# Patient Record
Sex: FEMALE | Race: BLACK OR AFRICAN AMERICAN | Employment: OTHER | URBAN - METROPOLITAN AREA
[De-identification: names, ages, dates, MRNs, and addresses within clinical notes are randomized per-mention and may not be internally consistent; named-entity substitution may affect disease eponyms.]

---

## 2017-01-05 ENCOUNTER — CLINICAL SUPPORT (OUTPATIENT)
Dept: INTERNAL MEDICINE CLINIC | Age: 77
End: 2017-01-05

## 2017-01-05 DIAGNOSIS — I48.20 CHRONIC ATRIAL FIBRILLATION (HCC): Primary | ICD-10-CM

## 2017-01-05 LAB
INR BLD: 1.8
PT POC: 21 SEC
VALID INTERNAL CONTROL?: YES

## 2017-01-05 NOTE — MR AVS SNAPSHOT
Visit Information Date & Time Provider Department Dept. Phone Encounter #  
 1/5/2017  9:15 AM [facility] Roman [facility] [phone] [id number] Upcoming Health Maintenance Date Due Pneumococcal 65+ High/Highest Risk (2 of 2 - PPSV23) 8/30/2017* INFLUENZA AGE 9 TO ADULT 8/30/2017* GLAUCOMA SCREENING Q2Y 3/16/2017 MEDICARE YEARLY EXAM 12/23/2017 DTaP/Tdap/Td series (2 - Td) 5/19/2026 *Topic was postponed. The date shown is not the original due date. Allergies as of 1/5/2017  Review Complete On: 12/22/2016 By: Olga Rendon MD  
  
 Severity Noted Reaction Type Reactions Codeine  09/05/2014    Other (comments) Current Immunizations  Reviewed on 4/19/2016 Name Date Influenza Vaccine 9/15/2014 Not reviewed this visit You Were Diagnosed With   
  
 Codes Comments Chronic atrial fibrillation (HCC)    -  Primary ICD-10-CM: Q75.4 ICD-9-CM: 427.31 Vitals OB Status Smoking Status Postmenopausal Never Smoker Preferred Pharmacy Pharmacy Name Phone [facility] [address] [place] [address]. [phone] Your Updated Medication List  
  
   
This list is accurate as of: 1/5/17 10:09 AM.  Always use your most recent med list.  
  
  
  
  
 amiodarone 200 mg tablet Commonly known as:  CORDARONE Take 0.5 Tabs by mouth daily. calcium 500 mg Tab Take 1 Tab by mouth daily. cyclobenzaprine 10 mg tablet Commonly known as:  FLEXERIL Take 1 Tab by mouth three (3) times daily as needed for Muscle Spasm(s). fentaNYL 25 mcg/hr PATCH Commonly known as:  DURAGESIC  
1 Patch by TransDERmal route every seventy-two (72) hours. Max Daily Amount: 1 Patch. GAVISCON EXTRA STRENGTH 160-105 mg Danuta Coburn Generic drug:  aluminum hydrox-magnesium carb Take 1-2 tablets by mouth four (4) times daily as needed. lisinopril 40 mg tablet Commonly known as:  Merilynn Kvng Take 1 Tab by mouth daily. metoprolol succinate 25 mg XL tablet Commonly known as:  TOPROL-XL Take 0.5 Tabs by mouth daily. oxyCODONE-acetaminophen  mg per tablet Commonly known as:  PERCOCET 10 Take 1 Tab by mouth every eight (8) hours as needed for Pain. Max Daily Amount: 3 Tabs. Polyethylene Glycol 3350 Powd  
by Does Not Apply route daily as needed. potassium chloride 20 mEq tablet Commonly known as:  K-DUR, KLOR-CON Take 1 Tab by mouth two (2) times a day. REVLIMID 25 mg Cap Generic drug:  lenalidomide Take 25 mg by mouth daily. * warfarin 7.5 mg tablet Commonly known as:  COUMADIN Take 7.5 mg by mouth four (4) days a week. 1 tablet (7.5 mg) Monday, Tuesday, Wednesday, Thursday, and Friday * warfarin 7.5 mg tablet Commonly known as:  COUMADIN Take 3.75 mg by mouth three (3) days a week. 1/2 tablet (3.75 mg) on Friday, Saturday, & Sunday * Notice: This list has 2 medication(s) that are the same as other medications prescribed for you. Read the directions carefully, and ask your doctor or other care provider to review them with you. Introducing [facility]! Carlos Lynch introduces iSECUREtrac patient portal. Now you can access parts of your medical record, email your doctor's office, and request medication refills online. 1. In your internet browser, go to [email or web address] 2. Click on the First Time User? Click Here link in the Sign In box. You will see the New Member Sign Up page. 3. Enter your iSECUREtrac Access Code exactly as it appears below. You will not need to use this code after youve completed the sign-up process. If you do not sign up before the expiration date, you must request a new code. · iSECUREtrac Access Code: [email or web address] Expires: 2/5/2017 10:48 AM 
 
 4. Enter the last four digits of your Social Security Number (xxxx) and Date of Birth (mm/dd/yyyy) as indicated and click Submit. You will be taken to the next sign-up page. 5. Create a Zady ID. This will be your Zady login ID and cannot be changed, so think of one that is secure and easy to remember. 6. Create a Zady password. You can change your password at any time. 7. Enter your Password Reset Question and Answer. This can be used at a later time if you forget your password. 8. Enter your e-mail address. You will receive e-mail notification when new information is available in [address] 9. Click Sign Up. You can now view and download portions of your medical record. 10. Click the Download Summary menu link to download a portable copy of your medical information. If you have questions, please visit the Frequently Asked Questions section of the Zady website. Remember, Zady is NOT to be used for urgent needs. For medical emergencies, dial 911. Now available from your iPhone and Android! Please provide this summary of care documentation to your next provider. Your primary care clinician is listed as Deanna Taylor. If you have any questions after today's visit, please call [phone].

## 2017-01-05 NOTE — PROGRESS NOTES
Patient in office for pt check. Patient taking coumadin 7.5 mg, 1/2 tab M-W-F, and 1 tab T-T-S-S. PT: 21.0  INR: 1.8  Per Dr. Fabiola Mancini, no changes and rto in 1 month for pt check.

## 2017-01-31 NOTE — TELEPHONE ENCOUNTER
Daya (pt) came in office requesting Rx refill for: lisinopril (PRINIVIL, ZESTRIL) 40 mg tablet and metoprolol succinate (TOPROL-XL) 25 mg XL tablet.  Pt contact # [phone]

## 2017-02-01 RX ORDER — LISINOPRIL 40 MG/1
40 TABLET ORAL DAILY
Qty: 30 TAB | Refills: 11 | Status: SHIPPED | OUTPATIENT
Start: 2017-02-01 | End: 2017-08-12

## 2017-02-01 RX ORDER — METOPROLOL SUCCINATE 25 MG/1
12.5 TABLET, EXTENDED RELEASE ORAL DAILY
Qty: 30 TAB | Refills: 11 | Status: SHIPPED | OUTPATIENT
Start: 2017-02-01 | End: 2017-05-18 | Stop reason: SDUPTHER

## 2017-02-06 ENCOUNTER — CLINICAL SUPPORT (OUTPATIENT)
Dept: INTERNAL MEDICINE CLINIC | Age: 77
End: 2017-02-06

## 2017-02-06 DIAGNOSIS — I48.20 CHRONIC ATRIAL FIBRILLATION (HCC): Primary | ICD-10-CM

## 2017-02-06 LAB
INR BLD: 2.8
PT POC: 33.3 SEC
VALID INTERNAL CONTROL?: YES

## 2017-02-06 NOTE — MR AVS SNAPSHOT
Visit Information Date & Time Provider Department Dept. Phone Encounter #  
 2/6/2017  9:00 AM Saumya Iqbal MD [facility] [phone] [id number] Your Appointments 2/20/2017  9:00 AM  
Nurse Visit with Saumya Iqbal MD  
[address] [place] [address] and Primary Care [address] [facility]) Appt Note: 2 week f/u  
 [phone]  
  
   
 [phone] Upcoming Health Maintenance Date Due  
 GLAUCOMA SCREENING Q2Y 3/16/2017 Pneumococcal 65+ High/Highest Risk (2 of 2 - PPSV23) 8/30/2017* INFLUENZA AGE 9 TO ADULT 8/30/2017* MEDICARE YEARLY EXAM 12/23/2017 DTaP/Tdap/Td series (2 - Td) 5/19/2026 *Topic was postponed. The date shown is not the original due date. Allergies as of 2/6/2017  Review Complete On: 1/11/2017 By: Saumya Iqbal MD  
  
 Severity Noted Reaction Type Reactions Codeine  09/05/2014    Other (comments) Current Immunizations  Reviewed on 4/19/2016 Name Date Influenza Vaccine 9/15/2014 Not reviewed this visit You Were Diagnosed With   
  
 Codes Comments Chronic atrial fibrillation (HCC)    -  Primary ICD-10-CM: F25.6 ICD-9-CM: 427.31 Vitals OB Status Smoking Status Postmenopausal Never Smoker Preferred Pharmacy Pharmacy Name Phone [facility] [address] [place] [address] [phone] Your Updated Medication List  
  
   
This list is accurate as of: 2/6/17  3:19 PM.  Always use your most recent med list.  
  
  
  
  
 amiodarone 200 mg tablet Commonly known as:  CORDARONE Take 0.5 Tabs by mouth daily. calcium 500 mg Tab Take 1 Tab by mouth daily. cyclobenzaprine 10 mg tablet Commonly known as:  FLEXERIL Take 1 Tab by mouth three (3) times daily as needed for Muscle Spasm(s).   
  
 fentaNYL 25 mcg/hr PATCH  
 Commonly known as:  DURAGESIC  
1 Patch by TransDERmal route every seventy-two (72) hours. Max Daily Amount: 1 Patch. GAVISCON EXTRA STRENGTH 160-105 mg Geovanna Millan Generic drug:  aluminum hydrox-magnesium carb Take 1-2 tablets by mouth four (4) times daily as needed. lisinopril 40 mg tablet Commonly known as:  Quick Edman Take 1 Tab by mouth daily. metoprolol succinate 25 mg XL tablet Commonly known as:  TOPROL-XL Take 0.5 Tabs by mouth daily. oxyCODONE-acetaminophen  mg per tablet Commonly known as:  PERCOCET 10 Take 1 Tab by mouth every eight (8) hours as needed for Pain. Max Daily Amount: 3 Tabs. Polyethylene Glycol 3350 Powd  
by Does Not Apply route daily as needed. potassium chloride 20 mEq tablet Commonly known as:  K-DUR, KLOR-CON Take 1 Tab by mouth two (2) times a day. REVLIMID 25 mg Cap Generic drug:  lenalidomide Take 25 mg by mouth daily. * warfarin 7.5 mg tablet Commonly known as:  COUMADIN Take 7.5 mg by mouth four (4) days a week. 1 tablet (7.5 mg) Monday, Tuesday, Wednesday, Thursday, and Friday * warfarin 7.5 mg tablet Commonly known as:  COUMADIN Take 3.75 mg by mouth three (3) days a week. 1/2 tablet (3.75 mg) on Friday, Saturday, & Sunday * Notice: This list has 2 medication(s) that are the same as other medications prescribed for you. Read the directions carefully, and ask your doctor or other care provider to review them with you. We Performed the Following AMB POC PT/INR [34199 CPT(R)] Introducing [facility]! Lesli Ireland introduces [employer] patient portal. Now you can access parts of your medical record, email your doctor's office, and request medication refills online. 1. In your internet browser, go to [email or web address] 2. Click on the First Time User? Click Here link in the Sign In box. You will see the New Member Sign Up page. 3. Enter your [employer] Access Code exactly as it appears below. You will not need to use this code after youve completed the sign-up process. If you do not sign up before the expiration date, you must request a new code. · [employer] Access Code: [email or web address] Expires: 5/7/2017  9:47 AM 
 
4. Enter the last four digits of your Social Security Number (xxxx) and Date of Birth (mm/dd/yyyy) as indicated and click Submit. You will be taken to the next sign-up page. 5. Create a XebiaLabs ID. This will be your Xeb[employer] login ID and cannot be changed, so think of one that is secure and easy to remember. 6. Create a XebiaLabs password. You can change your password at any time. 7. Enter your Password Reset Question and Answer. This can be used at a later time if you forget your password. 8. Enter your e-mail address. You will receive e-mail notification when new information is available in [address] 9. Click Sign Up. You can now view and download portions of your medical record. 10. Click the Download Summary menu link to download a portable copy of your medical information. If you have questions, please visit the Frequently Asked Questions section of the XebiaLabs website. Remember, XebiaLabs is NOT to be used for urgent needs. For medical emergencies, dial 911. Now available from your iPhone and Android! Please provide this summary of care documentation to your next provider. Your primary care clinician is listed as Deanna Taylor. If you have any questions after today's visit, please call [phone].

## 2017-02-06 NOTE — PROGRESS NOTES
Patient in office for pt/inr check. Patient taking coumadin 5 mg, 1/2 tab M-W-F, and 1 tab Tues-Thurs-Sat-Sun. Patient states that she held coumadin for 3 days last week due to dental procedure, and resumed taking on Friday. PT: 33.3  INR: 2.8  Per Dr. Osorio Najera, no changes and rto in 2 weeks for pt/inr check.

## 2017-02-15 RX ORDER — AMIODARONE HYDROCHLORIDE 200 MG/1
100 TABLET ORAL DAILY
Qty: 30 TAB | Refills: 11 | Status: SHIPPED | OUTPATIENT
Start: 2017-02-15 | End: 2017-08-12

## 2017-02-20 ENCOUNTER — CLINICAL SUPPORT (OUTPATIENT)
Dept: INTERNAL MEDICINE CLINIC | Age: 77
End: 2017-02-20

## 2017-02-20 DIAGNOSIS — I48.20 CHRONIC ATRIAL FIBRILLATION (HCC): Primary | ICD-10-CM

## 2017-02-20 LAB
INR BLD: 1.9
PT POC: 22.7 SEC
VALID INTERNAL CONTROL?: YES

## 2017-02-20 NOTE — MR AVS SNAPSHOT
Visit Information Date & Time Provider Department Dept. Phone Encounter #  
 2/20/2017  9:00 AM Hyacinth Hammans, MD [facility] [phone] [id number] Upcoming Health Maintenance Date Due  
 GLAUCOMA SCREENING Q2Y 3/16/2017 Pneumococcal 65+ High/Highest Risk (2 of 2 - PPSV23) 8/30/2017* INFLUENZA AGE 9 TO ADULT 8/30/2017* MEDICARE YEARLY EXAM 12/23/2017 DTaP/Tdap/Td series (2 - Td) 5/19/2026 *Topic was postponed. The date shown is not the original due date. Allergies as of 2/20/2017  Review Complete On: 1/11/2017 By: Hyacinth Hammans, MD  
  
 Severity Noted Reaction Type Reactions Codeine  09/05/2014    Other (comments) Current Immunizations  Reviewed on 4/19/2016 Name Date Influenza Vaccine 9/15/2014 Not reviewed this visit You Were Diagnosed With   
  
 Codes Comments Chronic atrial fibrillation (HCC)    -  Primary ICD-10-CM: T74.1 ICD-9-CM: 427.31 Vitals OB Status Smoking Status Postmenopausal Never Smoker Preferred Pharmacy Pharmacy Name Phone [facility] [address] [place] [phone] Your Updated Medication List  
  
   
This list is accurate as of: 2/20/17  9:54 AM.  Always use your most recent med list.  
  
  
  
  
 amiodarone 200 mg tablet Commonly known as:  CORDARONE Take 0.5 Tabs by mouth daily. calcium 500 mg Tab Take 1 Tab by mouth daily. cyclobenzaprine 10 mg tablet Commonly known as:  FLEXERIL Take 1 Tab by mouth three (3) times daily as needed for Muscle Spasm(s). fentaNYL 25 mcg/hr PATCH Commonly known as:  DURAGESIC  
1 Patch by TransDERmal route every seventy-two (72) hours. Max Daily Amount: 1 Patch. GAVISCON EXTRA STRENGTH 160-105 mg Peg Dancer Generic drug:  aluminum hydrox-magnesium carb Take 1-2 tablets by mouth four (4) times daily as needed. lisinopril 40 mg tablet Commonly known as:  Ksenia [place] Take 1 Tab by mouth daily. metoprolol succinate 25 mg XL tablet Commonly known as:  TOPROL-XL Take 0.5 Tabs by mouth daily. oxyCODONE-acetaminophen  mg per tablet Commonly known as:  PERCOCET 10 Take 1 Tab by mouth every eight (8) hours as needed for Pain. Max Daily Amount: 3 Tabs. Polyethylene Glycol 3350 Powd  
by Does Not Apply route daily as needed. potassium chloride 20 mEq tablet Commonly known as:  K-DUR, KLOR-CON Take 1 Tab by mouth two (2) times a day. REVLIMID 25 mg Cap Generic drug:  lenalidomide Take 25 mg by mouth daily. * warfarin 7.5 mg tablet Commonly known as:  COUMADIN Take 7.5 mg by mouth four (4) days a week. 1 tablet (7.5 mg) Monday, Tuesday, Wednesday, Thursday, and Friday * warfarin 7.5 mg tablet Commonly known as:  COUMADIN Take 3.75 mg by mouth three (3) days a week. 1/2 tablet (3.75 mg) on Friday, Saturday, & Sunday * Notice: This list has 2 medication(s) that are the same as other medications prescribed for you. Read the directions carefully, and ask your doctor or other care provider to review them with you. We Performed the Following AMB POC PT/INR [67393 CPT(R)] Introducing [facility]! True Jones introduces [employer] patient portal. Now you can access parts of your medical record, email your doctor's office, and request medication refills online. 1. In your internet browser, go to [email or web address] 2. Click on the First Time User? Click Here link in the Sign In box. You will see the New Member Sign Up page. 3. Enter your [employer] Access Code exactly as it appears below. You will not need to use this code after youve completed the sign-up process. If you do not sign up before the expiration date, you must request a new code. · [employer] Access Code: [email or web address] Expires: 5/7/2017  9:47 AM 
 
4. Enter the last four digits of your Social Security Number (xxxx) and Date of Birth (mm/dd/yyyy) as indicated and click Submit. You will be taken to the next sign-up page. 5. Create a Coherent Path ID. This will be your Coherent Path login ID and cannot be changed, so think of one that is secure and easy to remember. 6. Create a Coherent Path password. You can change your password at any time. 7. Enter your Password Reset Question and Answer. This can be used at a later time if you forget your password. 8. Enter your e-mail address. You will receive e-mail notification when new information is available in [address] 9. Click Sign Up. You can now view and download portions of your medical record. 10. Click the Download Summary menu link to download a portable copy of your medical information. If you have questions, please visit the Frequently Asked Questions section of the Coherent Path website. Remember, Coherent Path is NOT to be used for urgent needs. For medical emergencies, dial 911. Now available from your iPhone and Android! Please provide this summary of care documentation to your next provider. Your primary care clinician is listed as Deanna Taylor. If you have any questions after today's visit, please call [phone].

## 2017-02-20 NOTE — PROGRESS NOTES
Patient office for pt check. Patient taking coumadin 5 mg 1/2 tab M-W-F, and 1 tab Jefr-Chmi-Js-Sun. PT: 22.7  INR: 1.9  Per Nisa Holcomb, take 1/2 tab M-F and 1 tab Sa-Sun, and rto office in 2 weeks for pt check.

## 2017-02-24 ENCOUNTER — HOSPITAL ENCOUNTER (EMERGENCY)
Age: 77
Discharge: HOME | End: 2017-02-24
Attending: EMERGENCY MEDICINE
Payer: MEDICARE

## 2017-02-24 VITALS
DIASTOLIC BLOOD PRESSURE: 89 MMHG | OXYGEN SATURATION: 98 % | TEMPERATURE: 98.1 F | RESPIRATION RATE: 18 BRPM | SYSTOLIC BLOOD PRESSURE: 143 MMHG | HEIGHT: 70 IN | BODY MASS INDEX: 29.35 KG/M2 | WEIGHT: 205 LBS | HEART RATE: 66 BPM

## 2017-02-24 DIAGNOSIS — M62.838 NECK MUSCLE SPASM: Primary | ICD-10-CM

## 2017-02-24 DIAGNOSIS — M62.838 TRAPEZIUS MUSCLE SPASM: ICD-10-CM

## 2017-02-24 PROCEDURE — 99282 EMERGENCY DEPT VISIT SF MDM: CPT

## 2017-02-24 NOTE — ED NOTES
Discharge instructions reviewed with and given to pt.by ER NP. No change to assessment and no obvious distress noted at time of discharge. Ambulatory on own accord.

## 2017-02-24 NOTE — ED PROVIDER NOTES
HPI Comments: 67 yo F with hx of arthritis, atrial fibrillation, multiple myeloma, HTN (see list)  presents ambulatory to the ED for L sided neck and shoulder pain since Monday after doing housework. Denies trauma or injury. Denies CP, SOB. No weakness, numbness or tingling of LUE. Past Medical History:  No date: Arthritis  No date: Atrial fibrillation ([address]  No date: Cancer ([facility] [address] [facility]      Comment: multiple myeloma  No date: Hypertension    Social History    Marital status:              Spouse name:                       Years of education:                 Number of children:               Occupational History    None on file    Social History Main Topics    Smoking status: Never Smoker                                                                Smokeless status: Never Used                        Alcohol use: No              Drug use: No              Sexual activity: Not Currently        Other Topics            Concern    None on file    Social History Narrative    None on file            Patient is a 68 y.o. female presenting with neck pain and shoulder pain. Neck Pain      Shoulder Pain           Past Medical History:   Diagnosis Date    Arthritis     Atrial fibrillation ([address]     Cancer ([facility] [address]     multiple myeloma    Hypertension        Past Surgical History:   Procedure Laterality Date    HX GYN           Family History:   Problem Relation Age of Onset    Stroke Mother     No Known Problems Father        Social History     Social History    Marital status:      Spouse name: N/A    Number of children: N/A    Years of education: N/A     Occupational History    Not on file.      Social History Main Topics    Smoking status: Never Smoker    Smokeless tobacco: Never Used    Alcohol use No    Drug use: No    Sexual activity: Not Currently     Other Topics Concern    Not on file     Social History Narrative         ALLERGIES: Codeine    Review of Systems   Musculoskeletal: Positive for neck pain. Vitals:    02/24/17 0951   Pulse: 73   Resp: 16   Temp: 98 °F (36.7 °C)   SpO2: 97%   Weight: 93 kg (205 lb)   Height: 5' 10\" (1.778 m)            Physical Exam   Constitutional: She is oriented to person, place, and time. She appears well-developed and well-nourished. HENT:   Head: Normocephalic and atraumatic. Neck: Muscular tenderness present. No spinous process tenderness present. Decreased range of motion present. Cardiovascular: Normal rate, regular rhythm and intact distal pulses. Pulmonary/Chest: Effort normal. No stridor. No respiratory distress. Neurological: She is alert and oriented to person, place, and time. Skin: She is not diaphoretic. Psychiatric: She has a normal mood and affect. Nursing note and vitals reviewed. MDM  Number of Diagnoses or Management Options  Neck muscle spasm:   Trapezius muscle spasm:   Diagnosis management comments: 69 yo F with L trapezius, shoulder discomfort since Monday after house cleaning, Denies cervical spinous process tenderness. No deformity. ROM of L shoulder decreased secondary to pain. No deformity. Pt has point tenderness to palpation of trapezius. Xray imaging not indicated. Pt has prescriptions for Flexeril and Percocet and will therefore not be prescribed these medications today. Recommend pt use medications as directed. FU with PCP for evaluation of sx.     Patient Progress  Patient progress: stable    ED Course       Procedures

## 2017-02-24 NOTE — ED TRIAGE NOTES
Pt reports left shoulder and left neck pain that began this past Monday. Pt states it hurts to left her arm and turn her neck. Pt denies chest pain or SOB.

## 2017-02-24 NOTE — DISCHARGE INSTRUCTIONS
Neck Spasm: Exercises  Your Care Instructions  Here are some examples of typical rehabilitation exercises for your condition. Start each exercise slowly. Ease off the exercise if you start to have pain. Your doctor or physical therapist will tell you when you can start these exercises and which ones will work best for you. How to do the exercises  Levator scapula stretch    1. Sit in a firm chair, or stand up straight. 2. Gently tilt your head toward your left shoulder. 3. Turn your head to look down into your armpit, bending your head slightly forward. Let the weight of your head stretch your neck muscles. 4. Hold for 15 to 30 seconds. 5. Return to your starting position. 6. Follow the same instructions above, but tilt your head toward your right shoulder. 7. Repeat 2 to 4 times toward each shoulder. Upper trapezius stretch    1. Sit in a firm chair, or stand up straight. 2. This stretch works best if you keep your shoulder down as you lean away from it. To help you remember to do this, start by relaxing your shoulders and lightly holding on to your thighs or your chair. 3. Tilt your head toward your shoulder and hold for 15 to 30 seconds. Let the weight of your head stretch your muscles. 4. If you would like a little added stretch, place your arm behind your back. Use the arm opposite of the direction you are tilting your head. For example, if you are tilting your head to the left, place your right arm behind your back. 5. Repeat 2 to 4 times toward each shoulder. Neck rotation    1. Sit in a firm chair, or stand up straight. 2. Keeping your chin level, turn your head to the right, and hold for 15 to 30 seconds. 3. Turn your head to the left, and hold for 15 to 30 seconds. 4. Repeat 2 to 4 times to each side. Chin tuck    1. Lie on the floor with a rolled-up towel under your neck. Your head should be touching the floor. 2. Slowly bring your chin toward the front of your neck.   3. Hold for a count of 6, and then relax for up to 10 seconds. 4. Repeat 8 to 12 times. Forward neck flexion    1. Sit in a firm chair, or stand up straight. 2. Bend your head forward. 3. Hold for 15 to 30 seconds, then return to your starting position. 4. Repeat 2 to 4 times. Follow-up care is a key part of your treatment and safety. Be sure to make and go to all appointments, and call your doctor if you are having problems. It's also a good idea to know your test results and keep a list of the medicines you take. Where can you learn more? Go to http://mila-syeda.info/. Enter P962 in the search box to learn more about \"Neck Spasm: Exercises. \"  Current as of: May 23, 2016  Content Version: 11.1  © 5596-5545 [employer]. Care instructions adapted under license by [facility] (which disclaims liability or warranty for this information). If you have questions about a medical condition or this instruction, always ask your healthcare professional. [place] [address] any warranty or liability for your use of this information. We hope that we have addressed all of your medical concerns. The examination and treatment you received in the Emergency Department were for an emergent problem and were not intended as complete care. It is important that you follow up with your healthcare provider(s) for ongoing care. If your symptoms worsen or do not improve as expected, and you are unable to reach your usual health care provider(s), you should return to the Emergency Department. Today's healthcare is undergoing tremendous change, and patient satisfaction surveys are one of the many tools to assess the quality of medical care. You may receive a survey from the [employer] regarding your experience in the Emergency Department. I hope that your experience has been completely positive, particularly the medical care that I provided.   As such, please participate in the survey; anything less than excellent does not meet my expectations or intentions. [facility] and [facility] participate in nationally recognized quality of care measures. If your blood pressure is greater than 120/80, as reported below, we urge that you seek medical care to address the potential of high blood pressure, commonly known as hypertension. Hypertension can be hereditary or can be caused by certain medical conditions, pain, stress, or \"white coat syndrome. \"       Please make an appointment with your health care provider(s) for follow up of your Emergency Department visit. VITALS:   Patient Vitals for the past 8 hrs:   Temp Pulse Resp SpO2   02/24/17 0951 98 °F (36.7 °C) 73 16 97 %          Thank you for allowing us to provide you with medical care today. We realize that you have many choices for your emergency care needs. Please choose us in the future for any continued health care needs. July Villalta, [address] [facility] [[email or web address]] Sonny Suero: [phone]            No results found for this or any previous visit (from the past 24 hour(s)). No results found.

## 2017-02-28 ENCOUNTER — CLINICAL SUPPORT (OUTPATIENT)
Dept: INTERNAL MEDICINE CLINIC | Age: 77
End: 2017-02-28

## 2017-02-28 DIAGNOSIS — I48.20 CHRONIC ATRIAL FIBRILLATION (HCC): Primary | ICD-10-CM

## 2017-02-28 LAB
INR BLD: 2.3
PT POC: 26.7 SEC
VALID INTERNAL CONTROL?: YES

## 2017-02-28 NOTE — MR AVS SNAPSHOT
Visit Information Date & Time Provider Department Dept. Phone Encounter #  
 2/28/2017 10:00 AM Felice Causey MD [facility] Medicine and Primary Care [phone] [id number] Upcoming Health Maintenance Date Due  
 GLAUCOMA SCREENING Q2Y 3/16/2017 Pneumococcal 65+ High/Highest Risk (2 of 2 - PPSV23) 8/30/2017* INFLUENZA AGE 9 TO ADULT 8/30/2017* MEDICARE YEARLY EXAM 12/23/2017 DTaP/Tdap/Td series (2 - Td) 5/19/2026 *Topic was postponed. The date shown is not the original due date. Allergies as of 2/28/2017  Review Complete On: 2/24/2017 By: Rhonda Yancey RN Severity Noted Reaction Type Reactions Codeine  09/05/2014    Other (comments) Current Immunizations  Reviewed on 4/19/2016 Name Date Influenza Vaccine 9/15/2014 Not reviewed this visit Vitals OB Status Postmenopausal       
  
  
Preferred Pharmacy Pharmacy Name Phone [facility] [address] [place] [address] [phone] Your Updated Medication List  
  
   
This list is accurate as of: 2/28/17 10:46 AM.  Always use your most recent med list.  
  
  
  
  
 amiodarone 200 mg tablet Commonly known as:  CORDARONE Take 0.5 Tabs by mouth daily. calcium 500 mg Tab Take 1 Tab by mouth daily. cyclobenzaprine 10 mg tablet Commonly known as:  FLEXERIL Take 1 Tab by mouth three (3) times daily as needed for Muscle Spasm(s). fentaNYL 25 mcg/hr PATCH Commonly known as:  DURAGESIC  
1 Patch by TransDERmal route every seventy-two (72) hours. Max Daily Amount: 1 Patch. GAVISCON EXTRA STRENGTH 160-105 mg Issa Navas Generic drug:  aluminum hydrox-magnesium carb Take 1-2 tablets by mouth four (4) times daily as needed. lisinopril 40 mg tablet Commonly known as:  Shayy Pinch Take 1 Tab by mouth daily. metoprolol succinate 25 mg XL tablet Commonly known as:  TOPROL-XL  
 Take 0.5 Tabs by mouth daily. oxyCODONE-acetaminophen  mg per tablet Commonly known as:  PERCOCET 10 Take 1 Tab by mouth every eight (8) hours as needed for Pain. Max Daily Amount: 3 Tabs. Polyethylene Glycol 3350 Powd  
by Does Not Apply route daily as needed. potassium chloride 20 mEq tablet Commonly known as:  K-DUR, KLOR-CON Take 1 Tab by mouth two (2) times a day. REVLIMID 25 mg Cap Generic drug:  lenalidomide Take 25 mg by mouth daily. * warfarin 7.5 mg tablet Commonly known as:  COUMADIN Take 7.5 mg by mouth four (4) days a week. 1 tablet (7.5 mg) Monday, Tuesday, Wednesday, Thursday, and Friday * warfarin 7.5 mg tablet Commonly known as:  COUMADIN Take 3.75 mg by mouth three (3) days a week. 1/2 tablet (3.75 mg) on Friday, Saturday, & Sunday * Notice: This list has 2 medication(s) that are the same as other medications prescribed for you. Read the directions carefully, and ask your doctor or other care provider to review them with you. Introducing [facility]! Dariela Alexis introduces FamilyApp patient portal. Now you can access parts of your medical record, email your doctor's office, and request medication refills online. 1. In your internet browser, go to [email or web address] 2. Click on the First Time User? Click Here link in the Sign In box. You will see the New Member Sign Up page. 3. Enter your FamilyApp Access Code exactly as it appears below. You will not need to use this code after youve completed the sign-up process. If you do not sign up before the expiration date, you must request a new code. · FamilyApp Access Code: [email or web address] Expires: 5/7/2017  9:47 AM 
 
4. Enter the last four digits of your Social Security Number (xxxx) and Date of Birth (mm/dd/yyyy) as indicated and click Submit. You will be taken to the next sign-up page. 5. Create a ProNerve ID. This will be your ProNerve login ID and cannot be changed, so think of one that is secure and easy to remember. 6. Create a ProNerve password. You can change your password at any time. 7. Enter your Password Reset Question and Answer. This can be used at a later time if you forget your password. 8. Enter your e-mail address. You will receive e-mail notification when new information is available in [address] 9. Click Sign Up. You can now view and download portions of your medical record. 10. Click the Download Summary menu link to download a portable copy of your medical information. If you have questions, please visit the Frequently Asked Questions section of the ProNerve website. Remember, ProNerve is NOT to be used for urgent needs. For medical emergencies, dial 911. Now available from your iPhone and Android! Please provide this summary of care documentation to your next provider. Your primary care clinician is listed as Deanna Taylor. If you have any questions after today's visit, please call [phone].

## 2017-03-02 NOTE — PROGRESS NOTES
Patient in office for pt/inr check. Patient taking coumadin 5 mg 1tab M-F and 1/2 tab S-S. PT: 26.7  INR: 2.3  Per Dr. Debo Cunningham, no changes and rto in 1 month for pt/inr check.

## 2017-03-15 ENCOUNTER — HOSPITAL ENCOUNTER (EMERGENCY)
Age: 77
Discharge: HOME | End: 2017-03-15
Attending: EMERGENCY MEDICINE
Payer: MEDICARE

## 2017-03-15 ENCOUNTER — APPOINTMENT (OUTPATIENT)
Dept: GENERAL RADIOLOGY | Age: 77
End: 2017-03-15
Attending: EMERGENCY MEDICINE
Payer: MEDICARE

## 2017-03-15 VITALS
DIASTOLIC BLOOD PRESSURE: 91 MMHG | RESPIRATION RATE: 18 BRPM | TEMPERATURE: 98.2 F | HEART RATE: 90 BPM | HEIGHT: 70 IN | BODY MASS INDEX: 29.35 KG/M2 | OXYGEN SATURATION: 100 % | SYSTOLIC BLOOD PRESSURE: 177 MMHG | WEIGHT: 205 LBS

## 2017-03-15 DIAGNOSIS — M19.019 SHOULDER ARTHRITIS: ICD-10-CM

## 2017-03-15 DIAGNOSIS — M25.512 ACUTE PAIN OF LEFT SHOULDER: Primary | ICD-10-CM

## 2017-03-15 PROCEDURE — 74011250637 HC RX REV CODE- 250/637: Performed by: PHYSICIAN ASSISTANT

## 2017-03-15 PROCEDURE — 73030 X-RAY EXAM OF SHOULDER: CPT

## 2017-03-15 PROCEDURE — 99283 EMERGENCY DEPT VISIT LOW MDM: CPT

## 2017-03-15 RX ORDER — HYDROMORPHONE HYDROCHLORIDE 2 MG/1
2 TABLET ORAL
Qty: 15 TAB | Refills: 0 | Status: ON HOLD | OUTPATIENT
Start: 2017-03-15 | End: 2017-08-11

## 2017-03-15 RX ORDER — OXYCODONE AND ACETAMINOPHEN 5; 325 MG/1; MG/1
2 TABLET ORAL
Status: COMPLETED | OUTPATIENT
Start: 2017-03-15 | End: 2017-03-15

## 2017-03-15 RX ADMIN — OXYCODONE HYDROCHLORIDE AND ACETAMINOPHEN 2 TABLET: 5; 325 TABLET ORAL at 10:50

## 2017-03-15 NOTE — ED NOTES
Patient c/o L shoulder pain that started 3 weeks ago. Denies CP, SOB,swelling on ankles. Pain worse with movement. No injury. No distress noted. Will continue to monitor.

## 2017-03-15 NOTE — DISCHARGE INSTRUCTIONS
Musculoskeletal Pain: Care Instructions  Your Care Instructions  Different problems with the bones, muscles, nerves, ligaments, and tendons in the body can cause pain. One or more areas of your body may ache or burn. Or they may feel tired, stiff, or sore. The medical term for this type of pain is musculoskeletal pain. It can have many different causes. Sometimes the pain is caused by an injury such as a strain or sprain. Or you might have pain from using one part of your body in the same way over and over again. This is called overuse. In some cases, the cause of the pain is another health problem such as arthritis or fibromyalgia. The doctor will examine you and ask you questions about your health to help find the cause of your pain. Blood tests or imaging tests like an X-ray may also be helpful. But sometimes doctors can't find a cause of the pain. Treatment depends on your symptoms and the cause of the pain, if known. The doctor has checked you carefully, but problems can develop later. If you notice any problems or new symptoms, get medical treatment right away. Follow-up care is a key part of your treatment and safety. Be sure to make and go to all appointments, and call your doctor if you are having problems. It's also a good idea to know your test results and keep a list of the medicines you take. How can you care for yourself at home? · Rest until you feel better. · Do not do anything that makes the pain worse. Return to exercise gradually if you feel better and your doctor says it's okay. · Be safe with medicines. Read and follow all instructions on the label. ¨ If the doctor gave you a prescription medicine for pain, take it as prescribed. ¨ If you are not taking a prescription pain medicine, ask your doctor if you can take an over-the-counter medicine. · Put ice or a cold pack on the area for 10 to 20 minutes at a time to ease pain. Put a thin cloth between the ice and your skin.   When should you call for help? Call your doctor now or seek immediate medical care if:  · You have new pain, or your pain gets worse. · You have new symptoms such as a fever, a rash, or chills. Watch closely for changes in your health, and be sure to contact your doctor if:  · You do not get better as expected. Where can you learn more? Go to E[employer].be  Enter Q624 in the search box to learn more about \"Musculoskeletal Pain: Care Instructions. \"   © 4331-2460 [employer] Incorporated. Care instructions adapted under license by [employer] (which disclaims liability or warranty for this information). This care instruction is for use with your licensed healthcare professional. If you have questions about a medical condition or this instruction, always ask your healthcare professional. [address] any warranty or liability for your use of this information.   Content Version: 93.7.254550; Current as of: November 20, 2015

## 2017-03-15 NOTE — ED NOTES
Patient discharged by provider. No distress noted. Patient sent out with wheelchair and RN with ride home.
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
## 2017-03-17 ENCOUNTER — HOSPITAL ENCOUNTER (OUTPATIENT)
Dept: VASCULAR SURGERY | Age: 77
Discharge: HOME | End: 2017-03-17
Attending: INTERNAL MEDICINE
Payer: MEDICARE

## 2017-03-17 DIAGNOSIS — C90.00 MULTIPLE MYELOMA, WITHOUT MENTION OF HAVING ACHIEVED REMISSION: ICD-10-CM

## 2017-03-17 DIAGNOSIS — M79.602 PAIN IN LEFT ARM: ICD-10-CM

## 2017-03-17 DIAGNOSIS — R60.9 EDEMA, UNSPECIFIED: ICD-10-CM

## 2017-03-17 PROCEDURE — 93971 EXTREMITY STUDY: CPT

## 2017-03-17 NOTE — PROCEDURES
[facility]  *** FINAL REPORT ***    Name: Frank Vivar  MRN: [id number]    Outpatient  : 27 Mar 1940  HIS Order #: [id number]  [address] [facility] Visit #: [id number]  Date: 17 Mar 2017    TYPE OF TEST: Peripheral Venous Testing    REASON FOR TEST  Limb swelling    Left Arm:-  Deep venous thrombosis:           No  Superficial venous thrombosis:    No      INTERPRETATION/FINDINGS  PROCEDURE:  Venous duplex examination using B-mode, color flow and  spectral Doppler of the upper extremity veins. Left arm :  1. Deep vein(s) visualized include the internal jugular, subclavian,  axillary, brachial, radial and ulnar veins. 2. No evidence of deep venous thrombosis detected in the veins  visualized. 3. Superficial vein(s) visualized include the basilic (upper arm),  basilic (forearm), cephalic (upper arm) and cephalic (forearm) veins. 4. No evidence of superficial thrombosis detected. ADDITIONAL COMMENTS    I have personally reviewed the data relevant to the interpretation of  this  study. TECHNOLOGIST: Joi Boyd RVT, RDMS  Signed: 2017 01:51 PM    PHYSICIAN: Kamla Owusu MD  Signed: 2017 08:57 AM

## 2017-03-17 NOTE — PROGRESS NOTES
[facility] Vascular  Preliminary Report:  Venous Duplex Arm    Left arm venous duplex was performed. All deep and superficial veins appear compressible with normal Doppler characteristics. Final report to follow.

## 2017-03-21 ENCOUNTER — OFFICE VISIT (OUTPATIENT)
Dept: INTERNAL MEDICINE CLINIC | Age: 77
End: 2017-03-21

## 2017-03-21 VITALS
RESPIRATION RATE: 16 BRPM | HEIGHT: 70 IN | WEIGHT: 207 LBS | BODY MASS INDEX: 29.63 KG/M2 | DIASTOLIC BLOOD PRESSURE: 76 MMHG | OXYGEN SATURATION: 97 % | SYSTOLIC BLOOD PRESSURE: 119 MMHG | TEMPERATURE: 98.3 F | HEART RATE: 86 BPM

## 2017-03-21 DIAGNOSIS — M65.9 TENOSYNOVITIS OF HAND: ICD-10-CM

## 2017-03-21 DIAGNOSIS — M77.9 TENDONITIS/CAPSULITIS/PERIARTHRITIS: Primary | ICD-10-CM

## 2017-03-21 DIAGNOSIS — C90.00 MULTIPLE MYELOMA, REMISSION STATUS UNSPECIFIED (HCC): ICD-10-CM

## 2017-03-21 DIAGNOSIS — I48.20 CHRONIC ATRIAL FIBRILLATION (HCC): ICD-10-CM

## 2017-03-21 DIAGNOSIS — I10 ESSENTIAL HYPERTENSION: ICD-10-CM

## 2017-03-21 DIAGNOSIS — M17.0 PRIMARY OSTEOARTHRITIS OF BOTH KNEES: ICD-10-CM

## 2017-03-21 LAB
INR BLD: 3.4
PT POC: 40.2 SEC
VALID INTERNAL CONTROL?: YES

## 2017-03-21 NOTE — MR AVS SNAPSHOT
Visit Information Date & Time Provider Department Dept. Phone Encounter #  
 3/21/2017  1:00 PM Kady Ayers MD [facility] and [place] [address] [id number] Your Appointments 4/21/2017 11:00 AM  
Any with Kady Ayers MD  
[address] [place] [address] and Primary Care [address]) Appt Note: follow up 0 [address] Jimena Mcgarry [phone] Upcoming Health Maintenance Date Due  
 GLAUCOMA SCREENING Q2Y 3/16/2017 Pneumococcal 65+ High/Highest Risk (2 of 2 - PPSV23) 8/30/2017* INFLUENZA AGE 9 TO ADULT 8/30/2017* MEDICARE YEARLY EXAM 12/23/2017 DTaP/Tdap/Td series (2 - Td) 5/19/2026 *Topic was postponed. The date shown is not the original due date. Allergies as of 3/21/2017  Review Complete On: 3/21/2017 By: Jeffrey Mcintosh LPN Severity Noted Reaction Type Reactions Codeine  09/05/2014    Other (comments) Current Immunizations  Reviewed on 4/19/2016 Name Date Influenza Vaccine 9/15/2014 Not reviewed this visit You Were Diagnosed With   
  
 Codes Comments Tendonitis/capsulitis/periarthritis    -  Primary ICD-10-CM: M77.9 ICD-9-CM: 726.90 Tenosynovitis of hand     ICD-10-CM: M65.9 ICD-9-CM: 727.05 Left hand Multiple myeloma, remission status unspecified (HCC)     ICD-10-CM: C90.00 ICD-9-CM: 203.00 Essential hypertension     ICD-10-CM: I10 
ICD-9-CM: 401.9 Vitals BP Pulse Temp Resp Height(growth percentile) Weight(growth percentile) 119/76 (BP 1 Location: Left arm, BP Patient Position: Sitting) 86 98.3 °F (36.8 °C) (Oral) 16 5' 10\" (1.778 m) 207 lb (93.9 kg) SpO2 BMI OB Status Smoking Status 97% 29.7 kg/m2 Postmenopausal Never Smoker Vitals History BMI and BSA Data Body Mass Index Body Surface Area  
 29.7 kg/m 2 2.15 m 2 Preferred Pharmacy Pharmacy Name Phone [facility] [address] [place] [address] [phone] Your Updated Medication List  
  
   
This list is accurate as of: 3/21/17  2:42 PM.  Always use your most recent med list.  
  
  
  
  
 amiodarone 200 mg tablet Commonly known as:  CORDARONE Take 0.5 Tabs by mouth daily. calcium 500 mg Tab Take 1 Tab by mouth daily. cyclobenzaprine 10 mg tablet Commonly known as:  FLEXERIL Take 1 Tab by mouth three (3) times daily as needed for Muscle Spasm(s). fentaNYL 25 mcg/hr PATCH Commonly known as:  DURAGESIC  
1 Patch by TransDERmal route every seventy-two (72) hours. Max Daily Amount: 1 Patch. GAVISCON EXTRA STRENGTH 160-105 mg Anna Dupes Generic drug:  aluminum hydrox-magnesium carb Take 1-2 tablets by mouth four (4) times daily as needed. HYDROmorphone 2 mg tablet Commonly known as:  DILAUDID Take 1 Tab by mouth every four (4) hours as needed for Pain. Max Daily Amount: 12 mg.  
  
 lisinopril 40 mg tablet Commonly known as:  Merilynn Monroe Take 1 Tab by mouth daily. metoprolol succinate 25 mg XL tablet Commonly known as:  TOPROL-XL Take 0.5 Tabs by mouth daily. oxyCODONE-acetaminophen  mg per tablet Commonly known as:  PERCOCET 10 Take 1 Tab by mouth every eight (8) hours as needed for Pain. Max Daily Amount: 3 Tabs. Polyethylene Glycol 3350 Powd  
by Does Not Apply route daily as needed. potassium chloride 20 mEq tablet Commonly known as:  K-DUR, KLOR-CON Take 1 Tab by mouth two (2) times a day. REVLIMID 25 mg Cap Generic drug:  lenalidomide Take 25 mg by mouth daily. * warfarin 7.5 mg tablet Commonly known as:  COUMADIN Take 7.5 mg by mouth four (4) days a week. 1 tablet (7.5 mg) Monday, Tuesday, Wednesday, Thursday, and Friday * warfarin 7.5 mg tablet Commonly known as:  COUMADIN Take 3.75 mg by mouth three (3) days a week.  1/2 tablet (3.75 mg) on Friday, Saturday, & Sunday * Notice: This list has 2 medication(s) that are the same as other medications prescribed for you. Read the directions carefully, and ask your doctor or other care provider to review them with you. We Performed the Following METABOLIC PANEL, BASIC [83638 CPT(R)] URIC ACID A761978 CPT(R)] Introducing [facility] SERVICES! Dariela Vanesa introduces PicLyf patient portal. Now you can access parts of your medical record, email your doctor's office, and request medication refills online. 1. In your internet browser, go to [email or web address] 2. Click on the First Time User? Click Here link in the Sign In box. You will see the New Member Sign Up page. 3. Enter your PicLyf Access Code exactly as it appears below. You will not need to use this code after youve completed the sign-up process. If you do not sign up before the expiration date, you must request a new code. · PicLyf Access Code: [email or web address] Expires: 5/7/2017 10:47 AM 
 
4. Enter the last four digits of your Social Security Number (xxxx) and Date of Birth (mm/dd/yyyy) as indicated and click Submit. You will be taken to the next sign-up page. 5. Create a PicLyf ID. This will be your PicLyf login ID and cannot be changed, so think of one that is secure and easy to remember. 6. Create a PicLyf password. You can change your password at any time. 7. Enter your Password Reset Question and Answer. This can be used at a later time if you forget your password. 8. Enter your e-mail address. You will receive e-mail notification when new information is available in [address] 9. Click Sign Up. You can now view and download portions of your medical record. 10. Click the Download Summary menu link to download a portable copy of your medical information.  
 
If you have questions, please visit the Frequently Asked Questions section of the Vivisimo. Remember, Rocketriphart is NOT to be used for urgent needs. For medical emergencies, dial 911. Now available from your iPhone and Android! Please provide this summary of care documentation to your next provider. Your primary care clinician is listed as Deanna Taylor. If you have any questions after today's visit, please call [phone].

## 2017-03-21 NOTE — PROGRESS NOTES
Charley Harrison is a 68 y.o. female  Chief Complaint   Patient presents with   [facility] Follow Up     [facility] last tuesday, ED [facility] last friday r/t hand and back pain     1. Have you been to the ER, urgent care clinic since your last visit? Hospitalized since your last visit? [facility] last Tuesday, ED [facility] last friday    2. Have you seen or consulted any other health care providers outside of the [address] [place] [address] since your last visit? Include any pap smears or colon screening.  No

## 2017-03-21 NOTE — PROGRESS NOTES
Patient also in office for pt/inr check. Patient coumadin 5 mg, 1 tab M-F, and 1/2 tab Sat and Sun. PT: 40.2  INR: 3.4  Per Dr. Isabel Mays take 1 tab M-Thur, and 1/2 tab F-Sun and rto in 1 month. A steroid injection was performed at left shoulder using 1% plain Lidocaine and 40 mg of Kenalog. This was well tolerated.

## 2017-03-21 NOTE — PROGRESS NOTES
[address] [facility] and Primary Care  [facility]  [address] [facility] [address]  Phone:  [phone]  Fax: [phone]       Chief Complaint   Patient presents with   [facility] Follow Up     [facility] last tuesday, ED [facility] last friday r/t hand and back pain   . SUBJECTIVE:    Agustin Castano is a 68 y.o. female comes in stating that about a week and a half ago she developed swelling and pain of her hand. This is accompanied also by a painful left shoulder which has gotten progressively worse. There has been no antecedent history of trauma. She went to the emergency room on two separate occasions and nothing was really done until she saw the physician's assistant at Dr. Gerard Heller office who gave her a course of Prednisone. The pain in her left hand improved significantly but she continues to have significant pain in her left shoulder. She denies any similar symptoms in the past.      She is actively being treated for multiple myeloma by Dr. Shahnaz Braswell. She continues to have pain in her knees requiring use of a cane currently. She is taking her antihypertensive medication as prescribed. Finally she remains on her Coumadin for her paroxysmal atrial fibrillation. Current Outpatient Prescriptions   Medication Sig Dispense Refill    lisinopril (PRINIVIL, ZESTRIL) 40 mg tablet Take 1 Tab by mouth daily. 30 Tab 11    metoprolol succinate (TOPROL-XL) 25 mg XL tablet Take 0.5 Tabs by mouth daily. 30 Tab 11    potassium chloride (K-DUR, KLOR-CON) 20 mEq tablet Take 1 Tab by mouth two (2) times a day. 60 Tab 11    Polyethylene Glycol 3350 powd by Does Not Apply route daily as needed.  warfarin (COUMADIN) 7.5 mg tablet Take 7.5 mg by mouth four (4) days a week. 1 tablet (7.5 mg) Monday, Tuesday, Wednesday, Thursday, and Friday       warfarin (COUMADIN) 7.5 mg tablet Take 3.75 mg by mouth three (3) days a week.  1/2 tablet (3.75 mg) on Friday, Saturday, & Sunday       calcium 500 mg tab Take 1 Tab by mouth daily.  HYDROmorphone (DILAUDID) 2 mg tablet Take 1 Tab by mouth every four (4) hours as needed for Pain. Max Daily Amount: 12 mg. 15 Tab 0    amiodarone (CORDARONE) 200 mg tablet Take 0.5 Tabs by mouth daily. 30 Tab 11    oxyCODONE-acetaminophen (PERCOCET 10)  mg per tablet Take 1 Tab by mouth every eight (8) hours as needed for Pain. Max Daily Amount: 3 Tabs. 75 Tab 0    cyclobenzaprine (FLEXERIL) 10 mg tablet Take 1 Tab by mouth three (3) times daily as needed for Muscle Spasm(s). 60 Tab 5    fentaNYL (DURAGESIC) 25 mcg/hr PATCH 1 Patch by TransDERmal route every seventy-two (72) hours. Max Daily Amount: 1 Patch. (Patient taking differently: 1 Patch by TransDERmal route every seventy-two (72) hours as needed.) 10 Patch 0    aluminum hydrox-magnesium carb (GAVISCON EXTRA STRENGTH) 160-105 mg chew Take 1-2 tablets by mouth four (4) times daily as needed.  lenalidomide (REVLIMID) 25 mg cap Take 25 mg by mouth daily.        Past Medical History:   Diagnosis Date    Arthritis     Atrial fibrillation ([facility]     Cancer ([facility]     multiple myeloma    Hypertension      Past Surgical History:   Procedure Laterality Date    HX GYN       Allergies   Allergen Reactions    Codeine Other (comments)         REVIEW OF SYSTEMS:  General: negative for - chills or fever  ENT: negative for - headaches, nasal congestion or tinnitus  Respiratory: negative for - cough, hemoptysis, shortness of breath or wheezing  Cardiovascular : negative for - chest pain, edema, palpitations or shortness of breath  Gastrointestinal: negative for - abdominal pain, blood in stools, heartburn or nausea/vomiting  Genito-Urinary: no dysuria, trouble voiding, or hematuria  Musculoskeletal: negative for - gait disturbance, joint pain, joint stiffness or joint swelling  Neurological: no TIA or stroke symptoms  Hematologic: no bruises, no bleeding, no swollen glands  Integument: no lumps, mole changes, nail changes or rash  Endocrine: no malaise/lethargy or unexpected weight changes      Social History     Social History    Marital status:      Spouse name: N/A    Number of children: N/A    Years of education: N/A     Social History Main Topics    Smoking status: Never Smoker    Smokeless tobacco: Never Used    Alcohol use No    Drug use: No    Sexual activity: Not Currently     Other Topics Concern    None     Social History Narrative     Family History   Problem Relation Age of Onset    Stroke Mother     No Known Problems Father        OBJECTIVE:    Visit Vitals    /76 (BP 1 Location: Left arm, BP Patient Position: Sitting)    Pulse 86    Temp 98.3 °F (36.8 °C) (Oral)    Resp 16    Ht 5' 10\" (1.778 m)    Wt 207 lb (93.9 kg)    SpO2 97%    BMI 29.7 kg/m2     CONSTITUTIONAL: well , well nourished, appears age appropriate  EYES: perrla, eom intact  ENMT:moist mucous membranes, pharynx clear  NECK: supple. Thyroid normal  RESPIRATORY: Chest: clear to ascultation and percussion   CARDIOVASCULAR: Heart: regular rate and rhythm  GASTROINTESTINAL: Abdomen: soft, bowel sounds active  HEMATOLOGIC: no pathological lymph nodes palpated  MUSCULOSKELETAL: Extremities: no edema, pulse 1+   INTEGUMENT: No unusual rashes or suspicious skin lesions noted. Nails appear normal.  NEUROLOGIC: non-focal exam   MENTAL STATUS: alert and oriented, appropriate affect      ASSESSMENT:  1. Tendonitis/capsulitis/periarthritis    2. Tenosynovitis of hand    3. Multiple myeloma, remission status unspecified ([facility]    4. Essential hypertension    5. Primary osteoarthritis of both knees    6. Chronic atrial fibrillation (HCC)        PLAN:    1. The patient had a tenosynovitis of the left wrist and hand. This is significantly better. She is taking her last dose of Prednisone today. 2. She has a modest inflammation of her left pericapsular joint. There is a moderate amount of swelling that is there also.   Under sterile technique I inject Kenalog 40 mg and Xylocaine 1% 5 cc's. She tolerates the procedure well. My entry point within the tendon complex was not as invasive as I would like it to have been. 3. She will continue follow-up with Dr. Ray Ryder regarding multiple myeloma. Apparently she is doing well from this perspective. 4. Blood pressure is excellent today and no adjustments are made. 5. Her osteoarthritis of her knees is reasonably stable. She continues use of her NSAID. 6. Her INR is a bit elevated. She will hold the Coumadin for 24 hours and resume it at 5 mg Monday through Thursday and 2.5 mg on Friday, Saturday, and Sunday. .  Orders Placed This Encounter    URIC ACID    METABOLIC PANEL, BASIC    AMB POC PT/INR         Follow-up Disposition:  Return in about 2 weeks (around 4/4/2017).       Nadia Hinkle MD

## 2017-03-22 LAB
BUN SERPL-MCNC: 30 MG/DL (ref 8–27)
BUN/CREAT SERPL: 30 (ref 11–26)
CALCIUM SERPL-MCNC: 9.4 MG/DL (ref 8.7–10.3)
CHLORIDE SERPL-SCNC: 102 MMOL/L (ref 96–106)
CO2 SERPL-SCNC: 26 MMOL/L (ref 18–29)
CREAT SERPL-MCNC: 1 MG/DL (ref 0.57–1)
GLUCOSE SERPL-MCNC: 85 MG/DL (ref 65–99)
POTASSIUM SERPL-SCNC: 4.3 MMOL/L (ref 3.5–5.2)
SODIUM SERPL-SCNC: 143 MMOL/L (ref 134–144)
URATE SERPL-MCNC: 3.9 MG/DL (ref 2.5–7.1)

## 2017-03-28 ENCOUNTER — HOSPITAL ENCOUNTER (OUTPATIENT)
Dept: ULTRASOUND IMAGING | Age: 77
Discharge: HOME | End: 2017-03-28
Attending: INTERNAL MEDICINE
Payer: MEDICARE

## 2017-03-28 DIAGNOSIS — R10.9 ABDOMINAL PAIN, UNSPECIFIED SITE: ICD-10-CM

## 2017-03-28 DIAGNOSIS — C90.00 MULTIPLE MYELOMA, WITHOUT MENTION OF HAVING ACHIEVED REMISSION: ICD-10-CM

## 2017-03-28 PROCEDURE — 76700 US EXAM ABDOM COMPLETE: CPT

## 2017-04-21 ENCOUNTER — OFFICE VISIT (OUTPATIENT)
Dept: INTERNAL MEDICINE CLINIC | Age: 77
End: 2017-04-21

## 2017-04-21 VITALS
BODY MASS INDEX: 30.35 KG/M2 | HEIGHT: 70 IN | TEMPERATURE: 98.4 F | DIASTOLIC BLOOD PRESSURE: 90 MMHG | OXYGEN SATURATION: 98 % | WEIGHT: 212 LBS | HEART RATE: 89 BPM | RESPIRATION RATE: 16 BRPM | SYSTOLIC BLOOD PRESSURE: 124 MMHG

## 2017-04-21 DIAGNOSIS — I10 ESSENTIAL HYPERTENSION: ICD-10-CM

## 2017-04-21 DIAGNOSIS — M65.819 SYNOVITIS OF SHOULDER: ICD-10-CM

## 2017-04-21 DIAGNOSIS — R29.898 MUSCULAR DECONDITIONING: ICD-10-CM

## 2017-04-21 DIAGNOSIS — I48.20 CHRONIC ATRIAL FIBRILLATION (HCC): Primary | ICD-10-CM

## 2017-04-21 DIAGNOSIS — I87.2 VENOUS INSUFFICIENCY: ICD-10-CM

## 2017-04-21 DIAGNOSIS — C90.00 MULTIPLE MYELOMA, REMISSION STATUS UNSPECIFIED (HCC): ICD-10-CM

## 2017-04-21 LAB
INR BLD: 2.5
PT POC: 29.9 SEC
VALID INTERNAL CONTROL?: YES

## 2017-04-21 RX ORDER — OXYCODONE AND ACETAMINOPHEN 10; 325 MG/1; MG/1
1 TABLET ORAL
Qty: 75 TAB | Refills: 0 | Status: SHIPPED | OUTPATIENT
Start: 2017-04-21 | End: 2017-11-25

## 2017-04-21 NOTE — PROGRESS NOTES
1. Have you been to the ER, urgent care clinic since your last visit? Hospitalized since your last visit? No    2. Have you seen or consulted any other health care providers outside of the [address] [place] [address] since your last visit? Include any pap smears or colon screening.  No

## 2017-04-21 NOTE — MR AVS SNAPSHOT
Visit Information Date & Time Provider Department Dept. Phone Encounter #  
 4/21/2017 11:00 AM Madeline Castaneda MD [facility] and Primary Care [phone] Your Appointments 5/19/2017 11:00 AM  
New Patient with Madeline Castaneda MD  
[address] [facility] and Primary Care [facility]) Appt Note: follow up [address] [place]  
  
   
 [address] [phone] Upcoming Health Maintenance Date Due  
 GLAUCOMA SCREENING Q2Y 3/16/2017 Pneumococcal 65+ High/Highest Risk (2 of 2 - PPSV23) 8/30/2017* INFLUENZA AGE 9 TO ADULT 8/30/2017* MEDICARE YEARLY EXAM 12/23/2017 DTaP/Tdap/Td series (2 - Td) 5/19/2026 *Topic was postponed. The date shown is not the original due date. Allergies as of 4/21/2017  Review Complete On: 4/21/2017 By: [place] Severity Noted Reaction Type Reactions Codeine  09/05/2014    Other (comments) Current Immunizations  Reviewed on 4/19/2016 Name Date Influenza Vaccine 9/15/2014 Not reviewed this visit You Were Diagnosed With   
  
 Codes Comments Chronic atrial fibrillation (HCC)    -  Primary ICD-10-CM: G35.9 ICD-9-CM: 427.31 Venous insufficiency     ICD-10-CM: I87.2 ICD-9-CM: 459.81 Synovitis of shoulder     ICD-10-CM: M65.819 ICD-9-CM: 726.10 Essential hypertension     ICD-10-CM: I10 
ICD-9-CM: 401.9 Multiple myeloma, remission status unspecified (HCC)     ICD-10-CM: C90.00 ICD-9-CM: 203.00 Vitals BP Pulse Temp Resp Height(growth percentile) Weight(growth percentile) 124/90 (BP 1 Location: Right arm, BP Patient Position: Sitting) 89 98.4 °F (36.9 °C) (Oral) 16 5' 10\" (1.778 m) 212 lb (96.2 kg) SpO2 BMI OB Status Smoking Status 98% 30.42 kg/m2 Postmenopausal Never Smoker BMI and BSA Data  Body Mass Index Body Surface Area  
 30.42 kg/m 2 2.18 m 2  
  
  
 Preferred Pharmacy Pharmacy Name Phone [phone] [facility], [address] [place] [address]. [phone] Your Updated Medication List  
  
   
This list is accurate as of: 4/21/17 12:53 PM.  Always use your most recent med list.  
  
  
  
  
 amiodarone 200 mg tablet Commonly known as:  CORDARONE Take 0.5 Tabs by mouth daily. calcium 500 mg Tab Take 1 Tab by mouth daily. cyclobenzaprine 10 mg tablet Commonly known as:  FLEXERIL Take 1 Tab by mouth three (3) times daily as needed for Muscle Spasm(s). fentaNYL 25 mcg/hr PATCH Commonly known as:  DURAGESIC  
1 Patch by TransDERmal route every seventy-two (72) hours. Max Daily Amount: 1 Patch. GAVISCON EXTRA STRENGTH 160-105 mg Diamond Bare Generic drug:  aluminum hydrox-magnesium carb Take 1-2 tablets by mouth four (4) times daily as needed. HYDROmorphone 2 mg tablet Commonly known as:  DILAUDID Take 1 Tab by mouth every four (4) hours as needed for Pain. Max Daily Amount: 12 mg.  
  
 lisinopril 40 mg tablet Commonly known as:  Caitlyn Gear Take 1 Tab by mouth daily. metoprolol succinate 25 mg XL tablet Commonly known as:  TOPROL-XL Take 0.5 Tabs by mouth daily. oxyCODONE-acetaminophen  mg per tablet Commonly known as:  PERCOCET 10 Take 1 Tab by mouth every eight (8) hours as needed for Pain. Max Daily Amount: 3 Tabs. Polyethylene Glycol 3350 Powd  
by Does Not Apply route daily as needed. potassium chloride 20 mEq tablet Commonly known as:  K-DUR, KLOR-CON Take 1 Tab by mouth two (2) times a day. REVLIMID 25 mg Cap Generic drug:  lenalidomide Take 25 mg by mouth daily. * warfarin 7.5 mg tablet Commonly known as:  COUMADIN Take 7.5 mg by mouth four (4) days a week. 1 tablet (7.5 mg) Monday, Tuesday, Wednesday, Thursday, and Friday * warfarin 7.5 mg tablet Commonly known as:  COUMADIN  
 Take 3.75 mg by mouth three (3) days a week. 1/2 tablet (3.75 mg) on Friday, Saturday, & Sunday * Notice: This list has 2 medication(s) that are the same as other medications prescribed for you. Read the directions carefully, and ask your doctor or other care provider to review them with you. Prescriptions Printed Refills  
 oxyCODONE-acetaminophen (PERCOCET 10)  mg per tablet 0 Sig: Take 1 Tab by mouth every eight (8) hours as needed for Pain. Max Daily Amount: 3 Tabs. Class: Print Route: Oral  
  
We Performed the Following AMB POC PT/INR [91683 CPT(R)] Introducing [facility] & HEALTH SERVICES! Shahnaz Cleverly introduces Haiku Deck patient portal. Now you can access parts of your medical record, email your doctor's office, and request medication refills online. 1. In your internet browser, go to [email or web address] 2. Click on the First Time User? Click Here link in the Sign In box. You will see the New Member Sign Up page. 3. Enter your Haiku Deck Access Code exactly as it appears below. You will not need to use this code after youve completed the sign-up process. If you do not sign up before the expiration date, you must request a new code. · Haiku Deck Access Code: [email or web address] Expires: 5/7/2017 10:47 AM 
 
4. Enter the last four digits of your Social Security Number (xxxx) and Date of Birth (mm/dd/yyyy) as indicated and click Submit. You will be taken to the next sign-up page. 5. Create a Haiku Deck ID. This will be your Haiku Deck login ID and cannot be changed, so think of one that is secure and easy to remember. 6. Create a Haiku Deck password. You can change your password at any time. 7. Enter your Password Reset Question and Answer. This can be used at a later time if you forget your password. 8. Enter your e-mail address. You will receive e-mail notification when new information is available in [address] 9. Click Sign Up. You can now view and download portions of your medical record. 10. Click the Download Summary menu link to download a portable copy of your medical information. If you have questions, please visit the Frequently Asked Questions section of the Bubbles website. Remember, Bubbles is NOT to be used for urgent needs. For medical emergencies, dial 911. Now available from your iPhone and Android! Please provide this summary of care documentation to your next provider. Your primary care clinician is listed as Deanna Taylor. If you have any questions after today's visit, please call [phone].

## 2017-04-22 PROBLEM — R29.898 MUSCULAR DECONDITIONING: Status: ACTIVE | Noted: 2017-04-22

## 2017-04-22 NOTE — PROGRESS NOTES
[facility]  [place] [address]  [address] [place] [id number]  Phone:  [phone]  Fax: [phone]       Chief Complaint   Patient presents with    Follow-up     swelling in both legs    . SUBJECTIVE:    Bernard Kinsey is a 68 y.o. female comes in for return visit with several complaints. She continues to have pain in her left shoulder. She has a rather impressive synovitis of the shoulder with an effusion. My injection was not successful in reducing the discomfort. She is concerned about swelling of her lower extremities. The left is greater than the right and it is mostly distal.  There is indeed an orthostatic component. She has had this before but could not remember. She has been taking her antihypertensive medication as prescribed. She is in chronic atrial fibrillation with a controlled ventricular rate and has been on Coumadin. Finally she follows up with her medical oncologist Dr. Reagan Olvera regarding her multiple myeloma. Current Outpatient Prescriptions   Medication Sig Dispense Refill    oxyCODONE-acetaminophen (PERCOCET 10)  mg per tablet Take 1 Tab by mouth every eight (8) hours as needed for Pain. Max Daily Amount: 3 Tabs. 75 Tab 0    HYDROmorphone (DILAUDID) 2 mg tablet Take 1 Tab by mouth every four (4) hours as needed for Pain. Max Daily Amount: 12 mg. 15 Tab 0    amiodarone (CORDARONE) 200 mg tablet Take 0.5 Tabs by mouth daily. 30 Tab 11    lisinopril (PRINIVIL, ZESTRIL) 40 mg tablet Take 1 Tab by mouth daily. 30 Tab 11    metoprolol succinate (TOPROL-XL) 25 mg XL tablet Take 0.5 Tabs by mouth daily. 30 Tab 11    cyclobenzaprine (FLEXERIL) 10 mg tablet Take 1 Tab by mouth three (3) times daily as needed for Muscle Spasm(s). 60 Tab 5    fentaNYL (DURAGESIC) 25 mcg/hr PATCH 1 Patch by TransDERmal route every seventy-two (72) hours. Max Daily Amount: 1 Patch.  (Patient taking differently: 1 Patch by TransDERmal route every seventy-two (72) hours as needed.) 10 Patch 0    potassium chloride (K-DUR, KLOR-CON) 20 mEq tablet Take 1 Tab by mouth two (2) times a day. 60 Tab 11    Polyethylene Glycol 3350 powd by Does Not Apply route daily as needed.  warfarin (COUMADIN) 7.5 mg tablet Take 7.5 mg by mouth four (4) days a week. 1 tablet (7.5 mg) Monday, Tuesday, Wednesday, Thursday, and Friday       warfarin (COUMADIN) 7.5 mg tablet Take 3.75 mg by mouth three (3) days a week. 1/2 tablet (3.75 mg) on Friday, Saturday, & Sunday       calcium 500 mg tab Take 1 Tab by mouth daily.  aluminum hydrox-magnesium carb (GAVISCON EXTRA STRENGTH) 160-105 mg chew Take 1-2 tablets by mouth four (4) times daily as needed.  lenalidomide (REVLIMID) 25 mg cap Take 25 mg by mouth daily.        Past Medical History:   Diagnosis Date    Arthritis     At[facility] fibrillation ([facility]     Cancer ([facility]     multiple myeloma    Hypertension      Past Surgical History:   Procedure Laterality Date    HX GYN       Allergies   Allergen Reactions    Codeine Other (comments)         REVIEW OF SYSTEMS:  General: negative for - chills or fever  ENT: negative for - headaches, nasal congestion or tinnitus  Respiratory: negative for - cough, hemoptysis, shortness of breath or wheezing  Cardiovascular : negative for - chest pain, edema, palpitations or shortness of breath  Gastrointestinal: negative for - abdominal pain, blood in stools, heartburn or nausea/vomiting  Genito-Urinary: no dysuria, trouble voiding, or hematuria  Musculoskeletal: negative for - gait disturbance, joint pain, joint stiffness or joint swelling  Neurological: no TIA or stroke symptoms  Hematologic: no bruises, no bleeding, no swollen glands  Integument: no lumps, mole changes, nail changes or rash  Endocrine: no malaise/lethargy or unexpected weight changes      Social History     Social History    Marital status:      Spouse name: N/A    Number of children: N/A    Years of education: N/A     Social History Main Topics    Smoking status: Never Smoker    Smokeless tobacco: Never Used    Alcohol use No    Drug use: No    Sexual activity: Not Currently     Other Topics Concern    None     Social History Narrative     Family History   Problem Relation Age of Onset    Stroke Mother     No Known Problems Father        OBJECTIVE:    Visit Vitals    /90 (BP 1 Location: Right arm, BP Patient Position: Sitting)    Pulse 89    Temp 98.4 °F (36.9 °C) (Oral)    Resp 16    Ht 5' 10\" (1.778 m)    Wt 212 lb (96.2 kg)    SpO2 98%    BMI 30.42 kg/m2     CONSTITUTIONAL: well , well nourished, appears age appropriate  EYES: perrla, eom intact  ENMT:moist mucous membranes, pharynx clear  NECK: supple. Thyroid normal  RESPIRATORY: Chest: clear to ascultation and percussion   CARDIOVASCULAR: Heart: regular rate and rhythm  GASTROINTESTINAL: Abdomen: soft, bowel sounds active  HEMATOLOGIC: no pathological lymph nodes palpated  MUSCULOSKELETAL: Extremities: 1+ edema distally, pulse 1+, moderate limited range of motion of the left shoulder by pain, moderate effusion left shoulder anteriorly  INTEGUMENT: No unusual rashes or suspicious skin lesions noted. Nails appear normal.  NEUROLOGIC: non-focal exam   MENTAL STATUS: alert and oriented, appropriate affect      ASSESSMENT:  1. Chronic atrial fibrillation (HCC)    2. Venous insufficiency    3. Synovitis of shoulder    4. Essential hypertension    5. Multiple myeloma, remission status unspecified ([facility]    6. Muscular deconditioning        PLAN:    1. INR is acceptable today at 2.5. No adjustments will be made in her Coumadin. 2. As far as her venous insufficiency is concerned, support stockings are suggested. She was given a prescription for knee high support stockings with pressure of 20 to 30 millimeters of mercury.   She is told that she would have to put them on in the morning and take them off in the evening. They are going to be a bit tight. 3. She has a chronic synovitis of the left shoulder. I will inject her shoulder on her return visit. 4. Blood pressure is excellent today. No adjustments are made. 5. She will follow-up with her medical oncologist for her multiple myeloma. 6. I encourage her to remain as physically active as possible. She is getting a bit more deconditioned. .  Orders Placed This Encounter    AMB POC PT/INR    oxyCODONE-acetaminophen (PERCOCET 10)  mg per tablet         Follow-up Disposition:  Return in about 4 weeks (around 5/19/2017).       Amee Polo MD

## 2017-05-18 RX ORDER — METOPROLOL SUCCINATE 25 MG/1
12.5 TABLET, EXTENDED RELEASE ORAL DAILY
Qty: 30 TAB | Refills: 11 | Status: SHIPPED | OUTPATIENT
Start: 2017-05-18 | End: 2018-08-02 | Stop reason: SDUPTHER

## 2017-05-19 ENCOUNTER — APPOINTMENT (OUTPATIENT)
Dept: GENERAL RADIOLOGY | Age: 77
End: 2017-05-19
Attending: PHYSICIAN ASSISTANT
Payer: MEDICARE

## 2017-05-19 ENCOUNTER — HOSPITAL ENCOUNTER (EMERGENCY)
Age: 77
Discharge: HOME | End: 2017-05-19
Attending: EMERGENCY MEDICINE
Payer: MEDICARE

## 2017-05-19 VITALS
BODY MASS INDEX: 30.58 KG/M2 | TEMPERATURE: 98.2 F | HEIGHT: 70 IN | OXYGEN SATURATION: 100 % | SYSTOLIC BLOOD PRESSURE: 141 MMHG | WEIGHT: 213.63 LBS | DIASTOLIC BLOOD PRESSURE: 96 MMHG | HEART RATE: 89 BPM

## 2017-05-19 DIAGNOSIS — M54.12 CERVICAL RADICULOPATHY: ICD-10-CM

## 2017-05-19 DIAGNOSIS — M54.2 NECK PAIN: Primary | ICD-10-CM

## 2017-05-19 PROCEDURE — 74011250636 HC RX REV CODE- 250/636: Performed by: PHYSICIAN ASSISTANT

## 2017-05-19 PROCEDURE — 72050 X-RAY EXAM NECK SPINE 4/5VWS: CPT

## 2017-05-19 PROCEDURE — 96372 THER/PROPH/DIAG INJ SC/IM: CPT

## 2017-05-19 PROCEDURE — 74011250637 HC RX REV CODE- 250/637: Performed by: PHYSICIAN ASSISTANT

## 2017-05-19 PROCEDURE — 99283 EMERGENCY DEPT VISIT LOW MDM: CPT

## 2017-05-19 RX ORDER — PREDNISONE 5 MG/1
TABLET ORAL
Qty: 21 TAB | Refills: 0 | Status: ON HOLD | OUTPATIENT
Start: 2017-05-19 | End: 2017-08-11

## 2017-05-19 RX ORDER — HYDROCODONE BITARTRATE AND ACETAMINOPHEN 5; 325 MG/1; MG/1
1 TABLET ORAL
Status: COMPLETED | OUTPATIENT
Start: 2017-05-19 | End: 2017-05-19

## 2017-05-19 RX ORDER — DIAZEPAM 5 MG/1
5 TABLET ORAL
Status: COMPLETED | OUTPATIENT
Start: 2017-05-19 | End: 2017-05-19

## 2017-05-19 RX ORDER — METHOCARBAMOL 750 MG/1
750 TABLET, FILM COATED ORAL 3 TIMES DAILY
Qty: 15 TAB | Refills: 0 | Status: ON HOLD | OUTPATIENT
Start: 2017-05-19 | End: 2017-08-11

## 2017-05-19 RX ORDER — HYDROCODONE BITARTRATE AND ACETAMINOPHEN 5; 325 MG/1; MG/1
1 TABLET ORAL
Qty: 20 TAB | Refills: 0 | Status: ON HOLD | OUTPATIENT
Start: 2017-05-19 | End: 2017-08-11

## 2017-05-19 RX ADMIN — METHYLPREDNISOLONE SODIUM SUCCINATE 125 MG: 125 INJECTION, POWDER, FOR SOLUTION INTRAMUSCULAR; INTRAVENOUS at 08:01

## 2017-05-19 RX ADMIN — DIAZEPAM 5 MG: 5 TABLET ORAL at 08:01

## 2017-05-19 RX ADMIN — HYDROCODONE BITARTRATE AND ACETAMINOPHEN 1 TABLET: 5; 325 TABLET ORAL at 08:01

## 2017-05-19 NOTE — DISCHARGE INSTRUCTIONS
Neck Pain: Care Instructions  Your Care Instructions  You can have neck pain anywhere from the bottom of your head to the top of your shoulders. It can spread to the upper back or arms. Injuries, painting a ceiling, sleeping with your neck twisted, staying in one position for too long, and many other activities can cause neck pain. Most neck pain gets better with home care. Your doctor may recommend medicine to relieve pain or relax your muscles. He or she may suggest exercise and physical therapy to increase flexibility and relieve stress. You may need to wear a special (cervical) collar to support your neck for a day or two. Follow-up care is a key part of your treatment and safety. Be sure to make and go to all appointments, and call your doctor if you are having problems. It's also a good idea to know your test results and keep a list of the medicines you take. How can you care for yourself at home? · Try using a heating pad on a low or medium setting for 15 to 20 minutes every 2 or 3 hours. Try a warm shower in place of one session with the heating pad. · You can also try an ice pack for 10 to 15 minutes every 2 to 3 hours. Put a thin cloth between the ice and your skin. · Take pain medicines exactly as directed. ¨ If the doctor gave you a prescription medicine for pain, take it as prescribed. ¨ If you are not taking a prescription pain medicine, ask your doctor if you can take an over-the-counter medicine. · If your doctor recommends a cervical collar, wear it exactly as directed. When should you call for help? Call your doctor now or seek immediate medical care if:  · You have new or worsening numbness in your arms, buttocks or legs. · You have new or worsening weakness in your arms or legs. (This could make it hard to stand up.)  · You lose control of your bladder or bowels.   Watch closely for changes in your health, and be sure to contact your doctor if:  · Your neck pain is getting worse.  · You are not getting better after 1 week. · You do not get better as expected. Where can you learn more? Go to http://mila-syeda.info/. Enter [id number] in the search box to learn more about \"Neck Pain: Care Instructions. \"  Current as of: May 23, 2016  Content Version: 11.2  © 2514-5582 [employer]. Care instructions adapted under license by [employer] (which disclaims liability or warranty for this information). If you have questions about a medical condition or this instruction, always ask your healthcare professional. [place] [address] any warranty or liability for your use of this information. Pinched Nerve in the Neck: Care Instructions  Your Care Instructions  A pinched nerve in the neck happens when a vertebra or disc in the upper part of your spine is damaged. This damage can happen because of an injury. Or it can just happen with age. The changes caused by the damage may put pressure on a nearby nerve root, pinching it. This causes symptoms such as sharp pain in your neck, shoulder, arm, or back. You may also have tingling or numbness. Sometimes it makes your arm weaker. The symptoms are usually worse when you turn your head or strain your neck. For many people, the symptoms get better over time and finally go away. Early treatment usually includes medicines for pain and swelling. Sometimes physical therapy and special exercises may help. Follow-up care is a key part of your treatment and safety. Be sure to make and go to all appointments, and call your doctor if you are having problems. It's also a good idea to know your test results and keep a list of the medicines you take. How can you care for yourself at home? · Be safe with medicines. Read and follow all instructions on the label. ¨ If the doctor gave you a prescription medicine for pain, take it as prescribed.   ¨ If you are not taking a prescription pain medicine, ask your doctor if you can take an over-the-counter medicine. · Try using a heating pad on a low or medium setting for 15 to 20 minutes every 2 or 3 hours. Try a warm shower in place of one session with the heating pad. You can also buy single-use heat wraps that last up to 8 hours. · You can also try an ice pack for 10 to 15 minutes every 2 to 3 hours. There isn't strong evidence that either heat or ice will help. But you can try them to see if they help you. · Don't spend too long in one position. Take short breaks to move around and change positions. · Wear a seat belt and shoulder harness when you are in a car. · Sleep with a pillow under your head and neck that keeps your neck straight. · If you were given a neck brace (cervical collar) to limit neck motion, wear it as instructed for as many days as your doctor tells you to. Do not wear it longer than you were told to. Wearing a brace for too long can lead to neck stiffness and can weaken the neck muscles. · Follow your doctor's instructions for gentle neck-stretching exercises. · Do not smoke. Smoking can slow healing of your discs. If you need help quitting, talk to your doctor about stop-smoking programs and medicines. These can increase your chances of quitting for good. · Avoid strenuous work or exercise until your doctor says it is okay. When should you call for help? Call 911 anytime you think you may need emergency care. For example, call if:  · You are unable to move an arm or a leg at all. Call your doctor now or seek immediate medical care if:  · You have new or worse symptoms in your arms, legs, chest, belly, or buttocks. Symptoms may include:  ¨ Numbness or tingling. ¨ Weakness. ¨ Pain. · You lose bladder or bowel control. Watch closely for changes in your health, and be sure to contact your doctor if:  · You are not getting better as expected. Where can you learn more? Go to http://lee.info/.   Enter C537 in the search box to learn more about \"Pinched Nerve in the Neck: Care Instructions. \"  Current as of: May 23, 2016  Content Version: 11.2  © 4862-4984 [employer]. Care instructions adapted under license by [employer] (which disclaims liability or warranty for this information). If you have questions about a medical condition or this instruction, always ask your healthcare professional. [place] [address] any warranty or liability for your use of this information.

## 2017-05-19 NOTE — ED PROVIDER NOTES
HPI Comments: Hannah Alarcon is a 68 y.o. female with PMhx significant for HTN and arthritis who presents ambulatory to the ED with cc of 10/10 gradually worsening right shoulder pain since 5/16/17. She also c/o associated right neck pain. The pt reports that she had a recent CT of her cervical spine which showed degenerative disc disease. She denies any recent falls or injuries to the area. The pt specifically denies fecal incontinence or enuresis at this time. SHx: -tobacco, -EtOH, -illicit drug use    PCP: Anabella Gomez MD    There are no other complaints, changes or physical findings at this time. The history is provided by the patient. No  was used. Past Medical History:   Diagnosis Date    Arthritis     At[facility] fibrillation ([facility]     Cancer ([facility]     multiple myeloma    Hypertension        Past Surgical History:   Procedure Laterality Date    HX GYN           Family History:   Problem Relation Age of Onset    Stroke Mother     No Known Problems Father        Social History     Social History    Marital status:      Spouse name: N/A    Number of children: N/A    Years of education: N/A     Occupational History    Not on file. Social History Main Topics    Smoking status: Never Smoker    Smokeless tobacco: Never Used    Alcohol use No    Drug use: No    Sexual activity: Not Currently     Other Topics Concern    Not on file     Social History Narrative         ALLERGIES: Codeine    Review of Systems   Constitutional: Negative. Negative for activity change, appetite change, chills, fatigue, fever and unexpected weight change. HENT: Negative. Negative for congestion, hearing loss, rhinorrhea, sneezing and voice change. Eyes: Negative. Negative for pain and visual disturbance. Respiratory: Negative. Negative for apnea, cough, choking, chest tightness and shortness of breath. Cardiovascular: Negative.   Negative for chest pain and palpitations. Gastrointestinal: Negative. Negative for abdominal distention, abdominal pain, blood in stool, diarrhea, nausea and vomiting.        (-) fecal incontinence   Genitourinary: Negative. Negative for difficulty urinating, enuresis, flank pain, frequency and urgency. No discharge   Musculoskeletal: Positive for arthralgias (right shoulder) and neck pain (right). Negative for back pain, myalgias and neck stiffness. Skin: Negative. Negative for color change and rash. Neurological: Negative. Negative for dizziness, seizures, syncope, speech difficulty, weakness, numbness and headaches. Hematological: Negative for adenopathy. Psychiatric/Behavioral: Negative. Negative for agitation, behavioral problems, dysphoric mood and suicidal ideas. The patient is not nervous/anxious. Patient Vitals for the past 12 hrs:   Temp SpO2   05/19/17 0742 98.2 °F (36.8 °C) 100 %            Physical Exam   Constitutional: She is oriented to person, place, and time. She appears well-developed and well-nourished. No distress. HENT:   Head: Normocephalic and atraumatic. Right Ear: External ear normal.   Left Ear: External ear normal.   Nose: Nose normal.   Mouth/Throat: Oropharynx is clear and moist. No oropharyngeal exudate. Eyes: Conjunctivae and EOM are normal. Pupils are equal, round, and reactive to light. Right eye exhibits no discharge. Left eye exhibits no discharge. No scleral icterus. Neck: Normal range of motion. Neck supple. No JVD present. No tracheal deviation present. Cardiovascular: Normal rate, regular rhythm, normal heart sounds and intact distal pulses. Exam reveals no gallop and no friction rub. No murmur heard. Pulmonary/Chest: Effort normal and breath sounds normal. No respiratory distress. She has no wheezes. She has no rales. She exhibits no tenderness. Abdominal: Soft. Bowel sounds are normal. She exhibits no distension and no mass. There is no tenderness.  There is no rebound and no guarding. Musculoskeletal: She exhibits tenderness (right shoulder, right neck). She exhibits no edema. Decreased active and passive range of motion of right shoulder and right neck; neurovascularly intact. Lymphadenopathy:     She has no cervical adenopathy. Neurological: She is alert and oriented to person, place, and time. She has normal reflexes. No cranial nerve deficit. She exhibits normal muscle tone. Coordination normal.   No fecal or urinary incontinence. Skin: Skin is warm and dry. She is not diaphoretic. Psychiatric: She has a normal mood and affect. Her behavior is normal. Judgment and thought content normal.   Nursing note and vitals reviewed. MDM  Number of Diagnoses or Management Options  Cervical radiculopathy:   Neck pain:   Diagnosis management comments:   DDx: DDD, DJD, lytic lesion. Amount and/or Complexity of Data Reviewed  Tests in the radiology section of CPT®: ordered and reviewed  Review and summarize past medical records: yes  Independent visualization of images, tracings, or specimens: yes    Patient Progress  Patient progress: stable    ED Course       Procedures    IMAGING RESULTS:  XR SPINE CERV 4 OR 5 V   Final Result   Indication: Right shoulder and neck pain for 3 days     Five views of the cervical spine demonstrate normal alignment without evidence  of acute fracture, subluxation, or prevertebral edema. Spondylosis is noted at  C5-6 and C6-7 with bilateral neuroforaminal narrowing secondary to uncovertebral  osteophyte formation.     IMPRESSION  Impression: Spondylitic changes without acute abnormality. MEDICATIONS GIVEN:  Medications   methylPREDNISolone (PF) (SOLU-MEDROL) injection 125 mg (125 mg IntraMUSCular Given 5/19/17 0801)   HYDROcodone-acetaminophen (NORCO) 5-325 mg per tablet 1 Tab (1 Tab Oral Given 5/19/17 0801)   diazePAM (VALIUM) tablet 5 mg (5 mg Oral Given 5/19/17 0801)       IMPRESSION:  1. Neck pain    2.  Cervical radiculopathy        PLAN:  1. Current Discharge Medication List      START taking these medications    Details   HYDROcodone-acetaminophen (NORCO) 5-325 mg per tablet Take 1 Tab by mouth every four (4) hours as needed for Pain. Max Daily Amount: 6 Tabs. Qty: 20 Tab, Refills: 0      methocarbamol (ROBAXIN-750) 750 mg tablet Take 1 Tab by mouth three (3) times daily. Qty: 15 Tab, Refills: 0           2. Follow-up Information     Follow up With Details Comments [address], [place] In 2 days As needed [place] [address]  [facility]  [phone]          Return to ED if worse        DISCHARGE NOTE:  8:47 AM  The patient has been re-evaluated and is ready for discharge. Reviewed available results with patient. Counseled patient on diagnosis and care plan. Patient has expressed understanding, and all questions have been answered. Patient agrees with plan and agrees to follow up as recommended, or return to the ED if their symptoms worsen. Discharge instructions have been provided and explained to the patient, along with reasons to return to the ED. This note is prepared by Meg Patten, acting as Scribe for [employer]. [employer]: The scribe's documentation has been prepared under my direction and personally reviewed by me in its entirety. I confirm that the note above accurately reflects all work, treatment, procedures, and medical decision making performed by me.

## 2017-05-19 NOTE — ED NOTES
CHARLOTTE LOPEZ reviewed discharge instructions with the patient and son. The patient and son verbalized understanding. Patient taken out of treatment area via wheelchair by this RN and son states that he will drive patient home.

## 2017-05-22 ENCOUNTER — PATIENT OUTREACH (OUTPATIENT)
Dept: INTERNAL MEDICINE CLINIC | Age: 77
End: 2017-05-22

## 2017-05-22 NOTE — PROGRESS NOTES
NNTOCED:    Patient on discharge report dated 5/19/2017. Patient seen @ [facility] ED  For Neck Pain. Left message on voicemail. Will attempt to contact again. Need to complete post-discharge assessment.     Neck pain    Cervical radiculopathy

## 2017-05-23 ENCOUNTER — PATIENT OUTREACH (OUTPATIENT)
Dept: INTERNAL MEDICINE CLINIC | Age: 77
End: 2017-05-23

## 2017-05-23 NOTE — PROGRESS NOTES
NNTOCED:     Patient called again today; left message on  for return Call. Sent Get in Touch Letter today as well. Patient seen in ED [facility] ED 5/19/2017 for Neck Pain.

## 2017-05-23 NOTE — PROGRESS NOTES
Alexus Agarwal Discharge Follow-Up      Date/Time:  2017 10:28 AM     Returned Call received for Patient discharged from [facility] for Neck Pain. RRAT score: Low Risk            3       Total Score        3 Relationship with PCP        Criteria that do not apply:    Patient Living Status    Patient Length of Stay > 5    More than 1 Admission in calendar year    Patient Insurance is Medicare, Medicaid or Self Pay    Charlson Comorbidity Score     Low    Medical History:     Past Medical History:   Diagnosis Date    Arthritis     At[facility] ([facility]     Cancer ([facility]     multiple myeloma    Hypertension        Nurse Navigator(NN) contacted the patient by telephone to perform post ED [facility] ED discharge assessment. Verified  and address with patient as identifiers. Provided introduction to self, and explanation of the Nurses Navigator role. Diet:   Patient reports: Renal Diet     Activity:    Patient reports: somewalking outside the house    Medication:   Performed medication reconciliation with patient, and patient verbalizes understanding of administration of home medications. There were no barriers to obtaining medications identified at this time. Support system:  patient and friend & family    Discharge Instructions :  Reviewed discharge instructions with patient. Patient verbalizes understanding of discharge instructions and follow-up care. Red Flags:  Neck Pain.  -Teaching done on importance of taking medication as prescribed. Advance Care Planning:   Patient was offered the opportunity to discuss advance care planning:  yes     Does patient have an Advance Directive:  no   If no, did you provide information on Caring Connections? yes     PCP/Specialist follow up: Patient scheduled to follow up with Anne Alicea MD on 2017. Date changed from 6/15/2017. Reviewed red flags with patient, and patient verbalizes understanding.   Patient given an opportunity to ask questions. No other clinical/social/functional needs noted. The patient agrees to contact the PCP office for questions related to their healthcare. The patient expressed thanks, offered no additional questions and ended the call. Case management plan: Attempt to contact the patient by telephone or during office visit within the next 7-10 days. Will continue to follow as necessary for the next 30 days. Will reassess for case management needs prior to discharge from case management service on or about 30 days.

## 2017-05-23 NOTE — LETTER
5/23/2017 10:03 AM 
 
Ms. Brian Rosa [address] [address] Dear Ms. Betsy García am the Registered Nurse Navigator working with Dr. Monet Parra. Part of my job is to follow up with patients who have been in the emergency department to see how they are feeling, answer any questions they may have about their visit and also make sure they have a follow-up appointment to see their primary care doctor. You are scheduled for 6/15/2017 but may need to see Dr. Monet Parra before that. I have been unable to reach you by telephone and wanted to make sure that you scheduled a follow-up appointment to come in and talk to Dr. Monet Parra about your recent visit to the hospital.  Please call our office to schedule your appointment. In the meantime, if you have any questions or concerns, please feel free to call me on my direct line at Thank you for allowing us to participate in your care! Sincerely, Daniel Forman RN

## 2017-05-25 ENCOUNTER — OFFICE VISIT (OUTPATIENT)
Dept: INTERNAL MEDICINE CLINIC | Age: 77
End: 2017-05-25

## 2017-05-25 DIAGNOSIS — I48.20 CHRONIC ATRIAL FIBRILLATION (HCC): ICD-10-CM

## 2017-05-25 DIAGNOSIS — R35.1 NOCTURIA: Primary | ICD-10-CM

## 2017-05-25 DIAGNOSIS — I10 ESSENTIAL HYPERTENSION: ICD-10-CM

## 2017-05-25 DIAGNOSIS — I82.5Z3 CHRONIC DEEP VEIN THROMBOSIS (DVT) OF DISTAL VEIN OF BOTH LOWER EXTREMITIES (HCC): ICD-10-CM

## 2017-05-25 DIAGNOSIS — M17.0 PRIMARY OSTEOARTHRITIS OF BOTH KNEES: ICD-10-CM

## 2017-05-25 DIAGNOSIS — C90.00 MULTIPLE MYELOMA, REMISSION STATUS UNSPECIFIED (HCC): ICD-10-CM

## 2017-05-25 LAB
INR BLD: 3.4
PT POC: 40.7 SECONDS
VALID INTERNAL CONTROL?: YES

## 2017-05-25 RX ORDER — AMOXICILLIN 250 MG/1
250 CAPSULE ORAL 3 TIMES DAILY
Qty: 21 CAP | Refills: 0 | Status: SHIPPED | OUTPATIENT
Start: 2017-05-25 | End: 2017-06-01

## 2017-05-25 NOTE — PROGRESS NOTES
[facility]  [place] [address]  [address] [place] [id number]  Phone:  [phone]  Fax: [phone]     No chief complaint on file. .      SUBJECTIVE:    Stephanie Hager is a 68 y.o. female comes in for return visit complaining of significant nocturia last night which represents a significant change from her norm. She urinated almost every hour. There was no dysuria but obvious urgency associated. The urine was not malodorous. She also complains of increasing pain in both knees. She has rather severe osteoarthritis involving both joints. She continues to follow-up with her medical oncologist for her multiple myeloma. She continues on her Thalidomide. She remains on her Warfarin for her paroxysmal atrial fibrillation. She is also on Amiodarone. Current Outpatient Prescriptions   Medication Sig Dispense Refill    amoxicillin (AMOXIL) 250 mg capsule Take 1 Cap by mouth three (3) times daily for 7 days. 21 Cap 0    HYDROcodone-acetaminophen (NORCO) 5-325 mg per tablet Take 1 Tab by mouth every four (4) hours as needed for Pain. Max Daily Amount: 6 Tabs. 20 Tab 0    methocarbamol (ROBAXIN-750) 750 mg tablet Take 1 Tab by mouth three (3) times daily. 15 Tab 0    predniSONE (STERAPRED) 5 mg dose pack See administration instruction per 5mg dose pack 21 Tab 0    metoprolol succinate (TOPROL-XL) 25 mg XL tablet Take 0.5 Tabs by mouth daily. 30 Tab 11    oxyCODONE-acetaminophen (PERCOCET 10)  mg per tablet Take 1 Tab by mouth every eight (8) hours as needed for Pain. Max Daily Amount: 3 Tabs. 75 Tab 0    HYDROmorphone (DILAUDID) 2 mg tablet Take 1 Tab by mouth every four (4) hours as needed for Pain. Max Daily Amount: 12 mg. 15 Tab 0    amiodarone (CORDARONE) 200 mg tablet Take 0.5 Tabs by mouth daily. 30 Tab 11    lisinopril (PRINIVIL, ZESTRIL) 40 mg tablet Take 1 Tab by mouth daily.  30 Tab 11    cyclobenzaprine (FLEXERIL) 10 mg tablet Take 1 Tab by mouth three (3) times daily as needed for Muscle Spasm(s). 60 Tab 5    fentaNYL (DURAGESIC) 25 mcg/hr PATCH 1 Patch by TransDERmal route every seventy-two (72) hours. Max Daily Amount: 1 Patch. (Patient taking differently: 1 Patch by TransDERmal route every seventy-two (72) hours as needed.) 10 Patch 0    potassium chloride (K-DUR, KLOR-CON) 20 mEq tablet Take 1 Tab by mouth two (2) times a day. 60 Tab 11    Polyethylene Glycol 3350 powd by Does Not Apply route daily as needed.  warfarin (COUMADIN) 7.5 mg tablet Take 7.5 mg by mouth four (4) days a week. 1 tablet (7.5 mg) Monday, Tuesday, Wednesday, Thursday, and Friday       warfarin (COUMADIN) 7.5 mg tablet Take 3.75 mg by mouth three (3) days a week. 1/2 tablet (3.75 mg) on Friday, Saturday, & Sunday       calcium 500 mg tab Take 1 Tab by mouth daily.  aluminum hydrox-magnesium carb (GAVISCON EXTRA STRENGTH) 160-105 mg chew Take 1-2 tablets by mouth four (4) times daily as needed.  lenalidomide (REVLIMID) 25 mg cap Take 25 mg by mouth daily.        Past Medical History:   Diagnosis Date    Arthritis     [facility]     Cancer ([facility]     multiple myeloma    Hypertension      Past Surgical History:   Procedure Laterality Date    HX GYN       Allergies   Allergen Reactions    Codeine Other (comments)         REVIEW OF SYSTEMS:  General: negative for - chills or fever  ENT: negative for - headaches, nasal congestion or tinnitus  Respiratory: negative for - cough, hemoptysis, shortness of breath or wheezing  Cardiovascular : negative for - chest pain, edema, palpitations or shortness of breath  Gastrointestinal: negative for - abdominal pain, blood in stools, heartburn or nausea/vomiting  Genito-Urinary: no dysuria, trouble voiding, or hematuria  Musculoskeletal: negative for - gait disturbance, joint pain, joint stiffness or joint swelling  Neurological: no TIA or stroke symptoms  Hematologic: no bruises, no bleeding, no swollen glands  Integument: no lumps, mole changes, nail changes or rash  Endocrine: no malaise/lethargy or unexpected weight changes      Social History     Social History    Marital status:      Spouse name: N/A    Number of children: N/A    Years of education: N/A     Social History Main Topics    Smoking status: Never Smoker    Smokeless tobacco: Never Used    Alcohol use No    Drug use: No    Sexual activity: Not Currently     Other Topics Concern    None     Social History Narrative     Family History   Problem Relation Age of Onset    Stroke Mother     No Known Problems Father        OBJECTIVE:    There were no vitals taken for this visit. CONSTITUTIONAL: well , well nourished, appears age appropriate  EYES: perrla, eom intact  ENMT:moist mucous membranes, pharynx clear  NECK: supple. Thyroid normal  RESPIRATORY: Chest: clear to ascultation and percussion   CARDIOVASCULAR: Heart: regular rate and rhythm  GASTROINTESTINAL: Abdomen: soft, bowel sounds active  HEMATOLOGIC: no pathological lymph nodes palpated  MUSCULOSKELETAL: Extremities: no edema, pulse 1+   INTEGUMENT: No unusual rashes or suspicious skin lesions noted. Nails appear normal.  NEUROLOGIC: non-focal exam   MENTAL STATUS: alert and oriented, appropriate affect      ASSESSMENT:  1. Nocturia    2. Primary osteoarthritis of both knees    3. Essential hypertension    4. Chronic deep vein thrombosis (DVT) of distal vein of both lower extremities (HCC)    5. Multiple myeloma, remission status unspecified ([facility] [address]    6. Chronic atrial fibrillation (HCC)        PLAN:    1. As far as nocturia is concerned I suspect she might well have a cystitis. Empiric treatment with Cipro 500 mg b.i.d for the next five days. 2. She has impressive osteoarthritis of both knees with a mild to moderate flare of both. Under sterile technique I inject Kenalog 40 mg and Xylocaine 1% 5 cc's into the lateral aspect of both knees.   She tolerates the procedure well. 3. Blood pressure is excellent today. 4. INR is a bit elevated. This is secondary to her Amiodarone. I will reduce her Warfarin to 2.5 mg daily and see how she fairs. I also suggest that she increase her intake of vegetables. 5. She will follow-up with her medical oncologist for her multiple myeloma. 6. Her ventricular rate appears to be more than adequate. She will continue the Cardioactive medications, the Metoprolol and Amiodarone. I contemplated discontinue the patient's KCL since she is not longer on a diuretic but in reviewing her labs it appears that she might need this suggesting that she has hyperaldosteronism. On her return visit in two weeks we will repeat INR. She will also have a BNP done. .  Orders Placed This Encounter    AMB POC PT/INR    amoxicillin (AMOXIL) 250 mg capsule         Follow-up Disposition:  Return in about 3 months (around 8/25/2017), or RTO 2 weeks for INR.       Lan Phillips MD

## 2017-05-30 VITALS
WEIGHT: 210.8 LBS | OXYGEN SATURATION: 91 % | RESPIRATION RATE: 18 BRPM | TEMPERATURE: 98.1 F | DIASTOLIC BLOOD PRESSURE: 97 MMHG | BODY MASS INDEX: 30.18 KG/M2 | HEART RATE: 96 BPM | SYSTOLIC BLOOD PRESSURE: 159 MMHG | HEIGHT: 70 IN

## 2017-06-15 ENCOUNTER — OFFICE VISIT (OUTPATIENT)
Dept: INTERNAL MEDICINE CLINIC | Age: 77
End: 2017-06-15

## 2017-06-15 VITALS
BODY MASS INDEX: 30.59 KG/M2 | HEART RATE: 76 BPM | WEIGHT: 213.7 LBS | SYSTOLIC BLOOD PRESSURE: 133 MMHG | DIASTOLIC BLOOD PRESSURE: 92 MMHG | RESPIRATION RATE: 20 BRPM | OXYGEN SATURATION: 98 % | HEIGHT: 70 IN

## 2017-06-15 DIAGNOSIS — I87.2 VENOUS INSUFFICIENCY: Primary | ICD-10-CM

## 2017-06-15 DIAGNOSIS — I48.20 CHRONIC ATRIAL FIBRILLATION (HCC): ICD-10-CM

## 2017-06-15 DIAGNOSIS — C90.00 MULTIPLE MYELOMA, REMISSION STATUS UNSPECIFIED (HCC): ICD-10-CM

## 2017-06-15 DIAGNOSIS — M54.50 MIDLINE LOW BACK PAIN WITHOUT SCIATICA, UNSPECIFIED CHRONICITY: ICD-10-CM

## 2017-06-15 DIAGNOSIS — I10 ESSENTIAL HYPERTENSION: ICD-10-CM

## 2017-06-15 LAB
INR BLD: 2.3
PT POC: 27.5 SECONDS
VALID INTERNAL CONTROL?: YES

## 2017-06-15 NOTE — PROGRESS NOTES
Chief Complaint   Patient presents with    Nocturia     1 month follow up and pt/inr check   1. Have you been to the ER, urgent care clinic since your last visit? Hospitalized since your last visit? No    2. Have you seen or consulted any other health care providers outside of the [address] [place] [address] since your last visit? Include any pap smears or colon screening.  No

## 2017-06-15 NOTE — MR AVS SNAPSHOT
Visit Information Date & Time Provider Department Dept. Phone Encounter #  
 6/15/2017  9:15 AM [facility] and [address] [id number] Your Appointments 7/20/2017 10:00 AM  
Nurse Visit with Newton Aquino MD  
[address] [place] [address] and Primary Care [facility]) Appt Note: follow up PT/INR  
 [address] [facility]  
  
   
 [address] [id number] Upcoming Health Maintenance Date Due Pneumococcal 65+ High/Highest Risk (2 of 2 - PPSV23) 8/30/2017* INFLUENZA AGE 9 TO ADULT 8/30/2017* GLAUCOMA SCREENING Q2Y 9/15/2017* MEDICARE YEARLY EXAM 12/23/2017 DTaP/Tdap/Td series (2 - Td) 5/19/2026 *Topic was postponed. The date shown is not the original due date. Allergies as of 6/15/2017  Review Complete On: 6/15/2017 By: Antoinette Mosher Severity Noted Reaction Type Reactions Codeine  09/05/2014    Other (comments) Current Immunizations  Reviewed on 4/19/2016 Name Date Influenza Vaccine 9/15/2014 Not reviewed this visit You Were Diagnosed With   
  
 Codes Comments Venous insufficiency    -  Primary ICD-10-CM: F65.7 ICD-9-CM: 459.81 Chronic atrial fibrillation (HCC)     ICD-10-CM: Q75.6 ICD-9-CM: 427.31 Midline low back pain without sciatica, unspecified chronicity     ICD-10-CM: M54.5 ICD-9-CM: 724.2 Multiple myeloma, remission status unspecified (HCC)     ICD-10-CM: C90.00 ICD-9-CM: 203.00 Essential hypertension     ICD-10-CM: I10 
ICD-9-CM: 401.9 Vitals BP Pulse Resp Height(growth percentile) Weight(growth percentile) SpO2  
 (!) 133/92 (BP 1 Location: Right arm, BP Patient Position: Sitting) 76 20 5' 10\" (1.778 m) 213 lb 11.2 oz (96.9 kg) 98% BMI OB Status Smoking Status 30.66 kg/m2 Hysterectomy Never Smoker Vitals History BMI and BSA Data Body Mass Index Body Surface Area  
 30.66 kg/m 2 2.19 m 2 Preferred Pharmacy Pharmacy Name Phone [facility], [address] [place], [place] [address] [phone] Your Updated Medication List  
  
   
This list is accurate as of: 6/15/17 10:51 AM.  Always use your most recent med list.  
  
  
  
  
 amiodarone 200 mg tablet Commonly known as:  CORDARONE Take 0.5 Tabs by mouth daily. calcium 500 mg Tab Take 1 Tab by mouth daily. cyclobenzaprine 10 mg tablet Commonly known as:  FLEXERIL Take 1 Tab by mouth three (3) times daily as needed for Muscle Spasm(s). fentaNYL 25 mcg/hr PATCH Commonly known as:  DURAGESIC  
1 Patch by TransDERmal route every seventy-two (72) hours. Max Daily Amount: 1 Patch. GAVISCON EXTRA STRENGTH 160-105 mg [employer] Generic drug:  aluminum hydrox-magnesium carb Take 1-2 tablets by mouth four (4) times daily as needed. HYDROcodone-acetaminophen 5-325 mg per tablet Commonly known as:  Fredick Sly Take 1 Tab by mouth every four (4) hours as needed for Pain. Max Daily Amount: 6 Tabs. HYDROmorphone 2 mg tablet Commonly known as:  DILAUDID Take 1 Tab by mouth every four (4) hours as needed for Pain. Max Daily Amount: 12 mg.  
  
 lisinopril 40 mg tablet Commonly known as:  Davey Dance Take 1 Tab by mouth daily. methocarbamol 750 mg tablet Commonly known as:  [id number]-253 Take 1 Tab by mouth three (3) times daily. metoprolol succinate 25 mg XL tablet Commonly known as:  TOPROL-XL Take 0.5 Tabs by mouth daily. oxyCODONE-acetaminophen  mg per tablet Commonly known as:  PERCOCET 10 Take 1 Tab by mouth every eight (8) hours as needed for Pain. Max Daily Amount: 3 Tabs. Polyethylene Glycol 3350 Powd  
by Does Not Apply route daily as needed. potassium chloride 20 mEq tablet Commonly known as:  K-DUR, KLOR-CON Take 1 Tab by mouth two (2) times a day. predniSONE 5 mg dose pack Commonly known as:  STERAPRED See administration instruction per 5mg dose pack REVLIMID 25 mg Cap Generic drug:  lenalidomide Take 25 mg by mouth daily. * warfarin 7.5 mg tablet Commonly known as:  COUMADIN Take 7.5 mg by mouth four (4) days a week. 1 tablet (7.5 mg) Monday, Tuesday, Wednesday, Thursday, and Friday * warfarin 7.5 mg tablet Commonly known as:  COUMADIN Take 3.75 mg by mouth three (3) days a week. 1/2 tablet (3.75 mg) on Friday, Saturday, & Sunday * Notice: This list has 2 medication(s) that are the same as other medications prescribed for you. Read the directions carefully, and ask your doctor or other care provider to review them with you. We Performed the Following AMB POC PT/INR [34175 CPT(R)] Introducing [facility] SERVICES! Radha Alexander introduces [employer] patient portal. Now you can access parts of your medical record, email your doctor's office, and request medication refills online. 1. In your internet browser, go to [email or web address] 2. Click on the First Time User? Click Here link in the Sign In box. You will see the New Member Sign Up page. 3. Enter your [employer] Access Code exactly as it appears below. You will not need to use this code after youve completed the sign-up process. If you do not sign up before the expiration date, you must request a new code. · [employer] Access Code: [email or web address] Expires: 8/17/2017  7:51 AM 
 
4. Enter the last four digits of your Social Security Number (xxxx) and Date of Birth (mm/dd/yyyy) as indicated and click Submit. You will be taken to the next sign-up page. 5. Create a [employer] ID. This will be your [employer] login ID and cannot be changed, so think of one that is secure and easy to remember. 6. Create a [employer] password. You can change your password at any time. 7. Enter your Password Reset Question and Answer.  This can be used at a later time if you forget your password. 8. Enter your e-mail address. You will receive e-mail notification when new information is available in [address] 9. Click Sign Up. You can now view and download portions of your medical record. 10. Click the Download Summary menu link to download a portable copy of your medical information. If you have questions, please visit the Frequently Asked Questions section of the [employer] website. Remember, [employer] is NOT to be used for urgent needs. For medical emergencies, dial 911. Now available from your iPhone and Android! Please provide this summary of care documentation to your next provider. Your primary care clinician is listed as Deanna Taylor. If you have any questions after today's visit, please call [phone].

## 2017-06-15 NOTE — PROGRESS NOTES
[facility]  [place] [address]  [address] [place] [id number]  Phone:  [phone]  Fax: [phone]       Chief Complaint   Patient presents with    Nocturia     1 month follow up and pt/inr check   . SUBJECTIVE:    Gena Couch is a 68 y.o. female comes in for return visit stating that she has done fairly well. Her concern is primarily swelling of her lower extremities which she complained about before. She has had this chronically in the past and it has just gotten worse during the hot humid months as is happening currently. This has an orthostatic component to it. She continues to have episodic pain in her back and shoulders. She comments that the Oxycodone does not help as much even though she is getting 10/325. She gest occasional epigastric pain oftentimes occurring in the morning. Within an hour or so it is pretty much gone. She could not remember if there were any precipitating or exacerbating factors. She follows up with Dr. Rosanna Abel next week for her multiple myeloma. There has been no meaningful weight loss. Current Outpatient Prescriptions   Medication Sig Dispense Refill    HYDROcodone-acetaminophen (NORCO) 5-325 mg per tablet Take 1 Tab by mouth every four (4) hours as needed for Pain. Max Daily Amount: 6 Tabs. 20 Tab 0    methocarbamol (ROBAXIN-750) 750 mg tablet Take 1 Tab by mouth three (3) times daily. 15 Tab 0    predniSONE (STERAPRED) 5 mg dose pack See administration instruction per 5mg dose pack 21 Tab 0    metoprolol succinate (TOPROL-XL) 25 mg XL tablet Take 0.5 Tabs by mouth daily. 30 Tab 11    oxyCODONE-acetaminophen (PERCOCET 10)  mg per tablet Take 1 Tab by mouth every eight (8) hours as needed for Pain. Max Daily Amount: 3 Tabs. 75 Tab 0    HYDROmorphone (DILAUDID) 2 mg tablet Take 1 Tab by mouth every four (4) hours as needed for Pain.  Max Daily Amount: 12 mg. 15 Tab 0    amiodarone (CORDARONE) 200 mg tablet Take 0.5 Tabs by mouth daily. 30 Tab 11    lisinopril (PRINIVIL, ZESTRIL) 40 mg tablet Take 1 Tab by mouth daily. 30 Tab 11    cyclobenzaprine (FLEXERIL) 10 mg tablet Take 1 Tab by mouth three (3) times daily as needed for Muscle Spasm(s). 60 Tab 5    fentaNYL (DURAGESIC) 25 mcg/hr PATCH 1 Patch by TransDERmal route every seventy-two (72) hours. Max Daily Amount: 1 Patch. (Patient taking differently: 1 Patch by TransDERmal route every seventy-two (72) hours as needed.) 10 Patch 0    potassium chloride (K-DUR, KLOR-CON) 20 mEq tablet Take 1 Tab by mouth two (2) times a day. 60 Tab 11    Polyethylene Glycol 3350 powd by Does Not Apply route daily as needed.  warfarin (COUMADIN) 7.5 mg tablet Take 7.5 mg by mouth four (4) days a week. 1 tablet (7.5 mg) Monday, Tuesday, Wednesday, Thursday, and Friday       warfarin (COUMADIN) 7.5 mg tablet Take 3.75 mg by mouth three (3) days a week. 1/2 tablet (3.75 mg) on Friday, Saturday, & Sunday       calcium 500 mg tab Take 1 Tab by mouth daily.  aluminum hydrox-magnesium carb (GAVISCON EXTRA STRENGTH) 160-105 mg chew Take 1-2 tablets by mouth four (4) times daily as needed.  lenalidomide (REVLIMID) 25 mg cap Take 25 mg by mouth daily.        Past Medical History:   Diagnosis Date    Arthritis     Atrial fibrillation ([facility]     Cancer ([facility]     multiple myeloma    Hypertension      Past Surgical History:   Procedure Laterality Date    HX GYN       Allergies   Allergen Reactions    Codeine Other (comments)         REVIEW OF SYSTEMS:  General: negative for - chills or fever  ENT: negative for - headaches, nasal congestion or tinnitus  Respiratory: negative for - cough, hemoptysis, shortness of breath or wheezing  Cardiovascular : negative for - chest pain, edema, palpitations or shortness of breath  Gastrointestinal: negative for - abdominal pain, blood in stools, heartburn or nausea/vomiting  Genito-Urinary: no dysuria, trouble voiding, or hematuria  Musculoskeletal: negative for - gait disturbance, joint pain, joint stiffness or joint swelling  Neurological: no TIA or stroke symptoms  Hematologic: no bruises, no bleeding, no swollen glands  Integument: no lumps, mole changes, nail changes or rash  Endocrine: no malaise/lethargy or unexpected weight changes      Social History     Social History    Marital status:      Spouse name: N/A    Number of children: N/A    Years of education: N/A     Social History Main Topics    Smoking status: Never Smoker    Smokeless tobacco: Never Used    Alcohol use No    Drug use: No    Sexual activity: Not Currently     Other Topics Concern    None     Social History Narrative     Family History   Problem Relation Age of Onset    Stroke Mother     No Known Problems Father        OBJECTIVE:    Visit Vitals    BP (!) 133/92 (BP 1 Location: Right arm, BP Patient Position: Sitting)    Pulse 76    Resp 20    Ht 5' 10\" (1.778 m)    Wt 213 lb 11.2 oz (96.9 kg)    SpO2 98%    BMI 30.66 kg/m2     CONSTITUTIONAL: well , well nourished, appears age appropriate  EYES: perrla, eom intact  ENMT:moist mucous membranes, pharynx clear  NECK: supple. Thyroid normal  RESPIRATORY: Chest: clear to ascultation and percussion   CARDIOVASCULAR: Heart: regular rate and rhythm  GASTROINTESTINAL: Abdomen: soft, bowel sounds active  HEMATOLOGIC: no pathological lymph nodes palpated  MUSCULOSKELETAL: Extremities: 1+ edema distally, pulse 1+   INTEGUMENT: No unusual rashes or suspicious skin lesions noted. Nails appear normal.  NEUROLOGIC: non-focal exam   MENTAL STATUS: alert and oriented, appropriate affect      ASSESSMENT:  1. Venous insufficiency    2. Chronic atrial fibrillation (HCC)    3. Midline low back pain without sciatica, unspecified chronicity    4. Multiple myeloma, remission status unspecified ([facility] [address]    5. Essential hypertension        PLAN:    1.  The patient has venous insufficiency as the etiology of her swelling. Support stockings are the treatment of choice. Logically it would be very difficult for her to utilize this. I remind her this is a benign condition that is made worse in the hot, humid months and she has had it for years. 2. INR is acceptable today. No adjustments are made. She will continue the Coumadin at one and a half tablets all days except Mondays when she will take one of the 5 mg tablet. 3. Low back pain is reasonably stable for now. 4. She will follow-up with Dr. Jeremias Jimenez regarding her multiple myeloma. 5. Her blood pressure is excellent. No adjustments are made. 6. I remind her to remain as physically active as possible. .  Orders Placed This Encounter    AMB POC PT/INR         Follow-up Disposition:  Return in about 3 months (around 9/15/2017), or monthly for INR.       Héctor Ovalle MD

## 2017-06-29 ENCOUNTER — HOSPITAL ENCOUNTER (OUTPATIENT)
Dept: MAMMOGRAPHY | Age: 77
Discharge: HOME | End: 2017-06-29
Attending: INTERNAL MEDICINE
Payer: MEDICARE

## 2017-06-29 ENCOUNTER — OFFICE VISIT (OUTPATIENT)
Dept: INTERNAL MEDICINE CLINIC | Age: 77
End: 2017-06-29

## 2017-06-29 VITALS
SYSTOLIC BLOOD PRESSURE: 144 MMHG | TEMPERATURE: 98.3 F | HEIGHT: 70 IN | HEART RATE: 87 BPM | WEIGHT: 213.6 LBS | OXYGEN SATURATION: 94 % | RESPIRATION RATE: 18 BRPM | BODY MASS INDEX: 30.58 KG/M2 | DIASTOLIC BLOOD PRESSURE: 95 MMHG

## 2017-06-29 DIAGNOSIS — K11.20 SIALADENITIS: Primary | ICD-10-CM

## 2017-06-29 DIAGNOSIS — I10 ESSENTIAL HYPERTENSION: ICD-10-CM

## 2017-06-29 DIAGNOSIS — Z12.31 VISIT FOR SCREENING MAMMOGRAM: ICD-10-CM

## 2017-06-29 PROCEDURE — 77067 SCR MAMMO BI INCL CAD: CPT

## 2017-06-29 RX ORDER — AMOXICILLIN 250 MG/1
250 CAPSULE ORAL 3 TIMES DAILY
Qty: 30 CAP | Refills: 0 | Status: SHIPPED | OUTPATIENT
Start: 2017-06-29 | End: 2017-07-09

## 2017-06-29 NOTE — PROGRESS NOTES
Chief Complaint   Patient presents with    Mass     patient has visible \"lump\" on the right side of her neck. she states that noticed it about 4 days ago, and she also states that it swells when she eats and it makes it hard for her to swollow. 1. Have you been to the ER, urgent care clinic since your last visit? Hospitalized since your last visit? No    2. Have you seen or consulted any other health care providers outside of the [address] [place] [address] since your last visit? Include any pap smears or colon screening.  No

## 2017-06-29 NOTE — MR AVS SNAPSHOT
Visit Information Date & Time Provider Department Dept. Phone Encounter #  
 6/29/2017 10:45 AM [facility] [phone] [id number] Your Appointments 7/20/2017 10:00 AM  
Nurse Visit with Olivia Romo MD  
[address] [facility]) Appt Note: follow up PT/INR  
 [phone]  
  
   
 [address] [id number]  
  
    
 8/24/2017  9:30 AM  
Any with Olivia Romo MD  
[address] [facility]) Appt Note: 2 month follow up  
 [address] [phone]  
  
   
 [address] [id number] Upcoming Health Maintenance Date Due Pneumococcal 65+ High/Highest Risk (2 of 2 - PPSV23) 8/30/2017* INFLUENZA AGE 9 TO ADULT 8/30/2017* GLAUCOMA SCREENING Q2Y 9/15/2017* MEDICARE YEARLY EXAM 12/23/2017 DTaP/Tdap/Td series (2 - Td) 5/19/2026 *Topic was postponed. The date shown is not the original due date. Allergies as of 6/29/2017  Review Complete On: 6/29/2017 By: Shelli Archuleta Severity Noted Reaction Type Reactions Codeine  09/05/2014    Other (comments) Current Immunizations  Reviewed on 4/19/2016 Name Date Influenza Vaccine 9/15/2014 Not reviewed this visit Vitals BP Pulse Temp Resp Height(growth percentile) Weight(growth percentile) (!) 144/95 (BP 1 Location: Right arm, BP Patient Position: Sitting) 87 98.3 °F (36.8 °C) (Oral) 18 5' 10\" (1.778 m) 213 lb 9.6 oz (96.9 kg) SpO2 BMI OB Status Smoking Status 94% 30.65 kg/m2 Hysterectomy Never Smoker Vitals History BMI and BSA Data Body Mass Index Body Surface Area  
 30.65 kg/m 2 2.19 m 2 Preferred Pharmacy Pharmacy Name Phone [facility] [address] [place] [address] [phone] Your Updated Medication List  
  
   
This list is accurate as of: 6/29/17 12:46 PM.  Always use your most recent med list.  
  
  
  
  
 amiodarone 200 mg tablet Commonly known as:  CORDARONE Take 0.5 Tabs by mouth daily. calcium 500 mg Tab Take 1 Tab by mouth daily. cyclobenzaprine 10 mg tablet Commonly known as:  FLEXERIL Take 1 Tab by mouth three (3) times daily as needed for Muscle Spasm(s). fentaNYL 25 mcg/hr PATCH Commonly known as:  DURAGESIC  
1 Patch by TransDERmal route every seventy-two (72) hours. Max Daily Amount: 1 Patch. GAVISCON EXTRA STRENGTH 160-105 mg Redding Gerardo Generic drug:  aluminum hydrox-magnesium carb Take 1-2 tablets by mouth four (4) times daily as needed. HYDROcodone-acetaminophen 5-325 mg per tablet Commonly known as:  Rosa Elena Michael Take 1 Tab by mouth every four (4) hours as needed for Pain. Max Daily Amount: 6 Tabs. HYDROmorphone 2 mg tablet Commonly known as:  DILAUDID Take 1 Tab by mouth every four (4) hours as needed for Pain. Max Daily Amount: 12 mg.  
  
 lisinopril 40 mg tablet Commonly known as:  Conifer Neve Take 1 Tab by mouth daily. methocarbamol 750 mg tablet Commonly known as:  VCRYPUS-076 Take 1 Tab by mouth three (3) times daily. metoprolol succinate 25 mg XL tablet Commonly known as:  TOPROL-XL Take 0.5 Tabs by mouth daily. oxyCODONE-acetaminophen  mg per tablet Commonly known as:  PERCOCET 10 Take 1 Tab by mouth every eight (8) hours as needed for Pain. Max Daily Amount: 3 Tabs. Polyethylene Glycol 3350 Powd  
by Does Not Apply route daily as needed. potassium chloride 20 mEq tablet Commonly known as:  K-DUR, KLOR-CON Take 1 Tab by mouth two (2) times a day. predniSONE 5 mg dose pack Commonly known as:  STERAPRED See administration instruction per 5mg dose pack REVLIMID 25 mg Cap Generic drug:  lenalidomide Take 25 mg by mouth daily. * warfarin 7.5 mg tablet Commonly known as:  COUMADIN Take 7.5 mg by mouth four (4) days a week. 1 tablet (7.5 mg) Monday, Tuesday, Wednesday, Thursday, and Friday * warfarin 7.5 mg tablet Commonly known as:  COUMADIN Take 3.75 mg by mouth three (3) days a week. 1/2 tablet (3.75 mg) on Friday, Saturday, & Sunday * Notice: This list has 2 medication(s) that are the same as other medications prescribed for you. Read the directions carefully, and ask your doctor or other care provider to review them with you. Introducing [facility] & HEALTH SERVICES! [facility] introduces OurHouse patient portal. Now you can access parts of your medical record, email your doctor's office, and request medication refills online. 1. In your internet browser, go to [email or web address]". Shirley Mae[employer]/DealsAndYout 2. Click on the First Time User? Click Here link in the Sign In box. You will see the New Member Sign Up page. 3. Enter your OurHouse Access Code exactly as it appears below. You will not need to use this code after youve completed the sign-up process. If you do not sign up before the expiration date, you must request a new code. · Our[employer] Access Code: [email or web address] Expires: 8/17/2017  7:51 AM 
 
4. Enter the last four digits of your Social Security Number (xxxx) and Date of Birth (mm/dd/yyyy) as indicated and click Submit. You will be taken to the next sign-up page. 5. Create a ShoutEmt ID. This will be your OurHouse login ID and cannot be changed, so think of one that is secure and easy to remember. 6. Create a OurHouse password. You can change your password at any time. 7. Enter your Password Reset Question and Answer. This can be used at a later time if you forget your password. 8. Enter your e-mail address. You will receive e-mail notification when new information is available in [address] 9. Click Sign Up. You can now view and download portions of your medical record. 10. Click the Download Summary menu link to download a portable copy of your medical information. If you have questions, please visit the Frequently Asked Questions section of the [employer] website. Remember, [employer] is NOT to be used for urgent needs. For medical emergencies, dial 911. Now available from your iPhone and Android! Please provide this summary of care documentation to your next provider. Your primary care clinician is listed as Deanna Taylor. If you have any questions after today's visit, please call [phone].

## 2017-06-29 NOTE — PROGRESS NOTES
Chief Complaint   Patient presents with    Mass     patient has visible \"lump\" on the right side of her neck. she states that noticed it about 4 days ago, and she also states that it swells when she eats and it makes it hard for her to swollow. .      SUBECTIVE:    Bradly Lynn is a 68 y.o. female comes in for return visit complaining of a five day history of pain and swelling of the right submandibular area proximally. She states that when she eats it actually swells up even more. She denies any similar symptoms before. She has a past history of primary hypertension and atrial fibrillation. Current Outpatient Prescriptions   Medication Sig Dispense Refill    amoxicillin (AMOXIL) 250 mg capsule Take 1 Cap by mouth three (3) times daily for 10 days. 30 Cap 0    HYDROcodone-acetaminophen (NORCO) 5-325 mg per tablet Take 1 Tab by mouth every four (4) hours as needed for Pain. Max Daily Amount: 6 Tabs. 20 Tab 0    methocarbamol (ROBAXIN-750) 750 mg tablet Take 1 Tab by mouth three (3) times daily. 15 Tab 0    predniSONE (STERAPRED) 5 mg dose pack See administration instruction per 5mg dose pack 21 Tab 0    metoprolol succinate (TOPROL-XL) 25 mg XL tablet Take 0.5 Tabs by mouth daily. 30 Tab 11    oxyCODONE-acetaminophen (PERCOCET 10)  mg per tablet Take 1 Tab by mouth every eight (8) hours as needed for Pain. Max Daily Amount: 3 Tabs. 75 Tab 0    HYDROmorphone (DILAUDID) 2 mg tablet Take 1 Tab by mouth every four (4) hours as needed for Pain. Max Daily Amount: 12 mg. 15 Tab 0    amiodarone (CORDARONE) 200 mg tablet Take 0.5 Tabs by mouth daily. 30 Tab 11    lisinopril (PRINIVIL, ZESTRIL) 40 mg tablet Take 1 Tab by mouth daily. 30 Tab 11    cyclobenzaprine (FLEXERIL) 10 mg tablet Take 1 Tab by mouth three (3) times daily as needed for Muscle Spasm(s). 60 Tab 5    fentaNYL (DURAGESIC) 25 mcg/hr PATCH 1 Patch by TransDERmal route every seventy-two (72) hours.  Max Daily Amount: 1 Patch. (Patient taking differently: 1 Patch by TransDERmal route every seventy-two (72) hours as needed.) 10 Patch 0    potassium chloride (K-DUR, KLOR-CON) 20 mEq tablet Take 1 Tab by mouth two (2) times a day. 60 Tab 11    Polyethylene Glycol 3350 powd by Does Not Apply route daily as needed.  warfarin (COUMADIN) 7.5 mg tablet Take 7.5 mg by mouth four (4) days a week. 1 tablet (7.5 mg) Monday, Tuesday, Wednesday, Thursday, and Friday       warfarin (COUMADIN) 7.5 mg tablet Take 3.75 mg by mouth three (3) days a week. 1/2 tablet (3.75 mg) on Friday, Saturday, & Sunday       calcium 500 mg tab Take 1 Tab by mouth daily.  lenalidomide (REVLIMID) 25 mg cap Take 25 mg by mouth daily.  aluminum hydrox-magnesium carb (GAVISCON EXTRA STRENGTH) 160-105 mg chew Take 1-2 tablets by mouth four (4) times daily as needed.        Past Medical History:   Diagnosis Date    Arthritis     [facility]     Cancer ([facility]     multiple myeloma    Hypertension      Past Surgical History:   Procedure Laterality Date    HX GYN       Allergies   Allergen Reactions    Codeine Other (comments)       REVIEW OF SYSTEMS:  Review of Systems - Negative except   ENT ROS: negative for - headaches, hearing change, nasal congestion, oral lesions, tinnitus, visual changes or   Respiratory ROS: no cough, shortness of breath, or wheezing  Cardiovascular ROS: no chest pain or dyspnea on exertion  Gastrointestinal ROS: no abdominal pain, change in bowel habits, or black or blood  Genito-Urinary ROS: no dysuria, trouble voiding, or hematuria  Musculoskeletal ROS: negative  Neurological ROS: no TIA or stroke symptoms      Social History     Social History    Marital status:      Spouse name: N/A    Number of children: N/A    Years of education: N/A     Social History Main Topics    Smoking status: Never Smoker    Smokeless tobacco: Never Used    Alcohol use No    Drug use: No    Sexual activity: Not Currently     Other Topics Concern    None     Social History Narrative   r  Family History   Problem Relation Age of Onset    Stroke Mother     No Known Problems Father        OBJECTIVE:  Visit Vitals    BP (!) 144/95 (BP 1 Location: Right arm, BP Patient Position: Sitting)    Pulse 87    Temp 98.3 °F (36.8 °C) (Oral)    Resp 18    Ht 5' 10\" (1.778 m)    Wt 213 lb 9.6 oz (96.9 kg)    SpO2 94%    BMI 30.65 kg/m2     ENT: perrla,  eom intact, moderate enlargement right sublingual gland, tenderness to palpation  NECK: supple. Thyroid normal, no JVD  CHEST: clear to ascultation and percussion   HEART: regular rate and rhythm  ABD: soft, bowel sounds active,   EXTREMITIES: no edema, pulse 1+  INTEGUMENT: clear      ASSESSMENT:  1. Sialadenitis    2. Essential hypertension        PLAN:    1. The patient has a sialadenitis on the right lingula gland. It is moderately swollen today. She will be given Amoxicillin 250 t.i.d and I will see her back in two weeks for follow-up. If there is no meaningful improvement after five days she will give us a call because she will have to see an ENT physician for the strong possibility of a plugged tube. 2. Blood pressure is excellent. No adjustments are made today. Specifically blood pressure is 140/80. 3. She will continue her Coumadin as prescribed. .  Orders Placed This Encounter    amoxicillin (AMOXIL) 250 mg capsule       Follow-up Disposition:  Return in about 2 weeks (around 7/13/2017).       Deisy Fletcher MD

## 2017-07-13 ENCOUNTER — OFFICE VISIT (OUTPATIENT)
Dept: INTERNAL MEDICINE CLINIC | Age: 77
End: 2017-07-13

## 2017-07-13 VITALS
HEART RATE: 83 BPM | BODY MASS INDEX: 30.38 KG/M2 | SYSTOLIC BLOOD PRESSURE: 132 MMHG | HEIGHT: 70 IN | WEIGHT: 212.2 LBS | OXYGEN SATURATION: 96 % | RESPIRATION RATE: 18 BRPM | TEMPERATURE: 98.7 F | DIASTOLIC BLOOD PRESSURE: 92 MMHG

## 2017-07-13 DIAGNOSIS — C90.00 MULTIPLE MYELOMA, REMISSION STATUS UNSPECIFIED (HCC): ICD-10-CM

## 2017-07-13 DIAGNOSIS — I48.20 CHRONIC ATRIAL FIBRILLATION (HCC): ICD-10-CM

## 2017-07-13 DIAGNOSIS — K11.20 SIALADENITIS: Primary | ICD-10-CM

## 2017-07-13 NOTE — MR AVS SNAPSHOT
Visit Information Date & Time Provider Department Dept. Phone Encounter #  
 7/13/2017 10:00 AM Lan Phillips MD [facility] [phone] [id number] Your Appointments 7/20/2017 10:00 AM  
Nurse Visit with Lan Phillips MD  
[facility]) Appt Note: follow up PT/INR  
 [address] [phone]  
  
   
 [address] [id number]  
  
    
 8/24/2017  9:30 AM  
Any with Lan Phillips MD  
[address] [facility]) Appt Note: 2 month follow up  
 [address] [phone]  
  
   
 [address] [id number] Upcoming Health Maintenance Date Due Pneumococcal 65+ High/Highest Risk (2 of 2 - PPSV23) 8/30/2017* INFLUENZA AGE 9 TO ADULT 8/30/2017* GLAUCOMA SCREENING Q2Y 9/15/2017* MEDICARE YEARLY EXAM 12/23/2017 DTaP/Tdap/Td series (2 - Td) 5/19/2026 *Topic was postponed. The date shown is not the original due date. Allergies as of 7/13/2017  Review Complete On: 7/13/2017 By: Sammi Sauer Severity Noted Reaction Type Reactions Codeine  09/05/2014    Other (comments) Current Immunizations  Reviewed on 4/19/2016 Name Date Influenza Vaccine 9/15/2014 Not reviewed this visit Vitals BP Pulse Temp Resp Height(growth percentile) Weight(growth percentile) (!) 132/92 (BP 1 Location: Right arm, BP Patient Position: Sitting) 83 98.7 °F (37.1 °C) (Oral) 18 5' 10\" (1.778 m) 212 lb 3.2 oz (96.3 kg) SpO2 BMI OB Status Smoking Status 96% 30.45 kg/m2 Hysterectomy Never Smoker BMI and BSA Data Body Mass Index Body Surface Area  
 30.45 kg/m 2 2.18 m 2 Preferred Pharmacy Pharmacy Name Phone [facility] [address] [phone] Your Updated Medication List  
  
   
This list is accurate as of: 7/13/17 12:54 PM.  Always use your most recent med list.  
  
  
  
  
 amiodarone 200 mg tablet Commonly known as:  CORDARONE Take 0.5 Tabs by mouth daily. calcium 500 mg Tab Take 1 Tab by mouth daily. cyclobenzaprine 10 mg tablet Commonly known as:  FLEXERIL Take 1 Tab by mouth three (3) times daily as needed for Muscle Spasm(s). fentaNYL 25 mcg/hr PATCH Commonly known as:  DURAGESIC  
1 Patch by TransDERmal route every seventy-two (72) hours. Max Daily Amount: 1 Patch. GAVISCON EXTRA STRENGTH 160-105 mg Glennie Rummage Generic drug:  aluminum hydrox-magnesium carb Take 1-2 tablets by mouth four (4) times daily as needed. HYDROcodone-acetaminophen 5-325 mg per tablet Commonly known as:  Arun Quirk Take 1 Tab by mouth every four (4) hours as needed for Pain. Max Daily Amount: 6 Tabs. HYDROmorphone 2 mg tablet Commonly known as:  DILAUDID Take 1 Tab by mouth every four (4) hours as needed for Pain. Max Daily Amount: 12 mg.  
  
 lisinopril 40 mg tablet Commonly known as:  Tiffany Bills Take 1 Tab by mouth daily. methocarbamol 750 mg tablet Commonly known as:  RRMNEXE-697 Take 1 Tab by mouth three (3) times daily. metoprolol succinate 25 mg XL tablet Commonly known as:  TOPROL-XL Take 0.5 Tabs by mouth daily. oxyCODONE-acetaminophen  mg per tablet Commonly known as:  PERCOCET 10 Take 1 Tab by mouth every eight (8) hours as needed for Pain. Max Daily Amount: 3 Tabs. Polyethylene Glycol 3350 Powd  
by Does Not Apply route daily as needed. potassium chloride 20 mEq tablet Commonly known as:  K-DUR, KLOR-CON Take 1 Tab by mouth two (2) times a day. predniSONE 5 mg dose pack Commonly known as:  STERAPRED See administration instruction per 5mg dose pack REVLIMID 25 mg Cap Generic drug:  lenalidomide Take 25 mg by mouth daily. * warfarin 7.5 mg tablet Commonly known as:  COUMADIN Take 7.5 mg by mouth four (4) days a week. 1 tablet (7.5 mg) Monday, Tuesday, Wednesday, Thursday, and Friday * warfarin 7.5 mg tablet Commonly known as:  COUMADIN Take 3.75 mg by mouth three (3) days a week. 1/2 tablet (3.75 mg) on Friday, Saturday, & Sunday * Notice: This list has 2 medication(s) that are the same as other medications prescribed for you. Read the directions carefully, and ask your doctor or other care provider to review them with you. Introducing [facility] & HEALTH SERVICES! Hannah Maldonado introduces [employer] patient portal. Now you can access parts of your medical record, email your doctor's office, and request medication refills online. 1. In your internet browser, go to [email or web address] 2. Click on the First Time User? Click Here link in the Sign In box. You will see the New Member Sign Up page. 3. Enter your [employer] Access Code exactly as it appears below. You will not need to use this code after youve completed the sign-up process. If you do not sign up before the expiration date, you must request a new code. · [employer] Access Code: [email or web address] Expires: 8/17/2017  7:51 AM 
 
4. Enter the last four digits of your Social Security Number (xxxx) and Date of Birth (mm/dd/yyyy) as indicated and click Submit. You will be taken to the next sign-up page. 5. Create a Donya [employer] ID. This will be your [employer] login ID and cannot be changed, so think of one that is secure and easy to remember. 6. Create a [employer] password. You can change your password at any time. 7. Enter your Password Reset Question and Answer. This can be used at a later time if you forget your password. 8. Enter your e-mail address. You will receive e-mail notification when new information is available in [address] 9. Click Sign Up. You can now view and download portions of your medical record. 10. Click the Download Summary menu link to download a portable copy of your medical information. If you have questions, please visit the Frequently Asked Questions section of the [employer] website. Remember, [employer] is NOT to be used for urgent needs. For medical emergencies, dial 911. Now available from your iPhone and Android! Please provide this summary of care documentation to your next provider. Your primary care clinician is listed as Deanna Taylor. If you have any questions after today's visit, please call [phone].

## 2017-07-13 NOTE — PROGRESS NOTES
Chief Complaint   Patient presents with    Follow-up     2 week follow up      1. Have you been to the ER, urgent care clinic since your last visit? Hospitalized since your last visit? No    2. Have you seen or consulted any other health care providers outside of the [address] [place] [address] since your last visit? Include any pap smears or colon screening.  No

## 2017-07-16 NOTE — PROGRESS NOTES
Chief Complaint   Patient presents with    Follow-up     2 week follow up    . SUBECTIVE:    Endy Contreras is a 68 y.o. female comes in for follow-up of her infected lingular gland. There is been significant improvement such that the swelling is completely gone currently. She however notes episodic swelling with eating. She continues her Coumadin for episodic atrial fibrillation and recurrent episodes of DVT. She also follows up with her medical oncologist for multiple myeloma. Current Outpatient Prescriptions   Medication Sig Dispense Refill    HYDROcodone-acetaminophen (NORCO) 5-325 mg per tablet Take 1 Tab by mouth every four (4) hours as needed for Pain. Max Daily Amount: 6 Tabs. 20 Tab 0    methocarbamol (ROBAXIN-750) 750 mg tablet Take 1 Tab by mouth three (3) times daily. 15 Tab 0    predniSONE (STERAPRED) 5 mg dose pack See administration instruction per 5mg dose pack 21 Tab 0    metoprolol succinate (TOPROL-XL) 25 mg XL tablet Take 0.5 Tabs by mouth daily. 30 Tab 11    oxyCODONE-acetaminophen (PERCOCET 10)  mg per tablet Take 1 Tab by mouth every eight (8) hours as needed for Pain. Max Daily Amount: 3 Tabs. 75 Tab 0    HYDROmorphone (DILAUDID) 2 mg tablet Take 1 Tab by mouth every four (4) hours as needed for Pain. Max Daily Amount: 12 mg. 15 Tab 0    amiodarone (CORDARONE) 200 mg tablet Take 0.5 Tabs by mouth daily. 30 Tab 11    lisinopril (PRINIVIL, ZESTRIL) 40 mg tablet Take 1 Tab by mouth daily. 30 Tab 11    cyclobenzaprine (FLEXERIL) 10 mg tablet Take 1 Tab by mouth three (3) times daily as needed for Muscle Spasm(s). 60 Tab 5    fentaNYL (DURAGESIC) 25 mcg/hr PATCH 1 Patch by TransDERmal route every seventy-two (72) hours. Max Daily Amount: 1 Patch. (Patient taking differently: 1 Patch by TransDERmal route every seventy-two (72) hours as needed.) 10 Patch 0    potassium chloride (K-DUR, KLOR-CON) 20 mEq tablet Take 1 Tab by mouth two (2) times a day.  60 Tab 11    Polyethylene Glycol 3350 powd by Does Not Apply route daily as needed.  warfarin (COUMADIN) 7.5 mg tablet Take 7.5 mg by mouth four (4) days a week. 1 tablet (7.5 mg) Monday, Tuesday, Wednesday, Thursday, and Friday       warfarin (COUMADIN) 7.5 mg tablet Take 3.75 mg by mouth three (3) days a week. 1/2 tablet (3.75 mg) on Friday, Saturday, & Sunday       calcium 500 mg tab Take 1 Tab by mouth daily.  aluminum hydrox-magnesium carb (GAVISCON EXTRA STRENGTH) 160-105 mg chew Take 1-2 tablets by mouth four (4) times daily as needed.  lenalidomide (REVLIMID) 25 mg cap Take 25 mg by mouth daily.        Past Medical History:   Diagnosis Date    Arthritis     At[facility] fibrillation ([facility]     Cancer ([facility]     multiple myeloma    Hypertension      Past Surgical History:   Procedure Laterality Date    HX GYN       Allergies   Allergen Reactions    Codeine Other (comments)       REVIEW OF SYSTEMS:  Review of Systems - Negative except   ENT ROS: negative for - headaches, hearing change, nasal congestion, oral lesions, tinnitus, visual changes or   Respiratory ROS: no cough, shortness of breath, or wheezing  Cardiovascular ROS: no chest pain or dyspnea on exertion  Gastrointestinal ROS: no abdominal pain, change in bowel habits, or black or blood  Genito-Urinary ROS: no dysuria, trouble voiding, or hematuria  Musculoskeletal ROS: negative  Neurological ROS: no TIA or stroke symptoms      Social History     Social History    Marital status:      Spouse name: N/A    Number of children: N/A    Years of education: N/A     Social History Main Topics    Smoking status: Never Smoker    Smokeless tobacco: Never Used    Alcohol use No    Drug use: No    Sexual activity: Not Currently     Other Topics Concern    None     Social History Narrative   r  Family History   Problem Relation Age of Onset    Stroke Mother     No Known Problems Father        OBJECTIVE:  Visit Vitals    BP (!) 132/92 (BP 1 Location: Right arm, BP Patient Position: Sitting)    Pulse 83    Temp 98.7 °F (37.1 °C) (Oral)    Resp 18    Ht 5' 10\" (1.778 m)    Wt 212 lb 3.2 oz (96.3 kg)    SpO2 96%    BMI 30.45 kg/m2     ENT: perrla,  eom intact, minimal tenderness and enlargement right lingular gland  NECK: supple. Thyroid normal, no JVD  CHEST: clear to ascultation and percussion   HEART: regular rate and rhythm  ABD: soft, bowel sounds active,   EXTREMITIES: no edema, pulse 1+  INTEGUMENT: clear      ASSESSMENT:  1. Sialadenitis    2. Chronic atrial fibrillation (HCC)    3. Multiple myeloma, remission status unspecified (HCC)        PLAN:  1. The patient's infected lingular gland is better but it continues to swell with eating. I suspect a stone in the duct. I will send her to ENT to ensure nothing more need be done. 2.  INR is acceptable today no adjustments are made. 3.  She will continue follow-up with Dr. Ginger Alvarez for her multiple myeloma. .  Orders Placed This Encounter    REFERRAL TO ENT-OTOLARYNGOLOGY       Follow-up Disposition:  Return RTO monthly for INR.       Sita Merritt MD

## 2017-07-27 ENCOUNTER — TELEPHONE (OUTPATIENT)
Dept: INTERNAL MEDICINE CLINIC | Age: 77
End: 2017-07-27

## 2017-07-27 NOTE — TELEPHONE ENCOUNTER
We get a call from patients oncologist that her PT/INR GREATER THAN 8 AND THE PT WAS 96. PER THE DRJuanpablo PATIENT ASK TO HOLD HER COUMADIN UNTIL SHE SEE DR. Leanna Stewart.

## 2017-07-31 ENCOUNTER — OFFICE VISIT (OUTPATIENT)
Dept: INTERNAL MEDICINE CLINIC | Age: 77
End: 2017-07-31

## 2017-07-31 VITALS
RESPIRATION RATE: 16 BRPM | OXYGEN SATURATION: 96 % | WEIGHT: 216.6 LBS | HEART RATE: 53 BPM | SYSTOLIC BLOOD PRESSURE: 118 MMHG | DIASTOLIC BLOOD PRESSURE: 73 MMHG | TEMPERATURE: 98 F | HEIGHT: 70 IN | BODY MASS INDEX: 31.01 KG/M2

## 2017-07-31 DIAGNOSIS — I48.20 CHRONIC ATRIAL FIBRILLATION (HCC): Primary | ICD-10-CM

## 2017-07-31 DIAGNOSIS — I10 ESSENTIAL HYPERTENSION: ICD-10-CM

## 2017-07-31 DIAGNOSIS — R60.9 EDEMA, UNSPECIFIED TYPE: ICD-10-CM

## 2017-07-31 DIAGNOSIS — I87.2 VENOUS INSUFFICIENCY: ICD-10-CM

## 2017-07-31 DIAGNOSIS — J45.20 REACTIVE AIRWAY DISEASE, MILD INTERMITTENT, UNCOMPLICATED: ICD-10-CM

## 2017-07-31 NOTE — MR AVS SNAPSHOT
Visit Information Date & Time Provider Department Dept. Phone Encounter #  
 7/31/2017  9:00 AM Arben Nix MD [facility] [phone] [id number] Your Appointments 8/24/2017  9:30 AM  
Any with Arben Nix MD  
[address] [place] [address] and Primary Care [facility]) Appt Note: 2 month follow up  
 Josue Mcgarry [address] 1 [facility] [place]  
  
   
 Josue Mcgarry [address] [id number] Upcoming Health Maintenance Date Due Pneumococcal 65+ High/Highest Risk (2 of 2 - PPSV23) 8/30/2017* INFLUENZA AGE 9 TO ADULT 8/30/2017* GLAUCOMA SCREENING Q2Y 9/15/2017* MEDICARE YEARLY EXAM 12/23/2017 DTaP/Tdap/Td series (2 - Td) 5/19/2026 *Topic was postponed. The date shown is not the original due date. Allergies as of 7/31/2017  Review Complete On: 7/31/2017 By: Karo Damon Severity Noted Reaction Type Reactions Codeine  09/05/2014    Other (comments) Current Immunizations  Reviewed on 4/19/2016 Name Date Influenza Vaccine 9/15/2014 Not reviewed this visit You Were Diagnosed With   
  
 Codes Comments Chronic atrial fibrillation (HCC)    -  Primary ICD-10-CM: G82.2 ICD-9-CM: 427.31 Reactive airway disease, mild intermittent, uncomplicated     [facility]-99-KA: J45.20 ICD-9-CM: 493.90 Essential hypertension     ICD-10-CM: I10 
ICD-9-CM: 401.9 Venous insufficiency     ICD-10-CM: I87.2 ICD-9-CM: 459.81 Edema, unspecified type     ICD-10-CM: R60.9 ICD-9-CM: 432. 3 Vitals BP Pulse Temp Resp Height(growth percentile) Weight(growth percentile) 118/73 (BP 1 Location: Left arm, BP Patient Position: Sitting) (!) 53 98 °F (36.7 °C) (Oral) 16 5' 10\" (1.778 m) 216 lb 9.6 oz (98.2 kg) SpO2 BMI OB Status Smoking Status 96% 31.08 kg/m2 Hysterectomy Never Smoker Vitals History BMI and BSA Data Body Mass Index Body Surface Area 31.08 kg/m 2 2.2 m 2 Preferred Pharmacy Pharmacy Name Phone [phone], [address] [place] [address] [place] [phone] Your Updated Medication List  
  
   
This list is accurate as of: 7/31/17 11:38 AM.  Always use your most recent med list.  
  
  
  
  
 amiodarone 200 mg tablet Commonly known as:  CORDARONE Take 0.5 Tabs by mouth daily. calcium 500 mg Tab Take 1 Tab by mouth daily. cyclobenzaprine 10 mg tablet Commonly known as:  FLEXERIL Take 1 Tab by mouth three (3) times daily as needed for Muscle Spasm(s). fentaNYL 25 mcg/hr PATCH Commonly known as:  DURAGESIC  
1 Patch by TransDERmal route every seventy-two (72) hours. Max Daily Amount: 1 Patch. GAVISCON EXTRA STRENGTH 160-105 mg Suzy Khan Generic drug:  aluminum hydrox-magnesium carb Take 1-2 tablets by mouth four (4) times daily as needed. HYDROcodone-acetaminophen 5-325 mg per tablet Commonly known as:  Nanci Urban Take 1 Tab by mouth every four (4) hours as needed for Pain. Max Daily Amount: 6 Tabs. HYDROmorphone 2 mg tablet Commonly known as:  DILAUDID Take 1 Tab by mouth every four (4) hours as needed for Pain. Max Daily Amount: 12 mg.  
  
 lisinopril 40 mg tablet Commonly known as:  Nguyễn Littler Take 1 Tab by mouth daily. methocarbamol 750 mg tablet Commonly known as:  DSXYMWX-032 Take 1 Tab by mouth three (3) times daily. metoprolol succinate 25 mg XL tablet Commonly known as:  TOPROL-XL Take 0.5 Tabs by mouth daily. oxyCODONE-acetaminophen  mg per tablet Commonly known as:  PERCOCET 10 Take 1 Tab by mouth every eight (8) hours as needed for Pain. Max Daily Amount: 3 Tabs. Polyethylene Glycol 3350 Powd  
by Does Not Apply route daily as needed. potassium chloride 20 mEq tablet Commonly known as:  K-DUR, KLOR-CON Take 1 Tab by mouth two (2) times a day. predniSONE 5 mg dose pack Commonly known as:  STERAPRED See administration instruction per 5mg dose pack REVLIMID 25 mg Cap Generic drug:  lenalidomide Take 25 mg by mouth daily. * warfarin 7.5 mg tablet Commonly known as:  COUMADIN Take 7.5 mg by mouth four (4) days a week. 1 tablet (7.5 mg) Monday, Tuesday, Wednesday, Thursday, and Friday * warfarin 7.5 mg tablet Commonly known as:  COUMADIN Take 3.75 mg by mouth three (3) days a week. 1/2 tablet (3.75 mg) on Friday, Saturday, & Sunday * Notice: This list has 2 medication(s) that are the same as other medications prescribed for you. Read the directions carefully, and ask your doctor or other care provider to review them with you. We Performed the Following AMB POC PT/INR [36473 CPT(R)] BNP [id number] CPT(R)] METABOLIC PANEL, BASIC [62012 CPT(R)] Introducing [facility] SERVICES! [employer] introduces Herotainment patient portal. Now you can access parts of your medical record, email your doctor's office, and request medication refills online. 1. In your internet browser, go to [email or web address] 2. Click on the First Time User? Click Here link in the Sign In box. You will see the New Member Sign Up page. 3. Enter your Herotainment Access Code exactly as it appears below. You will not need to use this code after youve completed the sign-up process. If you do not sign up before the expiration date, you must request a new code. · Herotainment Access Code: [email or web address] Expires: 8/17/2017  7:51 AM 
 
4. Enter the last four digits of your Social Security Number (xxxx) and Date of Birth (mm/dd/yyyy) as indicated and click Submit. You will be taken to the next sign-up page. 5. Create a SPHARESt ID. This will be your SPHARESt login ID and cannot be changed, so think of one that is secure and easy to remember. 6. Create a SPHARESt password. You can change your password at any time. 7. Enter your Password Reset Question and Answer. This can be used at a later time if you forget your password. 8. Enter your e-mail address. You will receive e-mail notification when new information is available in [address] 9. Click Sign Up. You can now view and download portions of your medical record. 10. Click the Download Summary menu link to download a portable copy of your medical information. If you have questions, please visit the Frequently Asked Questions section of the meebee website. Remember, meebee is NOT to be used for urgent needs. For medical emergencies, dial 911. Now available from your iPhone and Android! Please provide this summary of care documentation to your next provider. Your primary care clinician is listed as Deanna Taylor. If you have any questions after today's visit, please call [phone].

## 2017-07-31 NOTE — PROGRESS NOTES
Patient states that she been experiencing wheezing x 3 to 4 days. Chief Complaint   Patient presents with    Coagulation disorder     elevated pt. 1. Have you been to the ER, urgent care clinic since your last visit? Hospitalized since your last visit? No    2. Have you seen or consulted any other health care providers outside of the [address] [place] [address] since your last visit? Include any pap smears or colon screening.  No

## 2017-07-31 NOTE — PROGRESS NOTES
[address] [place] [address] and Primary Care  [place] [address]  [address] [place] [address]  Phone:  [phone]  Fax: [phone]       Chief Complaint   Patient presents with    Coagulation disorder     elevated pt. .      SUBJECTIVE:    Larry Alexander is a 68 y.o. female Comes in for return visit for follow up of her INR, as well as several other problems. The INR remains elevated at 4.1. On last Tuesday 7.2 at oncologist office. She has been holding the Coumadin since. She has noted for the last five days nocturnal wheezing. She has had a bit of cough also. She has had no other accompanying respiratory symptoms. She has swelling of the lower extremities, but this has been a chronic recurring problem. She has a history of venous insufficiency. She has a past history primary hypertension. Current Outpatient Prescriptions   Medication Sig Dispense Refill    HYDROcodone-acetaminophen (NORCO) 5-325 mg per tablet Take 1 Tab by mouth every four (4) hours as needed for Pain. Max Daily Amount: 6 Tabs. 20 Tab 0    methocarbamol (ROBAXIN-750) 750 mg tablet Take 1 Tab by mouth three (3) times daily. 15 Tab 0    predniSONE (STERAPRED) 5 mg dose pack See administration instruction per 5mg dose pack 21 Tab 0    metoprolol succinate (TOPROL-XL) 25 mg XL tablet Take 0.5 Tabs by mouth daily. 30 Tab 11    oxyCODONE-acetaminophen (PERCOCET 10)  mg per tablet Take 1 Tab by mouth every eight (8) hours as needed for Pain. Max Daily Amount: 3 Tabs. 75 Tab 0    HYDROmorphone (DILAUDID) 2 mg tablet Take 1 Tab by mouth every four (4) hours as needed for Pain. Max Daily Amount: 12 mg. 15 Tab 0    amiodarone (CORDARONE) 200 mg tablet Take 0.5 Tabs by mouth daily. 30 Tab 11    lisinopril (PRINIVIL, ZESTRIL) 40 mg tablet Take 1 Tab by mouth daily. 30 Tab 11    cyclobenzaprine (FLEXERIL) 10 mg tablet Take 1 Tab by mouth three (3) times daily as needed for Muscle Spasm(s).  60 Tab 5    fentaNYL (DURAGESIC) 25 mcg/hr PATCH 1 Patch by TransDERmal route every seventy-two (72) hours. Max Daily Amount: 1 Patch. (Patient taking differently: 1 Patch by TransDERmal route every seventy-two (72) hours as needed.) 10 Patch 0    potassium chloride (K-DUR, KLOR-CON) 20 mEq tablet Take 1 Tab by mouth two (2) times a day. 60 Tab 11    Polyethylene Glycol 3350 powd by Does Not Apply route daily as needed.  warfarin (COUMADIN) 7.5 mg tablet Take 7.5 mg by mouth four (4) days a week. 1 tablet (7.5 mg) Monday, Tuesday, Wednesday, Thursday, and Friday       warfarin (COUMADIN) 7.5 mg tablet Take 3.75 mg by mouth three (3) days a week. 1/2 tablet (3.75 mg) on Friday, Saturday, & Sunday       calcium 500 mg tab Take 1 Tab by mouth daily.  aluminum hydrox-magnesium carb (GAVISCON EXTRA STRENGTH) 160-105 mg chew Take 1-2 tablets by mouth four (4) times daily as needed.  lenalidomide (REVLIMID) 25 mg cap Take 25 mg by mouth daily.        Past Medical History:   Diagnosis Date    Arthritis     Atrial fibrillation ([facility]     Cancer ([facility]     multiple myeloma    Hypertension      Past Surgical History:   Procedure Laterality Date    HX GYN       Allergies   Allergen Reactions    Codeine Other (comments)         REVIEW OF SYSTEMS:  General: negative for - chills or fever  ENT: negative for - headaches, nasal congestion or tinnitus  Respiratory: negative for - cough, hemoptysis, shortness of breath or wheezing  Cardiovascular : negative for - chest pain, edema, palpitations or shortness of breath  Gastrointestinal: negative for - abdominal pain, blood in stools, heartburn or nausea/vomiting  Genito-Urinary: no dysuria, trouble voiding, or hematuria  Musculoskeletal: negative for - gait disturbance, joint pain, joint stiffness or joint swelling  Neurological: no TIA or stroke symptoms  Hematologic: no bruises, no bleeding, no swollen glands  Integument: no lumps, mole changes, nail changes or rash  Endocrine: no malaise/lethargy or unexpected weight changes      Social History     Social History    Marital status:      Spouse name: N/A    Number of children: N/A    Years of education: N/A     Social History Main Topics    Smoking status: Never Smoker    Smokeless tobacco: Never Used    Alcohol use No    Drug use: No    Sexual activity: Not Currently     Other Topics Concern    None     Social History Narrative     Family History   Problem Relation Age of Onset    Stroke Mother     No Known Problems Father        OBJECTIVE:    Visit Vitals    /73 (BP 1 Location: Left arm, BP Patient Position: Sitting)    Pulse (!) 53    Temp 98 °F (36.7 °C) (Oral)    Resp 16    Ht 5' 10\" (1.778 m)    Wt 216 lb 9.6 oz (98.2 kg)    SpO2 96%    BMI 31.08 kg/m2     CONSTITUTIONAL: well , well nourished, appears age appropriate  EYES: perrla, eom intact  ENMT:moist mucous membranes, pharynx clear  NECK: supple. Thyroid normal  RESPIRATORY: Chest: clear to ascultation and percussion   CARDIOVASCULAR: Heart: regular rate and rhythm  GASTROINTESTINAL: Abdomen: soft, bowel sounds active  HEMATOLOGIC: no pathological lymph nodes palpated  MUSCULOSKELETAL: Extremities: 1+ edema distally, pulse 1+   INTEGUMENT: No unusual rashes or suspicious skin lesions noted. Nails appear normal.  NEUROLOGIC: non-focal exam   MENTAL STATUS: alert and oriented, appropriate affect      ASSESSMENT:  1. Chronic atrial fibrillation ([address]    2. Reactive airway disease, mild intermittent, uncomplicated    3. Essential hypertension    4. Venous insufficiency    5. Edema, unspecified type        PLAN:    1. INR is still elevated. She will continue to hold the Coumadin, and return to the office on Thursday for repeat INR. I suggest to her as I have done many times before that she needs to increase her intake of vegetables. 2. She has reactive airway disease, which is moderate.   She will start Dulera 2 puffs b.i.d., and I will give her a five day course of Prednisone 20 mg t.i.d. p.c.  3. Her blood pressure is excellent today. 4. She has a moderate amount of edema of the distal lower extremities bilaterally. Historically she has a history of venous insufficiency. This appears to be a little worse than usual, which is not surprising given the current weather pattern. .  Orders Placed This Encounter    BNP    METABOLIC PANEL, BASIC    AMB POC PT/INR         Follow-up Disposition:  Return in about 3 weeks (around 8/21/2017), or RTO Thur for INR.       Mikki Olson MD

## 2017-08-01 LAB
BNP SERPL-MCNC: 486.5 PG/ML (ref 0–100)
BUN SERPL-MCNC: 11 MG/DL (ref 8–27)
BUN/CREAT SERPL: 12 (ref 12–28)
CALCIUM SERPL-MCNC: 9.1 MG/DL (ref 8.7–10.3)
CHLORIDE SERPL-SCNC: 104 MMOL/L (ref 96–106)
CO2 SERPL-SCNC: 26 MMOL/L (ref 18–29)
CREAT SERPL-MCNC: 0.92 MG/DL (ref 0.57–1)
GLUCOSE SERPL-MCNC: 94 MG/DL (ref 65–99)
POTASSIUM SERPL-SCNC: 3.5 MMOL/L (ref 3.5–5.2)
SODIUM SERPL-SCNC: 145 MMOL/L (ref 134–144)

## 2017-08-03 ENCOUNTER — CLINICAL SUPPORT (OUTPATIENT)
Dept: INTERNAL MEDICINE CLINIC | Age: 77
End: 2017-08-03

## 2017-08-03 DIAGNOSIS — I48.20 CHRONIC ATRIAL FIBRILLATION (HCC): Primary | ICD-10-CM

## 2017-08-03 LAB
INR BLD: 1.5
PT POC: 18.5 SECONDS
VALID INTERNAL CONTROL?: YES

## 2017-08-03 NOTE — PROGRESS NOTES
PATIENT CAME IN TODAY FOR PT/INR CHECK. PATIENT CURRENTLY   NOT TAKING WARFIN BECAUSE OF ELEVATED INR. Results for orders placed or performed in visit on 08/03/17   AMB POC PT/INR   Result Value Ref Range    VALID INTERNAL CONTROL POC Yes     Prothrombin time (POC) 18.5 seconds    INR POC 1.5        Patient to return in 2 weeks per Dr. Marietta Soriano.

## 2017-08-03 NOTE — MR AVS SNAPSHOT
Visit Information Date & Time Provider Department Dept. Phone Encounter #  
 8/3/2017  9:15 AM [facility] Medicine and Primary Care [phone] [id number] Your Appointments 8/17/2017  9:00 AM  
Nurse Visit with David Romo MD  
[address] [facility] and Primary Care [facility]) Appt Note: 3 week follow up; pt check [address]  
[phone]  
  
   
 [facility] [address]  
  
    
 8/24/2017  9:30 AM  
Any with David Romo MD  
[address] [facility] and Primary Care [facility]) Appt Note: 2 month follow up  
 [address] [facility] [place]  
  
   
 [address] [facility] [address] Upcoming Health Maintenance Date Due Pneumococcal 65+ High/Highest Risk (2 of 2 - PPSV23) 8/30/2017* INFLUENZA AGE 9 TO ADULT 8/30/2017* GLAUCOMA SCREENING Q2Y 9/15/2017* MEDICARE YEARLY EXAM 12/23/2017 DTaP/Tdap/Td series (2 - Td) 5/19/2026 *Topic was postponed. The date shown is not the original due date. Allergies as of 8/3/2017  Review Complete On: 7/31/2017 By: David Romo MD  
  
 Severity Noted Reaction Type Reactions Codeine  09/05/2014    Other (comments) Current Immunizations  Reviewed on 4/19/2016 Name Date Influenza Vaccine 9/15/2014 Not reviewed this visit You Were Diagnosed With   
  
 Codes Comments Chronic atrial fibrillation (HCC)    -  Primary ICD-10-CM: U14.5 ICD-9-CM: 427.31 Vitals OB Status Smoking Status Hysterectomy Never Smoker Preferred Pharmacy [facility]acy [facility] Phone [facility] [address] [phone] Your Updated Medication List  
  
   
This list is accurate as of: 8/3/17 10:46 AM.  Always use your most recent med list.  
  
  
  
  
 amiodarone 200 mg tablet Commonly known as:  CORDARONE  
 Take 0.5 Tabs by mouth daily. calcium 500 mg Tab Take 1 Tab by mouth daily. cyclobenzaprine 10 mg tablet Commonly known as:  FLEXERIL Take 1 Tab by mouth three (3) times daily as needed for Muscle Spasm(s). fentaNYL 25 mcg/hr PATCH Commonly known as:  DURAGESIC  
1 Patch by TransDERmal route every seventy-two (72) hours. Max Daily Amount: 1 Patch. GAVISCON EXTRA STRENGTH 160-105 mg Sy Vitale Generic drug:  aluminum hydrox-magnesium carb Take 1-2 tablets by mouth four (4) times daily as needed. HYDROcodone-acetaminophen 5-325 mg per tablet Commonly known as:  Prakash Sony Take 1 Tab by mouth every four (4) hours as needed for Pain. Max Daily Amount: 6 Tabs. HYDROmorphone 2 mg tablet Commonly known as:  DILAUDID Take 1 Tab by mouth every four (4) hours as needed for Pain. Max Daily Amount: 12 mg.  
  
 lisinopril 40 mg tablet Commonly known as:  [place] Take 1 Tab by mouth daily. methocarbamol 750 mg tablet Commonly known as:  LDYUJZE-087 Take 1 Tab by mouth three (3) times daily. metoprolol succinate 25 mg XL tablet Commonly known as:  TOPROL-XL Take 0.5 Tabs by mouth daily. oxyCODONE-acetaminophen  mg per tablet Commonly known as:  PERCOCET 10 Take 1 Tab by mouth every eight (8) hours as needed for Pain. Max Daily Amount: 3 Tabs. Polyethylene Glycol 3350 Powd  
by Does Not Apply route daily as needed. potassium chloride 20 mEq tablet Commonly known as:  K-DUR, KLOR-CON Take 1 Tab by mouth two (2) times a day. predniSONE 5 mg dose pack Commonly known as:  STERAPRED See administration instruction per 5mg dose pack REVLIMID 25 mg Cap Generic drug:  lenalidomide Take 25 mg by mouth daily. * warfarin 7.5 mg tablet Commonly known as:  COUMADIN Take 7.5 mg by mouth four (4) days a week. 1 tablet (7.5 mg) Monday, Tuesday, Wednesday, Thursday, and Friday * warfarin 7.5 mg tablet Commonly known as:  COUMADIN Take 3.75 mg by mouth three (3) days a week. 1/2 tablet (3.75 mg) on Friday, Saturday, & Sunday * Notice: This list has 2 medication(s) that are the same as other medications prescribed for you. Read the directions carefully, and ask your doctor or other care provider to review them with you. We Performed the Following AMB POC PT/INR [08950 CPT(R)] Introducing [facility] & HEALTH SERVICES! [employer] introduces REscour patient portal. Now you can access parts of your medical record, email your doctor's office, and request medication refills online. 1. In your internet browser, go to [email or web address] 2. Click on the First Time User? Click Here link in the Sign In box. You will see the New Member Sign Up page. 3. Enter your REscour Access Code exactly as it appears below. You will not need to use this code after youve completed the sign-up process. If you do not sign up before the expiration date, you must request a new code. · REscour Access Code: [email or web address] Expires: 8/17/2017  7:51 AM 
 
4. Enter the last four digits of your Social Security Number (xxxx) and Date of Birth (mm/dd/yyyy) as indicated and click Submit. You will be taken to the next sign-up page. 5. Create a REscour ID. This will be your REscour login ID and cannot be changed, so think of one that is secure and easy to remember. 6. Create a REscour password. You can change your password at any time. 7. Enter your Password Reset Question and Answer. This can be used at a later time if you forget your password. 8. Enter your e-mail address. You will receive e-mail notification when new information is available in [address] 9. Click Sign Up. You can now view and download portions of your medical record. 10. Click the Download Summary menu link to download a portable copy of your medical information. If you have questions, please visit the Frequently Asked Questions section of the PeriGent website. Remember, [employer] is NOT to be used for urgent needs. For medical emergencies, dial 911. Now available from your iPhone and Android! Please provide this summary of care documentation to your next provider. Your primary care clinician is listed as Deanna Taylor. If you have any questions after today's visit, please call [phone].

## 2017-08-06 DIAGNOSIS — I51.7 LVH (LEFT VENTRICULAR HYPERTROPHY): Primary | ICD-10-CM

## 2017-08-08 ENCOUNTER — APPOINTMENT (OUTPATIENT)
Dept: CT IMAGING | Age: 77
DRG: 242 | End: 2017-08-08
Attending: EMERGENCY MEDICINE
Payer: MEDICARE

## 2017-08-08 ENCOUNTER — HOSPITAL ENCOUNTER (INPATIENT)
Age: 77
LOS: 4 days | Discharge: HOME | DRG: 242 | End: 2017-08-12
Attending: EMERGENCY MEDICINE | Admitting: INTERNAL MEDICINE
Payer: MEDICARE

## 2017-08-08 ENCOUNTER — APPOINTMENT (OUTPATIENT)
Dept: GENERAL RADIOLOGY | Age: 77
DRG: 242 | End: 2017-08-08
Attending: EMERGENCY MEDICINE
Payer: MEDICARE

## 2017-08-08 DIAGNOSIS — J96.01 ACUTE RESPIRATORY FAILURE WITH HYPOXIA (HCC): Primary | ICD-10-CM

## 2017-08-08 DIAGNOSIS — I26.99 OTHER ACUTE PULMONARY EMBOLISM WITHOUT ACUTE COR PULMONALE (HCC): ICD-10-CM

## 2017-08-08 DIAGNOSIS — I50.9 ACUTE CONGESTIVE HEART FAILURE, UNSPECIFIED CONGESTIVE HEART FAILURE TYPE: ICD-10-CM

## 2017-08-08 LAB
ALBUMIN SERPL BCP-MCNC: 3.2 G/DL (ref 3.5–5)
ALBUMIN/GLOB SERPL: 0.8 {RATIO} (ref 1.1–2.2)
ALP SERPL-CCNC: 108 U/L (ref 45–117)
ALT SERPL-CCNC: 35 U/L (ref 12–78)
ANION GAP BLD CALC-SCNC: 5 MMOL/L (ref 5–15)
APTT PPP: 30.1 SEC (ref 22.1–32.5)
AST SERPL W P-5'-P-CCNC: 21 U/L (ref 15–37)
ATRIAL RATE: 375 BPM
BASOPHILS # BLD AUTO: 0 K/UL (ref 0–0.1)
BASOPHILS # BLD: 1 % (ref 0–1)
BILIRUB SERPL-MCNC: 0.8 MG/DL (ref 0.2–1)
BNP SERPL-MCNC: 1661 PG/ML (ref 0–450)
BUN SERPL-MCNC: 8 MG/DL (ref 6–20)
BUN/CREAT SERPL: 8 (ref 12–20)
CALCIUM SERPL-MCNC: 8.6 MG/DL (ref 8.5–10.1)
CALCULATED R AXIS, ECG10: -23 DEGREES
CALCULATED T AXIS, ECG11: 153 DEGREES
CHLORIDE SERPL-SCNC: 105 MMOL/L (ref 97–108)
CK SERPL-CCNC: 158 U/L (ref 26–192)
CO2 SERPL-SCNC: 34 MMOL/L (ref 21–32)
CREAT SERPL-MCNC: 1.06 MG/DL (ref 0.55–1.02)
DIAGNOSIS, 93000: NORMAL
DIFFERENTIAL METHOD BLD: ABNORMAL
EOSINOPHIL # BLD: 0.1 K/UL (ref 0–0.4)
EOSINOPHIL NFR BLD: 2 % (ref 0–7)
ERYTHROCYTE [DISTWIDTH] IN BLOOD BY AUTOMATED COUNT: 18 % (ref 11.5–14.5)
GLOBULIN SER CALC-MCNC: 3.9 G/DL (ref 2–4)
GLUCOSE SERPL-MCNC: 120 MG/DL (ref 65–100)
HCT VFR BLD AUTO: 36.3 % (ref 35–47)
HGB BLD-MCNC: 10.9 G/DL (ref 11.5–16)
INR PPP: 1.3 (ref 0.9–1.1)
LYMPHOCYTES # BLD AUTO: 21 % (ref 12–49)
LYMPHOCYTES # BLD: 0.8 K/UL (ref 0.8–3.5)
MAGNESIUM SERPL-MCNC: 1.8 MG/DL (ref 1.6–2.4)
MCH RBC QN AUTO: 26.5 PG (ref 26–34)
MCHC RBC AUTO-ENTMCNC: 30 G/DL (ref 30–36.5)
MCV RBC AUTO: 88.1 FL (ref 80–99)
MONOCYTES # BLD: 0.1 K/UL (ref 0–1)
MONOCYTES NFR BLD AUTO: 2 % (ref 5–13)
NEUTS SEG # BLD: 2.7 K/UL (ref 1.8–8)
NEUTS SEG NFR BLD AUTO: 74 % (ref 32–75)
PLATELET # BLD AUTO: 236 K/UL (ref 150–400)
POTASSIUM SERPL-SCNC: 2.7 MMOL/L (ref 3.5–5.1)
PROT SERPL-MCNC: 7.1 G/DL (ref 6.4–8.2)
PROTHROMBIN TIME: 12.9 SEC (ref 9–11.1)
Q-T INTERVAL, ECG07: 304 MS
QRS DURATION, ECG06: 86 MS
QTC CALCULATION (BEZET), ECG08: 445 MS
RBC # BLD AUTO: 4.12 M/UL (ref 3.8–5.2)
RBC MORPH BLD: ABNORMAL
SODIUM SERPL-SCNC: 144 MMOL/L (ref 136–145)
THERAPEUTIC RANGE,PTTT: NORMAL SECS (ref 58–77)
TROPONIN I SERPL-MCNC: <0.04 NG/ML
VENTRICULAR RATE, ECG03: 129 BPM
WBC # BLD AUTO: 3.7 K/UL (ref 3.6–11)

## 2017-08-08 PROCEDURE — 85025 COMPLETE CBC W/AUTO DIFF WBC: CPT | Performed by: EMERGENCY MEDICINE

## 2017-08-08 PROCEDURE — 93005 ELECTROCARDIOGRAM TRACING: CPT

## 2017-08-08 PROCEDURE — 85730 THROMBOPLASTIN TIME PARTIAL: CPT | Performed by: EMERGENCY MEDICINE

## 2017-08-08 PROCEDURE — 71275 CT ANGIOGRAPHY CHEST: CPT

## 2017-08-08 PROCEDURE — 36415 COLL VENOUS BLD VENIPUNCTURE: CPT | Performed by: EMERGENCY MEDICINE

## 2017-08-08 PROCEDURE — 74011250637 HC RX REV CODE- 250/637: Performed by: INTERNAL MEDICINE

## 2017-08-08 PROCEDURE — 85610 PROTHROMBIN TIME: CPT | Performed by: EMERGENCY MEDICINE

## 2017-08-08 PROCEDURE — 96366 THER/PROPH/DIAG IV INF ADDON: CPT

## 2017-08-08 PROCEDURE — 80053 COMPREHEN METABOLIC PANEL: CPT | Performed by: EMERGENCY MEDICINE

## 2017-08-08 PROCEDURE — 96375 TX/PRO/DX INJ NEW DRUG ADDON: CPT

## 2017-08-08 PROCEDURE — 65660000000 HC RM CCU STEPDOWN

## 2017-08-08 PROCEDURE — 71020 XR CHEST PA LAT: CPT

## 2017-08-08 PROCEDURE — 74011636320 HC RX REV CODE- 636/320: Performed by: EMERGENCY MEDICINE

## 2017-08-08 PROCEDURE — 99285 EMERGENCY DEPT VISIT HI MDM: CPT

## 2017-08-08 PROCEDURE — 83880 ASSAY OF NATRIURETIC PEPTIDE: CPT | Performed by: EMERGENCY MEDICINE

## 2017-08-08 PROCEDURE — 84484 ASSAY OF TROPONIN QUANT: CPT | Performed by: EMERGENCY MEDICINE

## 2017-08-08 PROCEDURE — 74011250636 HC RX REV CODE- 250/636: Performed by: INTERNAL MEDICINE

## 2017-08-08 PROCEDURE — 96372 THER/PROPH/DIAG INJ SC/IM: CPT

## 2017-08-08 PROCEDURE — 83735 ASSAY OF MAGNESIUM: CPT | Performed by: EMERGENCY MEDICINE

## 2017-08-08 PROCEDURE — 74011250636 HC RX REV CODE- 250/636: Performed by: EMERGENCY MEDICINE

## 2017-08-08 PROCEDURE — 82550 ASSAY OF CK (CPK): CPT | Performed by: EMERGENCY MEDICINE

## 2017-08-08 PROCEDURE — 74011250637 HC RX REV CODE- 250/637: Performed by: EMERGENCY MEDICINE

## 2017-08-08 PROCEDURE — 96365 THER/PROPH/DIAG IV INF INIT: CPT

## 2017-08-08 RX ORDER — SODIUM CHLORIDE 0.9 % (FLUSH) 0.9 %
10 SYRINGE (ML) INJECTION
Status: COMPLETED | OUTPATIENT
Start: 2017-08-08 | End: 2017-08-08

## 2017-08-08 RX ORDER — FUROSEMIDE 10 MG/ML
20 INJECTION INTRAMUSCULAR; INTRAVENOUS
Status: COMPLETED | OUTPATIENT
Start: 2017-08-08 | End: 2017-08-08

## 2017-08-08 RX ORDER — SODIUM CHLORIDE 9 MG/ML
50 INJECTION, SOLUTION INTRAVENOUS
Status: COMPLETED | OUTPATIENT
Start: 2017-08-08 | End: 2017-08-08

## 2017-08-08 RX ORDER — AMIODARONE HYDROCHLORIDE 200 MG/1
100 TABLET ORAL DAILY
Status: DISCONTINUED | OUTPATIENT
Start: 2017-08-09 | End: 2017-08-11

## 2017-08-08 RX ORDER — SODIUM CHLORIDE 0.9 % (FLUSH) 0.9 %
5-10 SYRINGE (ML) INJECTION EVERY 8 HOURS
Status: DISCONTINUED | OUTPATIENT
Start: 2017-08-08 | End: 2017-08-12 | Stop reason: HOSPADM

## 2017-08-08 RX ORDER — SODIUM CHLORIDE 0.9 % (FLUSH) 0.9 %
5-10 SYRINGE (ML) INJECTION AS NEEDED
Status: DISCONTINUED | OUTPATIENT
Start: 2017-08-08 | End: 2017-08-12 | Stop reason: HOSPADM

## 2017-08-08 RX ORDER — POTASSIUM CHLORIDE 7.45 MG/ML
10 INJECTION INTRAVENOUS
Status: COMPLETED | OUTPATIENT
Start: 2017-08-08 | End: 2017-08-08

## 2017-08-08 RX ORDER — FUROSEMIDE 10 MG/ML
20 INJECTION INTRAMUSCULAR; INTRAVENOUS 2 TIMES DAILY
Status: DISCONTINUED | OUTPATIENT
Start: 2017-08-08 | End: 2017-08-09

## 2017-08-08 RX ORDER — METHOCARBAMOL 750 MG/1
750 TABLET, FILM COATED ORAL
Status: DISCONTINUED | OUTPATIENT
Start: 2017-08-08 | End: 2017-08-12 | Stop reason: HOSPADM

## 2017-08-08 RX ORDER — DOCUSATE SODIUM 100 MG/1
100 CAPSULE, LIQUID FILLED ORAL
Status: DISCONTINUED | OUTPATIENT
Start: 2017-08-08 | End: 2017-08-12 | Stop reason: HOSPADM

## 2017-08-08 RX ORDER — WARFARIN SODIUM 5 MG/1
5 TABLET ORAL
Status: COMPLETED | OUTPATIENT
Start: 2017-08-08 | End: 2017-08-08

## 2017-08-08 RX ORDER — POTASSIUM CHLORIDE 20 MEQ/1
60 TABLET, EXTENDED RELEASE ORAL
Status: COMPLETED | OUTPATIENT
Start: 2017-08-08 | End: 2017-08-08

## 2017-08-08 RX ORDER — HYDROCODONE BITARTRATE AND ACETAMINOPHEN 5; 325 MG/1; MG/1
1 TABLET ORAL
Status: DISCONTINUED | OUTPATIENT
Start: 2017-08-08 | End: 2017-08-12 | Stop reason: HOSPADM

## 2017-08-08 RX ORDER — METOPROLOL SUCCINATE 25 MG/1
12.5 TABLET, EXTENDED RELEASE ORAL DAILY
Status: DISCONTINUED | OUTPATIENT
Start: 2017-08-08 | End: 2017-08-12 | Stop reason: HOSPADM

## 2017-08-08 RX ORDER — LISINOPRIL 20 MG/1
40 TABLET ORAL DAILY
Status: DISCONTINUED | OUTPATIENT
Start: 2017-08-09 | End: 2017-08-12 | Stop reason: HOSPADM

## 2017-08-08 RX ORDER — ONDANSETRON 2 MG/ML
4 INJECTION INTRAMUSCULAR; INTRAVENOUS
Status: DISCONTINUED | OUTPATIENT
Start: 2017-08-08 | End: 2017-08-12 | Stop reason: HOSPADM

## 2017-08-08 RX ORDER — ACETAMINOPHEN 325 MG/1
650 TABLET ORAL
Status: DISCONTINUED | OUTPATIENT
Start: 2017-08-08 | End: 2017-08-12 | Stop reason: HOSPADM

## 2017-08-08 RX ORDER — ENOXAPARIN SODIUM 100 MG/ML
1 INJECTION SUBCUTANEOUS EVERY 12 HOURS
Status: DISCONTINUED | OUTPATIENT
Start: 2017-08-09 | End: 2017-08-11

## 2017-08-08 RX ORDER — ENOXAPARIN SODIUM 100 MG/ML
1 INJECTION SUBCUTANEOUS
Status: COMPLETED | OUTPATIENT
Start: 2017-08-08 | End: 2017-08-08

## 2017-08-08 RX ADMIN — POTASSIUM CHLORIDE 10 MEQ: 10 INJECTION, SOLUTION INTRAVENOUS at 14:31

## 2017-08-08 RX ADMIN — WARFARIN SODIUM 5 MG: 5 TABLET ORAL at 19:44

## 2017-08-08 RX ADMIN — SODIUM CHLORIDE 50 ML/HR: 900 INJECTION, SOLUTION INTRAVENOUS at 13:19

## 2017-08-08 RX ADMIN — Medication 10 ML: at 21:38

## 2017-08-08 RX ADMIN — METOPROLOL SUCCINATE 12.5 MG: 25 TABLET, EXTENDED RELEASE ORAL at 19:02

## 2017-08-08 RX ADMIN — ENOXAPARIN SODIUM 90 MG: 100 INJECTION SUBCUTANEOUS at 14:31

## 2017-08-08 RX ADMIN — POTASSIUM CHLORIDE 60 MEQ: 1500 TABLET, EXTENDED RELEASE ORAL at 12:19

## 2017-08-08 RX ADMIN — IOPAMIDOL 100 ML: 755 INJECTION, SOLUTION INTRAVENOUS at 13:19

## 2017-08-08 RX ADMIN — FUROSEMIDE 20 MG: 10 INJECTION, SOLUTION INTRAMUSCULAR; INTRAVENOUS at 19:02

## 2017-08-08 RX ADMIN — POTASSIUM CHLORIDE 10 MEQ: 10 INJECTION, SOLUTION INTRAVENOUS at 12:19

## 2017-08-08 RX ADMIN — Medication 10 ML: at 13:19

## 2017-08-08 RX ADMIN — FUROSEMIDE 20 MG: 10 INJECTION, SOLUTION INTRAMUSCULAR; INTRAVENOUS at 14:31

## 2017-08-08 NOTE — IP AVS SNAPSHOT
[address] [facility] 
[phone] Patient: Kinsey Hollis MRN: [id number] YRL:[id number] You are allergic to the following Allergen Reactions Codeine Other (comments) Recent Documentation Height Weight BMI OB Status Smoking Status 1.778 m 88 kg 27.84 kg/m2 Hysterectomy Never Smoker Emergency Contacts Name Discharge Info Relation Home Work Mobile Lucas Fofana DISCHARGE CAREGIVER [3] Child [2] [phone] Electa Alycia  Child [2] [phone] About your hospitalization You were admitted on:  August 8, 2017 You last received care in the:  [facility] 2 [facility] You were discharged on:  August 12, 2017 Unit phone number:  [phone] Why you were hospitalized Your primary diagnosis was:  Not on File Your diagnoses also included:  Chf (Congestive Heart Failure) (Hcc), Atrial Flutter (Hcc), Cardiomyopathy (Hcc), Acute Pulmonary Embolism (Hcc), Chronic Atrial Fibrillation (Hcc), Acute Combined Systolic And Diastolic Hf (Heart Failure), Nyha Class 2 (Hcc), Status Post Biventricular Pacemaker, S/P Av [place] Ablation Providers Seen During Your Hospitalizations Provider Role Specialty Primary office phone Emiliano Kilgore MD Attending Provider Emergency Medicine [phone] Geovanna Mccarthy MD Attending Provider Internal Medicine [phone] Your Primary Care Physician (PCP) Primary Care Physician Office Phone Office Fax [facility] [phone] Follow-up Information Follow up With Details Comments Contact Info Geovanna Mccarthy MD   [facility] [address] [place] [address] 
[phone] [address] [facility] [address] 
[phone] [facility] [address]   [address] [address] [phone] Ga Amos NP On 8/16/2017 at 3:30PM at [facility] (Dr. Trino Waddell office with Nurse Practitioner) [address] [place] [address] 
[phone] Marcie Ortega MD On 8/29/2017 at 10:15AM Post procedure followup [address] [place] [address] 
[phone] In 1 week Yvrose Lucas MD  Please call and make a follow-up appointment for 3-5 days from now. Thank you! [facility] [address] [facility] 
[phone] Your Appointments Wednesday August 16, 2017  3:30 PM EDT HOSPITAL FOLLOW-UP with Ga Amos NP [facility] [address] [place] [address]) [address] [place] [address]  
[phone] Thursday August 17, 2017  9:00 AM EDT Nurse Visit with Yvrose Lucas MD  
[address] [place] [address] and Primary Care [address] [place] [address]) Josue Mcgarry [phone]  
[phone] Thursday August 24, 2017  9:30 AM EDT Any with Yvrose Lucas MD  
[address] [place] [address] and Primary Care [address] [place] [address]) [address] [phone]  
[phone] Tuesday August 29, 2017 10:15 AM EDT  
PACEMAKER with PACEMAKER, [facility] [address] [place] [address]) [address] [place] [address]  
[phone] Current Discharge Medication List  
  
START taking these medications Dose & Instructions Dispensing Information Comments Morning Noon Evening Bedtime  
 apixaban 5 mg tablet Commonly known as:  Jacelyn Nathan Your last dose was: Your next dose is:    
   
   
 Dose:  5 mg Take 1 Tab by mouth two (2) times a day. Quantity:  60 Tab Refills:  3  
     
   
   
   
  
 furosemide 40 mg tablet Commonly known as:  LASIX Your last dose was: Your next dose is:    
   
   
 Dose:  40 mg Take 1 Tab by mouth daily. Quantity:  30 Tab Refills:  3 CONTINUE these medications which have CHANGED Dose & Instructions Dispensing Information Comments Morning Noon Evening Bedtime  
 lisinopril 40 mg tablet Commonly known as:  Albino Mosquera What changed:  how much to take Your last dose was: Your next dose is:    
   
   
 Dose:  20 mg Take 0.5 Tabs by mouth daily. Quantity:  30 Tab Refills:  3 CONTINUE these medications which have NOT CHANGED Dose & Instructions Dispensing Information Comments Morning Noon Evening Bedtime GAVISCON EXTRA STRENGTH 160-105 mg Hyacinth Byrdine Generic drug:  aluminum hydrox-magnesium carb Your last dose was: Your next dose is:    
   
   
 Dose:  1-2 Tab Take 1-2 tablets by mouth four (4) times daily as needed. Refills:  0  
     
   
   
   
  
 metoprolol succinate 25 mg XL tablet Commonly known as:  TOPROL-XL Your last dose was: Your next dose is:    
   
   
 Dose:  12.5 mg Take 0.5 Tabs by mouth daily. Quantity:  30 Tab Refills:  11  
     
   
   
   
  
 oxyCODONE-acetaminophen  mg per tablet Commonly known as:  PERCOCET 10 Your last dose was: Your next dose is:    
   
   
 Dose:  1 Tab Take 1 Tab by mouth every eight (8) hours as needed for Pain. Max Daily Amount: 3 Tabs. Quantity:  75 Tab Refills:  0  
     
   
   
   
  
 potassium chloride 20 mEq tablet Commonly known as:  K-DUR, KLOR-CON Your last dose was: Your next dose is:    
   
   
 Dose:  20 mEq Take 1 Tab by mouth two (2) times a day. Quantity:  60 Tab Refills:  11  
     
   
   
   
  
 REVLIMID 25 mg Cap Generic drug:  lenalidomide Your last dose was: Your next dose is:    
   
   
 Dose:  25 mg Take 25 mg by mouth daily. Refills:  0 STOP taking these medications   
 amiodarone 200 mg tablet Commonly known as:  CORDARONE  
   
  
 warfarin 7.5 mg tablet Commonly known as:  COUMADIN Where to Get Your Medications These medications were sent to [address] [place] [address], [address] [place] [address].  [address] [place] [id number] Phone:  [phone]  
  apixaban 5 mg tablet  
 furosemide 40 mg tablet Information on where to get these meds will be given to you by the nurse or doctor. ! Ask your nurse or doctor about these medications  
  lisinopril 40 mg tablet Discharge Instructions [facility] [address] [facility], [address] [facility]  [phone] ICD/PACEMAKER DISCHARGE INSTRUCTIONS Admission Diagnoses:  
CHF (congestive heart failure) ([address] Discharge Diagnoses: Active Problems: 
  Chronic atrial fibrillation ([address] (9/12/2014) Overview: 8/111/17 BIVPP implant with AV kathleen ablation CHF (congestive heart failure) ([address] (8/8/2017) Atrial flutter ([address] (8/10/2017) Cardiomyopathy ([address] (8/10/2017) Acute pulmonary embolism ([address] (8/10/2017) Acute combined systolic and diastolic HF (heart failure), NYHA class 2 ([address] (8/11/2017) Status post biventricular pacemaker (8/11/2017) Overview: 8/11/17 S/P AV kathleen ablation (8/11/2017) DISCHARGE INSTRUCTIONS FOR PATIENTS WITH ICD'S AND PACEMAKERS 1. Carry you ID card for your ICD/Pacemaker with you at all times. This card will be given to you in the hospital or mailed to you. 2. Medic Alert Bracelets are available from your pharmacist to wear at all times. 3. Call for an appointment in 2 weeks [phone]. 4. The pacemaker will bulge slightly under your skin. An ICD bulges a little more because it is larger. The bulge will decrease in size over the next few weeks. Please notify the doctor's office if you notice any of the following around your ICD site: A.  A bruise that does not go away B. Soreness or yellow, green, or brown drainage from the site. C. Any swelling from the site. D. If you have a fever of 100 degrees or higher that lasts for a few days INCISION CARE 1.  Leave dressing over your site until you see the doctor. 2.  Leave steri-strips over your site until they start to fall off.  
3.   You may shower after as long as your incision isnt submerged or directly sprayed upon until well healed. 4.  For comfort, wear loose fitting clothing. 5.  Ice pack to affected shoulder for first 24 hours, wear your sling for 2 days. 6.  Report any signs of infection, fever, pain, swelling, redness, oozing, or heat at site especially if these symptoms increase after the first 3 to 4 days. ACTIVITY PRECAUTIONS 1. Avoid rough contact with the implant site. 2. No driving for 14 days. 3. Avoid lifting your arm over your head, carrying anything on the affected side, or lifting over 10 pounds for 30 days. For the first 2 days only bend your arm at the elbow. 4. Any extreme activity such as golf, weight lifting or exercise biking should be restricted for 60 days. 5. Do not carry objects by holding them against your implant site. 6.  No shooting rifles or any type of gun with the affected shoulder permanently. 7.  If you have an ICD, welding and chainsaws are prohibited. SPECIAL PRECAUTIONS 1. You should avoid all strong magnetic fields, such as arc welding, large transformers, large motors. Some ICD devices will beep if it detects a strong magnet. If this occurs, move out of the area. 2.  You may not have an MRI. 3.  Treatments or surgery that requires diathermy or electrocautery should be discussed with your doctor before scheduled. 4. Avoid radio frequency transmitters, including radar. 5. Advise dentist or other medical personnel you see that you have a pacemaker or ICD. 6.  Cell phones and microwave oven use is okay. 7.  If you plan to move or take a trip to a new area, the doctor's office will give you a name of a doctor to contact for any problems. SPECIAL INSTRUCTIONS ON SHOCKS 1. Notify your doctor for any of the following: A. Anytime a shock is received in a 24 hour period. An office visit is not usually required for a single shock. B.  Two or more shocks in a row. If you do not feel well, call the [facility], otherwise call your doctor. This may require an office visit. C. Two or more shocks spaced apart by several hours. This may require an office visit. 2.  Keep a record of events. Include date, time, symptoms and activity at that time. ANTIBIOTIC THERAPY During the first 8 weeks after your pacemaker or ICD insertion, you may need antibiotics before any dental work or certain tests or operations. Let the dentist or doctor who is caring for you know that you have had an implanted device. S/P ABLATION DISCHARGE INSTRUCTIONS It is normal to feel tired the first couple days. Take it easy and follow the physicians instructions. CHECK THE CATHETER INSERTION SITE DAILY: 
You may shower 24 hours after the procedure, remove the bandage during showering. Wash with soap and water and pat dry. Gentle cleaning of the site with soap and water is sufficient, cover with a dry clean dressing or bandage. Do not apply creams or powders to the area. Do not sit in a bathtub or pool of water for 7 days or until wound has completely healed. Temporary bruising and discomfort is normal and may last a few weeks. You may have a  formation of a small lump at the site which may last up to 6 weeks. CALL THE PHYSICIAN: If the site becomes red, swollen or feels warm to the touch If there is bleeding or drainage or if there is unusual pain at the groin or down the leg.  
If there is any bleeding, lie down, apply pressure or have someone apply pressure with a clean cloth until the bleeding stops. If the bleeding continues, call 911 to be transported to the hospital. 
DO  [facility] [address]. Activity: For the first 24-48 hours or as instructed by the physician: No lifting, pushing or pulling over 5 pounds and no straining the insertion site. Do not life grocery bags or the garbage can, do not run the vacuum  or  for 7 days. Start with short walks as in the hospital and gradually increase as tolerated each day. It is recommended to walk 30 minutes 5-7 days per week. Follow your physicians instructions on activity. Avoid walking outside in extremes of heat or cold. Walk inside when it is cold and windy or hot and humid. Things to keep in mind: 
No driving for at least 5 days, or as designated by your physician. Limit the number of times you go up and down the stairs Take rests and pace yourself with activity. Be careful and do not strain with bowel movements. Medications: Take all medications as prescribed Call your physician if you have any questions Keep an updated list of your medications with you at all times and give a list to your physician and pharmacist 
 
Signs and Symptoms: 
Be cautious of symptoms of angina or recurrent symptoms such as chest discomfort, unusual shortness of breath or fatigue, palpitations. After Care: Follow up with your physician as instructed. Follow a heart healthy diet with proper portion control, daily stress management, daily exercise, blood pressure and cholesterol control , and smoking cessation. Discharge Instructions Attachments/References WARFARIN: LONG-TERM USE (ENGLISH) HEART FAILURE ZONES: GENERAL INFO (ENGLISH) Discharge Orders None ACO Transitions of Care Introducing [employer] [address] Ashvin offers a voluntary care coordination program to provide high quality service and care to [facility] fee-for-service beneficiaries. Grover Lantigua was designed to help you enhance your health and well-being through the following services: ? Transitions of Care  support for individuals who are transitioning from one care setting to another (example: Hospital to home). ? Chronic and Complex Care Coordination  support for individuals and caregivers of those with serious or chronic illnesses or with more than one chronic (ongoing) condition and those who take a number of different medications. If you meet specific medical criteria, a [facility] [address] may call you directly to coordinate your care with your primary care physician and your other care providers. For questions about the [facility] programs, please, contact your physicians office. For general questions or additional information about Accountable Care Organizations: 
Please visit www.medicare.gov/acos. html or call 1-800-MEDICARE (8-743.853.4507) TTY users should call 4-222.420.9997. Introducing [facility] [place] [facility]! Melany Fuller introduces [employer] patient portal. Now you can access parts of your medical record, email your doctor's office, and request medication refills online. 1. In your internet browser, go to [email or web address] 2. Click on the First Time User? Click Here link in the Sign In box. You will see the New Member Sign Up page. 3. Enter your [employer] Access Code exactly as it appears below. You will not need to use this code after youve completed the sign-up process. If you do not sign up before the expiration date, you must request a new code. · [employer] Access Code: [email or web address] Expires: 8/17/2017  7:51 AM 
 
4. Enter the last four digits of your Social Security Number (xxxx) and Date of Birth (mm/dd/yyyy) as indicated and click Submit. You will be taken to the next sign-up page. 5. Create a PATHSENSORS ID. This will be your PATHSENSORS login ID and cannot be changed, so think of one that is secure and easy to remember. 6. Create a PATHSENSORS password. You can change your password at any time. 7. Enter your Password Reset Question and Answer. This can be used at a later time if you forget your password. 8. Enter your e-mail address. You will receive e-mail notification when new information is available in [address] 9. Click Sign Up. You can now view and download portions of your medical record. 10. Click the Download Summary menu link to download a portable copy of your medical information. If you have questions, please visit the Frequently Asked Questions section of the PATHSENSORS website. Remember, PATHSENSORS is NOT to be used for urgent needs. For medical emergencies, dial 911. Now available from your iPhone and Android! General Information Please provide this summary of care documentation to your next provider. Patient Signature:  ____________________________________________________________ Date:  ____________________________________________________________  
  
Marilyn Whitaker Provider Signature:  ____________________________________________________________ Date:  ____________________________________________________________ More Information Taking Warfarin Safely: Care Instructions Your Care Instructions Warfarin is a medicine that you take to prevent blood clots. It is often called a blood thinner. Doctors give warfarin (such as Coumadin) to reduce the risk of blood clots. You may be at risk for blood clots if you have atrial fibrillation or deep vein thrombosis. Some other health problems may also put you at risk. Warfarin slows the amount of time it takes for your blood to clot. It can cause bleeding problems. Even if you've been taking warfarin for a while, it's important to know how to take it safely. Foods and other medicines can affect the way warfarin works. Some can make warfarin work too well. This can cause bleeding problems. And some can make it work poorly, so that it does not prevent blood clots very well. You will need regular blood tests to check how long it takes for your blood to form a clot. This test is called a PT or prothrombin time test. The result of the test is called an INR level. Depending on the test results, your doctor or anticoagulation clinic may adjust your dose of warfarin. Follow-up care is a key part of your treatment and safety. Be sure to make and go to all appointments, and call your doctor if you are having problems. It's also a good idea to know your test results and keep a list of the medicines you take. How can you care for yourself at home? Take warfarin safely · Take your warfarin at the same time each day. · If you miss a dose of warfarin, don't take an extra dose to make up for it. Your doctor can tell you exactly what to do so you don't take too much or too little. · Wear medical alert jewelry that lets others know that you take warfarin. You can buy this at most drugstores. · Don't take warfarin if you are pregnant or planning to get pregnant. Talk to your doctor about how you can prevent getting pregnant while you are taking it. · Don't change your dose or stop taking warfarin unless your doctor tells you to. Effects of medicines and food on warfarin · Don't start or stop taking any medicines, vitamins, or natural remedies unless you first talk to your doctor. Many medicines can affect how warfarin works. These include aspirin and other pain relievers, over-the-counter medicines, multivitamins, dietary supplements, and herbal products. · Tell all of your doctors and pharmacists that you take warfarin. Some prescription medicines can affect how warfarin works.  
· Keep the amount of vitamin K in your diet about the same from day to day. Do not suddenly eat a lot more or a lot less food that is rich in vitamin K than you usually do. Vitamin K affects how warfarin works and how your blood clots. Talk with your doctor before making big changes in your diet. Foods that have a lot of vitamin K include cooked green vegetables, such as: ¨ Kale, spinach, turnip greens, dex greens, Swiss chard, and mustard greens. ¨ [place] sprouts, broccoli, and asparagus. · Limit your use of alcohol. Avoid bleeding by preventing falls and injuries · Wear slippers or shoes with nonskid soles. · Remove throw rugs and clutter. · Rearrange furniture and electrical cords to keep them out of walking paths. · Keep stairways, porches, and outside walkways well lit. Use night-lights in hallways and bathrooms. · Be extra careful when you work with sharp tools or knives. When should you call for help? Call 911 anytime you think you may need emergency care. For example, call if: 
· You have a sudden, severe headache that is different from past headaches. Call your doctor now or seek immediate medical care if: 
· You have any abnormal bleeding, such as: 
¨ Nosebleeds. ¨ Vaginal bleeding that is different (heavier, more frequent, at a different time of the month) than what you are used to. ¨ Bloody or black stools, or rectal bleeding. ¨ Bloody or pink urine. Watch closely for changes in your health, and be sure to contact your doctor if you have any problems. Where can you learn more? Go to http://mila-syeda.info/. Enter O022 in the search box to learn more about \"Taking Warfarin Safely: Care Instructions. \" Current as of: November 15, 2016 Content Version: 11.3 © 2939-6448 [employer]. Care instructions adapted under license by [employer] (which disclaims liability or warranty for this information).  If you have questions about a medical condition or this instruction, always ask your healthcare professional. [address] any warranty or liability for your use of this information. Learning About Heart Failure Zones What are heart failure zones? Heart failure zones give you an easy way to see changes in your heart failure symptoms. They also tell you when you need to get help. Check every day to see which zone you are in. Green zone. You are doing well. This is where you want to be. · Your weight is stable. This means it is not going up or down. · You breathe easily. · You are sleeping well. You are able to lie flat without shortness of breath. · You can do your usual activities. Yellow zone. Be careful. Your symptoms are changing. Call your doctor. · You have new or increased shortness of breath. · You are dizzy or lightheaded, or you feel like you may faint. · You have sudden weight gain, such as more than 2 to 3 pounds in a day or 5 pounds in a week. (Your doctor may suggest a different range of weight gain.) · You have increased swelling in your legs, ankles, or feet. · You are so tired or weak that you cannot do your usual activities. · You are not sleeping well. Shortness of breath wakes you up at night. You need extra pillows. Your doctor's name: ____________________________________________________________ Your doctor's contact information: _________________________________________________ Red zone. This is an emergency. Call 911. You have symptoms of sudden heart failure, such as: 
· You have severe trouble breathing. · You cough up pink, foamy mucus. · You have a new irregular or fast heartbeat. You have symptoms of a heart attack. These may include: · Chest pain or pressure, or a strange feeling in the chest. 
· Sweating. · Shortness of breath. · Nausea or vomiting. · Pain, pressure, or a strange feeling in the back, neck, jaw, or upper belly or in one or both shoulders or arms. · Lightheadedness or sudden weakness. · A fast or irregular heartbeat. If you have symptoms of a heart attack: After you call 911, the  may tell you to chew 1 adult-strength or 2 to 4 low-dose aspirin. Wait for an ambulance. Do not try to drive yourself. Follow-up care is a key part of your treatment and safety. Be sure to make and go to all appointments, and call your doctor if you are having problems. It's also a good idea to know your test results and keep a list of the medicines you take. Where can you learn more? Go to http://mila-syeda.info/. Enter T174 in the search box to learn more about \"Learning About Heart Failure Zones. \" Current as of: February 23, 2017 Content Version: 11.3 © 5601-4648 [employer]. Care instructions adapted under license by [employer] (which disclaims liability or warranty for this information). If you have questions about a medical condition or this instruction, always ask your healthcare professional. [place] [address] any warranty or liability for your use of this information.

## 2017-08-08 NOTE — H&P
Hospitalist Admission Note    NAME: Jared Reveles   :  1940   MRN:  [id number]     Date/Time:  2017 5:30 PM    Patient PCP: Shorty Moore MD  ________________________________________________________________________    My assessment of this patient's clinical condition and my plan of care is as follows. Assessment / Plan:  Acute respiratory failure due to acute CHF exacerbation +/- small RLL acute PE  -O2 in 80s on room air, RR up to 28, significant edema in LE  -BNP elevated, CTA shows mild interstitial edema with small PE  -will order echo  -received lasix IV in ED, will continue Lasix 20 mg IV BID for now and monitor response  -INR has been supratherapeutic as outpatient, here INR 1.3, received lovenox in ED, will continue therapeutic lovenox BID, with warfarin bridge, also can consider switching to novel oral anticoagulant    Atrial fibrillation  -continue metoprolol, amiodarone   -anticoagulation as above    Multiple myeloma  -revlimid     Hypertension  -lisinopril, metoprolol  -hydralazine PRN    Code Status: full  Surrogate Decision Maker: son    DVT Prophylaxis: lovenox/warfarin bridge  GI Prophylaxis: not indicated    Baseline: independent        Subjective:   CHIEF COMPLAINT: SOB    HISTORY OF PRESENT ILLNESS:     Unruly Navas is a 68 y.o.  female with history of multiple myeloma, afib who presents with worsening shortness of breath and lower extremity edema for last few weeks. Had associated wheezing, was seen by her PCP who prescribed inhalers and prednisone and ordered outpatient echocardiogram. Has some orthopnea, no chest pain. Denies any history of heart problems. Has dry cough, no fevers or chills. We were asked to admit for work up and evaluation of the above problems.      Past Medical History:   Diagnosis Date    Arthritis     Atrial fibrillation ([facility] [address]     Cancer ([facility] [address]     multiple myeloma    Hypertension         Past Surgical History: Procedure Laterality Date    HX GYN      HX ORTHOPAEDIC      knee       Social History   Substance Use Topics    Smoking status: Never Smoker    Smokeless tobacco: Never Used    Alcohol use No        Family History   Problem Relation Age of Onset    Stroke Mother     No Known Problems Father      Allergies   Allergen Reactions    Codeine Other (comments)        Prior to Admission medications    Medication Sig Start Date End Date Taking? Authorizing Provider   HYDROcodone-acetaminophen (NORCO) 5-325 mg per tablet Take 1 Tab by mouth every four (4) hours as needed for Pain. Max Daily Amount: 6 Tabs. 5/19/17   CHARLOTTE Santiago   methocarbamol (ROBAXIN-750) 750 mg tablet Take 1 Tab by mouth three (3) times daily. 5/19/17   CHARLOTTE Santiago   predniSONE (STERAPRED) 5 mg dose pack See administration instruction per 5mg dose pack 5/19/17   CHARLOTTE Santiago   metoprolol succinate (TOPROL-XL) 25 mg XL tablet Take 0.5 Tabs by mouth daily. 5/18/17   July Bess MD   oxyCODONE-acetaminophen (PERCOCET 10)  mg per tablet Take 1 Tab by mouth every eight (8) hours as needed for Pain. Max Daily Amount: 3 Tabs. 4/21/17   July Bess MD   HYDROmorphone (DILAUDID) 2 mg tablet Take 1 Tab by mouth every four (4) hours as needed for Pain. Max Daily Amount: 12 mg. 3/15/17   CHARLOTTE Hennessy   amiodarone (CORDARONE) 200 mg tablet Take 0.5 Tabs by mouth daily. 2/15/17   July Bess MD   lisinopril (PRINIVIL, ZESTRIL) 40 mg tablet Take 1 Tab by mouth daily. 2/1/17   July Bess MD   cyclobenzaprine (FLEXERIL) 10 mg tablet Take 1 Tab by mouth three (3) times daily as needed for Muscle Spasm(s). 12/5/16   July Bess MD   fentaNYL (DURAGESIC) 25 mcg/hr PATCH 1 Patch by TransDERmal route every seventy-two (72) hours. Max Daily Amount: 1 Patch. Patient taking differently: 1 Patch by TransDERmal route every seventy-two (72) hours as needed.  5/19/16   July Bess MD   potassium chloride (K-DUR, KLOR-CON) 20 mEq tablet Take 1 Tab by mouth two (2) times a day. 4/22/16   Sita Merritt MD   Polyethylene Glycol 3350 powd by Does Not Apply route daily as needed. Historical Provider   warfarin (COUMADIN) 7.5 mg tablet Take 7.5 mg by mouth four (4) days a week. 1 tablet (7.5 mg) Monday, Tuesday, Wednesday, Thursday, and Friday     Historical Provider   warfarin (COUMADIN) 7.5 mg tablet Take 3.75 mg by mouth three (3) days a week. 1/2 tablet (3.75 mg) on Friday, Saturday, & Sunday     Historical Provider   calcium 500 mg tab Take 1 Tab by mouth daily. Historical Provider   aluminum hydrox-magnesium carb (GAVISCON EXTRA STRENGTH) 160-105 mg chew Take 1-2 tablets by mouth four (4) times daily as needed. Historical Provider   lenalidomide (REVLIMID) 25 mg cap Take 25 mg by mouth daily.     Historical Provider       REVIEW OF SYSTEMS:         Total of 12 systems reviewed as follows:         General: no fever, no chills, no sweats, no generalized weakness, no weight loss, no weight gain, no loss of appetite   Eyes: no blurred vision, no eye pain, no loss of vision, no double vision  ENT: no rhinorrhea, no pharyngitis   Respiratory: + cough, no sputum production, + SOB, no KNOTT, + wheezing, no pleuritic pain   Cardiology: no chest pain, no palpitations, no orthopnea, no PND, + edema, no syncope   Gastrointestinal: no abdominal pain, no N/V, no diarrhea, no dysphagia, no constipation, no bleeding   Genitourinary: no frequency, no urgency, no dysuria, no hematuria, no incontinence   Muskuloskeletal : no arthralgia, no myalgia, no back pain  Hematology: no easy bruising, no nose or gum bleeding, no lymphadenopathy   Dermatological: no rash, no ulceration, no pruritis, no color change / jaundice  Endocrine: no hot flashes, no polydipsia   Neurological: no headache, no dizziness, no confusion, no focal weakness, no paresthesia, no speech difficulties, no memory loss, no gait difficulty  Psychological: no anxiety, no depression, no agitation      Objective:   VITALS:    Patient Vitals for the past 12 hrs:   Temp Pulse Resp BP SpO2   08/08/17 1715 - 99 19 (!) 158/122 (!) 75 %   08/08/17 1708 - (!) 116 20 - 94 %   08/08/17 1646 - (!) 101 19 - (!) 85 %   08/08/17 1645 - (!) 112 18 (!) 146/102 -   08/08/17 1630 - 90 15 (!) 140/94 99 %   08/08/17 1615 - 97 18 (!) 153/103 98 %   08/08/17 1610 - (!) 103 17 - 99 %   08/08/17 1600 - (!) 130 21 (!) 186/137 92 %   08/08/17 1545 - (!) 125 26 (!) 151/115 (!) 89 %   08/08/17 1534 - (!) 138 24 (!) 179/110 (!) 82 %   08/08/17 1403 - 98 17 - 95 %   08/08/17 1402 - - - 137/90 -   08/08/17 1347 - 94 19 146/88 96 %   08/08/17 1336 - (!) 111 22 - 94 %   08/08/17 1245 - 86 18 114/77 93 %   08/08/17 1230 - 93 21 (!) 154/132 (!) 87 %   08/08/17 1215 - 87 16 (!) 131/92 (!) 88 %   08/08/17 1200 - 82 17 (!) 131/91 94 %   08/08/17 1148 - 86 17 124/83 96 %   08/08/17 1145 - 80 17 137/84 98 %   08/08/17 1123 - - - - 96 %   08/08/17 1100 - 95 16 110/78 -   08/08/17 1045 - (!) 111 28 (!) 149/103 -   08/08/17 1034 - (!) 121 21 127/72 -   08/08/17 1028 97.9 °F (36.6 °C) (!) 114 16 127/72 -         PHYSICAL EXAM:    General:    Alert, cooperative, no distress, appears stated age. HEENT: Atraumatic, anicteric sclerae, pink conjunctivae     No oral ulcers, oral mucosa moist, throat clear, dentition fair  Neck:  Supple, symmetrical  Lungs:   Clear to auscultation bilaterally, no wheezing, rhonchi, faint crackles  Chest wall:  No tenderness, no accessory muscle use. Heart:   Regular rhythm, no murmur, 3+ edema in b/l lower ext  Abdomen:   Soft, non-tender, not distended, bowel sounds normal  Extremities: No cyanosis, no clubbing, warm, well perfused, radial pulse 2+  Skin:     Not pale, not jaundiced, no rashes   Psych:  Good insight, not depressed, not anxious or agitated.   Neurologic: EOMs intact, face symmetric, no aphasia or slurred speech, strength 5/5 in BUE, 5/5 in BLE, sensation grossly intact, alert and oriented x 4.     _______________________________________________________________________  Care Plan discussed with:    Comments   Patient x    Family  x Son at bedside   RN     Care Manager                    Consultant:      _______________________________________________________________________  Expected  Disposition:   Home with Family x   HH/PT/OT/RN    SNF/LTC    JAY    ________________________________________________________________________  TOTAL TIME:  61 Minutes    Critical Care Provided     Minutes non procedure based      Comments     Reviewed previous records   >50% of visit spent in counseling and coordination of care  Discussion with patient and/or family and questions answered       ________________________________________________________________________  Sly Gustafson MD    Procedures: see electronic medical records for all procedures/Xrays and details which were not copied into this note but were reviewed prior to creation of Plan. LAB DATA REVIEWED:    Recent Results (from the past 24 hour(s))   EKG, 12 LEAD, INITIAL    Collection Time: 08/08/17 10:31 AM   Result Value Ref Range    Ventricular Rate 129 BPM    Atrial Rate 375 BPM    QRS Duration 86 ms    Q-T Interval 304 ms    QTC Calculation (Bezet) 445 ms    Calculated R Axis -23 degrees    Calculated T Axis 153 degrees    Diagnosis       Atrial flutter with variable AV block  Moderate voltage criteria for LVH, may be normal variant  Nonspecific ST and T wave abnormality  When compared with ECG of 25-MAR-2015 15:05,  Atrial flutter has replaced Sinus rhythm  Vent.  rate has increased BY  62 BPM  Confirmed by Vick Manjarrez ([id number]) on 8/8/2017 2:32:02 PM     CBC WITH AUTOMATED DIFF    Collection Time: 08/08/17 10:40 AM   Result Value Ref Range    WBC 3.7 3.6 - 11.0 K/uL    RBC 4.12 3.80 - 5.20 M/uL    HGB 10.9 (L) 11.5 - 16.0 g/dL    HCT 36.3 35.0 - 47.0 %    MCV 88.1 80.0 - 99.0 FL    MCH 26.5 26.0 - 34.0 PG    MCHC 30.0 30.0 - 36.5 g/dL    RDW 18.0 (H) 11.5 - 14.5 %    PLATELET 708 297 - 699 K/uL    NEUTROPHILS 74 32 - 75 %    LYMPHOCYTES 21 12 - 49 %    MONOCYTES 2 (L) 5 - 13 %    EOSINOPHILS 2 0 - 7 %    BASOPHILS 1 0 - 1 %    ABS. NEUTROPHILS 2.7 1.8 - 8.0 K/UL    ABS. LYMPHOCYTES 0.8 0.8 - 3.5 K/UL    ABS. MONOCYTES 0.1 0.0 - 1.0 K/UL    ABS. EOSINOPHILS 0.1 0.0 - 0.4 K/UL    ABS. BASOPHILS 0.0 0.0 - 0.1 K/UL    RBC COMMENTS POIKILOCYTOSIS  PRESENT        RBC COMMENTS OVALOCYTES  PRESENT        RBC COMMENTS ANISOCYTOSIS  1+        RBC COMMENTS SCHISTOCYTES  PRESENT        DF SMEAR SCANNED     TROPONIN I    Collection Time: 08/08/17 10:40 AM   Result Value Ref Range    Troponin-I, Qt. <0.04 <6.87 ng/mL   METABOLIC PANEL, COMPREHENSIVE    Collection Time: 08/08/17 10:40 AM   Result Value Ref Range    Sodium 144 136 - 145 mmol/L    Potassium 2.7 (LL) 3.5 - 5.1 mmol/L    Chloride 105 97 - 108 mmol/L    CO2 34 (H) 21 - 32 mmol/L    Anion gap 5 5 - 15 mmol/L    Glucose 120 (H) 65 - 100 mg/dL    BUN 8 6 - 20 MG/DL    Creatinine 1.06 (H) 0.55 - 1.02 MG/DL    BUN/Creatinine ratio 8 (L) 12 - 20      GFR est AA >60 >60 ml/min/1.73m2    GFR est non-AA 50 (L) >60 ml/min/1.73m2    Calcium 8.6 8.5 - 10.1 MG/DL    Bilirubin, total 0.8 0.2 - 1.0 MG/DL    ALT (SGPT) 35 12 - 78 U/L    AST (SGOT) 21 15 - 37 U/L    Alk.  phosphatase 108 45 - 117 U/L    Protein, total 7.1 6.4 - 8.2 g/dL    Albumin 3.2 (L) 3.5 - 5.0 g/dL    Globulin 3.9 2.0 - 4.0 g/dL    A-G Ratio 0.8 (L) 1.1 - 2.2     CK W/ REFLX CKMB    Collection Time: 08/08/17 10:40 AM   Result Value Ref Range     26 - 192 U/L   MAGNESIUM    Collection Time: 08/08/17 10:40 AM   Result Value Ref Range    Magnesium 1.8 1.6 - 2.4 mg/dL   NT-PRO BNP    Collection Time: 08/08/17 10:40 AM   Result Value Ref Range    NT pro-BNP 1661 (H) 0 - 450 PG/ML   PTT    Collection Time: 08/08/17 10:45 AM   Result Value Ref Range    aPTT 30.1 22.1 - 32.5 sec    aPTT, therapeutic range     58.0 - 77.0 SECS   PROTHROMBIN TIME + INR    Collection Time: 08/08/17 10:45 AM   Result Value Ref Range    INR 1.3 (H) 0.9 - 1.1      Prothrombin time 12.9 (H) 9.0 - 11.1 sec

## 2017-08-08 NOTE — PROGRESS NOTES
Pharmacy Daily Dosing of Warfarin    Indication: pulmonary embolism  Goal INR: 2-3    Average Daily Warfarin Dose: 2.5 mg daily  Concurrent Anticoagulants/Antiplatelets enoxaparin 90 mg Q12H  Major Interacting Medications (Dose/Frequency): amiodarone 100 mg daily    INR (0.9-1.1) > 5 or Platelets (< 79F): discuss with MD:  Recent Labs      08/08/17   1045  08/08/17   1040   INR  1.3*   --    HGB   --   10.9*   PLT   --   236     Impression/Plan:   Pharmacy consult to dose warfarin. Spoke with RN to confirm most recent dosing schedule, patient states she was taking warfarin 2.5 mg once daily. Originally taking average of ~6 mg/day which was reduced d/t addition of amiodarone by outpatient provider. INR subtherapeutic and patient presents with PE. Slightly low albumin. Will order warfarin PO 5 mg once tonight and follow. Pharmacy will follow daily and adjust the dose as appropriate.     Thanks for the consult  Ethyl ALLA Ayala Dosing and Monitoring Guidelines

## 2017-08-08 NOTE — ED PROVIDER NOTES
HPI Comments: Phuong Miller is a 68 y.o. female with hx of HTN, multiple myeloma and A-fib who presents ambulatory to the ED c/o dry cough, SOB and diaphoresis x two weeks. Pt reports SOB persists when lying flat. She states diaphoresis and SOB occur mostly in the evening. She denies waking up during night due to being SOB. Pt notes BL flank pain with deep inspiration. She notes having diarrhea, last episode two days ago. Pt also notes having intermittent abdominal pain, but denies any today. She also reports leg swelling and states she was taken off fluid pills x months. She notes decrease in coumadin by half since INR was supratherapeutic. Pt affirms undergoing chemotherapy zometa IV every three months. Pt specifically denies fever, chills, rhinorrhea, sore throat, congestion, CP, cough, hemoptysis, N/V, HA and lightheadedness. Social hx: - Tobacco use, - EtOH use, - Illicit drug use    PCP: Eden Martinez MD    There are no other complaints, changes or physical findings at this time. The history is provided by the patient. No  was used. Past Medical History:   Diagnosis Date    Arthritis     Atrial fibrillation ([address]     Cancer ([facility] [address]     multiple myeloma    Hypertension        Past Surgical History:   Procedure Laterality Date    HX GYN      HX ORTHOPAEDIC      knee         Family History:   Problem Relation Age of Onset    Stroke Mother     No Known Problems Father        Social History     Social History    Marital status:      Spouse name: N/A    Number of children: N/A    Years of education: N/A     Occupational History    Not on file.      Social History Main Topics    Smoking status: Never Smoker    Smokeless tobacco: Never Used    Alcohol use No    Drug use: No    Sexual activity: Not Currently     Other Topics Concern    Not on file     Social History Narrative         ALLERGIES: Codeine    Review of Systems   Constitutional: Positive for diaphoresis. Negative for chills, fatigue and fever. HENT: Negative for congestion, rhinorrhea and sore throat. Eyes: Negative for pain, discharge and visual disturbance. Respiratory: Positive for cough and shortness of breath. Negative for chest tightness and wheezing. Cardiovascular: Positive for leg swelling (BL). Negative for chest pain and palpitations. Gastrointestinal: Positive for nausea. Negative for abdominal pain, constipation, diarrhea and vomiting. Genitourinary: Negative for dysuria, frequency and hematuria. Musculoskeletal: Negative for arthralgias, back pain and myalgias. Skin: Negative for rash. Neurological: Negative for dizziness, weakness, light-headedness and headaches. Psychiatric/Behavioral: Negative. All other systems reviewed and are negative. Patient Vitals for the past 12 hrs:   Temp Pulse Resp BP SpO2   08/08/17 1403 - 98 17 - 95 %   08/08/17 1402 - - - 137/90 -   08/08/17 1347 - 94 19 146/88 96 %   08/08/17 1336 - (!) 111 22 - 94 %   08/08/17 1245 - 86 18 114/77 93 %   08/08/17 1230 - 93 21 (!) 154/132 (!) 87 %   08/08/17 1215 - 87 16 (!) 131/92 (!) 88 %   08/08/17 1200 - 82 17 (!) 131/91 94 %   08/08/17 1148 - 86 17 124/83 96 %   08/08/17 1145 - 80 17 137/84 98 %   08/08/17 1123 - - - - 96 %   08/08/17 1100 - 95 16 110/78 -   08/08/17 1045 - (!) 111 28 (!) 149/103 -   08/08/17 1034 - (!) 121 21 127/72 -   08/08/17 1028 97.9 °F (36.6 °C) (!) 114 16 127/72 -       Physical Exam   Constitutional: She is oriented to person, place, and time. She appears well-developed and well-nourished. No distress. HENT:   Head: Normocephalic and atraumatic. Eyes: EOM are normal. Right eye exhibits no discharge. Left eye exhibits no discharge. No scleral icterus. Neck: Normal range of motion. Neck supple. No tracheal deviation present. Cardiovascular: Normal rate, normal heart sounds and intact distal pulses. An irregular rhythm present.  Exam reveals no gallop and no friction rub. No murmur heard. Heart rate 90 to low 100s   Pulmonary/Chest: Effort normal and breath sounds normal. No respiratory distress. She has no wheezes. She has no rales. Abdominal: Soft. She exhibits no distension. There is no tenderness. Musculoskeletal: Normal range of motion. She exhibits no edema. 2+ BL edema. Lymphadenopathy:     She has no cervical adenopathy. Neurological: She is alert and oriented to person, place, and time. No focal neuro deficits   Skin: Skin is warm and dry. No rash noted. Psychiatric: She has a normal mood and affect. Nursing note and vitals reviewed. MDM  Number of Diagnoses or Management Options  Acute congestive heart failure, unspecified congestive heart failure type [facility]):   Acute respiratory failure with hypoxia ([address]): Other acute pulmonary embolism without acute cor pulmonale [facility]):   Diagnosis management comments:     Differential includes heart failure, pulmonary edema, PE, pleural effusion, pneumonia, ACS         Amount and/or Complexity of Data Reviewed  Clinical lab tests: reviewed and ordered  Tests in the radiology section of CPT®: ordered and reviewed  Tests in the medicine section of CPT®: ordered and reviewed  Review and summarize past medical records: yes  Discuss the patient with other providers: yes (hospitalist)  Independent visualization of images, tracings, or specimens: yes    Patient Progress  Patient progress: stable    ED Course       Procedures    EKG interpretation: (Preliminary) 11:07 AM  Rhythm: A-fib; and regular . Rate (approx.): 129 bpm; Axis: normal; QRS interval: normal ; ST/T wave: non-specific T wave abnormality; ST depression in lateral leads. PROGRESS NOTE:  1:55 PM  Last ejection fraction of 60-65% Sept 2014    PROGRESS NOTE:  2:02 PM  Reevaluated pt. She ambulated. Her SPO2 decreased to 86% with ambulation.     CONSULT NOTE:   2:21 PM  Fabienne Alex MD spoke with Dr. Fernando Woodard,  Specialty: hospitalist  Discussed pt's hx, disposition, and available diagnostic and imaging results. Reviewed care plans. Consultant agrees with plans as outlined. Ernestina Hopson will admit pt. Written by Brooke Castelan ED Scribe, as dictated by Kizzy You MD.    LABORATORY TESTS:  Recent Results (from the past 12 hour(s))   EKG, 12 LEAD, INITIAL    Collection Time: 08/08/17 10:31 AM   Result Value Ref Range    Ventricular Rate 129 BPM    Atrial Rate 375 BPM    QRS Duration 86 ms    Q-T Interval 304 ms    QTC Calculation (Bezet) 445 ms    Calculated R Axis -23 degrees    Calculated T Axis 153 degrees    Diagnosis       Atrial flutter with variable AV block  Moderate voltage criteria for LVH, may be normal variant  Nonspecific ST and T wave abnormality  Abnormal ECG  When compared with ECG of 25-MAR-2015 15:05,  Atrial flutter has replaced Sinus rhythm  Vent. rate has increased BY  62 BPM  ST now depressed in Inferior leads  ST now depressed in Lateral leads  Nonspecific T wave abnormality, worse in Lateral leads     CBC WITH AUTOMATED DIFF    Collection Time: 08/08/17 10:40 AM   Result Value Ref Range    WBC 3.7 3.6 - 11.0 K/uL    RBC 4.12 3.80 - 5.20 M/uL    HGB 10.9 (L) 11.5 - 16.0 g/dL    HCT 36.3 35.0 - 47.0 %    MCV 88.1 80.0 - 99.0 FL    MCH 26.5 26.0 - 34.0 PG    MCHC 30.0 30.0 - 36.5 g/dL    RDW 18.0 (H) 11.5 - 14.5 %    PLATELET 036 967 - 090 K/uL    NEUTROPHILS 74 32 - 75 %    LYMPHOCYTES 21 12 - 49 %    MONOCYTES 2 (L) 5 - 13 %    EOSINOPHILS 2 0 - 7 %    BASOPHILS 1 0 - 1 %    ABS. NEUTROPHILS 2.7 1.8 - 8.0 K/UL    ABS. LYMPHOCYTES 0.8 0.8 - 3.5 K/UL    ABS. MONOCYTES 0.1 0.0 - 1.0 K/UL    ABS. EOSINOPHILS 0.1 0.0 - 0.4 K/UL    ABS.  BASOPHILS 0.0 0.0 - 0.1 K/UL    RBC COMMENTS POIKILOCYTOSIS  PRESENT        RBC COMMENTS OVALOCYTES  PRESENT        RBC COMMENTS ANISOCYTOSIS  1+        RBC COMMENTS SCHISTOCYTES  PRESENT        DF SMEAR SCANNED     TROPONIN I    Collection Time: 08/08/17 10:40 AM   Result Value Ref Range    Troponin-I, Qt. <0.04 <4.53 ng/mL   METABOLIC PANEL, COMPREHENSIVE    Collection Time: 08/08/17 10:40 AM   Result Value Ref Range    Sodium 144 136 - 145 mmol/L    Potassium 2.7 (LL) 3.5 - 5.1 mmol/L    Chloride 105 97 - 108 mmol/L    CO2 34 (H) 21 - 32 mmol/L    Anion gap 5 5 - 15 mmol/L    Glucose 120 (H) 65 - 100 mg/dL    BUN 8 6 - 20 MG/DL    Creatinine 1.06 (H) 0.55 - 1.02 MG/DL    BUN/Creatinine ratio 8 (L) 12 - 20      GFR est AA >60 >60 ml/min/1.73m2    GFR est non-AA 50 (L) >60 ml/min/1.73m2    Calcium 8.6 8.5 - 10.1 MG/DL    Bilirubin, total 0.8 0.2 - 1.0 MG/DL    ALT (SGPT) 35 12 - 78 U/L    AST (SGOT) 21 15 - 37 U/L    Alk. phosphatase 108 45 - 117 U/L    Protein, total 7.1 6.4 - 8.2 g/dL    Albumin 3.2 (L) 3.5 - 5.0 g/dL    Globulin 3.9 2.0 - 4.0 g/dL    A-G Ratio 0.8 (L) 1.1 - 2.2     CK W/ REFLX CKMB    Collection Time: 08/08/17 10:40 AM   Result Value Ref Range     26 - 192 U/L   MAGNESIUM    Collection Time: 08/08/17 10:40 AM   Result Value Ref Range    Magnesium 1.8 1.6 - 2.4 mg/dL   NT-PRO BNP    Collection Time: 08/08/17 10:40 AM   Result Value Ref Range    NT pro-BNP 1661 (H) 0 - 450 PG/ML   PTT    Collection Time: 08/08/17 10:45 AM   Result Value Ref Range    aPTT 30.1 22.1 - 32.5 sec    aPTT, therapeutic range     58.0 - 77.0 SECS   PROTHROMBIN TIME + INR    Collection Time: 08/08/17 10:45 AM   Result Value Ref Range    INR 1.3 (H) 0.9 - 1.1      Prothrombin time 12.9 (H) 9.0 - 11.1 sec       IMAGING RESULTS:  CT Results  (Last 48 hours)               08/08/17 1320  CTA CHEST W OR W WO CONT Final result    Impression:  IMPRESSION:       1. Small acute emboli within the right lower lobe   2. Enlarged heart with mild intralobular septal thickening in the lower lobes   dependently compatible with mild interstitial edema       The findings were called to Corine Lennox on 8/8/2017 at 5 by Dr. Erin Hemphill. [phone]               Narrative:  INDICATION: Dyspnea.  History of multiple myeloma       COMPARISON: January 25, 2015       TECHNIQUE:  2.5 mm axial images were obtained from the bases to the lung apices   after the intravenous administration of 100 cc of Isovue-370. Three-dimensional   postprocessing was performed by the technologist with MIP reconstructions. CT   dose reduction was achieved through use of a standardized protocol tailored for   this examination and automatic exposure control for dose modulation. FINDINGS:       THYROID: There is a 7 mm nodule right lobe of the thyroid there is a 12 mm   nodule in the inferior left lobe of the thyroid. This area was obscured on the   previous CT scan   MEDIASTINUM: No mass or lymphadenopathy. NAHEED: No mass or lymphadenopathy. THORACIC AORTA: No dissection or aneurysm. MAIN PULMONARY ARTERY: There is acute coronary emboli to the segmental branches   of the posterior right lower lobe   TRACHEA/BRONCHI: Patent. ESOPHAGUS: No wall thickening or dilatation. HEART: Heart is enlarged   PLEURA: No effusion or pneumothorax. LUNGS: No focal consolidation. There is smooth interlobular septal thickening   dependently at the lung bases. INCIDENTALLY IMAGED UPPER ABDOMEN: Multiple round lesions are noted within both   kidneys incompletely evaluated. The largest in the upper pole left kidney   measures fluid density compatible with a cyst.   BONES: There is a focal area of sclerosis within the anterior L2 vertebral body. This is nonspecific but atypical for multiple myeloma               CXR Results  (Last 48 hours)               08/08/17 1115  XR CHEST PA LAT Final result    Impression:  IMPRESSION:       No acute pulmonary process. Moderate cardiomegaly       Narrative:  history: Dyspnea for 2 weeks       COMPARISON: 9/27/2016       FINDINGS:       Frontal and lateral chest radiograph submitted for review. Moderate cardiomegaly       No acute pulmonary process. No pleural effusion. No evidence for pneumothorax. MEDICATIONS GIVEN:  Medications   potassium chloride 10 mEq in 100 ml IVPB (10 mEq IntraVENous New Bag 8/8/17 1219)   enoxaparin (LOVENOX) injection 90 mg (not administered)   furosemide (LASIX) injection 20 mg (not administered)   potassium chloride (K-DUR, KLOR-CON) SR tablet 60 mEq (60 mEq Oral Given 8/8/17 1219)   iopamidol (ISOVUE-370) 76 % injection 100 mL (100 mL IntraVENous Given 8/8/17 1319)   sodium chloride (NS) flush 10 mL (10 mL IntraVENous Given 8/8/17 1319)   0.9% sodium chloride infusion (0 mL/hr IntraVENous IV Completed 8/8/17 1340)       IMPRESSION:  1. Acute respiratory failure with hypoxia ([address]    2. Acute congestive heart failure, unspecified congestive heart failure type ([address]    3. Other acute pulmonary embolism without acute cor pulmonale (HCC)        PLAN:  1. Admit to hospital.    2:23 PM  Patient is being admitted to the hospital by Dr. Nikunj Jameson. The results of their tests and reasons for their admission have been discussed with them and/or available family. They convey agreement and understanding for the need to be admitted and for their admission diagnosis. Consultation has been made with the inpatient physician specialist for hospitalization. This note is prepared by Teresa Agudelo, acting as Scribe for Eneida French MD.    Eneida French MD: The scribe's documentation has been prepared under my direction and personally reviewed by me in its entirety. I confirm that the note above accurately reflects all work, treatment, procedures, and medical decision making performed by me.

## 2017-08-08 NOTE — ED NOTES
Patient assisted with changing her brief, given clean underwear and pad. Patient resting comfortably in stretcher with call bell in reach, side rails x2, respirations even and unlabored. No complaints voiced at this time.

## 2017-08-08 NOTE — ED TRIAGE NOTES
Pt arrived ambulatory to ED with c/o SOB/ cough for about a week. Pt reports some dizziness yesterday. Denies chest pain, nausea, vomiting, diarrhea. Pt in no acute distress. Tachycardic.

## 2017-08-09 LAB
ANION GAP BLD CALC-SCNC: 5 MMOL/L (ref 5–15)
BUN SERPL-MCNC: 7 MG/DL (ref 6–20)
BUN/CREAT SERPL: 9 (ref 12–20)
CALCIUM SERPL-MCNC: 8 MG/DL (ref 8.5–10.1)
CHLORIDE SERPL-SCNC: 106 MMOL/L (ref 97–108)
CO2 SERPL-SCNC: 32 MMOL/L (ref 21–32)
CREAT SERPL-MCNC: 0.78 MG/DL (ref 0.55–1.02)
ERYTHROCYTE [DISTWIDTH] IN BLOOD BY AUTOMATED COUNT: 17.9 % (ref 11.5–14.5)
GLUCOSE SERPL-MCNC: 82 MG/DL (ref 65–100)
HCT VFR BLD AUTO: 31.4 % (ref 35–47)
HGB BLD-MCNC: 9.9 G/DL (ref 11.5–16)
INR PPP: 1.4 (ref 0.9–1.1)
MAGNESIUM SERPL-MCNC: 1.8 MG/DL (ref 1.6–2.4)
MCH RBC QN AUTO: 27.7 PG (ref 26–34)
MCHC RBC AUTO-ENTMCNC: 31.5 G/DL (ref 30–36.5)
MCV RBC AUTO: 87.7 FL (ref 80–99)
PLATELET # BLD AUTO: 216 K/UL (ref 150–400)
POTASSIUM SERPL-SCNC: 2.7 MMOL/L (ref 3.5–5.1)
PROTHROMBIN TIME: 14.3 SEC (ref 9–11.1)
RBC # BLD AUTO: 3.58 M/UL (ref 3.8–5.2)
SODIUM SERPL-SCNC: 143 MMOL/L (ref 136–145)
WBC # BLD AUTO: 3.1 K/UL (ref 3.6–11)

## 2017-08-09 PROCEDURE — 74011250637 HC RX REV CODE- 250/637: Performed by: INTERNAL MEDICINE

## 2017-08-09 PROCEDURE — 85610 PROTHROMBIN TIME: CPT | Performed by: EMERGENCY MEDICINE

## 2017-08-09 PROCEDURE — 93306 TTE W/DOPPLER COMPLETE: CPT

## 2017-08-09 PROCEDURE — 74011250636 HC RX REV CODE- 250/636: Performed by: EMERGENCY MEDICINE

## 2017-08-09 PROCEDURE — 36415 COLL VENOUS BLD VENIPUNCTURE: CPT | Performed by: INTERNAL MEDICINE

## 2017-08-09 PROCEDURE — 74011250637 HC RX REV CODE- 250/637: Performed by: EMERGENCY MEDICINE

## 2017-08-09 PROCEDURE — 74011250636 HC RX REV CODE- 250/636: Performed by: INTERNAL MEDICINE

## 2017-08-09 PROCEDURE — 80048 BASIC METABOLIC PNL TOTAL CA: CPT | Performed by: INTERNAL MEDICINE

## 2017-08-09 PROCEDURE — 65660000000 HC RM CCU STEPDOWN

## 2017-08-09 PROCEDURE — 83735 ASSAY OF MAGNESIUM: CPT

## 2017-08-09 PROCEDURE — 85027 COMPLETE CBC AUTOMATED: CPT | Performed by: INTERNAL MEDICINE

## 2017-08-09 RX ORDER — POTASSIUM CHLORIDE 750 MG/1
40 TABLET, FILM COATED, EXTENDED RELEASE ORAL
Status: COMPLETED | OUTPATIENT
Start: 2017-08-09 | End: 2017-08-09

## 2017-08-09 RX ORDER — FUROSEMIDE 10 MG/ML
40 INJECTION INTRAMUSCULAR; INTRAVENOUS DAILY
Status: DISCONTINUED | OUTPATIENT
Start: 2017-08-10 | End: 2017-08-12 | Stop reason: HOSPADM

## 2017-08-09 RX ORDER — POTASSIUM CHLORIDE 7.45 MG/ML
10 INJECTION INTRAVENOUS
Status: COMPLETED | OUTPATIENT
Start: 2017-08-09 | End: 2017-08-09

## 2017-08-09 RX ORDER — WARFARIN SODIUM 5 MG/1
5 TABLET ORAL ONCE
Status: COMPLETED | OUTPATIENT
Start: 2017-08-09 | End: 2017-08-09

## 2017-08-09 RX ADMIN — POTASSIUM CHLORIDE 40 MEQ: 750 TABLET, FILM COATED, EXTENDED RELEASE ORAL at 05:41

## 2017-08-09 RX ADMIN — Medication 10 ML: at 03:40

## 2017-08-09 RX ADMIN — ENOXAPARIN SODIUM 90 MG: 100 INJECTION SUBCUTANEOUS at 15:55

## 2017-08-09 RX ADMIN — FUROSEMIDE 20 MG: 10 INJECTION, SOLUTION INTRAMUSCULAR; INTRAVENOUS at 08:39

## 2017-08-09 RX ADMIN — AMIODARONE HYDROCHLORIDE 100 MG: 200 TABLET ORAL at 08:35

## 2017-08-09 RX ADMIN — POTASSIUM CHLORIDE 40 MEQ: 750 TABLET, FILM COATED, EXTENDED RELEASE ORAL at 08:37

## 2017-08-09 RX ADMIN — Medication 10 ML: at 21:20

## 2017-08-09 RX ADMIN — LISINOPRIL 40 MG: 20 TABLET ORAL at 08:36

## 2017-08-09 RX ADMIN — Medication 10 ML: at 15:55

## 2017-08-09 RX ADMIN — METOPROLOL SUCCINATE 12.5 MG: 25 TABLET, EXTENDED RELEASE ORAL at 08:37

## 2017-08-09 RX ADMIN — WARFARIN SODIUM 5 MG: 5 TABLET ORAL at 18:02

## 2017-08-09 RX ADMIN — POTASSIUM CHLORIDE 10 MEQ: 10 INJECTION, SOLUTION INTRAVENOUS at 05:39

## 2017-08-09 RX ADMIN — ENOXAPARIN SODIUM 90 MG: 100 INJECTION SUBCUTANEOUS at 03:39

## 2017-08-09 NOTE — CDMP QUERY
1 of 4    Dr. Ye Lomas MD :    The diagnosis of acute CHF exacerbation is documented on this patient. Could this be further specified as:    =>Acute on chronic diastolic CHF POA in setting of Chest CTA showing cardiomegaly and mild interstitial edema, Pro BNP: 1661 requiring Lasix IV, daily weight, cardiac monitor, strict I&O, and pending Echo  =>Acute on chronic systolic CHF POA  =>Other Explanation of clinical findings  =>Unable to Determine (no explanation of clinical findings)    The medical record reflects the following clinical findings, treatment, and risk factors:    Risk Factors: 77 BF w/hx: Multiple myeloma, afib, arthritis, HTN  Clinical Indicators: Admitted with acute resp failure with mild CHF, Chest CTA showing cardiomegaly and mild interstitial edema, Pro BNP: 1661  Treatment: Lasix IV, daily weight, cardiac monitor, strict I&O, and pending Echo    Please clarify and document your clinical opinion in the progress notes and discharge summary including the definitive and/or presumptive diagnosis, (suspected or probable), related to the above clinical findings. Please include clinical findings supporting your diagnosis. Thank [email or web address]  [phone]

## 2017-08-09 NOTE — PROGRESS NOTES
0700 - bedside report received from [facility], RN.    [id number] - bedside shift report given to Gena Albert RN.

## 2017-08-09 NOTE — CDMP QUERY
3 of 4    Dr. Glenna Parker MD :    Thank you for documenting the diagnosis of A-fib for this patient. Please clarify if this diagnosis could be further specified as:     =>Paroxysmal  =>Persistent  =>Permanent  =>Other explanations of clinical findings  =>Unable to Determine    Please document your response indicating your clinical opinion in your progress notes and discharge summary. -------------------------------------------------------------------------------------------------  Paroxysmal:  begins suddenly and stops on its own. Symptoms can be mild or severe. They stop within about a week, but usually in less than 24 hours. Persistent: continues for more than a week. It may stop on its own, or it can be stopped with treatment. Permanent: cannot be restored with treatment    Thank You,   [email or web address]  [phone]

## 2017-08-09 NOTE — CDMP QUERY
2 of 4    Dr. Becky German MD :    Acute pulmonary embolism was noted in the H&P; however, it is not noted in subsequent documentation. Please clarify if this condition was:    =>Treated & resolved  =>Ongoing/Improving  =>Ruled Out  =>Other Explanation of the Clinical Findings  =>Unable to Determine (no explanation for clinical findings)    The medical record reflects the following clinical findings, risk factors and treatment:     Risk Factors: 77 BF w/hx: multiple myeloma, afib, arthritis, HTN  Clinical Indicators:  Admitted with acute resp failure and CHF exacerbation with Chest CTA: Small acute emboli within the right lower lobe  Treatments:Anticoagulation and monitoring    Please clarify and document your clinical opinion in the progress notes and discharge summary including the definitive and/or presumptive diagnosis, (suspected or probable), related to the above clinical findings. Please include clinical findings supporting your diagnosis. Thank [email or web address]  [phone]

## 2017-08-09 NOTE — PROGRESS NOTES
TRANSFER - IN REPORT:    Verbal report received from Raven Baron (name) on Salvatore Lat  being received from ED (unit) for routine progression of care      Report consisted of patients Situation, Background, Assessment and   Recommendations(SBAR). Information from the following report(s) SBAR, Kardex, ED Summary, Intake/Output, MAR, Accordion, Recent Results and Cardiac Rhythm A-fib was reviewed with the receiving nurse. Opportunity for questions and clarification was provided. Assessment completed upon patients arrival to unit and care assumed. 2130 . Primary Nurse Fay Tran RN and Brayan melendrez RN performed a dual skin assessment on this patient No impairment noted  Wagner score is 22

## 2017-08-09 NOTE — PROGRESS NOTES
General Daily Progress Note    Admit Date: 8/8/2017    Subjective:     Patient has no complaint . Current Facility-Administered Medications   Medication Dose Route Frequency    [START ON 8/10/2017] furosemide (LASIX) injection 40 mg  40 mg IntraVENous DAILY    HYDROcodone-acetaminophen (NORCO) 5-325 mg per tablet 1 Tab  1 Tab Oral Q4H PRN    methocarbamol (ROBAXIN) tablet 750 mg  750 mg Oral TID PRN    metoprolol succinate (TOPROL-XL) XL tablet 12.5 mg  12.5 mg Oral DAILY    amiodarone (CORDARONE) tablet 100 mg  100 mg Oral DAILY    lisinopril (PRINIVIL, ZESTRIL) tablet 40 mg  40 mg Oral DAILY    . PHARMACY TO SUBSTITUTE PER PROTOCOL    Per Protocol    sodium chloride (NS) flush 5-10 mL  5-10 mL IntraVENous Q8H    sodium chloride (NS) flush 5-10 mL  5-10 mL IntraVENous PRN    acetaminophen (TYLENOL) tablet 650 mg  650 mg Oral Q4H PRN    ondansetron (ZOFRAN) injection 4 mg  4 mg IntraVENous Q4H PRN    docusate sodium (COLACE) capsule 100 mg  100 mg Oral BID PRN    enoxaparin (LOVENOX) injection 90 mg  1 mg/kg SubCUTAneous Q12H    WARFARIN INFORMATION NOTE (COUMADIN)   Other Rx Dosing/Monitoring        Review of Systems  Review of systems not obtained due to patient factors.     Objective:     Patient Vitals for the past 24 hrs:   BP Temp Pulse Resp SpO2 Height Weight   08/09/17 0810 (!) 130/94 97.6 °F (36.4 °C) 72 18 97 % - -   08/09/17 0552 - - - - - - 198 lb 3.1 oz (89.9 kg)   08/09/17 0403 138/87 - 77 - - - -   08/09/17 0355 (!) 142/102 98.3 °F (36.8 °C) 68 20 94 % - -   08/08/17 2354 152/87 98 °F (36.7 °C) 90 18 99 % - -   08/08/17 2118 137/88 98 °F (36.7 °C) 90 20 96 % - -   08/08/17 1927 138/71 - (!) 102 20 97 % - -   08/08/17 1845 146/87 - 96 20 90 % - -   08/08/17 1830 143/88 - 95 23 92 % - -   08/08/17 1815 (!) 151/94 - (!) 107 21 92 % - -   08/08/17 1812 - - (!) 104 20 93 % - -   08/08/17 1800 (!) 167/100 - (!) 114 20 (!) 83 % - -   08/08/17 1759 - - (!) 105 26 99 % - -   08/08/17 1755 - - 86 16 92 % - -   08/08/17 1751 - - 81 18 92 % - -   08/08/17 1715 (!) 158/122 - 99 19 95 % - -   08/08/17 1708 - - (!) 116 20 94 % - -   08/08/17 1646 - - (!) 101 19 98 % - -   08/08/17 1645 (!) 146/102 - (!) 112 18 - - -   08/08/17 1630 (!) 140/94 - 90 15 99 % - -   08/08/17 1615 (!) 153/103 - 97 18 98 % - -   08/08/17 1610 - - (!) 103 17 99 % - -   08/08/17 1600 (!) 186/137 - (!) 130 21 92 % - -   08/08/17 1545 (!) 151/115 - (!) 125 26 (!) 89 % - -   08/08/17 1534 (!) 179/110 - (!) 138 24 92 % - -   08/08/17 1403 - - 98 17 95 % - -   08/08/17 1402 137/90 - - - - - -   08/08/17 1347 146/88 - 94 19 96 % - -   08/08/17 1336 - - (!) 111 22 94 % - -   08/08/17 1245 114/77 - 86 18 93 % - -   08/08/17 1230 (!) 154/132 - 93 21 (!) 87 % - -   08/08/17 1215 (!) 131/92 - 87 16 (!) 88 % - -   08/08/17 1200 (!) 131/91 - 82 17 94 % - -   08/08/17 1148 124/83 - 86 17 96 % - -   08/08/17 1145 137/84 - 80 17 98 % - -   08/08/17 1123 - - - - 96 % - -   08/08/17 1100 110/78 - 95 16 - - -   08/08/17 1045 (!) 149/103 - (!) 111 28 - - -   08/08/17 1034 127/72 - (!) 121 21 - - -   08/08/17 1028 127/72 97.9 °F (36.6 °C) (!) 114 16 - 5' 10\" (1.778 m) 208 lb 15.9 oz (94.8 kg)        08/07 1901 - 08/09 0700  In: 440 [P.O.:340; I.V.:100]  Out: 4550 [Urine:4550]    Physical Exam:   Visit Vitals    BP (!) 130/94 (BP 1 Location: Right arm, BP Patient Position: Sitting)  Comment: notified RN    Pulse 72    Temp 97.6 °F (36.4 °C)    Resp 18    Ht 5' 10\" (1.778 m)    Wt 198 lb 3.1 oz (89.9 kg)    SpO2 97%    BMI 28.44 kg/m2     General appearance: alert, cooperative, no distress, appears stated age  Neck: supple, symmetrical, trachea midline, no adenopathy, thyroid: not enlarged, symmetric, no tenderness/mass/nodules, no carotid bruit and no JVD  Lungs: clear to auscultation bilaterally  Heart: regular rate and rhythm, S1, S2 normal, no murmur, click, rub or gallop  Abdomen: soft, non-tender.  Bowel sounds normal. No masses,  no organomegaly  Extremities: extremities normal, atraumatic, no cyanosis or edema  Neurologic: Grossly normal        Data Review   Recent Results (from the past 24 hour(s))   EKG, 12 LEAD, INITIAL    Collection Time: 08/08/17 10:31 AM   Result Value Ref Range    Ventricular Rate 129 BPM    Atrial Rate 375 BPM    QRS Duration 86 ms    Q-T Interval 304 ms    QTC Calculation (Bezet) 445 ms    Calculated R Axis -23 degrees    Calculated T Axis 153 degrees    Diagnosis       Atrial flutter with variable AV block  Moderate voltage criteria for LVH, may be normal variant  Nonspecific ST and T wave abnormality  When compared with ECG of 25-MAR-2015 15:05,  Atrial flutter has replaced Sinus rhythm  Vent. rate has increased BY  62 BPM  Confirmed by Elmer Natacha ([id number]) on 8/8/2017 2:32:02 PM     CBC WITH AUTOMATED DIFF    Collection Time: 08/08/17 10:40 AM   Result Value Ref Range    WBC 3.7 3.6 - 11.0 K/uL    RBC 4.12 3.80 - 5.20 M/uL    HGB 10.9 (L) 11.5 - 16.0 g/dL    HCT 36.3 35.0 - 47.0 %    MCV 88.1 80.0 - 99.0 FL    MCH 26.5 26.0 - 34.0 PG    MCHC 30.0 30.0 - 36.5 g/dL    RDW 18.0 (H) 11.5 - 14.5 %    PLATELET 720 701 - 328 K/uL    NEUTROPHILS 74 32 - 75 %    LYMPHOCYTES 21 12 - 49 %    MONOCYTES 2 (L) 5 - 13 %    EOSINOPHILS 2 0 - 7 %    BASOPHILS 1 0 - 1 %    ABS. NEUTROPHILS 2.7 1.8 - 8.0 K/UL    ABS. LYMPHOCYTES 0.8 0.8 - 3.5 K/UL    ABS. MONOCYTES 0.1 0.0 - 1.0 K/UL    ABS. EOSINOPHILS 0.1 0.0 - 0.4 K/UL    ABS.  BASOPHILS 0.0 0.0 - 0.1 K/UL    RBC COMMENTS POIKILOCYTOSIS  PRESENT        RBC COMMENTS OVALOCYTES  PRESENT        RBC COMMENTS ANISOCYTOSIS  1+        RBC COMMENTS SCHISTOCYTES  PRESENT        DF SMEAR SCANNED     TROPONIN I    Collection Time: 08/08/17 10:40 AM   Result Value Ref Range    Troponin-I, Qt. <0.04 <0.00 ng/mL   METABOLIC PANEL, COMPREHENSIVE    Collection Time: 08/08/17 10:40 AM   Result Value Ref Range    Sodium 144 136 - 145 mmol/L    Potassium 2.7 (LL) 3.5 - 5.1 mmol/L    Chloride 105 97 - 108 mmol/L    CO2 34 (H) 21 - 32 mmol/L    Anion gap 5 5 - 15 mmol/L    Glucose 120 (H) 65 - 100 mg/dL    BUN 8 6 - 20 MG/DL    Creatinine 1.06 (H) 0.55 - 1.02 MG/DL    BUN/Creatinine ratio 8 (L) 12 - 20      GFR est AA >60 >60 ml/min/1.73m2    GFR est non-AA 50 (L) >60 ml/min/1.73m2    Calcium 8.6 8.5 - 10.1 MG/DL    Bilirubin, total 0.8 0.2 - 1.0 MG/DL    ALT (SGPT) 35 12 - 78 U/L    AST (SGOT) 21 15 - 37 U/L    Alk.  phosphatase 108 45 - 117 U/L    Protein, total 7.1 6.4 - 8.2 g/dL    Albumin 3.2 (L) 3.5 - 5.0 g/dL    Globulin 3.9 2.0 - 4.0 g/dL    A-G Ratio 0.8 (L) 1.1 - 2.2     CK W/ REFLX CKMB    Collection Time: 08/08/17 10:40 AM   Result Value Ref Range     26 - 192 U/L   MAGNESIUM    Collection Time: 08/08/17 10:40 AM   Result Value Ref Range    Magnesium 1.8 1.6 - 2.4 mg/dL   NT-PRO BNP    Collection Time: 08/08/17 10:40 AM   Result Value Ref Range    NT pro-BNP 1661 (H) 0 - 450 PG/ML   PTT    Collection Time: 08/08/17 10:45 AM   Result Value Ref Range    aPTT 30.1 22.1 - 32.5 sec    aPTT, therapeutic range     58.0 - 77.0 SECS   PROTHROMBIN TIME + INR    Collection Time: 08/08/17 10:45 AM   Result Value Ref Range    INR 1.3 (H) 0.9 - 1.1      Prothrombin time 12.9 (H) 9.0 - 37.2 sec   METABOLIC PANEL, BASIC    Collection Time: 08/09/17  3:35 AM   Result Value Ref Range    Sodium 143 136 - 145 mmol/L    Potassium 2.7 (LL) 3.5 - 5.1 mmol/L    Chloride 106 97 - 108 mmol/L    CO2 32 21 - 32 mmol/L    Anion gap 5 5 - 15 mmol/L    Glucose 82 65 - 100 mg/dL    BUN 7 6 - 20 MG/DL    Creatinine 0.78 0.55 - 1.02 MG/DL    BUN/Creatinine ratio 9 (L) 12 - 20      GFR est AA >60 >60 ml/min/1.73m2    GFR est non-AA >60 >60 ml/min/1.73m2    Calcium 8.0 (L) 8.5 - 10.1 MG/DL   CBC W/O DIFF    Collection Time: 08/09/17  3:35 AM   Result Value Ref Range    WBC 3.1 (L) 3.6 - 11.0 K/uL    RBC 3.58 (L) 3.80 - 5.20 M/uL    HGB 9.9 (L) 11.5 - 16.0 g/dL    HCT 31.4 (L) 35.0 - 47.0 %    MCV 87.7 80.0 - 99.0 FL    MCH 27.7 26.0 - 34.0 PG    MCHC 31.5 30.0 - 36.5 g/dL    RDW 17.9 (H) 11.5 - 14.5 %    PLATELET 184 348 - 985 K/uL   PROTHROMBIN TIME + INR    Collection Time: 08/09/17  3:35 AM   Result Value Ref Range    INR 1.4 (H) 0.9 - 1.1      Prothrombin time 14.3 (H) 9.0 - 11.1 sec   MAGNESIUM    Collection Time: 08/09/17  3:35 AM   Result Value Ref Range    Magnesium 1.8 1.6 - 2.4 mg/dL           Assessment:     Active Problems:    CHF (congestive heart failure) ([facility] [address] (8/8/2017)        Plan:     1. It appears patient did indeed have mild congestive heart failure. I will await the results of her echocardiogram.  She is currently significantly improved. 2.  Continue Coumadin in view of her hypercoagulable state. 3.  Will mobilize.

## 2017-08-09 NOTE — PROGRESS NOTES
This 68 yr old AAF has been admitted for CHF. An echo is pending. The HF nurse is also following. With CHF a cardiology consult is recommended. Will follow for patient would benefit from a hospital to home referral and have consulted [facility] for CHF health couch post discharge. Upon interview patient verified demographics and PCP. Patient reports independence and drives to her own appointments. Arthritis      Atrial fibrillation (HCC)     Cancer (HCC)      multiple myeloma   Hypertension             Care Management Interventions  PCP Verified by CM: Yes  MyChart Signup: No  Discharge Durable Medical Equipment: No (st. cane)  Physical Therapy Consult: No  Occupational Therapy Consult: No  Speech Therapy Consult: No  Current Support Network: Own Home  Confirm Follow Up Transport: Family  Plan discussed with Pt/Family/Caregiver: Yes  Freedom of Choice Offered:  Yes

## 2017-08-09 NOTE — CARDIO/PULMONARY
C/P rehab note- chart reviewed-Pt is on CHF bundle list    Adm with CHF. HX includes HTN,A-fib,Multiple Myeloma,DVT,. Nonsmoker. LVEF 25-30%. DIET REGULAR      Met with pt and family member. The CHF teaching packet  was provided. Also received information regarding the low sodium diet. Instruction given on s/s of CHF, checking weight every am and calling MD if weight is up 2-3 lbs in a day or 5 lbs in a week (or as directed by the physician), fluid/Na restrictions, s/s of worsening CHF and when to call MD.  Discussed the CHF \"zones\" and subsequent actions with pt. Reviewed activity as tolerated with frequent rest periods as needed, taking medications as prescribed, and the importance of follow up visits with physician. Does own grocery shopping and does not own a scale. Encouraged to purchase one. Given examples of high sodium foods and encouraged to closely read labels for sodium content. Encouraged  to verbalize concerns/questions. Verbalized understanding.

## 2017-08-09 NOTE — PROGRESS NOTES
[address] SHIFT NURSING NOTE    Bedside and Verbal shift change report given to Tristin Martinez RN (oncoming nurse) by Que Harris RN (offgoing nurse). Report included the following information SBAR, Kardex, Intake/Output, MAR, Accordion, Recent Results and Cardiac Rhythm Atrial Fibrillation. SHIFT SUMMARY:   8912 Received critical K result of 2.7 from lab.  0451 Paged Dr. Jillian Garcia, hospitalist.  6439 Received order from Dr. Jillian Garcia to add on a magnesium to AM labs. [address] and notified lab of add on magnesium        Admission Date 8/8/2017   Admission Diagnosis CHF (congestive heart failure) ([facility] [address]   Consults IP CONSULT TO HOSPITALIST  IP CONSULT TO PRIMARY CARE PROVIDER        Consults   [] PT   [] OT   [] Speech   [] Palliative      [] Hospice    [] Case Management   [x] None   Cardiac Monitoring   [x] Yes   [] No     Antibiotics   [] Yes   [x] No   GI Prophylaxis  (Ex: Protonix, Pepcid, etc,.)   [] Yes   [x] No          DVT Prophylaxis   SCDs:             Cesar stockings:         [x] Medication (Ex: Lovenox, Eliquis, Brilinta, Coumadin,  Heparin, etc..)   [] Contraindicated   [] No VTE needed       Urinary Catheter             LDAs               Peripheral IV 08/08/17 Left Hand (Active)   Site Assessment Clean, dry, & intact 8/8/2017  9:38 PM   Phlebitis Assessment 0 8/8/2017  9:38 PM   Infiltration Assessment 0 8/8/2017  9:38 PM   Dressing Status Clean, dry, & intact 8/8/2017  9:38 PM   Dressing Type Tape;Transparent 8/8/2017  9:38 PM   Hub Color/Line Status Pink;Flushed;Patent;Capped 8/8/2017  9:38 PM   Alcohol Cap Used No 8/8/2017  9:38 PM                      I/Os   Intake/Output Summary (Last 24 hours) at 08/09/17 0259  Last data filed at 08/08/17 2145   Gross per 24 hour   Intake              340 ml   Output             4150 ml   Net            -3810 ml         Activity Level Activity Level:  Up with Assistance     Activity Assistance: Partial (one person)   Diet Active Orders   Diet    DIET CARDIAC Regular Purposeful Rounding every 1-2 hour? [x] Yes    Ai Score  Total Score: 2   Bed Alarm (If score 3 or >)   [] Yes    [] Refused (See signed refusal form in chart)   Wagner Score  Wagner Score: 22       Wagner Score (if score 14 or less)   [] PMT consult   [] Nutrition consult   [] Wound Care consult      []  Specialty bed         Influenza Vaccine Received Flu Vaccine for Current Season (usually Sept-March): Not Flu Season               Needs prior to discharge:   Home O2 required:    [] Yes   [x] No     If yes, how much O2 required? Other:    Last Bowel Movement Date: 08/08/17   Readmission Risk Assessment Tool Score Low Risk            12       Total Score        3 Has Seen PCP in Last 6 Months (Yes=3, No=0)    5 Pt. Coverage (Medicare=5 , Medicaid, or Self-Pay=4)    4 Charlson Comorbidity Score (Age + Comorbid Conditions)        Criteria that do not apply:    . Living with Significant Other. Assisted Living. LTAC. SNF.  or   Rehab    Patient Length of Stay (>5 days = 3)    IP Visits Last 12 Months (1-3=4, 4=9, >4=11)       Expected Length of Stay - - -   Actual Length of Stay 1

## 2017-08-09 NOTE — CDMP QUERY
4 of 4    Dr. Ruddy Figueroa MD :    Please clarify if this patient is being treated/managed for:    =>Hypokalemia POA in setting of admit K: 2.7 requiring KCl PO and IV  =>Other Explanation of clinical findings  =>Unable to Determine (no explanation of clinical findings)    The medical record reflects the following clinical findings, treatment, and risk factors:    Risk Factors: 77 BF w/hx: multiple myeloma, afib, arthritis, HTN  Clinical Indicators: Admitted with Acute resp failure and CHF exacerbation with admit K: 2.7  Treatment: KCl PO and IV    Please clarify and document your clinical opinion in the progress notes and discharge summary including the definitive and/or presumptive diagnosis, (suspected or probable), related to the above clinical findings. Please include clinical findings supporting your diagnosis. Thank [email or web address]  [phone]

## 2017-08-09 NOTE — PROGRESS NOTES
1900 Received report from Marnie Horn RN. Assumed patient care. [facility] SHIFT NURSING NOTE    Bedside shift change report given to Migdalia Hernandez RN (oncoming nurse) by Thiago Balbuena RN (offgoing nurse). Report included the following information SBAR, Kardex, Intake/Output, MAR and Recent Results. SHIFT SUMMARY:         Admission Date 8/8/2017   Admission Diagnosis CHF (congestive heart failure) ([address]   Consults IP CONSULT TO HOSPITALIST  IP CONSULT TO PRIMARY CARE PROVIDER        Consults   [] PT   [] OT   [] Speech   [] Palliative      [] Hospice    [x] Case Management   [] None   Cardiac Monitoring   [x] Yes   [] No     Antibiotics   [] Yes   [x] No   GI Prophylaxis  (Ex: Protonix, Pepcid, etc,.)   [] Yes   [x] No          DVT Prophylaxis   SCDs:             Cesar stockings:         [x] Medication (Ex: Lovenox, Eliquis, Brilinta, Coumadin,  Heparin, etc..)   [] Contraindicated   [] No VTE needed       Urinary Catheter             LDAs               Peripheral IV 08/08/17 Left Hand (Active)   Site Assessment Clean, dry, & intact 8/9/2017  7:47 PM   Phlebitis Assessment 0 8/9/2017  7:47 PM   Infiltration Assessment 0 8/9/2017  7:47 PM   Dressing Status Clean, dry, & intact 8/9/2017  7:47 PM   Dressing Type Tape;Transparent 8/9/2017  7:47 PM   Hub Color/Line Status Pink;Flushed;Patent 8/9/2017  7:47 PM   Alcohol Cap Used No 8/8/2017  9:38 PM                      I/Os   Intake/Output Summary (Last 24 hours) at 08/10/17 0050  Last data filed at 08/09/17 1947   Gross per 24 hour   Intake              900 ml   Output              400 ml   Net              500 ml         Activity Level Activity Level: Up ad dangelo     Activity Assistance: No assistance needed   Diet Active Orders   Diet    DIET REGULAR      Purposeful Rounding every 1-2 hour?    [x] Yes    Ai Score  Total Score: 1   Bed Alarm (If score 3 or >)   [] Yes    [] Refused (See signed refusal form in chart)   Wagner Score  Wagner Score: 22       Wagner Score (if score 14 or less)   [] PMT consult   [] Nutrition consult   [] Wound Care consult      []  Specialty bed         Influenza Vaccine Received Flu Vaccine for Current Season (usually Sept-March): Not Flu Season               Needs prior to discharge:   Home O2 required:    [] Yes   [x] No     If yes, how much O2 required? Other:    Last Bowel Movement Date: 08/09/17   Readmission Risk Assessment Tool Score Low Risk            12       Total Score        3 Has Seen PCP in Last 6 Months (Yes=3, No=0)    5 Pt. Coverage (Medicare=5 , Medicaid, or Self-Pay=4)    4 Charlson Comorbidity Score (Age + Comorbid Conditions)        Criteria that do not apply:    . Living with Significant Other. Assisted Living. LTAC. SNF.  or   Rehab    Patient Length of Stay (>5 days = 3)    IP Visits Last 12 Months (1-3=4, 4=9, >4=11)       Expected Length of Stay 4d 14h   Actual Length of Stay 2

## 2017-08-10 ENCOUNTER — PATIENT OUTREACH (OUTPATIENT)
Dept: INTERNAL MEDICINE CLINIC | Age: 77
End: 2017-08-10

## 2017-08-10 PROBLEM — I26.99 ACUTE PULMONARY EMBOLISM (HCC): Status: ACTIVE | Noted: 2017-08-10

## 2017-08-10 PROBLEM — I48.92 ATRIAL FLUTTER (HCC): Status: ACTIVE | Noted: 2017-08-10

## 2017-08-10 PROBLEM — I42.9 CARDIOMYOPATHY (HCC): Status: ACTIVE | Noted: 2017-08-10

## 2017-08-10 LAB
ANION GAP BLD CALC-SCNC: 5 MMOL/L (ref 5–15)
BUN SERPL-MCNC: 10 MG/DL (ref 6–20)
BUN/CREAT SERPL: 10 (ref 12–20)
CALCIUM SERPL-MCNC: 8.5 MG/DL (ref 8.5–10.1)
CHLORIDE SERPL-SCNC: 105 MMOL/L (ref 97–108)
CO2 SERPL-SCNC: 30 MMOL/L (ref 21–32)
CREAT SERPL-MCNC: 0.98 MG/DL (ref 0.55–1.02)
ERYTHROCYTE [DISTWIDTH] IN BLOOD BY AUTOMATED COUNT: 18 % (ref 11.5–14.5)
GLUCOSE SERPL-MCNC: 95 MG/DL (ref 65–100)
HCT VFR BLD AUTO: 35.4 % (ref 35–47)
HGB BLD-MCNC: 11 G/DL (ref 11.5–16)
INR PPP: 1.5 (ref 0.9–1.1)
MCH RBC QN AUTO: 27.1 PG (ref 26–34)
MCHC RBC AUTO-ENTMCNC: 31.1 G/DL (ref 30–36.5)
MCV RBC AUTO: 87.2 FL (ref 80–99)
PLATELET # BLD AUTO: 226 K/UL (ref 150–400)
POTASSIUM SERPL-SCNC: 3.7 MMOL/L (ref 3.5–5.1)
PROTHROMBIN TIME: 15.8 SEC (ref 9–11.1)
RBC # BLD AUTO: 4.06 M/UL (ref 3.8–5.2)
SODIUM SERPL-SCNC: 140 MMOL/L (ref 136–145)
WBC # BLD AUTO: 3.6 K/UL (ref 3.6–11)

## 2017-08-10 PROCEDURE — 74011250637 HC RX REV CODE- 250/637: Performed by: INTERNAL MEDICINE

## 2017-08-10 PROCEDURE — 85610 PROTHROMBIN TIME: CPT | Performed by: EMERGENCY MEDICINE

## 2017-08-10 PROCEDURE — 85027 COMPLETE CBC AUTOMATED: CPT | Performed by: EMERGENCY MEDICINE

## 2017-08-10 PROCEDURE — 65660000000 HC RM CCU STEPDOWN

## 2017-08-10 PROCEDURE — 74011250636 HC RX REV CODE- 250/636: Performed by: INTERNAL MEDICINE

## 2017-08-10 PROCEDURE — 80048 BASIC METABOLIC PNL TOTAL CA: CPT | Performed by: EMERGENCY MEDICINE

## 2017-08-10 PROCEDURE — 36415 COLL VENOUS BLD VENIPUNCTURE: CPT | Performed by: EMERGENCY MEDICINE

## 2017-08-10 RX ORDER — WARFARIN SODIUM 5 MG/1
5 TABLET ORAL ONCE
Status: COMPLETED | OUTPATIENT
Start: 2017-08-10 | End: 2017-08-10

## 2017-08-10 RX ADMIN — FUROSEMIDE 40 MG: 10 INJECTION, SOLUTION INTRAMUSCULAR; INTRAVENOUS at 08:24

## 2017-08-10 RX ADMIN — ENOXAPARIN SODIUM 90 MG: 100 INJECTION SUBCUTANEOUS at 16:13

## 2017-08-10 RX ADMIN — Medication 10 ML: at 21:05

## 2017-08-10 RX ADMIN — WARFARIN SODIUM 5 MG: 5 TABLET ORAL at 17:32

## 2017-08-10 RX ADMIN — Medication 10 ML: at 05:12

## 2017-08-10 RX ADMIN — Medication 10 ML: at 08:29

## 2017-08-10 RX ADMIN — AMIODARONE HYDROCHLORIDE 100 MG: 200 TABLET ORAL at 08:23

## 2017-08-10 RX ADMIN — Medication 10 ML: at 16:13

## 2017-08-10 RX ADMIN — METOPROLOL SUCCINATE 12.5 MG: 25 TABLET, EXTENDED RELEASE ORAL at 08:24

## 2017-08-10 RX ADMIN — ENOXAPARIN SODIUM 90 MG: 100 INJECTION SUBCUTANEOUS at 02:35

## 2017-08-10 RX ADMIN — LISINOPRIL 40 MG: 20 TABLET ORAL at 08:23

## 2017-08-10 NOTE — CONSULTS
[address] [place] [address] [facility]     Date of  Admission: 8/8/2017 10:24 AM     Admission type:Emergency    Consult for: cardiomyopathy   Consult by: PCP     Subjective:     Vale Pallas is a 68 y.o. female with PMH a-fib, multiple myeloma, HTN, anemia who was  admitted for CHF (congestive heart failure) ([address] Ms. Lawrence Rodriguez states that she's been having increased KNOTT and leg swelling for the past few months. She has KNOTT when she is doing more than normal activity - such as out for a period of time buying groceries. She denies chest pain, palpitations, PND, or dizziness. She is not sure if she has orthopnea because she has always slept on multiple pillows for comfort. Ms. Lydia Lundberg does not have a cardiologist.  She was completely unaware of her history of a-fib, although she does recognize her home meds.   She had a previous stress test 09/14 which was normal.        Cardiac risk factors: hypertension, post-menopausal.      Patient Active Problem List    Diagnosis Date Noted    Atrial flutter ([address] 08/10/2017    Cardiomyopathy ([address] 08/10/2017    Acute pulmonary embolism ([address] 08/10/2017    CHF (congestive heart failure) ([address] 08/08/2017    Sialadenitis 06/29/2017    Muscular deconditioning 04/22/2017    Synovitis of shoulder 04/21/2017    Back pain 04/30/2016    Jaw pain 12/07/2015    Primary osteoarthritis of both knees 08/24/2015    Synovitis 05/18/2015    Contusion of knee 05/09/2015    Venous insufficiency 04/07/2015    Calf DVT (deep venous thrombosis) ([address] 01/27/2015    Intractable back pain 01/25/2015    Reflux esophagitis 10/31/2014    Multiple myeloma ([address] 10/31/2014    Nocturia 10/31/2014    Complex renal cyst 10/11/2014    Low back pain 10/10/2014    Atrial fibrillation ([address] 09/12/2014    Anemia 09/08/2014    Chest pain on exertion 09/07/2014    HTN (hypertension) 09/07/2014  Chest pain 09/05/2014    Osteoarthritis 09/05/2014      Travis Cleaning MD  Past Medical History:   Diagnosis Date    Arthritis     [facility]     Cancer ([facility]     multiple myeloma    Hypertension       Social History     Social History    Marital status:      Spouse name: N/A    Number of children: N/A    Years of education: N/A     Social History Main Topics    Smoking status: Never Smoker    Smokeless tobacco: Never Used    Alcohol use No    Drug use: No    Sexual activity: Not Currently     Other Topics Concern    None     Social History Narrative     Allergies   Allergen Reactions    Codeine Other (comments)      Family History   Problem Relation Age of Onset    Stroke Mother     No Known Problems Father       Current Facility-Administered Medications   Medication Dose Route Frequency    warfarin (COUMADIN) tablet 5 mg  5 mg Oral ONCE    furosemide (LASIX) injection 40 mg  40 mg IntraVENous DAILY    HYDROcodone-acetaminophen (NORCO) 5-325 mg per tablet 1 Tab  1 Tab Oral Q4H PRN    methocarbamol (ROBAXIN) tablet 750 mg  750 mg Oral TID PRN    metoprolol succinate (TOPROL-XL) XL tablet 12.5 mg  12.5 mg Oral DAILY    amiodarone (CORDARONE) tablet 100 mg  100 mg Oral DAILY    lisinopril (PRINIVIL, ZESTRIL) tablet 40 mg  40 mg Oral DAILY    . PHARMACY TO SUBSTITUTE PER PROTOCOL    Per Protocol    sodium chloride (NS) flush 5-10 mL  5-10 mL IntraVENous Q8H    sodium chloride (NS) flush 5-10 mL  5-10 mL IntraVENous PRN    acetaminophen (TYLENOL) tablet 650 mg  650 mg Oral Q4H PRN    ondansetron (ZOFRAN) injection 4 mg  4 mg IntraVENous Q4H PRN    docusate sodium (COLACE) capsule 100 mg  100 mg Oral BID PRN    enoxaparin (LOVENOX) injection 90 mg  1 mg/kg SubCUTAneous Q12H    WARFARIN INFORMATION NOTE (COUMADIN)   Other Rx Dosing/Monitoring        Review of Symptoms:   Constitutional: negative  Eyes: negative   Ears, nose, mouth, throat, and face: negative  Respiratory: KNOTT  Cardiovascular: negative   Gastrointestinal: negative  Genitourinary:negative   Musculoskeletal:leg swelling   Neurological: negative   Endocrine: negative          Objective:      Visit Vitals    BP (!) 138/94 (BP 1 Location: Right arm, BP Patient Position: At rest)    Pulse 88    Temp 97.7 °F (36.5 °C)    Resp 18    Ht 5' 10\" (1.778 m)    Wt 91.3 kg (201 lb 4.5 oz)    SpO2 95%    BMI 28.88 kg/m2       Physical:   General: pleasant, AAF, sitting on side of bed in NAD   Heart: rapid irregular rhythm, no m/S3/JVD  Lungs: clear   Abdomen: Soft, +BS, NTND   Extremities: LE krunal +DP/PT, 1+ non-pitting edema   Neurologic: Grossly normal    Data Review:   Recent Labs      08/10/17   0248  08/09/17   0335  08/08/17   1040   WBC  3.6  3.1*  3.7   HGB  11.0*  9.9*  10.9*   HCT  35.4  31.4*  36.3   PLT  226  216  236     Recent Labs      08/10/17   0248  08/09/17   0335  08/08/17   1045  08/08/17   1040   NA  140  143   --   144   K  3.7  2.7*   --   2.7*   CL  105  106   --   105   CO2  30  32   --   34*   GLU  95  82   --   120*   BUN  10  7   --   8   CREA  0.98  0.78   --   1.06*   CA  8.5  8.0*   --   8.6   MG   --   1.8   --   1.8   ALB   --    --    --   3.2*   TBILI   --    --    --   0.8   SGOT   --    --    --   21   ALT   --    --    --   35   INR  1.5*  1.4*  1.3*   --        Recent Labs      08/08/17   1040   TROIQ  <0.04         Intake/Output Summary (Last 24 hours) at 08/10/17 1550  Last data filed at 08/10/17 0330   Gross per 24 hour   Intake              610 ml   Output                0 ml   Net              610 ml        Cardiographics    Telemetry: a-flutter 90s with variable AV   ECG: a-flutter with variable AVB  Echocardiogram: EF 25-30%; diffuse hypokinesis; wall thickness mod increased; REGIS; mild to mod MR; mild TR; mild pulm HTN   CXRAY: \"No acute pulmonary process. Moderate cardiomegaly\"  CTA chest: \"1. Small acute emboli within the right lower lobe  2.  Enlarged heart with mild intralobular septal thickening in the lower lobes  dependently compatible with mild interstitial edema\"       Assessment:       Active Problems:    CHF (congestive heart failure) ([address] (8/8/2017)      Atrial flutter ([address] (8/10/2017)      Cardiomyopathy ([address] (8/10/2017)      Acute pulmonary embolism ([address] (8/10/2017)         Plan:     Endy Contreras is a 68 y.o. female who presented with gradually worsening SOB/KNOTT and leg swelling and was found to be in rapid a-flutter with a reduced EF of 25-30%. CTA also showed small acute emboli in her RLL. Pro-BNP 1661. Trop neg. Potassium very low on admission of 2.7. CHADSVASC=5  · Patient had normal heart function during her prior stress test 09/14, and she is asymptomatic to her irregular fast heart rate, so it is unknown how long she has been in this fast rhythm. Her new cardiomyopathy likely rate-induced. · Ischemia could also contribute, however, last stress negative, patient without chest pain, and trop negative. · Spoke with patient about her variable INR and coumadin use. She has never tried a different agent, but is open to them. Family has asked for pricing. Feel we will likely get better anticoagulation with one of the DOACs. · Dr. Joe Cespedes to speak with Dr. Gama Marlow for EP eval to consider ablation. · Continue to monitor and replete electrolytes as needed.   · Continue diuresis, daily weights, I&O's, BB, ACEi  · Check TSH and statin levels   · lovenox while awaiting therapeutic INR vs pricing for DOACs        Thank you for consulting [facility]      Lashon Baca, VIN  DNP, RN, AGACNP-BC

## 2017-08-10 NOTE — PROGRESS NOTES
Pharmacy Daily Dosing of Warfarin    Indication: pulmonary embolism  Goal INR: 2-3    Average Daily Warfarin Dose: 2.5 mg daily  Concurrent Anticoagulants/Antiplatelets enoxaparin 90 mg Q12H  Major Interacting Medications (Dose/Frequency): amiodarone 100 mg daily    INR (0.9-1.1) > 5 or Platelets (< 29J): discuss with MD:  Recent Labs      08/10/17   0248  08/09/17   0335  08/08/17   1045  08/08/17   1040   INR  1.5*  1.4*  1.3*   --    HGB  11.0*  9.9*   --   10.9*   PLT  226  216   --   236     Impression/Plan: Will order 5 mg x 1 dose. Will continue current regimen for lovenox as appropriate for indication and renal function. Pharmacy will follow daily and adjust the dose as appropriate.     Thanks for the consult  Trevin Fuentes, PHARMD,

## 2017-08-10 NOTE — PROGRESS NOTES
Problem: Falls - Risk of  Goal: *Absence of Falls  Document Ai Fall Risk and appropriate interventions in the flowsheet.    Outcome: Progressing Towards Goal  Fall Risk Interventions:  Mobility Interventions: Communicate number of staff needed for ambulation/transfer, Patient to call before getting OOB, Utilize walker, cane, or other assitive device           Medication Interventions: Evaluate medications/consider consulting pharmacy

## 2017-08-10 NOTE — PROGRESS NOTES
Spiritual Care Partner Volunteer visited patient on 8/10/17. Documented by:  Rev. Walter Murguia.  Peter Doss MA, [facility]    Lead  Profession Development & Advancement

## 2017-08-10 NOTE — CARDIO/PULMONARY
C/P rehab note- chart reviewed-Pt is on CHF bundle list.  DIET REGULAR     Adm with CHF.        HX includes HTN,A-fib,Multiple Myeloma,DVT,.     Nonsmoker.     LVEF 25-30%.       Met with pt who was sitting up on side of bed. This was a follow-up visit to answer questions and reinforce prior teaching re: CHF, S&Ss, medication management, Low NA diet, daily weights and when to call the doctor. Inquired if she had a chance to read CHF packet from yesterday? She responded,\" I glanced at it briefly\". Reviewed rational for daily weights and following a low NA diet. Pt informed me that she does not salt her food and will begin reading food labels to keep intake to 1500 mg of NA per day. She presently does not exercise and reviewed chair exercises she can do at home. Pt without questions but reinforcement needed.

## 2017-08-10 NOTE — PROGRESS NOTES
General Daily Progress Note    Admit Date: 8/8/2017    Subjective:     Patient has no complaint . Current Facility-Administered Medications   Medication Dose Route Frequency    warfarin (COUMADIN) tablet 5 mg  5 mg Oral ONCE    furosemide (LASIX) injection 40 mg  40 mg IntraVENous DAILY    HYDROcodone-acetaminophen (NORCO) 5-325 mg per tablet 1 Tab  1 Tab Oral Q4H PRN    methocarbamol (ROBAXIN) tablet 750 mg  750 mg Oral TID PRN    metoprolol succinate (TOPROL-XL) XL tablet 12.5 mg  12.5 mg Oral DAILY    amiodarone (CORDARONE) tablet 100 mg  100 mg Oral DAILY    lisinopril (PRINIVIL, ZESTRIL) tablet 40 mg  40 mg Oral DAILY    . PHARMACY TO SUBSTITUTE PER PROTOCOL    Per Protocol    sodium chloride (NS) flush 5-10 mL  5-10 mL IntraVENous Q8H    sodium chloride (NS) flush 5-10 mL  5-10 mL IntraVENous PRN    acetaminophen (TYLENOL) tablet 650 mg  650 mg Oral Q4H PRN    ondansetron (ZOFRAN) injection 4 mg  4 mg IntraVENous Q4H PRN    docusate sodium (COLACE) capsule 100 mg  100 mg Oral BID PRN    enoxaparin (LOVENOX) injection 90 mg  1 mg/kg SubCUTAneous Q12H    WARFARIN INFORMATION NOTE (COUMADIN)   Other Rx Dosing/Monitoring        Review of Systems  A comprehensive review of systems was negative. Objective:     Patient Vitals for the past 24 hrs:   BP Temp Pulse Resp SpO2 Weight   08/10/17 1054 120/82 97.5 °F (36.4 °C) (!) 101 18 96 % -   08/10/17 0751 99/73 97.6 °F (36.4 °C) 99 18 97 % -   08/10/17 0330 142/90 97.7 °F (36.5 °C) 87 18 96 % 201 lb 4.5 oz (91.3 kg)   08/09/17 2239 (!) 128/92 97.8 °F (36.6 °C) 64 18 97 % -   08/09/17 1947 143/84 98.1 °F (36.7 °C) (!) 55 18 100 % -   08/09/17 1516 120/75 98 °F (36.7 °C) 96 18 98 % -        08/08 1901 - 08/10 0700  In: 1050 [P.O.:950;  I.V.:100]  Out: 1100 [Urine:1100]    Physical Exam:   Visit Vitals    /82    Pulse (!) 101    Temp 97.5 °F (36.4 °C)    Resp 18    Ht 5' 10\" (1.778 m)    Wt 201 lb 4.5 oz (91.3 kg)    SpO2 96%    BMI 28.88 kg/m2     General appearance: alert, cooperative, no distress, appears stated age  Neck: supple, symmetrical, trachea midline, no adenopathy, thyroid: not enlarged, symmetric, no tenderness/mass/nodules and no JVD  Lungs: clear to auscultation bilaterally  Heart: regular rate and rhythm, S1, S2 normal, no murmur, click, rub or gallop  Abdomen: soft, non-tender. Bowel sounds normal. No masses,  no organomegaly  Extremities: extremities normal, atraumatic, no cyanosis or edema  Neurologic: Grossly normal        Data Review   Recent Results (from the past 24 hour(s))   PROTHROMBIN TIME + INR    Collection Time: 08/10/17  2:48 AM   Result Value Ref Range    INR 1.5 (H) 0.9 - 1.1      Prothrombin time 15.8 (H) 9.0 - 60.7 sec   METABOLIC PANEL, BASIC    Collection Time: 08/10/17  2:48 AM   Result Value Ref Range    Sodium 140 136 - 145 mmol/L    Potassium 3.7 3.5 - 5.1 mmol/L    Chloride 105 97 - 108 mmol/L    CO2 30 21 - 32 mmol/L    Anion gap 5 5 - 15 mmol/L    Glucose 95 65 - 100 mg/dL    BUN 10 6 - 20 MG/DL    Creatinine 0.98 0.55 - 1.02 MG/DL    BUN/Creatinine ratio 10 (L) 12 - 20      GFR est AA >60 >60 ml/min/1.73m2    GFR est non-AA 55 (L) >60 ml/min/1.73m2    Calcium 8.5 8.5 - 10.1 MG/DL   CBC W/O DIFF    Collection Time: 08/10/17  2:48 AM   Result Value Ref Range    WBC 3.6 3.6 - 11.0 K/uL    RBC 4.06 3.80 - 5.20 M/uL    HGB 11.0 (L) 11.5 - 16.0 g/dL    HCT 35.4 35.0 - 47.0 %    MCV 87.2 80.0 - 99.0 FL    MCH 27.1 26.0 - 34.0 PG    MCHC 31.1 30.0 - 36.5 g/dL    RDW 18.0 (H) 11.5 - 14.5 %    PLATELET 034 706 - 943 K/uL           Assessment:     Active Problems:    CHF (congestive heart failure) ([facility] (8/8/2017)        Plan:     1.  Echocardiogram reveals the patient to have a cardiomyopathy with an ejection fraction 35%. Will have cardiology evaluate. There are no overt signs of congestive heart failure currently. 2.  Continue full anticoagulation with Lovenox bridge. 3.  Will mobilize.

## 2017-08-10 NOTE — PROGRESS NOTES
4716 Received report from Arabella Willis RN. [facility]umed [facility] care. [facility]    Bedside shift change report given to William Aviles RN (oncoming nurse) by ROD Styles (offgoing nurse). Report included the following information SBAR, Kardex, Intake/Output, MAR, Recent Results and Cardiac Rhythm AFib. SHIFT SUMMARY:         Admission Date 8/8/2017   Admission Diagnosis CHF (congestive heart failure) ([facility]   Consults IP CONSULT TO PRIMARY CARE PROVIDER  IP CONSULT TO CARDIOLOGY        Consults   [] PT   [] OT   [] Speech   [] Palliative      [] Hospice    [x] Case Management   [] None   Cardiac Monitoring   [x] Yes   [] No     Antibiotics   [] Yes   [x] No   GI Prophylaxis  (Ex: Protonix, Pepcid, etc,.)   [] Yes   [x] No          DVT Prophylaxis   SCDs:             Cesar stockings:         [x] Medication (Ex: Lovenox, Eliquis, Brilinta, Coumadin,  Heparin, etc..)   [] Contraindicated   [] No VTE needed       Urinary Catheter             LDAs               Peripheral IV 08/08/17 Left Hand (Active)   Site Assessment Clean, dry, & intact 8/10/2017  8:09 PM   Phlebitis Assessment 0 8/10/2017  8:09 PM   Infiltration Assessment 0 8/10/2017  8:09 PM   Dressing Status Clean, dry, & intact 8/10/2017  8:09 PM   Dressing Type Tape;Transparent 8/10/2017  8:09 PM   Hub Color/Line Status Pink;Flushed;Patent 8/10/2017  8:09 PM   Alcohol Cap Used No 8/8/2017  9:38 PM                      I/Os   Intake/Output Summary (Last 24 hours) at 08/10/17 5458  Last data filed at 08/10/17 2009   Gross per 24 hour   Intake              170 ml   Output                0 ml   Net              170 ml         Activity Level Activity Level: Up ad dangelo     Activity Assistance: No assistance needed   Diet Active Orders   Diet    DIET NPO Except Meds    DIET REGULAR      Purposeful Rounding every 1-2 hour?    [x] Yes    Ai Score  Total Score: 1   Bed Alarm (If score 3 or >)   [] Yes    [] Refused (See signed refusal form in chart)   Wagner Score  Wagner Score: 22       Wagner Score (if score 14 or less)   [] PMT consult   [] Nutrition consult   [] Wound Care consult      []  Specialty bed         Influenza Vaccine Received Flu Vaccine for Current Season (usually Sept-March): Not Flu Season               Needs prior to discharge:   Home O2 required:    [] Yes   [x] No     If yes, how much O2 required? Other:    Last Bowel Movement Date: 08/10/17   Readmission Risk Assessment Tool Score Low Risk            12       Total Score        3 Has Seen PCP in Last 6 Months (Yes=3, No=0)    5 Pt. Coverage (Medicare=5 , Medicaid, or Self-Pay=4)    4 Charlson Comorbidity Score (Age + Comorbid Conditions)        Criteria that do not apply:    . Living with Significant Other. Assisted Living. LTAC. SNF.  or   Rehab    Patient Length of Stay (>5 days = 3)    IP Visits Last 12 Months (1-3=4, 4=9, >4=11)       Expected Length of Stay 4d 14h   Actual Length of Stay 2

## 2017-08-10 NOTE — PROGRESS NOTES
Bedside and Verbal shift change report given to Gallito Rico RN   (oncoming nurse) by Bev Martinez RN (offgoing nurse). Report included the following information SBAR, Kardex, ED Summary, Intake/Output, MAR, Accordion and Recent Results.

## 2017-08-11 ENCOUNTER — HOME HEALTH ADMISSION (OUTPATIENT)
Dept: HOME HEALTH SERVICES | Facility: HOME HEALTH | Age: 77
End: 2017-08-11

## 2017-08-11 ENCOUNTER — ANESTHESIA (OUTPATIENT)
Dept: NON INVASIVE DIAGNOSTICS | Age: 77
DRG: 242 | End: 2017-08-11
Payer: MEDICARE

## 2017-08-11 ENCOUNTER — ANESTHESIA EVENT (OUTPATIENT)
Dept: NON INVASIVE DIAGNOSTICS | Age: 77
DRG: 242 | End: 2017-08-11
Payer: MEDICARE

## 2017-08-11 ENCOUNTER — APPOINTMENT (OUTPATIENT)
Dept: GENERAL RADIOLOGY | Age: 77
DRG: 242 | End: 2017-08-11
Attending: INTERNAL MEDICINE
Payer: MEDICARE

## 2017-08-11 PROBLEM — Z98.890 S/P AV NODAL ABLATION: Status: ACTIVE | Noted: 2017-08-11

## 2017-08-11 PROBLEM — Z95.0 STATUS POST BIVENTRICULAR PACEMAKER: Status: ACTIVE | Noted: 2017-08-11

## 2017-08-11 PROBLEM — I50.41 ACUTE COMBINED SYSTOLIC AND DIASTOLIC HF (HEART FAILURE), NYHA CLASS 2 (HCC): Status: ACTIVE | Noted: 2017-08-11

## 2017-08-11 LAB
ANION GAP BLD CALC-SCNC: 4 MMOL/L (ref 5–15)
BUN SERPL-MCNC: 14 MG/DL (ref 6–20)
BUN/CREAT SERPL: 14 (ref 12–20)
CALCIUM SERPL-MCNC: 8.6 MG/DL (ref 8.5–10.1)
CHLORIDE SERPL-SCNC: 102 MMOL/L (ref 97–108)
CHOLEST SERPL-MCNC: 156 MG/DL
CO2 SERPL-SCNC: 33 MMOL/L (ref 21–32)
CREAT SERPL-MCNC: 0.97 MG/DL (ref 0.55–1.02)
ERYTHROCYTE [DISTWIDTH] IN BLOOD BY AUTOMATED COUNT: 17.8 % (ref 11.5–14.5)
GLUCOSE BLD STRIP.AUTO-MCNC: 94 MG/DL (ref 65–100)
GLUCOSE SERPL-MCNC: 98 MG/DL (ref 65–100)
HCT VFR BLD AUTO: 34 % (ref 35–47)
HDLC SERPL-MCNC: 82 MG/DL
HDLC SERPL: 1.9 {RATIO} (ref 0–5)
HGB BLD-MCNC: 10.5 G/DL (ref 11.5–16)
INR PPP: 1.6 (ref 0.9–1.1)
LDLC SERPL CALC-MCNC: 64 MG/DL (ref 0–100)
LIPID PROFILE,FLP: NORMAL
MCH RBC QN AUTO: 26.4 PG (ref 26–34)
MCHC RBC AUTO-ENTMCNC: 30.9 G/DL (ref 30–36.5)
MCV RBC AUTO: 85.6 FL (ref 80–99)
PLATELET # BLD AUTO: 217 K/UL (ref 150–400)
POTASSIUM SERPL-SCNC: 3.2 MMOL/L (ref 3.5–5.1)
PROTHROMBIN TIME: 16.8 SEC (ref 9–11.1)
RBC # BLD AUTO: 3.97 M/UL (ref 3.8–5.2)
SERVICE CMNT-IMP: NORMAL
SODIUM SERPL-SCNC: 139 MMOL/L (ref 136–145)
TRIGL SERPL-MCNC: 50 MG/DL (ref ?–150)
TSH SERPL DL<=0.05 MIU/L-ACNC: 2.7 UIU/ML (ref 0.36–3.74)
VLDLC SERPL CALC-MCNC: 10 MG/DL
WBC # BLD AUTO: 3.8 K/UL (ref 3.6–11)

## 2017-08-11 PROCEDURE — 85027 COMPLETE CBC AUTOMATED: CPT | Performed by: EMERGENCY MEDICINE

## 2017-08-11 PROCEDURE — 0JH607Z INSERTION OF CARDIAC RESYNCHRONIZATION PACEMAKER PULSE GENERATOR INTO CHEST SUBCUTANEOUS TISSUE AND FASCIA, OPEN APPROACH: ICD-10-PCS | Performed by: INTERNAL MEDICINE

## 2017-08-11 PROCEDURE — 02583ZZ DESTRUCTION OF CONDUCTION MECHANISM, PERCUTANEOUS APPROACH: ICD-10-PCS | Performed by: INTERNAL MEDICINE

## 2017-08-11 PROCEDURE — 77030012468 HC VLV BLEEDBK CNTRL ABBT -B

## 2017-08-11 PROCEDURE — 80061 LIPID PANEL: CPT | Performed by: NURSE PRACTITIONER

## 2017-08-11 PROCEDURE — 77030029065 HC DRSG HEMO QCLOT ZMED -B

## 2017-08-11 PROCEDURE — 74011250636 HC RX REV CODE- 250/636: Performed by: INTERNAL MEDICINE

## 2017-08-11 PROCEDURE — 77030010507 HC ADH SKN DERMBND J&J -B

## 2017-08-11 PROCEDURE — 85610 PROTHROMBIN TIME: CPT | Performed by: EMERGENCY MEDICINE

## 2017-08-11 PROCEDURE — 77030031139 HC SUT VCRL2 J&J -A

## 2017-08-11 PROCEDURE — 74011000250 HC RX REV CODE- 250

## 2017-08-11 PROCEDURE — C1887 CATHETER, GUIDING: HCPCS

## 2017-08-11 PROCEDURE — 82962 GLUCOSE BLOOD TEST: CPT

## 2017-08-11 PROCEDURE — C1769 GUIDE WIRE: HCPCS

## 2017-08-11 PROCEDURE — 74011636320 HC RX REV CODE- 636/320

## 2017-08-11 PROCEDURE — C1894 INTRO/SHEATH, NON-LASER: HCPCS

## 2017-08-11 PROCEDURE — L3670 SO ACRO/CLAV CAN WEB PRE OTS: HCPCS

## 2017-08-11 PROCEDURE — C1730 CATH, EP, 19 OR FEW ELECT: HCPCS

## 2017-08-11 PROCEDURE — C1751 CATH, INF, PER/CENT/MIDLINE: HCPCS

## 2017-08-11 PROCEDURE — 02H63JZ INSERTION OF PACEMAKER LEAD INTO RIGHT ATRIUM, PERCUTANEOUS APPROACH: ICD-10-PCS | Performed by: INTERNAL MEDICINE

## 2017-08-11 PROCEDURE — 36415 COLL VENOUS BLD VENIPUNCTURE: CPT | Performed by: EMERGENCY MEDICINE

## 2017-08-11 PROCEDURE — 74011250636 HC RX REV CODE- 250/636

## 2017-08-11 PROCEDURE — 84443 ASSAY THYROID STIM HORMONE: CPT | Performed by: EMERGENCY MEDICINE

## 2017-08-11 PROCEDURE — C1892 INTRO/SHEATH,FIXED,PEEL-AWAY: HCPCS

## 2017-08-11 PROCEDURE — C1898 LEAD, PMKR, OTHER THAN TRANS: HCPCS

## 2017-08-11 PROCEDURE — 77030004964 HC CBL EP ABLAT BSC -C

## 2017-08-11 PROCEDURE — 74011250637 HC RX REV CODE- 250/637: Performed by: INTERNAL MEDICINE

## 2017-08-11 PROCEDURE — 77030016894 HC CBL EP DX CATH3 STJU -B

## 2017-08-11 PROCEDURE — 02HK3JZ INSERTION OF PACEMAKER LEAD INTO RIGHT VENTRICLE, PERCUTANEOUS APPROACH: ICD-10-PCS | Performed by: INTERNAL MEDICINE

## 2017-08-11 PROCEDURE — 77030002996 HC SUT SLK J&J -A

## 2017-08-11 PROCEDURE — 77030027107 HC PTCH EXT REF CARTO3 J&J -F

## 2017-08-11 PROCEDURE — C1732 CATH, EP, DIAG/ABL, 3D/VECT: HCPCS

## 2017-08-11 PROCEDURE — 02H43JZ INSERTION OF PACEMAKER LEAD INTO CORONARY VEIN, PERCUTANEOUS APPROACH: ICD-10-PCS | Performed by: INTERNAL MEDICINE

## 2017-08-11 PROCEDURE — 33225 L VENTRIC PACING LEAD ADD-ON: CPT

## 2017-08-11 PROCEDURE — 77030018729 HC ELECTRD DEFIB PAD CARD -B

## 2017-08-11 PROCEDURE — 77030011640 HC PAD GRND REM COVD -A

## 2017-08-11 PROCEDURE — 93653 COMPRE EP EVAL TX SVT: CPT

## 2017-08-11 PROCEDURE — 65660000000 HC RM CCU STEPDOWN

## 2017-08-11 PROCEDURE — C2621 PMKR, OTHER THAN SING/DUAL: HCPCS

## 2017-08-11 PROCEDURE — 02K83ZZ MAP CONDUCTION MECHANISM, PERCUTANEOUS APPROACH: ICD-10-PCS | Performed by: INTERNAL MEDICINE

## 2017-08-11 PROCEDURE — C1900 LEAD, CORONARY VENOUS: HCPCS

## 2017-08-11 PROCEDURE — 71010 XR CHEST PORT: CPT

## 2017-08-11 PROCEDURE — 80048 BASIC METABOLIC PNL TOTAL CA: CPT | Performed by: EMERGENCY MEDICINE

## 2017-08-11 PROCEDURE — 77030018836 HC SOL IRR NACL ICUM -A

## 2017-08-11 PROCEDURE — 74011250637 HC RX REV CODE- 250/637: Performed by: NURSE PRACTITIONER

## 2017-08-11 RX ORDER — LIDOCAINE HYDROCHLORIDE 10 MG/ML
1-40 INJECTION INFILTRATION; PERINEURAL
Status: DISCONTINUED | OUTPATIENT
Start: 2017-08-11 | End: 2017-08-11 | Stop reason: HOSPADM

## 2017-08-11 RX ORDER — HEPARIN SODIUM 200 [USP'U]/100ML
INJECTION, SOLUTION INTRAVENOUS
Status: COMPLETED
Start: 2017-08-11 | End: 2017-08-11

## 2017-08-11 RX ORDER — POTASSIUM CHLORIDE 750 MG/1
40 TABLET, FILM COATED, EXTENDED RELEASE ORAL DAILY
Status: DISCONTINUED | OUTPATIENT
Start: 2017-08-11 | End: 2017-08-12 | Stop reason: HOSPADM

## 2017-08-11 RX ORDER — BACITRACIN 50000 [IU]/1
INJECTION, POWDER, FOR SOLUTION INTRAMUSCULAR
Status: COMPLETED
Start: 2017-08-11 | End: 2017-08-11

## 2017-08-11 RX ORDER — FENTANYL CITRATE 50 UG/ML
INJECTION, SOLUTION INTRAMUSCULAR; INTRAVENOUS AS NEEDED
Status: DISCONTINUED | OUTPATIENT
Start: 2017-08-11 | End: 2017-08-11 | Stop reason: HOSPADM

## 2017-08-11 RX ORDER — FENTANYL CITRATE 50 UG/ML
12.5-5 INJECTION, SOLUTION INTRAMUSCULAR; INTRAVENOUS
Status: DISCONTINUED | OUTPATIENT
Start: 2017-08-11 | End: 2017-08-11 | Stop reason: HOSPADM

## 2017-08-11 RX ORDER — BACITRACIN 50000 [IU]/1
50000 INJECTION, POWDER, FOR SOLUTION INTRAMUSCULAR ONCE
Status: COMPLETED | OUTPATIENT
Start: 2017-08-11 | End: 2017-08-11

## 2017-08-11 RX ORDER — HEPARIN SODIUM 200 [USP'U]/100ML
1500 INJECTION, SOLUTION INTRAVENOUS ONCE
Status: COMPLETED | OUTPATIENT
Start: 2017-08-11 | End: 2017-08-11

## 2017-08-11 RX ORDER — MIDAZOLAM HYDROCHLORIDE 1 MG/ML
INJECTION, SOLUTION INTRAMUSCULAR; INTRAVENOUS AS NEEDED
Status: DISCONTINUED | OUTPATIENT
Start: 2017-08-11 | End: 2017-08-11 | Stop reason: HOSPADM

## 2017-08-11 RX ORDER — LIDOCAINE HYDROCHLORIDE 10 MG/ML
INJECTION INFILTRATION; PERINEURAL
Status: COMPLETED
Start: 2017-08-11 | End: 2017-08-11

## 2017-08-11 RX ORDER — DOBUTAMINE HYDROCHLORIDE 200 MG/100ML
2.5-1 INJECTION INTRAVENOUS
Status: DISCONTINUED | OUTPATIENT
Start: 2017-08-11 | End: 2017-08-11 | Stop reason: HOSPADM

## 2017-08-11 RX ORDER — NALOXONE HYDROCHLORIDE 0.4 MG/ML
0.4 INJECTION, SOLUTION INTRAMUSCULAR; INTRAVENOUS; SUBCUTANEOUS AS NEEDED
Status: DISCONTINUED | OUTPATIENT
Start: 2017-08-11 | End: 2017-08-12 | Stop reason: HOSPADM

## 2017-08-11 RX ORDER — MIDAZOLAM HYDROCHLORIDE 1 MG/ML
1-5 INJECTION, SOLUTION INTRAMUSCULAR; INTRAVENOUS
Status: DISCONTINUED | OUTPATIENT
Start: 2017-08-11 | End: 2017-08-11 | Stop reason: HOSPADM

## 2017-08-11 RX ORDER — SODIUM CHLORIDE 9 MG/ML
INJECTION, SOLUTION INTRAVENOUS
Status: DISCONTINUED | OUTPATIENT
Start: 2017-08-11 | End: 2017-08-11 | Stop reason: HOSPADM

## 2017-08-11 RX ORDER — SODIUM CHLORIDE 0.9 % (FLUSH) 0.9 %
5-10 SYRINGE (ML) INJECTION AS NEEDED
Status: DISCONTINUED | OUTPATIENT
Start: 2017-08-11 | End: 2017-08-12 | Stop reason: HOSPADM

## 2017-08-11 RX ORDER — DOBUTAMINE HYDROCHLORIDE 200 MG/100ML
INJECTION INTRAVENOUS
Status: COMPLETED
Start: 2017-08-11 | End: 2017-08-11

## 2017-08-11 RX ORDER — SODIUM CHLORIDE 0.9 % (FLUSH) 0.9 %
5-10 SYRINGE (ML) INJECTION EVERY 8 HOURS
Status: DISCONTINUED | OUTPATIENT
Start: 2017-08-11 | End: 2017-08-12 | Stop reason: HOSPADM

## 2017-08-11 RX ORDER — PROPOFOL 10 MG/ML
INJECTION, EMULSION INTRAVENOUS
Status: DISCONTINUED | OUTPATIENT
Start: 2017-08-11 | End: 2017-08-11 | Stop reason: HOSPADM

## 2017-08-11 RX ADMIN — AMIODARONE HYDROCHLORIDE 100 MG: 200 TABLET ORAL at 09:42

## 2017-08-11 RX ADMIN — BACITRACIN 50000 UNITS: 50000 INJECTION, POWDER, FOR SOLUTION INTRAMUSCULAR at 12:06

## 2017-08-11 RX ADMIN — HEPARIN SODIUM 3000 UNITS: 200 INJECTION, SOLUTION INTRAVENOUS at 11:47

## 2017-08-11 RX ADMIN — FENTANYL CITRATE 25 MCG: 50 INJECTION, SOLUTION INTRAMUSCULAR; INTRAVENOUS at 11:25

## 2017-08-11 RX ADMIN — METOPROLOL SUCCINATE 12.5 MG: 25 TABLET, EXTENDED RELEASE ORAL at 09:42

## 2017-08-11 RX ADMIN — MIDAZOLAM HYDROCHLORIDE 1 MG: 1 INJECTION, SOLUTION INTRAMUSCULAR; INTRAVENOUS at 11:25

## 2017-08-11 RX ADMIN — LISINOPRIL 40 MG: 20 TABLET ORAL at 09:42

## 2017-08-11 RX ADMIN — DOBUTAMINE HYDROCHLORIDE 5 MCG/KG/MIN: 200 INJECTION INTRAVENOUS at 12:29

## 2017-08-11 RX ADMIN — SODIUM CHLORIDE: 9 INJECTION, SOLUTION INTRAVENOUS at 11:05

## 2017-08-11 RX ADMIN — LIDOCAINE HYDROCHLORIDE 10 ML: 10 INJECTION INFILTRATION; PERINEURAL at 12:25

## 2017-08-11 RX ADMIN — FENTANYL CITRATE 25 MCG: 50 INJECTION, SOLUTION INTRAMUSCULAR; INTRAVENOUS at 11:26

## 2017-08-11 RX ADMIN — POTASSIUM CHLORIDE 40 MEQ: 750 TABLET, FILM COATED, EXTENDED RELEASE ORAL at 18:43

## 2017-08-11 RX ADMIN — MIDAZOLAM HYDROCHLORIDE 0.5 MG: 1 INJECTION, SOLUTION INTRAMUSCULAR; INTRAVENOUS at 11:40

## 2017-08-11 RX ADMIN — IOPAMIDOL 75 ML: 755 INJECTION, SOLUTION INTRAVENOUS at 11:51

## 2017-08-11 RX ADMIN — LIDOCAINE HYDROCHLORIDE 30 ML: 10 INJECTION INFILTRATION; PERINEURAL at 11:39

## 2017-08-11 RX ADMIN — FENTANYL CITRATE 25 MCG: 50 INJECTION, SOLUTION INTRAMUSCULAR; INTRAVENOUS at 12:13

## 2017-08-11 RX ADMIN — FENTANYL CITRATE 25 MCG: 50 INJECTION, SOLUTION INTRAMUSCULAR; INTRAVENOUS at 11:39

## 2017-08-11 RX ADMIN — Medication 10 ML: at 05:34

## 2017-08-11 RX ADMIN — VANCOMYCIN HYDROCHLORIDE 1000 MG: 1 INJECTION, POWDER, LYOPHILIZED, FOR SOLUTION INTRAVENOUS at 11:20

## 2017-08-11 RX ADMIN — HYDROCODONE BITARTRATE AND ACETAMINOPHEN 1 TABLET: 5; 325 TABLET ORAL at 21:06

## 2017-08-11 RX ADMIN — FUROSEMIDE 40 MG: 10 INJECTION, SOLUTION INTRAMUSCULAR; INTRAVENOUS at 09:41

## 2017-08-11 RX ADMIN — Medication 10 ML: at 22:56

## 2017-08-11 RX ADMIN — ENOXAPARIN SODIUM 90 MG: 100 INJECTION SUBCUTANEOUS at 02:33

## 2017-08-11 RX ADMIN — LIDOCAINE HYDROCHLORIDE 30 ML: 10 INJECTION, SOLUTION INFILTRATION; PERINEURAL at 11:39

## 2017-08-11 RX ADMIN — MIDAZOLAM HYDROCHLORIDE 0.5 MG: 1 INJECTION, SOLUTION INTRAMUSCULAR; INTRAVENOUS at 12:25

## 2017-08-11 RX ADMIN — PROPOFOL 30 MCG/KG/MIN: 10 INJECTION, EMULSION INTRAVENOUS at 11:25

## 2017-08-11 NOTE — PROGRESS NOTES
[facility]: MED RECONCILIATION    Comments/Recommendations:      Patient was alert and oriented during med rec.  Family was present in the room and I was given consent to proceed with med rec.  Verified no new allergies   Patient denied use of OTC medications   Gaviscon is PRN for GERD, she has not needed it recently   Patient is only on Percocet for chronic pain management and only takes it PRN      Medications added:     · None    Medications removed:    · Calcium  · Cyclobenzaprine  · Fentanyl  · Norco  · Hydromorphone  · Methocarbamol  · Polyethylene Glycol  · Prednisone Dose pack    Medications adjusted:    · Warfarin--days in which patient is taking whole or 1/2 tablet    Information obtained from: Patient, [address] [facility] pharmacy personnel        Allergies: Codeine    Prior to Admission Medications:     Medication Documentation Review Audit       Reviewed by Alfredo Way (Pharmacy Student) on 08/11/17 at 1116         Medication Sig Documenting Provider Last Dose Status Taking?      aluminum hydrox-magnesium carb (GAVISCON EXTRA STRENGTH) 160-105 mg chew Take 1-2 tablets by mouth four (4) times daily as needed. Historical Provider 8/4/2017 Unknown time Active Yes             Med Note Mikle Goltz   Wed Mar 1, 2017  3:58 PM): Non recently      amiodarone (CORDARONE) 200 mg tablet Take 0.5 Tabs by mouth daily. Lan Phillips MD 8/4/2017 Unknown time Active Yes    lenalidomide (REVLIMID) 25 mg cap Take 25 mg by mouth daily. Historical Provider 8/4/2017 Unknown time Active Yes    lisinopril (PRINIVIL, ZESTRIL) 40 mg tablet Take 1 Tab by mouth daily. Lan Phillips MD 8/4/2017 Unknown time Active Yes    metoprolol succinate (TOPROL-XL) 25 mg XL tablet Take 0.5 Tabs by mouth daily. Lan Phillips MD 8/4/2017 Unknown time Active Yes    oxyCODONE-acetaminophen (PERCOCET 10)  mg per tablet Take 1 Tab by mouth every eight (8) hours as needed for Pain. Max Daily Amount: 3 Tabs.  Lan Phillips MD 8/4/2017 Unknown time Active Yes    potassium chloride (K-DUR, KLOR-CON) 20 mEq tablet Take 1 Tab by mouth two (2) times a day. Terri Rios MD 8/4/2017 Unknown time Active Yes    warfarin (COUMADIN) 7.5 mg tablet Take 7.5 mg by mouth four (4) days a week. 1 tablet (7.5 mg) Tuesday, Thursday, Saturday, and Sunday Historical Provider 8/4/2017 Unknown time Active Yes    warfarin (COUMADIN) 7.5 mg tablet Take 3.75 mg by mouth three (3) days a week.  1/2 tablet (3.75 mg) on Monday, Wednesday, and Friday Historical Provider 8/4/2017 Unknown time Active Yes                        Daryl Gaspar

## 2017-08-11 NOTE — PROGRESS NOTES
SHEATH PULL NOTE:    Patient informed of procedure with questions answered with review. Sheath site prepped with Chloraprep swab. 7 fr sheath in rfv and 8 fr sheath rfv pulled by KARISHMA Oakes RN. Hand hold and quick clot, with manual compression to site. No bleeding, no hematoma, no pain at site. Hemostasis obtained with hand hold/manual compression at site. Patient tolerated well. No change in status. Handhold for 15 minutes. No change at site. Occlusive dressing applied to site. No bleeding, no hematoma, no pain/discomfort at site. Groin instructions provided with review. Continue to monitor procedure site and patient status. *Advised patient to keep head flat and extremity flat to decrease risk of bleeding. *Recommended that patient not drink for ONE HOUR post sheath pull completion. *Recommended that patient not eat for TWO HOURS post sheath pull completion. *Instructed patient on rationale for delay of PO products to decrease risk for aspiration and if additional treatment to procedure site is required. Patient verbalized understanding of instructions with review.

## 2017-08-11 NOTE — PROGRESS NOTES
[address] [address]  [phone]      Cardiology Progress Note      8/11/2017 11:05 AM    Admit Date: 8/8/2017    Admit Diagnosis:   CHF (congestive heart failure) (HCC)    Subjective:     Salvatore Zuniga has no complaints. Dr. Wendi Noble consulted - plan for an PP implant with AV kathleen ablation today. Visit Vitals    /82    Pulse 81    Temp 97.9 °F (36.6 °C)    Resp 17    Ht 5' 10\" (1.778 m)    Wt 90.7 kg (200 lb)    SpO2 98%    BMI 28.7 kg/m2       Current Facility-Administered Medications   Medication Dose Route Frequency    apixaban (ELIQUIS) tablet 5 mg  5 mg Oral BID    furosemide (LASIX) injection 40 mg  40 mg IntraVENous DAILY    HYDROcodone-acetaminophen (NORCO) 5-325 mg per tablet 1 Tab  1 Tab Oral Q4H PRN    methocarbamol (ROBAXIN) tablet 750 mg  750 mg Oral TID PRN    metoprolol succinate (TOPROL-XL) XL tablet 12.5 mg  12.5 mg Oral DAILY    lisinopril (PRINIVIL, ZESTRIL) tablet 40 mg  40 mg Oral DAILY    . PHARMACY TO SUBSTITUTE PER PROTOCOL    Per Protocol    sodium chloride (NS) flush 5-10 mL  5-10 mL IntraVENous Q8H    sodium chloride (NS) flush 5-10 mL  5-10 mL IntraVENous PRN    acetaminophen (TYLENOL) tablet 650 mg  650 mg Oral Q4H PRN    ondansetron (ZOFRAN) injection 4 mg  4 mg IntraVENous Q4H PRN    docusate sodium (COLACE) capsule 100 mg  100 mg Oral BID PRN    WARFARIN INFORMATION NOTE (COUMADIN)   Other Rx Dosing/Monitoring       Objective:      Physical Exam:  General Appearance:  WNWD AAF in no acute distress  Chest:   Clear  Cardiovascular:  irr, irr , no murmur.   Abdomen:   Soft, non-tender, bowel sounds are active.   Extremities: +1 krunal LE edema  Skin:  Warm and dry.     Data Review:   Recent Labs      08/11/17   0241  08/10/17   0248  08/09/17   0335   WBC  3.8  3.6  3.1*   HGB  10.5*  11.0*  9.9*   HCT  34.0*  35.4  31.4*   PLT  217  226  216     Recent Labs      08/11/17   0241  08/10/17   0248  08/09/17   0335   NA 139  140  143   K  3.2*  3.7  2.7*   CL  102  105  106   CO2  33*  30  32   GLU  98  95  82   BUN  14  10  7   CREA  0.97  0.98  0.78   CA  8.6  8.5  8.0*   MG   --    --   1.8   INR  1.6*  1.5*  1.4*       No results for input(s): TROIQ, CPK, CKMB in the last 72 hours. Intake/Output Summary (Last 24 hours) at 08/11/17 1109  Last data filed at 08/11/17 0232   Gross per 24 hour   Intake              120 ml   Output              300 ml   Net             -180 ml        Telemetry: Afib    Echo: EF 25-30%, LAE sev, mod BAYRON, mild-mot MR, mild TR, pulm HTN       Assessment:     Active Problems:    Chronic atrial fibrillation ([address] (9/12/2014)      CHF (congestive heart failure) ([address] (8/8/2017)      Atrial flutter ([address] (8/10/2017)      Cardiomyopathy ([address] (8/10/2017)      Acute pulmonary embolism ([address] (8/10/2017)      Acute combined systolic and diastolic HF (heart failure), NYHA class 2 ([address] (8/11/2017)        Plan:     Chronic afib:  Appreciate Dr. Jasmyn Guzman consult - atrial fib not flutter, echo shows LAE 5.7 cm in 2014 and now with severe LAE and mod MR, ef 25-30% and cardiomyopathy  Not a candidate for amiodarone due to multiple myleloma, and not a candidate for cardioversion  Plan for biventricular PP implant (LBBB), low EF, and AV kathleen ablation today  Recommend NOAC - price $15 copayment. CHADS2 vasc score: 5  Continue on BB  TSH normal    CM with acute systolic HF:  May be arrhythmia induced CM, hopeful with AV kathleen ablation, EF will improve. Plan for echo in 3 months  Continue on Toprol XL, ACEI, and diuretics. Continues with hypokalemia, PO supplement  Monitor I/Os, daily weights, labs. Neg 3.5 L, weight down 8#s    RLL PE:  Starting Eliquis    Follow up with Dr. Jamsyn Guzman in 2 weeks for pacemaker check.   Follow up with Dr. Gertrude Watts 1 week

## 2017-08-11 NOTE — ANESTHESIA PREPROCEDURE EVALUATION
Anesthetic History   No history of anesthetic complications            Review of Systems / Medical History  Patient summary reviewed, nursing notes reviewed and pertinent labs reviewed    Pulmonary  Within defined limits                 Neuro/Psych   Within defined limits           Cardiovascular    Hypertension      CHF  Dysrhythmias       Exercise tolerance: >4 METS  Comments: EF 25-30%   GI/Hepatic/Renal     GERD           Endo/Other        Arthritis and cancer     Other Findings   Comments: Multiple myeloma           Physical Exam    Airway  Mallampati: IV  TM Distance: 4 - 6 cm  Neck ROM: normal range of motion   Mouth opening: Normal     Cardiovascular  Regular rate and rhythm,  S1 and S2 normal,  no murmur, click, rub, or gallop             Dental    Dentition: Upper partial plate and Lower partial plate     Pulmonary  Breath sounds clear to auscultation               Abdominal  GI exam deferred       Other Findings            Anesthetic Plan    ASA: 3  Anesthesia type: general and total IV anesthesia          Induction: Intravenous  Anesthetic plan and risks discussed with: Patient

## 2017-08-11 NOTE — PROGRESS NOTES
Received bedside report from [place] [address]. 11:03 AM    Dr. Jack Jackson called to inform about the patient being on high dose lovenox and her INR is 1.6 this morning. EP lab nurses aware of it,

## 2017-08-11 NOTE — CARDIO/PULMONARY
Cardiopulmonary Rehab Nursing Entry:    Chart reviewed-Pt is on CHF bundle list.    DIET REGULAR      Adm with CHF.       HX includes HTN,A-fib,Multiple Myeloma,DVT,.      Nonsmoker.      LVEF 25-30%. Met with pt at completion of Theresa Morgan NP visit to discuss plan for transfer to cath lab for biV-PPM and ablation. Pts son and granddaughter at bedside during session. Pt and family verbalized understanding of procedure and post-procedure temporarily restricted activities, no tub baths, no heavy lifting, no lifting her arm above shoulder height and no driving for 2 weeks. Advised pt of s/sx to monitor and inspect at puncture/incision sites. Followed-up on CHF information as well, pt aware of need to monitor sodium, to weigh herself on a daily basis, use a pillbox for medication compliance and to walk on a regular basis. Cath lab nurses in to transport pt to procedure.

## 2017-08-11 NOTE — PROGRESS NOTES
[facility] was able to process the new script for Eliquis 5 MG BID. Patient's co-pay was $15 month. Patient up ab dangelo in room today. Patient going for a av node and biv pacemaker today. Have spoke with Dr. Brandy Russo regarding the above and received permission for a time nursing visit under the [facility] to Home for CHF teaching. Have requested the specialist assist with PCP & cardiology appointment as anticipate discharge soon. Consult has also been placed to [facility] for post discharge health .

## 2017-08-11 NOTE — PROCEDURES
[address] [place] [address]  [phone]    Indications and Pre-Procedure Diagnosis:  Luis Bae is a 68 y.o. female with atrial fibrillation with rvr is referred for electr-physiologic evaluation and intervention. The left ventricular ejection fraction is 25-30% and the patient is NYHA Class III. Post Procedure Diagnosis    Cardiomyopathy  AF  CHF    AV Marietta Ablation Procedure  After informed consent was obtained, the patient was brought back to the EP lab in fasting condition. The presenting rhythm was atrial fibrillation. The patient received Versed and Fentanyl for conscious sedation per nursing personnel. Venous sheaths were placed in the right femoral vein using sterile Seldinger technique. Mapping was performed using standard catheter-based techniques and 3-D electro-anatomic mapping. A quadrapolar catheter was placed in the His position for mapping. An Blazer 8F/10mm Large Curve ablation catheter was advanced to the AV junction and 1 RF lesions totaling 3 minutes were applied resulting in complete heart block. Dobutamine at 5 mcg/min was started and no increased ventricular rates were noted. A slow ventricular escape rhythm of 42 bpm was present. Rapid atrial pacing to 200 ms, on and off dobutamine, demonstrated antegrade AV block. Rapid ventricular pacing to 600 ms, on and off dobutamine, demonstrated retrograde VA block. At the end of the procedure, catheters and sheaths were removed and manual compression held for hemostasis. Fluoroscopy and total procedure times were 2 and 30 minutes respectively. Estimated blood loss: < 10 ml. Sharp counts: correct. Specimen (s) collected: none. The following procedure related complication occurred: none. The following problems were encountered: none.     Conduction Intervals (ms)  H-V QRS Q-T R-R   [phone]     AV Marietta Conduction    VA Block when pacing at 600 ms    AV Wenckebach when pacing at 200 ms      Findings and Summary: This study demonstrates:  1. Successful AV node ablation    Recommendations:    1. [address]  2. VVIR 75 bpm      Thank you for allowing me to participate in this patients care.     Marva Ferrara MD, Marcela Acevedo

## 2017-08-11 NOTE — CONSULTS
Subjective:                [address], [place], [address] [place] [address]  [phone]    Date of  Admission: 8/8/2017 10:24 AM     Admission type:Emergency    Trinidad Marroquin is a 68 y.o. female admitted for CHF (congestive heart failure) ([address] She presented to the hospital with afib with rvr and chf. She has a hx of multiple myeloma. I was asked to see her regarding her AF and newly dx/d cardiomyopathy (ef25-30%). She has sob.       Patient Active Problem List    Diagnosis Date Noted    Atrial flutter ([address] 08/10/2017    Cardiomyopathy ([address] 08/10/2017    Acute pulmonary embolism ([address] 08/10/2017    CHF (congestive heart failure) ([address] 08/08/2017    Sialadenitis 06/29/2017    Muscular deconditioning 04/22/2017    Synovitis of shoulder 04/21/2017    Back pain 04/30/2016    Jaw pain 12/07/2015    Primary osteoarthritis of both knees 08/24/2015    Synovitis 05/18/2015    Contusion of knee 05/09/2015    Venous insufficiency 04/07/2015    Calf DVT (deep venous thrombosis) (HCC) 01/27/2015    Intractable back pain 01/25/2015    Reflux esophagitis 10/31/2014    Multiple myeloma ([address] 10/31/2014    Nocturia 10/31/2014    Complex renal cyst 10/11/2014    Low back pain 10/10/2014    Chronic atrial fibrillation ([address] 09/12/2014    Anemia 09/08/2014    Chest pain on exertion 09/07/2014    HTN (hypertension) 09/07/2014    Chest pain 09/05/2014    Osteoarthritis 09/05/2014      Champ North MD  Past Medical History:   Diagnosis Date    Arthritis     Atrial fibrillation ([address]     Cancer ([address]     multiple myeloma    Hypertension       Past Surgical History:   Procedure Laterality Date    HX GYN      HX ORTHOPAEDIC      knee     Allergies   Allergen Reactions    Codeine Other (comments)     Social History   Substance Use Topics    Smoking status: Never Smoker    Smokeless tobacco: Never Used    Alcohol use No     Family History   Problem Relation Age of Onset    Stroke Mother     No Known Problems Father       Current Facility-Administered Medications   Medication Dose Route Frequency    furosemide (LASIX) injection 40 mg  40 mg IntraVENous DAILY    HYDROcodone-acetaminophen (NORCO) 5-325 mg per tablet 1 Tab  1 Tab Oral Q4H PRN    methocarbamol (ROBAXIN) tablet 750 mg  750 mg Oral TID PRN    metoprolol succinate (TOPROL-XL) XL tablet 12.5 mg  12.5 mg Oral DAILY    amiodarone (CORDARONE) tablet 100 mg  100 mg Oral DAILY    lisinopril (PRINIVIL, ZESTRIL) tablet 40 mg  40 mg Oral DAILY    . PHARMACY TO SUBSTITUTE PER PROTOCOL    Per Protocol    sodium chloride (NS) flush 5-10 mL  5-10 mL IntraVENous Q8H    sodium chloride (NS) flush 5-10 mL  5-10 mL IntraVENous PRN    acetaminophen (TYLENOL) tablet 650 mg  650 mg Oral Q4H PRN    ondansetron (ZOFRAN) injection 4 mg  4 mg IntraVENous Q4H PRN    docusate sodium (COLACE) capsule 100 mg  100 mg Oral BID PRN    enoxaparin (LOVENOX) injection 90 mg  1 mg/kg SubCUTAneous Q12H    WARFARIN INFORMATION NOTE (COUMADIN)   Other Rx Dosing/Monitoring         Review of Symptoms:  Constitutional: negative  Eyes: negative  Ears, nose, mouth, throat, and face: negative  Respiratory: sob  Cardiovascular: tachy  Gastrointestinal: negative  Genitourinary: negative  Musculoskeletal: negative  Neurological: negative  Behvioral/Psych: negative  Endocrine: negative     Subjective:      Visit Vitals    /82    Pulse 81    Temp 97.9 °F (36.6 °C)    Resp 17    Ht 5' 10\" (1.778 m)    Wt 200 lb (90.7 kg)    SpO2 98%    BMI 28.7 kg/m2       Physical Exam  Abdomen: soft, non-tender.    Extremities: extremities normal  Heart: irr irr  Lungs: clear to auscultation bilaterally  Pulses: 2+ and symmetric    Cardiographics    Telemetry: afib    ECG: afib with rvr    Echocardiogram: lvef 25-30    Labs:  Recent Labs      08/11/17   0241  08/10/17   0248  08/09/17   0335   WBC  3.8  3.6  3.1*   HGB  10.5*  11.0*  9.9*   HCT  34.0*  35.4  31.4*   PLT  217  226  216 Recent Labs      08/11/17   0241  08/10/17   0248  08/09/17   0335   NA  139  140  143   K  3.2*  3.7  2.7*   CL  102  105  106   CO2  33*  30  32   GLU  98  95  82   BUN  14  10  7   CREA  0.97  0.98  0.78   CA  8.6  8.5  8.0*   MG   --    --   1.8   INR  1.6*  1.5*  1.4*       No results for input(s): TROIQ, CPK, CKMB in the last 72 hours. Intake/Output Summary (Last 24 hours) at 08/11/17 1057  Last data filed at 08/11/17 0232   Gross per 24 hour   Intake              120 ml   Output              300 ml   Net             -180 ml         Assessment:     Assessment:       Active Problems:    Chronic atrial fibrillation ([address] (9/12/2014)      CHF (congestive heart failure) ([address] (8/8/2017)      Atrial flutter ([address] (8/10/2017)      Cardiomyopathy ([address] (8/10/2017)      Acute pulmonary embolism ([address] (8/10/2017)         Plan:     Stephanie Hager is a pleasant lady with AF (LAE 5.7 cm in 2014 and now with severe LAE and mod MR, ef 25-30%) and cardiomyopathy. This is AF and not AFL. She is not a good candidate for amio due to multiple myeloma and I do not think she will see sinus again. She is a candidate for an av node ablation and biv pacemaker. I discussed the risks/benefits/alternatives of the procedure with the patient. Risks include (but are not limited to) bleeding, heart block, infection, cva/mi/tamponade/death. The patient understands and agrees to proceed. We will reassess her LVEF in 3months - if still down, she will be upgraded to a BiV-ICD. Thank you for this interesting consultation.         Spike Wynn MD, [facility]    8/11/2017

## 2017-08-11 NOTE — DISCHARGE INSTRUCTIONS
[facility]   [address] [address]  [phone]  ICD/PACEMAKER DISCHARGE INSTRUCTIONS        Admission Diagnoses:   CHF (congestive heart failure) [facility])    Discharge Diagnoses: Active Problems:    Chronic atrial fibrillation (HCC) (9/12/2014)      Overview: 8/111/17 BIVPP implant with AV kathleen ablation      CHF (congestive heart failure) ([address] (8/8/2017)      Atrial flutter ([address] (8/10/2017)      Cardiomyopathy ([address] (8/10/2017)      Acute pulmonary embolism ([address] (8/10/2017)      Acute combined systolic and diastolic HF (heart failure), NYHA class 2 ([address] (8/11/2017)      Status post biventricular pacemaker (8/11/2017)      Overview: 8/11/17       S/P AV kathleen ablation (8/11/2017)              DISCHARGE INSTRUCTIONS FOR PATIENTS WITH ICD'S AND PACEMAKERS    1. Carry you ID card for your ICD/Pacemaker with you at all times. This card will be given to you in the hospital or mailed to you. 2. Medic Alert Bracelets are available from your pharmacist to wear at all times. 3. Call for an appointment in 2 weeks [phone]. 4. The pacemaker will bulge slightly under your skin. An ICD bulges a little more because it is larger. The bulge will decrease in size over the next few weeks. Please notify the doctor's office if you notice any of the following around your ICD site:  A.  A bruise that does not go away  B. Soreness or yellow, green, or brown drainage from the site. C. Any swelling from the site. D. If you have a fever of 100 degrees or higher that lasts for a few days    INCISION CARE       1.  Leave dressing over your site until you see the doctor. 2.  Leave steri-strips over your site until they start to fall off.   3.   You may shower after as long as your incision isnt submerged or directly sprayed upon until well healed. 4.  For comfort, wear loose fitting clothing.   5.  Ice pack to affected shoulder for first 24 hours, wear your sling for 2 days. 6.  Report any signs of infection, fever, pain, swelling, redness, oozing, or heat at site especially if these symptoms increase after the first 3 to 4 days. ACTIVITY PRECAUTIONS     1. Avoid rough contact with the implant site. 2. No driving for 14 days. 3. Avoid lifting your arm over your head, carrying anything on the affected side, or lifting over 10 pounds for 30 days. For the first 2 days only bend your arm at the elbow. 4. Any extreme activity such as golf, weight lifting or exercise biking should be restricted for 60 days. 5. Do not carry objects by holding them against your implant site. 6.  No shooting rifles or any type of gun with the affected shoulder permanently. 7.  If you have an ICD, welding and chainsaws are prohibited. SPECIAL PRECAUTIONS     1. You should avoid all strong magnetic fields, such as arc welding, large transformers, large motors. Some ICD devices will beep if it detects a strong magnet. If this occurs, move out of the area. 2.  You may not have an MRI. 3.  Treatments or surgery that requires diathermy or electrocautery should be discussed with your doctor before scheduled. 4. Avoid radio frequency transmitters, including radar. 5. Advise dentist or other medical personnel you see that you have a pacemaker or ICD. 6.  Cell phones and microwave oven use is okay. 7.  If you plan to move or take a trip to a new area, the doctor's office will give you a name of a doctor to contact for any problems. SPECIAL INSTRUCTIONS ON SHOCKS   1. Notify your doctor for any of the following:      A. Anytime a shock is received in a 24 hour period. An office visit is not usually required for a single shock. B.  Two or more shocks in a row. If you do not feel well, call the [facility], otherwise call your doctor. This may require an office visit. C. Two or more shocks spaced apart by several hours. This may require an office visit.   2.  Keep a record of events. Include date, time, symptoms and activity at that time. ANTIBIOTIC THERAPY    During the first 8 weeks after your pacemaker or ICD insertion, you may need antibiotics before any dental work or certain tests or operations. Let the dentist or doctor who is caring for you know that you have had an implanted device. S/P ABLATION DISCHARGE INSTRUCTIONS    It is normal to feel tired the first couple days. Take it easy and follow the physicians instructions. CHECK THE CATHETER INSERTION SITE DAILY:  You may shower 24 hours after the procedure, remove the bandage during showering. Wash with soap and water and pat dry. Gentle cleaning of the site with soap and water is sufficient, cover with a dry clean dressing or bandage. Do not apply creams or powders to the area. Do not sit in a bathtub or pool of water for 7 days or until wound has completely healed. Temporary bruising and discomfort is normal and may last a few weeks. You may have a  formation of a small lump at the site which may last up to 6 weeks. CALL THE PHYSICIAN:  If the site becomes red, swollen or feels warm to the touch  If there is bleeding or drainage or if there is unusual pain at the groin or down the leg. If there is any bleeding, lie down, apply pressure or have someone apply pressure with a clean cloth until the bleeding stops. If the bleeding continues, call 911 to be transported to the hospital.  DO NOT DRIVE YOURSELF, [facility] 852. Activity:      For the first 24-48 hours or as instructed by the physician:  No lifting, pushing or pulling over 5 pounds and no straining the insertion site. Do not life grocery bags or the garbage can, do not run the vacuum  or  for 7 days. Start with short walks as in the hospital and gradually increase as tolerated each day. It is recommended to walk 30 minutes 5-7 days per week.   Follow your physicians instructions on activity. Avoid walking outside in extremes of heat or cold. Walk inside when it is cold and windy or hot and humid. Things to keep in mind:  No driving for at least 5 days, or as designated by your physician. Limit the number of times you go up and down the stairs  Take rests and pace yourself with activity. Be careful and do not strain with bowel movements. Medications: Take all medications as prescribed  Call your physician if you have any questions  Keep an updated list of your medications with you at all times and give a list to your physician and pharmacist    Signs and Symptoms:  Be cautious of symptoms of angina or recurrent symptoms such as chest discomfort, unusual shortness of breath or fatigue, palpitations. After Care: Follow up with your physician as instructed. Follow a heart healthy diet with proper portion control, daily stress management, daily exercise, blood pressure and cholesterol control , and smoking cessation.

## 2017-08-11 NOTE — ANESTHESIA POSTPROCEDURE EVALUATION
Post-Anesthesia Evaluation and Assessment    Patient: Salvatore Zuniga MRN: [id number]  SSN: [id number]    YOB: 1940  Age: 68 y.o. Sex: female       Cardiovascular Function/Vital Signs  Visit Vitals    BP (!) 152/102    Pulse 75    Temp 36.7 °C (98.1 °F)    Resp 15    Ht 5' 10\" (1.778 m)    Wt 90.7 kg (200 lb)    SpO2 94%    BMI 28.7 kg/m2       Patient is status post general, total IV anesthesia anesthesia for * No procedures listed *. Nausea/Vomiting: None    Postoperative hydration reviewed and adequate. Pain:  Pain Scale 1: Numeric (0 - 10) (08/11/17 0400)  Pain Intensity 1: 0 (08/11/17 0400)   Managed    Neurological Status: At baseline    Mental Status and Level of Consciousness: Arousable    Pulmonary Status:   O2 Device: Nasal cannula (08/11/17 1252)   Adequate oxygenation and airway patent    Complications related to anesthesia: None    Post-anesthesia assessment completed.  No concerns    Signed By: Tre Green MD     August 11, 2017

## 2017-08-11 NOTE — PROCEDURES
[address]  [phone]    Indications and Pre-Procedure Diagnosis:  Dimas Garcia is a 68 y.o. female with AF with rvr and cardiomyopathy/CHF is referred for BiV pacemaker. The left ventricular ejection fraction is 25-30% and the patient is NYHA Class III. Post Procedure Diagnosis:    CHF, acute systolic  cardiomyopathy  AF with RVR    BIV-Pacemaker Implant Procedure and Findings:  Informed consent was obtained and the patient was premedicated with vancomycin. The procedure was performed under local anesthesia. Continuous pulse oximetry and cuff pressure were monitored. During the procedure, the patient received Versed, Fentanyl and Propofol for sedation per anesthesia personnel. The left deltopectoral area was prepped and draped in the usual sterile fashion and was liberally infiltrated with 1% lidocaine. An incision was made over the left subpectoral area and a generator pocket was manually dissected. Access was achieved in the left axillary vein under fluoroscopic guidance and using the seldinger technique. Through the left axillary vein, pacing leads were positioned in appropriate regions in the right heart chambers where satisfactory pacing and sensing parameters were measured. A venogram was performed to assess patency of the coronary sinus, and a pacing lead was positioned appropriately to stimulate the left ventricle. Stability of the leads was assessed with deep breathing and there was no diaphragmatic pacing at 10V output. The leads were anchored using the sleeves and a pulse generator pocket fashioned using blunt dissection. The leads were then connected to the pulse generator. The pulse generator pocket was then liberally infiltrated with bacitracin solution, and the device implanted with a single silk fixation suture in the header to prevent migration.  The pocket was then closed in three layers using 2-O vicryl absorbable suture material, absorbable staples and Dermabond. The skin was closed using a sub-cuticular technique. A bio-occlusive dressing were applied to the skin. Fluoroscopy and total procedure times were 4 and 30 minutes respectively. Estimated blood loss <10 ml. Sharp count: correct. Specimen(s) collected: none. The following procedure related complication occurred: none. The following problems were encountered: none. Findings: successful bi-ventricular pacemaker placement.     Device Data Measurements:  Lead Sensing (mV) Threshold (V)Pulse Width (ms) Impedance (Ohms)    RV 15  1.2  0.5   828  CS 24  2.2  0.5   1331       Final Programmed Parameters  Bradycardia pacing rate  70 bpm  Pacing Mode    VVIR  Pacing Output    3.5 V@ 0.5 ms (RV) 3.5 V@ 0.5 (LV)    Supplies Summary available in the chart    Marichuy Valadez MD, [facility]

## 2017-08-12 VITALS
HEART RATE: 75 BPM | HEIGHT: 70 IN | SYSTOLIC BLOOD PRESSURE: 117 MMHG | BODY MASS INDEX: 27.77 KG/M2 | OXYGEN SATURATION: 99 % | TEMPERATURE: 97.7 F | RESPIRATION RATE: 16 BRPM | DIASTOLIC BLOOD PRESSURE: 98 MMHG | WEIGHT: 194 LBS

## 2017-08-12 LAB
ANION GAP BLD CALC-SCNC: 5 MMOL/L (ref 5–15)
ATRIAL RATE: 77 BPM
BUN SERPL-MCNC: 12 MG/DL (ref 6–20)
BUN/CREAT SERPL: 15 (ref 12–20)
CALCIUM SERPL-MCNC: 8.3 MG/DL (ref 8.5–10.1)
CALCULATED R AXIS, ECG10: -72 DEGREES
CALCULATED T AXIS, ECG11: 98 DEGREES
CHLORIDE SERPL-SCNC: 104 MMOL/L (ref 97–108)
CO2 SERPL-SCNC: 32 MMOL/L (ref 21–32)
CREAT SERPL-MCNC: 0.82 MG/DL (ref 0.55–1.02)
DIAGNOSIS, 93000: NORMAL
ERYTHROCYTE [DISTWIDTH] IN BLOOD BY AUTOMATED COUNT: 17.8 % (ref 11.5–14.5)
GLUCOSE SERPL-MCNC: 86 MG/DL (ref 65–100)
HCT VFR BLD AUTO: 31.1 % (ref 35–47)
HGB BLD-MCNC: 9.5 G/DL (ref 11.5–16)
MCH RBC QN AUTO: 26.5 PG (ref 26–34)
MCHC RBC AUTO-ENTMCNC: 30.5 G/DL (ref 30–36.5)
MCV RBC AUTO: 86.6 FL (ref 80–99)
PLATELET # BLD AUTO: 192 K/UL (ref 150–400)
POTASSIUM SERPL-SCNC: 3.1 MMOL/L (ref 3.5–5.1)
Q-T INTERVAL, ECG07: 540 MS
QRS DURATION, ECG06: 188 MS
QTC CALCULATION (BEZET), ECG08: 603 MS
RBC # BLD AUTO: 3.59 M/UL (ref 3.8–5.2)
SODIUM SERPL-SCNC: 141 MMOL/L (ref 136–145)
VENTRICULAR RATE, ECG03: 75 BPM
WBC # BLD AUTO: 4.8 K/UL (ref 3.6–11)

## 2017-08-12 PROCEDURE — 74011250637 HC RX REV CODE- 250/637: Performed by: INTERNAL MEDICINE

## 2017-08-12 PROCEDURE — 80048 BASIC METABOLIC PNL TOTAL CA: CPT | Performed by: EMERGENCY MEDICINE

## 2017-08-12 PROCEDURE — 74011250636 HC RX REV CODE- 250/636: Performed by: INTERNAL MEDICINE

## 2017-08-12 PROCEDURE — 74011250637 HC RX REV CODE- 250/637: Performed by: NURSE PRACTITIONER

## 2017-08-12 PROCEDURE — 36415 COLL VENOUS BLD VENIPUNCTURE: CPT | Performed by: EMERGENCY MEDICINE

## 2017-08-12 PROCEDURE — 93005 ELECTROCARDIOGRAM TRACING: CPT

## 2017-08-12 PROCEDURE — 85027 COMPLETE CBC AUTOMATED: CPT | Performed by: EMERGENCY MEDICINE

## 2017-08-12 RX ORDER — POTASSIUM CHLORIDE 20 MEQ/1
40 TABLET, EXTENDED RELEASE ORAL
Status: COMPLETED | OUTPATIENT
Start: 2017-08-12 | End: 2017-08-12

## 2017-08-12 RX ORDER — LISINOPRIL 40 MG/1
20 TABLET ORAL DAILY
Qty: 30 TAB | Refills: 3 | Status: SHIPPED
Start: 2017-08-12 | End: 2018-01-26 | Stop reason: SDUPTHER

## 2017-08-12 RX ORDER — FUROSEMIDE 40 MG/1
40 TABLET ORAL DAILY
Qty: 30 TAB | Refills: 3 | Status: SHIPPED | OUTPATIENT
Start: 2017-08-12 | End: 2017-12-03 | Stop reason: SDUPTHER

## 2017-08-12 RX ADMIN — HYDROCODONE BITARTRATE AND ACETAMINOPHEN 1 TABLET: 5; 325 TABLET ORAL at 11:03

## 2017-08-12 RX ADMIN — POTASSIUM CHLORIDE 40 MEQ: 750 TABLET, FILM COATED, EXTENDED RELEASE ORAL at 08:56

## 2017-08-12 RX ADMIN — LISINOPRIL 40 MG: 20 TABLET ORAL at 08:54

## 2017-08-12 RX ADMIN — POTASSIUM CHLORIDE 40 MEQ: 1500 TABLET, EXTENDED RELEASE ORAL at 12:46

## 2017-08-12 RX ADMIN — APIXABAN 5 MG: 5 TABLET, FILM COATED ORAL at 08:54

## 2017-08-12 RX ADMIN — METOPROLOL SUCCINATE 12.5 MG: 25 TABLET, EXTENDED RELEASE ORAL at 08:54

## 2017-08-12 RX ADMIN — Medication 10 ML: at 06:38

## 2017-08-12 RX ADMIN — FUROSEMIDE 40 MG: 10 INJECTION, SOLUTION INTRAMUSCULAR; INTRAVENOUS at 08:51

## 2017-08-12 RX ADMIN — VANCOMYCIN HYDROCHLORIDE 1000 MG: 1 INJECTION, POWDER, LYOPHILIZED, FOR SOLUTION INTRAVENOUS at 06:37

## 2017-08-12 NOTE — PROGRESS NOTES
1035: Per Dr. Cristino Aguilar, Dr. Marietta Soriano is the only other doctor that needs to see pt. Before discharge. Cariology has signed off.

## 2017-08-12 NOTE — PROGRESS NOTES
Patient ambulated to the bathroom with standby assistance. VSS. Right groin and left chest sites remain C/D/I.

## 2017-08-12 NOTE — PROGRESS NOTES
General Daily Progress Note    Admit Date: 8/8/2017    Subjective:     Patient has no complaint . Current Facility-Administered Medications   Medication Dose Route Frequency    potassium chloride SR (KLOR-CON 10) tablet 40 mEq  40 mEq Oral DAILY    [START ON 8/12/2017] apixaban (ELIQUIS) tablet 5 mg  5 mg Oral BID    sodium chloride (NS) flush 5-10 mL  5-10 mL IntraVENous Q8H    sodium chloride (NS) flush 5-10 mL  5-10 mL IntraVENous PRN    naloxone (NARCAN) injection 0.4 mg  0.4 mg IntraVENous PRN    [START ON 8/12/2017] vancomycin (VANCOCIN) 1,000 mg in 0.9% sodium chloride (MBP/ADV) 250 mL  1,000 mg IntraVENous ONCE    furosemide (LASIX) injection 40 mg  40 mg IntraVENous DAILY    HYDROcodone-acetaminophen (NORCO) 5-325 mg per tablet 1 Tab  1 Tab Oral Q4H PRN    methocarbamol (ROBAXIN) tablet 750 mg  750 mg Oral TID PRN    metoprolol succinate (TOPROL-XL) XL tablet 12.5 mg  12.5 mg Oral DAILY    lisinopril (PRINIVIL, ZESTRIL) tablet 40 mg  40 mg Oral DAILY    . PHARMACY TO SUBSTITUTE PER PROTOCOL    Per Protocol    sodium chloride (NS) flush 5-10 mL  5-10 mL IntraVENous Q8H    sodium chloride (NS) flush 5-10 mL  5-10 mL IntraVENous PRN    acetaminophen (TYLENOL) tablet 650 mg  650 mg Oral Q4H PRN    ondansetron (ZOFRAN) injection 4 mg  4 mg IntraVENous Q4H PRN    docusate sodium (COLACE) capsule 100 mg  100 mg Oral BID PRN        Review of Systems  A comprehensive review of systems was negative.     Objective:     Patient Vitals for the past 24 hrs:   BP Temp Pulse Resp SpO2 Weight   08/11/17 2100 105/62 - 75 - 93 % -   08/11/17 2000 113/78 - 75 - 98 % -   08/11/17 1900 129/75 - 76 - 99 % -   08/11/17 1730 111/81 - 75 - 98 % -   08/11/17 1715 106/75 - 75 - 96 % -   08/11/17 1700 108/74 - 75 - 93 % -   08/11/17 1645 113/72 - 75 - 96 % -   08/11/17 1630 125/80 - 75 - 92 % -   08/11/17 1615 121/82 - 75 - 97 % -   08/11/17 1600 130/81 - 75 - 94 % -   08/11/17 1545 136/85 - 75 - 97 % -   08/11/17 1530 125/90 - 75 - 97 % -   08/11/17 1515 129/89 - 75 - 95 % -   08/11/17 1500 (!) 128/92 - 75 - 94 % -   08/11/17 1445 126/89 - 75 - 95 % -   08/11/17 1430 122/83 - 75 - 94 % -   08/11/17 1415 133/90 - 75 - 95 % -   08/11/17 1400 (!) 138/92 - 75 - 96 % -   08/11/17 1346 (!) 155/110 - 75 - 97 % -   08/11/17 1330 (!) 150/96 - 75 - 98 % -   08/11/17 1315 (!) 143/96 - 75 - 97 % -   08/11/17 1300 (!) 160/97 - 75 - 93 % -   08/11/17 1252 (!) 152/102 98.1 °F (36.7 °C) 75 15 94 % -   08/11/17 0745 129/82 97.9 °F (36.6 °C) 81 17 98 % -   08/11/17 0400 (!) 130/92 97.6 °F (36.4 °C) 82 18 100 % 200 lb (90.7 kg)   08/10/17 2346 134/85 97.6 °F (36.4 °C) 86 18 97 % -        08/10 0701 - 08/11 1900  In: 420 [P.O.:120; I.V.:300]  Out: 1150 [Urine:1150]    Physical Exam:   Visit Vitals    /62    Pulse 75    Temp 98.1 °F (36.7 °C)    Resp 15    Ht 5' 10\" (1.778 m)    Wt 200 lb (90.7 kg)    SpO2 93%    BMI 28.7 kg/m2     General appearance: alert, cooperative, no distress, appears stated age  Neck: supple, symmetrical, trachea midline, no adenopathy, thyroid: not enlarged, symmetric, no tenderness/mass/nodules, no carotid bruit and no JVD  Lungs: clear to auscultation bilaterally  Heart: regular rate and rhythm, S1, S2 normal, no murmur, click, rub or gallop  Abdomen: soft, non-tender.  Bowel sounds normal. No masses,  no organomegaly  Extremities: extremities normal, atraumatic, no cyanosis or edema  Neurologic: Grossly normal        Data Review   Recent Results (from the past 24 hour(s))   PROTHROMBIN TIME + INR    Collection Time: 08/11/17  2:41 AM   Result Value Ref Range    INR 1.6 (H) 0.9 - 1.1      Prothrombin time 16.8 (H) 9.0 - 40.9 sec   METABOLIC PANEL, BASIC    Collection Time: 08/11/17  2:41 AM   Result Value Ref Range    Sodium 139 136 - 145 mmol/L    Potassium 3.2 (L) 3.5 - 5.1 mmol/L    Chloride 102 97 - 108 mmol/L    CO2 33 (H) 21 - 32 mmol/L    Anion gap 4 (L) 5 - 15 mmol/L    Glucose 98 65 - 100 mg/dL BUN 14 6 - 20 MG/DL    Creatinine 0.97 0.55 - 1.02 MG/DL    BUN/Creatinine ratio 14 12 - 20      GFR est AA >60 >60 ml/min/1.73m2    GFR est non-AA 56 (L) >60 ml/min/1.73m2    Calcium 8.6 8.5 - 10.1 MG/DL   CBC W/O DIFF    Collection Time: 08/11/17  2:41 AM   Result Value Ref Range    WBC 3.8 3.6 - 11.0 K/uL    RBC 3.97 3.80 - 5.20 M/uL    HGB 10.5 (L) 11.5 - 16.0 g/dL    HCT 34.0 (L) 35.0 - 47.0 %    MCV 85.6 80.0 - 99.0 FL    MCH 26.4 26.0 - 34.0 PG    MCHC 30.9 30.0 - 36.5 g/dL    RDW 17.8 (H) 11.5 - 14.5 %    PLATELET 050 033 - 391 K/uL   LIPID PANEL    Collection Time: 08/11/17  2:41 AM   Result Value Ref Range    LIPID PROFILE          Cholesterol, total 156 <200 MG/DL    Triglyceride 50 <150 MG/DL    HDL Cholesterol 82 MG/DL    LDL, calculated 64 0 - 100 MG/DL    VLDL, calculated 10 MG/DL    CHOL/HDL Ratio 1.9 0 - 5.0     TSH 3RD GENERATION    Collection Time: 08/11/17  2:41 AM   Result Value Ref Range    TSH 2.70 0.36 - 3.74 uIU/mL   GLUCOSE, POC    Collection Time: 08/11/17  1:05 PM   Result Value Ref Range    Glucose (POC) 94 65 - 100 mg/dL    Performed by Brown Decker            Assessment:     Active Problems:    Chronic atrial fibrillation ([facility] (9/12/2014)      Overview: 8/111/17 BIVPP implant with AV kathleen ablation      CHF (congestive heart failure) ([facility] (8/8/2017)      Atrial flutter ([facility] (8/10/2017)      Cardiomyopathy ([facility] (8/10/2017)      Acute pulmonary embolism ([facility] (8/10/2017)      Acute combined systolic and diastolic HF (heart failure), NYHA class 2 ([facility] (8/11/2017)      Status post biventricular pacemaker (8/11/2017)      Overview: 8/11/17       S/P AV kathleen ablation (8/11/2017)        Plan:     1. Complete ablation with placement of a biventricular pacemaker planned. 2.  Continue treatment of congestive heart failure. 3.  Anticoagulant to be changed to a 10 a inhibitor.

## 2017-08-12 NOTE — PROGRESS NOTES
Patient ambulated in hallway without difficulty. Dressing CDI. No complaints. Discharge instructions reviewed with patient; to be discharged to home with daughter. Site care instructions reviewed; site(s) CDI. Patient instructed on which medications to continue, which to start, and which to stop. Prescriptions sent ot patient's pharmacy. Medication info provided and reviewed with patient. Follow-up appointment information given; follow-up appointment to be made by patient. IV and tele box removed. Opportunity for questions provided; all questions answered. All belongings returned. Patient sent home with pacemaker card and information booklet. Patient wheeled to front door via wheelchair by nurse; to be transported home by daughter.

## 2017-08-12 NOTE — CARDIO/PULMONARY
Cardiopulmonary Rehab Nursing Entry:     Chart reviewed. DIET REGULAR      Adm with CHF.      HX includes HTN, A-fib, Multiple Myeloma, DVT.      Nonsmoker.      LVEF 25-30%.     Pt s/p biV-PPM and ablation. Pacemaker: Pt visited, daughter at the bedside. Printed material given and discussed re: pacemakers, pacemaker discharge instructions and the TLC diet. Discussed post pacemaker instructions including: restrictions for the affected arm (no raising the arm above shoulder level, no heavy lifting for 30 days), monitoring for infection, avoiding impacts/pressure to the site, avoiding extreme activities, when to call the doctor, use of cell phones and microwaves and avoiding strong magnetic fields. Afib/ablation: Printed material given and discussed re: cardiac ablation, post ablation instructions, healthy heart diet, and risk factors for CVD. Discussed post ablation restrictions (Avoiding heavy lifting and keeping the site clean and dry), what to do if bleeding/bruising is noted at the insertion site and when to call the dorctor. Also discussed heart healthy habits  and avoiding cardiac risk factors. Discussed etiology, symptomatology, and treatment of Atrial Fibrillation. Advised of risks if left untreated. Notified of importance of taking medications as prescribed. Recommended to purchase a pill box to prevent missed dosages. To advise medical personal if you are on a blood thinner. Provided handout on apixaban. Discussed avoiding caffeine, nicotine and other stimulants. Start a light exercise program, if ok with MD. Check pulse on a regular basis and demonstrated how to perform pulse check. Pt able to return demonstration. Contact your MD if pulse rate becomes irregular, dizzy/lightheaded, or any signs of bleeding. CHF: This was also a follow-up visit to answer questions and reinforce prior teaching re: CHF, S&Ss, medication management, Low NA diet, daily weights and when to call the doctor. Pt does not yet own a scale. Discussed importance of tracking daily weights and monitoring for s&s of fluid overload. Pt reports she is no longer on a diuretic. Discussed low sodium diet options as she reports she eats out often. Pt/daughter verbalized understanding.

## 2017-08-12 NOTE — PROGRESS NOTES
8/12/2017 8:11 AM    Admit Date: 8/8/2017    Admit Diagnosis: CHF (congestive heart failure) (HCC)    Subjective:     Daya Prescott   denies chest pain, chest pressure/discomfort, dyspnea, palpitations, irregular heart beats, near-syncope. Visit Vitals    /75    Pulse 76    Temp 98 °F (36.7 °C)    Resp 14    Ht 5' 10\" (1.778 m)    Wt 194 lb 0.1 oz (88 kg)    SpO2 97%    BMI 27.84 kg/m2     Current Facility-Administered Medications   Medication Dose Route Frequency    potassium chloride SR (KLOR-CON 10) tablet 40 mEq  40 mEq Oral DAILY    apixaban (ELIQUIS) tablet 5 mg  5 mg Oral BID    sodium chloride (NS) flush 5-10 mL  5-10 mL IntraVENous Q8H    sodium chloride (NS) flush 5-10 mL  5-10 mL IntraVENous PRN    naloxone (NARCAN) injection 0.4 mg  0.4 mg IntraVENous PRN    vancomycin (VANCOCIN) 1,000 mg in 0.9% sodium chloride (MBP/ADV) 250 mL  1,000 mg IntraVENous ONCE    furosemide (LASIX) injection 40 mg  40 mg IntraVENous DAILY    HYDROcodone-acetaminophen (NORCO) 5-325 mg per tablet 1 Tab  1 Tab Oral Q4H PRN    methocarbamol (ROBAXIN) tablet 750 mg  750 mg Oral TID PRN    metoprolol succinate (TOPROL-XL) XL tablet 12.5 mg  12.5 mg Oral DAILY    lisinopril (PRINIVIL, ZESTRIL) tablet 40 mg  40 mg Oral DAILY    . PHARMACY TO SUBSTITUTE PER PROTOCOL    Per Protocol    sodium chloride (NS) flush 5-10 mL  5-10 mL IntraVENous Q8H    sodium chloride (NS) flush 5-10 mL  5-10 mL IntraVENous PRN    acetaminophen (TYLENOL) tablet 650 mg  650 mg Oral Q4H PRN    ondansetron (ZOFRAN) injection 4 mg  4 mg IntraVENous Q4H PRN    docusate sodium (COLACE) capsule 100 mg  100 mg Oral BID PRN         Objective:      Visit Vitals    /75    Pulse 76    Temp 98 °F (36.7 °C)    Resp 14    Ht 5' 10\" (1.778 m)    Wt 194 lb 0.1 oz (88 kg)    SpO2 97%    BMI 27.84 kg/m2       Physical Exam:  Abdomen: soft, non-tender.  Bowel sounds normal.   Extremities: no cyanosis or edema  Heart: regular rate and rhythm, S1, S2 normal, no murmur, click, rub or gallop  Lungs: clear to auscultation bilaterally  Neurologic: Grossly normal    Data Review:   Labs:    Recent Results (from the past 24 hour(s))   GLUCOSE, POC    Collection Time: 08/11/17  1:05 PM   Result Value Ref Range    Glucose (POC) 94 65 - 100 mg/dL    Performed by Daniel Watson    EKG, 12 LEAD, INITIAL    Collection Time: 08/12/17  3:06 AM   Result Value Ref Range    Ventricular Rate 75 BPM    Atrial Rate 77 BPM    QRS Duration 188 ms    Q-T Interval 540 ms    QTC Calculation (Bezet) 603 ms    Calculated R Axis -72 degrees    Calculated T Axis 98 degrees    Diagnosis       Electronic ventricular pacemaker  When compared with ECG of 08-AUG-2017 10:31,  Electronic ventricular pacemaker has replaced Atrial flutter  Vent.  rate has decreased BY  54 BPM     METABOLIC PANEL, BASIC    Collection Time: 08/12/17  3:13 AM   Result Value Ref Range    Sodium 141 136 - 145 mmol/L    Potassium 3.1 (L) 3.5 - 5.1 mmol/L    Chloride 104 97 - 108 mmol/L    CO2 32 21 - 32 mmol/L    Anion gap 5 5 - 15 mmol/L    Glucose 86 65 - 100 mg/dL    BUN 12 6 - 20 MG/DL    Creatinine 0.82 0.55 - 1.02 MG/DL    BUN/Creatinine ratio 15 12 - 20      GFR est AA >60 >60 ml/min/1.73m2    GFR est non-AA >60 >60 ml/min/1.73m2    Calcium 8.3 (L) 8.5 - 10.1 MG/DL   CBC W/O DIFF    Collection Time: 08/12/17  3:13 AM   Result Value Ref Range    WBC 4.8 3.6 - 11.0 K/uL    RBC 3.59 (L) 3.80 - 5.20 M/uL    HGB 9.5 (L) 11.5 - 16.0 g/dL    HCT 31.1 (L) 35.0 - 47.0 %    MCV 86.6 80.0 - 99.0 FL    MCH 26.5 26.0 - 34.0 PG    MCHC 30.5 30.0 - 36.5 g/dL    RDW 17.8 (H) 11.5 - 14.5 %    PLATELET 450 286 - 026 K/uL       Telemetry: paced      Assessment:     Active Problems:    Chronic atrial fibrillation (HCC) (9/12/2014)      Overview: 8/111/17 BIVPP implant with AV kathleen ablation      CHF (congestive heart failure) ([facility]) (8/8/2017)      [facility] (8/10/2017) Cardiomyopathy ([facility] (8/10/2017)      Acute pulmonary embolism ([facility] (8/10/2017)      Acute combined systolic and diastolic HF (heart failure), NYHA class 2 ([facility] (8/11/2017)      Status post biventricular pacemaker (8/11/2017)      Overview: 8/11/17       S/P AV kathleen ablation (8/11/2017)        Plan:     S/p AV kathleen ablation and PPM. Continue current med. Ok to dc from cardiac standpoint. F/u as out pt as per schedule.

## 2017-08-13 ENCOUNTER — HOME CARE VISIT (OUTPATIENT)
Dept: HOME HEALTH SERVICES | Facility: HOME HEALTH | Age: 77
End: 2017-08-13

## 2017-08-14 ENCOUNTER — PATIENT OUTREACH (OUTPATIENT)
Dept: INTERNAL MEDICINE CLINIC | Age: 77
End: 2017-08-14

## 2017-08-14 NOTE — PROGRESS NOTES
[facility] 8/8-8/12/2017  CHF    Patient on Daily Cesus discharge report dated 8/14/2017. Left message on voicemail. Will attempt to contact again. Need to complete post-discharge assessment.     Chart Review:     Note:  Pt is requesting an [facility] visit for Tuesday 8/15.  MD appointments scheduled for Wednesday and Thursday.  Pt also agreed to visits on Monday or Friday if necessary. Brian Reilly you please assign out to a nurse in her area?  Thanks! [employer]

## 2017-08-14 NOTE — DISCHARGE SUMMARY
[address] SUMMARY       Name:  Tono Garay   MR#:  [id number]   :  1940   Account #:  [de-identified]        Date of Adm:  2017       HISTORY OF PRESENT ILLNESS: The patient is a 80-year-old lady   who presented to the emergency room complaining of increasing   shortness of breath. She was evaluated and found to have a very small   pulmonary embolus in one of the right lower lobes, but more   importantly mild interstitial edema. BNP was elevated, previously noted   as an outpatient. In view of this, it was elected to admit her for further   evaluation and care. She denies any chest pain. She has had no   orthopnea, PND either. PAST MEDICAL HISTORY, SOCIAL HISTORY, REVIEW OF   SYSTEMS, FAMILY HISTORY, PHYSICAL EXAMINATION: As in   admitting H and P.    LABORATORY VALUES: Hemoglobin was 10.8, white count 3.7, MCV   was 88.1, platelet count 225. The following differential: 74 segs, 21   lymphocytes, 10 monocytes, 2 eosinophils, 1 basophil. At the time of   discharge, hemoglobin was 9.5. Abnormalities on the comprehensive   profile on admission, BUN and creatinine 18 and 1.06, potassium 2.7. ProBNP 1661. Cardiac enzymes were negative. Potassium on   2017 of 2.1. BUN and creatinine are 12 and 0.8. RADIOGRAPHS: Chest x-ray revealed no acute pulmonary process,   moderate cardiomegaly. CTA revealed a small acute pulmonary   embolus within the right lower lobe, enlarged heart, mild intralobular   septal thickening in both lower lobes, dependently compatible with   interstitial edema. Echocardiogram revealed an ejection fraction of 25% to 30% with   diffuse hypokinesis, slight increased wall thickness. Left atrium and   right atrium was mildly dilated. There was mild to moderate mitral   regurgitation and mild tricuspid regurgitation.     HOSPITAL COURSE: The patient was admitted and treated for congestive heart failure, which was quite mild. She had no overt signs   of failure which is quite interesting. Echocardiogram revealed the   severe cardiac dysfunction with an ejection fraction of 25% to 30%. At   this point, consultation with Cardiology was obtained. The patient has a   prior history of atrial fibrillation with controlled ventricular response. Dr. Bernadette Flood, Cardiologist felt that ablation and  pacemaker would be in order to   improve her overall cardiac function. On 08/11/2017 she had an AV   kathleen ablation followed by placement of an AV pacemaker with a rate   of 62. She tolerated the procedure well. The patient was also switched to a 10a inhibitor inview of difficulty  with anticoagulation. Additionally, she did have a pulmonary embolus, so   this was to be addressed simultaneously. Hospital course was otherwise uneventful. No workup for ischemic was done. Sandro Joe FINAL DIAGNOSES:   1. Acute biventricular congestive heart failure secondary to a   cardiomyopathy with an ejection fraction of 25% to 30%. 2. Atrial fibrillation. 3. History of multiple myeloma. 4. Osteoarthritis. 5. Primary hypertension. 6. Hypokalemia. 7. Acute pulmonary embolus. OPERATIONS AND PROCEDURES PERFORMED ON THIS   ADMISSION:   1 AV node ablation. 2. Placement of an AV pacemaker. DISPOSITION: The patient will be discharged home ambulatory on a   regular diet. DISCHARGE MEDICATIONS: Include the following. 1. Eliquis 5 mg b.i.d.   2. Furosemide 20 mg daily. 3. Amlodipine 5 mg daily. 4. Lisinopril 40 mg daily. 5. Metoprolol 25 mg daily. 6. Potassium chloride 20 mEq t.i.d. She will return to the office in 1 week to see me, 1 week to see   cardiologist, Dr. Bernadette Flood. She will also return to see her oncologist, Dr. Leo Lugo in the next 1-2 weeks also. MD Daryl Kiran / TONYA   D:  08/13/2017   10:49   T:  08/14/2017   07:48   Job #:  [id number]

## 2017-08-15 ENCOUNTER — HOME CARE VISIT (OUTPATIENT)
Dept: SCHEDULING | Facility: HOME HEALTH | Age: 77
End: 2017-08-15

## 2017-08-15 ENCOUNTER — PATIENT OUTREACH (OUTPATIENT)
Dept: INTERNAL MEDICINE CLINIC | Age: 77
End: 2017-08-15

## 2017-08-15 RX ORDER — POTASSIUM CHLORIDE 20 MEQ/1
20 TABLET, EXTENDED RELEASE ORAL 2 TIMES DAILY
Qty: 60 TAB | Refills: 11 | Status: SHIPPED | OUTPATIENT
Start: 2017-08-15 | End: 2019-11-04 | Stop reason: SDUPTHER

## 2017-08-15 NOTE — PROGRESS NOTES
NNTOCIP [facility] -2017  CHF    Hospital Discharge Follow-Up      Date/Time:  8/15/2017 12:18 PM    Patient listed on Daily Census discharge [facility]) report on 2017  . Patient discharged from [facility] for CHF. Medical History:     Past Medical History:   Diagnosis Date    Acute combined systolic and diastolic HF (heart failure), NYHA class 2 ([address] 2017    Arthritis     Atrial fibrillation (HCC)     Cancer (HCC)     multiple myeloma    Hypertension     S/P AV kathleen ablation 2017    Status post biventricular pacemaker 2017      Nurse Navigator(NN) contacted the patient by telephone to [facility]. Verified  and address with patient as identifiers. Provided introduction to self, and explanation of the Nurses Navigator role. Diet:   Patient reports: Renal Diet  & Cardiac     Activity:    Patient reports: mostly moving around the house    Medication: Performed medication reconciliation with patient, and patient verbalizes understanding of administration of home medications. K-Con refill done. Nurse &  Daughter fill pill boxes. There were no barriers to obtaining medications identified at this time. Support system:  patient and daughter    Discharge Instructions :  Reviewed discharge instructions with patient. Patient verbalizes understanding of discharge instructions and follow-up care. Red Flags:  SOB. Chest Pain    EF: Echo:  Left ventricle: Systolic function was markedly reduced. Ejection fraction  was estimated in the range of 25 % to 30 %. There was diffuse hypokinesis. Wall thickness was moderately increased.     Type of HF:   Systolic   Disposition of patient:  Home    Current Heart Failure Medications:    ACE/ARB: Lisinopril   Beta blocker: Metroprolol   Asprin/antiplatelet/anticoagulation: Eliquis   Statin:    Diuretic: Lasix 40mg   K-Con 20meq    Vasodilators/anti-arrhythmic: Sacubitril/valsartan:    Daily Weights:  yes    (Evaluate a weight gain/loss of 2 pounds in 24 hours 3 pounds in 3 days or 4 pounds in one week). Date Weight Comments   8/14 201    8/15 205                               ACP: agreed to discuss on office visit    Palliative consult while IP: N/A    HH Services/Tele Monitoring: Jonathan Muller comes today. (Note:  8/14:  Pt is requesting an [facility] visit for Tuesday 8/15.  MD appointments scheduled for Wednesday and Thursday.  Pt also agreed to visits on Monday or Friday if necessary.)    Signs/Symptoms:   Patient had Pacemaker placed during IP. C/o a little fatigue today. Follow up appointment with Cardiology (3-5 days): 8/16/2017  Follow up appointment with PCP (within 14 days): 8/17/2017    Note:  Patient should be seen by cardiology or PCP monthly. Patient education completed during this call : How to Follow a Low Na+ Diet:  Patient stated daughter fixed chicken salad sandwich yesterday. Patient examined Na+ intake from yesterday as evaluation of increased weight gain. Patient stated she ate neumann for breakfast.  Patient stated she was drinking much water; Teaching on following adequate/ordered fluid intake 32 oz max. Goals      Attends follow-up appointments as directed.  Knowledge and adherence of prescribed medication (ie. action, side effects, missed dose, etc.). Advance Care Planning:   Patient was offered the opportunity to discuss advance care planning:  yes     Does patient have an Advance Directive:  no   If no, did you provide information on Caring Connections? yes     PCP/Specialist follow up: Patient scheduled to follow up with Geovanna Mccarthy MD on 8/17/2017. Scheduled to see Cardiologist 8/16/2017. Silvio Combs began today; to have begun yesterday. Reviewed red flags with patient, and patient verbalizes understanding. Patient given an opportunity to ask questions.  No other clinical/social/functional needs noted. The patient agrees to contact the PCP office for questions related to their healthcare. The patient expressed thanks, offered no additional questions and ended the call. Case management plan: Attempt to contact the patient by telephone or during office visit within the next 7-10 days. Will continue to follow as necessary for the next 30 days. Will reassess for case management needs prior to discharge from case management service on or about 30 days.

## 2017-08-16 ENCOUNTER — OFFICE VISIT (OUTPATIENT)
Dept: CARDIOLOGY CLINIC | Age: 77
End: 2017-08-16

## 2017-08-16 VITALS
BODY MASS INDEX: 28.29 KG/M2 | RESPIRATION RATE: 18 BRPM | SYSTOLIC BLOOD PRESSURE: 104 MMHG | WEIGHT: 197.6 LBS | HEART RATE: 78 BPM | DIASTOLIC BLOOD PRESSURE: 60 MMHG | OXYGEN SATURATION: 96 % | HEIGHT: 70 IN

## 2017-08-16 DIAGNOSIS — Z95.0 STATUS POST BIVENTRICULAR PACEMAKER: ICD-10-CM

## 2017-08-16 DIAGNOSIS — I42.9 CARDIOMYOPATHY, UNSPECIFIED TYPE (HCC): Primary | Chronic | ICD-10-CM

## 2017-08-16 DIAGNOSIS — I26.99 OTHER ACUTE PULMONARY EMBOLISM: ICD-10-CM

## 2017-08-16 DIAGNOSIS — Z98.890 S/P AV NODAL ABLATION: ICD-10-CM

## 2017-08-16 DIAGNOSIS — I48.92 ATRIAL FLUTTER, UNSPECIFIED TYPE (HCC): ICD-10-CM

## 2017-08-16 DIAGNOSIS — I50.41 ACUTE COMBINED SYSTOLIC AND DIASTOLIC HF (HEART FAILURE), NYHA CLASS 2 (HCC): ICD-10-CM

## 2017-08-16 DIAGNOSIS — I50.22 CHRONIC SYSTOLIC CONGESTIVE HEART FAILURE (HCC): ICD-10-CM

## 2017-08-16 RX ORDER — PROPRANOLOL HYDROCHLORIDE 40 MG/1
40 TABLET ORAL DAILY
COMMUNITY
End: 2017-08-16 | Stop reason: ALTCHOICE

## 2017-08-16 RX ORDER — DEXAMETHASONE 4 MG/1
4 TABLET ORAL
COMMUNITY
End: 2017-09-18 | Stop reason: ALTCHOICE

## 2017-08-16 NOTE — MR AVS SNAPSHOT
Visit Information Date & Time Provider Department Dept. Phone Encounter #  
 8/16/2017 11:30 AM Diego Hardy MD [facility] [phone] [id number] Your Appointments 8/17/2017  9:00 AM  
Nurse Visit with Hood Lay MD  
[address] [place] [address] and Primary Care [facility]) Appt Note: 3 week follow up; pt check [address]  
[phone]  
  
   
 [address] [id number]  
  
    
 8/24/2017  9:30 AM  
Any with Hood Lay MD  
[address] [place] [address] and Primary Care [facility]) Appt Note: 2 month follow up  
 [address] [place]  
  
   
 [address] [id number]  
  
    
 8/29/2017 10:15 AM  
PACEMAKER with PACEMAKER, [facility] [address]) Appt Note: post op pacemaker check per ellen espinosa 8-11-17  
 [address] [facility] [address]  
[phone] [address] [facility] [address] Upcoming Health Maintenance Date Due Pneumococcal 65+ High/Highest Risk (2 of 2 - PPSV23) 8/30/2017* INFLUENZA AGE 9 TO ADULT 8/30/2017* GLAUCOMA SCREENING Q2Y 9/15/2017* MEDICARE YEARLY EXAM 12/23/2017 DTaP/Tdap/Td series (2 - Td) 5/19/2026 *Topic was postponed. The date shown is not the original due date. Allergies as of 8/16/2017  Review Complete On: 8/16/2017 By: Isai Rush NP Severity Noted Reaction Type Reactions Codeine  09/05/2014    Other (comments) Current Immunizations  Reviewed on 4/19/2016 Name Date Influenza Vaccine 9/15/2014 Not reviewed this visit You Were Diagnosed With   
  
 Codes Comments Cardiomyopathy, unspecified type ([facility]    -  Primary ICD-10-CM: I42.9 ICD-9-CM: 425.4  Atrial flutter, unspecified type ([facility]     ICD-10-CM: I48.92 
ICD-9-CM: 427.32   
 Other acute pulmonary embolism ([facility]     ICD-10-CM: I26.99 
ICD-9-CM: 415.19 Chronic systolic congestive heart failure (HCC)     ICD-10-CM: I50.22 ICD-9-CM: 428.22, 428.0 Acute combined systolic and diastolic HF (heart failure), NYHA class 2 (HCC)     ICD-10-CM: I50.41 ICD-9-CM: 428.41 S/P AV kathleen ablation     ICD-10-CM: J40.294 ICD-9-CM: V45.89 Status post biventricular pacemaker     ICD-10-CM: Z95.0 ICD-9-CM: V45.01 Vitals BP Pulse Resp Height(growth percentile) Weight(growth percentile) SpO2  
 104/60 (BP 1 Location: Right arm, BP Patient Position: Sitting) 78 18 5' 10\" (1.778 m) 197 lb 9.6 oz (89.6 kg) 96% BMI OB Status Smoking Status 28.35 kg/m2 Hysterectomy Never Smoker Vitals History BMI and BSA Data Body Mass Index Body Surface Area  
 28.35 kg/m 2 2.1 m 2 Preferred Pharmacy Pharmacy Name Phone [facility] [address] [place] [address] [phone] Your Updated Medication List  
  
   
This list is accurate as of: 8/16/17 12:37 PM.  Always use your most recent med list.  
  
  
  
  
 apixaban 5 mg tablet Commonly known as:  Chacho Guardian Take 1 Tab by mouth two (2) times a day. dexamethasone 4 mg tablet Commonly known as:  DECADRON Take 4 mg by mouth every seven (7) days. furosemide 40 mg tablet Commonly known as:  LASIX Take 1 Tab by mouth daily. GAVISCON EXTRA STRENGTH 160-105 mg Lindsey Steven Generic drug:  aluminum hydrox-magnesium carb Take 1-2 tablets by mouth four (4) times daily as needed. lisinopril 40 mg tablet Commonly known as:  Joe Danna Take 0.5 Tabs by mouth daily. metoprolol succinate 25 mg XL tablet Commonly known as:  TOPROL-XL Take 0.5 Tabs by mouth daily. oxyCODONE-acetaminophen  mg per tablet Commonly known as:  PERCOCET 10 Take 1 Tab by mouth every eight (8) hours as needed for Pain. Max Daily Amount: 3 Tabs. potassium chloride 20 mEq tablet Commonly known as:  K-DUR, KLOR-CON Take 1 Tab by mouth two (2) times a day. REVLIMID 25 mg Cap Generic drug:  lenalidomide Take 25 mg by mouth daily. We Performed the Following AMB POC EKG ROUTINE W/ 12 LEADS, INTER & REP [07084 CPT(R)] To-Do List   
 08/30/2017 ECG:  STRESS TEST LEXISCAN/CARDIOLITE Introducing [facility] & HEALTH SERVICES! [employer] introduces [email or web address] patient portal. Now you can access parts of your medical record, email your doctor's office, and request medication refills online. 1. In your internet browser, go to [email or web address] Society 2. Click on the First Time User? Click Here link in the Sign In box. You will see the New Member Sign Up page. 3. Enter your PriceShoppers.com Access Code exactly as it appears below. You will not need to use this code after youve completed the sign-up process. If you do not sign up before the expiration date, you must request a new code. · [email or web address] Access Code: [email or web address] Expires: 8/17/2017  7:51 AM 
 
4. Enter the last four digits of your Social Security Number (xxxx) and Date of Birth (mm/dd/yyyy) as indicated and click Submit. You will be taken to the next sign-up page. 5. Create a [email or web address].com ID. This will be your [email or web address].com login ID and cannot be changed, so think of one that is secure and easy to remember. 6. Create a [email or web address].com password. You can change your password at any time. 7. Enter your Password Reset Question and Answer. This can be used at a later time if you forget your password. 8. Enter your e-mail address. You will receive e-mail notification when new information is available in [address] 9. Click Sign Up. You can now view and download portions of your medical record. 10. Click the Download Summary menu link to download a portable copy of your medical information.  
 
If you have questions, please visit the Frequently Asked Questions section of the PK Clean. Remember, [employer] is NOT to be used for urgent needs. For medical emergencies, dial 911. Now available from your iPhone and Android! Please provide this summary of care documentation to your next provider. Your primary care clinician is listed as Deanna Taylor. If you have any questions after today's visit, please call [phone].

## 2017-08-16 NOTE — PROGRESS NOTES
Guillermo Goldman DNP, ANP-BC  Subjective/HPI:     Kinsey Hollis is a 68 y.o. female is here for Hospital Follow up for A fib/cardiomyopathy. AVN ablation Bi V pacer placed. Ef 25-30%. NY III          ECHO  SUMMARY:  Left ventricle: Systolic function was markedly reduced. Ejection fraction  was estimated in the range of 25 % to 30 %. There was diffuse hypokinesis. Wall thickness was moderately increased. Left atrium: The atrium was moderately to severely dilated. Right atrium: The atrium was dilated. Mitral valve: There was mild to moderate regurgitation. Tricuspid valve: There was mild regurgitation. There was mild pulmonary  hypertension. PCP Provider  Geovanna Mccarthy MD  Past Medical History:   Diagnosis Date    Acute combined systolic and diastolic HF (heart failure), NYHA class 2 ([facility] 8/11/2017    Arthritis     Atrial fibrillation ([facility]     Cancer ([facility]     multiple myeloma    Hypertension     S/P AV kathleen ablation 8/11/2017    Status post biventricular pacemaker 8/11/2017 8/11/17       Past Surgical History:   Procedure Laterality Date    HX GYN      HX ORTHOPAEDIC      knee     Allergies   Allergen Reactions    Codeine Other (comments)      Family History   Problem Relation Age of Onset    Stroke Mother     No Known Problems Father       Current Outpatient Prescriptions   Medication Sig    dexamethasone (DECADRON) 4 mg tablet Take 4 mg by mouth every seven (7) days.  propranolol (INDERAL) 40 mg tablet Take 40 mg by mouth daily.  potassium chloride (K-DUR, KLOR-CON) 20 mEq tablet Take 1 Tab by mouth two (2) times a day.  lisinopril (PRINIVIL, ZESTRIL) 40 mg tablet Take 0.5 Tabs by mouth daily.  apixaban (ELIQUIS) 5 mg tablet Take 1 Tab by mouth two (2) times a day.  furosemide (LASIX) 40 mg tablet Take 1 Tab by mouth daily.  metoprolol succinate (TOPROL-XL) 25 mg XL tablet Take 0.5 Tabs by mouth daily.     oxyCODONE-acetaminophen (PERCOCET 10)  mg per tablet Take 1 Tab by mouth every eight (8) hours as needed for Pain. Max Daily Amount: 3 Tabs.  aluminum hydrox-magnesium carb (GAVISCON EXTRA STRENGTH) 160-105 mg chew Take 1-2 tablets by mouth four (4) times daily as needed.  lenalidomide (REVLIMID) 25 mg cap Take 25 mg by mouth daily. No current facility-administered medications for this visit. Vitals:    08/16/17 1138 08/16/17 1153   BP: 100/52 104/60   Pulse: 78    Resp: 18    SpO2: 96%    Weight: 197 lb 9.6 oz (89.6 kg)    Height: 5' 10\" (1.778 m)      Social History     Social History    Marital status:      Spouse name: N/A    Number of children: N/A    Years of education: N/A     Occupational History    Not on file. Social History Main Topics    Smoking status: Never Smoker    Smokeless tobacco: Never Used    Alcohol use No    Drug use: No    Sexual activity: Not Currently     Other Topics Concern    Not on file     Social History Narrative       I have reviewed the nurses notes, vitals, problem list, allergy list, medical history, family, social history and medications. Review of Symptoms:    General: Pt denies excessive weight gain or loss. Pt is able to conduct ADL's  HEENT: Denies blurred vision, headaches, epistaxis and difficulty swallowing. Respiratory: Denies shortness of breath, Mild KNOTT, wheezing or stridor. Cardiovascular: Denies precordial pain, palpitations, edema or PND  Gastrointestinal: Denies poor appetite, indigestion, abdominal pain or blood in stool  Musculoskeletal: Denies pain or swelling from muscles or joints  Neurologic: Denies tremor, paresthesias, or sensory motor disturbance  Skin: Denies rash, itching or texture change. Physical Exam:      General: Well developed, in no acute distress, cooperative and alert  HEENT: No carotid bruits, no JVD, trach is midline. Neck Supple, PEERL, EOM intact. Heart:  Normal S1/S2 negative S3 or S4.  Regular, 1/6 systolic murmur, gallop or rub.   Respiratory: Clear bilaterally x 4, no wheezing or rales  Abdomen:   Soft, non-tender, no masses, bowel sounds are active.   Extremities:  No edema, normal cap refill, no cyanosis, atraumatic. Neuro: A&Ox3, speech clear, gait stable. Cardiographics    ECG: paced   Results for orders placed or performed during the hospital encounter of 08/08/17   EKG, 12 LEAD, INITIAL   Result Value Ref Range    Ventricular Rate 75 BPM    Atrial Rate 77 BPM    QRS Duration 188 ms    Q-T Interval 540 ms    QTC Calculation (Bezet) 603 ms    Calculated R Axis -72 degrees    Calculated T Axis 98 degrees    Diagnosis       Electronic ventricular pacemaker  When compared with ECG of 08-AUG-2017 10:31,  Electronic ventricular pacemaker has replaced Atrial flutter  Vent. rate has decreased BY  54 BPM  Confirmed by Katarina Alexander ([id number]) on 8/12/2017 2:44:12 PM           Cardiology Labs:  Lab Results   Component Value Date/Time    Cholesterol, total 156 08/11/2017 02:41 AM    HDL Cholesterol 82 08/11/2017 02:41 AM    LDL, calculated 64 08/11/2017 02:41 AM    Triglyceride 50 08/11/2017 02:41 AM    CHOL/HDL Ratio 1.9 08/11/2017 02:41 AM       Lab Results   Component Value Date/Time    Sodium 141 08/12/2017 03:13 AM    Potassium 3.1 08/12/2017 03:13 AM    Chloride 104 08/12/2017 03:13 AM    CO2 32 08/12/2017 03:13 AM    Anion gap 5 08/12/2017 03:13 AM    Glucose 86 08/12/2017 03:13 AM    BUN 12 08/12/2017 03:13 AM    Creatinine 0.82 08/12/2017 03:13 AM    BUN/Creatinine ratio 15 08/12/2017 03:13 AM    GFR est AA >60 08/12/2017 03:13 AM    GFR est non-AA >60 08/12/2017 03:13 AM    Calcium 8.3 08/12/2017 03:13 AM    Bilirubin, total 0.8 08/08/2017 10:40 AM    AST (SGOT) 21 08/08/2017 10:40 AM    Alk.  phosphatase 108 08/08/2017 10:40 AM    Protein, total 7.1 08/08/2017 10:40 AM    Albumin 3.2 08/08/2017 10:40 AM    Globulin 3.9 08/08/2017 10:40 AM    A-G Ratio 0.8 08/08/2017 10:40 AM    ALT (SGPT) 35 08/08/2017 10:40 AM Assessment:     Assessment:     Diagnoses and all orders for this visit:    1. Cardiomyopathy, unspecified type ([facility]    2. Atrial flutter, unspecified type (HCC)  -     AMB POC EKG ROUTINE W/ 12 LEADS, INTER & REP    3. Other acute pulmonary embolism ([facility] [facility]    4. Chronic systolic congestive heart failure ([facility] [facility]    5. Acute combined systolic and diastolic HF (heart failure), NYHA class 2 ([address]    6. S/P AV kathleen ablation    7. Status post biventricular pacemaker        ICD-10-CM ICD-9-CM    1. Cardiomyopathy, unspecified type ([facility] [address] I42.9 425.4    2. Atrial flutter, unspecified type (HCC) I48.92 427.32 AMB POC EKG ROUTINE W/ 12 LEADS, INTER & REP   3. Other acute pulmonary embolism (HCC) I26.99 415.19    4. Chronic systolic congestive heart failure (HCC) I50.22 428.22      428.0    5. Acute combined systolic and diastolic HF (heart failure), NYHA class 2 (HCC) I50.41 428.41    6. S/P AV kathleen ablation Z98.890 V45.89    7. Status post biventricular pacemaker Z95.0 V45.01      Orders Placed This Encounter    AMB POC EKG ROUTINE W/ 12 LEADS, INTER & REP     Order Specific Question:   Reason for Exam:     Answer:   ROUTINE    dexamethasone (DECADRON) 4 mg tablet     Sig: Take 4 mg by mouth every seven (7) days.  propranolol (INDERAL) 40 mg tablet     Sig: Take 40 mg by mouth daily. Plan:      Systolic heart failure: Ejection fraction 25-30%: Has bi V pacemaker, on Toprol and ACEI. Will need ischemia work up, [facility] ordered. Follow up 1 month  Renal function stable. Atrial Fibrillation: AVN ablation, maintain Eliquis. Lisha Cassidy NP      [facility]    8/17/2017         Agree with note as outlined by  NP. I confirm findings in history and physical exam. No additional findings noted. Agree with plan as outlined above.      [address] Gabrielle Ko MD

## 2017-08-17 ENCOUNTER — OFFICE VISIT (OUTPATIENT)
Dept: INTERNAL MEDICINE CLINIC | Age: 77
End: 2017-08-17

## 2017-08-17 VITALS
HEART RATE: 75 BPM | RESPIRATION RATE: 16 BRPM | TEMPERATURE: 98 F | OXYGEN SATURATION: 98 % | WEIGHT: 196.9 LBS | SYSTOLIC BLOOD PRESSURE: 92 MMHG | BODY MASS INDEX: 28.19 KG/M2 | HEIGHT: 70 IN | DIASTOLIC BLOOD PRESSURE: 62 MMHG

## 2017-08-17 DIAGNOSIS — R10.31 INGUINAL PAIN, RIGHT: ICD-10-CM

## 2017-08-17 DIAGNOSIS — I42.9 CARDIOMYOPATHY, UNSPECIFIED TYPE (HCC): Primary | ICD-10-CM

## 2017-08-17 DIAGNOSIS — I87.2 VENOUS INSUFFICIENCY: ICD-10-CM

## 2017-08-17 DIAGNOSIS — R53.81 DEBILITY: ICD-10-CM

## 2017-08-17 DIAGNOSIS — I48.20 CHRONIC ATRIAL FIBRILLATION (HCC): Chronic | ICD-10-CM

## 2017-08-17 NOTE — MR AVS SNAPSHOT
Visit Information Date & Time Provider Department Dept. Phone Encounter #  
 8/17/2017  9:00 AM Karen Aguirre MD [facility]  Your Appointments 8/24/2017  9:30 AM  
Any with Karen Aguirre MD  
[address] [facility] and Primary Care [address]) Appt Note: 2 month follow up  
 [address] [facility] [address] [phone]  
  
    
 8/28/2017  9:30 AM  
STRESS TEST with NUCLEAR, [facility] [address]) Appt Note: per Dr. Naty Howell [[id number]] (Order [id number])798 pds , [address] [facility]  
[phone] [address]  
  
    
 8/29/2017 10:15 AM  
PACEMAKER with PACEMAKER, [facility] [address]) Appt Note: post op pacemaker check per ellen espinosa 8-11-17  
 [address]  
[phone] [address] Upcoming Health Maintenance Date Due Pneumococcal 65+ High/Highest Risk (2 of 2 - PPSV23) 8/30/2017* INFLUENZA AGE 9 TO ADULT 8/30/2017* GLAUCOMA SCREENING Q2Y 9/15/2017* MEDICARE YEARLY EXAM 12/23/2017 DTaP/Tdap/Td series (2 - Td) 5/19/2026 *Topic was postponed. The date shown is not the original due date. Allergies as of 8/17/2017  Review Complete On: 8/17/2017 By: Eric Noriega Severity Noted Reaction Type Reactions Codeine  09/05/2014    Other (comments) Current Immunizations  Reviewed on 4/19/2016 Name Date Influenza Vaccine 9/15/2014 Not reviewed this visit You Were Diagnosed With   
  
 Codes Comments Cardiomyopathy, unspecified type ([facility]    -  Primary ICD-10-CM: I42.9 ICD-9-CM: 425.4 Chronic atrial fibrillation (HCC)     ICD-10-CM: C72.8 ICD-9-CM: 427.31 Inguinal pain, right     ICD-10-CM: R10.31 ICD-9-CM: 789.09 Debility     ICD-10-CM: R53.81 ICD-9-CM: 799.3 Venous insufficiency     ICD-10-CM: I87.2 ICD-9-CM: 459.81 Vitals BP Pulse Temp Resp Height(growth percentile) Weight(growth percentile) 92/62 (BP 1 Location: Right arm, BP Patient Position: Sitting) 75 98 °F (36.7 °C) (Oral) 16 5' 10\" (1.778 m) 196 lb 14.4 oz (89.3 kg) SpO2 BMI OB Status Smoking Status 98% 28.25 kg/m2 Hysterectomy Never Smoker Vitals History BMI and BSA Data Body Mass Index Body Surface Area  
 28.25 kg/m 2 2.1 m 2 Preferred Pharmacy Pharmacy Name Phone [facility] [address] [place] [phone] Your Updated Medication List  
  
   
This list is accurate as of: 8/17/17 11:22 AM.  Always use your most recent med list.  
  
  
  
  
 apixaban 5 mg tablet Commonly known as:  Lis Mao Take 1 Tab by mouth two (2) times a day. dexamethasone 4 mg tablet Commonly known as:  DECADRON Take 4 mg by mouth every seven (7) days. furosemide 40 mg tablet Commonly known as:  LASIX Take 1 Tab by mouth daily. GAVISCON EXTRA STRENGTH 160-105 mg Nestor Spikes Generic drug:  aluminum hydrox-magnesium carb Take 1-2 tablets by mouth four (4) times daily as needed. lisinopril 40 mg tablet Commonly known as:  Coleman John Take 0.5 Tabs by mouth daily. metoprolol succinate 25 mg XL tablet Commonly known as:  TOPROL-XL Take 0.5 Tabs by mouth daily. oxyCODONE-acetaminophen  mg per tablet Commonly known as:  PERCOCET 10 Take 1 Tab by mouth every eight (8) hours as needed for Pain. Max Daily Amount: 3 Tabs. potassium chloride 20 mEq tablet Commonly known as:  K-DUR, KLOR-CON Take 1 Tab by mouth two (2) times a day. REVLIMID 25 mg Cap Generic drug:  lenalidomide Take 25 mg by mouth daily. We Performed the Following METABOLIC PANEL, BASIC [19247 CPT(R)] Introducing [facility] & HEALTH SERVICES! [facility] introduces [employer] patient portal. Now you can access parts of your medical record, email your doctor's office, and request medication refills online. 1. In your internet browser, go to [email or web address] 2. Click on the First Time User? Click Here link in the Sign In box. You will see the New Member Sign Up page. 3. Enter your [employer] Access Code exactly as it appears below. You will not need to use this code after youve completed the sign-up process. If you do not sign up before the expiration date, you must request a new code. · [employer] Access Code: [email or web address] Expires: 11/15/2017  8:14 AM 
 
4. Enter the last four digits of your Social Security Number (xxxx) and Date of Birth (mm/dd/yyyy) as indicated and click Submit. You will be taken to the next sign-up page. 5. Create a [employer] ID. This will be your [employer] login ID and cannot be changed, so think of one that is secure and easy to remember. 6. Create a [employer] password. You can change your password at any time. 7. Enter your Password Reset Question and Answer. This can be used at a later time if you forget your password. 8. Enter your e-mail address. You will receive e-mail notification when new information is available in [address] 9. Click Sign Up. You can now view and download portions of your medical record. 10. Click the Download Summary menu link to download a portable copy of your medical information. If you have questions, please visit the Frequently Asked Questions section of the [employer] website. Remember, [employer] is NOT to be used for urgent needs. For medical emergencies, dial 911. Now available from your iPhone and Android! Please provide this summary of care documentation to your next provider. Your primary care clinician is listed as Deanna Taylor. If you have any questions after today's visit, please call [phone].

## 2017-08-17 NOTE — PROGRESS NOTES
Chief Complaint   Patient presents with   [facility] Follow Up     1 week post hospital discharge following pacemaker placement     1. Have you been to the ER, urgent care clinic since your last visit? Hospitalized since your last visit? Yes Reason for visit: difficulty breathing/pacemaker placed    2. Have you seen or consulted any other health care providers outside of the [address] [place] [address] since your last visit? Include any pap smears or colon screening.  No

## 2017-08-18 LAB
BUN SERPL-MCNC: 19 MG/DL (ref 8–27)
BUN/CREAT SERPL: 19 (ref 12–28)
CALCIUM SERPL-MCNC: 9.4 MG/DL (ref 8.7–10.3)
CHLORIDE SERPL-SCNC: 101 MMOL/L (ref 96–106)
CO2 SERPL-SCNC: 28 MMOL/L (ref 18–29)
CREAT SERPL-MCNC: 1 MG/DL (ref 0.57–1)
GLUCOSE SERPL-MCNC: 74 MG/DL (ref 65–99)
POTASSIUM SERPL-SCNC: 4.1 MMOL/L (ref 3.5–5.2)
SODIUM SERPL-SCNC: 144 MMOL/L (ref 134–144)

## 2017-08-19 NOTE — PROGRESS NOTES
[address] [place] [address] and Primary Care  [place] [address]  [address] [place] [address]  Phone:  [phone]  Fax: [phone]       Chief Complaint   Patient presents with   [facility] Follow Up     1 week post hospital discharge following pacemaker placement   . SUBJECTIVE:    Isrrael Wilson is a 68 y.o. female Comes in for return visit having most recently been hospitalized for rhe unveiling of cardiomyopathy ejection fraction of 25 to 30%. She was in mild heart failure at the time, and with several doses of Lasix she is doing much better. She has chronic atrial fibrillation and given the size of her atrium it is elected to make her AV node and implant a dual chamber pacemaker. This was uneventful, and she has seen her EP doctor since discharge. He is quite pleased with her progress. She remains on her Revlimid for her multiple myeloma. She also remains on Eliquis for the chronic atrial fibrillation, and the risk persists in spite of the fact that she is technically in sinus rhythm. This will be up to cardiology to discontinue. Finally, she remains on antihypertensive medication as prescribed. She is moderately debilitated since being in the hospital for about a week. She is quite slow today. She is able to walk with her cane. Current Outpatient Prescriptions   Medication Sig Dispense Refill    dexamethasone (DECADRON) 4 mg tablet Take 4 mg by mouth every seven (7) days.  potassium chloride (K-DUR, KLOR-CON) 20 mEq tablet Take 1 Tab by mouth two (2) times a day. 60 Tab 11    lisinopril (PRINIVIL, ZESTRIL) 40 mg tablet Take 0.5 Tabs by mouth daily. 30 Tab 3    apixaban (ELIQUIS) 5 mg tablet Take 1 Tab by mouth two (2) times a day. 60 Tab 3    furosemide (LASIX) 40 mg tablet Take 1 Tab by mouth daily. 30 Tab 3    metoprolol succinate (TOPROL-XL) 25 mg XL tablet Take 0.5 Tabs by mouth daily.  30 Tab 11    oxyCODONE-acetaminophen (PERCOCET 10)  mg per tablet Take 1 Tab by mouth every eight (8) hours as needed for Pain. Max Daily Amount: 3 Tabs. 75 Tab 0    aluminum hydrox-magnesium carb (GAVISCON EXTRA STRENGTH) 160-105 mg chew Take 1-2 tablets by mouth four (4) times daily as needed.  lenalidomide (REVLIMID) 25 mg cap Take 25 mg by mouth daily.        Past Medical History:   Diagnosis Date    Acute combined systolic and diastolic HF (heart failure), NYHA class 2 ([address] 8/11/2017    Arthritis     Atrial fibrillation (HCC)     Cancer (HCC)     multiple myeloma    Hypertension     S/P AV kathleen ablation 8/11/2017    Status post biventricular pacemaker 8/11/2017 8/11/17      Past Surgical History:   Procedure Laterality Date    HX GYN      HX ORTHOPAEDIC      knee    HX PACEMAKER  08/11/2017     Allergies   Allergen Reactions    Codeine Other (comments)         REVIEW OF SYSTEMS:  General: negative for - chills or fever  ENT: negative for - headaches, nasal congestion or tinnitus  Respiratory: negative for - cough, hemoptysis, shortness of breath or wheezing  Cardiovascular : negative for - chest pain, edema, palpitations or shortness of breath  Gastrointestinal: negative for - abdominal pain, blood in stools, heartburn or nausea/vomiting  Genito-Urinary: no dysuria, trouble voiding, or hematuria  Musculoskeletal: negative for - gait disturbance, joint pain, joint stiffness or joint swelling  Neurological: no TIA or stroke symptoms  Hematologic: no bruises, no bleeding, no swollen glands  Integument: no lumps, mole changes, nail changes or rash  Endocrine: no malaise/lethargy or unexpected weight changes      Social History     Social History    Marital status:      Spouse name: N/A    Number of children: N/A    Years of education: N/A     Social History Main Topics    Smoking status: Never Smoker    Smokeless tobacco: Never Used    Alcohol use No    Drug use: No    Sexual activity: Not Currently     Other Topics Concern    None     Social History Narrative     Family History   Problem Relation Age of Onset    Stroke Mother     No Known Problems Father        OBJECTIVE:    Visit Vitals    BP 92/62 (BP 1 Location: Right arm, BP Patient Position: Sitting)    Pulse 75    Temp 98 °F (36.7 °C) (Oral)    Resp 16    Ht 5' 10\" (1.778 m)    Wt 196 lb 14.4 oz (89.3 kg)    SpO2 98%    BMI 28.25 kg/m2     CONSTITUTIONAL: well , well nourished, appears age appropriate  EYES: perrla, eom intact  ENMT:moist mucous membranes, pharynx clear  NECK: supple. Thyroid normal  RESPIRATORY: Chest: clear to ascultation and percussion   CARDIOVASCULAR: Heart: regular rate and rhythm  GASTROINTESTINAL: Abdomen: soft, bowel sounds active  HEMATOLOGIC: no pathological lymph nodes palpated  MUSCULOSKELETAL: Extremities: no edema, pulse 1+   INTEGUMENT: No unusual rashes or suspicious skin lesions noted. Nails appear normal.  NEUROLOGIC: non-focal exam   MENTAL STATUS: alert and oriented, appropriate affect      ASSESSMENT:  1. Cardiomyopathy, unspecified type ([address]    2. Chronic atrial fibrillation (HCC)    3. Inguinal pain, right    4. Debility    5. Venous insufficiency        PLAN:    1. There are no overt signs of congestive heart failure in spite of her cardiomyopathy. Hopefully with the pacemaker implantation there may be improvement in her ejection fraction. Obviously, if not over the next six months or so, then she will have to have a defibrillator placed. 2. She remains on her Eliquis for now. She will follow with cardiology as relates to the heart failure, as well as the atrial fibrillation. 3. She complains of inguinal pain, which is in the area where she had her cath done. I could not find any obvious pathology today. She does have a mildly positive Gordo's test which reflects mild osteoarthritis in this instance. which reflects    4. She has general debility related to deconditioning from her prolonged hospital stay.   She actually needs to walk more. a.  5. Swelling of the lower extremities persists, although it is not as bad as it previously was. Support stockings are suggested, but this has been suggested for many years. Fortunately the swelling isn't as bad. We suggested that may have been a contribution for cardiac decompensation, which improved with use of the Furosemide. .  Orders Placed This Encounter    METABOLIC PANEL, BASIC         Follow-up Disposition:  Return in about 4 weeks (around 9/14/2017).       Dana Fothergill, MD

## 2017-08-21 ENCOUNTER — PATIENT OUTREACH (OUTPATIENT)
Dept: INTERNAL MEDICINE CLINIC | Age: 77
End: 2017-08-21

## 2017-08-21 NOTE — PROGRESS NOTES
Hospital Discharge Follow-Up  [address] [facility]: CHF IP 2nd d/c f/u    Date/Time:  2017 10:57 AM    Patient discharged from [facility] for CHF  -2017. Patient returned call to [facility]; Nurse Navigator(NN) contacted the patient by telephone to perform post 2nd f/u for [facility] IP discharge f/u assessment. Verified  and address with patient as identifiers. Diet:  Renal Diet   Activity:  reports: walking everyday. Medication:   Patient called to inquire of medications if changes; Patient stated Propanolol 40mg was taken off by Cardiologist; patient asked what Dr. Cecily Kramer wanted. NN performed medication reconciliation with patient comparing to [address] with PCP and Cardiologist; Propanolol was not on list of meds. Patient verbalizes understanding of administration of home medications. There were no barriers to obtaining medications identified at this time. Discharge Instructions :  Reviewed discharge instructions with patient. Patient verbalizes understanding of discharge instructions and follow-up care. Red Flags:  SOB. Dizziness. Chest Pain. Advance Care Planning:   Patient was offered the opportunity to discuss advance care planning:  yes     Does patient have an Advance Directive:  no   If no, did you provide information on [facility]? yes     PCP/Specialist follow up: Patient scheduled to follow up with Geovanna Mccarthy MD on 2017. Reviewed red flags with patient, and patient verbalizes understanding. Patient given an opportunity to ask questions. No other clinical/social/functional needs noted. The patient agrees to contact the PCP office for questions related to their healthcare. The patient expressed thanks for having given NN office #, offered no additional questions and ended the call. Case management plan: Attempt to contact the patient by telephone or during office visit within the next 7 days for HF f/u.  Will continue to follow up weekly for 90 days. Will reassess for case management needs prior to discharge from Heart Failure Bundle service on or about 90 days post discharge. Ricky Leigh

## 2017-08-28 ENCOUNTER — CLINICAL SUPPORT (OUTPATIENT)
Dept: CARDIOLOGY CLINIC | Age: 77
End: 2017-08-28

## 2017-08-28 DIAGNOSIS — I26.99 OTHER ACUTE PULMONARY EMBOLISM: ICD-10-CM

## 2017-08-28 DIAGNOSIS — Z98.890 S/P AV NODAL ABLATION: ICD-10-CM

## 2017-08-28 DIAGNOSIS — R93.1 ABNORMAL NUCLEAR CARDIAC IMAGING TEST: Primary | ICD-10-CM

## 2017-08-28 DIAGNOSIS — I48.92 ATRIAL FLUTTER, UNSPECIFIED TYPE (HCC): ICD-10-CM

## 2017-08-28 DIAGNOSIS — I50.22 CHRONIC SYSTOLIC CONGESTIVE HEART FAILURE (HCC): ICD-10-CM

## 2017-08-28 DIAGNOSIS — I42.9 CARDIOMYOPATHY, UNSPECIFIED TYPE (HCC): Chronic | ICD-10-CM

## 2017-08-28 DIAGNOSIS — I50.41 ACUTE COMBINED SYSTOLIC AND DIASTOLIC HF (HEART FAILURE), NYHA CLASS 2 (HCC): ICD-10-CM

## 2017-08-28 DIAGNOSIS — Z95.0 STATUS POST BIVENTRICULAR PACEMAKER: ICD-10-CM

## 2017-08-29 ENCOUNTER — CLINICAL SUPPORT (OUTPATIENT)
Dept: CARDIOLOGY CLINIC | Age: 77
End: 2017-08-29

## 2017-08-29 ENCOUNTER — APPOINTMENT (OUTPATIENT)
Dept: GENERAL RADIOLOGY | Age: 77
End: 2017-08-29
Attending: EMERGENCY MEDICINE
Payer: MEDICARE

## 2017-08-29 ENCOUNTER — HOSPITAL ENCOUNTER (EMERGENCY)
Age: 77
Discharge: HOME | End: 2017-08-29
Attending: EMERGENCY MEDICINE
Payer: MEDICARE

## 2017-08-29 VITALS
DIASTOLIC BLOOD PRESSURE: 94 MMHG | TEMPERATURE: 98.7 F | HEART RATE: 75 BPM | WEIGHT: 196.43 LBS | BODY MASS INDEX: 28.12 KG/M2 | HEIGHT: 70 IN | OXYGEN SATURATION: 98 % | RESPIRATION RATE: 16 BRPM | SYSTOLIC BLOOD PRESSURE: 153 MMHG

## 2017-08-29 DIAGNOSIS — I50.22 CHRONIC SYSTOLIC CONGESTIVE HEART FAILURE (HCC): ICD-10-CM

## 2017-08-29 DIAGNOSIS — M54.9 UPPER BACK PAIN: Primary | ICD-10-CM

## 2017-08-29 DIAGNOSIS — Z95.0 STATUS POST BIVENTRICULAR PACEMAKER: Primary | ICD-10-CM

## 2017-08-29 DIAGNOSIS — Z98.890 S/P AV NODAL ABLATION: ICD-10-CM

## 2017-08-29 DIAGNOSIS — Z85.79 HISTORY OF MULTIPLE MYELOMA: ICD-10-CM

## 2017-08-29 DIAGNOSIS — I48.20 CHRONIC ATRIAL FIBRILLATION (HCC): Chronic | ICD-10-CM

## 2017-08-29 LAB
ANION GAP SERPL CALC-SCNC: 5 MMOL/L (ref 5–15)
ATRIAL RATE: 394 BPM
BASOPHILS # BLD: 0 K/UL
BASOPHILS NFR BLD: 0 %
BUN SERPL-MCNC: 11 MG/DL (ref 6–20)
BUN/CREAT SERPL: 14 (ref 12–20)
CALCIUM SERPL-MCNC: 8.6 MG/DL (ref 8.5–10.1)
CALCULATED R AXIS, ECG10: -103 DEGREES
CALCULATED T AXIS, ECG11: 39 DEGREES
CHLORIDE SERPL-SCNC: 107 MMOL/L (ref 97–108)
CO2 SERPL-SCNC: 31 MMOL/L (ref 21–32)
CREAT SERPL-MCNC: 0.81 MG/DL (ref 0.55–1.02)
DIAGNOSIS, 93000: NORMAL
DIFFERENTIAL METHOD BLD: ABNORMAL
EOSINOPHIL # BLD: 0 K/UL
EOSINOPHIL NFR BLD: 0 %
ERYTHROCYTE [DISTWIDTH] IN BLOOD BY AUTOMATED COUNT: 17 % (ref 11.5–14.5)
GLUCOSE SERPL-MCNC: 107 MG/DL (ref 65–100)
HCT VFR BLD AUTO: 29.5 % (ref 35–47)
HGB BLD-MCNC: 9.3 G/DL (ref 11.5–16)
LYMPHOCYTES # BLD: 0.6 K/UL
LYMPHOCYTES NFR BLD: 12 %
MCH RBC QN AUTO: 27.4 PG (ref 26–34)
MCHC RBC AUTO-ENTMCNC: 31.5 G/DL (ref 30–36.5)
MCV RBC AUTO: 86.8 FL (ref 80–99)
MONOCYTES # BLD: 1.1 K/UL
MONOCYTES NFR BLD: 21 %
NEUTS SEG # BLD: 3.4 K/UL
NEUTS SEG NFR BLD: 67 %
PLATELET # BLD AUTO: 195 K/UL (ref 150–400)
POTASSIUM SERPL-SCNC: 3.3 MMOL/L (ref 3.5–5.1)
Q-T INTERVAL, ECG07: 450 MS
QRS DURATION, ECG06: 144 MS
QTC CALCULATION (BEZET), ECG08: 502 MS
RBC # BLD AUTO: 3.4 M/UL (ref 3.8–5.2)
RBC MORPH BLD: ABNORMAL
SODIUM SERPL-SCNC: 143 MMOL/L (ref 136–145)
TROPONIN I SERPL-MCNC: <0.04 NG/ML
VENTRICULAR RATE, ECG03: 75 BPM
WBC # BLD AUTO: 5.1 K/UL (ref 3.6–11)

## 2017-08-29 PROCEDURE — 74011250637 HC RX REV CODE- 250/637: Performed by: EMERGENCY MEDICINE

## 2017-08-29 PROCEDURE — 93005 ELECTROCARDIOGRAM TRACING: CPT

## 2017-08-29 PROCEDURE — 36415 COLL VENOUS BLD VENIPUNCTURE: CPT | Performed by: EMERGENCY MEDICINE

## 2017-08-29 PROCEDURE — 80048 BASIC METABOLIC PNL TOTAL CA: CPT | Performed by: EMERGENCY MEDICINE

## 2017-08-29 PROCEDURE — 85025 COMPLETE CBC W/AUTO DIFF WBC: CPT | Performed by: EMERGENCY MEDICINE

## 2017-08-29 PROCEDURE — 99284 EMERGENCY DEPT VISIT MOD MDM: CPT

## 2017-08-29 PROCEDURE — 71020 XR CHEST PA LAT: CPT

## 2017-08-29 PROCEDURE — 96374 THER/PROPH/DIAG INJ IV PUSH: CPT

## 2017-08-29 PROCEDURE — 84484 ASSAY OF TROPONIN QUANT: CPT | Performed by: EMERGENCY MEDICINE

## 2017-08-29 PROCEDURE — 74011250636 HC RX REV CODE- 250/636: Performed by: EMERGENCY MEDICINE

## 2017-08-29 RX ORDER — SODIUM CHLORIDE 0.9 % (FLUSH) 0.9 %
5-10 SYRINGE (ML) INJECTION AS NEEDED
Status: DISCONTINUED | OUTPATIENT
Start: 2017-08-29 | End: 2017-08-29 | Stop reason: HOSPADM

## 2017-08-29 RX ORDER — OXYCODONE HYDROCHLORIDE 5 MG/1
10 TABLET ORAL
Status: COMPLETED | OUTPATIENT
Start: 2017-08-29 | End: 2017-08-29

## 2017-08-29 RX ORDER — SODIUM CHLORIDE 0.9 % (FLUSH) 0.9 %
5-10 SYRINGE (ML) INJECTION EVERY 8 HOURS
Status: DISCONTINUED | OUTPATIENT
Start: 2017-08-29 | End: 2017-08-29 | Stop reason: HOSPADM

## 2017-08-29 RX ORDER — PREDNISONE 20 MG/1
40 TABLET ORAL DAILY
Qty: 10 TAB | Refills: 0 | Status: SHIPPED | OUTPATIENT
Start: 2017-08-29 | End: 2017-11-24 | Stop reason: SDUPTHER

## 2017-08-29 RX ADMIN — OXYCODONE HYDROCHLORIDE 10 MG: 5 TABLET ORAL at 11:56

## 2017-08-29 RX ADMIN — METHYLPREDNISOLONE SODIUM SUCCINATE 125 MG: 125 INJECTION, POWDER, FOR SOLUTION INTRAMUSCULAR; INTRAVENOUS at 11:56

## 2017-08-29 NOTE — PROGRESS NOTES
Physical Therapy Screening:  Physical Therapy consult is not indicated at this time. A screening was performed on this patient's chart based on their entrance into the emergency department and potential need for physical therapy identified. The patients chart was reviewed and the patient was interviewed/screened. A physical therapy consult is not indicated at this time based on their prior level of function or current medical status. Pt here with R shoulder pain. She states she has not had recent falls and continues to walk with her SPC. She states she lives alone and feels she has no issues. She states that her pain does not interfere with use of her cane. She states again as to previous PT that she is not interested in OPPT for any balance or strengthening issues. Relayed to MD.      Please consult physical therapy if any therapy needs arise. Thank you.   Kae Morfin, PT

## 2017-08-29 NOTE — PROGRESS NOTES
See device report - BSC BiV PM, RV, LV only, post AV node ablation. Next check scheduled for 3 months in office.

## 2017-08-29 NOTE — PROGRESS NOTES
Carina Ely Henderson  1940  Marichuy Valadez MD          Subjective:    Patient is here for 2 week site check and device interrogation after pacemaker implantation completed on 8/11/17. The patient denies chest pain or shortness of breath.      Patient Active Problem List    Diagnosis Date Noted    Acute combined systolic and diastolic HF (heart failure), NYHA class 2 ([address] 08/11/2017    Status post biventricular pacemaker 08/11/2017    S/P AV kathleen ablation 08/11/2017    Atrial flutter ([address] 08/10/2017    Cardiomyopathy ([address] 08/10/2017    Acute pulmonary embolism ([address] 08/10/2017    CHF (congestive heart failure) ([address] 08/08/2017    Sialadenitis 06/29/2017    Muscular deconditioning 04/22/2017    Synovitis of shoulder 04/21/2017    Back pain 04/30/2016    Jaw pain 12/07/2015    Primary osteoarthritis of both knees 08/24/2015    Synovitis 05/18/2015    Contusion of knee 05/09/2015    Venous insufficiency 04/07/2015    Calf DVT (deep venous thrombosis) (HCC) 01/27/2015    Intractable back pain 01/25/2015    Reflux esophagitis 10/31/2014    Multiple myeloma ([address] 10/31/2014    Nocturia 10/31/2014    Complex renal cyst 10/11/2014    Low back pain 10/10/2014    Chronic atrial fibrillation ([address] 09/12/2014    Anemia 09/08/2014    Chest pain on exertion 09/07/2014    HTN (hypertension) 09/07/2014    Chest pain 09/05/2014    Osteoarthritis 09/05/2014      Past Medical History:   Diagnosis Date    Acute combined systolic and diastolic HF (heart failure), NYHA class 2 ([address] 8/11/2017    Arthritis     Atrial fibrillation (HCC)     Cancer (HCC)     multiple myeloma    Hypertension     S/P AV kathleen ablation 8/11/2017    Status post biventricular pacemaker 8/11/2017 8/11/17       Past Surgical History:   Procedure Laterality Date    HX GYN      HX ORTHOPAEDIC      knee    HX PACEMAKER  08/11/2017     Allergies   Allergen Reactions    Codeine Other (comments)      Family History Problem Relation Age of Onset    Stroke Mother     No Known Problems Father       No current facility-administered medications for this visit. Current Outpatient Prescriptions   Medication Sig    Technetium Tc99M-Tetrofosmin 0.23 mg solr 75.4 millicuries by IntraVENous route once for 1 dose. Radiopharmaceutical administered:Tc99m Tc Tetrofosmin (Myoview)    Amount administered rest : 10.5 mCi  Amount administered stress: 32.0 mCi    regadenoson (REGADENSON) 0.4 mg/5 mL syrg injection 5 mL by IntraVENous route once for 1 dose.  dexamethasone (DECADRON) 4 mg tablet Take 4 mg by mouth every seven (7) days.  potassium chloride (K-DUR, KLOR-CON) 20 mEq tablet Take 1 Tab by mouth two (2) times a day.  lisinopril (PRINIVIL, ZESTRIL) 40 mg tablet Take 0.5 Tabs by mouth daily.  apixaban (ELIQUIS) 5 mg tablet Take 1 Tab by mouth two (2) times a day.  furosemide (LASIX) 40 mg tablet Take 1 Tab by mouth daily.  metoprolol succinate (TOPROL-XL) 25 mg XL tablet Take 0.5 Tabs by mouth daily.  oxyCODONE-acetaminophen (PERCOCET 10)  mg per tablet Take 1 Tab by mouth every eight (8) hours as needed for Pain. Max Daily Amount: 3 Tabs.  aluminum hydrox-magnesium carb (GAVISCON EXTRA STRENGTH) 160-105 mg chew Take 1-2 tablets by mouth four (4) times daily as needed.  lenalidomide (REVLIMID) 25 mg cap Take 25 mg by mouth daily. Facility-Administered Medications Ordered in Other Visits   Medication Dose Route Frequency    sodium chloride (NS) flush 5-10 mL  5-10 mL IntraVENous Q8H    sodium chloride (NS) flush 5-10 mL  5-10 mL IntraVENous PRN          Review of Systems:    General: Pt denies excessive weight gain or loss. Pt is able to conduct ADL's  Respiratory: Denies shortness of breath, KNOTT, wheezing or stridor. Cardiovascular: Denies new onset of precordial pain, palpitations, edema or PND        Physical ExamPhysical Exam:      General: Well developed, in no acute distress. .  Chest: left subclavian pacer site approximated well  Neuro: A&Ox3, speech clear, gait stable. Assessment:   Assessment:     ICD-10-CM ICD-9-CM    1. Status post biventricular pacemaker Z95.0 V45.01    2. Chronic atrial fibrillation (HCC) I48.2 427.31    3. Chronic systolic congestive heart failure (HCC) I50.22 428.22      428.0    4. S/P AV kathleen ablation Z98.890 V45.89         Plan:     Patient is here to f/u after AV kathleen ablation and BIV- pacemaker placement. Left subclavian pacemaker site approximated well, no discharge. No erythema or heat. She will need to f/u with Dr Lang Zeng regarding her stress test results.      Den Mclain, VIN

## 2017-08-29 NOTE — ED NOTES
Pt complains of right shoulder pain that \"comes on every so many months. \"  The pain is posterior. Pt has hx of multiple myeloma, and she states this is the cause of her pain. She is here for pain control. Pt had a pacemaker placed on 08/11/17 for bradycardia.

## 2017-08-29 NOTE — ED PROVIDER NOTES
HPI Comments: Larry Alexander, 68 y.o. Female with PMHx significant for multiple myeloma, HTN, afib with pacemaker placed and recent AV node ablation, and CHF presents ambulatory to ED [facility] ED with cc of 10/10 sharp right upper back pain x 1 week. The patient states that her pain is similar to the pain she has due to her multiple myeloma. She reports that when she has this pain, she typically gets relief with Prednisone. She states that she took a dose of Hydrocodone with no relief. She states that she is actively receiving chemotherapy/radiation therapy at this time. Per medical records, the patient was recently admitted on 8/8/17 for acute respiratory failure, acute CHF, and a small PE found in her right lower lobe. She denies having any pain after her hospital discharge. She denies a history of recent strenuous activity or traumas. She specifically denies fevers, nausea, vomiting, cough, increased leg swelling, or chest pain. PCP: Pranav Paez MD  Heme/Onc: Michael Rivas. Abida Tinsley MD    Social history significant for: - Tobacco, - EtOH, - Illicit Drug Use    There are no other complaints, changes, or physical findings at this time. Written by LUANNE Whitley, as dictated by Elvia Marinelli DO. The history is provided by the patient and medical records. No  was used.         Past Medical History:   Diagnosis Date    Acute combined systolic and diastolic HF (heart failure), NYHA class 2 ([address] 8/11/2017    Arthritis     Atrial fibrillation (HCC)     Cancer (HCC)     multiple myeloma    Hypertension     S/P AV kathleen ablation 8/11/2017    Status post biventricular pacemaker 8/11/2017 8/11/17        Past Surgical History:   Procedure Laterality Date    HX GYN      HX ORTHOPAEDIC      knee    HX PACEMAKER  08/11/2017         Family History:   Problem Relation Age of Onset    Stroke Mother     No Known Problems Father        Social History     Social History    Marital status:      Spouse name: N/A    Number of children: N/A    Years of education: N/A     Occupational History    Not on file. Social History Main Topics    Smoking status: Never Smoker    Smokeless tobacco: Never Used    Alcohol use No    Drug use: No    Sexual activity: Not Currently     Other Topics Concern    Not on file     Social History Narrative         ALLERGIES: Codeine    Review of Systems   Constitutional: Negative. Negative for appetite change, chills, fatigue and fever. HENT: Negative. Negative for congestion, rhinorrhea, sinus pressure and sore throat. Eyes: Negative. Respiratory: Negative. Negative for cough, choking, chest tightness, shortness of breath and wheezing. Cardiovascular: Negative. Negative for chest pain, palpitations and leg swelling. Gastrointestinal: Negative for abdominal pain, constipation, diarrhea, nausea and vomiting. Endocrine: Negative. Genitourinary: Negative. Negative for difficulty urinating, dysuria, flank pain and urgency. Musculoskeletal: Positive for back pain (Right upper). Skin: Negative. Neurological: Negative. Negative for dizziness, speech difficulty, weakness, light-headedness, numbness and headaches. Psychiatric/Behavioral: Negative. All other systems reviewed and are negative. Vitals:    08/29/17 1113   BP: (!) 153/94   Pulse: 75   Resp: 16   Temp: 98.7 °F (37.1 °C)   SpO2: 98%   Weight: 89.1 kg (196 lb 6.9 oz)   Height: 5' 10\" (1.778 m)            Physical Exam   Constitutional: She is oriented to person, place, and time. She appears well-developed and well-nourished. No distress. HENT:   Head: Normocephalic and atraumatic. Mouth/Throat: Oropharynx is clear and moist. No oropharyngeal exudate. Eyes: Conjunctivae and EOM are normal. Pupils are equal, round, and reactive to light. Neck: Normal range of motion. Neck supple. No JVD present. No tracheal deviation present.    Cardiovascular: Normal rate, regular rhythm, normal heart sounds and intact distal pulses. No murmur heard. Pulmonary/Chest: Effort normal and breath sounds normal. No stridor. No respiratory distress. She has no wheezes. She has no rales. She exhibits no tenderness. Abdominal: Soft. She exhibits no distension. There is no tenderness. There is no rebound and no guarding. Musculoskeletal: She exhibits no edema. + ttp in right upper trapezius, ROM decreased due to pain, distal PMS intact     Neurological: She is alert and oriented to person, place, and time. No cranial nerve deficit. No gross motor or sensory deficits    Skin: Skin is warm and dry. She is not diaphoretic. Psychiatric: She has a normal mood and affect. Her behavior is normal.   Nursing note and vitals reviewed. MDM  Number of Diagnoses or Management Options  Diagnosis management comments:   DDx: Musculoskeletal pain, PNA, ACS       Amount and/or Complexity of Data Reviewed  Clinical lab tests: ordered and reviewed  Tests in the radiology section of CPT®: ordered and reviewed  Tests in the medicine section of CPT®: ordered and reviewed  Obtain history from someone other than the patient: yes (Medical records)  Review and summarize past medical records: yes  Independent visualization of images, tracings, or specimens: yes    Patient Progress  Patient progress: stable       ED Course       Procedures     EKG- Paced, underlying a-flutter, rate 75, normal qrs, no acute ST-T wave changes, Niki Worthington, DO      Progress Note:  11:40 AM  The patient was offered out patient Physical Therapy follow up prior to her discharge from the hospital on 8/8/17. The patient had declined out patient assistance then. [facility] evaluated the patient and the patient states that she is able to ambulate with her cane without difficulty at home. The patient also reports that her right shoulder pain does not interfere with her ADL's.  Pt continues to decline out patient Physical Therapy follow up at this time. Progress Note:  12:52 PM  The patient was re-evaluated and states that she is feeling improvement in pain after ED treatment. The patient and her son were informed of her lab and imaging results, and they convey their understanding of these results. Will discharge. Written by Lee Pruitt ED Scribe, as dictated by Juanjose Gallegos DO.    LABORATORY TESTS:  Recent Results (from the past 12 hour(s))   EKG, 12 LEAD, INITIAL    Collection Time: 08/29/17 11:06 AM   Result Value Ref Range    Ventricular Rate 75 BPM    Atrial Rate 394 BPM    QRS Duration 144 ms    Q-T Interval 450 ms    QTC Calculation (Bezet) 502 ms    Calculated R Axis -103 degrees    Calculated T Axis 39 degrees    Diagnosis       Ventricular-paced rhythm  When compared with ECG of 12-AUG-2017 03:06,  No significant change was found     CBC WITH AUTOMATED DIFF    Collection Time: 08/29/17 11:54 AM   Result Value Ref Range    WBC 5.1 3.6 - 11.0 K/uL    RBC 3.40 (L) 3.80 - 5.20 M/uL    HGB 9.3 (L) 11.5 - 16.0 g/dL    HCT 29.5 (L) 35.0 - 47.0 %    MCV 86.8 80.0 - 99.0 FL    MCH 27.4 26.0 - 34.0 PG    MCHC 31.5 30.0 - 36.5 g/dL    RDW 17.0 (H) 11.5 - 14.5 %    PLATELET 705 132 - 114 K/uL    NEUTROPHILS 67 %    LYMPHOCYTES 12 %    MONOCYTES 21 %    EOSINOPHILS 0 %    BASOPHILS 0 %    ABS. NEUTROPHILS 3.4 K/UL    ABS. LYMPHOCYTES 0.6 K/UL    ABS. MONOCYTES 1.1 K/UL    ABS. EOSINOPHILS 0.0 K/UL    ABS.  BASOPHILS 0.0 K/UL    RBC COMMENTS ANISOCYTOSIS  1+        RBC COMMENTS OVALOCYTES  PRESENT        RBC COMMENTS VISH CELLS  PRESENT        RBC COMMENTS POIKILOCYTOSIS  1+        DF MANUAL     METABOLIC PANEL, BASIC    Collection Time: 08/29/17 11:54 AM   Result Value Ref Range    Sodium 143 136 - 145 mmol/L    Potassium 3.3 (L) 3.5 - 5.1 mmol/L    Chloride 107 97 - 108 mmol/L    CO2 31 21 - 32 mmol/L    Anion gap 5 5 - 15 mmol/L    Glucose 107 (H) 65 - 100 mg/dL    BUN 11 6 - 20 MG/DL    Creatinine 0.81 0.55 - 1.02 MG/DL    BUN/Creatinine ratio 14 12 - 20      GFR est AA >60 >60 ml/min/1.73m2    GFR est non-AA >60 >60 ml/min/1.73m2    Calcium 8.6 8.5 - 10.1 MG/DL   TROPONIN I    Collection Time: 08/29/17 11:54 AM   Result Value Ref Range    Troponin-I, Qt. <0.04 <0.05 ng/mL       IMAGING RESULTS:  CXR Results  (Last 48 hours)               08/29/17 1145  XR CHEST PA LAT Final result    Impression:  Impression: Minimal bibasilar atelectasis. Stable cardiomegaly       Narrative:  Exam:  2 view chest       Indication: Right shoulder pain, history of multiple myeloma       Comparison to 8/11/2017. PA and lateral views demonstrate stable cardiomegaly. Pacer leads are unchanged   in position. Minimal bibasilar atelectasis is noted. The lungs are otherwise   clear. Degenerative changes are seen in the thoracic spine. MEDICATIONS GIVEN:  Medications   sodium chloride (NS) flush 5-10 mL (not administered)   sodium chloride (NS) flush 5-10 mL (not administered)   methylPREDNISolone (PF) (SOLU-MEDROL) injection 125 mg (125 mg IntraVENous Given 8/29/17 1156)   oxyCODONE IR (ROXICODONE) tablet 10 mg (10 mg Oral Given 8/29/17 1156)       IMPRESSION:  1. Upper back pain    2. History of multiple myeloma        PLAN:  1. Current Discharge Medication List      START taking these medications    Details   predniSONE (DELTASONE) 20 mg tablet Take 2 Tabs by mouth daily for 5 days. With Breakfast  Qty: 10 Tab, Refills: 0           2. Follow-up Information     Follow up With Details Comments [id number] SJuanpablo Butts MD  As needed [address]  [phone]          Return to ED if worse     Discharge Note:  1:08 PM  The pt is ready for discharge. The pt's signs, symptoms, diagnosis, and discharge instructions have been discussed and pt has conveyed their understanding. The pt is to follow up as recommended or return to ER should their symptoms worsen.  Plan has been discussed and pt is in agreement. This note is prepared by Pepito Taylor, acting as a Scribe for [facility], [address] [facility]: The scribe's documentation has been prepared under my direction and personally reviewed by me in its entirety. I confirm that the notes above accurately reflects all work, treatment, procedures, and medical decision making performed by me.

## 2017-08-29 NOTE — ED NOTES
Dr Neris Macario reviewed discharge instructions with the patient. The patient verbalized understanding. All questions and concerns were addressed. The patient declined a wheelchair and is discharged ambulatory in the care of family members with instructions and prescriptions in hand. Pt is alert and oriented x 4. Respirations are clear and unlabored.

## 2017-08-30 NOTE — PROGRESS NOTES
Verified patient with two identifiers. Spoke with patient regarding STRESS test results. Yadira Boone pt needs a follow up appt with Dr. Joanne Virgen. Thanks!

## 2017-08-31 ENCOUNTER — OFFICE VISIT (OUTPATIENT)
Dept: CARDIOLOGY CLINIC | Age: 77
End: 2017-08-31

## 2017-08-31 VITALS
BODY MASS INDEX: 28.1 KG/M2 | SYSTOLIC BLOOD PRESSURE: 122 MMHG | WEIGHT: 196.3 LBS | RESPIRATION RATE: 18 BRPM | HEIGHT: 70 IN | HEART RATE: 74 BPM | OXYGEN SATURATION: 99 % | DIASTOLIC BLOOD PRESSURE: 84 MMHG

## 2017-08-31 DIAGNOSIS — I42.9 CARDIOMYOPATHY, UNSPECIFIED TYPE (HCC): ICD-10-CM

## 2017-08-31 DIAGNOSIS — I50.41 ACUTE COMBINED SYSTOLIC AND DIASTOLIC HF (HEART FAILURE), NYHA CLASS 2 (HCC): Primary | ICD-10-CM

## 2017-08-31 DIAGNOSIS — I50.22 CHRONIC SYSTOLIC CONGESTIVE HEART FAILURE (HCC): ICD-10-CM

## 2017-08-31 DIAGNOSIS — R94.39 ABNORMAL NUCLEAR STRESS TEST: ICD-10-CM

## 2017-08-31 DIAGNOSIS — I10 ESSENTIAL HYPERTENSION: ICD-10-CM

## 2017-08-31 DIAGNOSIS — I48.92 ATRIAL FLUTTER, UNSPECIFIED TYPE (HCC): ICD-10-CM

## 2017-08-31 DIAGNOSIS — Z98.890 S/P AV NODAL ABLATION: ICD-10-CM

## 2017-08-31 NOTE — PROGRESS NOTES
NAME:  Stephanie Hager   :   1940   MRN:   [id number]   PCP:  Pérez Crowder MD           Subjective: The patient is a 68y.o. year old female  who returns for a test results with hx of A fib/cardiomyopathy, AVN ablation Bi V pacer placed. EF 25-30%. No complaints of chest discomfort. Unable to lie flat. Past Medical History:   Diagnosis Date    Acute combined systolic and diastolic HF (heart failure), NYHA class 2 (HCC) 2017    Arthritis     Atrial fibrillation (HCC)     Cancer (HCC)     multiple myeloma    Hypertension     S/P AV kathleen ablation 2017    Status post biventricular pacemaker 2017        Social History   Substance Use Topics    Smoking status: Never Smoker    Smokeless tobacco: Never Used    Alcohol use No      Family History   Problem Relation Age of Onset    Stroke Mother     No Known Problems Father         Review of Systems  Constitutional: Negative for fever, chills, and diaphoresis. Respiratory: Negative for cough, hemoptysis, sputum production, shortness of breath and wheezing. Reports orthopnea. Cardiovascular: Negative for chest pain, palpitations, orthopnea, claudication, leg swelling and PND. Gastrointestinal: Negative for heartburn, nausea, vomiting, blood in stool and melena. Genitourinary: Negative for dysuria and flank pain. Musculoskeletal: Negative for joint pain and back pain. Skin: Negative for rash. Neurological: Negative for focal weakness, seizures, loss of consciousness, weakness and headaches. Endo/Heme/Allergies: Does not bruise/bleed easily. Psychiatric/Behavioral: Negative for memory loss. The patient does not have insomnia. Objective:       Vitals:    17 1327 17 1336   BP: 120/70 122/84   Pulse: 74    Resp: 18    SpO2: 99%    Weight: 196 lb 4.8 oz (89 kg)    Height: 5' 10\" (1.778 m)     Body mass index is 28.17 kg/(m^2). General PE    Gen: NAD     Mental Status - Alert. General Appearance - Not in acute distress. Neck - no JVD     Chest and Lung Exam     Inspection: Accessory muscles - No use of accessory muscles in breathing. Auscultation:   Breath sounds: - Normal.     Cardiovascular   Inspection: Jugular vein - Bilateral - Inspection Normal.   Palpation/Percussion:   Apical Impulse: - Normal.   Auscultation: Rhythm - Regular. Heart Sounds - S1 WNL and S2 WNL. No S3 or S4. Murmurs & Other Heart Sounds: Auscultation of the heart reveals - No Murmurs. Peripheral Vascular   Upper Extremity: Inspection - Bilateral - No Cyanotic nailbeds or Digital clubbing. Lower Extremity:   Palpation: Edema - Bilateral - No edema. Abdomen: Soft, non-tender, bowel sounds are active. Neuro: A&O times 3, CN and motor grossly WNL      Data Review:       Stress Test The overall quality of the study is good. Attenuation artifact was noted. Left ventricular cavity size is noted to be upper normal.     SPECT images demonstrate a medium sized, low-grade partially reversible mid to distal anterior wall defect possibly consistent with low-grade ischemia and a medium, moderate grade predominantly fixed inferior wall defect most consistent with nontransmural infarction.  Possible contribution from soft tissue attenuation.  Specificity of perfusion defects for ischemia or infarction is reduced in the presence of dilated cardiomyopathy.  Gated SPECT images reveal mildly dilated LV with severe global hypokinesis with a calculated ejection fraction of 14%.  The calculated transient ischemic dilatation index is positive, although it is not visually obvious. Impression:   Myocardial perfusion imaging is abnormal. Overall left ventricular systolic function was calculated to be severely reduced.  Compared to the report from 9/8/2014, the current study reveals newly reduced calculated LVEF and new perfusion defects.      These test results indicate moderate to high likelihood for the presence of angiographically significant coronary artery disease.   Specificity of perfusion defects for ischemia or infarction is reduced in the presence of dilated cardiomyopathy.      Allergies reviewed  Allergies   Allergen Reactions    Codeine Other (comments)       Medications reviewed  Current Outpatient Prescriptions   Medication Sig    dexamethasone (DECADRON) 4 mg tablet Take 4 mg by mouth every seven (7) days.  potassium chloride (K-DUR, KLOR-CON) 20 mEq tablet Take 1 Tab by mouth two (2) times a day.  lisinopril (PRINIVIL, ZESTRIL) 40 mg tablet Take 0.5 Tabs by mouth daily.  apixaban (ELIQUIS) 5 mg tablet Take 1 Tab by mouth two (2) times a day.  furosemide (LASIX) 40 mg tablet Take 1 Tab by mouth daily.  metoprolol succinate (TOPROL-XL) 25 mg XL tablet Take 0.5 Tabs by mouth daily.  oxyCODONE-acetaminophen (PERCOCET 10)  mg per tablet Take 1 Tab by mouth every eight (8) hours as needed for Pain. Max Daily Amount: 3 Tabs.  lenalidomide (REVLIMID) 25 mg cap Take 25 mg by mouth daily.  predniSONE (DELTASONE) 20 mg tablet Take 2 Tabs by mouth daily for 5 days. With Breakfast    aluminum hydrox-magnesium carb (GAVISCON EXTRA STRENGTH) 160-105 mg chew Take 1-2 tablets by mouth four (4) times daily as needed. No current facility-administered medications for this visit. Assessment:       ICD-10-CM ICD-9-CM    1. Acute combined systolic and diastolic HF (heart failure), NYHA class 2 (HCC) I50.41 428.41 CARDIAC CATHETERIZATION   2. Atrial flutter, unspecified type ([address] I48.92 427.32 CARDIAC CATHETERIZATION   3. Cardiomyopathy, unspecified type ([facility] [address] I42.9 425.4 CARDIAC CATHETERIZATION   4. Chronic systolic congestive heart failure (HCC) I50.22 428.22 CARDIAC CATHETERIZATION     428.0    5. Essential hypertension I10 401.9 CARDIAC CATHETERIZATION   6. S/P AV kathleen ablation Z98.890 V45.89 CARDIAC CATHETERIZATION   7.  Abnormal nuclear stress test R94.39 794.39 CARDIAC CATHETERIZATION        Orders Placed This Encounter    CARDIAC CATHETERIZATION     Standing Status:   Future     Standing Expiration Date:   2/28/2018     Scheduling Instructions:      [id number]      Hold eliquis the day of procedure. Order Specific Question:   Reason for Exam:     Answer:   cardiomyopathy, afib, abnormal nuclear stress test.       Patient Active Problem List   Diagnosis Code    Chest pain R07.9    Osteoarthritis M19.90    Chest pain on exertion R07.9    HTN (hypertension) I10    Anemia D64.9    Chronic atrial fibrillation (HCC) I48.2    Low back pain M54.5    Complex renal cyst N28.1    Reflux esophagitis K21.0    Multiple myeloma (HCC) C90.00    Nocturia R35.1    Intractable back pain M54.9    Calf DVT (deep venous thrombosis) ([facility]) I82.4Z9    Venous insufficiency I87.2    Contusion of knee S80.00XA    Synovitis M65.9    Primary osteoarthritis of both knees M17.0    Jaw pain R68.84    Back pain M54.9    Synovitis of shoulder M65.819    Muscular deconditioning R29.898    Sialadenitis K11.20    CHF (congestive heart failure) ([facility]) I50.9    Atrial flutter ([facility]) I48.92    Cardiomyopathy ([facility]) I42.9    Acute pulmonary embolism ([facility]) I26.99    Acute combined systolic and diastolic HF (heart failure), NYHA class 2 ([facility]) I50.41    Status post biventricular pacemaker Z95.0    S/P AV kathleen ablation Z98.890       Plan:     Patient presents for follow up. 1. Ischemia per nuclear stress test - will schedule for cardiac cath. Pt verbalizes understanding and is in agreement with the plan. 2.  Systolic heart failure: compensated. Ejection fraction 25-30%: Has bi V pacemaker, on Toprol and ACEI. Consider Entretso and referral to heart failure. 3. Atrial fibrillation : S/p AV kathleen ablation and PPM.     Nidia Rico, TOD      [facility]    8/31/2017         Agree with note as outlined by  NP.  I confirm findings in history and physical exam. No additional findings noted. Agree with plan as outlined above. Cardiomyopathy with abnormal stress test. Procedure, risks, alternatives discussed with patient , son ( over phone ) and grand daughter.     Dorcas Guzman MD

## 2017-08-31 NOTE — MR AVS SNAPSHOT
Visit Information Date & Time Provider Department Dept. Phone Encounter #  
 8/31/2017  1:30 PM Mau Pastrana MD [facility] [phone] [id number] Your Appointments 9/21/2017  9:45 AM  
Any with Marni Montez MD  
[address] [place] [address] and Primary Care [address]) Appt Note: 2 month follow up; 1 month follow up  
 Josue Mcgarry [address] [facility]  
  
   
 [phone] [id number]  
  
    
 12/5/2017 11:15 AM  
PACEMAKER with PACEMAKER, [facility] [address]) Appt Note: 3mo bsc bivpm , s/p av node ablation [id number] [facility]  
[phone] [address] [facility]  
  
    
 12/5/2017 11:15 AM  
ESTABLISHED PATIENT with Alyse Cochran MD  
[facility] [address] [facility]) Appt Note: 3mo post implant [id number] [facility]  
[phone] [address] [facility] Upcoming Health Maintenance Date Due Pneumococcal 65+ High/Highest Risk (2 of 2 - PPSV23) 7/14/2016 INFLUENZA AGE 9 TO ADULT 8/1/2017 GLAUCOMA SCREENING Q2Y 9/15/2017* MEDICARE YEARLY EXAM 12/23/2017 DTaP/Tdap/Td series (2 - Td) 5/19/2026 *Topic was postponed. The date shown is not the original due date. Allergies as of 8/31/2017  Review Complete On: 8/31/2017 By: Dior Edwards LPN Severity Noted Reaction Type Reactions Codeine  09/05/2014    Other (comments) Current Immunizations  Reviewed on 8/29/2017 Name Date Influenza Vaccine 9/15/2014 Not reviewed this visit You Were Diagnosed With   
  
 Codes Comments Acute combined systolic and diastolic HF (heart failure), NYHA class 2 ([facility]    -  Primary ICD-10-CM: I50.41 ICD-9-CM: 428.41  Atrial flutter, unspecified type ([facility]     ICD-10-CM: Q17.16 
 ICD-9-CM: 427.32 Cardiomyopathy, unspecified type ([facility]     ICD-10-CM: I42.9 ICD-9-CM: 425. 4 Chronic systolic congestive heart failure (HCC)     ICD-10-CM: I50.22 ICD-9-CM: 428.22, 428.0 Essential hypertension     ICD-10-CM: I10 
ICD-9-CM: 401.9 S/P AV kathleen ablation     ICD-10-CM: E17.970 ICD-9-CM: V45.89 Abnormal nuclear stress test     ICD-10-CM: R94.39 
ICD-9-CM: 794.39 Vitals BP Pulse Resp Height(growth percentile) Weight(growth percentile) SpO2  
 122/84 (BP 1 Location: Right arm, BP Patient Position: Sitting) 74 18 5' 10\" (1.778 m) 196 lb 4.8 oz (89 kg) 99% BMI OB Status Smoking Status 28.17 kg/m2 Hysterectomy Never Smoker Vitals History BMI and BSA Data Body Mass Index Body Surface Area  
 28.17 kg/m 2 2.1 m 2 Preferred Pharmacy Pharmacy Name Phone [phone] [phone] Your Updated Medication List  
  
   
This list is accurate as of: 8/31/17  2:10 PM.  Always use your most recent med list.  
  
  
  
  
 apixaban 5 mg tablet Commonly known as:  Gwenetta [place] Take 1 Tab by mouth two (2) times a day. dexamethasone 4 mg tablet Commonly known as:  DECADRON Take 4 mg by mouth every seven (7) days. furosemide 40 mg tablet Commonly known as:  LASIX Take 1 Tab by mouth daily. GAVISCON EXTRA STRENGTH 160-105 mg Janine Ion Generic drug:  aluminum hydrox-magnesium carb Take 1-2 tablets by mouth four (4) times daily as needed. lisinopril 40 mg tablet Commonly known as:  Erika Jin Take 0.5 Tabs by mouth daily. metoprolol succinate 25 mg XL tablet Commonly known as:  TOPROL-XL Take 0.5 Tabs by mouth daily. oxyCODONE-acetaminophen  mg per tablet Commonly known as:  PERCOCET 10 Take 1 Tab by mouth every eight (8) hours as needed for Pain. Max Daily Amount: 3 Tabs. potassium chloride 20 mEq tablet Commonly known as:  K-DUR, KLOR-CON Take 1 Tab by mouth two (2) times a day. predniSONE 20 mg tablet Commonly known as:  Jose Gilmore Take 2 Tabs by mouth daily for 5 days. With Breakfast  
  
 REVLIMID 25 mg Cap Generic drug:  lenalidomide Take 25 mg by mouth daily. To-Do List   
 Around 08/31/2017 Cardiac Services:  CARDIAC CATHETERIZATION   
  
 09/07/2017 9:30 AM  
  Appointment with CATH LAB [facility] at [facility] CARDIAC CATH LAB ([phone]) NPO AFTER MIDNIGHT! ROUTINE CASES:  Please arrive 2 hours prior to your scheduled appointment time. If your scheduled appointment is for 7:30am, 8:00am or 8:15am, please arrive by 6:45am.  NON ROUTINE CASES:  1. If you require labs, x-ray, EKG or meds-please arrive 3 hours prior to your appointment time. 2.  If you require hydration prior to your procedure-please arrive 4 hours prior to your appointment time. ** It is the [facility] responsibility to notify the Cath Lab  of any prep required outside of the normal routine case**   Patient needs to arrive 2 hours before their appointment time. IF Pt requires LABS, EKG, MEDS, X-RAY, please arrive 3 hours prior to appontment time. IF Pt requires HYDRATION, please arrive 4 hours prior to appointment time. Introducing [facility]! [address] introduces [employer] patient portal. Now you can access parts of your medical record, email your doctor's office, and request medication refills online. 1. In your internet browser, go to [email or web address] 2. Click on the First Time User? Click Here link in the Sign In box. You will see the New Member Sign Up page. 3. Enter your [employer] Access Code exactly as it appears below. You will not need to use this code after youve completed the sign-up process. If you do not sign up before the expiration date, you must request a new code. · [employer] Access Code: [email or web address]- Expires: 11/15/2017  8:14 AM 
 
4. Enter the last four digits of your Social Security Number (xxxx) and Date of Birth (mm/dd/yyyy) as indicated and click Submit. You will be taken to the next sign-up page. 5. Create a [employer] ID. This will be your [employer] login ID and cannot be changed, so think of one that is secure and easy to remember. 6. Create a [employer] password. You can change your password at any time. 7. Enter your Password Reset Question and Answer. This can be used at a later time if you forget your password. 8. Enter your e-mail address. You will receive e-mail notification when new information is available in [address] 9. Click Sign Up. You can now view and download portions of your medical record. 10. Click the Download Summary menu link to download a portable copy of your medical information. If you have questions, please visit the Frequently Asked Questions section of the [employer] website. Remember, [employer] is NOT to be used for urgent needs. For medical emergencies, dial 911. Now available from your iPhone and Android! Please provide this summary of care documentation to your next provider. Your primary care clinician is listed as Deanna Taylor. If you have any questions after today's visit, please call [phone].

## 2017-09-07 ENCOUNTER — HOSPITAL ENCOUNTER (OUTPATIENT)
Dept: CARDIAC CATH/INVASIVE PROCEDURES | Age: 77
Discharge: HOME | End: 2017-09-07
Attending: INTERNAL MEDICINE | Admitting: INTERNAL MEDICINE
Payer: MEDICARE

## 2017-09-07 VITALS
SYSTOLIC BLOOD PRESSURE: 121 MMHG | RESPIRATION RATE: 18 BRPM | TEMPERATURE: 97.9 F | BODY MASS INDEX: 28.2 KG/M2 | OXYGEN SATURATION: 100 % | WEIGHT: 197 LBS | HEART RATE: 75 BPM | DIASTOLIC BLOOD PRESSURE: 95 MMHG | HEIGHT: 70 IN

## 2017-09-07 DIAGNOSIS — I42.9 CARDIOMYOPATHY, UNSPECIFIED TYPE (HCC): ICD-10-CM

## 2017-09-07 DIAGNOSIS — I48.92 ATRIAL FLUTTER, UNSPECIFIED TYPE (HCC): ICD-10-CM

## 2017-09-07 DIAGNOSIS — I10 ESSENTIAL HYPERTENSION: ICD-10-CM

## 2017-09-07 DIAGNOSIS — I50.41 ACUTE COMBINED SYSTOLIC AND DIASTOLIC HF (HEART FAILURE), NYHA CLASS 2 (HCC): ICD-10-CM

## 2017-09-07 DIAGNOSIS — Z98.890 S/P AV NODAL ABLATION: ICD-10-CM

## 2017-09-07 DIAGNOSIS — I50.22 CHRONIC SYSTOLIC CONGESTIVE HEART FAILURE (HCC): ICD-10-CM

## 2017-09-07 DIAGNOSIS — R94.39 ABNORMAL NUCLEAR STRESS TEST: ICD-10-CM

## 2017-09-07 LAB
INR PPP: 1.1 (ref 0.9–1.1)
PROTHROMBIN TIME: 11.5 SEC (ref 9–11.1)

## 2017-09-07 PROCEDURE — 77030008543 HC TBNG MON PRSS MRTM -A

## 2017-09-07 PROCEDURE — 99152 MOD SED SAME PHYS/QHP 5/>YRS: CPT

## 2017-09-07 PROCEDURE — 85610 PROTHROMBIN TIME: CPT | Performed by: INTERNAL MEDICINE

## 2017-09-07 PROCEDURE — 77030004549 HC CATH ANGI DX PRF MRTM -A

## 2017-09-07 PROCEDURE — C1894 INTRO/SHEATH, NON-LASER: HCPCS

## 2017-09-07 PROCEDURE — 77030019569 HC BND COMPR RAD TERU -B

## 2017-09-07 PROCEDURE — C1769 GUIDE WIRE: HCPCS

## 2017-09-07 PROCEDURE — 77030028837 HC SYR ANGI PWR INJ COEU -A

## 2017-09-07 PROCEDURE — 77030010221 HC SPLNT WR POS TELE -B

## 2017-09-07 PROCEDURE — 74011000250 HC RX REV CODE- 250

## 2017-09-07 PROCEDURE — 74011636320 HC RX REV CODE- 636/320

## 2017-09-07 PROCEDURE — 77030003631 HC NDL PERC VASC TELE -B

## 2017-09-07 PROCEDURE — 77030015766

## 2017-09-07 PROCEDURE — 74011250636 HC RX REV CODE- 250/636

## 2017-09-07 PROCEDURE — 74011250636 HC RX REV CODE- 250/636: Performed by: INTERNAL MEDICINE

## 2017-09-07 PROCEDURE — 77030019698 HC SYR ANGI MDLON MRTM -A

## 2017-09-07 PROCEDURE — 74011250637 HC RX REV CODE- 250/637: Performed by: INTERNAL MEDICINE

## 2017-09-07 PROCEDURE — 36415 COLL VENOUS BLD VENIPUNCTURE: CPT | Performed by: INTERNAL MEDICINE

## 2017-09-07 RX ORDER — FENTANYL CITRATE 50 UG/ML
25-50 INJECTION, SOLUTION INTRAMUSCULAR; INTRAVENOUS
Status: DISCONTINUED | OUTPATIENT
Start: 2017-09-07 | End: 2017-09-07

## 2017-09-07 RX ORDER — LIDOCAINE HYDROCHLORIDE 10 MG/ML
1-30 INJECTION, SOLUTION EPIDURAL; INFILTRATION; INTRACAUDAL; PERINEURAL
Status: DISCONTINUED | OUTPATIENT
Start: 2017-09-07 | End: 2017-09-07

## 2017-09-07 RX ORDER — VERAPAMIL HYDROCHLORIDE 2.5 MG/ML
2.5 INJECTION, SOLUTION INTRAVENOUS ONCE
Status: COMPLETED | OUTPATIENT
Start: 2017-09-07 | End: 2017-09-07

## 2017-09-07 RX ORDER — VERAPAMIL HYDROCHLORIDE 2.5 MG/ML
INJECTION, SOLUTION INTRAVENOUS
Status: COMPLETED
Start: 2017-09-07 | End: 2017-09-07

## 2017-09-07 RX ORDER — SODIUM CHLORIDE 0.9 % (FLUSH) 0.9 %
5-10 SYRINGE (ML) INJECTION EVERY 8 HOURS
Status: DISCONTINUED | OUTPATIENT
Start: 2017-09-07 | End: 2017-09-07 | Stop reason: HOSPADM

## 2017-09-07 RX ORDER — HEPARIN SODIUM 200 [USP'U]/100ML
500 INJECTION, SOLUTION INTRAVENOUS ONCE
Status: COMPLETED | OUTPATIENT
Start: 2017-09-07 | End: 2017-09-07

## 2017-09-07 RX ORDER — SODIUM CHLORIDE 0.9 % (FLUSH) 0.9 %
5-10 SYRINGE (ML) INJECTION AS NEEDED
Status: DISCONTINUED | OUTPATIENT
Start: 2017-09-07 | End: 2017-09-07 | Stop reason: HOSPADM

## 2017-09-07 RX ORDER — HEPARIN SODIUM 200 [USP'U]/100ML
INJECTION, SOLUTION INTRAVENOUS
Status: COMPLETED
Start: 2017-09-07 | End: 2017-09-07

## 2017-09-07 RX ORDER — NITROGLYCERIN 400 UG/1
1 SPRAY ORAL AS NEEDED
Status: DISCONTINUED | OUTPATIENT
Start: 2017-09-07 | End: 2017-09-07

## 2017-09-07 RX ORDER — HEPARIN SODIUM 1000 [USP'U]/ML
INJECTION, SOLUTION INTRAVENOUS; SUBCUTANEOUS
Status: COMPLETED
Start: 2017-09-07 | End: 2017-09-07

## 2017-09-07 RX ORDER — MIDAZOLAM HYDROCHLORIDE 1 MG/ML
.5-2 INJECTION, SOLUTION INTRAMUSCULAR; INTRAVENOUS
Status: DISCONTINUED | OUTPATIENT
Start: 2017-09-07 | End: 2017-09-07

## 2017-09-07 RX ORDER — MIDAZOLAM HYDROCHLORIDE 1 MG/ML
INJECTION, SOLUTION INTRAMUSCULAR; INTRAVENOUS
Status: COMPLETED
Start: 2017-09-07 | End: 2017-09-07

## 2017-09-07 RX ORDER — LIDOCAINE HYDROCHLORIDE 10 MG/ML
INJECTION, SOLUTION EPIDURAL; INFILTRATION; INTRACAUDAL; PERINEURAL
Status: COMPLETED
Start: 2017-09-07 | End: 2017-09-07

## 2017-09-07 RX ORDER — HEPARIN SODIUM 1000 [USP'U]/ML
1000-10000 INJECTION, SOLUTION INTRAVENOUS; SUBCUTANEOUS
Status: DISCONTINUED | OUTPATIENT
Start: 2017-09-07 | End: 2017-09-07

## 2017-09-07 RX ORDER — FENTANYL CITRATE 50 UG/ML
INJECTION, SOLUTION INTRAMUSCULAR; INTRAVENOUS
Status: COMPLETED
Start: 2017-09-07 | End: 2017-09-07

## 2017-09-07 RX ADMIN — MIDAZOLAM HYDROCHLORIDE 1 MG: 1 INJECTION, SOLUTION INTRAMUSCULAR; INTRAVENOUS at 09:27

## 2017-09-07 RX ADMIN — HEPARIN SODIUM 1000 UNITS: 200 INJECTION, SOLUTION INTRAVENOUS at 09:41

## 2017-09-07 RX ADMIN — MIDAZOLAM HYDROCHLORIDE 1 MG: 1 INJECTION INTRAMUSCULAR; INTRAVENOUS at 09:27

## 2017-09-07 RX ADMIN — HEPARIN SODIUM 1000 UNITS: 200 INJECTION, SOLUTION INTRAVENOUS at 09:43

## 2017-09-07 RX ADMIN — VERAPAMIL HYDROCHLORIDE 2.5 MG: 2.5 INJECTION, SOLUTION INTRAVENOUS at 09:49

## 2017-09-07 RX ADMIN — LIDOCAINE HYDROCHLORIDE 2 ML: 10 INJECTION, SOLUTION EPIDURAL; INFILTRATION; INTRACAUDAL; PERINEURAL at 09:40

## 2017-09-07 RX ADMIN — NITROGLYCERIN 200 MCG: 5 INJECTION, SOLUTION INTRAVENOUS at 09:49

## 2017-09-07 RX ADMIN — MIDAZOLAM HYDROCHLORIDE 1 MG: 1 INJECTION, SOLUTION INTRAMUSCULAR; INTRAVENOUS at 09:51

## 2017-09-07 RX ADMIN — VERAPAMIL HYDROCHLORIDE 2.5 MG: 2.5 INJECTION INTRAVENOUS at 09:49

## 2017-09-07 RX ADMIN — IOPAMIDOL 50 ML: 755 INJECTION, SOLUTION INTRAVENOUS at 09:39

## 2017-09-07 RX ADMIN — FENTANYL CITRATE 50 MCG: 50 INJECTION, SOLUTION INTRAMUSCULAR; INTRAVENOUS at 09:27

## 2017-09-07 RX ADMIN — HEPARIN SODIUM 2500 UNITS: 1000 INJECTION, SOLUTION INTRAVENOUS; SUBCUTANEOUS at 09:50

## 2017-09-07 RX ADMIN — IOPAMIDOL 18 ML: 755 INJECTION, SOLUTION INTRAVENOUS at 10:04

## 2017-09-07 RX ADMIN — Medication 1 SPRAY: at 09:44

## 2017-09-07 NOTE — DISCHARGE INSTRUCTIONS
[address] [phone]        Patient ID:  Endy Contreras  [id number]  31 y.o.  1940    Admit Date: 9/7/2017    Discharge Date: 9/7/2017     Admitting Physician: Karina Herbert MD     Discharge Physician: Fan Collins NP/Dr. Reshma Rodriguez    Admission Diagnoses:   Acute combined systolic and diastolic HF (heart failure), NYHA class 2 ([address] [I50.41]  Atrial flutter, unspecified type ([address] [I48.92]  Cardiomyopathy, unspecified type ([address] [D20.0]  Chronic systolic congestive heart failure ([address] [I50.22]  Essential hypertension [I10]  S/P AV kathleen ablation [Z98.890]  Abnormal nuclear stress test [R94.39]    Discharge Diagnoses: Active Problems:    S/P cardiac cath (9/7/2017)      Overview: 9/7/17:  No significant CAD        Discharge Condition: Good    Cardiology Procedures this Admission:  Diagnostic left heart catheterization    Disposition: home    Reference discharge instructions provided by nursing for diet and activity. Signed:  Fan Collins NP  9/7/2017  2:19 PM      Radial Cardiac Catheterization/Angiography Discharge Instructions    It is normal to feel tired the first couple days. Take it easy and follow the physicians instructions. CHECK THE CATHETER INSERTION SITE DAILY:    Remove the wrist dressing 24 hours after the procedure. You may shower 24 hours after the procedure. Wash with soap and water and pat dry. Gentle cleaning of the site with soap and water is sufficient, cover with a dry clean dressing or bandage. Do not apply creams or powders to the area. No soaking the wrist for 3 days. Leave the puncture site open to air after 24 hours post-procedure. CALL THE PHYSICIANS:     If the site becomes red, swollen or feels warm to the touch  If there is bleeding or drainage or if there is unusual pain at the radial site. If there is any minor oozing, you may apply a band-aid and remove after 12 hours. If the bleeding continues, hold pressure with the middle finger against the puncture site and the thumb against the back of the wrist,call 911 to be transported to the hospital.  DO NOT DRIVE YOURSELF, Tobias Matute. ACTIVITY:   For the first 24 hours do not manipulate the wrist.  No lifting, pushing or pulling over 3-5 pounds with the affected wrist for 7 daysand no straining the insertion site. Do not life grocery bags or the garbage can, do not run the vacuum  or  for 7 days. Start with short walks as in the hospital and gradually increase as tolerated each day. It is recommended to walk 30 minutes 5-7 days per week. Follow your physicians instructions on activity. Avoid walking outside in extremes of heat or cold. Walk inside when it is cold and windy or hot and humid. Things to keep in mind:  No driving for at least 24 hours, or as designated by your physician. Limit the number of times you go up and down the stairs  Take rests and pace yourself with activity. Be careful and do not strain with bowel movements. MEDICATIONS:    Take all medications as prescribed  Call your physician if you have any questions  Keep an updated list of your medications with you at all times and give a list to your physician and pharmacist    SIGNS AND SYMPTOMS:   Be cautious of symptoms of angina or recurrent symptoms such as chest discomfort, unusual shortness of breath or fatigue. These could be symptoms of restenosis, a new blockage or a heart attack. If your symptoms are relieved with rest it is still recommended that you notify your physician of recurrent chest pain or discomfort. For CHEST PAIN or symptoms of angina not relieved with rest:  If the discomfort is not relieved with rest, and you have been prescribed Nitroglycerin, take as directed (taken under the tongue, one at a time 5 minutes apart for a total of 3 doses).  If the discomfort is not relieved after the 3rd nitroglycerin, call 911. If you have not been prescribed Nitroglycerin  and your chest discomfort is not relieved with rest, call 911. AFTER CARE:   Follow up with your physician as instructed. Follow a heart healthy diet with proper portion control, daily stress management, daily exercise, blood pressure and cholesterol control , and smoking cessation.

## 2017-09-07 NOTE — INTERVAL H&P NOTE
H&P Update:  Rani Merrill was seen and examined. History and physical has been reviewed. The patient has been examined.  There have been no significant clinical changes since the completion of the originally dated History and Physical.    Signed By: Dakota De Leon MD     September 7, 2017 9:24 AM

## 2017-09-07 NOTE — PROCEDURES
LM-normal  LAD-normal   LCX-normal .   RCA-normal.   LV-dilated, LVEF 40%, LVEDP 8..   Right radial access. No complications. EBL-minimal.   Specimen-none. Medical management.

## 2017-09-07 NOTE — PROGRESS NOTES
1020-pt arrived to room, patent hemostasis achieved, TR band @ 11, no bleeding/hematoma, VS stable, no complaints of pain/SOB  1135-TR band removal started, VS stable, no signs of bleeding  1140-slight oozing at site  1155-TR band off, VS stable, no bleeding/bruising  1300-pt ambulated in hallway, no complaints of pain, right radial site clean/dry/intact, VS stable post walk  1420-dicussed discharge instructions with patient and family member, no further questions.

## 2017-09-07 NOTE — PROGRESS NOTES
Luis Bae is recovering post-procedure. R radial site dressing is CDI without swelling or bleeding. VSS. Rhythm v-paced with underlying flutter. Luis Bae denies complaints at this time. If recovery continues to progress without complication, discharge is planned for later today.           Abdifatah Campos, VIN  DNP, RN, AGACNP-BC

## 2017-09-07 NOTE — IP AVS SNAPSHOT
[address] [facility] 
[phone] Patient: Shalini Dey MRN: [id number] HMT:0/31/0849 You are allergic to the following Allergen Reactions Codeine Other (comments) Recent Documentation Height Weight BMI OB Status Smoking Status 1.778 m 89.4 kg 28.27 kg/m2 Hysterectomy Never Smoker Emergency Contacts Name Discharge Info Relation Home Work Mobile Lucas Fofana DISCHARGE CAREGIVER [3] Child [2] [phone] CAREGIVER [3] Child [2] [phone] About your hospitalization You were admitted on:  September 7, 2017 You last received care in the:  [facility] 2 INTRVNTNL CARDIO You were discharged on:  September 7, 2017 Unit phone number:  [phone] Why you were hospitalized Your primary diagnosis was:  Not on File Your diagnoses also included:  S/P Cardiac Cath Providers Seen During Your Hospitalizations Provider Role Specialty Primary office phone Jan Metcalf MD Attending Provider Cardiology [phone] Your Primary Care Physician (PCP) Primary Care Physician Office Phone Office Fax 104 [facility] [phone] Follow-up Information Follow up With Details Comments Contact Info Sharri Castellanos MD   [facility] [address] [place] [address] 
[phone] Jan Metcalf MD Go on 9/18/2017 11:30 AM for hospital follow-up:  please arrive at 11:15AM [address] [facility] 
[phone] Your Appointments Tuesday September 12, 2017  2:00 PM EDT  
(Arrive by 1:30 PM) ECHO with [facility], [facility]  
[facility] NON-INVASIVE CARD ([facility]) [address] [facility]  
[phone] Please be prepared to remove everything from the waist up and put on a gown. 2nd Floor Registration Area- Lobby of the 2nd Floor of [facility] Telephone: [phone] Main Fax: [phone] Nuc Med Fax: [phone] Send ALL pt scheduled for Cardiology, Nuc Med, and Stress to this location. Pt will enter hospital Main Lobby and take elevators to 2nd Floor. Registration area is located directly behind the Information Desk. ** Pt will need to arrive 30 min prior unless appointment prep/ patient instructions indicate otherwise** Please note that 2nd floor registration at [facility] closes at 2:45, Monday- Friday and is closed on weekends. if a patient has an appointment that will register after 2:45 or on the weekend, please direct them to [place] [address] Patient Access Registration. They will have a volunteer walk them to the second floor after they are registered. Monday September 18, 2017 11:30 AM EDT HOSPITAL FOLLOW-UP with Geo Jones MD  
[facility]) [address] [place] [address]  
[phone] Thursday September 21, 2017  9:45 AM EDT Any with Arben Nix MD  
[address] [place] [address] and Primary Care [facility]) [address] [address] [phone] Current Discharge Medication List  
  
CONTINUE these medications which have NOT CHANGED Dose & Instructions Dispensing Information Comments Morning Noon Evening Bedtime  
 apixaban 5 mg tablet Commonly known as:  Alvera Gouge Your last dose was: Your next dose is:    
   
   
 Dose:  5 mg Take 1 Tab by mouth two (2) times a day. Quantity:  60 Tab Refills:  3  
     
   
   
   
  
 dexamethasone 4 mg tablet Commonly known as:  DECADRON Your last dose was: Your next dose is:    
   
   
 Dose:  4 mg Take 4 mg by mouth every seven (7) days. Refills:  0 furosemide 40 mg tablet Commonly known as:  LASIX Your last dose was: Your next dose is:    
   
   
 Dose:  40 mg Take 1 Tab by mouth daily. Quantity:  30 Tab Refills:  3 GAVISCON EXTRA STRENGTH 160-105 mg Lige Hedge Generic drug:  aluminum hydrox-magnesium carb Your last dose was: Your next dose is:    
   
   
 Dose:  1-2 Tab Take 1-2 tablets by mouth four (4) times daily as needed. Refills:  0  
     
   
   
   
  
 lisinopril 40 mg tablet Commonly known as:  Rkissy Shayne Your last dose was: Your next dose is:    
   
   
 Dose:  20 mg Take 0.5 Tabs by mouth daily. Quantity:  30 Tab Refills:  3  
     
   
   
   
  
 metoprolol succinate 25 mg XL tablet Commonly known as:  TOPROL-XL Your last dose was: Your next dose is:    
   
   
 Dose:  12.5 mg Take 0.5 Tabs by mouth daily. Quantity:  30 Tab Refills:  11  
     
   
   
   
  
 oxyCODONE-acetaminophen  mg per tablet Commonly known as:  PERCOCET 10 Your last dose was: Your next dose is:    
   
   
 Dose:  1 Tab Take 1 Tab by mouth every eight (8) hours as needed for Pain. Max Daily Amount: 3 Tabs. Quantity:  75 Tab Refills:  0  
     
   
   
   
  
 potassium chloride 20 mEq tablet Commonly known as:  K-DUR, KLOR-CON Your last dose was: Your next dose is:    
   
   
 Dose:  20 mEq Take 1 Tab by mouth two (2) times a day. Quantity:  60 Tab Refills:  11  
     
   
   
   
  
 REVLIMID 25 mg Cap Generic drug:  lenalidomide Your last dose was: Your next dose is:    
   
   
 Dose:  25 mg Take 25 mg by mouth daily. Refills:  0 Discharge Instructions [address] [place] [address] [address] [facility]  [phone] Patient ID: 
Latonya Boogie [id number] 
47 y.o. 
1940 Admit Date: 9/7/2017 Discharge Date: 9/7/2017 Admitting Physician: Mau Pastrana MD  
 
Discharge Physician: Vito Pate NP/Dr. Jennifer Jane Admission Diagnoses:  
Acute combined systolic and diastolic HF (heart failure), NYHA class 2 ([address] [I50.41] Atrial flutter, unspecified type ([address] [I48.92] Cardiomyopathy, unspecified type ([address] [I42.9] Chronic systolic congestive heart failure ([address] [I50.22] Essential hypertension [I10] S/P AV kathleen ablation [Z98.890] Abnormal nuclear stress test [R94.39] Discharge Diagnoses: Active Problems: S/P cardiac cath (9/7/2017) Overview: 9/7/17:  No significant CAD Discharge Condition: Good Cardiology Procedures this Admission:  Diagnostic left heart catheterization Disposition: home Reference discharge instructions provided by nursing for diet and activity. Signed: 
Vito Pate NP 
9/7/2017 
2:19 PM 
 
 
Radial Cardiac Catheterization/Angiography Discharge Instructions It is normal to feel tired the first couple days. Take it easy and follow the physicians instructions. CHECK THE CATHETER INSERTION SITE DAILY: 
 
Remove the wrist dressing 24 hours after the procedure. You may shower 24 hours after the procedure. Wash with soap and water and pat dry. Gentle cleaning of the site with soap and water is sufficient, cover with a dry clean dressing or bandage. Do not apply creams or powders to the area. No soaking the wrist for 3 days. Leave the puncture site open to air after 24 hours post-procedure. CALL THE PHYSICIANS:  
 
If the site becomes red, swollen or feels warm to the touch If there is bleeding or drainage or if there is unusual pain at the radial site. If there is any minor oozing, you may apply a band-aid and remove after 12 hours.   
If the bleeding continues, hold pressure with the middle finger against the puncture site and the thumb against the back of the wrist,call 911 to be transported to the hospital. 
DO NOT DRIVE YOURSELF, OR HAVE ANYONE ELSE DRIVE YOU  CALL 228. ACTIVITY:  
For the first 24 hours do not manipulate the wrist. 
No lifting, pushing or pulling over 3-5 pounds with the affected wrist for 7 daysand no straining the insertion site. Do not life grocery bags or the garbage can, do not run the vacuum  or  for 7 days. Start with short walks as in the hospital and gradually increase as tolerated each day. It is recommended to walk 30 minutes 5-7 days per week. Follow your physicians instructions on activity. Avoid walking outside in extremes of heat or cold. Walk inside when it is cold and windy or hot and humid. Things to keep in mind: 
No driving for at least 24 hours, or as designated by your physician. Limit the number of times you go up and down the stairs Take rests and pace yourself with activity. Be careful and do not strain with bowel movements. MEDICATIONS: 
 
Take all medications as prescribed Call your physician if you have any questions Keep an updated list of your medications with you at all times and give a list to your physician and pharmacist 
 
SIGNS AND SYMPTOMS:  
Be cautious of symptoms of angina or recurrent symptoms such as chest discomfort, unusual shortness of breath or fatigue. These could be symptoms of restenosis, a new blockage or a heart attack. If your symptoms are relieved with rest it is still recommended that you notify your physician of recurrent chest pain or discomfort. For CHEST PAIN or symptoms of angina not relieved with rest:  If the discomfort is not relieved with rest, and you have been prescribed Nitroglycerin, take as directed (taken under the tongue, one at a time 5 minutes apart for a total of 3 doses). If the discomfort is not relieved after the 3rd nitroglycerin, call 911. If you have not been prescribed Nitroglycerin  and your chest discomfort is not relieved with rest, call 911. AFTER CARE:  
Follow up with your physician as instructed. Follow a heart healthy diet with proper portion control, daily stress management, daily exercise, blood pressure and cholesterol control , and smoking cessation. Discharge Orders None ACO Transitions of Care Introducing [address] [employer] offers a voluntary care coordination program to provide high quality service and care to [facility] fee-for-service beneficiaries. Devonte Ng was designed to help you enhance your health and well-being through the following services: ? Transitions of Care  support for individuals who are transitioning from one care setting to another (example: Hospital to home). ? Chronic and Complex Care Coordination  support for individuals and caregivers of those with serious or chronic illnesses or with more than one chronic (ongoing) condition and those who take a number of different medications. If you meet specific medical criteria, a [facility] [address] may call you directly to coordinate your care with your primary care physician and your other care providers. For questions about the [facility] programs, please, contact your physicians office. For general questions or additional information about Accountable Care Organizations: 
Please visit www.medicare.gov/acos. html or call 1-800-MEDICARE (0-171.306.2395) TTY users should call 8-558.852.9909. Introducing [facility]! Mateus Aviles introduces [employer] patient portal. Now you can access parts of your medical record, email your doctor's office, and request medication refills online. 1. In your internet browser, go to [email or web address]://[employer]. [employer] 2. Click on the First Time User? Click Here link in the Sign In box. You will see the New Member Sign Up page. 3. Enter your [employer] Access Code exactly as it appears below.  You will not need to use this code after youve completed the sign-up process. If you do not sign up before the expiration date, you must request a new code. · [employer] Access Code: [email or web address] Expires: 11/15/2017  8:14 AM 
 
4. Enter the last four digits of your Social Security Number (xxxx) and Date of Birth (mm/dd/yyyy) as indicated and click Submit. You will be taken to the next sign-up page. 5. Create a [employer] ID. This will be your [employer] login ID and cannot be changed, so think of one that is secure and easy to remember. 6. Create a [employer] password. You can change your password at any time. 7. Enter your Password Reset Question and Answer. This can be used at a later time if you forget your password. 8. Enter your e-mail address. You will receive e-mail notification when new information is available in [address] 9. Click Sign Up. You can now view and download portions of your medical record. 10. Click the Download Summary menu link to download a portable copy of your medical information. If you have questions, please visit the Frequently Asked Questions section of the [employer] website. Remember, [employer] is NOT to be used for urgent needs. For medical emergencies, dial 911. Now available from your iPhone and Android! General Information Please provide this summary of care documentation to your next provider. Patient Signature:  ____________________________________________________________ Date:  ____________________________________________________________  
  
Lorrie Little Provider Signature:  ____________________________________________________________ Date:  ____________________________________________________________

## 2017-09-07 NOTE — IP AVS SNAPSHOT
[address] [facility] 
[phone] Patient: Salvatore Zuniga MRN: [id number] VVZ:4/71/5894 Current Discharge Medication List  
  
CONTINUE these medications which have NOT CHANGED Dose & Instructions Dispensing Information Comments Morning Noon Evening Bedtime  
 apixaban 5 mg tablet Commonly known as:  Lis Mao Your last dose was: Your next dose is:    
   
   
 Dose:  5 mg Take 1 Tab by mouth two (2) times a day. Quantity:  60 Tab Refills:  3  
     
   
   
   
  
 dexamethasone 4 mg tablet Commonly known as:  DECADRON Your last dose was: Your next dose is:    
   
   
 Dose:  4 mg Take 4 mg by mouth every seven (7) days. Refills:  0  
     
   
   
   
  
 furosemide 40 mg tablet Commonly known as:  LASIX Your last dose was: Your next dose is:    
   
   
 Dose:  40 mg Take 1 Tab by mouth daily. Quantity:  30 Tab Refills:  3 GAVISCON EXTRA STRENGTH 160-105 mg Nestor Spikes Generic drug:  aluminum hydrox-magnesium carb Your last dose was: Your next dose is:    
   
   
 Dose:  1-2 Tab Take 1-2 tablets by mouth four (4) times daily as needed. Refills:  0  
     
   
   
   
  
 lisinopril 40 mg tablet Commonly known as:  [place] Your last dose was: Your next dose is:    
   
   
 Dose:  20 mg Take 0.5 Tabs by mouth daily. Quantity:  30 Tab Refills:  3  
     
   
   
   
  
 metoprolol succinate 25 mg XL tablet Commonly known as:  TOPROL-XL Your last dose was: Your next dose is:    
   
   
 Dose:  12.5 mg Take 0.5 Tabs by mouth daily. Quantity:  30 Tab Refills:  11  
     
   
   
   
  
 oxyCODONE-acetaminophen  mg per tablet Commonly known as:  PERCOCET 10 Your last dose was: Your next dose is:    
   
   
 Dose:  1 Tab Take 1 Tab by mouth every eight (8) hours as needed for Pain. Max Daily Amount: 3 Tabs. Quantity:  75 Tab Refills:  0  
     
   
   
   
  
 potassium chloride 20 mEq tablet Commonly known as:  K-TATO, KLOR-CON Your last dose was: Your next dose is:    
   
   
 Dose:  20 mEq Take 1 Tab by mouth two (2) times a day. Quantity:  60 Tab Refills:  11  
     
   
   
   
  
 REVLIMID 25 mg Cap Generic drug:  lenalidomide Your last dose was: Your next dose is:    
   
   
 Dose:  25 mg Take 25 mg by mouth daily. Refills:  0

## 2017-09-07 NOTE — H&P (VIEW-ONLY)
NAME:  Vanessa Weinstein   :   1940   MRN:   [id number]   PCP:  Trino Howell MD           Subjective: The patient is a 68y.o. year old female  who returns for a test results with hx of A fib/cardiomyopathy, AVN ablation Bi V pacer placed. EF 25-30%. No complaints of chest discomfort. Unable to lie flat. Past Medical History:   Diagnosis Date    Acute combined systolic and diastolic HF (heart failure), NYHA class 2 (HCC) 2017    Arthritis     Atrial fibrillation (HCC)     Cancer (HCC)     multiple myeloma    Hypertension     S/P AV kathleen ablation 2017    Status post biventricular pacemaker 2017        Social History   Substance Use Topics    Smoking status: Never Smoker    Smokeless tobacco: Never Used    Alcohol use No      Family History   Problem Relation Age of Onset    Stroke Mother     No Known Problems Father         Review of Systems  Constitutional: Negative for fever, chills, and diaphoresis. Respiratory: Negative for cough, hemoptysis, sputum production, shortness of breath and wheezing. Reports orthopnea. Cardiovascular: Negative for chest pain, palpitations, orthopnea, claudication, leg swelling and PND. Gastrointestinal: Negative for heartburn, nausea, vomiting, blood in stool and melena. Genitourinary: Negative for dysuria and flank pain. Musculoskeletal: Negative for joint pain and back pain. Skin: Negative for rash. Neurological: Negative for focal weakness, seizures, loss of consciousness, weakness and headaches. Endo/Heme/Allergies: Does not bruise/bleed easily. Psychiatric/Behavioral: Negative for memory loss. The patient does not have insomnia. Objective:       Vitals:    17 1327 17 1336   BP: 120/70 122/84   Pulse: 74    Resp: 18    SpO2: 99%    Weight: 196 lb 4.8 oz (89 kg)    Height: 5' 10\" (1.778 m)     Body mass index is 28.17 kg/(m^2). General PE    Gen: NAD     Mental Status - Alert. General Appearance - Not in acute distress. Neck - no JVD     Chest and Lung Exam     Inspection: Accessory muscles - No use of accessory muscles in breathing. Auscultation:   Breath sounds: - Normal.     Cardiovascular   Inspection: Jugular vein - Bilateral - Inspection Normal.   Palpation/Percussion:   Apical Impulse: - Normal.   Auscultation: Rhythm - Regular. Heart Sounds - S1 WNL and S2 WNL. No S3 or S4. Murmurs & Other Heart Sounds: Auscultation of the heart reveals - No Murmurs. Peripheral Vascular   Upper Extremity: Inspection - Bilateral - No Cyanotic nailbeds or Digital clubbing. Lower Extremity:   Palpation: Edema - Bilateral - No edema. Abdomen: Soft, non-tender, bowel sounds are active. Neuro: A&O times 3, CN and motor grossly WNL      Data Review:       Stress Test The overall quality of the study is good. Attenuation artifact was noted. Left ventricular cavity size is noted to be upper normal.     SPECT images demonstrate a medium sized, low-grade partially reversible mid to distal anterior wall defect possibly consistent with low-grade ischemia and a medium, moderate grade predominantly fixed inferior wall defect most consistent with nontransmural infarction.  Possible contribution from soft tissue attenuation.  Specificity of perfusion defects for ischemia or infarction is reduced in the presence of dilated cardiomyopathy.  Gated SPECT images reveal mildly dilated LV with severe global hypokinesis with a calculated ejection fraction of 14%.  The calculated transient ischemic dilatation index is positive, although it is not visually obvious. Impression:   Myocardial perfusion imaging is abnormal. Overall left ventricular systolic function was calculated to be severely reduced.  Compared to the report from 9/8/2014, the current study reveals newly reduced calculated LVEF and new perfusion defects.      These test results indicate moderate to high likelihood for the presence of angiographically significant coronary artery disease.   Specificity of perfusion defects for ischemia or infarction is reduced in the presence of dilated cardiomyopathy.      Allergies reviewed  Allergies   Allergen Reactions    Codeine Other (comments)       Medications reviewed  Current Outpatient Prescriptions   Medication Sig    dexamethasone (DECADRON) 4 mg tablet Take 4 mg by mouth every seven (7) days.  potassium chloride (K-DUR, KLOR-CON) 20 mEq tablet Take 1 Tab by mouth two (2) times a day.  lisinopril (PRINIVIL, ZESTRIL) 40 mg tablet Take 0.5 Tabs by mouth daily.  apixaban (ELIQUIS) 5 mg tablet Take 1 Tab by mouth two (2) times a day.  furosemide (LASIX) 40 mg tablet Take 1 Tab by mouth daily.  metoprolol succinate (TOPROL-XL) 25 mg XL tablet Take 0.5 Tabs by mouth daily.  oxyCODONE-acetaminophen (PERCOCET 10)  mg per tablet Take 1 Tab by mouth every eight (8) hours as needed for Pain. Max Daily Amount: 3 Tabs.  lenalidomide (REVLIMID) 25 mg cap Take 25 mg by mouth daily.  predniSONE (DELTASONE) 20 mg tablet Take 2 Tabs by mouth daily for 5 days. With Breakfast    aluminum hydrox-magnesium carb (GAVISCON EXTRA STRENGTH) 160-105 mg chew Take 1-2 tablets by mouth four (4) times daily as needed. No current facility-administered medications for this visit. Assessment:       ICD-10-CM ICD-9-CM    1. Acute combined systolic and diastolic HF (heart failure), NYHA class 2 (HCC) I50.41 428.41 CARDIAC CATHETERIZATION   2. Atrial flutter, unspecified type ([address] I48.92 427.32 CARDIAC CATHETERIZATION   3. Cardiomyopathy, unspecified type ([facility] I42.9 425.4 CARDIAC CATHETERIZATION   4. Chronic systolic congestive heart failure (HCC) I50.22 428.22 CARDIAC CATHETERIZATION     428.0    5. Essential hypertension I10 401.9 CARDIAC CATHETERIZATION   6. S/P AV kathleen ablation Z98.890 V45.89 CARDIAC CATHETERIZATION   7.  Abnormal nuclear stress test R94.39 794.39 CARDIAC CATHETERIZATION        Orders Placed This Encounter    CARDIAC CATHETERIZATION     Standing Status:   Future     Standing Expiration Date:   2/28/2018     Scheduling Instructions:      [id number]      Hold eliquis the day of procedure. Order Specific Question:   Reason for Exam:     Answer:   cardiomyopathy, afib, abnormal nuclear stress test.       Patient Active Problem List   Diagnosis Code    Chest pain R07.9    Osteoarthritis M19.90    Chest pain on exertion R07.9    HTN (hypertension) I10    Anemia D64.9    Chronic atrial fibrillation (HCC) I48.2    Low back pain M54.5    Complex renal cyst N28.1    Reflux esophagitis K21.0    Multiple myeloma (HCC) C90.00    Nocturia R35.1    Intractable back pain M54.9    Calf DVT (deep venous thrombosis) ([facility]) I82.4Z9    Venous insufficiency I87.2    Contusion of knee S80.00XA    Synovitis M65.9    Primary osteoarthritis of both knees M17.0    Jaw pain R68.84    Back pain M54.9    Synovitis of shoulder M65.819    Muscular deconditioning R29.898    Sialadenitis K11.20    CHF (congestive heart failure) ([facility]) I50.9    Atrial flutter ([facility]) I48.92    Cardiomyopathy ([facility]) I42.9    Acute pulmonary embolism ([facility]) I26.99    Acute combined systolic and diastolic HF (heart failure), NYHA class 2 ([facility]) I50.41    Status post biventricular pacemaker Z95.0    S/P AV kathleen ablation Z98.890       Plan:     Patient presents for follow up. 1. Ischemia per nuclear stress test - will schedule for cardiac cath. Pt verbalizes understanding and is in agreement with the plan. 2.  Systolic heart failure: compensated. Ejection fraction 2530%: Has bi V pacemaker, on Toprol and ACEI. Consider Entretso and referral to heart failure. 3. Atrial fibrillation : S/p AV kathleen ablation and PPM.     Nidia Vieyra, TOD      [facility]    8/31/2017         Agree with note as outlined by  NP.  I confirm findings in history and physical exam. No additional findings noted. Agree with plan as outlined above. Cardiomyopathy with abnormal stress test. Procedure, risks, alternatives discussed with patient , son ( over phone ) and grand daughter.     Karina Herbert MD

## 2017-09-12 ENCOUNTER — HOSPITAL ENCOUNTER (OUTPATIENT)
Dept: NON INVASIVE DIAGNOSTICS | Age: 77
Discharge: HOME | End: 2017-09-12
Attending: INTERNAL MEDICINE

## 2017-09-12 DIAGNOSIS — I51.7 LVH (LEFT VENTRICULAR HYPERTROPHY): ICD-10-CM

## 2017-09-18 ENCOUNTER — OFFICE VISIT (OUTPATIENT)
Dept: CARDIOLOGY CLINIC | Age: 77
End: 2017-09-18

## 2017-09-18 VITALS
OXYGEN SATURATION: 98 % | HEART RATE: 75 BPM | HEIGHT: 70 IN | BODY MASS INDEX: 28.1 KG/M2 | DIASTOLIC BLOOD PRESSURE: 70 MMHG | RESPIRATION RATE: 20 BRPM | SYSTOLIC BLOOD PRESSURE: 170 MMHG | WEIGHT: 196.3 LBS

## 2017-09-18 DIAGNOSIS — I50.22 CHRONIC SYSTOLIC CONGESTIVE HEART FAILURE (HCC): Primary | ICD-10-CM

## 2017-09-18 DIAGNOSIS — Z95.0 STATUS POST BIVENTRICULAR PACEMAKER: ICD-10-CM

## 2017-09-18 DIAGNOSIS — I48.20 CHRONIC ATRIAL FIBRILLATION (HCC): Chronic | ICD-10-CM

## 2017-09-18 NOTE — MR AVS SNAPSHOT
Visit Information Date & Time Provider Department Dept. Phone Encounter #  
 9/18/2017 11:30 AM [address], [place] [facility] [phone] [id number] Your Appointments 9/21/2017  9:45 AM  
Any with Gertrude Bender MD  
[address] [place] [address] and Primary [address] [facility]) Appt Note: 2 month follow up; 1 month follow up  
 Josue Mcgarry 90 [address] [facility] [id number]  
  
    
 12/5/2017 11:15 AM  
PACEMAKER with PACEMAKER, [facility]) Appt Note: 3mo bsc bivpm , s/p av node ablation [address]  
[phone] [address]  
  
    
 12/5/2017 11:15 AM  
ESTABLISHED PATIENT with Derek Bravo MD  
[facility]) Appt Note: 3mo post implant [address]  
[phone] [address] Upcoming Health Maintenance Date Due Pneumococcal 65+ High/Highest Risk (2 of 2 - PPSV23) 7/14/2016 GLAUCOMA SCREENING Q2Y 3/16/2017 INFLUENZA AGE 9 TO ADULT 8/1/2017 MEDICARE YEARLY EXAM 12/23/2017 DTaP/Tdap/Td series (2 - Td) 5/19/2026 Allergies as of 9/18/2017  Review Complete On: 9/18/2017 By: Leena Rizzo LPN Severity Noted Reaction Type Reactions Codeine  09/05/2014    Other (comments) Current Immunizations  Reviewed on 8/29/2017 Name Date Influenza Vaccine 9/15/2014 Not reviewed this visit You Were Diagnosed With   
  
 Codes Comments Chronic atrial fibrillation (HCC)    -  Primary ICD-10-CM: P34.4 ICD-9-CM: 427.31 Vitals  BP Pulse Resp Height(growth percentile) Weight(growth percentile) SpO2  
 170/70 (BP 1 Location: Right arm, BP Patient Position: Sitting) 75 20 5' 10\" (1.778 m) 196 lb 4.8 oz (89 kg) 98% BMI OB Status Smoking Status 28.17 kg/m2 Hysterectomy Never Smoker Vitals History BMI and BSA Data Body Mass Index Body Surface Area  
 28.17 kg/m 2 2.1 m 2 Preferred Pharmacy Pharmacy Name Phone [facility] [address] [facility], [address] [place] [address] Jimena [phone] Your Updated Medication List  
  
   
This list is accurate as of: 9/18/17 12:05 PM.  Always use your most recent med list.  
  
  
  
  
 apixaban 5 mg tablet Commonly known as:  Artice Guerita Take 1 Tab by mouth two (2) times a day. furosemide 40 mg tablet Commonly known as:  LASIX Take 1 Tab by mouth daily. GAVISCON EXTRA STRENGTH 160-105 mg Fish [place] Generic drug:  aluminum hydrox-magnesium carb Take 1-2 tablets by mouth four (4) times daily as needed. lisinopril 40 mg tablet Commonly known as:  [place] Take 0.5 Tabs by mouth daily. metoprolol succinate 25 mg XL tablet Commonly known as:  TOPROL-XL Take 0.5 Tabs by mouth daily. oxyCODONE-acetaminophen  mg per tablet Commonly known as:  PERCOCET 10 Take 1 Tab by mouth every eight (8) hours as needed for Pain. Max Daily Amount: 3 Tabs. potassium chloride 20 mEq tablet Commonly known as:  K-DUR, KLOR-CON Take 1 Tab by mouth two (2) times a day. REVLIMID 25 mg Cap Generic drug:  lenalidomide Take 25 mg by mouth daily. We Performed the Following AMB POC EKG ROUTINE W/ 12 LEADS, INTER & REP [55974 CPT(R)] Introducing [facility] & HEALTH SERVICES! [employer] introduces [employer] patient portal. Now you can access parts of your medical record, email your doctor's office, and request medication refills online. 1. In your internet browser, go to [email or web address] 2. Click on the First Time User? Click Here link in the Sign In box. You will see the New Member Sign Up page. 3. Enter your [employer] Access Code exactly as it appears below. You will not need to use this code after youve completed the sign-up process. If you do not sign up before the expiration date, you must request a new code. · [employer] Access Code: [email or web address] Expires: 11/15/2017  8:14 AM 
 
4. Enter the last four digits of your Social Security Number (xxxx) and Date of Birth (mm/dd/yyyy) as indicated and click Submit. You will be taken to the next sign-up page. 5. Create a [employer] ID. This will be your [employer] login ID and cannot be changed, so think of one that is secure and easy to remember. 6. Create a [employer] password. You can change your password at any time. 7. Enter your Password Reset Question and Answer. This can be used at a later time if you forget your password. 8. Enter your e-mail address. You will receive e-mail notification when new information is available in [address]. 9. Click Sign Up. You can now view and download portions of your medical record. 10. Click the Download Summary menu link to download a portable copy of your medical information. If you have questions, please visit the Frequently Asked Questions section of the [employer] website. Remember, [employer] is NOT to be used for urgent needs. For medical emergencies, dial 911. Now available from your iPhone and Android! Please provide this summary of care documentation to your next provider. Your primary care clinician is listed as Deanna Taylor. If you have any questions after today's visit, please call [phone].

## 2017-09-19 NOTE — PROGRESS NOTES
NAME:  Amee Gasca   :   1940   MRN:   [id number]   PCP:  Feliciano Nugent MD           Subjective: The patient is a 68y.o. year old female  who returns for a routine follow-up. Since the last visit, patient reports no change in exercise tolerance, chest pain,  medication intolerance, palpitations, shortness of breath, PND/orthopnea wheezing, sputum, syncope, dizziness or light headedness. C/o edema    Past Medical History:   Diagnosis Date    Acute combined systolic and diastolic HF (heart failure), NYHA class 2 ([address] 2017    Arthritis     Atrial fibrillation (HCC)     Cancer (HCC)     multiple myeloma    Hypertension     S/P AV kathlene ablation 2017    Status post biventricular pacemaker 2017         ICD-10-CM ICD-9-CM    1. Chronic systolic congestive heart failure (HCC) I50.22 428.22      428.0    2. Chronic atrial fibrillation (HCC) I48.2 427.31 AMB POC EKG ROUTINE W/ 12 LEADS, INTER & REP   3. Status post biventricular pacemaker Z95.0 V45.01       Social History   Substance Use Topics    Smoking status: Never Smoker    Smokeless tobacco: Never Used    Alcohol use No      Family History   Problem Relation Age of Onset    Stroke Mother     No Known Problems Father         Review of Systems  General: Pt denies excessive weight gain or loss. Pt is able to conduct ADL's  HEENT: Denies blurred vision, headaches, epistaxis and difficulty swallowing. Respiratory: Denies shortness of breath, KNOTT, wheezing or stridor. Cardiovascular: Denies precordial pain, palpitations, edema or PND  Gastrointestinal: Denies poor appetite, indigestion, abdominal pain or blood in stool  Musculoskeletal: Denies pain or swelling from muscles or joints  Neurologic: Denies tremor, paresthesias, or sensory motor disturbance  Skin: Denies rash, itching or texture change.     Objective:       Vitals:    17 1142 17 1150   BP: 164/70 170/70   Pulse: 75    Resp: 20    SpO2: 98%    Weight: 196 lb 4.8 oz (89 kg)    Height: 5' 10\" (1.778 m)     Body mass index is 28.17 kg/(m^2). General PE  Mental Status - Alert. General Appearance - Not in acute distress. Chest and Lung Exam   Inspection: Accessory muscles - No use of accessory muscles in breathing. Auscultation:   Breath sounds: - Normal.    Cardiovascular   Inspection: Jugular vein - Bilateral - Inspection Normal.  Palpation/Percussion:   Apical Impulse: - Normal.  Auscultation: Rhythm - Regular. Heart Sounds - S1 WNL and S2 WNL. No S3 or S4. Murmurs & Other Heart Sounds: Auscultation of the heart reveals - No Murmurs. Peripheral Vascular   Upper Extremity: Inspection - Bilateral - No Cyanotic nailbeds or Digital clubbing. Lower Extremity:   Palpation: Edema - Bilateral -  1+ edema, L> R. Data Review:     EKG -EKG: paced rythm    Medications reviewed  Current Outpatient Prescriptions   Medication Sig    potassium chloride (K-DUR, KLOR-CON) 20 mEq tablet Take 1 Tab by mouth two (2) times a day.  lisinopril (PRINIVIL, ZESTRIL) 40 mg tablet Take 0.5 Tabs by mouth daily.  apixaban (ELIQUIS) 5 mg tablet Take 1 Tab by mouth two (2) times a day.  furosemide (LASIX) 40 mg tablet Take 1 Tab by mouth daily.  metoprolol succinate (TOPROL-XL) 25 mg XL tablet Take 0.5 Tabs by mouth daily.  oxyCODONE-acetaminophen (PERCOCET 10)  mg per tablet Take 1 Tab by mouth every eight (8) hours as needed for Pain. Max Daily Amount: 3 Tabs.  aluminum hydrox-magnesium carb (GAVISCON EXTRA STRENGTH) 160-105 mg chew Take 1-2 tablets by mouth four (4) times daily as needed.  lenalidomide (REVLIMID) 25 mg cap Take 25 mg by mouth daily. No current facility-administered medications for this visit. Assessment:       ICD-10-CM ICD-9-CM    1. Chronic systolic congestive heart failure (HCC) I50.22 428.22      428.0    2.  Chronic atrial fibrillation (HCC) I48.2 427.31 AMB POC EKG ROUTINE W/ 12 LEADS, INTER & REP   3. Status post biventricular pacemaker Z95.0 V45.01         Plan:     Patient presents doing well and stable from cardiac standpoint. Cath revealed normal coronaries. She has non ischemic CM. CHF not well compensated. Will increase lisinopril to 40 mg to better control her BP. She will increase her lasix to 40 BID for 3 days. Continue current care and follow up in 2 weeks.     Brian Thomson MD

## 2017-09-21 ENCOUNTER — OFFICE VISIT (OUTPATIENT)
Dept: INTERNAL MEDICINE CLINIC | Age: 77
End: 2017-09-21

## 2017-09-21 VITALS
BODY MASS INDEX: 27.89 KG/M2 | WEIGHT: 194.8 LBS | DIASTOLIC BLOOD PRESSURE: 73 MMHG | OXYGEN SATURATION: 93 % | HEART RATE: 75 BPM | TEMPERATURE: 98 F | HEIGHT: 70 IN | SYSTOLIC BLOOD PRESSURE: 113 MMHG | RESPIRATION RATE: 18 BRPM

## 2017-09-21 DIAGNOSIS — R63.4 WEIGHT LOSS: ICD-10-CM

## 2017-09-21 DIAGNOSIS — I87.2 VENOUS INSUFFICIENCY: ICD-10-CM

## 2017-09-21 DIAGNOSIS — M65.819 SYNOVITIS OF SHOULDER: ICD-10-CM

## 2017-09-21 DIAGNOSIS — M17.0 PRIMARY OSTEOARTHRITIS OF BOTH KNEES: ICD-10-CM

## 2017-09-21 DIAGNOSIS — R29.898 MUSCULAR DECONDITIONING: ICD-10-CM

## 2017-09-21 DIAGNOSIS — I42.9 CARDIOMYOPATHY, UNSPECIFIED TYPE (HCC): Primary | ICD-10-CM

## 2017-09-21 DIAGNOSIS — I10 ESSENTIAL HYPERTENSION: ICD-10-CM

## 2017-09-21 DIAGNOSIS — I48.20 CHRONIC ATRIAL FIBRILLATION (HCC): Chronic | ICD-10-CM

## 2017-09-21 NOTE — MR AVS SNAPSHOT
Visit Information Date & Time Provider Department Dept. Phone Encounter #  
 9/21/2017  9:45 AM [facility] [phone] [id number] Follow-up Instructions Return in about 2 months (around 11/21/2017). Your Appointments 10/9/2017  9:00 AM  
3 WEEK with [place] [place]) Appt Note: per Dr. Evangeline Sacks [facility]  
[phone] [address] [facility]  
  
    
 12/5/2017 11:15 AM  
PACEMAKER with PACEMAKER, [facility]) Appt Note: 3mo bsc bivpm , s/p av node ablation [address] [facility]  
[phone] [address] [facility]  
  
    
 12/5/2017 11:15 AM  
ESTABLISHED PATIENT with Lion Salgado MD  
[place] [facility]) Appt Note: 3mo post implant [address]  
[phone] [address] [facility] Upcoming Health Maintenance Date Due  
 GLAUCOMA SCREENING Q2Y 3/16/2017 MEDICARE YEARLY EXAM 12/23/2017 DTaP/Tdap/Td series (2 - Td) 5/19/2026 Allergies as of 9/21/2017  Review Complete On: 9/18/2017 By: Chano Steen LPN Severity Noted Reaction Type Reactions Codeine  09/05/2014    Other (comments) Current Immunizations  Reviewed on 8/29/2017 Name Date Influenza Vaccine 9/15/2014 Not reviewed this visit You Were Diagnosed With   
  
 Codes Comments [facility], unspecified [facility]    -  Primary ICD-10-CM: I42.9 ICD-9-CM: 425. 4 Chronic atrial fibrillation (HCC)     ICD-10-CM: U19.1 ICD-9-CM: 427.31 Essential hypertension     ICD-10-CM: I10 
ICD-9-CM: 401.9 Venous insufficiency     ICD-10-CM: I87.2 ICD-9-CM: 459.81   
 Synovitis of shoulder     ICD-10-CM: M65.819 ICD-9-CM: 726.10 Muscular deconditioning     ICD-10-CM: R29.898 ICD-9-CM: 781.99 Primary osteoarthritis of both knees     ICD-10-CM: M17.0 ICD-9-CM: 715.16 Weight loss     ICD-10-CM: R63.4 ICD-9-CM: 783.21 Vitals BP Pulse Temp Resp Height(growth percentile) Weight(growth percentile) 113/73 75 98 °F (36.7 °C) (Oral) 18 5' 10\" (1.778 m) 194 lb 12.8 oz (88.4 kg) SpO2 BMI OB Status Smoking Status 93% 27.95 kg/m2 Hysterectomy Never Smoker Vitals History BMI and BSA Data Body Mass Index Body Surface Area  
 27.95 kg/m 2 2.09 m 2 Preferred Pharmacy Pharmacy Name Phone [facility] [address] [place] [address] [phone] Your Updated Medication List  
  
   
This list is accurate as of: 9/21/17 11:11 AM.  Always use your most recent med list.  
  
  
  
  
 apixaban 5 mg tablet Commonly known as:  Todd Brill Take 1 Tab by mouth two (2) times a day. furosemide 40 mg tablet Commonly known as:  LASIX Take 1 Tab by mouth daily. GAVISCON EXTRA STRENGTH 160-105 mg Mitchelian Mosylvester Generic drug:  aluminum hydrox-magnesium carb Take 1-2 tablets by mouth four (4) times daily as needed. lisinopril 40 mg tablet Commonly known as:  Niraj Carlos Take 0.5 Tabs by mouth daily. metoprolol succinate 25 mg XL tablet Commonly known as:  TOPROL-XL Take 0.5 Tabs by mouth daily. oxyCODONE-acetaminophen  mg per tablet Commonly known as:  PERCOCET 10 Take 1 Tab by mouth every eight (8) hours as needed for Pain. Max Daily Amount: 3 Tabs. potassium chloride 20 mEq tablet Commonly known as:  K-DUR, KLOR-CON Take 1 Tab by mouth two (2) times a day. REVLIMID 25 mg Cap Generic drug:  lenalidomide Take 25 mg by mouth daily. We Performed the Following METABOLIC PANEL, BASIC [81462 CPT(R)] Follow-up Instructions Return in about 2 months (around 11/21/2017). Introducing [facility] & HEALTH SERVICES! [employer] introduces [employer] patient portal. Now you can access parts of your medical record, email your doctor's office, and request medication refills online. 1. In your internet browser, go to [email or web address] 2. Click on the First Time User? Click Here link in the Sign In box. You will see the New Member Sign Up page. 3. Enter your [employer] Access Code exactly as it appears below. You will not need to use this code after youve completed the sign-up process. If you do not sign up before the expiration date, you must request a new code. · [employer] Access Code: [email or web address] Expires: 11/15/2017  8:14 AM 
 
4. Enter the last four digits of your Social Security Number (xxxx) and Date of Birth (mm/dd/yyyy) as indicated and click Submit. You will be taken to the next sign-up page. 5. Create a [employer] ID. This will be your [employer] login ID and cannot be changed, so think of one that is secure and easy to remember. 6. Create a [employer] password. You can change your password at any time. 7. Enter your Password Reset Question and Answer. This can be used at a later time if you forget your password. 8. Enter your e-mail address. You will receive e-mail notification when new information is available in [address] 9. Click Sign Up. You can now view and download portions of your medical record. 10. Click the Download Summary menu link to download a portable copy of your medical information. If you have questions, please visit the Frequently Asked Questions section of the [employer] website. Remember, [employer] is NOT to be used for urgent needs. For medical emergencies, dial 911. Now available from your iPhone and Android! Please provide this summary of care documentation to your next provider. Your primary care clinician is listed as Deanna Falk  If you have any questions after today's visit, please call [phone].

## 2017-09-21 NOTE — PROGRESS NOTES
1. Have you been to the ER, urgent care clinic since your last visit? Hospitalized since your last visit? No    2. Have you seen or consulted any other health care providers outside of the [facility] since your last visit? Include any pap smears or colon screening.  No      Had pac[facility] put in [facility].  1 month follow up

## 2017-09-21 NOTE — PROGRESS NOTES
[address] [facility]  [place] [address]  [address] [facility] [address]  Phone:  [phone]  Fax: [phone]       Chief Complaint   Patient presents with    Hypertension     f/u   . SUBJECTIVE:    Germain Blackwood is a 68 y.o. female Comes in for return visit stating that she has done generally well. She has occasional pain in her back, upper portion often times in the morning. It is short lived. She does not really take anything for it. I remind her that she cannot take NSAID's in view of the fact that she is on an anticoagulant. Her osteoarthritis of the knees is doing fairly well. She is also concerned about her weight, but her weight is actually stable for her , and allows her more mobility with her knees. She saw her cardiologist most recently and nothing was changed other than it was suggested that she increase her Lisinopril. Her blood pressure was slightly up during that visit. She denies any orthopnea, PND, or dyspnea on exertion. She does have swelling of her lower extremities, left greater than the right, which she has had for well over 20 plus years. This is related to venous insufficiency. Finally she follows with Dr. Nicolette Mayorga, her oncologist for her multiple myeloma. Current Outpatient Prescriptions   Medication Sig Dispense Refill    potassium chloride (K-DUR, KLOR-CON) 20 mEq tablet Take 1 Tab by mouth two (2) times a day. 60 Tab 11    lisinopril (PRINIVIL, ZESTRIL) 40 mg tablet Take 0.5 Tabs by mouth daily. 30 Tab 3    apixaban (ELIQUIS) 5 mg tablet Take 1 Tab by mouth two (2) times a day. 60 Tab 3    furosemide (LASIX) 40 mg tablet Take 1 Tab by mouth daily. 30 Tab 3    metoprolol succinate (TOPROL-XL) 25 mg XL tablet Take 0.5 Tabs by mouth daily. 30 Tab 11    oxyCODONE-acetaminophen (PERCOCET 10)  mg per tablet Take 1 Tab by mouth every eight (8) hours as needed for Pain. Max Daily Amount: 3 Tabs.  75 Tab 0    aluminum hydrox-magnesium carb (GAVISCON EXTRA STRENGTH) 160-105 mg chew Take 1-2 tablets by mouth four (4) times daily as needed.  lenalidomide (REVLIMID) 25 mg cap Take 25 mg by mouth daily.        Past Medical History:   Diagnosis Date    Acute combined systolic and diastolic HF (heart failure), [facility] 8/11/2017    Arthritis     Atrial fibrillation (HCC)     Cancer (HCC)     multiple myeloma    Hypertension     S/P AV kathleen ablation 8/11/2017    Status post biventricular pacemaker 8/11/2017 8/11/17      Past Surgical History:   Procedure Laterality Date    HX GYN      HX ORTHOPAEDIC      knee    HX PACEMAKER  08/11/2017     Allergies   Allergen Reactions    Codeine Other (comments)         REVIEW OF SYSTEMS:  General: negative for - chills or fever  ENT: negative for - headaches, nasal congestion or tinnitus  Respiratory: negative for - cough, hemoptysis, shortness of breath or wheezing  Cardiovascular : negative for - chest pain, edema, palpitations or shortness of breath  Gastrointestinal: negative for - abdominal pain, blood in stools, heartburn or nausea/vomiting  Genito-Urinary: no dysuria, trouble voiding, or hematuria  Musculoskeletal: negative for - gait disturbance, joint pain, joint stiffness or joint swelling  Neurological: no TIA or stroke symptoms  Hematologic: no bruises, no bleeding, no swollen glands  Integument: no lumps, mole changes, nail changes or rash  Endocrine: no malaise/lethargy or unexpected weight changes      Social History     Social History    Marital status:      Spouse name: N/A    Number of children: N/A    Years of education: N/A     Social History Main Topics    Smoking status: Never Smoker    Smokeless tobacco: Never Used    Alcohol use No    Drug use: No    Sexual activity: Not Currently     Other Topics Concern    None     Social History Narrative     Family History   Problem Relation Age of Onset    Stroke Mother     No Known Problems Father        OBJECTIVE:    Visit Vitals    /73    Pulse 75    Temp 98 °F (36.7 °C) (Oral)    Resp 18    Ht 5' 10\" (1.778 m)    Wt 194 lb 12.8 oz (88.4 kg)    SpO2 93%    BMI 27.95 kg/m2     CONSTITUTIONAL: well , well nourished, appears age appropriate  EYES: perrla, eom intact  ENMT:moist mucous membranes, pharynx clear  NECK: supple. Thyroid normal  RESPIRATORY: Chest: clear to ascultation and percussion   CARDIOVASCULAR: Heart: regular rate and rhythm  GASTROINTESTINAL: Abdomen: soft, bowel sounds active  HEMATOLOGIC: no pathological lymph nodes palpated  MUSCULOSKELETAL: Extremities: 1+ edema distally, pulse 1+, mild degenerative changes noted both knees  INTEGUMENT: No unusual rashes or suspicious skin lesions noted. Nails appear normal.  NEUROLOGIC: non-focal exam, slow gait  MENTAL STATUS: alert and oriented, appropriate affect      ASSESSMENT:  1. Cardiomyopathy, unspecified type ([address]    2. Chronic atrial fibrillation (HCC)    3. Essential hypertension    4. Venous insufficiency    5. Synovitis of shoulder    6. Muscular deconditioning    7. Primary osteoarthritis of both knees    8. Weight loss        PLAN:    1. Her cardiomyopathy is reasonably stable. There are no overt signs of congestive heart failure. 2. Her rhythm is regular since he has had the AV node ablation and is currently on a biventricular chamber pacemaker. 3. Blood pressure is perfectly normal at 120/70. There is no reason to make any adjustments in her statin. 4. There is asymmetric swelling of the lower extremities related to venous insufficiency. There is moderate orthostatic changes noted. Support stocking are indicated, but no increase in her diuretic. 5. She has a mild synovitis of the shoulder, which has been chronic. This does not really require any treatments other than symptomatic with Acetaminophen. 6. I encourage her to remain as physically active as possible.    7. Her osteoarthritis is quite stable for now primarily because her weight is stable. 8. There is no pathological weight loss. .  Orders Placed This Encounter    METABOLIC PANEL, BASIC         Follow-up Disposition:  Return in about 2 months (around 11/21/2017).       Sita Merritt MD

## 2017-09-22 LAB
BUN SERPL-MCNC: 11 MG/DL (ref 8–27)
BUN/CREAT SERPL: 11 (ref 12–28)
CALCIUM SERPL-MCNC: 9.4 MG/DL (ref 8.7–10.3)
CHLORIDE SERPL-SCNC: 106 MMOL/L (ref 96–106)
CO2 SERPL-SCNC: 28 MMOL/L (ref 18–29)
CREAT SERPL-MCNC: 1.01 MG/DL (ref 0.57–1)
GLUCOSE SERPL-MCNC: 84 MG/DL (ref 65–99)
POTASSIUM SERPL-SCNC: 3.9 MMOL/L (ref 3.5–5.2)
SODIUM SERPL-SCNC: 148 MMOL/L (ref 134–144)

## 2017-10-03 ENCOUNTER — PATIENT OUTREACH (OUTPATIENT)
Dept: INTERNAL MEDICINE CLINIC | Age: 77
End: 2017-10-03

## 2017-10-03 NOTE — PROGRESS NOTES
NNTOCIP:  [id number] [facility] 2017:  Cardiac Cath/Pacemaker    10/3/2017  11:00 Hospital Discharge Follow-Up  Patient listed on discharge [facility]) report on 2017. Patient discharged from [facility] for Cardiac Cath-Pacemaker implant. .  Call received today from patient inquiring of mickey. RRAT score: Low Risk            7       Total Score        3 Has Seen PCP in Last 6 Months (Yes=3, No=0)    4 Charlson Comorbidity Score (Age + Comorbid Conditions)        Criteria that do not apply:    . Living with Significant Other. Assisted Living. LTAC. SNF. or   Rehab    Patient Length of Stay (>5 days = 3)    IP Visits Last 12 Months (1-3=4, 4=9, >4=11)    Pt. Coverage (Medicare=5 , Medicaid, or Self-Pay=4)     Low    Medical History:     Past Medical History:   Diagnosis Date    Acute combined systolic and diastolic HF (heart failure), NYHA class 2 ([address] 2017    Arthritis     Atrial fibrillation (HCC)     Cancer (HCC)     multiple myeloma    Hypertension     S/P AV kathleen ablation 2017    Status post biventricular pacemaker 2017      Patient called to inquire of mickey walker order. Nurse Navigator(NN)  Performed a  post [address] [facility] IP discharge assessment for Cardiac Pacemaker. Verified  and address with patient as identifiers. Provided introduction to self, and explanation of the Nurses Navigator role. Diet:   Patient reports: Renal Diet     Activity:    Patient reports: somewalking outside the house    Medication: Performed medication reconciliation with patient, and patient verbalizes understanding of administration of home medications. There were no barriers to obtaining medications identified at this time. Support system: patient and friend    Discharge Instructions :Reviewed discharge instructions with patient done by PCP during 2017 office visit. Review SOB; dyspnea with patient today.  .  Patient verbalizes understanding of discharge instructions and follow-up care. Patient saw Cardiologist 9/18/2017 and had Echo done 9/12/2017. Red Flags:  SOB. Dizziness. Weakness. Advance Care Planning:   Patient was offered the opportunity to discuss advance care planning:  yes     Does patient have an Advance Directive:  no   If no, did you provide information on Caring Connections? yes     PCP/Specialist follow up: Patient scheduled to follow up with Josie Randhawa MD on 9/21/2017-visit done. Consult done w/ Referral Coordinator. Consult done w/ PCP re rolator-approved order; Consult done with rooming nurse for order placement. The patient agrees to contact the PCP office for questions related to their healthcare. The patient expressed thanks, offered no additional questions and ended the call. Case management Impression/ plan:  Patient verbalizes understanding of self-management goals of living with Heart Failure required [facility].

## 2017-10-05 ENCOUNTER — TELEPHONE (OUTPATIENT)
Dept: CARDIOLOGY CLINIC | Age: 77
End: 2017-10-05

## 2017-10-05 NOTE — TELEPHONE ENCOUNTER
Please call patient. She is having a small procedure and needs to no if she can get off her medication Eloquis. Thanks.

## 2017-10-05 NOTE — TELEPHONE ENCOUNTER
Verified patient with two identifiers. Spoke with pt asking when procedure is scheduled. Pt stated there is no date yet. Advised her to discuss with Dr. Kiana Barros when she comes in on 10/10. Pt verbalized understanding,      Nidia Gonzalez, TOD Beckford LPN        Caller: Unspecified (Today,  9:34 AM)                     She has follow up for decompensated CHF on 10/10. Is her procedure after that?  We should assess her first since we adjusted her meds

## 2017-10-05 NOTE — TELEPHONE ENCOUNTER
Verified patient with two identifiers. Spoke with pt she is going to have a procedure with Dr. Blayne Albarado, and would like to stop Eliquis if possible. Please advise.

## 2017-10-10 ENCOUNTER — OFFICE VISIT (OUTPATIENT)
Dept: CARDIOLOGY CLINIC | Age: 77
End: 2017-10-10

## 2017-10-10 ENCOUNTER — TELEPHONE (OUTPATIENT)
Dept: CARDIOLOGY CLINIC | Age: 77
End: 2017-10-10

## 2017-10-10 VITALS
BODY MASS INDEX: 27.9 KG/M2 | RESPIRATION RATE: 16 BRPM | WEIGHT: 194.9 LBS | OXYGEN SATURATION: 99 % | HEIGHT: 70 IN | HEART RATE: 76 BPM

## 2017-10-10 DIAGNOSIS — Z95.0 STATUS POST BIVENTRICULAR PACEMAKER: ICD-10-CM

## 2017-10-10 DIAGNOSIS — I48.20 CHRONIC ATRIAL FIBRILLATION (HCC): Chronic | ICD-10-CM

## 2017-10-10 DIAGNOSIS — I42.9 CARDIOMYOPATHY, UNSPECIFIED TYPE (HCC): Primary | ICD-10-CM

## 2017-10-10 DIAGNOSIS — I10 ESSENTIAL HYPERTENSION: ICD-10-CM

## 2017-10-10 DIAGNOSIS — Z98.890 S/P AV NODAL ABLATION: ICD-10-CM

## 2017-10-10 RX ORDER — LENALIDOMIDE 10 MG/1
CAPSULE ORAL
COMMUNITY
Start: 2017-09-19 | End: 2019-07-16

## 2017-10-10 NOTE — MR AVS SNAPSHOT
Visit Information Date & Time Provider Department Dept. Phone Encounter #  
 10/10/2017  9:00 AM [address], MD [facility] [phone] [id number] Your Appointments 11/16/2017  9:45 AM  
Any with Kenna Anderson MD  
[address] [place] [address] and Primary Care [facility]) Appt Note: 2 month f/u htn  
 [address] [place]  
  
   
 [phone]  
  
    
 12/5/2017 11:15 AM  
PACEMAKER with PACEMAKER, [facility]) Appt Note: 3mo bsc bivpm , s/p av node ablation [address]  
[phone] [address]  
  
    
 12/5/2017 11:15 AM  
ESTABLISHED PATIENT with Dena Gould MD  
[facility]) Appt Note: 3mo post implant [address]  
[phone] [address] Upcoming Health Maintenance Date Due  
 GLAUCOMA SCREENING Q2Y 3/16/2017 MEDICARE YEARLY EXAM 12/23/2017 DTaP/Tdap/Td series (2 - Td) 5/19/2026 Allergies as of 10/10/2017  Review Complete On: 10/10/2017 By: Francesca Meredith NP Severity Noted Reaction Type Reactions Codeine  09/05/2014    Other (comments) Current Immunizations  Reviewed on 8/29/2017 Name Date Influenza Vaccine 9/15/2014 Not reviewed this visit You Were Diagnosed With   
  
 Codes Comments Cardiomyopathy, unspecified type ([facility] [address]    -  Primary ICD-10-CM: I42.9 ICD-9-CM: 425. 4 Chronic atrial fibrillation (HCC)     ICD-10-CM: X42.0 ICD-9-CM: 427.31 S/P AV kathleen ablation     ICD-10-CM: X08.179 ICD-9-CM: V45.89 Status post biventricular pacemaker     ICD-10-CM: Z95.0 ICD-9-CM: V45.01 Essential hypertension     ICD-10-CM: I10 
ICD-9-CM: 401.9 Vitals BP Pulse Resp Height(growth percentile) Weight(growth percentile) SpO2  
 (!) 0/0 76 16 5' 10\" (1.778 m) 194 lb 14.4 oz (88.4 kg) 99% BMI OB Status Smoking Status 27.97 kg/m2 Hysterectomy Never Smoker Vitals History BMI and BSA Data Body Mass Index Body Surface Area  
 27.97 kg/m 2 2.09 m 2 Preferred Pharmacy Pharmacy Name Phone [facility] [address] [place] [address] [place] [phone] Your Updated Medication List  
  
   
This list is accurate as of: 10/10/17 10:20 AM.  Always use your most recent med list.  
  
  
  
  
 apixaban 5 mg tablet Commonly known as:  Rhenda David Take 1 Tab by mouth two (2) times a day. furosemide 40 mg tablet Commonly known as:  LASIX Take 1 Tab by mouth daily. GAVISCON EXTRA STRENGTH 160-105 mg Rainer Cord Generic drug:  aluminum hydrox-magnesium carb Take 1-2 tablets by mouth four (4) times daily as needed. lisinopril 40 mg tablet Commonly known as:  Nahed Primmer Take 0.5 Tabs by mouth daily. metoprolol succinate 25 mg XL tablet Commonly known as:  TOPROL-XL Take 0.5 Tabs by mouth daily. oxyCODONE-acetaminophen  mg per tablet Commonly known as:  PERCOCET 10 Take 1 Tab by mouth every eight (8) hours as needed for Pain. Max Daily Amount: 3 Tabs. potassium chloride 20 mEq tablet Commonly known as:  K-DUR, KLOR-CON Take 1 Tab by mouth two (2) times a day. REVLIMID 10 mg Cap Generic drug:  lenalidomide We Performed the Following AMB POC EKG ROUTINE W/ 12 LEADS, INTER & REP [75554 CPT(R)] Introducing [facility]! [place] [employer] introduces [employer] patient portal. Now you can access parts of your medical record, email your doctor's office, and request medication refills online. 1. In your internet browser, go to [email or web address] 2. Click on the First Time User? Click Here link in the Sign In box. You will see the New Member Sign Up page. 3. Enter your M[employer] Access Code exactly as it appears below. You will not need to use this code after youve completed the sign-up process. If you do not sign up before the expiration date, you must request a new code. · M[employer] Access Code: [email or web address] Expires: 11/15/2017  8:14 AM 
 
4. Enter the last four digits of your Social Security Number (xxxx) and Date of Birth (mm/dd/yyyy) as indicated and click Submit. You will be taken to the next sign-up page. 5. Create a MeshApp ID. This will be your MeshApp login ID and cannot be changed, so think of one that is secure and easy to remember. 6. Create a [employer] password. You can change your password at any time. 7. Enter your Password Reset Question and Answer. This can be used at a later time if you forget your password. 8. Enter your e-mail address. You will receive e-mail notification when new information is available in [address] 9. Click Sign Up. You can now view and download portions of your medical record. 10. Click the Download Summary menu link to download a portable copy of your medical information. If you have questions, please visit the Frequently Asked Questions section of the [employer] website. Remember, [employer]App is NOT to be used for urgent needs. For medical emergencies, dial 911. Now available from your iPhone and Android! Please provide this summary of care documentation to your next provider. Your primary care clinician is listed as Deanna Taylor. If you have any questions after today's visit, please call [phone].

## 2017-10-10 NOTE — TELEPHONE ENCOUNTER
Faxed note to Mack Verma @ [phone] stating pt can have a regular cleaning on teeth without stopping Eliquis.

## 2017-10-10 NOTE — TELEPHONE ENCOUNTER
Dentist office needs a fax that it is ok to clean patient's teeth with her blood thinner. Fax [phone].  Thanks

## 2017-10-10 NOTE — PROGRESS NOTES
Fannie Duncan DNP, ANP-BC  Subjective/HPI:     Albino Nolasco is a 68 y.o. female is here for congestive heart failure follow-up. Since last visit ACE inhibitor was increased due to hypertension however 3 days later was seen by primary care lisinopril reduced from 40 back down to 20 mg. She denies lightheadedness dizziness chest pain or shortness of breath. She is able to perform her activities of daily life without any limiting symptoms. Sleeping with 2 pillows without any symptoms suggestive of orthopnea or paroxysmal nocturnal dyspnea. Weight stable. PCP Provider  Leonarda Bingham MD  Past Medical History:   Diagnosis Date    Acute combined systolic and diastolic HF (heart failure), NYHA class 2 ([facility] [address] 8/11/2017    Arthritis     Atrial fibrillation ([facility] [address]     Cancer ([facility] [address]     multiple myeloma    Hypertension     S/P AV kathleen ablation 8/11/2017    Status post biventricular pacemaker 8/11/2017 8/11/17       Past Surgical History:   Procedure Laterality Date    HX GYN      HX ORTHOPAEDIC      knee    HX PACEMAKER  08/11/2017     Allergies   Allergen Reactions    Codeine Other (comments)      Family History   Problem Relation Age of Onset    Stroke Mother     No Known Problems Father       Current Outpatient Prescriptions   Medication Sig    potassium chloride (K-DUR, KLOR-CON) 20 mEq tablet Take 1 Tab by mouth two (2) times a day.  lisinopril (PRINIVIL, ZESTRIL) 40 mg tablet Take 0.5 Tabs by mouth daily.  apixaban (ELIQUIS) 5 mg tablet Take 1 Tab by mouth two (2) times a day.  furosemide (LASIX) 40 mg tablet Take 1 Tab by mouth daily.  metoprolol succinate (TOPROL-XL) 25 mg XL tablet Take 0.5 Tabs by mouth daily.  oxyCODONE-acetaminophen (PERCOCET 10)  mg per tablet Take 1 Tab by mouth every eight (8) hours as needed for Pain. Max Daily Amount: 3 Tabs.     REVLIMID 10 mg cap     aluminum hydrox-magnesium carb (GAVISCON EXTRA STRENGTH) 160-105 mg chew Take 1-2 tablets by mouth four (4) times daily as needed. No current facility-administered medications for this visit. Vitals:    10/10/17 0934 10/10/17 0935   BP: 120/80 (!) 0/0   Pulse: 76    Resp: 16    SpO2: 99%    Weight: 194 lb 14.4 oz (88.4 kg)    Height: 5' 10\" (1.778 m)      Social History     Social History    Marital status:      Spouse name: N/A    Number of children: N/A    Years of education: N/A     Occupational History    Not on file. Social History Main Topics    Smoking status: Never Smoker    Smokeless tobacco: Never Used    Alcohol use No    Drug use: No    Sexual activity: Not Currently     Other Topics Concern    Not on file     Social History Narrative       I have reviewed the nurses notes, vitals, problem list, allergy list, medical history, family, social history and medications. Review of Symptoms:    General: Pt denies excessive weight gain or loss. Pt is able to conduct ADL's  HEENT: Denies blurred vision, headaches, epistaxis and difficulty swallowing. Respiratory: Denies shortness of breath, KNOTT, wheezing or stridor. Cardiovascular: Denies precordial pain, palpitations, edema or PND  Gastrointestinal: Denies poor appetite, indigestion, abdominal pain or blood in stool  Musculoskeletal: Denies pain or swelling from muscles or joints  Neurologic: Denies tremor, paresthesias, or sensory motor disturbance  Skin: Denies rash, itching or texture change. Physical Exam:      General: Well developed, in no acute distress, cooperative and alert  HEENT: No carotid bruits, no JVD, trach is midline. Neck Supple,   Heart:  Normal S1/S2 negative S3 or S4. Regular, no murmur, gallop or rub.   Respiratory: Clear bilaterally x 4, no wheezing or rales  Abdomen:   Soft, non-tender, no masses, bowel sounds are active.   Extremities: Trace ankle edema, normal cap refill, no cyanosis, atraumatic.    Neuro: A&Ox3, speech clear, gait stable with assistance of a cane   Skin: Skin color is normal. No rashes or lesions. Non diaphoretic  Vascular: 2+ pulses symmetric in all extremities    Cardiographics    ECG: Paced with underlying atrial fibrillation  Results for orders placed or performed during the hospital encounter of 08/29/17   EKG, 12 LEAD, INITIAL   Result Value Ref Range    Ventricular Rate 75 BPM    Atrial Rate 394 BPM    QRS Duration 144 ms    Q-T Interval 450 ms    QTC Calculation (Bezet) 502 ms    Calculated R Axis -103 degrees    Calculated T Axis 39 degrees    Diagnosis       Ventricular-paced rhythm  Probable Atrial flutter  Confirmed by Inés Huang ([id number]) on 8/29/2017 8:12:55 PM           Cardiology Labs:  Lab Results   Component Value Date/Time    Cholesterol, total 156 08/11/2017 02:41 AM    HDL Cholesterol 82 08/11/2017 02:41 AM    LDL, calculated 64 08/11/2017 02:41 AM    Triglyceride 50 08/11/2017 02:41 AM    CHOL/HDL Ratio 1.9 08/11/2017 02:41 AM       Lab Results   Component Value Date/Time    Sodium 148 09/21/2017 11:05 AM    Potassium 3.9 09/21/2017 11:05 AM    Chloride 106 09/21/2017 11:05 AM    CO2 28 09/21/2017 11:05 AM    Anion gap 5 08/29/2017 11:54 AM    Glucose 84 09/21/2017 11:05 AM    BUN 11 09/21/2017 11:05 AM    Creatinine 1.01 09/21/2017 11:05 AM    BUN/Creatinine ratio 11 09/21/2017 11:05 AM    GFR est AA 62 09/21/2017 11:05 AM    GFR est non-AA 54 09/21/2017 11:05 AM    Calcium 9.4 09/21/2017 11:05 AM    Bilirubin, total 0.8 08/08/2017 10:40 AM    AST (SGOT) 21 08/08/2017 10:40 AM    Alk. phosphatase 108 08/08/2017 10:40 AM    Protein, total 7.1 08/08/2017 10:40 AM    Albumin 3.2 08/08/2017 10:40 AM    Globulin 3.9 08/08/2017 10:40 AM    A-G Ratio 0.8 08/08/2017 10:40 AM    ALT (SGPT) 35 08/08/2017 10:40 AM           Assessment:     Assessment:     Diagnoses and all orders for this visit:    1. Cardiomyopathy, unspecified type ([address]    2. Chronic atrial fibrillation (HCC)  -     AMB POC EKG ROUTINE W/ 12 LEADS, INTER & REP    3.  S/P AV kathleen ablation    4. Status post biventricular pacemaker    5. Essential hypertension        ICD-10-CM ICD-9-CM    1. Cardiomyopathy, unspecified type ([facility] [address] I42.9 425.4    2. Chronic atrial fibrillation (HCC) I48.2 427.31 AMB POC EKG ROUTINE W/ 12 LEADS, INTER & REP   3. S/P AV kathleen ablation Z98.890 V45.89    4. Status post biventricular pacemaker Z95.0 V45.01    5. Essential hypertension I10 401.9      Orders Placed This Encounter    AMB POC EKG ROUTINE W/ 12 LEADS, INTER & REP     Order Specific Question:   Reason for Exam:     Answer:   routine    REVLIMID 10 mg cap        Plan:     Systolic heart failure ejection fraction 40% well compensated symptoms consistent with [place] heart class I. Continue current medications including low-dose metoprolol and lisinopril 20 mg. She is euvolemic. Pacemaker site well-healed. Follow-up in 3 months. Ginger Toledo NP      [facility]    10/10/2017         Agree with note as outlined by  NP. I confirm findings in history and physical exam. No additional findings noted. Agree with plan as outlined above.      [address] Ko MD

## 2017-10-14 ENCOUNTER — HOSPITAL ENCOUNTER (EMERGENCY)
Age: 77
Discharge: HOME | End: 2017-10-14
Attending: EMERGENCY MEDICINE
Payer: MEDICARE

## 2017-10-14 ENCOUNTER — APPOINTMENT (OUTPATIENT)
Dept: ULTRASOUND IMAGING | Age: 77
End: 2017-10-14
Attending: EMERGENCY MEDICINE
Payer: MEDICARE

## 2017-10-14 ENCOUNTER — APPOINTMENT (OUTPATIENT)
Dept: GENERAL RADIOLOGY | Age: 77
End: 2017-10-14
Attending: EMERGENCY MEDICINE
Payer: MEDICARE

## 2017-10-14 VITALS
TEMPERATURE: 98.4 F | WEIGHT: 197.97 LBS | OXYGEN SATURATION: 96 % | SYSTOLIC BLOOD PRESSURE: 132 MMHG | BODY MASS INDEX: 28.34 KG/M2 | RESPIRATION RATE: 17 BRPM | HEIGHT: 70 IN | DIASTOLIC BLOOD PRESSURE: 88 MMHG | HEART RATE: 75 BPM

## 2017-10-14 DIAGNOSIS — M62.838 MUSCLE SPASM: Primary | ICD-10-CM

## 2017-10-14 DIAGNOSIS — R60.0 LOWER EXTREMITY EDEMA: ICD-10-CM

## 2017-10-14 PROCEDURE — 99284 EMERGENCY DEPT VISIT MOD MDM: CPT

## 2017-10-14 PROCEDURE — 93971 EXTREMITY STUDY: CPT

## 2017-10-14 PROCEDURE — 73030 X-RAY EXAM OF SHOULDER: CPT

## 2017-10-14 PROCEDURE — 72050 X-RAY EXAM NECK SPINE 4/5VWS: CPT

## 2017-10-14 RX ORDER — CYCLOBENZAPRINE HCL 5 MG
5 TABLET ORAL
Qty: 15 TAB | Refills: 0 | Status: SHIPPED | OUTPATIENT
Start: 2017-10-14 | End: 2017-11-24 | Stop reason: SDUPTHER

## 2017-10-14 NOTE — ED NOTES
Patient identified and read over and explained discharge instructions with time for questions by attending MD/PA. Patient has verbalized understanding of discharge instructions.    By wheelchair family

## 2017-10-14 NOTE — ED PROVIDER NOTES
[facility] [address]  EMERGENCY DEPARTMENT HISTORY AND PHYSICAL EXAM       Date of Service: 10/14/2017   Patient Name: Jacques Bee   YOB: 1940  Medical Record Number: [id number]    History of Presenting Illness     Chief Complaint   Patient presents with    Leg Pain     patient complain of right leg pain onset Tuesday denies any injury or fall    Arm Pain     patient also complain of right arm pain took Percocet at home without relief        History Provided By:  patient    Additional History:   Jacques Bee is a 68 y.o. female with PMhx significant for HTN, multiple myeloma, A-fib, and blood clots who presents ambulatory to the ED with cc of constant, progressively worsening 8/10 R lower leg pain that is exacerbated by movement and weight bearing, along with associated constant, worsening R shoulder pain that radiates to the neck x 4 days. Pt describes her R leg being abnormally swollen from baseline, with pain localized below the knee. Of note, she took a Percocet PTA with no relief, and is on Eliquis for her A-fib. She notes hx of multiple myeloma for which she is undergoing treatment with Prednisone with relief. Of note, pt is s/p recent pacemaker placement without complications. Pt denies any recent falls, numbness, tingling, chest pain, shortness of breath. Social Hx: - Tobacco, - EtOH, - Illicit Drugs    There are no other complaints, changes or physical findings at this time.     Primary Care Provider: Hyacinth Hammans, MD     Past History     Past Medical History:   Past Medical History:   Diagnosis Date    Acute combined systolic and diastolic HF (heart failure), NYHA class 2 ([facility] [address] 8/11/2017    Arthritis     Atrial fibrillation (HCC)     Cancer (HCC)     multiple myeloma    Hypertension     S/P AV kathleen ablation 8/11/2017    Status post biventricular pacemaker 8/11/2017 8/11/17         Past Surgical History:   Past Surgical History:   Procedure Laterality Date    HX GYN      HX ORTHOPAEDIC      knee    HX PACEMAKER  08/11/2017        Family History:   Family History   Problem Relation Age of Onset    Stroke Mother     No Known Problems Father         Social History:   Social History   Substance Use Topics    Smoking status: Never Smoker    Smokeless tobacco: Never Used    Alcohol use No        Allergies: Allergies   Allergen Reactions    Codeine Other (comments)         Review of Systems   Review of Systems   Constitutional: Negative for fatigue and fever. HENT: Negative. Eyes: Negative. Respiratory: Negative for shortness of breath and wheezing. Cardiovascular: Negative for chest pain. Gastrointestinal: Negative for blood in stool, constipation, diarrhea, nausea and vomiting. Endocrine: Negative. Genitourinary: Negative for difficulty urinating and dysuria. Musculoskeletal: Positive for arthralgias, myalgias and neck pain. +R leg swelling   Skin: Negative for rash. Allergic/Immunologic: Negative. Neurological: Negative for weakness and numbness. Hematological: Negative. Psychiatric/Behavioral: Negative. Physical Exam  Physical Exam   Constitutional: She is oriented to person, place, and time. She appears well-developed and well-nourished. HENT:   Head: Normocephalic and atraumatic. Mouth/Throat: Mucous membranes are normal.   Eyes: EOM are normal. Pupils are equal, round, and reactive to light. Neck: Normal range of motion. No JVD present. No tracheal deviation present. No midline C-spine TTP. Normal ROM of neck. Cardiovascular: Normal rate, regular rhythm, normal heart sounds and intact distal pulses. Exam reveals no gallop and no friction rub. No murmur heard. Pulses:       Dorsalis pedis pulses are 2+ on the right side, and 2+ on the left side. Pulmonary/Chest: Effort normal and breath sounds normal. No stridor. She has no wheezes. She has no rales. Pacemaker to left chest wall. Area is clean/dry/intact. Abdominal: Soft. Bowel sounds are normal. She exhibits no distension and no mass. There is no tenderness. There is no guarding. Musculoskeletal: Normal range of motion. She exhibits edema and tenderness. Tenderness to R superior posterior trapezius. Increased hypertonicity  No tenderness over right scapula  Normal ROM over R shoulder without tenderness. BL LE edema. 2+ radial pulse RUE with intact sensation. No R hip tenderness with normal ROM. No T or L spine TTP; no step-offs. Neurological: She is alert and oriented to person, place, and time. Skin: Skin is warm and dry. No rash noted. Psychiatric: She has a normal mood and affect. Her behavior is normal. Judgment and thought content normal.   Nursing note and vitals reviewed. Medical Decision Making   I am the first provider for this patient. I reviewed the vital signs, available nursing notes, past medical history, past surgical history, family history and social history. ED Course:  12:42 PM   Initial assessment performed. The patients presenting problems have been discussed, and they are in agreement with the care plan formulated and outlined with them. I have encouraged them to ask questions as they arise throughout their visit. Progress Notes:   DDx: muscle spasm, pathologic fracture, dvt, lower extremity edema, venous insufficiency, strain, sprain      Radiologic Studies -  The following have been ordered and reviewed:  DUPLEX LOWER EXT VENOUS RIGHT   Final Result   EXAM:  DUPLEX LOWER EXT VENOUS RIGHT     INDICATION:  R/o DVT. Right leg pain     COMPARISON: None.     FINDINGS: Duplex Doppler sonography of the right lower extension was performed  from the groin to the calf.  The right common femoral, femoral and popliteal  veins are compressible with normal color-flow and wave forms and response to  physiologic maneuvers including Valsalva and augmentation.     IMPRESSION  IMPRESSION: No deep venous thrombosis identified.      XR SPINE CERV 4 OR 5 V   Final Result   EXAM:  XR SPINE CERV 4 OR 5 V     INDICATION:  Neck Pain     COMPARISON: 5/19/2017.     FINDINGS: AP, lateral, bilateral oblique and open mouth odontoid views  of the  cervical spine were obtained. There is unchanged minimal retrolisthesis of C3 on  C4 measuring 2 mm. There is unchanged minimal anterolisthesis of C7 on T1  measuring 3 mm. There is moderate disc space narrowing at C5-6 and C6-7 which is  unchanged. There is multilevel bilateral significant neural foraminal narrowing  and posterior facet arthropathy. The odontoid process is partially obscured. The  prevertebral soft tissues appear unremarkable. No acute fracture or dislocation. An incidental soft tissue calcifications seen between the posterior C6 and C7  spinous processes.     IMPRESSION  IMPRESSION: Unchanged multilevel spondylosis. .   XR SHOULDER RT AP/LAT MIN 2 V   Final Result   EXAM:  XR SHOULDER RT AP/LAT MIN 2 V     INDICATION:   Trauma. Right arm pain     COMPARISON: None.     FINDINGS: Three views of the right shoulder. There is mild acromioclavicular  osteoarthritis. The glenohumeral joint is intact. The humerus is high riding  with severe narrowing of the acromiohumeral interval, surrounding sclerosis, and  chronic remodeling likely related to chronic full-thickness rotator cuff  tearing.     IMPRESSION  IMPRESSION:  No evidence of acute abnormality. Vital Signs-Reviewed the patient's vital signs. Patient Vitals for the past 12 hrs:   Temp Pulse Resp BP SpO2   10/14/17 1204 - 75 19 125/83 100 %   10/14/17 1152 98.4 °F (36.9 °C) 75 16 149/87 99 %       Diagnosis   Clinical Impression:   1. Muscle spasm    2.  Lower extremity edema         Plan:  1:   Follow-up Information     Follow up With Details Comments Contact Marcellus Tran MD Schedule an appointment as soon as possible for a visit  [address] [address]  [phone]      [facility] EMERGENCY DEPT  As needed, If symptoms worsen [address] [place] [address]  [phone]        2:   Current Discharge Medication List      START taking these medications    Details   cyclobenzaprine (FLEXERIL) 5 mg tablet Take 1 Tab by mouth three (3) times daily as needed for Muscle Spasm(s). Qty: 15 Tab, Refills: 0           Return to ED if Worse    Disposition Note:  DISCHARGE NOTE  3:24 PM  The patient has been re-evaluated and is ready for discharge. Reviewed available results with patient. Counseled pt on diagnosis and care plan. Pt has expressed understanding, and all questions have been answered. Pt agrees with plan and agrees to F/U as recommended, or return to the ED if their sxs worsen. Discharge instructions have been provided and explained to the pt, along with reasons to return to the ED. Written by Collin Chaudhry ED Scribe, as dictated by Beatriz Hastings DO.  _______________________________   Attestations: This note is prepared by Collin Chaudhry, acting as Scribe for Beatriz Hastings DO. The scribe's documentation has been prepared under my direction and personally reviewed by me in its entirety. I confirm that the note above accurately reflects all work, treatment, procedures, and medical decision making performed by me.   Beatriz Hastings DO    _______________________________

## 2017-10-14 NOTE — DISCHARGE INSTRUCTIONS
Neck Spasm: Care Instructions  Your Care Instructions  A neck spasm is sudden tightness and pain in your neck muscles. A spasm may be caused by some activities or repeated movements. For example, you may be more likely to have a neck spasm if you slouch, paint a ceiling, work at a computer, or sleep with your neck twisted. But the cause isn't always clear. Home treatment includes using heat or ice, taking over-the-counter (OTC) pain medicines, and avoiding activities that may lead to neck pain. Gentle stretching, or treatments such as massage or manipulation, may also help ease a neck spasm. For a neck spasm that doesn't get better with home care, your doctor may prescribe medicine. He or she may also suggest exercise or physical therapy to help strengthen or relax your neck muscles. Follow-up care is a key part of your treatment and safety. Be sure to make and go to all appointments, and call your doctor if you are having problems. It's also a good idea to know your test results and keep a list of the medicines you take. How can you care for yourself at home? · To relieve pain, use heat or ice (whichever feels better) on the affected area. ¨ Put a warm water bottle, a heating pad set on low, or a warm cloth on your neck. Put a thin cloth between the heating pad and your skin. Do not go to sleep with a heating pad on your skin. ¨ Try ice or a cold pack on the area for 10 to 20 minutes at a time. Put a thin cloth between the ice and your skin. · Ask your doctor if you can take acetaminophen (such as Tylenol) or nonsteroidal anti-inflammatory drugs, such as ibuprofen or naproxen. Your doctor can prescribe stronger medicines if needed. Be safe with medicines. Read and follow all instructions on the label. · Stretch your muscles every day, especially before and after exercise and at bedtime. Regular stretching can help relax your muscles.   · Try to find a pillow and a position in bed that help improve your night's rest.  · Try to stay active. It's best to start activity slowly. If an exercise makes your pain worse, stop doing it. When should you call for help? Call 911 anytime you think you may need emergency care. For example, call if:  · You are unable to move an arm or a leg at all. Call your doctor now or seek immediate medical care if:  · You have new or worse symptoms in your arms, legs, belly, or buttocks. Symptoms may include:  ¨ Numbness or tingling. ¨ Weakness. ¨ Pain. · You lose bladder or bowel control. Watch closely for changes in your health, and be sure to contact your doctor if:  · You do not get better as expected. Where can you learn more? Go to http://mila-syeda.info/. Enter V557 in the search box to learn more about \"Neck Spasm: Care Instructions. \"  Current as of: March 21, 2017  Content Version: 11.3  © 7525-2102 [employer]. Care instructions adapted under license by [employer] (which disclaims liability or warranty for this information). If you have questions about a medical condition or this instruction, always ask your healthcare professional. [place] [address] any warranty or liability for your use of this information. Leg and Ankle Edema: Care Instructions    Please INCREASE your Lasix to 40 mg TWICE A DAY for two days, then go back to taking it once a day. Your Care Instructions  Swelling in the legs, ankles, and feet is called edema. It is common after you sit or stand for a while. Long plane flights or car rides often cause swelling in the legs and feet. You may also have swelling if you have to stand for long periods of time at your job. Problems with the veins in the legs (varicose veins) and changes in hormones can also cause swelling. Sometimes the swelling in the ankles and feet is caused by a more serious problem, such as heart failure, infection, blood clots, or liver or kidney disease.   Follow-up care is a key part of your treatment and safety. Be sure to make and go to all appointments, and call your doctor if you are having problems. Its also a good idea to know your test results and keep a list of the medicines you take. How can you care for yourself at home? · If your doctor gave you medicine, take it as prescribed. Call your doctor if you think you are having a problem with your medicine. · Whenever you are resting, raise your legs up. Try to keep the swollen area higher than the level of your heart. · Take breaks from standing or sitting in one position. ¨ Walk around to increase the blood flow in your lower legs. ¨ Move your feet and ankles often while you stand, or tighten and relax your leg muscles. · Wear support stockings. Put them on in the morning, before swelling gets worse. · Eat a balanced diet. Lose weight if you need to. · Limit the amount of salt (sodium) in your diet. Salt holds fluid in the body and may increase swelling. When should you call for help? Call 911 anytime you think you may need emergency care. For example, call if:  · You have symptoms of a blood clot in your lung (called a pulmonary embolism). These may include:  ¨ Sudden chest pain. ¨ Trouble breathing. ¨ Coughing up blood. Call your doctor now or seek immediate medical care if:  · You have signs of a blood clot, such as:  ¨ Pain in your calf, back of the knee, thigh, or groin. ¨ Redness and swelling in your leg or groin. · You have symptoms of infection, such as:  ¨ Increased pain, swelling, warmth, or redness. ¨ Red streaks or pus. ¨ A fever. Watch closely for changes in your health, and be sure to contact your doctor if:  · Your swelling is getting worse. · You have new or worsening pain in your legs. · You do not get better as expected. Where can you learn more? Go to http://mila-syeda.info/.   Enter D387 in the search box to learn more about \"Leg and Ankle Edema: Care Instructions. \"  Current as of: March 20, 2017  Content Version: 11.3  © 9517-0518 [employer], [facility]. Care instructions adapted under license by [employer] (which disclaims liability or warranty for this information). If you have questions about a medical condition or this instruction, always ask your healthcare professional. [place] [address] any warranty or liability for your use of this information.

## 2017-10-16 ENCOUNTER — PATIENT OUTREACH (OUTPATIENT)
Dept: INTERNAL MEDICINE CLINIC | Age: 77
End: 2017-10-16

## 2017-10-16 NOTE — PROGRESS NOTES
[facility]ED [facility] 10/14/2017: Muscle Spasm/Lower Extrem Edema. А[address] Discharge Follow-Up      Date/Time:  10/16/2017 1:00 PM    Patient listed on discharge weekend Daily Census report on 10/16/2017. Patient discharged from [facility] for Lower Extrem Swelling/Muscle Spasm. RRAT score: Low Risk            7       Total Score        3 Has Seen PCP in Last 6 Months (Yes=3, No=0)    4 Charlson Comorbidity Score (Age + Comorbid Conditions)        Criteria that do not apply:    . Living with Significant Other. Assisted Living. LTAC. SNF. or   Rehab    Patient Length of Stay (>5 days = 3)    IP Visits Last 12 Months (1-3=4, 4=9, >4=11)    Pt. Coverage (Medicare=5 , Medicaid, or Self-Pay=4)     Low    Medical History:     Past Medical History:   Diagnosis Date    Acute combined systolic and diastolic HF (heart failure), NYHA class 2 ([address] 2017    Arthritis     Atrial fibrillation (HCC)     Cancer (HCC)     multiple myeloma    Hypertension     S/P AV kathleen ablation 2017    Status post biventricular pacemaker 2017      Nurse Navigator(NN) contacted the patient by telephone to perform post ED ED [facility] discharge assessment. Verified  and address with patient as identifiers. Provided introduction to self, and explanation of the Nurses Navigator role. Diet:   Patient reports: Renal Diet     Activity:    Patient reports: mostly moving around the house    Medication:   Performed medication reconciliation with patient, and patient verbalizes understanding of administration of home medications. There were no barriers to obtaining medications identified at this time. Support system:  patient and daughter    Discharge Instructions :  Reviewed discharge instructions with patient. Patient verbalizes understanding of discharge instructions and follow-up care.        Red Flags:  Lower Extrem swelling--teachings done on the importance of wearing support stocking per PCP note. Imaging results reviewed:  Radioloogy:  Cervical Spine: There is unchanged minimal retrolisthesis of C3 on C4 measuring 2 mm. There is unchanged minimal anterolisthesis of C7 on T1  measuring 3 mm. There is moderate disc space narrowing at C5-6 and C6-7 which is unchanged. There is multilevel bilateral significant neural foraminal narrowing and posterior facet arthropathy. The odontoid process is partially obscured. The prevertebral soft tissues appear unremarkable. No acute fracture or dislocation. An incidental soft tissue calcifications seen between the posterior C6 and C7 spinous processes. Advance Care Planning:   Patient was offered the opportunity to discuss advance care planning:  yes     Does patient have an Advance Directive:  no   yes     PCP/Specialist follow up: Patient scheduled to follow up with Tom Montague MD on Thursday, November 16, 2017 09:45 AM.  Patient stated she will call Dr. Bedolla Guardiviky if she needs to see him sooner. The patient agrees to contact the PCP office for questions related to their healthcare. The patient expressed thanks, offered no additional questions and ended the call. Case management Impression/ plan:    Teachings:  Warm Soak applications to neck for unrelieved muscle spasms. Patient voiced understanding. Goals:  Attends follow-up appointments as directed.

## 2017-10-17 ENCOUNTER — TELEPHONE (OUTPATIENT)
Dept: CARDIOLOGY CLINIC | Age: 77
End: 2017-10-17

## 2017-10-17 NOTE — TELEPHONE ENCOUNTER
Dr. Rajput Links office (ENT) requesting if pt can hold Eliquis 3 days prior to a procedure. Please advise.

## 2017-10-18 NOTE — TELEPHONE ENCOUNTER
Called Dr. Heather Ferreira office advising them pt can hold Eliquis 48 hr before procedure. Faxed note to [phone] Haim Barakat.       Maria Elena Chacko NP   You 15 hours ago (5:21 PM)                 48 hours is sufficient

## 2017-11-14 ENCOUNTER — PATIENT OUTREACH (OUTPATIENT)
Dept: INTERNAL MEDICINE CLINIC | Age: 77
End: 2017-11-14

## 2017-11-14 ENCOUNTER — OFFICE VISIT (OUTPATIENT)
Dept: INTERNAL MEDICINE CLINIC | Age: 77
End: 2017-11-14

## 2017-11-14 VITALS
TEMPERATURE: 98.5 F | HEIGHT: 70 IN | WEIGHT: 198.6 LBS | RESPIRATION RATE: 18 BRPM | BODY MASS INDEX: 28.43 KG/M2 | DIASTOLIC BLOOD PRESSURE: 81 MMHG | SYSTOLIC BLOOD PRESSURE: 128 MMHG | OXYGEN SATURATION: 94 % | HEART RATE: 75 BPM

## 2017-11-14 DIAGNOSIS — M54.12 CERVICAL RADICULOPATHY: ICD-10-CM

## 2017-11-14 DIAGNOSIS — I48.20 CHRONIC ATRIAL FIBRILLATION (HCC): Chronic | ICD-10-CM

## 2017-11-14 DIAGNOSIS — C90.00 MULTIPLE MYELOMA, REMISSION STATUS UNSPECIFIED (HCC): ICD-10-CM

## 2017-11-14 DIAGNOSIS — M65.9 SYNOVITIS OF KNEE: Primary | ICD-10-CM

## 2017-11-14 RX ORDER — LIDOCAINE HYDROCHLORIDE 10 MG/ML
5 INJECTION, SOLUTION EPIDURAL; INFILTRATION; INTRACAUDAL; PERINEURAL ONCE
Qty: 5 ML | Refills: 0
Start: 2017-11-14 | End: 2017-11-14

## 2017-11-14 RX ORDER — TRIAMCINOLONE ACETONIDE 40 MG/ML
40 INJECTION, SUSPENSION INTRA-ARTICULAR; INTRAMUSCULAR ONCE
Qty: 1 ML | Refills: 0
Start: 2017-11-14 | End: 2017-11-14

## 2017-11-14 NOTE — PROGRESS NOTES
Chief Complaint   Patient presents with    Back Pain     rm 7 pain has gone up into back     Leg Pain     pain continuing in legs     Hypertension     patient states she has been feeling okay      1. Have you been to the ER, urgent care clinic since your last visit? Hospitalized since your last visit? Yes When: Oct 2017 Where: [facility] Reason for visit: Leg and shoulder pain    2. Have you seen or consulted any other health care providers outside of the [address] [place] [address] since your last visit? Include any pap smears or colon screening.  No

## 2017-11-14 NOTE — MR AVS SNAPSHOT
Visit Information Date & Time Provider Department Dept. Phone Encounter #  
 11/14/2017 12:00 PM Aparna Pierre MD [facility] [phone] [id number] Your Appointments 12/5/2017 11:15 AM  
PACEMAKER with PACEMAKER, [facility] [address]) Appt Note: 3mo bsc bivpm , s/p av node ablation [id number] [facility]  
[phone] [id number] [facility]  
  
    
 12/5/2017 11:15 AM  
ESTABLISHED PATIENT with Sunshine Plaza MD  
[facility] [address] [facility]) Appt Note: 3mo post implant [id number] [facility]  
[phone] [id number] [facility]  
  
    
 1/10/2018  9:15 AM  
3 MONTH with Neto Rojas MD  
[facility] [address] [facility]) Appt Note: $0cp  ekr [id number] [facility]  
[phone] [id number] [facility] Upcoming Health Maintenance Date Due  
 GLAUCOMA SCREENING Q2Y 3/16/2017 MEDICARE YEARLY EXAM 12/23/2017 DTaP/Tdap/Td series (2 - Td) 5/19/2026 Allergies as of 11/14/2017  Review Complete On: 11/14/2017 By: Girma Cuevas Severity Noted Reaction Type Reactions Codeine  09/05/2014    Other (comments) Current Immunizations  Reviewed on 8/29/2017 Name Date Influenza Vaccine 9/15/2014 Not reviewed this visit You Were Diagnosed With   
  
 Codes Comments Synovitis of knee    -  Primary ICD-10-CM: M65.9 ICD-9-CM: 727.09 Chronic atrial fibrillation (HCC)     ICD-10-CM: A28.2 ICD-9-CM: 427.31   
 Multiple myeloma, remission status unspecified (HCC)     ICD-10-CM: C90.00 ICD-9-CM: 203.00 Cervical radiculopathy     ICD-10-CM: M54.12 
ICD-9-CM: 723.4 Vitals BP Pulse Temp Resp Height(growth percentile) Weight(growth percentile) 128/81 (BP 1 Location: Left arm, BP Patient Position: Sitting) 75 98.5 °F (36.9 °C) (Oral) 18 5' 10\" (1.778 m) 198 lb 9.6 oz (90.1 kg) SpO2 BMI OB Status Smoking Status 94% 28.5 kg/m2 Hysterectomy Never Smoker BMI and BSA Data Body Mass Index Body Surface Area 28.5 kg/m 2 2.11 m 2 Preferred Pharmacy Pharmacy Name Phone [facility] [address] [place] [address], [address] [place] [address] [place] [phone] Your Updated Medication List  
  
   
This list is accurate as of: 11/14/17  2:55 PM.  Always use your most recent med list.  
  
  
  
  
 apixaban 5 mg tablet Commonly known as:  Flynn Po Take 1 Tab by mouth two (2) times a day. cyclobenzaprine 5 mg tablet Commonly known as:  FLEXERIL Take 1 Tab by mouth three (3) times daily as needed for Muscle Spasm(s). furosemide 40 mg tablet Commonly known as:  LASIX Take 1 Tab by mouth daily. GAVISCON EXTRA STRENGTH 160-105 mg Rain Kins Generic drug:  aluminum hydrox-magnesium carb Take 1-2 tablets by mouth four (4) times daily as needed. lisinopril 40 mg tablet Commonly known as:  Mic Headings Take 0.5 Tabs by mouth daily. metoprolol succinate 25 mg XL tablet Commonly known as:  TOPROL-XL Take 0.5 Tabs by mouth daily. oxyCODONE-acetaminophen  mg per tablet Commonly known as:  PERCOCET 10 Take 1 Tab by mouth every eight (8) hours as needed for Pain. Max Daily Amount: 3 Tabs. potassium chloride 20 mEq tablet Commonly known as:  K-DUR, KLOR-CON Take 1 Tab by mouth two (2) times a day. REVLIMID 10 mg Cap Generic drug:  lenalidomide Introducing [facility]! [facility] introduces [employer] patient portal. Now you can access parts of your medical record, email your doctor's office, and request medication refills online.    
 
1. In your internet browser, go to [email or web address] 2. Click on the First Time User? Click Here link in the Sign In box. You will see the New Member Sign Up page. 3. Enter your [employer] Access Code exactly as it appears below. You will not need to use this code after youve completed the sign-up process. If you do not sign up before the expiration date, you must request a new code. · [employer] Access Code: [email or web address]- Expires: 11/15/2017  7:14 AM 
 
4. Enter the last four digits of your Social Security Number (xxxx) and Date of Birth (mm/dd/yyyy) as indicated and click Submit. You will be taken to the next sign-up page. 5. Create a Playmaticst ID. This will be your [employer] login ID and cannot be changed, so think of one that is secure and easy to remember. 6. Create a [employer] password. You can change your password at any time. 7. Enter your Password Reset Question and Answer. This can be used at a later time if you forget your password. 8. Enter your e-mail address. You will receive e-mail notification when new information is available in [address] 9. Click Sign Up. You can now view and download portions of your medical record. 10. Click the Download Summary menu link to download a portable copy of your medical information. If you have questions, please visit the Frequently Asked Questions section of the [employer] website. Remember, [employer] is NOT to be used for urgent needs. For medical emergencies, dial 911. Now available from your iPhone and Android! Please provide this summary of care documentation to your next provider. Your primary care clinician is listed as Deanna Taylor. If you have any questions after today's visit, please call [phone].

## 2017-11-14 NOTE — PROGRESS NOTES
NNTOCED [facility] 11/14/2017:  Muscle Spasm/Lower Extremity Edema/LLE Pain    Patient was seen as the above:  NN received call from patient c/o severe LLE pain. Patient stated the pain started as pain in her back but now in \"all down in knees\". Patient stated the pain started on Sat but has gotten so much worse; she is unable to do her plans today. Patient stated she has taken percocet but no relief. Patient asked if she could come in to see PCP today. Patient informed of office visit scheduled for 11/16/2017; patient stated she doesn't think she can wait that long. NN scheduled patient for office visit today  @12N. Will continue to f/u.

## 2017-11-15 NOTE — PROGRESS NOTES
[address] [facility]  [place] [address]  [address]  [place] [address]  Phone:  [phone]  Fax: [phone]       Chief Complaint   Patient presents with    Back Pain     rm 7 pain has gone up into back     Leg Pain     pain continuing in legs     Hypertension     patient states she has been feeling okay    . SUBJECTIVE:    Lydia Corrales is a 68 y.o. female Comes in for return visit complaining of increasing pain in her right knee. There is no history of trauma. This has been present for the last two weeks and is getting worse. She finds and increasing difficulty to do activities of daily living because of this. She also notes pain in her cervical spine which is equivalent to the same intensity it previously was when it was fairly intense. She has been taking her Percocet for this with mild improvement. Pain in her neck that was quite nonspecific approximately a year ago and repetitive MRI scan did not reveal any obvious pathology. She follows up with her medical oncologist for her multiple myeloma. She has a past history of primary hypertension and atrial fibrillation. She is actively followed by cardiology for this and is on Apixaban. Current Outpatient Prescriptions   Medication Sig Dispense Refill    REVLIMID 10 mg cap       potassium chloride (K-DUR, KLOR-CON) 20 mEq tablet Take 1 Tab by mouth two (2) times a day. 60 Tab 11    lisinopril (PRINIVIL, ZESTRIL) 40 mg tablet Take 0.5 Tabs by mouth daily. 30 Tab 3    apixaban (ELIQUIS) 5 mg tablet Take 1 Tab by mouth two (2) times a day. 60 Tab 3    furosemide (LASIX) 40 mg tablet Take 1 Tab by mouth daily. 30 Tab 3    metoprolol succinate (TOPROL-XL) 25 mg XL tablet Take 0.5 Tabs by mouth daily. 30 Tab 11    aluminum hydrox-magnesium carb (GAVISCON EXTRA STRENGTH) 160-105 mg chew Take 1-2 tablets by mouth four (4) times daily as needed.       oxyCODONE-acetaminophen (PERCOCET)  mg per tablet Take 1 Tab by mouth every eight (8) hours as needed for Pain. Max Daily Amount: 3 Tabs. 20 Tab 0    diazePAM (VALIUM) 5 mg tablet Take 1 Tab by mouth every eight (8) hours as needed (spasm). Max Daily Amount: 15 mg. 15 Tab 0    naloxone (NARCAN) 4 mg/actuation nasal spray Use 1 spray intranasally into 1 nostril. Use a new Narcan nasal spray for subsequent doses and administer into alternating nostrils. May repeat every 2 to 3 minutes as needed. 2 Each 0    predniSONE (DELTASONE) 20 mg tablet Take 2 Tabs by mouth daily for 5 days. With Breakfast 10 Tab 0    cyclobenzaprine (FLEXERIL) 5 mg tablet Take 1 Tab by mouth three (3) times daily as needed for Muscle Spasm(s).  15 Tab 0     Past Medical History:   Diagnosis Date    Acute combined systolic and diastolic HF (heart failure), NYHA class 2 (HCC) 8/11/2017    Arthritis     Atrial fibrillation (HCC)     Cancer (HCC)     multiple myeloma    Hypertension     S/P AV kathleen ablation 8/11/2017    Status post biventricular pacemaker 8/11/2017 8/11/17      Past Surgical History:   Procedure Laterality Date    HX GYN      HX ORTHOPAEDIC      knee    HX PACEMAKER  08/11/2017     Allergies   Allergen Reactions    Codeine Other (comments)         REVIEW OF SYSTEMS:  General: negative for - chills or fever  ENT: negative for - headaches, nasal congestion or tinnitus  Respiratory: negative for - cough, hemoptysis, shortness of breath or wheezing  Cardiovascular : negative for - chest pain, edema, palpitations or shortness of breath  Gastrointestinal: negative for - abdominal pain, blood in stools, heartburn or nausea/vomiting  Genito-Urinary: no dysuria, trouble voiding, or hematuria  Musculoskeletal: See HPI  Neurological: no TIA or stroke symptoms  Hematologic: no bruises, no bleeding, no swollen glands  Integument: no lumps, mole changes, nail changes or rash  Endocrine: no malaise/lethargy or unexpected weight changes      Social History     Social History    Marital status:      Spouse name: N/A    Number of children: N/A    Years of education: N/A     Social History Main Topics    Smoking status: Never Smoker    Smokeless tobacco: Never Used    Alcohol use No    Drug use: No    Sexual activity: Not Currently     Other Topics Concern    None     Social History Narrative     Family History   Problem Relation Age of Onset    Stroke Mother     No Known Problems Father        OBJECTIVE:    Visit Vitals    /81 (BP 1 Location: Left arm, BP Patient Position: Sitting)    Pulse 75    Temp 98.5 °F (36.9 °C) (Oral)    Resp 18    Ht 5' 10\" (1.778 m)    Wt 198 lb 9.6 oz (90.1 kg)    SpO2 94%    BMI 28.5 kg/m2     CONSTITUTIONAL: well , well nourished, appears age appropriate  EYES: perrla, eom intact  ENMT:moist mucous membranes, pharynx clear  NECK: supple. Thyroid normal  RESPIRATORY: Chest: clear to ascultation and percussion   CARDIOVASCULAR: Heart: regular rate and rhythm  GASTROINTESTINAL: Abdomen: soft, bowel sounds active  HEMATOLOGIC: no pathological lymph nodes palpated  MUSCULOSKELETAL: Extremities: trace edema distally, pulse 1+, mild effusion and pain elicited to hyperextension and flexion right knee  INTEGUMENT: No unusual rashes or suspicious skin lesions noted. Nails appear normal.  NEUROLOGIC: non-focal exam   MENTAL STATUS: alert and oriented, appropriate affect      ASSESSMENT:  1. Synovitis of knee    2. Chronic atrial fibrillation (HCC)    3. Multiple myeloma, remission status unspecified (HCC)    4. Cervical radiculopathy        PLAN:    1. The patient has an acute synovitis superimposed on osteoarthritis of her right knee. Under sterile technique, I inject Kenalog 40 mg and Xylocaine 5 cc's into the medial aspect of the right knee. She tolerates the procedure well. Post injection notes definite improvement, which is obviously created by the Xylocaine. This should improve over the next several days.    2. She will continue to follow with her cardiologist regarding her atrial fibrillation. Her ventricular rate today is quite stable, but rally the range is irregular. 3. She will follow with Dr. Emmanuelle Hassan regarding her multiple myeloma. 4. Her cervical radiculopathy is the most likely etiology of her pain in her shoulder. Continue symptomatic treatment for now. Historically there is nothing more that need be done unless something new has evolved since last year. .  Orders Placed This Encounter    triamcinolone acetonide (KENALOG) 40 mg/mL injection    lidocaine, PF, (XYLOCAINE) 10 mg/mL (1 %) injection         Follow-up Disposition:  Return in about 3 months (around 2/14/2018).       Polly Aaron MD

## 2017-11-20 ENCOUNTER — PATIENT OUTREACH (OUTPATIENT)
Dept: INTERNAL MEDICINE CLINIC | Age: 77
End: 2017-11-20

## 2017-11-20 NOTE — PROGRESS NOTES
NNTOCED [address] 10/14/2017  Leg Pain/  Knee Pain     NN received message left on VM from patient with c/o knee/leg pain. Patient stated on VM that Dr. Galina Yoon injected knee during office visit and that has done well, but now the other knee is hurting. Patient states on VM she was unable to walk yesterday 11/19/2017. Patient requested NN  To return call back. NN call via telephone/LM on VM for patient to return call. Patient provided office phone and cell phone #s. NN will continue to f/u. Chart Review:  Office Visit:  PCP 10/14/2017:  PLAN:   The patient has an acute synovitis superimposed on osteoarthritis of her right knee. Under sterile technique, I inject Kenalog 40 mg and Xylocaine 5 cc's into the medial aspect of the right knee. She tolerates the procedure well. Post injection notes definite improvement, which is obviously created by the Xylocaine. This should improve over the next several days. Follow-up Disposition:  Return in about 3 months (around 2/14/2018). NN Goal:   Schedule appt w/ PCP for Leg Pain/inability to walk pain. Goals            Knowledge and adherence of prescribed medication (ie. action, side effects, missed dose, etc.).             Self-schedules and keeps appointments        Completion target:  11/22/2017

## 2017-11-20 NOTE — PROGRESS NOTES
NNTOCED [facility] 10/14/2017  Leg Pain/  Knee Pain     NN spoke with patient; stated pain started a couple of days ago; patient stated she was unable to walk on yesterday; stated she is resting now. Patient stated she didn't want to put ice on her leg but agreed to placing warm soaks to lateral knee area. Patient stated she had take pain medication which had helped some what. Patient agreed to contact PCP on tomorrow if pain persists as were yesterday. Goals Addressed             Most Recent     Coordinate pain management plan for patient. On track (11/20/2017)             Coordinate pain management plan for patient. Patient agreed to place warm soaks to later knee for severe pain and to take pain medication PRN.  Knowledge and adherence of prescribed medication (ie. action, side effects, missed dose, etc.). On track (11/20/2017)          NN w/ continue to f/u.

## 2017-11-24 ENCOUNTER — OFFICE VISIT (OUTPATIENT)
Dept: INTERNAL MEDICINE CLINIC | Age: 77
End: 2017-11-24

## 2017-11-24 ENCOUNTER — PATIENT OUTREACH (OUTPATIENT)
Dept: INTERNAL MEDICINE CLINIC | Age: 77
End: 2017-11-24

## 2017-11-24 VITALS
DIASTOLIC BLOOD PRESSURE: 90 MMHG | OXYGEN SATURATION: 98 % | HEART RATE: 75 BPM | BODY MASS INDEX: 27.83 KG/M2 | SYSTOLIC BLOOD PRESSURE: 156 MMHG | RESPIRATION RATE: 20 BRPM | WEIGHT: 194.4 LBS | HEIGHT: 70 IN | TEMPERATURE: 97.8 F

## 2017-11-24 DIAGNOSIS — M50.30 DDD (DEGENERATIVE DISC DISEASE), CERVICAL: Primary | ICD-10-CM

## 2017-11-24 RX ORDER — PREDNISONE 20 MG/1
40 TABLET ORAL DAILY
Qty: 10 TAB | Refills: 0 | Status: SHIPPED | OUTPATIENT
Start: 2017-11-24 | End: 2017-11-27 | Stop reason: CLARIF

## 2017-11-24 RX ORDER — CYCLOBENZAPRINE HCL 5 MG
5 TABLET ORAL
Qty: 15 TAB | Refills: 0 | Status: SHIPPED | OUTPATIENT
Start: 2017-11-24 | End: 2017-11-24 | Stop reason: SDUPTHER

## 2017-11-24 RX ORDER — CYCLOBENZAPRINE HCL 5 MG
5 TABLET ORAL
Qty: 15 TAB | Refills: 0 | Status: SHIPPED | OUTPATIENT
Start: 2017-11-24 | End: 2021-09-23 | Stop reason: ALTCHOICE

## 2017-11-24 NOTE — PROGRESS NOTES
1. Have you been to the ER, urgent care clinic since your last visit? Hospitalized since your last visit? No    2. Have you seen or consulted any other health care providers outside of the [address] [place] [address] since your last visit? Include any pap smears or colon screening. No         Chief Complaint   Patient presents with    Neck Pain     she is a 68y.o. year old female who presents for evaluation of   Pain Assessment Encounter      Daya Alvaresston  11/24/2017  Onset of Symptoms: stiffness and neck pain  ________________________________________________________________________  Description: The next that happens sporadically and intermittently. Frequency: once a day  Pain Scale:(1-10): 6  Trauma Hx: NA  Hx of similar symptoms: Yes  Radiation: no  Duration:  continuous      Progression: has worsened  What makes it better?: rest  What makes it worse?:exercise  Medications tried: Percocet    Reviewed and agree with Nurse Note and duplicated in this note. Reviewed PmHx, RxHx, FmHx, SocHx, AllgHx and updated and dated in the chart.     Family History   Problem Relation Age of Onset    Stroke Mother     No Known Problems Father        Past Medical History:   Diagnosis Date    Acute combined systolic and diastolic HF (heart failure), NYHA class 2 ([facility] [address] 8/11/2017    Arthritis     Atrial fibrillation (HCC)     Cancer (HCC)     multiple myeloma    Hypertension     S/P AV kathleen ablation 8/11/2017    Status post biventricular pacemaker 8/11/2017 8/11/17       Social History     Social History    Marital status:      Spouse name: N/A    Number of children: N/A    Years of education: N/A     Social History Main Topics    Smoking status: Never Smoker    Smokeless tobacco: Never Used    Alcohol use No    Drug use: No    Sexual activity: Not Currently     Other Topics Concern    None     Social History Narrative        Review of Systems - negative except as listed above      Objective: Vitals:    11/24/17 1443   BP: 156/90   Pulse: 75   Resp: 20   Temp: 97.8 °F (36.6 °C)   TempSrc: Oral   SpO2: 98%   Weight: 194 lb 6.4 oz (88.2 kg)   Height: 5' 10\" (1.778 m)       Physical Examination: General appearance - alert, well appearing, and in no distress  Back exam - full range of motion, no tenderness, palpable spasm or pain on motion  Neck -negative Spurling's bilaterally but does produce pain, pain with palpation of C-spine, no trap spasms  Neurological - alert, oriented, normal speech, no focal findings or movement disorder noted  Musculoskeletal - no joint tenderness, deformity or swelling  Extremities - peripheral pulses normal, no pedal edema, no clubbing or cyanosis  Skin - normal coloration and turgor, no rashes, no suspicious skin lesions noted    Assessment/ Plan:   Diagnoses and all orders for this visit:    1. DDD (degenerative disc disease), cervical    Other orders  -     cyclobenzaprine (FLEXERIL) 5 mg tablet; Take 1 Tab by mouth three (3) times daily as needed for Muscle Spasm(s). -     predniSONE (DELTASONE) 20 mg tablet; Take 2 Tabs by mouth daily for 5 days. With Breakfast     Patient return to clinic if any worsening symptoms  Pathophysiology, recovery and rehabilitation process discussed and questions answered   Counseling for 30 Minutes of the total visit duration   Pictures and figures used as necessary   Provided reassurance   Recommend activity modification   Recommend  lower impact activities-walking, Eliptical, Nordic Track, cycling or swimming   Follow up in 4 week(s)       1) Remember to stay active and/or exercise regularly (I suggest 30-45 minutes daily)   2) For reliable dietary information, go to www. EATRIGHT.org. You may wish to consider seeing the nutritionist at [facility] [phone], also consider the [address] I have discussed the diagnosis with the patient and the intended plan as seen in the above orders.   The patient has received an after-visit summary and questions were answered concerning future plans. Medication Side Effects and Warnings were discussed with patient: yes  Patient Labs were reviewed and or requested: yes  Patient Past Records were reviewed and or requested  yes  I have discussed the diagnosis with the patient and the intended plan as seen in the above orders. The patient has received an after-visit summary and questions were answered concerning future plans. Pt agrees to call or return to clinic and/or go to closest ER with any worsening of symptoms. This may include, but not limited to increased fever (>100.4) with NSAIDS or Tylenol, increased edema, confusion, rash, worsening of presenting symptoms.

## 2017-11-24 NOTE — MR AVS SNAPSHOT
Visit Information Date & Time Provider Department Dept. Phone Encounter #  
 11/24/2017  3:00 PM Paul Alexis MD [facility] and [address] [id number] Follow-up Instructions Return if symptoms worsen or fail to improve. Follow-up and Disposition History Your Appointments 12/5/2017 11:15 AM  
PACEMAKER with PACEMAKER, [facility]) Appt Note: 3mo bsc bivpm , s/p av node ablation [id number] [facility]  
[phone] [id number] [facility]  
  
    
 12/5/2017 11:15 AM  
ESTABLISHED PATIENT with Renetta Abdalla MD  
[facility] Appt Note: 3mo post implant [id number] [facility]  
[phone] [id number] [facility]  
  
    
 1/10/2018  9:15 AM  
3 MONTH with [address], MD  
[facility]) Appt Note: $0cp  ekr [id number] [facility]  
[phone] [address] [facility]  
  
    
 2/20/2018  9:45 AM  
Any with Leah Fitzgerald MD  
[address] [place] [address] and Primary Care [facility]) Appt Note: 3 month follow up  
 [address] [facility]  
  
   
 [address] [id number] Upcoming Health Maintenance Date Due  
 GLAUCOMA SCREENING Q2Y 3/16/2017 MEDICARE YEARLY EXAM 12/23/2017 DTaP/Tdap/Td series (2 - Td) 5/19/2026 Allergies as of 11/24/2017  Review Complete On: 11/24/2017 By: Paul Alexis MD  
  
 Severity Noted Reaction Type Reactions Codeine  09/05/2014    Other (comments) Current Immunizations  Reviewed on 8/29/2017 Name Date Influenza Vaccine 9/15/2014 Not reviewed this visit You Were Diagnosed With   
  
 Codes Comments DDD (degenerative disc disease), cervical    -  Primary ICD-10-CM: M50.30 ICD-9-CM: 722.4 Vitals BP Pulse Temp Resp Height(growth percentile) Weight(growth percentile) 156/90 (BP 1 Location: Left arm, BP Patient Position: Sitting) 75 97.8 °F (36.6 °C) (Oral) 20 5' 10\" (1.778 m) 194 lb 6.4 oz (88.2 kg) SpO2 BMI OB Status Smoking Status 98% 27.89 kg/m2 Hysterectomy Never Smoker Vitals History BMI and BSA Data Body Mass Index Body Surface Area  
 27.89 kg/m 2 2.09 m 2 Preferred Pharmacy Pharmacy Name Phone [facility] [address] [phone] Your Updated Medication List  
  
   
This list is accurate as of: 11/24/17  3:02 PM.  Always use your most recent med list.  
  
  
  
  
 apixaban 5 mg tablet Commonly known as:  Jenny Rk Take 1 Tab by mouth two (2) times a day. cyclobenzaprine 5 mg tablet Commonly known as:  FLEXERIL Take 1 Tab by mouth three (3) times daily as needed for Muscle Spasm(s). furosemide 40 mg tablet Commonly known as:  LASIX Take 1 Tab by mouth daily. GAVISCON EXTRA STRENGTH 160-105 mg Violetta Sheller Generic drug:  aluminum hydrox-magnesium carb Take 1-2 tablets by mouth four (4) times daily as needed. lisinopril 40 mg tablet Commonly known as:  [place] Take 0.5 Tabs by mouth daily. metoprolol succinate 25 mg XL tablet Commonly known as:  TOPROL-XL Take 0.5 Tabs by mouth daily. oxyCODONE-acetaminophen  mg per tablet Commonly known as:  PERCOCET 10 Take 1 Tab by mouth every eight (8) hours as needed for Pain. Max Daily Amount: 3 Tabs. potassium chloride 20 mEq tablet Commonly known as:  K-DUR, KLOR-CON Take 1 Tab by mouth two (2) times a day. predniSONE 20 mg tablet Commonly known as:  Nakul Sprawls Take 2 Tabs by mouth daily for 5 days. With Breakfast  
  
 REVLIMID 10 mg Cap Generic drug:  lenalidomide Prescriptions Sent to Pharmacy Refills  
 predniSONE (DELTASONE) 20 mg tablet 0 Sig: Take 2 Tabs by mouth daily for 5 days. With Breakfast  
 Class: Normal  
 Pharmacy: [address] [place] [address], [address] [place] [address]. Ph #: [phone] Route: Oral  
 cyclobenzaprine (FLEXERIL) 5 mg tablet 0 Sig: Take 1 Tab by mouth three (3) times daily as needed for Muscle Spasm(s). Class: Normal  
 Pharmacy: [address] [place] [address], [address] [place] [address]. Ph #: [phone] Route: Oral  
  
Follow-up Instructions Return if symptoms worsen or fail to improve. Introducing [facility]! [facility] introduces [employer] patient portal. Now you can access parts of your medical record, email your doctor's office, and request medication refills online. 1. In your internet browser, go to [email or web address]://Pod [email or web address]. [employer]/Pod Inns 2. Click on the First Time User? Click Here link in the Sign In box. You will see the New Member Sign Up page. 3. Enter your [employer] Access Code exactly as it appears below. You will not need to use this code after youve completed the sign-up process. If you do not sign up before the expiration date, you must request a new code. · [employer] Access Code: [email or web address] Expires: 2/22/2018  3:02 PM 
 
4. Enter the last four digits of your Social Security Number (xxxx) and Date of Birth (mm/dd/yyyy) as indicated and click Submit. You will be taken to the next sign-up page. 5. Create a Hakiat ID. This will be your Hakiat login ID and cannot be changed, so think of one that is secure and easy to remember. 6. Create a Hakiat password. You can change your password at any time. 7. Enter your Password Reset Question and Answer. This can be used at a later time if you forget your password. 8. Enter your e-mail address. You will receive e-mail notification when new information is available in [address] 9. Click Sign Up. You can now view and download portions of your medical record. 10. Click the Download Summary menu link to download a portable copy of your medical information. If you have questions, please visit the Frequently Asked Questions section of the [employer] website. Remember, [employer] is NOT to be used for urgent needs. For medical emergencies, dial 911. Now available from your iPhone and Android! Please provide this summary of care documentation to your next provider. Your primary care clinician is listed as Deanna Taylor. If you have any questions after today's visit, please call [phone].

## 2017-11-25 ENCOUNTER — HOSPITAL ENCOUNTER (EMERGENCY)
Age: 77
Discharge: HOME | End: 2017-11-25
Attending: EMERGENCY MEDICINE
Payer: MEDICARE

## 2017-11-25 ENCOUNTER — TELEPHONE (OUTPATIENT)
Dept: FAMILY MEDICINE CLINIC | Age: 77
End: 2017-11-25

## 2017-11-25 ENCOUNTER — PATIENT OUTREACH (OUTPATIENT)
Dept: INTERNAL MEDICINE CLINIC | Age: 77
End: 2017-11-25

## 2017-11-25 VITALS
HEIGHT: 70 IN | TEMPERATURE: 98.9 F | WEIGHT: 194 LBS | DIASTOLIC BLOOD PRESSURE: 90 MMHG | HEART RATE: 74 BPM | BODY MASS INDEX: 27.77 KG/M2 | RESPIRATION RATE: 16 BRPM | OXYGEN SATURATION: 95 % | SYSTOLIC BLOOD PRESSURE: 134 MMHG

## 2017-11-25 DIAGNOSIS — M54.2 NECK PAIN: Primary | ICD-10-CM

## 2017-11-25 DIAGNOSIS — M62.838 CERVICAL PARASPINAL MUSCLE SPASM: ICD-10-CM

## 2017-11-25 PROCEDURE — 99283 EMERGENCY DEPT VISIT LOW MDM: CPT

## 2017-11-25 PROCEDURE — 96372 THER/PROPH/DIAG INJ SC/IM: CPT

## 2017-11-25 PROCEDURE — 74011250637 HC RX REV CODE- 250/637: Performed by: EMERGENCY MEDICINE

## 2017-11-25 PROCEDURE — 74011250636 HC RX REV CODE- 250/636: Performed by: EMERGENCY MEDICINE

## 2017-11-25 RX ORDER — OXYCODONE AND ACETAMINOPHEN 10; 325 MG/1; MG/1
1 TABLET ORAL
Qty: 20 TAB | Refills: 0 | Status: SHIPPED | OUTPATIENT
Start: 2017-11-25 | End: 2017-11-27 | Stop reason: SDUPTHER

## 2017-11-25 RX ORDER — ONDANSETRON 4 MG/1
4 TABLET, ORALLY DISINTEGRATING ORAL
Status: COMPLETED | OUTPATIENT
Start: 2017-11-25 | End: 2017-11-25

## 2017-11-25 RX ORDER — HYDROMORPHONE HYDROCHLORIDE 2 MG/ML
1 INJECTION, SOLUTION INTRAMUSCULAR; INTRAVENOUS; SUBCUTANEOUS
Status: COMPLETED | OUTPATIENT
Start: 2017-11-25 | End: 2017-11-25

## 2017-11-25 RX ORDER — NALOXONE HYDROCHLORIDE 4 MG/.1ML
SPRAY NASAL
Qty: 2 EACH | Refills: 0 | Status: SHIPPED | OUTPATIENT
Start: 2017-11-25 | End: 2018-12-21

## 2017-11-25 RX ORDER — LIDOCAINE 50 MG/G
1 PATCH TOPICAL
Status: DISCONTINUED | OUTPATIENT
Start: 2017-11-25 | End: 2017-11-25 | Stop reason: HOSPADM

## 2017-11-25 RX ORDER — DIAZEPAM 5 MG/1
5 TABLET ORAL
Qty: 15 TAB | Refills: 0 | Status: SHIPPED | OUTPATIENT
Start: 2017-11-25 | End: 2018-04-07

## 2017-11-25 RX ORDER — DIAZEPAM 5 MG/1
5 TABLET ORAL
Status: COMPLETED | OUTPATIENT
Start: 2017-11-25 | End: 2017-11-25

## 2017-11-25 RX ADMIN — HYDROMORPHONE HYDROCHLORIDE 1 MG: 2 INJECTION INTRAMUSCULAR; INTRAVENOUS; SUBCUTANEOUS at 16:14

## 2017-11-25 RX ADMIN — METHYLPREDNISOLONE SODIUM SUCCINATE 125 MG: 125 INJECTION, POWDER, FOR SOLUTION INTRAMUSCULAR; INTRAVENOUS at 14:08

## 2017-11-25 RX ADMIN — DIAZEPAM 5 MG: 5 TABLET ORAL at 14:08

## 2017-11-25 RX ADMIN — ONDANSETRON 4 MG: 4 TABLET, ORALLY DISINTEGRATING ORAL at 16:15

## 2017-11-25 NOTE — TELEPHONE ENCOUNTER
Returned call to answering service. Patient c/o neck pain, states she was rx prednisone and muscle relaxer at visit with Dr. Semaj Adame yesterday, feels it is not helping. Advised patient patient that steroid will take more time to work. If she feels symptoms are becoming too severe, she should go to UC or ED for further eval today. Patient verbalizes understanding.     Ramon Nelson NP

## 2017-11-25 NOTE — ED PROVIDER NOTES
[facility] [address]  EMERGENCY DEPARTMENT HISTORY AND PHYSICAL EXAM       Date of Service: 11/25/2017   Patient Name: Matt Brand   YOB: 1940  Medical Record Number: [id number]    History of Presenting Illness     Chief Complaint   Patient presents with    Neck Pain     left greater than right since Wednesday. No injury        History Provided By:  patient    Additional History:   Matt Brand is a 68 y.o. female with PMhx significant for HTN and DDD who presents ambulatory to the ED with cc of worsening chronic central neck pain with an associated posterior headache x 4 days. Pt reports previously taking Percocet and steroids for pain with temporary relief and notes being notified of the possible need for a Prednisone injection. Pt endorses taking Flexeril without relief and using a lidocaine patch with relief. She specifically denies any SOB, CP, or urinary/stool incontinence. Social Hx: - Tobacco, - EtOH, - Illicit Drugs    There are no other complaints, changes or physical findings at this time. Primary Care Provider: Fadia Gordon MD     Past History     Past Medical History:   Past Medical History:   Diagnosis Date    Acute combined systolic and diastolic HF (heart failure), NYHA class 2 ([facility] [address] 8/11/2017    Arthritis     Atrial fibrillation (HCC)     Cancer (HCC)     multiple myeloma    Hypertension     S/P AV kathleen ablation 8/11/2017    Status post biventricular pacemaker 8/11/2017 8/11/17         Past Surgical History:   Past Surgical History:   Procedure Laterality Date    HX GYN      HX ORTHOPAEDIC      knee    HX PACEMAKER  08/11/2017        Family History:   Family History   Problem Relation Age of Onset    Stroke Mother     No Known Problems Father         Social History:   Social History   Substance Use Topics    Smoking status: Never Smoker    Smokeless tobacco: Never Used    Alcohol use No        Allergies:    Allergies Allergen Reactions    Codeine Other (comments)         Review of Systems   Review of Systems   Constitutional: Negative. Negative for appetite change, chills, fatigue and fever. HENT: Negative. Negative for congestion, rhinorrhea, sinus pressure and sore throat. Eyes: Negative. Respiratory: Negative. Negative for cough, choking, chest tightness, shortness of breath and wheezing. Cardiovascular: Negative. Negative for chest pain, palpitations and leg swelling. Gastrointestinal: Negative for abdominal pain, constipation, diarrhea, nausea and vomiting. Endocrine: Negative. Genitourinary: Negative. Negative for difficulty urinating, dysuria, flank pain and urgency. Musculoskeletal: Positive for neck pain. Skin: Negative. Neurological: Positive for headaches (posterior). Negative for dizziness, speech difficulty, weakness, light-headedness and numbness. Psychiatric/Behavioral: Negative. All other systems reviewed and are negative. Physical Exam  Physical Exam   Constitutional: She is oriented to person, place, and time. She appears well-developed and well-nourished. She appears distressed. Elderly female, appears to be uncomfortable, holding head still   HENT:   Head: Normocephalic and atraumatic. Mouth/Throat: Oropharynx is clear and moist. No oropharyngeal exudate. Eyes: Conjunctivae and EOM are normal. Pupils are equal, round, and reactive to light. Neck: Neck supple. No JVD present. No tracheal deviation present. Decrease ROM due to pain, spasm in bilateral neck paraspinals, no midline tenderness   Cardiovascular: Normal rate, regular rhythm, normal heart sounds and intact distal pulses. No murmur heard. Pulmonary/Chest: Effort normal and breath sounds normal. No stridor. No respiratory distress. She has no wheezes. She has no rales. She exhibits no tenderness. Abdominal: Soft. She exhibits no distension. There is no tenderness.  There is no rebound and no guarding. Musculoskeletal: Normal range of motion. She exhibits no edema or tenderness. Neurological: She is alert and oriented to person, place, and time. No cranial nerve deficit. No focal motor or sensory deficits    Skin: Skin is warm and dry. She is not diaphoretic. Psychiatric: She has a normal mood and affect. Her behavior is normal.   Nursing note and vitals reviewed. Medical Decision Making   I am the first provider for this patient. I reviewed the vital signs, available nursing notes, past medical history, past surgical history, family history and social history. Old Medical Records: Old medical records. Nursing notes. Provider Notes:   DDx: cervical strain, exacerbation of disc disease, muscle strain    ED Course:  1:45 PM   Initial assessment performed. The patients presenting problems have been discussed, and they are in agreement with the care plan formulated and outlined with them. I have encouraged them to ask questions as they arise throughout their visit. Progress Notes:   3:47 PM  Patient states pain has improved, however is still having some right-sided discomfort. Will order another dose of medication then prepare for discharge. Diagnostic Study Results   Vital Signs-Reviewed the patient's vital signs.    Patient Vitals for the past 12 hrs:   Temp Pulse Resp BP SpO2   11/25/17 1643 - 74 16 134/90 95 %   11/25/17 1224 98.9 °F (37.2 °C) 76 16 (!) 159/109 95 %       Medications Given in the ED:  Medications   lidocaine (LIDODERM) 5 % patch 1 Patch (1 Patch TransDERmal Apply Patch 11/25/17 1408)   diazePAM (VALIUM) tablet 5 mg (5 mg Oral Given 11/25/17 1408)   methylPREDNISolone (PF) (SOLU-MEDROL) injection 125 mg (125 mg IntraMUSCular Given 11/25/17 1408)   HYDROmorphone (DILAUDID) injection 1 mg (1 mg IntraMUSCular Given 11/25/17 1614)   ondansetron (ZOFRAN ODT) tablet 4 mg (4 mg Oral Given 11/25/17 1615)       Pulse Oximetry Analysis - Normal 95% on room air Cardiac Monitor:   Rate: 78 bpm    Diagnosis   Clinical Impression:   1. Neck pain    2. Cervical paraspinal muscle spasm         Plan:  1:   Follow-up Information     Follow up With Details 2021 Kourtney Taylor [place]   [address] [address]  [phone]          2:   Discharge Medication List as of 11/25/2017  5:41 PM      START taking these medications    Details   diazePAM (VALIUM) 5 mg tablet Take 1 Tab by mouth every eight (8) hours as needed (spasm). Max Daily Amount: 15 mg., Print, Disp-15 Tab, R-0      naloxone (NARCAN) 4 mg/actuation nasal spray Use 1 spray intranasally into 1 nostril. Use a new Narcan nasal spray for subsequent doses and administer into alternating nostrils. May repeat every 2 to 3 minutes as needed. , Print, Disp-2 Each, R-0         CONTINUE these medications which have CHANGED    Details   oxyCODONE-acetaminophen (PERCOCET)  mg per tablet Take 1 Tab by mouth every eight (8) hours as needed for Pain. Max Daily Amount: 3 Tabs., Print, Disp-20 Tab, R-0         CONTINUE these medications which have NOT CHANGED    Details   predniSONE (DELTASONE) 20 mg tablet Take 2 Tabs by mouth daily for 5 days. With Breakfast, Normal, Disp-10 Tab, R-0      cyclobenzaprine (FLEXERIL) 5 mg tablet Take 1 Tab by mouth three (3) times daily as needed for Muscle Spasm(s). , Normal, Disp-15 Tab, R-0      REVLIMID 10 mg cap Historical Med, CYRUS      potassium chloride (K-DUR, KLOR-CON) 20 mEq tablet Take 1 Tab by mouth two (2) times a day., Normal, Disp-60 Tab, R-11      lisinopril (PRINIVIL, ZESTRIL) 40 mg tablet Take 0.5 Tabs by mouth daily. , No Print, Disp-30 Tab, R-3      apixaban (ELIQUIS) 5 mg tablet Take 1 Tab by mouth two (2) times a day., Normal, Disp-60 Tab, R-3      furosemide (LASIX) 40 mg tablet Take 1 Tab by mouth daily. , Normal, Disp-30 Tab, R-3      metoprolol succinate (TOPROL-XL) 25 mg XL tablet Take 0.5 Tabs by mouth daily. , Normal, Disp-30 Tab, R-11      aluminum hydrox-magnesium carb (GAVISCON EXTRA STRENGTH) 160-105 mg chew Take 1-2 tablets by mouth four (4) times daily as needed., Historical Med           Return to ED if Worse    Disposition Note:  DISCHARGE NOTE:  6:00 PM  The patient has been re-evaluated and is ready for discharge. Reviewed available results with patient. Counseled patient on diagnosis and care plan. Patient has expressed understanding, and all questions have been answered. Patient agrees with plan and agrees to follow up as recommended, or return to the ED if their symptoms worsen. Discharge instructions have been provided and explained to the patient, along with reasons to return to the ED.  _______________________________   Attestations:     ATTESTATION:  This note is prepared by Brandy Caldwell, acting as Scribe for Dion Matos DO: The scribe's documentation has been prepared under my direction and personally reviewed by me in its entirety.  I confirm that the note above accurately reflects all work, treatment, procedures, and medical decision making performed by me.  _______________________________

## 2017-11-25 NOTE — DISCHARGE INSTRUCTIONS
Neck Pain: Care Instructions  Your Care Instructions    You can have neck pain anywhere from the bottom of your head to the top of your shoulders. It can spread to the upper back or arms. Injuries, painting a ceiling, sleeping with your neck twisted, staying in one position for too long, and many other activities can cause neck pain. Most neck pain gets better with home care. Your doctor may recommend medicine to relieve pain or relax your muscles. He or she may suggest exercise and physical therapy to increase flexibility and relieve stress. You may need to wear a special (cervical) collar to support your neck for a day or two. Follow-up care is a key part of your treatment and safety. Be sure to make and go to all appointments, and call your doctor if you are having problems. It's also a good idea to know your test results and keep a list of the medicines you take. How can you care for yourself at home? · Try using a heating pad on a low or medium setting for 15 to 20 minutes every 2 or 3 hours. Try a warm shower in place of one session with the heating pad. · You can also try an ice pack for 10 to 15 minutes every 2 to 3 hours. Put a thin cloth between the ice and your skin. · Take pain medicines exactly as directed. ¨ If the doctor gave you a prescription medicine for pain, take it as prescribed. ¨ If you are not taking a prescription pain medicine, ask your doctor if you can take an over-the-counter medicine. · If your doctor recommends a cervical collar, wear it exactly as directed. When should you call for help? Call your doctor now or seek immediate medical care if:  ? · You have new or worsening numbness in your arms, buttocks or legs. ? · You have new or worsening weakness in your arms or legs. (This could make it hard to stand up.)   ? · You lose control of your bladder or bowels. ? Watch closely for changes in your health, and be sure to contact your doctor if:  ? · Your neck pain is getting worse. ? · You are not getting better after 1 week. ? · You do not get better as expected. Where can you learn more? Go to http://mila-syeda.info/. Enter [id number] in the search box to learn more about \"Neck Pain: Care Instructions. \"  Current as of: March 21, 2017  Content Version: 11.4  © 6596-1106 [employer]. Care instructions adapted under license by [employer] (which disclaims liability or warranty for this information). If you have questions about a medical condition or this instruction, always ask your healthcare professional. [place] [address] any warranty or liability for your use of this information. Neck: Exercises  Your Care Instructions  Here are some examples of typical rehabilitation exercises for your condition. Start each exercise slowly. Ease off the exercise if you start to have pain. Your doctor or physical therapist will tell you when you can start these exercises and which ones will work best for you. How to do the exercises  Neck stretch    1. This stretch works best if you keep your shoulder down as you lean away from it. To help you remember to do this, start by relaxing your shoulders and lightly holding on to your thighs or your chair. 2. Tilt your head toward your shoulder and hold for 15 to 30 seconds. Let the weight of your head stretch your muscles. 3. If you would like a little added stretch, use your hand to gently and steadily pull your head toward your shoulder. For example, keeping your right shoulder down, lean your head to the left. 4. Repeat 2 to 4 times toward each shoulder. Diagonal neck stretch    1. Turn your head slightly toward the direction you will be stretching, and tilt your head diagonally toward your chest and hold for 15 to 30 seconds.   2. If you would like a little added stretch, use your hand to gently and steadily pull your head forward on the diagonal.  3. Repeat 2 to 4 times toward each side. Dorsal glide stretch    The dorsal glide stretches the back of the neck. If you feel pain, do not glide so far back. Some people find this exercise easier to do while lying on their backs with an ice pack on the neck. 1. Sit or stand tall and look straight ahead. 2. Slowly tuck your chin as you glide your head backward over your body  3. Hold for a count of 6, and then relax for up to 10 seconds. 4. Repeat 8 to 12 times. Chest and shoulder stretch    1. Sit or stand tall and glide your head backward as in the dorsal glide stretch. 2. Raise both arms so that your hands are next to your ears. 3. Take a deep breath, and as you breathe out, lower your elbows down and behind your back. You will feel your shoulder blades slide down and together, and at the same time you will feel a stretch across your chest and the front of your shoulders. 4. Hold for about 6 seconds, and then relax for up to 10 seconds. 5. Repeat 8 to 12 times. Strengthening: Hands on head    1. Move your head backward, forward, and side to side against gentle pressure from your hands, holding each position for about 6 seconds. 2. Repeat 8 to 12 times. Follow-up care is a key part of your treatment and safety. Be sure to make and go to all appointments, and call your doctor if you are having problems. It's also a good idea to know your test results and keep a list of the medicines you take. Where can you learn more? Go to http://mila-syeda.info/. Enter P975 in the search box to learn more about \"Neck: Exercises. \"  Current as of: March 21, 2017  Content Version: 11.4  © 7739-0113 [employer]. Care instructions adapted under license by [employer] (which disclaims liability or warranty for this information).  If you have questions about a medical condition or this instruction, always ask your healthcare professional. Marco Antonio Blanco disclaims any warranty or liability for your use of this information.

## 2017-11-25 NOTE — ED NOTES
Pt arrives to ED via triage c/o bilateral neck pain for which she sees her PCP normally and was dx with arthritis. Pt states she called her PCP today and he recommended coming to ED for IM injection of solu-medrol, which has worked for her in the past until he can see her in the office next week.

## 2017-11-25 NOTE — PROGRESS NOTES
NNTOCED:  DDD (degenerative disc disease), cervical  F/u    NN following up on patient from yesterday's c/o neck pain after having seen Dr. Sheila Alonzo Physician. Patient spoke with on-call NN today stating prednisone was not working. NN Left message on voicemail. Will attempt to contact again. Need to complete office-follow-up assessment.

## 2017-11-25 NOTE — PROGRESS NOTES
NNTOCED [facility] 10/14/2017:       DDD (degenerative disc disease), .  cervical     Neck Pain      NN received call today; c/o neck pain. Patient stated she had been having pain since yesterday and didn't think she could tolerate the pain any more;   NN recommended patient to come in for office visit regarding neck pain. Patient agreed to see Dr. Juan Gasca at the Practice. Patient stated she had taken percocet for the pain. Patient agreed to take her pain medications/percocet to office today that she stated she had just gotten filled. Will continue to f/u post appointment.

## 2017-11-25 NOTE — ED NOTES
Discharge instructions given to patient by Dr. Ricardo Denny. Patient verbalized understanding of discharge instructions. Pt discharged without difficulty. Pt. Discharged in stable condition via wheelchair , accompanied by daughters.

## 2017-11-26 ENCOUNTER — PATIENT OUTREACH (OUTPATIENT)
Dept: INTERNAL MEDICINE CLINIC | Age: 77
End: 2017-11-26

## 2017-11-27 ENCOUNTER — OFFICE VISIT (OUTPATIENT)
Dept: INTERNAL MEDICINE CLINIC | Age: 77
End: 2017-11-27

## 2017-11-27 ENCOUNTER — PATIENT OUTREACH (OUTPATIENT)
Dept: INTERNAL MEDICINE CLINIC | Age: 77
End: 2017-11-27

## 2017-11-27 VITALS
BODY MASS INDEX: 27.39 KG/M2 | HEIGHT: 70 IN | RESPIRATION RATE: 16 BRPM | TEMPERATURE: 97.9 F | HEART RATE: 75 BPM | DIASTOLIC BLOOD PRESSURE: 89 MMHG | OXYGEN SATURATION: 97 % | SYSTOLIC BLOOD PRESSURE: 144 MMHG | WEIGHT: 191.3 LBS

## 2017-11-27 DIAGNOSIS — M46.92 CERVICAL SPONDYLITIS (HCC): Primary | ICD-10-CM

## 2017-11-27 DIAGNOSIS — C90.00 MULTIPLE MYELOMA, REMISSION STATUS UNSPECIFIED (HCC): ICD-10-CM

## 2017-11-27 DIAGNOSIS — M54.12 CERVICAL RADICULOPATHY: ICD-10-CM

## 2017-11-27 RX ORDER — OXYCODONE AND ACETAMINOPHEN 10; 325 MG/1; MG/1
1 TABLET ORAL
Qty: 50 TAB | Refills: 0 | Status: SHIPPED | OUTPATIENT
Start: 2017-11-27 | End: 2018-05-07 | Stop reason: SDUPTHER

## 2017-11-27 RX ORDER — PREDNISONE 20 MG/1
80 TABLET ORAL
Qty: 16 TAB | Refills: 0 | Status: SHIPPED | OUTPATIENT
Start: 2017-11-27 | End: 2018-04-07

## 2017-11-27 NOTE — PROGRESS NOTES
NNTOCED [facility] 11/25/2017:  Neck Pain  f/u    8:01AM:  NN received call back message left on VM from patient. Patient c/o neck pain being no better at all since going to ED. Patient requested call back from NN.    9:00AM:  NN spoke with patient; states pain is no better. Patient stated her daughter is off today and can bring her into the office; asked if she can see her PCP. Patient agreed to having applied warm soaks to neck pain. Patient stated the medication has not helped and that the last time this occurred she was placed in the hospital for a week. 9:15AM: Consult done w/ PCP. Advised to bring her in the office tomorrow. 9:30AM:  NN spoke with patient to schedule appt for tomorrow AM.  Stated she needs to see PCP today if it is possible. Consult w/ PCP:  Advised patient to come into the office. NN scheduled patient for 11:30AM office visit. Goals      Attends follow-up appointments as directed.  Coordinate pain management plan for patient. Coordinate pain management plan for patient. Patient agreed to place warm soaks to later knee for severe pain and to take pain medication PRN.  Knowledge and adherence of prescribed medication (ie. action, side effects, missed dose, etc.).  Patient verbalizes understanding of self-management goals of living with Congestive Heart Failure      Self-schedules and keeps appointments        Goals date for scheduling appointment:  On-going.

## 2017-11-27 NOTE — MR AVS SNAPSHOT
Visit Information Date & Time Provider Department Dept. Phone Encounter #  
 11/27/2017 11:30 AM Khalif Carranza [facility] [phone] [id number] Your Appointments 12/5/2017 11:15 AM  
PACEMAKER with PACEMAKER, [facility] Appt Note: 3mo bsc bivpm , s/p av node ablation [address] [facility]  
[phone] [address] [facility]  
  
    
 12/5/2017 11:15 AM  
ESTABLISHED PATIENT with Rhonda Freeman MD  
[facility] Appt Note: 3mo post implant [address] [facility]  
[phone] [address] [facility]  
  
    
 1/10/2018  9:15 AM  
3 MONTH with [address] Gabrielle Ko MD  
[facility]) Appt Note: $0cp  ek[address] [facility]  
[phone] [address] [facility]  
  
    
 2/20/2018  9:45 AM  
Any with Trixie Holguin MD  
[address] [facility]) Appt Note: 3 month follow up  
 [address] [facility]  
  
   
 [address] [id number] Upcoming Health Maintenance Date Due  
 GLAUCOMA SCREENING Q2Y 3/16/2017 MEDICARE YEARLY EXAM 12/23/2017 DTaP/Tdap/Td series (2 - Td) 5/19/2026 Allergies as of 11/27/2017  Review Complete On: 11/27/2017 By: Alysia Felipe Severity Noted Reaction Type Reactions Codeine  09/05/2014    Other (comments) Current Immunizations  Reviewed on 8/29/2017 Name Date Influenza Vaccine 9/15/2014 Not reviewed this visit You Were Diagnosed With   
  
 Codes Comments Cervical spondylitis (HCC)    -  Primary ICD-10-CM: M46.92 
ICD-9-CM: 721.0 Cervical radiculopathy     ICD-10-CM: M54.12 
ICD-9-CM: 723.4 Multiple myeloma, remission status unspecified (HCC)     ICD-10-CM: C90.00 ICD-9-CM: 203.00 Vitals BP Pulse Temp Resp Height(growth percentile) Weight(growth percentile) 144/89 (BP 1 Location: Left arm, BP Patient Position: Sitting) 75 97.9 °F (36.6 °C) (Oral) 16 5' 10\" (1.778 m) 191 lb 4.8 oz (86.8 kg) SpO2 BMI OB Status Smoking Status 97% 27.45 kg/m2 Hysterectomy Never Smoker Vitals History BMI and BSA Data Body Mass Index Body Surface Area  
 27.45 kg/m 2 2.07 m 2 Preferred Pharmacy Pharmacy Name Phone [phone] [place] [phone] Your Updated Medication List  
  
   
This list is accurate as of: 11/27/17 12:57 PM.  Always use your most recent med list.  
  
  
  
  
 apixaban 5 mg tablet Commonly known as:  Jodelle Patient Take 1 Tab by mouth two (2) times a day. cyclobenzaprine 5 mg tablet Commonly known as:  FLEXERIL Take 1 Tab by mouth three (3) times daily as needed for Muscle Spasm(s). diazePAM 5 mg tablet Commonly known as:  VALIUM Take 1 Tab by mouth every eight (8) hours as needed (spasm). Max Daily Amount: 15 mg.  
  
 furosemide 40 mg tablet Commonly known as:  LASIX Take 1 Tab by mouth daily. GAVISCON EXTRA STRENGTH 160-105 mg Johana Rogers Generic drug:  aluminum hydrox-magnesium carb Take 1-2 tablets by mouth four (4) times daily as needed. lisinopril 40 mg tablet Commonly known as:  Antoinette George Take 0.5 Tabs by mouth daily. metoprolol succinate 25 mg XL tablet Commonly known as:  TOPROL-XL Take 0.5 Tabs by mouth daily. naloxone 4 mg/actuation nasal spray Commonly known as:  [employer] Use 1 spray intranasally into 1 nostril. Use a new Narcan nasal spray for subsequent doses and administer into alternating nostrils. May repeat every 2 to 3 minutes as needed. oxyCODONE-acetaminophen  mg per tablet Commonly known as:  PERCOCET Take 1 Tab by mouth every eight (8) hours as needed for Pain. Max Daily Amount: 3 Tabs. potassium chloride 20 mEq tablet Commonly known as:  K-DUR, KLOR-CON Take 1 Tab by mouth two (2) times a day. predniSONE 20 mg tablet Commonly known as:  Connor Noam Take 4 Tabs by mouth daily (after dinner). REVLIMID 10 mg Cap Generic drug:  lenalidomide Prescriptions Printed Refills  
 oxyCODONE-acetaminophen (PERCOCET)  mg per tablet 0 Sig: Take 1 Tab by mouth every eight (8) hours as needed for Pain. Max Daily Amount: 3 Tabs. Class: Print Route: Oral  
  
Prescriptions Sent to Pharmacy Refills  
 predniSONE (DELTASONE) 20 mg tablet 0 Sig: Take 4 Tabs by mouth daily (after dinner). Class: Normal  
 Pharmacy: [address] [place] [address] #: [phone] Route: Oral  
  
Introducing [facility] & HEALTH SERVICES! [employer] introduces Flavourly patient portal. Now you can access parts of your medical record, email your doctor's office, and request medication refills online. 1. In your internet browser, go to [email or web address] 2. Click on the First Time User? Click Here link in the Sign In box. You will see the New Member Sign Up page. 3. Enter your Flavourly Access Code exactly as it appears below. You will not need to use this code after youve completed the sign-up process. If you do not sign up before the expiration date, you must request a new code. · Flavourly Access Code: [email or web address] Expires: 2/22/2018  3:02 PM 
 
4. Enter the last four digits of your Social Security Number (xxxx) and Date of Birth (mm/dd/yyyy) as indicated and click Submit. You will be taken to the next sign-up page. 5. Create a Flavourly ID.  This will be your Flavourly login ID and cannot be changed, so think of one that is secure and easy to remember. 6. Create a Storific password. You can change your password at any time. 7. Enter your Password Reset Question and Answer. This can be used at a later time if you forget your password. 8. Enter your e-mail address. You will receive e-mail notification when new information is available in [address] 9. Click Sign Up. You can now view and download portions of your medical record. 10. Click the Download Summary menu link to download a portable copy of your medical information. If you have questions, please visit the Frequently Asked Questions section of the [employer] website. Remember, Storific is NOT to be used for urgent needs. For medical emergencies, dial 911. Now available from your iPhone and Android! Please provide this summary of care documentation to your next provider. Your primary care clinician is listed as Deanna Taylor. If you have any questions after today's visit, please call [phone].

## 2017-11-27 NOTE — PROGRESS NOTES
Chief Complaint   Patient presents with   [facility] Follow-up     neck pain on 11/25/17   . SUBECTIVE:    Duncan Lucia is a 68 y.o. female She comes in for a return visit accompanied by her daughter. She has been having increasing pain in the neck and shoulders. She has gone to the ER on two separate occasions. X-rays today are negative. She is somewhat better today but remains symptomatic. Her myeloma is reasonably stable. She actively follows up with her medical oncologist.    MedDATA/gwo         Current Outpatient Prescriptions   Medication Sig Dispense Refill    predniSONE (DELTASONE) 20 mg tablet Take 4 Tabs by mouth daily (after dinner). 16 Tab 0    oxyCODONE-acetaminophen (PERCOCET)  mg per tablet Take 1 Tab by mouth every eight (8) hours as needed for Pain. Max Daily Amount: 3 Tabs. 50 Tab 0    diazePAM (VALIUM) 5 mg tablet Take 1 Tab by mouth every eight (8) hours as needed (spasm). Max Daily Amount: 15 mg. 15 Tab 0    naloxone (NARCAN) 4 mg/actuation nasal spray Use 1 spray intranasally into 1 nostril. Use a new Narcan nasal spray for subsequent doses and administer into alternating nostrils. May repeat every 2 to 3 minutes as needed. 2 Each 0    cyclobenzaprine (FLEXERIL) 5 mg tablet Take 1 Tab by mouth three (3) times daily as needed for Muscle Spasm(s). 15 Tab 0    REVLIMID 10 mg cap       potassium chloride (K-DUR, KLOR-CON) 20 mEq tablet Take 1 Tab by mouth two (2) times a day. 60 Tab 11    lisinopril (PRINIVIL, ZESTRIL) 40 mg tablet Take 0.5 Tabs by mouth daily. 30 Tab 3    apixaban (ELIQUIS) 5 mg tablet Take 1 Tab by mouth two (2) times a day. 60 Tab 3    furosemide (LASIX) 40 mg tablet Take 1 Tab by mouth daily. 30 Tab 3    metoprolol succinate (TOPROL-XL) 25 mg XL tablet Take 0.5 Tabs by mouth daily. 30 Tab 11    aluminum hydrox-magnesium carb (GAVISCON EXTRA STRENGTH) 160-105 mg chew Take 1-2 tablets by mouth four (4) times daily as needed.        Past Medical History: Diagnosis Date    Acute combined systolic and diastolic HF (heart failure), NYHA class 2 ([address] 8/11/2017    Arthritis     Atrial fibrillation (HCC)     Cancer (HCC)     multiple myeloma    Hypertension     S/P AV kathleen ablation 8/11/2017    Status post biventricular pacemaker 8/11/2017 8/11/17      Past Surgical History:   Procedure Laterality Date    HX GYN      HX ORTHOPAEDIC      knee    HX PACEMAKER  08/11/2017     Allergies   Allergen Reactions    Codeine Other (comments)       REVIEW OF SYSTEMS:  Review of Systems - Negative except   ENT ROS: negative for - headaches, hearing change, nasal congestion, oral lesions, tinnitus, visual changes or   Respiratory ROS: no cough, shortness of breath, or wheezing  Cardiovascular ROS: no chest pain or dyspnea on exertion  Gastrointestinal ROS: no abdominal pain, change in bowel habits, or black or blood  Genito-Urinary ROS: no dysuria, trouble voiding, or hematuria  Musculoskeletal ROS: negative  Neurological ROS: no TIA or stroke symptoms      Social History     Social History    Marital status:      Spouse name: N/A    Number of children: N/A    Years of education: N/A     Social History Main Topics    Smoking status: Never Smoker    Smokeless tobacco: Never Used    Alcohol use No    Drug use: No    Sexual activity: Not Currently     Other Topics Concern    None     Social History Narrative   r  Family History   Problem Relation Age of Onset    Stroke Mother     No Known Problems Father        OBJECTIVE:  Visit Vitals    /89 (BP 1 Location: Left arm, BP Patient Position: Sitting)    Pulse 75    Temp 97.9 °F (36.6 °C) (Oral)    Resp 16    Ht 5' 10\" (1.778 m)    Wt 191 lb 4.8 oz (86.8 kg)    SpO2 97%    BMI 27.45 kg/m2     ENT: perrla,  eom intact  NECK: supple.  Thyroid normal, no JVD  CHEST: clear to ascultation and percussion   HEART: regular rate and rhythm  ABD: soft, bowel sounds active,   EXTREMITIES: no edema, pulse 1+  INTEGUMENT: clear      ASSESSMENT:  1. Cervical spondylitis (HCC)    2. Cervical radiculopathy    3. Multiple myeloma, remission status unspecified (HCC)        PLAN:    1. The patient has what appears to be a cervical spondylitis which is a recurring problem for her. Prednisone will be increased to 80 mg daily for four days. She does not appear to have a radicular component at this point. We will see how she fares with this. I will not pursue any x-rays until she returns to the office in one week if she is not making any progress. She is told to use her analgesics as needed. She was given cyclobenzaprine/diazepam in two different medical settings. She can take either one. 2. Her multiple myeloma is reasonably stable for now. .  Orders Placed This Encounter    predniSONE (DELTASONE) 20 mg tablet    oxyCODONE-acetaminophen (PERCOCET)  mg per tablet       Follow-up Disposition:  Return in about 2 weeks (around 12/11/2017).       Julianna Beverly MD

## 2017-12-03 RX ORDER — FUROSEMIDE 40 MG/1
40 TABLET ORAL DAILY
Qty: 30 TAB | Refills: 3 | Status: SHIPPED | OUTPATIENT
Start: 2017-12-03 | End: 2018-05-10 | Stop reason: SDUPTHER

## 2017-12-04 ENCOUNTER — OFFICE VISIT (OUTPATIENT)
Dept: INTERNAL MEDICINE CLINIC | Age: 77
End: 2017-12-04

## 2017-12-04 VITALS
HEART RATE: 75 BPM | SYSTOLIC BLOOD PRESSURE: 127 MMHG | BODY MASS INDEX: 28.32 KG/M2 | HEIGHT: 70 IN | RESPIRATION RATE: 16 BRPM | TEMPERATURE: 98 F | DIASTOLIC BLOOD PRESSURE: 88 MMHG | WEIGHT: 197.8 LBS | OXYGEN SATURATION: 99 %

## 2017-12-04 DIAGNOSIS — I48.20 CHRONIC ATRIAL FIBRILLATION (HCC): Chronic | ICD-10-CM

## 2017-12-04 DIAGNOSIS — M54.12 CERVICAL RADICULOPATHY: Primary | ICD-10-CM

## 2017-12-04 DIAGNOSIS — C90.00 MULTIPLE MYELOMA, REMISSION STATUS UNSPECIFIED (HCC): ICD-10-CM

## 2017-12-04 NOTE — PROGRESS NOTES
Chief Complaint   Patient presents with    Neck Pain     1 week follow up      1. Have you been to the ER, urgent care clinic since your last visit? Hospitalized since your last visit? No    2. Have you seen or consulted any other health care providers outside of the [address] [place] [address] since your last visit? Include any pap smears or colon screening.  No

## 2017-12-04 NOTE — MR AVS SNAPSHOT
Visit Information Date & Time Provider Department Dept. Phone Encounter #  
 12/4/2017 10:30 AM Felice Causey MD [facility] and [place] [id number] [id number] Your Appointments 12/5/2017 11:15 AM  
PACEMAKER with PACEMAKER, [facility] [address]) Appt Note: 3mo bsc bivpm , s/p av node ablation [address] [facility]  
[phone] [address] [facility]  
  
    
 12/5/2017 11:15 AM  
ESTABLISHED PATIENT with Trista Portillo MD  
[facility] [address]) Appt Note: 3mo post implant [address] [facility]  
[phone] [address] [facility]  
  
    
 1/10/2018  9:15 AM  
3 MONTH with [address], MD  
[facility] [address] [facility]) Appt Note: $0cp  [address] [address] [facility]  
[phone] [address] [facility]  
  
    
 2/20/2018  9:45 AM  
Any with Felice Causey MD  
[address] [place] [address] and Primary Care [address] [facility]) Appt Note: 3 month follow up  
 [address] [facility]  
  
   
 [address] [phone] Upcoming Health Maintenance Date Due  
 GLAUCOMA SCREENING Q2Y 3/16/2017 MEDICARE YEARLY EXAM 12/23/2017 DTaP/Tdap/Td series (2 - Td) 5/19/2026 Allergies as of 12/4/2017  Review Complete On: 12/4/2017 By: Catie Thomas Severity Noted Reaction Type Reactions Codeine  09/05/2014    Other (comments) Current Immunizations  Reviewed on 8/29/2017 Name Date Influenza Vaccine 9/15/2014 Not reviewed this visit You Were Diagnosed With   
  
 Codes Comments Chronic atrial fibrillation (HCC)    -  Primary ICD-10-CM: J07.6 ICD-9-CM: 427.31 Vitals BP Pulse Temp Resp Height(growth percentile) Weight(growth percentile) 127/88 (BP 1 Location: Left arm, BP Patient Position: Sitting) 75 98 °F (36.7 °C) (Oral) 16 5' 10\" (1.778 m) 197 lb 12.8 oz (89.7 kg) SpO2 BMI OB Status Smoking Status 99% 28.38 kg/m2 Hysterectomy Never Smoker Vitals History BMI and BSA Data Body Mass Index Body Surface Area  
 28.38 kg/m 2 2.1 m 2 Preferred Pharmacy Pharmacy Name Phone [facility] [address] [place] [address], [address] [place] [address] [place] [phone] Your Updated Medication List  
  
   
This list is accurate as of: 12/4/17  1:40 PM.  Always use your most recent med list.  
  
  
  
  
 apixaban 5 mg tablet Commonly known as:  Donte Costain Take 1 Tab by mouth two (2) times a day. cyclobenzaprine 5 mg tablet Commonly known as:  FLEXERIL Take 1 Tab by mouth three (3) times daily as needed for Muscle Spasm(s). diazePAM 5 mg tablet Commonly known as:  VALIUM Take 1 Tab by mouth every eight (8) hours as needed (spasm). Max Daily Amount: 15 mg.  
  
 furosemide 40 mg tablet Commonly known as:  LASIX Take 1 Tab by mouth daily. GAVISCON EXTRA STRENGTH 160-105 mg [facility] Generic drug:  aluminum hydrox-magnesium carb Take 1-2 tablets by mouth four (4) times daily as needed. lisinopril 40 mg tablet Commonly known as:  Betzaida Reasons Take 0.5 Tabs by mouth daily. metoprolol succinate 25 mg XL tablet Commonly known as:  TOPROL-XL Take 0.5 Tabs by mouth daily. naloxone 4 mg/actuation nasal spray Commonly known as:  [employer] Use 1 spray intranasally into 1 nostril. Use a new Narcan nasal spray for subsequent doses and administer into alternating nostrils. May repeat every 2 to 3 minutes as needed. oxyCODONE-acetaminophen  mg per tablet Commonly known as:  PERCOCET Take 1 Tab by mouth every eight (8) hours as needed for Pain. Max Daily Amount: 3 Tabs. potassium chloride 20 mEq tablet Commonly known as:  K-DUR, KLOR-CON Take 1 Tab by mouth two (2) times a day. predniSONE 20 mg tablet Commonly known as:  Luann Girma Take 4 Tabs by mouth daily (after dinner). REVLIMID 10 mg Cap Generic drug:  lenalidomide Introducing [facility] & HEALTH SERVICES! [employer] introduces Motion Math patient portal. Now you can access parts of your medical record, email your doctor's office, and request medication refills online. 1. In your internet browser, go to [email or web address] 2. Click on the First Time User? Click Here link in the Sign In box. You will see the New Member Sign Up page. 3. Enter your [employer] Access Code exactly as it appears below. You will not need to use this code after youve completed the sign-up process. If you do not sign up before the expiration date, you must request a new code. · [employer] Access Code: [email or web address] Expires: 2/22/2018  3:02 PM 
 
4. Enter the last four digits of your Social Security Number (xxxx) and Date of Birth (mm/dd/yyyy) as indicated and click Submit. You will be taken to the next sign-up page. 5. Create a [employer] Math ID. This will be your [employer] login ID and cannot be changed, so think of one that is secure and easy to remember. 6. Create a [employer] password. You can change your password at any time. 7. Enter your Password Reset Question and Answer. This can be used at a later time if you forget your password. 8. Enter your e-mail address. You will receive e-mail notification when new information is available in [address] 9. Click Sign Up. You can now view and download portions of your medical record. 10. Click the Download Summary menu link to download a portable copy of your medical information. If you have questions, please visit the Frequently Asked Questions section of the Motion Math website.  Remember, [employer] Math is NOT to be used for urgent needs. For medical emergencies, dial 911. Now available from your iPhone and Android! Please provide this summary of care documentation to your next provider. Your primary care clinician is listed as Deanna Taylor. If you have any questions after today's visit, please call [phone].

## 2017-12-05 ENCOUNTER — OFFICE VISIT (OUTPATIENT)
Dept: CARDIOLOGY CLINIC | Age: 77
End: 2017-12-05

## 2017-12-05 ENCOUNTER — CLINICAL SUPPORT (OUTPATIENT)
Dept: CARDIOLOGY CLINIC | Age: 77
End: 2017-12-05

## 2017-12-05 VITALS
WEIGHT: 192.4 LBS | SYSTOLIC BLOOD PRESSURE: 140 MMHG | BODY MASS INDEX: 27.54 KG/M2 | HEIGHT: 70 IN | DIASTOLIC BLOOD PRESSURE: 90 MMHG | HEART RATE: 76 BPM | RESPIRATION RATE: 18 BRPM

## 2017-12-05 DIAGNOSIS — I42.9 CARDIOMYOPATHY, UNSPECIFIED TYPE (HCC): ICD-10-CM

## 2017-12-05 DIAGNOSIS — I10 ESSENTIAL HYPERTENSION: ICD-10-CM

## 2017-12-05 DIAGNOSIS — I49.9 IRREGULAR HEARTBEAT: Primary | ICD-10-CM

## 2017-12-05 DIAGNOSIS — Z95.0 STATUS POST BIVENTRICULAR PACEMAKER: ICD-10-CM

## 2017-12-05 DIAGNOSIS — I50.41 ACUTE COMBINED SYSTOLIC AND DIASTOLIC HF (HEART FAILURE), NYHA CLASS 2 (HCC): ICD-10-CM

## 2017-12-05 DIAGNOSIS — Z95.0 STATUS POST BIVENTRICULAR PACEMAKER: Primary | ICD-10-CM

## 2017-12-05 DIAGNOSIS — Z98.890 S/P AV NODAL ABLATION: ICD-10-CM

## 2017-12-05 DIAGNOSIS — I48.20 CHRONIC ATRIAL FIBRILLATION (HCC): Chronic | ICD-10-CM

## 2017-12-05 NOTE — PROGRESS NOTES
Subjective:      Jason Castorena is a 68 y.o. female is here for device follow up. She is doing well without cardiac complaints. The patient denies chest pain/ shortness of breath, orthopnea, PND, LE edema, palpitations, syncope, presyncope or fatigue.        Patient Active Problem List    Diagnosis Date Noted    Irregular heartbeat 12/05/2017    Weight loss 09/21/2017    S/P cardiac cath 09/07/2017    Acute combined systolic and diastolic HF (heart failure), NYHA class 2 ([address] 08/11/2017    Status post biventricular pacemaker 08/11/2017    S/P AV kathleen ablation 08/11/2017    Atrial flutter ([address] 08/10/2017    Cardiomyopathy ([address] 08/10/2017    Acute pulmonary embolism ([address] 08/10/2017    CHF (congestive heart failure) ([address] 08/08/2017    Sialadenitis 06/29/2017    Muscular deconditioning 04/22/2017    Synovitis of shoulder 04/21/2017    Back pain 04/30/2016    Jaw pain 12/07/2015    Primary osteoarthritis of both knees 08/24/2015    Synovitis 05/18/2015    Contusion of knee 05/09/2015    Venous insufficiency 04/07/2015    Calf DVT [facility]deep venous thrombosis) [facility]) 01/27/2015    Intractable back pain 01/25/2015    Reflux esophagitis 10/31/2014    Multiple myeloma ([address] 10/31/2014    Nocturia 10/31/2014    Complex renal cyst 10/11/2014    Low back pain 10/10/2014    Chronic atrial fibrillation ([address] 09/12/2014    Anemia 09/08/2014    Chest pain on exertion 09/07/2014    HTN (hypertension) 09/07/2014    Chest pain 09/05/2014    Osteoarthritis 09/05/2014      Julianna Beverly MD  Past Medical History:   Diagnosis Date    Acute combined systolic and diastolic HF (heart failure), NYHA class 2 ([address] 8/11/2017    Arthritis     Atrial fibrillation ([address]     Cancer ([address]     multiple myeloma    Hypertension     S/P AV kathleen ablation 8/11/2017    Status post biventricular pacemaker 8/11/2017 8/11/17       Past Surgical History:   Procedure Laterality Date    HX GYN      HX ORTHOPAEDIC      knee    HX PACEMAKER  08/11/2017     Allergies   Allergen Reactions    Codeine Other (comments)      Family History   Problem Relation Age of Onset    Stroke Mother     No Known Problems Father     negative for cardiac disease  Social History     Social History    Marital status:      Spouse name: N/A    Number of children: N/A    Years of education: N/A     Social History Main Topics    Smoking status: Never Smoker    Smokeless tobacco: Never Used    Alcohol use No    Drug use: No    Sexual activity: Not Currently     Other Topics Concern    None     Social History Narrative     Current Outpatient Prescriptions   Medication Sig    furosemide (LASIX) 40 mg tablet Take 1 Tab by mouth daily.  oxyCODONE-acetaminophen (PERCOCET)  mg per tablet Take 1 Tab by mouth every eight (8) hours as needed for Pain. Max Daily Amount: 3 Tabs.  diazePAM (VALIUM) 5 mg tablet Take 1 Tab by mouth every eight (8) hours as needed (spasm). Max Daily Amount: 15 mg.    naloxone (NARCAN) 4 mg/actuation nasal spray Use 1 spray intranasally into 1 nostril. Use a new Narcan nasal spray for subsequent doses and administer into alternating nostrils. May repeat every 2 to 3 minutes as needed.  cyclobenzaprine (FLEXERIL) 5 mg tablet Take 1 Tab by mouth three (3) times daily as needed for Muscle Spasm(s).  potassium chloride (K-DUR, KLOR-CON) 20 mEq tablet Take 1 Tab by mouth two (2) times a day.  lisinopril (PRINIVIL, ZESTRIL) 40 mg tablet Take 0.5 Tabs by mouth daily.  apixaban (ELIQUIS) 5 mg tablet Take 1 Tab by mouth two (2) times a day.  metoprolol succinate (TOPROL-XL) 25 mg XL tablet Take 0.5 Tabs by mouth daily.  aluminum hydrox-magnesium carb (GAVISCON EXTRA STRENGTH) 160-105 mg chew Take 1-2 tablets by mouth four (4) times daily as needed.  predniSONE (DELTASONE) 20 mg tablet Take 4 Tabs by mouth daily (after dinner).     REVLIMID 10 mg cap      No current facility-administered medications for this visit. Vitals:    12/05/17 1129   BP: 140/90   Pulse: 76   Resp: 18   Weight: 192 lb 6.4 oz (87.3 kg)   Height: 5' 10\" (1.778 m)       I have reviewed the nurses notes, vitals, problem list, allergy list, medical history, family, social history and medications. Review of Symptoms:    General: Pt denies excessive weight gain or loss. Pt is able to conduct ADL's  HEENT: Denies blurred vision, headaches, epistaxis and difficulty swallowing. Respiratory: Denies shortness of breath, KNOTT, wheezing or stridor. Cardiovascular: Denies precordial pain, palpitations, edema or PND  Gastrointestinal: Denies poor appetite, indigestion, abdominal pain or blood in stool  Urinary: Denies dysuria, pyuria  Musculoskeletal: Denies pain or swelling from muscles or joints  Neurologic: Denies tremor, paresthesias, or sensory motor disturbance  Skin: Denies rash, itching or texture change. Psych: Denies depression      Physical Exam:      General: Well developed, in no acute distress. HEENT: Eyes - PERRL, no jvd  Heart:  Normal S1/S2 negative S3 or S4. Regular, no murmur, gallop or rub.   Respiratory: Clear bilaterally x 4, no wheezing or rales  Abdomen:   Soft, non-tender, bowel sounds are active.   Extremities:  No edema, normal cap refill, no cyanosis. Musculoskeletal: No clubbing  Neuro: A&Ox3, speech clear, gait stable. Skin: Skin color is normal. No rashes or lesions.  Non diaphoretic  Vascular: 2+ pulses symmetric in all extremities    Cardiographics    Ekg: v pacing   BSC BI-V Pm: 100% BIV pacing    Results for orders placed or performed during the hospital encounter of 08/29/17   EKG, 12 LEAD, INITIAL   Result Value Ref Range    Ventricular Rate 75 BPM    Atrial Rate 394 BPM    QRS Duration 144 ms    Q-T Interval 450 ms    QTC Calculation (Bezet) 502 ms    Calculated R Axis -103 degrees    Calculated T Axis 39 degrees    Diagnosis       Ventricular-paced rhythm  Probable Atrial flutter  Confirmed by Adalgisa Acuna ([id number]) on 8/29/2017 8:12:55 PM           Lab Results   Component Value Date/Time    WBC 5.1 08/29/2017 11:54 AM    HGB 9.3 08/29/2017 11:54 AM    HCT 29.5 08/29/2017 11:54 AM    PLATELET 479 88/71/1375 11:54 AM    MCV 86.8 08/29/2017 11:54 AM      Lab Results   Component Value Date/Time    Sodium 148 09/21/2017 11:05 AM    Potassium 3.9 09/21/2017 11:05 AM    Chloride 106 09/21/2017 11:05 AM    CO2 28 09/21/2017 11:05 AM    Anion gap 5 08/29/2017 11:54 AM    Glucose 84 09/21/2017 11:05 AM    BUN 11 09/21/2017 11:05 AM    Creatinine 1.01 09/21/2017 11:05 AM    BUN/Creatinine ratio 11 09/21/2017 11:05 AM    GFR est AA 62 09/21/2017 11:05 AM    GFR est non-AA 54 09/21/2017 11:05 AM    Calcium 9.4 09/21/2017 11:05 AM    Bilirubin, total 0.8 08/08/2017 10:40 AM    AST (SGOT) 21 08/08/2017 10:40 AM    Alk. phosphatase 108 08/08/2017 10:40 AM    Protein, total 7.1 08/08/2017 10:40 AM    Albumin 3.2 08/08/2017 10:40 AM    Globulin 3.9 08/08/2017 10:40 AM    A-G Ratio 0.8 08/08/2017 10:40 AM    ALT (SGPT) 35 08/08/2017 10:40 AM         Assessment:     Assessment:        ICD-10-CM ICD-9-CM    1. Irregular heartbeat I49.9 427.9 AMB POC EKG ROUTINE W/ 12 LEADS, INTER & REP   2. Chronic atrial fibrillation (HCC) I48.2 427.31    3. Cardiomyopathy, unspecified type ([facility] I42.9 425.4    4. Essential hypertension I10 401.9    5. S/P AV kathleen ablation Z98.890 V45.89    6. Status post biventricular pacemaker Z95.0 V45.01      Orders Placed This Encounter    AMB POC EKG ROUTINE W/ 12 LEADS, INTER & REP     Order Specific Question:   Reason for Exam:     Answer:   routine        Plan:   Ms. Rony Logan is here for device follow up s/p BIV pacemaker implantation. EKG demonstrates ventricular pacing and device interrogation demonstrates BIV pacing, no events. She denies cardiac complaints. Continue current medical therapy and follow up in one year.      Continue medical management for AF, cardiomyopathy and HTN. Thank you for allowing me to participate in Kalli Glass 's care.     Shanel Olvera, VIN Rocha MD, [facility]

## 2017-12-05 NOTE — PROGRESS NOTES
1. Have you been to the ER, urgent care clinic since your last visit? Hospitalized since your last visit? ED [facility], 2 wks ago, neck & back pain. 2. Have you seen or consulted any other health care providers outside of the [address] [place] [address] since your last visit? Include any pap smears or colon screening. No      Chief Complaint   Patient presents with    Irregular Heart Beat     3 mo appt. Denied cardiac symptoms.

## 2017-12-05 NOTE — MR AVS SNAPSHOT
Visit Information Date & Time Provider Department Dept. Phone Encounter #  
 12/5/2017 11:15 AM Stew Alejo, [facility] [phone] [id number] Your Appointments 1/4/2018  9:30 AM  
Any with Rock Jimenez MD  
[address] [place] [address] and Primary Care [facility]) Appt Note: follow up [address] [place] Mcgarry [id number]  
  
    
 1/10/2018  9:15 AM  
3 MONTH with 1700 Gabrielle Ko MD  
[facility] Appt Note: $0cp  ekr [address] [facility]  
[phone] [address] [facility]  
  
    
 2/20/2018  9:45 AM  
Any with Rock Jimenez MD  
[address] [place] [address] and Primary Care [facility]) Appt Note: 3 month follow up  
 [address] [facility]  
[phone] Upcoming Health Maintenance Date Due  
 GLAUCOMA SCREENING Q2Y 3/16/2017 MEDICARE YEARLY EXAM 12/23/2017 DTaP/Tdap/Td series (2 - Td) 5/19/2026 Allergies as of 12/5/2017  Review Complete On: 12/5/2017 By: Sheila Flores MD  
  
 Severity Noted Reaction Type Reactions Codeine  09/05/2014    Other (comments) Current Immunizations  Reviewed on 8/29/2017 Name Date Influenza Vaccine 9/15/2014 Not reviewed this visit You Were Diagnosed With   
  
 Codes Comments Irregular heartbeat    -  Primary ICD-10-CM: I49.9 ICD-9-CM: 427.9 Chronic atrial fibrillation (HCC)     ICD-10-CM: U35.9 ICD-9-CM: 427.31 Cardiomyopathy, unspecified type ([facility]     ICD-10-CM: I42.9 ICD-9-CM: 425.4 Essential hypertension     ICD-10-CM: I10 
ICD-9-CM: 401.9 S/P AV kathleen ablation     ICD-10-CM: O31.631 ICD-9-CM: V45.89 Status post biventricular pacemaker     ICD-10-CM: Z95.0 ICD-9-CM: V45.01 Vitals BP Pulse Resp Height(growth percentile) Weight(growth percentile) BMI  
 140/90 (BP 1 Location: Right arm, BP Patient Position: Sitting) 76 18 5' 10\" (1.778 m) 192 lb 6.4 oz (87.3 kg) 27.61 kg/m2 OB Status Smoking Status Hysterectomy Never Smoker Vitals History BMI and BSA Data Body Mass Index Body Surface Area  
 27.61 kg/m 2 2.08 m 2 Preferred Pharmacy Pharmacy Name Phone [facility] [address] [place], [place] [address] [phone] Your Updated Medication List  
  
   
This list is accurate as of: 12/5/17 11:57 AM.  Always use your most recent med list.  
  
  
  
  
 apixaban 5 mg tablet Commonly known as:  Shamika Loll Take 1 Tab by mouth two (2) times a day. cyclobenzaprine 5 mg tablet Commonly known as:  FLEXERIL Take 1 Tab by mouth three (3) times daily as needed for Muscle Spasm(s). diazePAM 5 mg tablet Commonly known as:  VALIUM Take 1 Tab by mouth every eight (8) hours as needed (spasm). Max Daily Amount: 15 mg.  
  
 furosemide 40 mg tablet Commonly known as:  LASIX Take 1 Tab by mouth daily. GAVISCON EXTRA STRENGTH 160-105 mg [employer] Generic drug:  aluminum hydrox-magnesium carb Take 1-2 tablets by mouth four (4) times daily as needed. lisinopril 40 mg tablet Commonly known as:  Shayy Pinch Take 0.5 Tabs by mouth daily. metoprolol succinate 25 mg XL tablet Commonly known as:  TOPROL-XL Take 0.5 Tabs by mouth daily. naloxone 4 mg/actuation nasal spray Commonly known as:  [employer] Use 1 spray intranasally into 1 nostril. Use a new Narcan nasal spray for subsequent doses and administer into alternating nostrils. May repeat every 2 to 3 minutes as needed. oxyCODONE-acetaminophen  mg per tablet Commonly known as:  PERCOCET Take 1 Tab by mouth every eight (8) hours as needed for Pain. Max Daily Amount: 3 Tabs. potassium chloride 20 mEq tablet Commonly known as:  K-DUR, KLOR-CON Take 1 Tab by mouth two (2) times a day. predniSONE 20 mg tablet Commonly known as:  Connor Noam Take 4 Tabs by mouth daily (after dinner). REVLIMID 10 mg Cap Generic drug:  lenalidomide We Performed the Following AMB POC EKG ROUTINE W/ 12 LEADS, INTER & REP [78551 CPT(R)] Introducing [facility] & HEALTH [facility]! [facility] introduces [employer] patient portal. Now you can access parts of your medical record, email your doctor's office, and request medication refills online. 1. In your internet browser, go to [email or web address] 2. Click on the First Time User? Click Here link in the Sign In box. You will see the New Member Sign Up page. 3. Enter your [employer] Access Code exactly as it appears below. You will not need to use this code after youve completed the sign-up process. If you do not sign up before the expiration date, you must request a new code. · [employer] Access Code: [email or web address] Expires: 2/22/2018  3:02 PM 
 
4. Enter the last four digits of your Social Security Number (xxxx) and Date of Birth (mm/dd/yyyy) as indicated and click Submit. You will be taken to the next sign-up page. 5. Create a [employer] ID. This will be your [employer] login ID and cannot be changed, so think of one that is secure and easy to remember. 6. Create a [employer] password. You can change your password at any time. 7. Enter your Password Reset Question and Answer. This can be used at a later time if you forget your password. 8. Enter your e-mail address. You will receive e-mail notification when new information is available in [address] 9. Click Sign Up. You can now view and download portions of your medical record. 10. Click the Download Summary menu link to download a portable copy of your medical information.  
 
If you have questions, please visit the Frequently Asked Questions section of the [employer]. Remember, "[employer]hart is NOT to be used for urgent needs. For medical emergencies, dial 911. Now available from your iPhone and Android! Please provide this summary of care documentation to your next provider. Your primary care clinician is listed as Deanna Taylor. If you have any questions after today's visit, please call [phone].

## 2017-12-11 ENCOUNTER — PATIENT OUTREACH (OUTPATIENT)
Dept: INTERNAL MEDICINE CLINIC | Age: 77
End: 2017-12-11

## 2017-12-11 NOTE — PROGRESS NOTES
[facility]NT[facility]ED [facility] 11/25/2017:  Neck Pain  f/u    NN consulted with PCP re night cough & mucus --clear in color requesting medication for the same. PCP recommended Robintussin DM to be taken as ordered. NN advised patient to schedule office visit if cough does not subside. Goals Addressed             Most Recent     Attends follow-up appointments as directed. On track (12/11/2017)     Coordinate pain management plan for patient. On track (12/11/2017)             Coordinate pain management plan for patient. Patient agreed to place warm soaks to later knee for severe pain and to take pain medication PRN.  Knowledge and adherence of prescribed medication (ie. action, side effects, missed dose, etc.). On track (12/11/2017)     Patient verbalizes understanding of self-management goals of living with Congestive Heart Failure   On track (12/11/2017)     Self-schedules and keeps appointments    On track (12/11/2017)      Patient states neck pain subsided. States she has PRN pain medication. Patient scheduled and kept office visit. -atient LOV with Cardiology: 12/5/2017.   LOV w/ PCP 12/4/2017

## 2017-12-11 NOTE — PROGRESS NOTES
Chief Complaint   Patient presents with    Neck Pain     1 week follow up    . SUBECTIVE:    Jason Castroena is a 68 y.o. female Comes in for return visit for follow-up of her cervical radiculopathy/cervical spondylitis. I gave her high-dose steroids for five days and she is significantly better. She is smiling today and continues to have pain but it is not as intense. She still needs to follow-up with Dr. Misty Xavier for her multiple myeloma. She remains on her coumadin for her atrial fibrillation. Current Outpatient Prescriptions   Medication Sig Dispense Refill    furosemide (LASIX) 40 mg tablet Take 1 Tab by mouth daily. 30 Tab 3    predniSONE (DELTASONE) 20 mg tablet Take 4 Tabs by mouth daily (after dinner). 16 Tab 0    oxyCODONE-acetaminophen (PERCOCET)  mg per tablet Take 1 Tab by mouth every eight (8) hours as needed for Pain. Max Daily Amount: 3 Tabs. 50 Tab 0    diazePAM (VALIUM) 5 mg tablet Take 1 Tab by mouth every eight (8) hours as needed (spasm). Max Daily Amount: 15 mg. 15 Tab 0    naloxone (NARCAN) 4 mg/actuation nasal spray Use 1 spray intranasally into 1 nostril. Use a new Narcan nasal spray for subsequent doses and administer into alternating nostrils. May repeat every 2 to 3 minutes as needed. 2 Each 0    cyclobenzaprine (FLEXERIL) 5 mg tablet Take 1 Tab by mouth three (3) times daily as needed for Muscle Spasm(s). 15 Tab 0    REVLIMID 10 mg cap       potassium chloride (K-DUR, KLOR-CON) 20 mEq tablet Take 1 Tab by mouth two (2) times a day. 60 Tab 11    lisinopril (PRINIVIL, ZESTRIL) 40 mg tablet Take 0.5 Tabs by mouth daily. 30 Tab 3    apixaban (ELIQUIS) 5 mg tablet Take 1 Tab by mouth two (2) times a day. 60 Tab 3    metoprolol succinate (TOPROL-XL) 25 mg XL tablet Take 0.5 Tabs by mouth daily. 30 Tab 11    aluminum hydrox-magnesium carb (GAVISCON EXTRA STRENGTH) 160-105 mg chew Take 1-2 tablets by mouth four (4) times daily as needed.        Past Medical History:   Diagnosis Date    Acute combined systolic and diastolic HF (heart failure), NYHA class 2 ([facility] 8/11/2017    Arthritis     Atrial fibrillation (HCC)     Cancer (HCC)     multiple myeloma    Hypertension     S/P AV kathleen ablation 8/11/2017    Status post biventricular pacemaker 8/11/2017 8/11/17      Past Surgical History:   Procedure Laterality Date    HX GYN      HX ORTHOPAEDIC      knee    HX PACEMAKER  08/11/2017     Allergies   Allergen Reactions    Codeine Other (comments)       REVIEW OF SYSTEMS:  Review of Systems - Negative except   ENT ROS: negative for - headaches, hearing change, nasal congestion, oral lesions, tinnitus, visual changes or   Respiratory ROS: no cough, shortness of breath, or wheezing  Cardiovascular ROS: no chest pain or dyspnea on exertion  Gastrointestinal ROS: no abdominal pain, change in bowel habits, or black or blood  Genito-Urinary ROS: no dysuria, trouble voiding, or hematuria  Musculoskeletal ROS: negative  Neurological ROS: no TIA or stroke symptoms      Social History     Social History    Marital status:      Spouse name: N/A    Number of children: N/A    Years of education: N/A     Social History Main Topics    Smoking status: Never Smoker    Smokeless tobacco: Never Used    Alcohol use No    Drug use: No    Sexual activity: Not Currently     Other Topics Concern    None     Social History Narrative   r  Family History   Problem Relation Age of Onset    Stroke Mother     No Known Problems Father        OBJECTIVE:  Visit Vitals    /88 (BP 1 Location: Left arm, BP Patient Position: Sitting)    Pulse 75    Temp 98 °F (36.7 °C) (Oral)    Resp 16    Ht 5' 10\" (1.778 m)    Wt 197 lb 12.8 oz (89.7 kg)    SpO2 99%    BMI 28.38 kg/m2     ENT: perrla,  eom intact  NECK: supple.  Thyroid normal, no JVD  CHEST: clear to ascultation and percussion   HEART: regular rate and rhythm  ABD: soft, bowel sounds active,   EXTREMITIES: no edema, pulse 1+  INTEGUMENT: clear      ASSESSMENT:  1. Cervical radiculopathy    2. Chronic atrial fibrillation (HCC)    3. Multiple myeloma, remission status unspecified (HCC)        PLAN:  Her cervical radiculopathy/spondylitis is doing well. She will continue symptomatic treatment for now. She does not need any more systemic steroids. Atrial fibrillation remains. She continues her coumadin, no adjustments are made. She will follow with Dr. Antonieta Rendon regarding her multiple myeloma. Follow-up Disposition:  Return in about 4 weeks (around 1/1/2018).       Adilia Ling MD

## 2017-12-11 NOTE — PROGRESS NOTES
NN Health Promotion and Risk Prevention:  Cough    NN spoke with patient regarding c/o cough/phlegm during the night. Patient asked if PCP could give something for the night symptoms. Patient denied the need to come to the office. Consult w/ PCP      Chart Review:    LOV w/ Cardiology: 12/5:  Irregular Heart Beat. LOV w/ PCP:  12/4:  Cervical Radiculopathy.

## 2018-01-02 ENCOUNTER — APPOINTMENT (OUTPATIENT)
Dept: GENERAL RADIOLOGY | Age: 78
End: 2018-01-02
Payer: MEDICARE

## 2018-01-02 ENCOUNTER — APPOINTMENT (OUTPATIENT)
Dept: CT IMAGING | Age: 78
End: 2018-01-02
Attending: EMERGENCY MEDICINE
Payer: MEDICARE

## 2018-01-02 ENCOUNTER — HOSPITAL ENCOUNTER (EMERGENCY)
Age: 78
Discharge: HOME | End: 2018-01-02
Attending: EMERGENCY MEDICINE
Payer: MEDICARE

## 2018-01-02 VITALS
OXYGEN SATURATION: 100 % | TEMPERATURE: 97.9 F | DIASTOLIC BLOOD PRESSURE: 103 MMHG | HEIGHT: 70 IN | RESPIRATION RATE: 16 BRPM | SYSTOLIC BLOOD PRESSURE: 168 MMHG | BODY MASS INDEX: 28.37 KG/M2 | HEART RATE: 75 BPM | WEIGHT: 198.19 LBS

## 2018-01-02 DIAGNOSIS — R06.02 SHORTNESS OF BREATH: Primary | ICD-10-CM

## 2018-01-02 LAB
ALBUMIN SERPL-MCNC: 3.5 G/DL (ref 3.5–5)
ALBUMIN/GLOB SERPL: 0.9 {RATIO} (ref 1.1–2.2)
ALP SERPL-CCNC: 122 U/L (ref 45–117)
ALT SERPL-CCNC: 29 U/L (ref 12–78)
ANION GAP SERPL CALC-SCNC: 3 MMOL/L (ref 5–15)
AST SERPL-CCNC: 28 U/L (ref 15–37)
ATRIAL RATE: 375 BPM
BASOPHILS # BLD: 0 K/UL (ref 0–0.1)
BASOPHILS NFR BLD: 0 % (ref 0–1)
BILIRUB SERPL-MCNC: 0.5 MG/DL (ref 0.2–1)
BNP SERPL-MCNC: 905 PG/ML (ref 0–450)
BUN SERPL-MCNC: 16 MG/DL (ref 6–20)
BUN/CREAT SERPL: 17 (ref 12–20)
CALCIUM SERPL-MCNC: 8.9 MG/DL (ref 8.5–10.1)
CALCULATED R AXIS, ECG10: -94 DEGREES
CALCULATED T AXIS, ECG11: 78 DEGREES
CHLORIDE SERPL-SCNC: 106 MMOL/L (ref 97–108)
CK SERPL-CCNC: 111 U/L (ref 26–192)
CO2 SERPL-SCNC: 35 MMOL/L (ref 21–32)
CREAT SERPL-MCNC: 0.96 MG/DL (ref 0.55–1.02)
DIAGNOSIS, 93000: NORMAL
EOSINOPHIL # BLD: 0 K/UL (ref 0–0.4)
EOSINOPHIL NFR BLD: 1 % (ref 0–7)
ERYTHROCYTE [DISTWIDTH] IN BLOOD BY AUTOMATED COUNT: 17.6 % (ref 11.5–14.5)
GLOBULIN SER CALC-MCNC: 3.9 G/DL (ref 2–4)
GLUCOSE SERPL-MCNC: 77 MG/DL (ref 65–100)
HCT VFR BLD AUTO: 36.4 % (ref 35–47)
HGB BLD-MCNC: 11.4 G/DL (ref 11.5–16)
LYMPHOCYTES # BLD: 0.9 K/UL (ref 0.8–3.5)
LYMPHOCYTES NFR BLD: 20 % (ref 12–49)
MCH RBC QN AUTO: 27.1 PG (ref 26–34)
MCHC RBC AUTO-ENTMCNC: 31.3 G/DL (ref 30–36.5)
MCV RBC AUTO: 86.7 FL (ref 80–99)
MONOCYTES # BLD: 0.4 K/UL (ref 0–1)
MONOCYTES NFR BLD: 9 % (ref 5–13)
NEUTS SEG # BLD: 3.2 K/UL (ref 1.8–8)
NEUTS SEG NFR BLD: 70 % (ref 32–75)
PLATELET # BLD AUTO: 214 K/UL (ref 150–400)
POTASSIUM SERPL-SCNC: 3.3 MMOL/L (ref 3.5–5.1)
PROT SERPL-MCNC: 7.4 G/DL (ref 6.4–8.2)
Q-T INTERVAL, ECG07: 500 MS
QRS DURATION, ECG06: 154 MS
QTC CALCULATION (BEZET), ECG08: 558 MS
RBC # BLD AUTO: 4.2 M/UL (ref 3.8–5.2)
SODIUM SERPL-SCNC: 144 MMOL/L (ref 136–145)
TROPONIN I SERPL-MCNC: <0.04 NG/ML
VENTRICULAR RATE, ECG03: 75 BPM
WBC # BLD AUTO: 4.5 K/UL (ref 3.6–11)

## 2018-01-02 PROCEDURE — 74011250636 HC RX REV CODE- 250/636: Performed by: EMERGENCY MEDICINE

## 2018-01-02 PROCEDURE — 93005 ELECTROCARDIOGRAM TRACING: CPT

## 2018-01-02 PROCEDURE — 99284 EMERGENCY DEPT VISIT MOD MDM: CPT

## 2018-01-02 PROCEDURE — 85025 COMPLETE CBC W/AUTO DIFF WBC: CPT | Performed by: EMERGENCY MEDICINE

## 2018-01-02 PROCEDURE — 36415 COLL VENOUS BLD VENIPUNCTURE: CPT | Performed by: EMERGENCY MEDICINE

## 2018-01-02 PROCEDURE — 80053 COMPREHEN METABOLIC PANEL: CPT | Performed by: EMERGENCY MEDICINE

## 2018-01-02 PROCEDURE — 83880 ASSAY OF NATRIURETIC PEPTIDE: CPT | Performed by: EMERGENCY MEDICINE

## 2018-01-02 PROCEDURE — 74011636320 HC RX REV CODE- 636/320: Performed by: EMERGENCY MEDICINE

## 2018-01-02 PROCEDURE — 74011250637 HC RX REV CODE- 250/637: Performed by: EMERGENCY MEDICINE

## 2018-01-02 PROCEDURE — 71275 CT ANGIOGRAPHY CHEST: CPT

## 2018-01-02 PROCEDURE — 84484 ASSAY OF TROPONIN QUANT: CPT | Performed by: EMERGENCY MEDICINE

## 2018-01-02 PROCEDURE — 71046 X-RAY EXAM CHEST 2 VIEWS: CPT

## 2018-01-02 PROCEDURE — 82550 ASSAY OF CK (CPK): CPT | Performed by: EMERGENCY MEDICINE

## 2018-01-02 RX ORDER — SODIUM CHLORIDE 0.9 % (FLUSH) 0.9 %
10 SYRINGE (ML) INJECTION
Status: COMPLETED | OUTPATIENT
Start: 2018-01-02 | End: 2018-01-02

## 2018-01-02 RX ORDER — POTASSIUM CHLORIDE 20 MEQ/1
40 TABLET, EXTENDED RELEASE ORAL
Status: COMPLETED | OUTPATIENT
Start: 2018-01-02 | End: 2018-01-02

## 2018-01-02 RX ORDER — SODIUM CHLORIDE 9 MG/ML
50 INJECTION, SOLUTION INTRAVENOUS
Status: COMPLETED | OUTPATIENT
Start: 2018-01-02 | End: 2018-01-02

## 2018-01-02 RX ORDER — LISINOPRIL 20 MG/1
20 TABLET ORAL
Status: COMPLETED | OUTPATIENT
Start: 2018-01-02 | End: 2018-01-02

## 2018-01-02 RX ORDER — POTASSIUM CHLORIDE 750 MG/1
40 TABLET, FILM COATED, EXTENDED RELEASE ORAL
Status: COMPLETED | OUTPATIENT
Start: 2018-01-02 | End: 2018-01-02

## 2018-01-02 RX ADMIN — SODIUM CHLORIDE 50 ML/HR: 900 INJECTION, SOLUTION INTRAVENOUS at 13:15

## 2018-01-02 RX ADMIN — IOPAMIDOL 100 ML: 755 INJECTION, SOLUTION INTRAVENOUS at 13:14

## 2018-01-02 RX ADMIN — POTASSIUM CHLORIDE 40 MEQ: 20 TABLET, EXTENDED RELEASE ORAL at 14:35

## 2018-01-02 RX ADMIN — Medication 10 ML: at 13:15

## 2018-01-02 RX ADMIN — LISINOPRIL 20 MG: 20 TABLET ORAL at 14:37

## 2018-01-02 RX ADMIN — POTASSIUM CHLORIDE 40 MEQ: 750 TABLET, FILM COATED, EXTENDED RELEASE ORAL at 12:50

## 2018-01-02 NOTE — ED PROVIDER NOTES
EMERGENCY DEPARTMENT HISTORY AND PHYSICAL EXAM      Date: 1/2/2018  Patient Name: Renato Beaver    History of Presenting Illness     Chief Complaint   Patient presents with    Shortness of Breath     Ambulatory w/ cane c/o SOB x1 week, pacemaker by [facility] x3 months    Back Pain     Pain with inspiration x 1 week     History Provided By: Patient    HPI: Renato Beaver, 68 y.o. female with PMHx significant for HTN, A-fib, and multiple myeloma, presents ambulatory to the ED with cc of gradually worsening SOB and back pain. Per pt, her SOB has been presenting with intermittent worsening over the course of the past week with minimal relief. Pt informs her SOB is increased with inspirations and laying flat. Pt informs exacerbation of her SOB elicits pain to the left upper back with a moderate intensity and an associated dull, sore sensation to the aforementioned region. Pt lastly informs of an episode of diarrhea three days prior which has since resolved. Of note, pt reports she attempted to go to her Cardiolgist's office, Dr. Bo Chung, but noted there was no open appointment and she was advised to visit the ED. Pt reports of a pacemaker placement three months prior and informs that was when she started taking Eliquis. Pt informs she is also on Lasix. Pt reports history of PE but is compliant with Eliquis. Pt specifically denies any nausea, vomiting, fevers, chills, cough, hemoptysis, chest pain, or palpitations. PMHx: arthritis   PSHx: pacemaker placement   Social Hx: - EtOH; - Smoker; - Illicit Drugs    PCP: Montez Mercado MD    There are no other complaints, changes, or physical findings at this time.     Current Facility-Administered Medications   Medication Dose Route Frequency Provider Last Rate Last Dose    potassium chloride (K-DUR, KLOR-CON) SR tablet 40 mEq  40 mEq Oral NOW Noemy Cortez MD         Current Outpatient Prescriptions   Medication Sig Dispense Refill    furosemide (LASIX) 40 mg tablet Take 1 Tab by mouth daily. 30 Tab 3    predniSONE (DELTASONE) 20 mg tablet Take 4 Tabs by mouth daily (after dinner). 16 Tab 0    oxyCODONE-acetaminophen (PERCOCET)  mg per tablet Take 1 Tab by mouth every eight (8) hours as needed for Pain. Max Daily Amount: 3 Tabs. 50 Tab 0    diazePAM (VALIUM) 5 mg tablet Take 1 Tab by mouth every eight (8) hours as needed (spasm). Max Daily Amount: 15 mg. 15 Tab 0    naloxone (NARCAN) 4 mg/actuation nasal spray Use 1 spray intranasally into 1 nostril. Use a new Narcan nasal spray for subsequent doses and administer into alternating nostrils. May repeat every 2 to 3 minutes as needed. 2 Each 0    cyclobenzaprine (FLEXERIL) 5 mg tablet Take 1 Tab by mouth three (3) times daily as needed for Muscle Spasm(s). 15 Tab 0    REVLIMID 10 mg cap       potassium chloride (K-DUR, KLOR-CON) 20 mEq tablet Take 1 Tab by mouth two (2) times a day. 60 Tab 11    lisinopril (PRINIVIL, ZESTRIL) 40 mg tablet Take 0.5 Tabs by mouth daily. 30 Tab 3    apixaban (ELIQUIS) 5 mg tablet Take 1 Tab by mouth two (2) times a day. 60 Tab 3    metoprolol succinate (TOPROL-XL) 25 mg XL tablet Take 0.5 Tabs by mouth daily. 30 Tab 11    aluminum hydrox-magnesium carb (GAVISCON EXTRA STRENGTH) 160-105 mg chew Take 1-2 tablets by mouth four (4) times daily as needed.        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), NYHA class 2 ([facility] [address] 8/11/2017    Arthritis     Atrial fibrillation (HCC)     Cancer (HCC)     multiple myeloma    Hypertension     S/P AV kathleen ablation 8/11/2017    Status post biventricular pacemaker 8/11/2017 8/11/17      Past Surgical History:  Past Surgical History:   Procedure Laterality Date    HX GYN      HX ORTHOPAEDIC      knee    HX PACEMAKER  08/11/2017     Family History:  Family History   Problem Relation Age of Onset    Stroke Mother     No Known Problems Father      Social History:  Social History   Substance Use Topics    Smoking status: Never Smoker    Smokeless tobacco: Never Used    Alcohol use No     Allergies: Allergies   Allergen Reactions    Codeine Other (comments)     Review of Systems   Review of Systems   Constitutional: Negative for chills, fatigue and fever. HENT: Negative for congestion, rhinorrhea and sore throat. Eyes: Negative for pain, discharge and visual disturbance. Respiratory: Positive for shortness of breath. Negative for cough, chest tightness and wheezing. Cardiovascular: Negative for chest pain, palpitations and leg swelling. Gastrointestinal: Positive for diarrhea. Negative for abdominal pain, constipation, nausea and vomiting. Genitourinary: Negative for dysuria, frequency and hematuria. Musculoskeletal: Positive for back pain. Negative for arthralgias and myalgias. Skin: Negative for rash. Neurological: Negative for dizziness, weakness, light-headedness and headaches. Psychiatric/Behavioral: Negative. Physical Exam   Physical Exam   Constitutional: She is oriented to person, place, and time. She appears well-developed and well-nourished. No distress. HENT:   Head: Normocephalic and atraumatic. Eyes: EOM are normal. Right eye exhibits no discharge. Left eye exhibits no discharge. No scleral icterus. Neck: Normal range of motion. Neck supple. No tracheal deviation present. Cardiovascular: Normal rate, regular rhythm, normal heart sounds and intact distal pulses. Exam reveals no gallop and no friction rub. No murmur heard. Pulmonary/Chest: Effort normal and breath sounds normal. No respiratory distress. She has no wheezes. She has no rales. Crackles left lower base   Abdominal: Soft. She exhibits no distension. There is no tenderness. Musculoskeletal: Normal range of motion. She exhibits edema (2+ BLE). She exhibits no tenderness (No calf tenderness).    Lymphadenopathy:     She has no cervical adenopathy. Neurological: She is alert and oriented to person, place, and time. No focal neuro deficits   Skin: Skin is warm and dry. No rash noted. Psychiatric: She has a normal mood and affect. Nursing note and vitals reviewed. Diagnostic Study Results     Labs -     Recent Results (from the past 12 hour(s))   EKG, 12 LEAD, INITIAL    Collection Time: 01/02/18 10:04 AM   Result Value Ref Range    Ventricular Rate 75 BPM    Atrial Rate 375 BPM    QRS Duration 154 ms    Q-T Interval 500 ms    QTC Calculation (Bezet) 558 ms    Calculated R Axis -94 degrees    Calculated T Axis 78 degrees    Diagnosis       Ventricular-paced rhythm  Abnormal ECG  When compared with ECG of 29-AUG-2017 11:06,  Electronic ventricular pacemaker has replaced Atrial flutter     CBC WITH AUTOMATED DIFF    Collection Time: 01/02/18 11:39 AM   Result Value Ref Range    WBC 4.5 3.6 - 11.0 K/uL    RBC 4.20 3.80 - 5.20 M/uL    HGB 11.4 (L) 11.5 - 16.0 g/dL    HCT 36.4 35.0 - 47.0 %    MCV 86.7 80.0 - 99.0 FL    MCH 27.1 26.0 - 34.0 PG    MCHC 31.3 30.0 - 36.5 g/dL    RDW 17.6 (H) 11.5 - 14.5 %    PLATELET 379 520 - 562 K/uL    NEUTROPHILS 70 32 - 75 %    LYMPHOCYTES 20 12 - 49 %    MONOCYTES 9 5 - 13 %    EOSINOPHILS 1 0 - 7 %    BASOPHILS 0 0 - 1 %    ABS. NEUTROPHILS 3.2 1.8 - 8.0 K/UL    ABS. LYMPHOCYTES 0.9 0.8 - 3.5 K/UL    ABS. MONOCYTES 0.4 0.0 - 1.0 K/UL    ABS. EOSINOPHILS 0.0 0.0 - 0.4 K/UL    ABS.  BASOPHILS 0.0 0.0 - 0.1 K/UL   METABOLIC PANEL, COMPREHENSIVE    Collection Time: 01/02/18 11:39 AM   Result Value Ref Range    Sodium 144 136 - 145 mmol/L    Potassium 3.3 (L) 3.5 - 5.1 mmol/L    Chloride 106 97 - 108 mmol/L    CO2 35 (H) 21 - 32 mmol/L    Anion gap 3 (L) 5 - 15 mmol/L    Glucose 77 65 - 100 mg/dL    BUN 16 6 - 20 MG/DL    Creatinine 0.96 0.55 - 1.02 MG/DL    BUN/Creatinine ratio 17 12 - 20      GFR est AA >60 >60 ml/min/1.73m2    GFR est non-AA 56 (L) >60 ml/min/1.73m2    Calcium 8.9 8.5 - 10.1 MG/DL Bilirubin, total 0.5 0.2 - 1.0 MG/DL    ALT (SGPT) 29 12 - 78 U/L    AST (SGOT) 28 15 - 37 U/L    Alk. phosphatase 122 (H) 45 - 117 U/L    Protein, total 7.4 6.4 - 8.2 g/dL    Albumin 3.5 3.5 - 5.0 g/dL    Globulin 3.9 2.0 - 4.0 g/dL    A-G Ratio 0.9 (L) 1.1 - 2.2     CK W/ REFLX CKMB    Collection Time: 01/02/18 11:39 AM   Result Value Ref Range     26 - 192 U/L   TROPONIN I    Collection Time: 01/02/18 11:39 AM   Result Value Ref Range    Troponin-I, Qt. <0.04 <0.05 ng/mL   NT-PRO BNP    Collection Time: 01/02/18 11:39 AM   Result Value Ref Range    NT pro- (H) 0 - 450 PG/ML     Radiologic Studies -   CTA CHEST W OR W WO CONT   Final Result      XR CHEST PA LAT   Final Result        CT Results  (Last 48 hours)               01/02/18 1313  CTA CHEST W OR W WO CONT Final result    Impression:  IMPRESSION: No evidence of acute process or pulmonary embolus. Narrative:  EXAM:  CTA CHEST W OR W WO CONT       INDICATION:   pleuritic pain with dyspnea       COMPARISON: Concurrent chest x-ray and CT 8/8/2017. CONTRAST:  100 mL of Isovue-370. TECHNIQUE:    Precontrast  images were obtained to localize the volume for acquisition. Multislice helical CT arteriography was performed from the diaphragm to the   thoracic inlet during uneventful rapid bolus intravenous contrast   administration. Lung and soft tissue windows were generated. Coronal and   sagittal images were generated and 3D post processing consisting of coronal   maximum intensity images was performed. CT dose reduction was achieved through   use of a standardized protocol tailored for this examination and automatic   exposure control for dose modulation. FINDINGS:   LUNGS:  Interstitial disease is again noted in both lower lobes. There is mild. No other evidence of acute finding or significant nodule is seen. Arvid Rhonda PLEURA: No pleural effusion or pneumothorax. TRACHEA/BRONCHI: Patent. PULMONARY ARTERIES: The pulmonary arteries are well enhanced and no pulmonary   emboli are identified. Pain pulmonary artery is at the upper limits normal in   size. Thyroid: There is low density nodularity in the right lobe of the thyroid gland   unchanged previous. MEDIASTINUM/NAHEED: There is no mediastinal or hilar adenopathy or mass. AORTA: The aorta enhances normally without evidence of aneurysm or dissection. UPPER ABDOMEN there is probable cyst in the upper pole of the left kidney.:   Right. BONES: No sclerotic or lytic lesion. CXR Results  (Last 48 hours)               01/02/18 1213  XR CHEST PA LAT Final result    Impression:  IMPRESSION:   Cardiomegaly, stable. No vascular congestion demonstrated. Narrative:  EXAM:  XR CHEST PA LAT   INDICATION:  shortness of breath, one week history of increased shortness of   breath. One week history of left-sided chest pain with inspiration. TECHNIQUE:    PA and lateral chest x-rays were obtained. COMPARISON: 8/29/17   FINDINGS:     Cardiomegaly again noted. Patient has pacemaker with 2-lead wires which appear   to be intact. External cardiac monitor leads are also noted. Pulmonary vascularity does not appear to be congested. No focal infiltrate or area consolidation demonstrated. Mild chronic degenerative changes in the spine. Small focal eventration again   noted in the right diaphragm. Medical Decision Making   I am the first provider for this patient. I reviewed the vital signs, available nursing notes, past medical history, past surgical history, family history and social history. Vital Signs-Reviewed the patient's vital signs.   Patient Vitals for the past 12 hrs:   Temp Pulse Resp BP SpO2   01/02/18 1200 - 75 16 (!) 152/97 -   01/02/18 1130 97.9 °F (36.6 °C) 75 17 169/81 100 %     Pulse Oximetry Analysis - 100% on RA    Cardiac Monitor:   Rate: 75 bpm  Rhythm: Normal Sinus Rhythm EKG interpretation: (Preliminary) 1004  Rhythm: paced rhythm; and regular . Rate (approx.): 75; Axis: normal; TN interval: prolonged; QRS interval: prolonged; ST/T wave: non specific ST changes; no significant changes from prior. Written by Laura Felipe ED Scribe, as dictated by Keli Munson MD.    Records Reviewed: Old Medical Records    Provider Notes (Medical Decision Making):   Patient presents to ED with dyspnea. She denies chest pain. She is maintaining oxygen saturation on room air. Differential includes heart failure, pulmonary edema, pleural effusion, pneumonia, PE, ACS, arrhythmia, PTX.  - CBC, CMP, troponin, BNP  - CXR, if unremarkable will proceed with CTA to rule out PE    ED Course:   Initial assessment performed. The patients presenting problems have been discussed, and they are in agreement with the care plan formulated and outlined with them. I have encouraged them to ask questions as they arise throughout their visit. PROGRESS NOTE:  2:10 PM  Labs and CTA unremarkable. Advised follow up with patient's cardiologist.  Discussed results, prescriptions and follow up plan with patient. Provided customary return to ED instructions. Patient expressed understanding. Roxi Deras MD    Disposition:  DISCHARGE NOTE:    2:12 PM  The patient is ready for discharge. The patient signs, symptoms, diagnosis, and discharge instructions have been discussed and the patient has conveyed their understanding. The patient is to follow-up as reccommended or returned to the ER should their symptoms worsen. Plan has been discussed and patient is in agreement. PLAN:  1. Current Discharge Medication List        2.    Follow-up Information     Follow up With Details Comments [id number] S. Asif Butts MD In 2 days  [address] [address]  [address]  [facility] [address] [facility]      Trudy Thompson MD  Please follow up with cardiology this week [address] [facility] [address] [facility]  [phone]      [facility] EMERGENCY DEPT  As needed, If symptoms worsen [address] [place] [address]  [phone]        Return to ED if worse     Diagnosis     Clinical Impression:   1. Shortness of breath      Attestations: This note is prepared by Rhonda Reardon, acting as Scribe for Shannon Keen MD.    Shannon Keen MD: The scribe's documentation has been prepared under my direction and personally reviewed by me in its entirety. I confirm that the note above accurately reflects all work, treatment, procedures, and medical decision making performed by me.

## 2018-01-02 NOTE — ED NOTES
Discharge instructions reviewed w/ pt and copy given by Dr. Patrick Childers. Pt discharged ambulatory from the ED.

## 2018-01-02 NOTE — ED NOTES
Received pt to exam room for c/o SOB and pain on the L side when she takes a deep breath. Pt states has has pacemaker for about 3 months.

## 2018-01-02 NOTE — DISCHARGE INSTRUCTIONS
Shortness of Breath: Care Instructions  Your Care Instructions  Shortness of breath has many causes. Sometimes conditions such as anxiety can lead to shortness of breath. Some people get mild shortness of breath when they exercise. Trouble breathing also can be a symptom of a serious problem, such as asthma, lung disease, emphysema, heart problems, and pneumonia. If your shortness of breath continues, you may need tests and treatment. Watch for any changes in your breathing and other symptoms. Follow-up care is a key part of your treatment and safety. Be sure to make and go to all appointments, and call your doctor if you are having problems. It's also a good idea to know your test results and keep a list of the medicines you take. How can you care for yourself at home? · Do not smoke or allow others to smoke around you. If you need help quitting, talk to your doctor about stop-smoking programs and medicines. These can increase your chances of quitting for good. · Get plenty of rest and sleep. · Take your medicines exactly as prescribed. Call your doctor if you think you are having a problem with your medicine. · Find healthy ways to deal with stress. ¨ Exercise daily. ¨ Get plenty of sleep. ¨ Eat regularly and well. When should you call for help? Call 911 anytime you think you may need emergency care. For example, call if:  ? · You have severe shortness of breath. ? · You have symptoms of a heart attack. These may include:  ¨ Chest pain or pressure, or a strange feeling in the chest.  ¨ Sweating. ¨ Shortness of breath. ¨ Nausea or vomiting. ¨ Pain, pressure, or a strange feeling in the back, neck, jaw, or upper belly or in one or both shoulders or arms. ¨ Lightheadedness or sudden weakness. ¨ A fast or irregular heartbeat. After you call 911, the  may tell you to chew 1 adult-strength or 2 to 4 low-dose aspirin. Wait for an ambulance. Do not try to drive yourself.    ?Call your doctor now or seek immediate medical care if:  ? · Your shortness of breath gets worse or you start to wheeze. Wheezing is a high-pitched sound when you breathe. ? · You wake up at night out of breath or have to prop your head up on several pillows to breathe. ? · You are short of breath after only light activity or while at rest.   ? Watch closely for changes in your health, and be sure to contact your doctor if:  ? · You do not get better over the next 1 to 2 days. Where can you learn more? Go to http://mila-syeda.info/. Enter S780 in the search box to learn more about \"Shortness of Breath: Care Instructions. \"  Current as of: May 12, 2017  Content Version: 11.4  © 6275-7742 [employer]. Care instructions adapted under license by [employer] (which disclaims liability or warranty for this information). If you have questions about a medical condition or this instruction, always ask your healthcare professional. [address] any warranty or liability for your use of this information.

## 2018-01-22 ENCOUNTER — PATIENT OUTREACH (OUTPATIENT)
Dept: INTERNAL MEDICINE CLINIC | Age: 78
End: 2018-01-22

## 2018-01-22 ENCOUNTER — OFFICE VISIT (OUTPATIENT)
Dept: INTERNAL MEDICINE CLINIC | Age: 78
End: 2018-01-22

## 2018-01-22 VITALS
HEART RATE: 75 BPM | OXYGEN SATURATION: 98 % | SYSTOLIC BLOOD PRESSURE: 142 MMHG | RESPIRATION RATE: 16 BRPM | WEIGHT: 200.8 LBS | DIASTOLIC BLOOD PRESSURE: 81 MMHG | HEIGHT: 70 IN | TEMPERATURE: 97.9 F | BODY MASS INDEX: 28.75 KG/M2

## 2018-01-22 DIAGNOSIS — I26.99 OTHER ACUTE PULMONARY EMBOLISM WITHOUT ACUTE COR PULMONALE (HCC): ICD-10-CM

## 2018-01-22 DIAGNOSIS — Z13.31 SCREENING FOR DEPRESSION: ICD-10-CM

## 2018-01-22 DIAGNOSIS — I50.22 CHRONIC SYSTOLIC CONGESTIVE HEART FAILURE (HCC): ICD-10-CM

## 2018-01-22 DIAGNOSIS — I50.41 ACUTE COMBINED SYSTOLIC AND DIASTOLIC HF (HEART FAILURE), NYHA CLASS 2 (HCC): ICD-10-CM

## 2018-01-22 DIAGNOSIS — I48.92 ATRIAL FLUTTER, UNSPECIFIED TYPE (HCC): ICD-10-CM

## 2018-01-22 DIAGNOSIS — I10 ESSENTIAL HYPERTENSION: ICD-10-CM

## 2018-01-22 DIAGNOSIS — Z00.00 WELLNESS EXAMINATION: ICD-10-CM

## 2018-01-22 DIAGNOSIS — C90.00 MULTIPLE MYELOMA, REMISSION STATUS UNSPECIFIED (HCC): ICD-10-CM

## 2018-01-22 DIAGNOSIS — R07.89 RIGHT-SIDED CHEST WALL PAIN: Primary | ICD-10-CM

## 2018-01-22 DIAGNOSIS — Z13.39 SCREENING FOR ALCOHOLISM: ICD-10-CM

## 2018-01-22 DIAGNOSIS — I42.8 OTHER CARDIOMYOPATHY (HCC): ICD-10-CM

## 2018-01-22 DIAGNOSIS — R10.13 DYSPEPSIA: ICD-10-CM

## 2018-01-22 DIAGNOSIS — I48.20 CHRONIC ATRIAL FIBRILLATION (HCC): Chronic | ICD-10-CM

## 2018-01-22 RX ORDER — OMEPRAZOLE 40 MG/1
40 CAPSULE, DELAYED RELEASE ORAL DAILY
Qty: 30 CAP | Refills: 1 | Status: SHIPPED | OUTPATIENT
Start: 2018-01-22 | End: 2018-12-21

## 2018-01-22 NOTE — PROGRESS NOTES
[address] [place] [address] and Primary Care  [facility]  [address] [place] [address]  Phone:  [phone]  Fax: [phone]       Chief Complaint   Patient presents with   [facility] Wellness Visit   . SUBJECTIVE:    Suma Colindres is a 68 y.o. female   Comes in for a return visit complaining of severe dyspepsia starting 2-3 days ago. She states initially started on her abdomen, then went to her low back, left side of the back and now on the right side of her back. Her dyspeptic symptoms have gradually improved, however. Movement aggravates the current discomfort in the right shoulder. She is not taking any of her analgesic at this time. For her hypercoagulable state, she is on Eliquis. She has been taking her antihypertensive medication as prescribed also. MedDATA/gwo            Current Outpatient Prescriptions   Medication Sig Dispense Refill    omeprazole (PRILOSEC) 40 mg capsule Take 1 Cap by mouth daily. 30 Cap 1    furosemide (LASIX) 40 mg tablet Take 1 Tab by mouth daily. 30 Tab 3    predniSONE (DELTASONE) 20 mg tablet Take 4 Tabs by mouth daily (after dinner). 16 Tab 0    oxyCODONE-acetaminophen (PERCOCET)  mg per tablet Take 1 Tab by mouth every eight (8) hours as needed for Pain. Max Daily Amount: 3 Tabs. 50 Tab 0    REVLIMID 10 mg cap Take  by mouth. Indications: 1 tab daily      potassium chloride (K-DUR, KLOR-CON) 20 mEq tablet Take 1 Tab by mouth two (2) times a day. 60 Tab 11    apixaban (ELIQUIS) 5 mg tablet Take 1 Tab by mouth two (2) times a day. 60 Tab 3    metoprolol succinate (TOPROL-XL) 25 mg XL tablet Take 0.5 Tabs by mouth daily. 30 Tab 11    aluminum hydrox-magnesium carb (GAVISCON EXTRA STRENGTH) 160-105 mg chew Take 1-2 tablets by mouth four (4) times daily as needed.  lisinopril (PRINIVIL, ZESTRIL) 40 mg tablet Take 1 Tab by mouth daily.  30 Tab 3    diazePAM (VALIUM) 5 mg tablet Take 1 Tab by mouth every eight (8) hours as needed (spasm). Max Daily Amount: 15 mg. 15 Tab 0    naloxone (NARCAN) 4 mg/actuation nasal spray Use 1 spray intranasally into 1 nostril. Use a new Narcan nasal spray for subsequent doses and administer into alternating nostrils. May repeat every 2 to 3 minutes as needed. 2 Each 0    cyclobenzaprine (FLEXERIL) 5 mg tablet Take 1 Tab by mouth three (3) times daily as needed for Muscle Spasm(s).  15 Tab 0     Past Medical History:   Diagnosis Date    Acute combined systolic and diastolic HF (heart failure), NYHA class 2 ([address] 8/11/2017    Arthritis     Atrial fibrillation (HCC)     Cancer (HCC)     multiple myeloma    Hypertension     S/P AV kathleen ablation 8/11/2017    Status post biventricular pacemaker 8/11/2017 8/11/17      Past Surgical History:   Procedure Laterality Date    HX GYN      HX ORTHOPAEDIC      knee    HX PACEMAKER  08/11/2017     Allergies   Allergen Reactions    Codeine Other (comments)         REVIEW OF SYSTEMS:  General: negative for - chills or fever  ENT: negative for - headaches, nasal congestion or tinnitus  Respiratory: negative for - cough, hemoptysis, shortness of breath or wheezing  Cardiovascular : negative for - chest pain, edema, palpitations or shortness of breath  Gastrointestinal: negative for - abdominal pain, blood in stools, heartburn or nausea/vomiting  Genito-Urinary: no dysuria, trouble voiding, or hematuria  Musculoskeletal: negative for - gait disturbance, joint pain, joint stiffness or joint swelling  Neurological: no TIA or stroke symptoms  Hematologic: no bruises, no bleeding, no swollen glands  Integument: no lumps, mole changes, nail changes or rash  Endocrine: no malaise/lethargy or unexpected weight changes      Social History     Social History    Marital status:      Spouse name: N/A    Number of children: N/A    Years of education: N/A     Social History Main Topics    Smoking status: Never Smoker    Smokeless tobacco: Never Used    Alcohol use No    Drug use: No    Sexual activity: Not Currently     Other Topics Concern    None     Social History Narrative     Family History   Problem Relation Age of Onset    Stroke Mother     No Known Problems Father        OBJECTIVE:    Visit Vitals    /81    Pulse 75    Temp 97.9 °F (36.6 °C) (Oral)    Resp 16    Ht 5' 10\" (1.778 m)    Wt 200 lb 12.8 oz (91.1 kg)    SpO2 98%    BMI 28.81 kg/m2     CONSTITUTIONAL: well , well nourished, appears age appropriate  EYES: perrla, eom intact  ENMT:moist mucous membranes, pharynx clear  NECK: supple. Thyroid normal  RESPIRATORY: Chest: clear to ascultation and percussion   CARDIOVASCULAR: Heart: regular rate and rhythm  GASTROINTESTINAL: Abdomen: soft, bowel sounds active  HEMATOLOGIC: no pathological lymph nodes palpated  MUSCULOSKELETAL: Extremities: no edema, pulse 1+   INTEGUMENT: No unusual rashes or suspicious skin lesions noted. Nails appear normal.  NEUROLOGIC: non-focal exam   MENTAL STATUS: alert and oriented, appropriate affect      ASSESSMENT:  1. Right-sided chest wall pain    2. Dyspepsia    3. Multiple myeloma, remission status unspecified ([address]    4. Essential hypertension    5. Acute combined systolic and diastolic HF (heart failure), NYHA class 2 (HCC)    6. Other acute pulmonary embolism without acute cor pulmonale ([address]    7. Atrial flutter, unspecified type ([address]    8. Other cardiomyopathy ([address]    9. Chronic systolic congestive heart failure ([address]    10. Chronic atrial fibrillation (HCC)    11. Screening for alcoholism    12. Screening for depression    13. Wellness examination      Diagnoses and all orders for this visit:    1. Right-sided chest wall pain    2. Dyspepsia    3. Multiple myeloma, remission status unspecified ([address]  Assessment & Plan: This condition is managed by Specialist.  Key Oncology Meds             REVLIMID 10 mg cap  (Taking) Take  by mouth.  Indications: 1 tab daily        Key Pain Meds oxyCODONE-acetaminophen (PERCOCET)  mg per tablet  (Taking) Take 1 Tab by mouth every eight (8) hours as needed for Pain. Max Daily Amount: 3 Tabs. Lab Results   Component Value Date/Time    WBC 4.5 01/02/2018 11:39 AM    ABS. NEUTROPHILS 3.2 01/02/2018 11:39 AM    HGB 11.4 01/02/2018 11:39 AM    HCT 36.4 01/02/2018 11:39 AM    PLATELET 355 74/10/3580 11:39 AM    Creatinine 0.96 01/02/2018 11:39 AM    BUN 16 01/02/2018 11:39 AM    ALT (SGPT) 29 01/02/2018 11:39 AM    AST (SGOT) 28 01/02/2018 11:39 AM    Albumin 3.5 01/02/2018 11:39 AM         4. Essential hypertension    5. Acute combined systolic and diastolic HF (heart failure), NYHA class 2 (HCC)  Assessment & Plan:  Stable, based on history, physical exam and review of pertinent labs, studies and medications; meds reconciled; continue current treatment plan. Key CAD CHF Meds             furosemide (LASIX) 40 mg tablet  (Taking) Take 1 Tab by mouth daily. apixaban (ELIQUIS) 5 mg tablet  (Taking) Take 1 Tab by mouth two (2) times a day. lisinopril (PRINIVIL, ZESTRIL) 40 mg tablet  (Taking/Discontinued) Take 0.5 Tabs by mouth daily. metoprolol succinate (TOPROL-XL) 25 mg XL tablet  (Taking) Take 0.5 Tabs by mouth daily. Key Antihyperlipidemia Meds     The patient is on no antihyperlipidemia meds. Lab Results   Component Value Date/Time    B-type Natriuretic Peptide 486.5 07/31/2017 11:31 AM    Sodium 144 01/02/2018 11:39 AM    Potassium 3.3 01/02/2018 11:39 AM    Cholesterol, total 156 08/11/2017 02:41 AM    HDL Cholesterol 82 08/11/2017 02:41 AM    LDL, calculated 64 08/11/2017 02:41 AM    Triglyceride 50 08/11/2017 02:41 AM    INR 1.1 09/07/2017 07:57 AM    Prothrombin time 11.5 09/07/2017 07:57 AM         6.  Other acute pulmonary embolism without acute cor pulmonale (HCC)  Assessment & Plan:  Stable, based on history, physical exam and review of pertinent labs, studies and medications; meds reconciled; continue current treatment plan. Key Peripheral Vascular Disease Meds             apixaban (ELIQUIS) 5 mg tablet  (Taking) Take 1 Tab by mouth two (2) times a day. Lab Results   Component Value Date/Time    WBC 4.5 01/02/2018 11:39 AM    HGB 11.4 01/02/2018 11:39 AM    HCT 36.4 01/02/2018 11:39 AM    PLATELET 280 04/65/8284 11:39 AM    Creatinine 0.96 01/02/2018 11:39 AM    BUN 16 01/02/2018 11:39 AM    INR 1.1 09/07/2017 07:57 AM    Prothrombin time 11.5 09/07/2017 07:57 AM    Cholesterol, total 156 08/11/2017 02:41 AM    HDL Cholesterol 82 08/11/2017 02:41 AM    LDL, calculated 64 08/11/2017 02:41 AM    Triglyceride 50 08/11/2017 02:41 AM         7. Atrial flutter, unspecified type [facility])  Assessment & Plan: This condition is managed by Specialist.  Key CAD CHF Meds             furosemide (LASIX) 40 mg tablet  (Taking) Take 1 Tab by mouth daily. apixaban (ELIQUIS) 5 mg tablet  (Taking) Take 1 Tab by mouth two (2) times a day. lisinopril (PRINIVIL, ZESTRIL) 40 mg tablet  (Taking/Discontinued) Take 0.5 Tabs by mouth daily. metoprolol succinate (TOPROL-XL) 25 mg XL tablet  (Taking) Take 0.5 Tabs by mouth daily. Key Antihyperlipidemia Meds     The patient is on no antihyperlipidemia meds. Lab Results   Component Value Date/Time    B-type Natriuretic Peptide 486.5 07/31/2017 11:31 AM    Sodium 144 01/02/2018 11:39 AM    Potassium 3.3 01/02/2018 11:39 AM    Cholesterol, total 156 08/11/2017 02:41 AM    HDL Cholesterol 82 08/11/2017 02:41 AM    LDL, calculated 64 08/11/2017 02:41 AM    Triglyceride 50 08/11/2017 02:41 AM    INR 1.1 09/07/2017 07:57 AM    Prothrombin time 11.5 09/07/2017 07:57 AM         8. Other cardiomyopathy [facility])  Assessment & Plan: This condition is managed by Specialist.  Key CAD CHF Meds             furosemide (LASIX) 40 mg tablet  (Taking) Take 1 Tab by mouth daily. apixaban (ELIQUIS) 5 mg tablet  (Taking) Take 1 Tab by mouth two (2) times a day.     lisinopril (Ghulam Loera) 40 mg tablet  (Taking/Discontinued) Take 0.5 Tabs by mouth daily. metoprolol succinate (TOPROL-XL) 25 mg XL tablet  (Taking) Take 0.5 Tabs by mouth daily. Key Antihyperlipidemia Meds     The patient is on no antihyperlipidemia meds. Lab Results   Component Value Date/Time    B-type Natriuretic Peptide 486.5 07/31/2017 11:31 AM    Sodium 144 01/02/2018 11:39 AM    Potassium 3.3 01/02/2018 11:39 AM    Cholesterol, total 156 08/11/2017 02:41 AM    HDL Cholesterol 82 08/11/2017 02:41 AM    LDL, calculated 64 08/11/2017 02:41 AM    Triglyceride 50 08/11/2017 02:41 AM    INR 1.1 09/07/2017 07:57 AM    Prothrombin time 11.5 09/07/2017 07:57 AM         9. Chronic systolic congestive heart failure [facility])  Assessment & Plan: This condition is managed by Specialist.  Key CAD CHF Meds             furosemide (LASIX) 40 mg tablet  (Taking) Take 1 Tab by mouth daily. apixaban (ELIQUIS) 5 mg tablet  (Taking) Take 1 Tab by mouth two (2) times a day. lisinopril (PRINIVIL, ZESTRIL) 40 mg tablet  (Taking/Discontinued) Take 0.5 Tabs by mouth daily. metoprolol succinate (TOPROL-XL) 25 mg XL tablet  (Taking) Take 0.5 Tabs by mouth daily. Key Antihyperlipidemia Meds     The patient is on no antihyperlipidemia meds. Lab Results   Component Value Date/Time    B-type Natriuretic Peptide 486.5 07/31/2017 11:31 AM    Sodium 144 01/02/2018 11:39 AM    Potassium 3.3 01/02/2018 11:39 AM    Cholesterol, total 156 08/11/2017 02:41 AM    HDL Cholesterol 82 08/11/2017 02:41 AM    LDL, calculated 64 08/11/2017 02:41 AM    Triglyceride 50 08/11/2017 02:41 AM    INR 1.1 09/07/2017 07:57 AM    Prothrombin time 11.5 09/07/2017 07:57 AM         10. Chronic atrial fibrillation [facility])  Assessment & Plan: This condition is managed by Specialist.  Key CAD CHF Meds             furosemide (LASIX) 40 mg tablet  (Taking) Take 1 Tab by mouth daily.     apixaban (ELIQUIS) 5 mg tablet  (Taking) Take 1 Tab by mouth two (2) times a day.    lisinopril (PRINIVIL, ZESTRIL) 40 mg tablet  (Taking/Discontinued) Take 0.5 Tabs by mouth daily. metoprolol succinate (TOPROL-XL) 25 mg XL tablet  (Taking) Take 0.5 Tabs by mouth daily. Key Antihyperlipidemia Meds     The patient is on no antihyperlipidemia meds. Lab Results   Component Value Date/Time    B-type Natriuretic Peptide 486.5 07/31/2017 11:31 AM    Sodium 144 01/02/2018 11:39 AM    Potassium 3.3 01/02/2018 11:39 AM    Cholesterol, total 156 08/11/2017 02:41 AM    HDL Cholesterol 82 08/11/2017 02:41 AM    LDL, calculated 64 08/11/2017 02:41 AM    Triglyceride 50 08/11/2017 02:41 AM    INR 1.1 09/07/2017 07:57 AM    Prothrombin time 11.5 09/07/2017 07:57 AM         11. Screening for alcoholism  -     Annual  Alcohol Screen 15 min ()    12. Screening for depression  -     Depression Screen Annual    13. Wellness examination    Other orders  -     omeprazole (PRILOSEC) 40 mg capsule; Take 1 Cap by mouth daily. PLAN:    1. She is having more musculoskeletal pain in the subscapular region on the right. It almost suggests a subscapular bursitis. She cannot take NSAIDs other than Celebrex technically. I suggest taking 1/2 Percocet once a day for the next 3-4 days. 2. For the dyspeptic type symptoms, she will be placed on a PPI temporarily. 3. She will follow up with the medical oncologist for her multiple myeloma. 4. Blood pressure is excellent today. 5. She remains on Eliquis for her hypercoagulable state and paroxysmal atrial fibrillation. MedDATA/gwo           Follow-up Disposition:  Return keep old apt. David Michelle MD      This is a Subsequent Medicare Annual Wellness Exam (AWV) (Performed 12 months after IPPE or effective date of Medicare Part B enrollment)    I have reviewed the patient's medical history in detail and updated the computerized patient record.      History     Past Medical History:   Diagnosis Date    Acute combined systolic and diastolic HF (heart failure), NYHA class 2 ([facility] 8/11/2017    Arthritis     Atrial fibrillation (HCC)     Cancer (HCC)     multiple myeloma    Hypertension     S/P AV kathleen ablation 8/11/2017    Status post biventricular pacemaker 8/11/2017 8/11/17       Past Surgical History:   Procedure Laterality Date    HX GYN      HX ORTHOPAEDIC      knee    HX PACEMAKER  08/11/2017     Current Outpatient Prescriptions   Medication Sig Dispense Refill    omeprazole (PRILOSEC) 40 mg capsule Take 1 Cap by mouth daily. 30 Cap 1    furosemide (LASIX) 40 mg tablet Take 1 Tab by mouth daily. 30 Tab 3    predniSONE (DELTASONE) 20 mg tablet Take 4 Tabs by mouth daily (after dinner). 16 Tab 0    oxyCODONE-acetaminophen (PERCOCET)  mg per tablet Take 1 Tab by mouth every eight (8) hours as needed for Pain. Max Daily Amount: 3 Tabs. 50 Tab 0    REVLIMID 10 mg cap Take  by mouth. Indications: 1 tab daily      potassium chloride (K-DUR, KLOR-CON) 20 mEq tablet Take 1 Tab by mouth two (2) times a day. 60 Tab 11    apixaban (ELIQUIS) 5 mg tablet Take 1 Tab by mouth two (2) times a day. 60 Tab 3    metoprolol succinate (TOPROL-XL) 25 mg XL tablet Take 0.5 Tabs by mouth daily. 30 Tab 11    aluminum hydrox-magnesium carb (GAVISCON EXTRA STRENGTH) 160-105 mg chew Take 1-2 tablets by mouth four (4) times daily as needed.  lisinopril (PRINIVIL, ZESTRIL) 40 mg tablet Take 1 Tab by mouth daily. 30 Tab 3    diazePAM (VALIUM) 5 mg tablet Take 1 Tab by mouth every eight (8) hours as needed (spasm). Max Daily Amount: 15 mg. 15 Tab 0    naloxone (NARCAN) 4 mg/actuation nasal spray Use 1 spray intranasally into 1 nostril. Use a new Narcan nasal spray for subsequent doses and administer into alternating nostrils. May repeat every 2 to 3 minutes as needed. 2 Each 0    cyclobenzaprine (FLEXERIL) 5 mg tablet Take 1 Tab by mouth three (3) times daily as needed for Muscle Spasm(s).  15 Tab 0     Allergies   Allergen Reactions  Codeine Other (comments)     Family History   Problem Relation Age of Onset    Stroke Mother     No Known Problems Father      Social History   Substance Use Topics    Smoking status: Never Smoker    Smokeless tobacco: Never Used    Alcohol use No     Patient Active Problem List   Diagnosis Code    Chest pain R07.9    Osteoarthritis M19.90    Chest pain on exertion R07.9    HTN (hypertension) I10    Anemia D64.9    Chronic atrial fibrillation (HCC) I48.2    Low back pain M54.5    Complex renal cyst N28.1    Reflux esophagitis K21.0    Multiple myeloma ([facility]) C90.00    Nocturia R35.1    Intractable back pain M54.9    Venous insufficiency I87.2    Contusion of knee S80.00XA    Synovitis M65.9    Primary osteoarthritis of both knees M17.0    Jaw pain R68.84    Back pain M54.9    Synovitis of shoulder M65.819    Muscular deconditioning R29.898    Sialadenitis K11.20    CHF (congestive heart failure) ([facility]) I50.9    Atrial flutter ([facility]) I48.92    Cardiomyopathy ([facility]) I42.9    Acute pulmonary embolism ([facility]) I26.99    Acute combined systolic and diastolic HF (heart failure), NYHA class 2 ([facility]) I50.41    Status post biventricular pacemaker Z95.0    S/P AV kathleen ablation Z98.890    S/P cardiac cath Z98.890    Weight loss R63.4    Irregular heartbeat I49.9       Depression Risk Factor Screening:     PHQ over the last two weeks 1/26/2018   PHQ Not Done -   Little interest or pleasure in doing things Not at all   Feeling down, depressed or hopeless Not at all   Total Score PHQ 2 0     Alcohol Risk Factor Screening: You do not drink alcohol or very rarely. Functional Ability and Level of Safety:   Hearing Loss  Hearing is good. Activities of Daily Living  The home contains: no safety equipment. Patient does total self care    Fall Risk  Fall Risk Assessment, last 12 mths 1/26/2018   Able to walk? Yes   Fall in past 12 months? No   Fall with injury?  -       Abuse Screen  Patient is not abused    Cognitive Screening   Evaluation of Cognitive Function:  Has your family/caregiver stated any concerns about your memory: no  Normal    Patient Care Team   Patient Care Team:  Dianne Hanna MD as PCP - General (Internal Medicine)  Kimberlyn Jj RN as Ambulatory Care Navigator  Lien Kaplan MD (Cardiology)  Albino Medina MD as Physician (Cardiology)    Assessment/Plan   Education and counseling provided:  Are appropriate based on today's review and evaluation    Diagnoses and all orders for this visit:    1. Right-sided chest wall pain    2. Dyspepsia    3. Multiple myeloma, remission status unspecified ([facility]  Assessment & Plan: This condition is managed by Specialist.  Key Oncology Meds             REVLIMID 10 mg cap  (Taking) Take  by mouth. Indications: 1 tab daily        Key Pain Meds             oxyCODONE-acetaminophen (PERCOCET)  mg per tablet  (Taking) Take 1 Tab by mouth every eight (8) hours as needed for Pain. Max Daily Amount: 3 Tabs. Lab Results   Component Value Date/Time    WBC 4.5 01/02/2018 11:39 AM    ABS. NEUTROPHILS 3.2 01/02/2018 11:39 AM    HGB 11.4 01/02/2018 11:39 AM    HCT 36.4 01/02/2018 11:39 AM    PLATELET 375 75/41/1099 11:39 AM    Creatinine 0.96 01/02/2018 11:39 AM    BUN 16 01/02/2018 11:39 AM    ALT (SGPT) 29 01/02/2018 11:39 AM    AST (SGOT) 28 01/02/2018 11:39 AM    Albumin 3.5 01/02/2018 11:39 AM         4. Essential hypertension    5. Acute combined systolic and diastolic HF (heart failure), NYHA class 2 (HCC)  Assessment & Plan:  Stable, based on history, physical exam and review of pertinent labs, studies and medications; meds reconciled; continue current treatment plan. Key CAD CHF Meds             furosemide (LASIX) 40 mg tablet  (Taking) Take 1 Tab by mouth daily. apixaban (ELIQUIS) 5 mg tablet  (Taking) Take 1 Tab by mouth two (2) times a day.     lisinopril (PRINIVIL, ZESTRIL) 40 mg tablet  (Taking/Discontinued) Take 0.5 Tabs by mouth daily. metoprolol succinate (TOPROL-XL) 25 mg XL tablet  (Taking) Take 0.5 Tabs by mouth daily. Key Antihyperlipidemia Meds     The patient is on no antihyperlipidemia meds. Lab Results   Component Value Date/Time    B-type Natriuretic Peptide 486.5 07/31/2017 11:31 AM    Sodium 144 01/02/2018 11:39 AM    Potassium 3.3 01/02/2018 11:39 AM    Cholesterol, total 156 08/11/2017 02:41 AM    HDL Cholesterol 82 08/11/2017 02:41 AM    LDL, calculated 64 08/11/2017 02:41 AM    Triglyceride 50 08/11/2017 02:41 AM    INR 1.1 09/07/2017 07:57 AM    Prothrombin time 11.5 09/07/2017 07:57 AM         6. Other acute pulmonary embolism without acute cor pulmonale (HCC)  Assessment & Plan:  Stable, based on history, physical exam and review of pertinent labs, studies and medications; meds reconciled; continue current treatment plan. Key Peripheral Vascular Disease Meds             apixaban (ELIQUIS) 5 mg tablet  (Taking) Take 1 Tab by mouth two (2) times a day. Lab Results   Component Value Date/Time    WBC 4.5 01/02/2018 11:39 AM    HGB 11.4 01/02/2018 11:39 AM    HCT 36.4 01/02/2018 11:39 AM    PLATELET 680 62/91/9920 11:39 AM    Creatinine 0.96 01/02/2018 11:39 AM    BUN 16 01/02/2018 11:39 AM    INR 1.1 09/07/2017 07:57 AM    Prothrombin time 11.5 09/07/2017 07:57 AM    Cholesterol, total 156 08/11/2017 02:41 AM    HDL Cholesterol 82 08/11/2017 02:41 AM    LDL, calculated 64 08/11/2017 02:41 AM    Triglyceride 50 08/11/2017 02:41 AM         7. Atrial flutter, unspecified type [facility])  Assessment & Plan: This condition is managed by Specialist.  Key CAD CHF Meds             furosemide (LASIX) 40 mg tablet  (Taking) Take 1 Tab by mouth daily. apixaban (ELIQUIS) 5 mg tablet  (Taking) Take 1 Tab by mouth two (2) times a day. lisinopril (PRINIVIL, ZESTRIL) 40 mg tablet  (Taking/Discontinued) Take 0.5 Tabs by mouth daily.     metoprolol succinate (TOPROL-XL) 25 mg XL tablet  (Taking) Take 0.5 Tabs by mouth daily. Key Antihyperlipidemia Meds     The patient is on no antihyperlipidemia meds. Lab Results   Component Value Date/Time    B-type Natriuretic Peptide 486.5 07/31/2017 11:31 AM    Sodium 144 01/02/2018 11:39 AM    Potassium 3.3 01/02/2018 11:39 AM    Cholesterol, total 156 08/11/2017 02:41 AM    HDL Cholesterol 82 08/11/2017 02:41 AM    LDL, calculated 64 08/11/2017 02:41 AM    Triglyceride 50 08/11/2017 02:41 AM    INR 1.1 09/07/2017 07:57 AM    Prothrombin time 11.5 09/07/2017 07:57 AM         8. Other cardiomyopathy [facility])  Assessment & Plan: This condition is managed by Specialist.  Key CAD CHF Meds             furosemide (LASIX) 40 mg tablet  (Taking) Take 1 Tab by mouth daily. apixaban (ELIQUIS) 5 mg tablet  (Taking) Take 1 Tab by mouth two (2) times a day. lisinopril (PRINIVIL, ZESTRIL) 40 mg tablet  (Taking/Discontinued) Take 0.5 Tabs by mouth daily. metoprolol succinate (TOPROL-XL) 25 mg XL tablet  (Taking) Take 0.5 Tabs by mouth daily. Key Antihyperlipidemia Meds     The patient is on no antihyperlipidemia meds. Lab Results   Component Value Date/Time    B-type Natriuretic Peptide 486.5 07/31/2017 11:31 AM    Sodium 144 01/02/2018 11:39 AM    Potassium 3.3 01/02/2018 11:39 AM    Cholesterol, total 156 08/11/2017 02:41 AM    HDL Cholesterol 82 08/11/2017 02:41 AM    LDL, calculated 64 08/11/2017 02:41 AM    Triglyceride 50 08/11/2017 02:41 AM    INR 1.1 09/07/2017 07:57 AM    Prothrombin time 11.5 09/07/2017 07:57 AM         9. Chronic systolic congestive heart failure [facility])  Assessment & Plan: This condition is managed by Specialist.  Key CAD CHF Meds             furosemide (LASIX) 40 mg tablet  (Taking) Take 1 Tab by mouth daily. apixaban (ELIQUIS) 5 mg tablet  (Taking) Take 1 Tab by mouth two (2) times a day. lisinopril (PRINIVIL, ZESTRIL) 40 mg tablet  (Taking/Discontinued) Take 0.5 Tabs by mouth daily.     metoprolol succinate (TOPROL-XL) 25 mg XL tablet  (Taking) Take 0.5 Tabs by mouth daily. Key Antihyperlipidemia Meds     The patient is on no antihyperlipidemia meds. Lab Results   Component Value Date/Time    B-type Natriuretic Peptide 486.5 07/31/2017 11:31 AM    Sodium 144 01/02/2018 11:39 AM    Potassium 3.3 01/02/2018 11:39 AM    Cholesterol, total 156 08/11/2017 02:41 AM    HDL Cholesterol 82 08/11/2017 02:41 AM    LDL, calculated 64 08/11/2017 02:41 AM    Triglyceride 50 08/11/2017 02:41 AM    INR 1.1 09/07/2017 07:57 AM    Prothrombin time 11.5 09/07/2017 07:57 AM         10. Chronic atrial fibrillation [facility])  Assessment & Plan: This condition is managed by Specialist.  Key CAD CHF Meds             furosemide (LASIX) 40 mg tablet  (Taking) Take 1 Tab by mouth daily. apixaban (ELIQUIS) 5 mg tablet  (Taking) Take 1 Tab by mouth two (2) times a day. lisinopril (PRINIVIL, ZESTRIL) 40 mg tablet  (Taking/Discontinued) Take 0.5 Tabs by mouth daily. metoprolol succinate (TOPROL-XL) 25 mg XL tablet  (Taking) Take 0.5 Tabs by mouth daily. Key Antihyperlipidemia Meds     The patient is on no antihyperlipidemia meds. Lab Results   Component Value Date/Time    B-type Natriuretic Peptide 486.5 07/31/2017 11:31 AM    Sodium 144 01/02/2018 11:39 AM    Potassium 3.3 01/02/2018 11:39 AM    Cholesterol, total 156 08/11/2017 02:41 AM    HDL Cholesterol 82 08/11/2017 02:41 AM    LDL, calculated 64 08/11/2017 02:41 AM    Triglyceride 50 08/11/2017 02:41 AM    INR 1.1 09/07/2017 07:57 AM    Prothrombin time 11.5 09/07/2017 07:57 AM         11. Screening for alcoholism  -     Annual  Alcohol Screen 15 min ()    12. Screening for depression  -     Depression Screen Annual    13. Wellness examination    Other orders  -     omeprazole (PRILOSEC) 40 mg capsule; Take 1 Cap by mouth daily.       Health Maintenance Due   Topic Date Due    GLAUCOMA SCREENING Q2Y  03/16/2017

## 2018-01-22 NOTE — PROGRESS NOTES
1. Have you been to the ER, urgent care clinic since your last visit? Hospitalized since your last visit? No    2. Have you seen or consulted any other health care providers outside of the [address] [place] [address] since your last visit? Include any pap smears or colon screening.  No    Patient complains of upper back pain (gas)

## 2018-01-22 NOTE — PROGRESS NOTES
Umair Jane [facility] 2018  Back Pain/SOB    NN spoke with patient today. C/o severe gas pain and back pain. Patient seen in ED [facility] ED 2018 for Back Pain. Patient stated she was in the office for a Cardiology appointment on 2018; stated the had so many patients they were sending some to the ED and she was one-sent to ED to f/u blood clot due to SOB & Back Pain symptoms. .    Today patient states she is taking Gaviscon as ordered by PCP (4x/day PRN). States pain in ED was on the left side, but now on right side and abdomin. States she ate beans on Friday, but has moved bowels daily. States the pain is not from the beans for this is the 3rd day since consuming. States she is unable to sit or sleep at night; not  eating well. States drinking water as usual.    Consult done w/ PCP. Scheduled for see PCP today. Alexus Agarwal Discharge Follow-Up    Date/Time:  2018 10:06 AM  Patient discharged from [facility] for Back pain/SOB on 2018. RRAT score: 16     Medical History:     Past Medical History:   Diagnosis Date    Acute combined systolic and diastolic HF (heart failure), NYHA class 2 ([address] 2017    Arthritis     Atrial fibrillation (HCC)     Cancer (HCC)     multiple myeloma    Hypertension     S/P AV kathleen ablation 2017    Status post biventricular pacemaker 2017      Nurse Navigator(NN) was contacted by patient by telephone for pain in back. NN perform post MRMED discharge assessment. .  Verified  and address with patient as identifiers. Provided introduction to self, and explanation of the Nurses Navigator role. Diet:   Patient reports: Renal Diet -Patient states she is hardly eating anything. States her BM is normal for her; denies constipation. Stated water intake is adequate.       Activity:    Patient reports: mostly moving around the house    Medication:   Performed medication reconciliation with patient, and patient verbalizes understanding of administration of home medications. There were no barriers to obtaining medications identified at this time. Support system:  patient and daughter    Discharge Instructions :  Reviewed discharge instructions with patient. Patient verbalizes understanding of discharge instructions and follow-up care. Red Flags:  Chest pain. N/V. Severe back pain-of which patient denies. Patient states she has had pacemaker going on 3 months now. .    Labs Reviewed:Results for Erika Quiroz (MRN [id number]) as of 1/22/2018 10:11   Ref. Range 1/2/2018 11:39   GFR est non-AA Latest Ref Range: >60 ml/min/1.73m2 56 (L)   GFR est AA Latest Ref Range: >60 ml/min/1.73m2 >60   Bilirubin, total Latest Ref Range: 0.2 - 1.0 MG/DL 0.5   Protein, total Latest Ref Range: 6.4 - 8.2 g/dL 7.4   Albumin Latest Ref Range: 3.5 - 5.0 g/dL 3.5   Globulin Latest Ref Range: 2.0 - 4.0 g/dL 3.9   A-G Ratio Latest Ref Range: 1.1 - 2.2   0.9 (L)   ALT (SGPT) Latest Ref Range: 12 - 78 U/L 29   AST Latest Ref Range: 15 - 37 U/L 28   Alk. phosphatase Latest Ref Range: 45 - 117 U/L 122 (H)   CK Latest Ref Range: 26 - 192 U/L 111   Troponin-I, Qt. Latest Ref Range: <0.05 ng/mL <0.04   NT pro-BNP Latest Ref Range: 0 - 450 PG/ (H)     Imaging results reviewed:  EKG:  Ventricular-paced rhythm   Abnormal ECG Underlying afib   Chest:  Cardiomegaly, stable. No vascular congestion demonstrated. Advance Care Planning:   Patient was offered the opportunity to discuss advance care planning:  yes     Does patient have an Advance Directive:  no   If no, did you provide information on C[facility] Connections? yes     PCP/Specialist follow up: Patient scheduled to follow up with Moi Purcell MD on 1/22/2018. Case management Impression/ plan:    Goals Addressed             Most Recent     Attends follow-up appointments as directed. On track (1/22/2018)     Coordinate pain management plan for patient.    On track (1/22/2018)             Coordinate pain management plan for patient. Patient agreed to place warm soaks to later knee for severe pain and to take pain medication PRN.     1/22/2018:  C/o severe gas pain and back pain. Patient seen in ED [facility] ED 1/2/2018 for Back Pain. Patient stated she was in the office for a Cardiology appointment on 1/2/2018; stated the had so many patients they were sending some to the ED and she was one. Today patient states she is taking Gaviscon as ordered by PCP (4x/day PRN). States pain in ED was on the left side, but now on right side and abdomin. States she ate beans on Friday, but has moved bowels daily. States the pain is not from the beans for this is the 3rd day since consuming.                Self-schedules and keeps appointments    On track (1/22/2018)      Goal completion 1/22/2018

## 2018-01-22 NOTE — PROGRESS NOTES
Pateint stated the reason Cardiology sent to ED on 1/2/2018 was to r/o blood clot due to Sx of SOB & Left Back Pain.

## 2018-01-22 NOTE — MR AVS SNAPSHOT
[address] [place] [address] 
 
 
 [address] [address] 
[phone] Patient: Marcellus Law MRN: [id number] QKX:7/83/6021 Visit Information Date & Time Provider Department Dept. Phone Encounter #  
 1/22/2018 10:45 AM Sandra Abdalla MD [facility] [phone] [id number] Your Appointments 1/26/2018 10:00 AM  
ESTABLISHED PATIENT with Ja Yeager MD  
[facility]) Appt Note: follow up visit- [address] [facility]  
[phone] [address]  
  
    
 2/20/2018  9:45 AM  
Any with Sandra Abdalla MD  
[address] [place] [address] and Primary Care [facility]) Appt Note: 3 month follow up  
 [address] [facility]  
  
   
 [address] [address]  
  
    
 6/12/2018  9:15 AM  
PACEMAKER with PACEMAKER, [facility]) Appt Note: Per Dr. Gissel Pool [facility]  
[phone] [address]  
  
    
 6/12/2018  9:15 AM  
6 MONTH with Marilou Thacker MD  
[facility]) Appt Note: Per Dr. Gissel Pool [facility]  
[phone] [address] [place] [address] Upcoming Health Maintenance Date Due  
 GLAUCOMA SCREENING Q2Y 3/16/2017 MEDICARE YEARLY EXAM 12/23/2017 DTaP/Tdap/Td series (2 - Td) 5/19/2026 Allergies as of 1/22/2018  Review Complete On: 1/22/2018 By: Angela Velarde Severity Noted Reaction Type Reactions Codeine  09/05/2014    Other (comments) Current Immunizations  Reviewed on 8/29/2017 Name Date Influenza Vaccine 9/15/2014 Not reviewed this visit You Were Diagnosed With   
  
 Codes Comments Right-sided chest wall pain    -  Primary ICD-10-CM: R07.89 ICD-9-CM: 786.52 Dyspepsia     ICD-10-CM: R10.13 ICD-9-CM: 536.8 Multiple myeloma, remission status unspecified (HCC)     ICD-10-CM: C90.00 ICD-9-CM: 203.00 Essential hypertension     ICD-10-CM: I10 
ICD-9-CM: 401.9 Vitals BP Pulse Temp Resp Height(growth percentile) Weight(growth percentile) 142/81 75 97.9 °F (36.6 °C) (Oral) 16 5' 10\" (1.778 m) 200 lb 12.8 oz (91.1 kg) SpO2 BMI OB Status Smoking Status 98% 28.81 kg/m2 Hysterectomy Never Smoker Vitals History BMI and BSA Data Body Mass Index Body Surface Area  
 28.81 kg/m 2 2.12 m 2 Preferred Pharmacy Pharmacy Name Phone [facility] [address] [place] [address] [phone] Your Updated Medication List  
  
   
This list is accurate as of: 1/22/18 12:36 PM.  Always use your most recent med list.  
  
  
  
  
 apixaban 5 mg tablet Commonly known as:  Neida Barnesdale Take 1 Tab by mouth two (2) times a day. cyclobenzaprine 5 mg tablet Commonly known as:  FLEXERIL Take 1 Tab by mouth three (3) times daily as needed for Muscle Spasm(s). diazePAM 5 mg tablet Commonly known as:  VALIUM Take 1 Tab by mouth every eight (8) hours as needed (spasm). Max Daily Amount: 15 mg.  
  
 furosemide 40 mg tablet Commonly known as:  LASIX Take 1 Tab by mouth daily. GAVISCON EXTRA STRENGTH 160-105 mg Ezzard Hopes Generic drug:  aluminum hydrox-magnesium carb Take 1-2 tablets by mouth four (4) times daily as needed. lisinopril 40 mg tablet Commonly known as:  Brenda Likens Take 0.5 Tabs by mouth daily. metoprolol succinate 25 mg XL tablet Commonly known as:  TOPROL-XL Take 0.5 Tabs by mouth daily. naloxone 4 mg/actuation nasal spray Commonly known as:  [employer] Use 1 spray intranasally into 1 nostril.  Use a new Narcan nasal spray for subsequent doses and administer into alternating nostrils. May repeat every 2 to 3 minutes as needed. omeprazole 40 mg capsule Commonly known as:  PRILOSEC Take 1 Cap by mouth daily. oxyCODONE-acetaminophen  mg per tablet Commonly known as:  PERCOCET Take 1 Tab by mouth every eight (8) hours as needed for Pain. Max Daily Amount: 3 Tabs. potassium chloride 20 mEq tablet Commonly known as:  K-DUR, KLOR-CON Take 1 Tab by mouth two (2) times a day. predniSONE 20 mg tablet Commonly known as:  Collette Shaper Take 4 Tabs by mouth daily (after dinner). REVLIMID 10 mg Cap Generic drug:  lenalidomide Prescriptions Sent to Pharmacy Refills  
 omeprazole (PRILOSEC) 40 mg capsule 1 Sig: Take 1 Cap by mouth daily. Class: Normal  
 [facility] [address] [place] [place] [address] [phone] Route: Oral  
  
Introducing [facility] & HEALTH SERVICES! [facility] introduces Identyx patient portal. Now you can access parts of your medical record, email your doctor's office, and request medication refills online. 1. In your internet browser, go to [email or web address] 2. Click on the First Time User? Click Here link in the Sign In box. You will see the New Member Sign Up page. 3. Enter your Identyx Access Code exactly as it appears below. You will not need to use this code after youve completed the sign-up process. If you do not sign up before the expiration date, you must request a new code. · Identyx Access Code: [email or web address] Expires: 2/22/2018  3:02 PM 
 
4. Enter the last four digits of your Social Security Number (xxxx) and Date of Birth (mm/dd/yyyy) as indicated and click Submit. You will be taken to the next sign-up page. 5. Create a Anvatot ID. This will be your Anvatot login ID and cannot be changed, so think of one that is secure and easy to remember. 6. Create a Cleverlize password. You can change your password at any time. 7. Enter your Password Reset Question and Answer. This can be used at a later time if you forget your password. 8. Enter your e-mail address. You will receive e-mail notification when new information is available in [address] 9. Click Sign Up. You can now view and download portions of your medical record. 10. Click the Download Summary menu link to download a portable copy of your medical information. If you have questions, please visit the Frequently Asked Questions section of the Cleverlize website. Remember, Cleverlize is NOT to be used for urgent needs. For medical emergencies, dial 911. Now available from your iPhone and Android! Please provide this summary of care documentation to your next provider. Your primary care clinician is listed as Deanna Taylor. If you have any questions after today's visit, please call [phone].

## 2018-01-26 ENCOUNTER — OFFICE VISIT (OUTPATIENT)
Dept: CARDIOLOGY CLINIC | Age: 78
End: 2018-01-26

## 2018-01-26 VITALS
BODY MASS INDEX: 28.43 KG/M2 | SYSTOLIC BLOOD PRESSURE: 134 MMHG | HEIGHT: 70 IN | OXYGEN SATURATION: 99 % | DIASTOLIC BLOOD PRESSURE: 100 MMHG | HEART RATE: 74 BPM | RESPIRATION RATE: 18 BRPM | WEIGHT: 198.6 LBS

## 2018-01-26 DIAGNOSIS — I48.20 CHRONIC ATRIAL FIBRILLATION (HCC): Chronic | ICD-10-CM

## 2018-01-26 DIAGNOSIS — I10 ESSENTIAL HYPERTENSION: ICD-10-CM

## 2018-01-26 DIAGNOSIS — I50.22 CHRONIC SYSTOLIC CONGESTIVE HEART FAILURE (HCC): Primary | ICD-10-CM

## 2018-01-26 RX ORDER — LISINOPRIL 40 MG/1
40 TABLET ORAL DAILY
Qty: 30 TAB | Refills: 3
Start: 2018-01-26 | End: 2018-08-02 | Stop reason: SDUPTHER

## 2018-01-26 NOTE — PROGRESS NOTES
1. Have you been to the ER, urgent care clinic since your last visit? Hospitalized since your last visit? Yes this is a hospital f/u.     2. Have you seen or consulted any other health care providers outside of the [address] [place] [address] since your last visit? Include any pap smears or colon screening.        No.      Chief Complaint   Patient presents with   [facility] Follow Up     upper back pain with sob

## 2018-01-26 NOTE — MR AVS SNAPSHOT
[address] [facility] 
[phone] Patient: Griselda Hernandez MRN: [id number] PTX:7/[birth year] Visit Information Date & Time Provider Department Dept. Phone Encounter #  
 1/26/2018 10:00 AM [address], MD [facility] [phone] [id number] Your Appointments 2/20/2018  9:45 AM  
Any with Glne Morales MD  
[address] [place] [address] and Primary Care [facility]) Appt Note: 3 month follow up  
 [address] [facility]  
  
   
 [address] [id number]  
  
    
 6/12/2018  9:15 AM  
PACEMAKER with PACEMAKER, [facility] Appt Note: Per Dr. Rodriguez [facility]  
[phone] [address] [facility]  
  
    
 6/12/2018  9:15 AM  
6 MONTH with Slim Spencer MD  
[facility]) Appt Note: Per Dr. Rodriguez [facility]  
[phone] [address] [facility] Upcoming Health Maintenance Date Due  
 GLAUCOMA SCREENING Q2Y 3/16/2017 MEDICARE YEARLY EXAM 1/23/2019 DTaP/Tdap/Td series (2 - Td) 5/19/2026 Allergies as of 1/26/2018  Review Complete On: 1/26/2018 By: Jelena Staton LPN Severity Noted Reaction Type Reactions Codeine  09/05/2014    Other (comments) Current Immunizations  Reviewed on 8/29/2017 Name Date Influenza Vaccine 9/15/2014 Not reviewed this visit You Were Diagnosed With   
  
 Codes Comments Chronic systolic congestive heart failure (HCC)    -  Primary ICD-10-CM: V56.40 ICD-9-CM: 428.22, 428.0 Essential hypertension     ICD-10-CM: I10 
ICD-9-CM: 401.9 Chronic atrial fibrillation (HCC)     ICD-10-CM: P69.3 ICD-9-CM: 427.31 Vitals BP Pulse Resp Height(growth percentile) Weight(growth percentile) SpO2  
 (!) 134/100 (BP 1 Location: Left arm, BP Patient Position: Sitting) 74 18 5' 10\" (1.778 m) 198 lb 9.6 oz (90.1 kg) 99% BMI OB Status Smoking Status 28.5 kg/m2 Hysterectomy Never Smoker Vitals History BMI and BSA Data Body Mass Index Body Surface Area 28.5 kg/m 2 2.11 m 2 Preferred Pharmacy Pharmacy Name Phone [facility] [address] [place] [address]. [phone] Your Updated Medication List  
  
   
This list is accurate as of: 1/26/18 10:47 AM.  Always use your most recent med list.  
  
  
  
  
 apixaban 5 mg tablet Commonly known as:  Kenton Rued Take 1 Tab by mouth two (2) times a day. cyclobenzaprine 5 mg tablet Commonly known as:  FLEXERIL Take 1 Tab by mouth three (3) times daily as needed for Muscle Spasm(s). diazePAM 5 mg tablet Commonly known as:  VALIUM Take 1 Tab by mouth every eight (8) hours as needed (spasm). Max Daily Amount: 15 mg.  
  
 furosemide 40 mg tablet Commonly known as:  LASIX Take 1 Tab by mouth daily. GAVISCON EXTRA STRENGTH 160-105 mg Peralta Generic drug:  aluminum hydrox-magnesium carb Take 1-2 tablets by mouth four (4) times daily as needed. lisinopril 40 mg tablet Commonly known as:  Chan Hayes Take 0.5 Tabs by mouth daily. metoprolol succinate 25 mg XL tablet Commonly known as:  TOPROL-XL Take 0.5 Tabs by mouth daily. naloxone 4 mg/actuation nasal spray Commonly known as:  [employer] Use 1 spray intranasally into 1 nostril. Use a new Narcan nasal spray for subsequent doses and administer into alternating nostrils. May repeat every 2 to 3 minutes as needed. omeprazole 40 mg capsule Commonly known as:  PRILOSEC Take 1 Cap by mouth daily. oxyCODONE-acetaminophen  mg per tablet Commonly known as:  PERCOCET  
 Take 1 Tab by mouth every eight (8) hours as needed for Pain. Max Daily Amount: 3 Tabs. potassium chloride 20 mEq tablet Commonly known as:  K-DUR, KLOR-CON Take 1 Tab by mouth two (2) times a day. predniSONE 20 mg tablet Commonly known as:  Theresa Lard Take 4 Tabs by mouth daily (after dinner). REVLIMID 10 mg Cap Generic drug:  lenalidomide Take  by mouth. Indications: 1 tab daily We Performed the Following AMB POC EKG ROUTINE W/ 12 LEADS, INTER & REP [42032 CPT(R)] Introducing [facility] & HEALTH SERVICES! [facility] introduces Lionsharp Voiceboard patient portal. Now you can access parts of your medical record, email your doctor's office, and request medication refills online. 1. In your internet browser, go to [email or web address] 2. Click on the First Time User? Click Here link in the Sign In box. You will see the New Member Sign Up page. 3. Enter your Lionsharp Voiceboard Access Code exactly as it appears below. You will not need to use this code after youve completed the sign-up process. If you do not sign up before the expiration date, you must request a new code. · Lionsharp Voiceboard Access Code: [email or web address] Expires: 2/22/2018  3:02 PM 
 
4. Enter the last four digits of your Social Security Number (xxxx) and Date of Birth (mm/dd/yyyy) as indicated and click Submit. You will be taken to the next sign-up page. 5. Create a PawSpott ID. This will be your Lionsharp Voiceboard login ID and cannot be changed, so think of one that is secure and easy to remember. 6. Create a Lionsharp Voiceboard password. You can change your password at any time. 7. Enter your Password Reset Question and Answer. This can be used at a later time if you forget your password. 8. Enter your e-mail address. You will receive e-mail notification when new information is available in [address] 9. Click Sign Up. You can now view and download portions of your medical record. 10. Click the Download Summary menu link to download a portable copy of your medical information. If you have questions, please visit the Frequently Asked Questions section of the LightningBuy website. Remember, LightningBuy is NOT to be used for urgent needs. For medical emergencies, dial 911. Now available from your iPhone and Android! Please provide this summary of care documentation to your next provider. Your primary care clinician is listed as Deanna Taylor. If you have any questions after today's visit, please call [phone].

## 2018-01-28 NOTE — ASSESSMENT & PLAN NOTE
This condition is managed by Specialist.  Key CAD CHF Meds             furosemide (LASIX) 40 mg tablet  (Taking) Take 1 Tab by mouth daily. apixaban (ELIQUIS) 5 mg tablet  (Taking) Take 1 Tab by mouth two (2) times a day. lisinopril (PRINIVIL, ZESTRIL) 40 mg tablet  (Taking/Discontinued) Take 0.5 Tabs by mouth daily. metoprolol succinate (TOPROL-XL) 25 mg XL tablet  (Taking) Take 0.5 Tabs by mouth daily. Key Antihyperlipidemia Meds     The patient is on no antihyperlipidemia meds.         Lab Results   Component Value Date/Time    B-type Natriuretic Peptide 486.5 07/31/2017 11:31 AM    Sodium 144 01/02/2018 11:39 AM    Potassium 3.3 01/02/2018 11:39 AM    Cholesterol, total 156 08/11/2017 02:41 AM    HDL Cholesterol 82 08/11/2017 02:41 AM    LDL, calculated 64 08/11/2017 02:41 AM    Triglyceride 50 08/11/2017 02:41 AM    INR 1.1 09/07/2017 07:57 AM    Prothrombin time 11.5 09/07/2017 07:57 AM

## 2018-01-28 NOTE — ASSESSMENT & PLAN NOTE
Stable, based on history, physical exam and review of pertinent labs, studies and medications; meds reconciled; continue current treatment plan. Key Peripheral Vascular Disease Meds             apixaban (ELIQUIS) 5 mg tablet  (Taking) Take 1 Tab by mouth two (2) times a day.         Lab Results   Component Value Date/Time    WBC 4.5 01/02/2018 11:39 AM    HGB 11.4 01/02/2018 11:39 AM    HCT 36.4 01/02/2018 11:39 AM    PLATELET 960 46/03/1904 11:39 AM    Creatinine 0.96 01/02/2018 11:39 AM    BUN 16 01/02/2018 11:39 AM    INR 1.1 09/07/2017 07:57 AM    Prothrombin time 11.5 09/07/2017 07:57 AM    Cholesterol, total 156 08/11/2017 02:41 AM    HDL Cholesterol 82 08/11/2017 02:41 AM    LDL, calculated 64 08/11/2017 02:41 AM    Triglyceride 50 08/11/2017 02:41 AM

## 2018-01-28 NOTE — ASSESSMENT & PLAN NOTE
Stable, based on history, physical exam and review of pertinent labs, studies and medications; meds reconciled; continue current treatment plan. Key CAD CHF Meds             furosemide (LASIX) 40 mg tablet  (Taking) Take 1 Tab by mouth daily. apixaban (ELIQUIS) 5 mg tablet  (Taking) Take 1 Tab by mouth two (2) times a day. lisinopril (PRINIVIL, ZESTRIL) 40 mg tablet  (Taking/Discontinued) Take 0.5 Tabs by mouth daily. metoprolol succinate (TOPROL-XL) 25 mg XL tablet  (Taking) Take 0.5 Tabs by mouth daily. Key Antihyperlipidemia Meds     The patient is on no antihyperlipidemia meds.         Lab Results   Component Value Date/Time    B-type Natriuretic Peptide 486.5 07/31/2017 11:31 AM    Sodium 144 01/02/2018 11:39 AM    Potassium 3.3 01/02/2018 11:39 AM    Cholesterol, total 156 08/11/2017 02:41 AM    HDL Cholesterol 82 08/11/2017 02:41 AM    LDL, calculated 64 08/11/2017 02:41 AM    Triglyceride 50 08/11/2017 02:41 AM    INR 1.1 09/07/2017 07:57 AM    Prothrombin time 11.5 09/07/2017 07:57 AM

## 2018-01-28 NOTE — PATIENT INSTRUCTIONS

## 2018-01-28 NOTE — ASSESSMENT & PLAN NOTE
This condition is managed by Specialist.  Key Oncology Meds             REVLIMID 10 mg cap  (Taking) Take  by mouth. Indications: 1 tab daily        Key Pain Meds             oxyCODONE-acetaminophen (PERCOCET)  mg per tablet  (Taking) Take 1 Tab by mouth every eight (8) hours as needed for Pain. Max Daily Amount: 3 Tabs. Lab Results   Component Value Date/Time    WBC 4.5 01/02/2018 11:39 AM    ABS.  NEUTROPHILS 3.2 01/02/2018 11:39 AM    HGB 11.4 01/02/2018 11:39 AM    HCT 36.4 01/02/2018 11:39 AM    PLATELET 170 86/15/6110 11:39 AM    Creatinine 0.96 01/02/2018 11:39 AM    BUN 16 01/02/2018 11:39 AM    ALT (SGPT) 29 01/02/2018 11:39 AM    AST (SGOT) 28 01/02/2018 11:39 AM    Albumin 3.5 01/02/2018 11:39 AM

## 2018-01-31 ENCOUNTER — CLINICAL SUPPORT (OUTPATIENT)
Dept: CARDIOLOGY CLINIC | Age: 78
End: 2018-01-31

## 2018-01-31 DIAGNOSIS — I51.7 LVH (LEFT VENTRICULAR HYPERTROPHY): ICD-10-CM

## 2018-01-31 DIAGNOSIS — I50.22 CHRONIC SYSTOLIC CONGESTIVE HEART FAILURE (HCC): ICD-10-CM

## 2018-01-31 NOTE — PROGRESS NOTES
NAME:  Rocio Ruano   :   1940   MRN:   [id number]   PCP:  Jaylen Azul MD           Subjective: The patient is a 68y.o. year old female  who returns for a routine follow-up. Since the last visit, patient reports no change in exercise tolerance, chest pain, edema, medication intolerance, palpitations, shortness of breath, PND/orthopnea wheezing, sputum, syncope, dizziness or light headedness. Doing well. Past Medical History:   Diagnosis Date    Acute combined systolic and diastolic HF (heart failure), NYHA class 2 ([address] 2017    Arthritis     Atrial fibrillation (HCC)     Cancer (HCC)     multiple myeloma    Hypertension     S/P AV kathleen ablation 2017    Status post biventricular pacemaker 2017         ICD-10-CM ICD-9-CM    1. Chronic systolic congestive heart failure (HCC) I50.22 428.22 2D ECHO COMPLETE ADULT (TTE) W OR WO CONTR     428.0    2. Essential hypertension I10 401.9 AMB POC EKG ROUTINE W/ 12 LEADS, INTER & REP   3. Chronic atrial fibrillation (HCC) I48.2 427.31       Social History   Substance Use Topics    Smoking status: Never Smoker    Smokeless tobacco: Never Used    Alcohol use No      Family History   Problem Relation Age of Onset    Stroke Mother     No Known Problems Father         Review of Systems  General: Pt denies excessive weight gain or loss. Pt is able to conduct ADL's  HEENT: Denies blurred vision, headaches, epistaxis and difficulty swallowing. Respiratory: Denies shortness of breath, KNOTT, wheezing or stridor. Cardiovascular: Denies precordial pain, palpitations, edema or PND  Gastrointestinal: Denies poor appetite, indigestion, abdominal pain or blood in stool  Musculoskeletal: Denies pain or swelling from muscles or joints  Neurologic: Denies tremor, paresthesias, or sensory motor disturbance  Skin: Denies rash, itching or texture change.     Objective:       Vitals:    18 1010 18 1026   BP: (!) 140/92 (!) 134/100   Pulse: 74    Resp: 18    SpO2: 99%    Weight: 198 lb 9.6 oz (90.1 kg)    Height: 5' 10\" (1.778 m)     Body mass index is 28.5 kg/(m^2). General PE  Mental Status - Alert. General Appearance - Not in acute distress. Chest and Lung Exam   Inspection: Accessory muscles - No use of accessory muscles in breathing. Auscultation:   Breath sounds: - Normal.    Cardiovascular   Inspection: Jugular vein - Bilateral - Inspection Normal.  Palpation/Percussion:   Apical Impulse: - Normal.  Auscultation: Rhythm - Regular. Heart Sounds - S1 WNL and S2 WNL. No S3 or S4. Murmurs & Other Heart Sounds: Auscultation of the heart reveals - No Murmurs. Peripheral Vascular   Upper Extremity: Inspection - Bilateral - No Cyanotic nailbeds or Digital clubbing. Lower Extremity:   Palpation: Edema - Bilateral - No edema. Data Review:     EKG -EKG: paced, aflutter    Medications reviewed  Current Outpatient Prescriptions   Medication Sig    lisinopril (PRINIVIL, ZESTRIL) 40 mg tablet Take 1 Tab by mouth daily.  omeprazole (PRILOSEC) 40 mg capsule Take 1 Cap by mouth daily.  furosemide (LASIX) 40 mg tablet Take 1 Tab by mouth daily.  oxyCODONE-acetaminophen (PERCOCET)  mg per tablet Take 1 Tab by mouth every eight (8) hours as needed for Pain. Max Daily Amount: 3 Tabs.  diazePAM (VALIUM) 5 mg tablet Take 1 Tab by mouth every eight (8) hours as needed (spasm). Max Daily Amount: 15 mg.    naloxone (NARCAN) 4 mg/actuation nasal spray Use 1 spray intranasally into 1 nostril. Use a new Narcan nasal spray for subsequent doses and administer into alternating nostrils. May repeat every 2 to 3 minutes as needed.  cyclobenzaprine (FLEXERIL) 5 mg tablet Take 1 Tab by mouth three (3) times daily as needed for Muscle Spasm(s).  REVLIMID 10 mg cap Take  by mouth.  Indications: 1 tab daily    potassium chloride (K-DUR, KLOR-CON) 20 mEq tablet Take 1 Tab by mouth two (2) times a day.    apixaban (ELIQUIS) 5 mg tablet Take 1 Tab by mouth two (2) times a day.  metoprolol succinate (TOPROL-XL) 25 mg XL tablet Take 0.5 Tabs by mouth daily.  predniSONE (DELTASONE) 20 mg tablet Take 4 Tabs by mouth daily (after dinner).  aluminum hydrox-magnesium carb (GAVISCON EXTRA STRENGTH) 160-105 mg chew Take 1-2 tablets by mouth four (4) times daily as needed. No current facility-administered medications for this visit. Assessment:       ICD-10-CM ICD-9-CM    1. Chronic systolic congestive heart failure (HCC) I50.22 428.22 2D ECHO COMPLETE ADULT (TTE) W OR WO CONTR     428.0    2. Essential hypertension I10 401.9 AMB POC EKG ROUTINE W/ 12 LEADS, INTER & REP   3. Chronic atrial fibrillation (HCC) I48.2 427.31         Plan:     Patient presents doing well and stable from cardiac standpoint. CHF stable. Has paced rhythm with underlying flutter. Cath 9/17 normal, EF 40%. Check echo. Continue current care and follow up in 6 months.     Edith Puentes MD

## 2018-02-20 ENCOUNTER — OFFICE VISIT (OUTPATIENT)
Dept: INTERNAL MEDICINE CLINIC | Age: 78
End: 2018-02-20

## 2018-02-20 VITALS
WEIGHT: 194.6 LBS | SYSTOLIC BLOOD PRESSURE: 145 MMHG | DIASTOLIC BLOOD PRESSURE: 99 MMHG | OXYGEN SATURATION: 99 % | TEMPERATURE: 97.9 F | BODY MASS INDEX: 27.86 KG/M2 | HEART RATE: 75 BPM | HEIGHT: 70 IN | RESPIRATION RATE: 16 BRPM

## 2018-02-20 DIAGNOSIS — I10 ESSENTIAL HYPERTENSION: ICD-10-CM

## 2018-02-20 DIAGNOSIS — M17.0 PRIMARY OSTEOARTHRITIS OF BOTH KNEES: ICD-10-CM

## 2018-02-20 DIAGNOSIS — I48.20 CHRONIC ATRIAL FIBRILLATION (HCC): Chronic | ICD-10-CM

## 2018-02-20 DIAGNOSIS — Z01.818 PREOPERATIVE EXAMINATION: Primary | ICD-10-CM

## 2018-02-20 DIAGNOSIS — C90.00 MULTIPLE MYELOMA, REMISSION STATUS UNSPECIFIED (HCC): ICD-10-CM

## 2018-02-20 DIAGNOSIS — I42.8 OTHER CARDIOMYOPATHY (HCC): ICD-10-CM

## 2018-02-20 NOTE — MR AVS SNAPSHOT
[address] [facility] [address] 
[phone] Patient: Anthony Ocmapo MRN: [id number] Visit Information Date & Time Provider Department Dept. Phone Encounter #  
 2/20/2018  9:45 AM Oscar Canavan, [address] Sports Medicine and [address] [id number] Your Appointments 2/26/2018 10:00 AM  
ESTABLISHED PATIENT with Dav Wilson MD  
[facility] [address] [place] [address]) Appt Note: 1 month f/u; 1 month f/u  
 [address] [facility]  
[phone] [address] [facility]  
  
    
 6/12/2018  9:15 AM  
PACEMAKER with PACEMAKER, [facility] [address] [place] [address] Road) Appt Note: Per Dr. Kim Youssef [facility]  
[phone] [address] [facility]  
  
    
 6/12/2018  9:15 AM  
6 MONTH with Filemon Bjawa MD  
[facility] [address] [place] [address]) Appt Note: Per Dr. Kim Youssef [facility]  
[phone] [id number] [facility] Upcoming Health Maintenance Date Due  
 GLAUCOMA SCREENING Q2Y 3/16/2017 MEDICARE YEARLY EXAM 1/23/2019 DTaP/Tdap/Td series (2 - Td) 5/19/2026 Allergies as of 2/20/2018  Review Complete On: 1/28/2018 By: Oscar Canavan, MD  
  
 Severity Noted Reaction Type Reactions Codeine  09/05/2014    Other (comments) Current Immunizations  Reviewed on 8/29/2017 Name Date Influenza Vaccine 9/15/2014 Not reviewed this visit You Were Diagnosed With   
  
 Codes Comments Preoperative examination    -  Primary ICD-10-CM: T08.912 ICD-9-CM: V72.84 Chronic atrial fibrillation (HCC)     ICD-10-CM: S40.5 ICD-9-CM: 427.31   
 Multiple myeloma, remission status unspecified (HCC)     ICD-10-CM: C90.00 ICD-9-CM: 203.00 Other cardiomyopathy ([facility] [address] [facility]     ICD-10-CM: I42.8 ICD-9-CM: 425.4 Essential hypertension     ICD-10-CM: I10 
ICD-9-CM: 401.9 Primary osteoarthritis of both knees     ICD-10-CM: M17.0 ICD-9-CM: 715.16 Vitals BP Pulse Temp Resp Height(growth percentile) Weight(growth percentile) (!) 145/99 (BP 1 Location: Right arm, BP Patient Position: Sitting) 75 97.9 °F (36.6 °C) (Oral) 16 5' 10\" (1.778 m) 194 lb 9.6 oz (88.3 kg) SpO2 BMI OB Status Smoking Status 99% 27.92 kg/m2 Hysterectomy Never Smoker Vitals History BMI and BSA Data Body Mass Index Body Surface Area  
 27.92 kg/m 2 2.09 m 2 Preferred Pharmacy Pharmacy Name Phone [facility] [address] [place] [address] [phone] Your Updated Medication List  
  
   
This list is accurate as of: 2/20/18 11:37 AM.  Always use your most recent med list.  
  
  
  
  
 apixaban 5 mg tablet Commonly known as:  Guinevere Bouche Take 1 Tab by mouth two (2) times a day. cyclobenzaprine 5 mg tablet Commonly known as:  FLEXERIL Take 1 Tab by mouth three (3) times daily as needed for Muscle Spasm(s). diazePAM 5 mg tablet Commonly known as:  VALIUM Take 1 Tab by mouth every eight (8) hours as needed (spasm). Max Daily Amount: 15 mg.  
  
 furosemide 40 mg tablet Commonly known as:  LASIX Take 1 Tab by mouth daily. GAVISCON EXTRA STRENGTH 160-105 mg Alessio Shown Generic drug:  aluminum hydrox-magnesium carb Take 1-2 tablets by mouth four (4) times daily as needed. lisinopril 40 mg tablet Commonly known as:  Luetta Manzanilla Take 1 Tab by mouth daily. metoprolol succinate 25 mg XL tablet Commonly known as:  TOPROL-XL Take 0.5 Tabs by mouth daily. naloxone 4 mg/actuation nasal spray Commonly known as:  [employer] Use 1 spray intranasally into 1 nostril.  Use a new Narcan nasal spray for subsequent doses and administer into alternating nostrils. May repeat every 2 to 3 minutes as needed. omeprazole 40 mg capsule Commonly known as:  PRILOSEC Take 1 Cap by mouth daily. oxyCODONE-acetaminophen  mg per tablet Commonly known as:  PERCOCET Take 1 Tab by mouth every eight (8) hours as needed for Pain. Max Daily Amount: 3 Tabs. potassium chloride 20 mEq tablet Commonly known as:  K-DUR, KLOR-CON Take 1 Tab by mouth two (2) times a day. predniSONE 20 mg tablet Commonly known as:  Vicie Curio Take 4 Tabs by mouth daily (after dinner). REVLIMID 10 mg Cap Generic drug:  lenalidomide Take  by mouth. Indications: 1 tab daily Introducing [facility]! Ganesh King introduces [employer] patient portal. Now you can access parts of your medical record, email your doctor's office, and request medication refills online. 1. In your internet browser, go to [email or web address] 2. Click on the First Time User? Click Here link in the Sign In box. You will see the New Member Sign Up page. 3. Enter your InterEx Access Code exactly as it appears below. You will not need to use this code after youve completed the sign-up process. If you do not sign up before the expiration date, you must request a new code. · Inter[employer] Access Code: [email or web address] Expires: 2/22/2018  3:02 PM 
 
4. Enter the last four digits of your Social Security Number (xxxx) and Date of Birth (mm/dd/yyyy) as indicated and click Submit. You will be taken to the next sign-up page. 5. Create a [employer] ID. This will be your InterEx login ID and cannot be changed, so think of one that is secure and easy to remember. 6. Create a [employer] password. You can change your password at any time. 7. Enter your Password Reset Question and Answer. This can be used at a later time if you forget your password. 8. Enter your e-mail address. You will receive e-mail notification when new information is available in [address] 9. Click Sign Up. You can now view and download portions of your medical record. 10. Click the Download Summary menu link to download a portable copy of your medical information. If you have questions, please visit the Frequently Asked Questions section of the [employer] website. Remember, [employer] is NOT to be used for urgent needs. For medical emergencies, dial 911. Now available from your iPhone and Android! Please provide this summary of care documentation to your next provider. Your primary care clinician is listed as Deanna Taylor. If you have any questions after today's visit, please call [phone].

## 2018-02-20 NOTE — PROGRESS NOTES
Chief Complaint   Patient presents with    Irregular Heart Beat     3 month follow up     1. Have you been to the ER, urgent care clinic since your last visit? Hospitalized since your last visit? No    2. Have you seen or consulted any other health care providers outside of the [address] [place] [address] since your last visit? Include any pap smears or colon screening.  No

## 2018-02-20 NOTE — PROGRESS NOTES
[address] [place] [address] and Primary Care  [place] [address]  [address] [place] [address]  Phone:  [phone]  Fax: [phone]       Chief Complaint   Patient presents with    Irregular Heart Beat     3 month follow up   . SUBJECTIVE:    Ash Starkey is a 68 y.o. female comes in for return visit planning on having cataract surgery on the 12th of March. She has minimal pain in her neck currently. She follows up with her medical oncologist for her multiple myeloma. She has a past history of primary hypertension as well as osteoarthritis. Current Outpatient Prescriptions   Medication Sig Dispense Refill    lisinopril (PRINIVIL, ZESTRIL) 40 mg tablet Take 1 Tab by mouth daily. 30 Tab 3    omeprazole (PRILOSEC) 40 mg capsule Take 1 Cap by mouth daily. 30 Cap 1    furosemide (LASIX) 40 mg tablet Take 1 Tab by mouth daily. 30 Tab 3    predniSONE (DELTASONE) 20 mg tablet Take 4 Tabs by mouth daily (after dinner). 16 Tab 0    oxyCODONE-acetaminophen (PERCOCET)  mg per tablet Take 1 Tab by mouth every eight (8) hours as needed for Pain. Max Daily Amount: 3 Tabs. 50 Tab 0    diazePAM (VALIUM) 5 mg tablet Take 1 Tab by mouth every eight (8) hours as needed (spasm). Max Daily Amount: 15 mg. 15 Tab 0    naloxone (NARCAN) 4 mg/actuation nasal spray Use 1 spray intranasally into 1 nostril. Use a new Narcan nasal spray for subsequent doses and administer into alternating nostrils. May repeat every 2 to 3 minutes as needed. 2 Each 0    cyclobenzaprine (FLEXERIL) 5 mg tablet Take 1 Tab by mouth three (3) times daily as needed for Muscle Spasm(s). 15 Tab 0    REVLIMID 10 mg cap Take  by mouth. Indications: 1 tab daily      potassium chloride (K-DUR, KLOR-CON) 20 mEq tablet Take 1 Tab by mouth two (2) times a day. 60 Tab 11    apixaban (ELIQUIS) 5 mg tablet Take 1 Tab by mouth two (2) times a day.  60 Tab 3    metoprolol succinate (TOPROL-XL) 25 mg XL tablet Take 0.5 Tabs by mouth daily. 30 Tab 11    aluminum hydrox-magnesium carb (GAVISCON EXTRA STRENGTH) 160-105 mg chew Take 1-2 tablets by mouth four (4) times daily as needed.        Past Medical History:   Diagnosis Date    Acute combined systolic and diastolic HF (heart failure), NYHA class 2 ([address] 8/11/2017    Arthritis     Atrial fibrillation (HCC)     Cancer (HCC)     multiple myeloma    Hypertension     S/P AV kathleen ablation 8/11/2017    Status post biventricular pacemaker 8/11/2017 8/11/17      Past Surgical History:   Procedure Laterality Date    HX GYN      HX ORTHOPAEDIC      knee    HX PACEMAKER  08/11/2017     Allergies   Allergen Reactions    Codeine Other (comments)         REVIEW OF SYSTEMS:  General: negative for - chills or fever  ENT: negative for - headaches, nasal congestion or tinnitus  Respiratory: negative for - cough, hemoptysis, shortness of breath or wheezing  Cardiovascular : negative for - chest pain, edema, palpitations or shortness of breath  Gastrointestinal: negative for - abdominal pain, blood in stools, heartburn or nausea/vomiting  Genito-Urinary: no dysuria, trouble voiding, or hematuria  Musculoskeletal: negative for - gait disturbance, joint pain, joint stiffness or joint swelling  Neurological: no TIA or stroke symptoms  Hematologic: no bruises, no bleeding, no swollen glands  Integument: no lumps, mole changes, nail changes or rash  Endocrine: no malaise/lethargy or unexpected weight changes      Social History     Social History    Marital status:      Spouse name: N/A    Number of children: N/A    Years of education: N/A     Social History Main Topics    Smoking status: Never Smoker    Smokeless tobacco: Never Used    Alcohol use No    Drug use: No    Sexual activity: Not Currently     Other Topics Concern    None     Social History Narrative     Family History   Problem Relation Age of Onset    Stroke Mother     No Known Problems Father OBJECTIVE:    Visit Vitals    BP (!) 145/99 (BP 1 Location: Right arm, BP Patient Position: Sitting)    Pulse 75    Temp 97.9 °F (36.6 °C) (Oral)    Resp 16    Ht 5' 10\" (1.778 m)    Wt 194 lb 9.6 oz (88.3 kg)    SpO2 99%    BMI 27.92 kg/m2     CONSTITUTIONAL: well , well nourished, appears age appropriate  EYES: perrla, eom intact  ENMT:moist mucous membranes, pharynx clear  NECK: supple. Thyroid normal  RESPIRATORY: Chest: clear to ascultation and percussion   CARDIOVASCULAR: Heart: regular rate and rhythm  GASTROINTESTINAL: Abdomen: soft, bowel sounds active  HEMATOLOGIC: no pathological lymph nodes palpated  MUSCULOSKELETAL: Extremities: trace edema, pulse 1+   INTEGUMENT: No unusual rashes or suspicious skin lesions noted. Nails appear normal.  NEUROLOGIC: non-focal exam   MENTAL STATUS: alert and oriented, appropriate affect      ASSESSMENT:  1. Preoperative examination    2. Chronic atrial fibrillation (HCC)    3. Multiple myeloma, remission status unspecified ([address]    4. Other cardiomyopathy ([address]    5. Essential hypertension    6. Primary osteoarthritis of both knees        PLAN:    1. The patient is medically stable for her planned procedure, cataract removal.   2. Her multiple myeloma is actively being followed by her medical oncologist and is presumably improving. 3. Her musculoskeletal pain has improved significantly and will therefore continue smptomatic treatment. 4. Blood pressure is excellent today, no adjustments are made. 5. I also congratulated her on her weight loss. 6. She remains on her DOCA. 7.  Her congestive heart failure is well-compensated. She follows with her cardiologist for this. Follow-up Disposition:  Return in about 3 months (around 5/20/2018).       Budd Spatz, MD

## 2018-02-26 ENCOUNTER — OFFICE VISIT (OUTPATIENT)
Dept: CARDIOLOGY CLINIC | Age: 78
End: 2018-02-26

## 2018-02-26 VITALS
WEIGHT: 193.3 LBS | HEART RATE: 75 BPM | SYSTOLIC BLOOD PRESSURE: 136 MMHG | RESPIRATION RATE: 18 BRPM | HEIGHT: 70 IN | DIASTOLIC BLOOD PRESSURE: 88 MMHG | BODY MASS INDEX: 27.67 KG/M2 | OXYGEN SATURATION: 100 %

## 2018-02-26 DIAGNOSIS — I48.20 CHRONIC ATRIAL FIBRILLATION (HCC): Chronic | ICD-10-CM

## 2018-02-26 DIAGNOSIS — I10 ESSENTIAL HYPERTENSION: ICD-10-CM

## 2018-02-26 DIAGNOSIS — I50.22 CHRONIC SYSTOLIC CONGESTIVE HEART FAILURE (HCC): Primary | ICD-10-CM

## 2018-02-26 DIAGNOSIS — Z95.0 STATUS POST BIVENTRICULAR PACEMAKER: ICD-10-CM

## 2018-02-26 DIAGNOSIS — Z98.890 S/P AV NODAL ABLATION: ICD-10-CM

## 2018-02-26 NOTE — PROGRESS NOTES
1. Have you been to the ER, urgent care clinic since your last visit? Hospitalized since your last visit? No    2. Have you seen or consulted any other health care providers outside of the [address] [place] [address] since your last visit? Include any pap smears or colon screening.  No    Chief Complaint   Patient presents with    Hypertension     1 mo f/u

## 2018-02-26 NOTE — PROGRESS NOTES
NAME:  Giovany Infante   :   1940   MRN:   [id number]   PCP:  Ross Erickson MD           Subjective: The patient is a 68y.o. year old female  who returns for a routine follow-up. Since the last visit, patient reports no change in exercise tolerance, chest pain, edema, medication intolerance, palpitations, shortness of breath, PND/orthopnea wheezing, sputum, syncope, dizziness or light headedness. Doing well. Past Medical History:   Diagnosis Date    Acute combined systolic and diastolic HF (heart failure), NYHA class 2 ([address] 2017    Arthritis     Atrial fibrillation (HCC)     Cancer (HCC)     multiple myeloma    Hypertension     S/P AV kathleen ablation 2017    Status post biventricular pacemaker 2017         ICD-10-CM ICD-9-CM    1. Chronic systolic congestive heart failure (HCC) I50.22 428.22      428.0    2. Essential hypertension I10 401.9 AMB POC EKG ROUTINE W/ 12 LEADS, INTER & REP   3. Chronic atrial fibrillation (HCC) I48.2 427.31    4. S/P AV kathleen ablation Z98.890 V45.89    5. Status post biventricular pacemaker Z95.0 V45.01       Social History   Substance Use Topics    Smoking status: Never Smoker    Smokeless tobacco: Never Used    Alcohol use No      Family History   Problem Relation Age of Onset    Stroke Mother     No Known Problems Father         Review of Systems  General: Pt denies excessive weight gain or loss. Pt is able to conduct ADL's  HEENT: Denies blurred vision, headaches, epistaxis and difficulty swallowing. Respiratory: Denies shortness of breath, KNOTT, wheezing or stridor.   Cardiovascular: Denies precordial pain, palpitations, edema or PND  Gastrointestinal: Denies poor appetite, indigestion, abdominal pain or blood in stool  Musculoskeletal: Denies pain or swelling from muscles or joints  Neurologic: Denies tremor, paresthesias, or sensory motor disturbance  Skin: Denies rash, itching or texture change. Objective:       Vitals:    02/26/18 0954 02/26/18 1007   BP: 134/82 136/88   Pulse: 75    Resp: 18    SpO2: 100%    Weight: 193 lb 4.8 oz (87.7 kg)    Height: 5' 10\" (1.778 m)     Body mass index is 27.74 kg/(m^2). General PE  Mental Status - Alert. General Appearance - Not in acute distress. Chest and Lung Exam   Inspection: Accessory muscles - No use of accessory muscles in breathing. Auscultation:   Breath sounds: - Normal.    Cardiovascular   Inspection: Jugular vein - Bilateral - Inspection Normal.  Palpation/Percussion:   Apical Impulse: - Normal.  Auscultation: Rhythm - Regular. Heart Sounds - S1 WNL and S2 WNL. No S3 or S4. Murmurs & Other Heart Sounds: Auscultation of the heart reveals - No Murmurs. Peripheral Vascular   Upper Extremity: Inspection - Bilateral - No Cyanotic nailbeds or Digital clubbing. Lower Extremity:   Palpation: Edema - Bilateral - No edema. Data Review:     [facility]    Medications reviewed  Current Outpatient Prescriptions   Medication Sig    lisinopril (PRINIVIL, ZESTRIL) 40 mg tablet Take 1 Tab by mouth daily.  omeprazole (PRILOSEC) 40 mg capsule Take 1 Cap by mouth daily.  furosemide (LASIX) 40 mg tablet Take 1 Tab by mouth daily.  oxyCODONE-acetaminophen (PERCOCET)  mg per tablet Take 1 Tab by mouth every eight (8) hours as needed for Pain. Max Daily Amount: 3 Tabs.  diazePAM (VALIUM) 5 mg tablet Take 1 Tab by mouth every eight (8) hours as needed (spasm). Max Daily Amount: 15 mg.    naloxone (NARCAN) 4 mg/actuation nasal spray Use 1 spray intranasally into 1 nostril. Use a new Narcan nasal spray for subsequent doses and administer into alternating nostrils. May repeat every 2 to 3 minutes as needed.  cyclobenzaprine (FLEXERIL) 5 mg tablet Take 1 Tab by mouth three (3) times daily as needed for Muscle Spasm(s).  REVLIMID 10 mg cap Take  by mouth.  Indications: 1 tab daily    potassium chloride (K-DUR, KLOR-CON) 20 mEq tablet Take 1 Tab by mouth two (2) times a day.  apixaban (ELIQUIS) 5 mg tablet Take 1 Tab by mouth two (2) times a day.  metoprolol succinate (TOPROL-XL) 25 mg XL tablet Take 0.5 Tabs by mouth daily.  aluminum hydrox-magnesium carb (GAVISCON EXTRA STRENGTH) 160-105 mg chew Take 1-2 tablets by mouth four (4) times daily as needed.  predniSONE (DELTASONE) 20 mg tablet Take 4 Tabs by mouth daily (after dinner). No current facility-administered medications for this visit. Assessment:       ICD-10-CM ICD-9-CM    1. Chronic systolic congestive heart failure (HCC) I50.22 428.22      428.0    2. Essential hypertension I10 401.9 AMB POC EKG ROUTINE W/ 12 LEADS, INTER & REP   3. Chronic atrial fibrillation (HCC) I48.2 427.31    4. S/P AV kathleen ablation Z98.890 V45.89    5. Status post biventricular pacemaker Z95.0 V45.01         Plan:     Patient presents doing well and stable from cardiac standpoint. CHF well compensated. Continue current care and follow up in 6 months.     Serjio Garrison MD

## 2018-02-26 NOTE — MR AVS SNAPSHOT
Brenton Fuentes [address] [facility] 
[phone] Patient: Giovany Infante MRN: [id number] Visit Information Date & Time Provider Department Dept. Phone Encounter #  
 2/26/2018 10:00 AM Kirk Mccray MD [facility] [phone] [id number] Your Appointments 5/22/2018  9:30 AM  
Any with Ross Erickson MD  
[address] [place] [address] and Primary Care [facility] Appt Note: 3 month follow up  
 [address] [place] [address] [place]  
  
   
 [address] [place] [address] [address]  
  
    
 6/12/2018  9:15 AM  
PACEMAKER with PACEMAKER, [facility] Appt Note: Per Dr. Alexa Hannon [facility]  
[phone] [address]  
  
    
 6/12/2018  9:15 AM  
6 MONTH with Arsh Rordíguez MD  
[facility]) Appt Note: Per Dr. Alexa Hannon [facility]  
[phone] [address] Upcoming Health Maintenance Date Due  
 GLAUCOMA SCREENING Q2Y 3/16/2017 MEDICARE YEARLY EXAM 1/23/2019 DTaP/Tdap/Td series (2 - Td) 5/19/2026 Allergies as of 2/26/2018  Review Complete On: 2/26/2018 By: Abby Espinoza Severity Noted Reaction Type Reactions Codeine  09/05/2014    Other (comments) Current Immunizations  Reviewed on 8/29/2017 Name Date Influenza Vaccine 9/15/2014 Not reviewed this visit You Were Diagnosed With   
  
 Codes Comments Essential hypertension    -  Primary ICD-10-CM: I10 
ICD-9-CM: 401.9 Vitals BP Pulse Resp Height(growth percentile) Weight(growth percentile) SpO2  
 136/88 (BP 1 Location: Left arm, BP Patient Position: Sitting) 75 18 5' 10\" (1.778 m) 193 lb 4.8 oz (87.7 kg) 100% BMI OB Status Smoking Status 27.74 kg/m2 Hysterectomy Never Smoker Vitals History BMI and BSA Data Body Mass Index Body Surface Area  
 27.74 kg/m 2 2.08 m 2 Preferred Pharmacy Pharmacy Name Phone [facility] [address] [place] [address] [place] [phone] Your Updated Medication List  
  
   
This list is accurate as of 2/26/18 10:29 AM.  Always use your most recent med list.  
  
  
  
  
 apixaban 5 mg tablet Commonly known as:  Mahala Maine Take 1 Tab by mouth two (2) times a day. cyclobenzaprine 5 mg tablet Commonly known as:  FLEXERIL Take 1 Tab by mouth three (3) times daily as needed for Muscle Spasm(s). diazePAM 5 mg tablet Commonly known as:  VALIUM Take 1 Tab by mouth every eight (8) hours as needed (spasm). Max Daily Amount: 15 mg.  
  
 furosemide 40 mg tablet Commonly known as:  LASIX Take 1 Tab by mouth daily. GAVISCON EXTRA STRENGTH 160-105 mg [employer] Generic drug:  aluminum hydrox-magnesium carb Take 1-2 tablets by mouth four (4) times daily as needed. lisinopril 40 mg tablet Commonly known as:  Era Shakeel Take 1 Tab by mouth daily. metoprolol succinate 25 mg XL tablet Commonly known as:  TOPROL-XL Take 0.5 Tabs by mouth daily. naloxone 4 mg/actuation nasal spray Commonly known as:  [employer] Use 1 spray intranasally into 1 nostril. Use a new Narcan nasal spray for subsequent doses and administer into alternating nostrils. May repeat every 2 to 3 minutes as needed. omeprazole 40 mg capsule Commonly known as:  PRILOSEC Take 1 Cap by mouth daily. oxyCODONE-acetaminophen  mg per tablet Commonly known as:  PERCOCET Take 1 Tab by mouth every eight (8) hours as needed for Pain. Max Daily Amount: 3 Tabs. potassium chloride 20 mEq tablet Commonly known as:  K-DUR, KLOR-CON Take 1 Tab by mouth two (2) times a day. predniSONE 20 mg tablet Commonly known as:  Clyde Curd Take 4 Tabs by mouth daily (after dinner). REVLIMID 10 mg Cap Generic drug:  lenalidomide Take  by mouth. Indications: 1 tab daily We Performed the Following AMB POC EKG ROUTINE W/ 12 LEADS, INTER & REP [59979 CPT(R)] Introducing [facility] SERVICES! [employer] introduces [facility] patient portal. Now you can access parts of your medical record, email your doctor's office, and request medication refills online. 1. In your internet browser, go to [email or web address] 2. Click on the First Time User? Click Here link in the Sign In box. You will see the New Member Sign Up page. 3. Enter your [facility] Access Code exactly as it appears below. You will not need to use this code after youve completed the sign-up process. If you do not sign up before the expiration date, you must request a new code. · [facility] Access Code: [email or web address] Expires: 5/27/2018  9:41 AM 
 
4. Enter the last four digits of your Social Security Number (xxxx) and Date of Birth (mm/dd/yyyy) as indicated and click Submit. You will be taken to the next sign-up page. 5. Create a [facility] ID. This will be your [facility] login ID and cannot be changed, so think of one that is secure and easy to remember. 6. Create a [facility] password. You can change your password at any time. 7. Enter your Password Reset Question and Answer. This can be used at a later time if you forget your password. 8. Enter your e-mail address. You will receive e-mail notification when new information is available in [address] 9. Click Sign Up. You can now view and download portions of your medical record. 10. Click the Download Summary menu link to download a portable copy of your medical information. If you have questions, please visit the Frequently Asked Questions section of the [facility] website.  Remember, [facility] is NOT to be used for urgent needs. For medical emergencies, dial 911. Now available from your iPhone and Android! Please provide this summary of care documentation to your next provider. Your primary care clinician is listed as Deanna Taylor. If you have any questions after today's visit, please call [phone].

## 2018-04-07 ENCOUNTER — HOSPITAL ENCOUNTER (EMERGENCY)
Age: 78
Discharge: HOME | End: 2018-04-07
Attending: EMERGENCY MEDICINE
Payer: MEDICARE

## 2018-04-07 ENCOUNTER — APPOINTMENT (OUTPATIENT)
Dept: GENERAL RADIOLOGY | Age: 78
End: 2018-04-07
Attending: PHYSICIAN ASSISTANT
Payer: MEDICARE

## 2018-04-07 VITALS
DIASTOLIC BLOOD PRESSURE: 103 MMHG | TEMPERATURE: 98.1 F | BODY MASS INDEX: 27.46 KG/M2 | HEART RATE: 75 BPM | RESPIRATION RATE: 16 BRPM | HEIGHT: 70 IN | WEIGHT: 191.8 LBS | OXYGEN SATURATION: 99 % | SYSTOLIC BLOOD PRESSURE: 150 MMHG

## 2018-04-07 DIAGNOSIS — R07.89 ATYPICAL CHEST PAIN: Primary | ICD-10-CM

## 2018-04-07 DIAGNOSIS — R60.0 BILATERAL LOWER EXTREMITY EDEMA: ICD-10-CM

## 2018-04-07 LAB
ANION GAP SERPL CALC-SCNC: 3 MMOL/L (ref 5–15)
APPEARANCE UR: CLEAR
BACTERIA URNS QL MICRO: ABNORMAL /HPF
BASOPHILS # BLD: 0 K/UL (ref 0–0.1)
BASOPHILS NFR BLD: 1 % (ref 0–1)
BILIRUB UR QL: NEGATIVE
BNP SERPL-MCNC: 1195 PG/ML (ref 0–450)
BUN SERPL-MCNC: 13 MG/DL (ref 6–20)
BUN/CREAT SERPL: 16 (ref 12–20)
CALCIUM SERPL-MCNC: 8.9 MG/DL (ref 8.5–10.1)
CHLORIDE SERPL-SCNC: 108 MMOL/L (ref 97–108)
CO2 SERPL-SCNC: 33 MMOL/L (ref 21–32)
COLOR UR: ABNORMAL
CREAT SERPL-MCNC: 0.79 MG/DL (ref 0.55–1.02)
DIFFERENTIAL METHOD BLD: ABNORMAL
EOSINOPHIL # BLD: 0.1 K/UL (ref 0–0.4)
EOSINOPHIL NFR BLD: 2 % (ref 0–7)
EPITH CASTS URNS QL MICRO: ABNORMAL /LPF
ERYTHROCYTE [DISTWIDTH] IN BLOOD BY AUTOMATED COUNT: 16 % (ref 11.5–14.5)
GLUCOSE SERPL-MCNC: 93 MG/DL (ref 65–100)
GLUCOSE UR STRIP.AUTO-MCNC: NEGATIVE MG/DL
HCT VFR BLD AUTO: 31.5 % (ref 35–47)
HGB BLD-MCNC: 9.6 G/DL (ref 11.5–16)
HGB UR QL STRIP: NEGATIVE
HYALINE CASTS URNS QL MICRO: ABNORMAL /LPF (ref 0–5)
IMM GRANULOCYTES # BLD: 0 K/UL (ref 0–0.04)
IMM GRANULOCYTES NFR BLD AUTO: 0 % (ref 0–0.5)
KETONES UR QL STRIP.AUTO: NEGATIVE MG/DL
LEUKOCYTE ESTERASE UR QL STRIP.AUTO: NEGATIVE
LYMPHOCYTES # BLD: 0.6 K/UL (ref 0.8–3.5)
LYMPHOCYTES NFR BLD: 20 % (ref 12–49)
MCH RBC QN AUTO: 26.2 PG (ref 26–34)
MCHC RBC AUTO-ENTMCNC: 30.5 G/DL (ref 30–36.5)
MCV RBC AUTO: 85.8 FL (ref 80–99)
MONOCYTES # BLD: 0.9 K/UL (ref 0–1)
MONOCYTES NFR BLD: 29 % (ref 5–13)
NEUTS SEG # BLD: 1.6 K/UL (ref 1.8–8)
NEUTS SEG NFR BLD: 48 % (ref 32–75)
NITRITE UR QL STRIP.AUTO: NEGATIVE
NRBC # BLD: 0 K/UL (ref 0–0.01)
NRBC BLD-RTO: 0 PER 100 WBC
PH UR STRIP: 7 [PH] (ref 5–8)
PLATELET # BLD AUTO: 111 K/UL (ref 150–400)
PMV BLD AUTO: 12.4 FL (ref 8.9–12.9)
POTASSIUM SERPL-SCNC: 4 MMOL/L (ref 3.5–5.1)
PROT UR STRIP-MCNC: NEGATIVE MG/DL
RBC # BLD AUTO: 3.67 M/UL (ref 3.8–5.2)
RBC #/AREA URNS HPF: ABNORMAL /HPF (ref 0–5)
RBC MORPH BLD: ABNORMAL
RBC MORPH BLD: ABNORMAL
SODIUM SERPL-SCNC: 144 MMOL/L (ref 136–145)
SP GR UR REFRACTOMETRY: 1.01 (ref 1–1.03)
TROPONIN I SERPL-MCNC: <0.04 NG/ML
UA: UC IF INDICATED,UAUC: ABNORMAL
UROBILINOGEN UR QL STRIP.AUTO: 0.2 EU/DL (ref 0.2–1)
WBC # BLD AUTO: 3.2 K/UL (ref 3.6–11)
WBC URNS QL MICRO: ABNORMAL /HPF (ref 0–4)

## 2018-04-07 PROCEDURE — 83880 ASSAY OF NATRIURETIC PEPTIDE: CPT | Performed by: PHYSICIAN ASSISTANT

## 2018-04-07 PROCEDURE — 85025 COMPLETE CBC W/AUTO DIFF WBC: CPT | Performed by: PHYSICIAN ASSISTANT

## 2018-04-07 PROCEDURE — 87186 SC STD MICRODIL/AGAR DIL: CPT | Performed by: EMERGENCY MEDICINE

## 2018-04-07 PROCEDURE — 36415 COLL VENOUS BLD VENIPUNCTURE: CPT | Performed by: PHYSICIAN ASSISTANT

## 2018-04-07 PROCEDURE — 81001 URINALYSIS AUTO W/SCOPE: CPT | Performed by: EMERGENCY MEDICINE

## 2018-04-07 PROCEDURE — 74011250636 HC RX REV CODE- 250/636: Performed by: EMERGENCY MEDICINE

## 2018-04-07 PROCEDURE — 99283 EMERGENCY DEPT VISIT LOW MDM: CPT

## 2018-04-07 PROCEDURE — 71101 X-RAY EXAM UNILAT RIBS/CHEST: CPT

## 2018-04-07 PROCEDURE — 80048 BASIC METABOLIC PNL TOTAL CA: CPT | Performed by: PHYSICIAN ASSISTANT

## 2018-04-07 PROCEDURE — 84484 ASSAY OF TROPONIN QUANT: CPT | Performed by: PHYSICIAN ASSISTANT

## 2018-04-07 PROCEDURE — 87077 CULTURE AEROBIC IDENTIFY: CPT | Performed by: EMERGENCY MEDICINE

## 2018-04-07 PROCEDURE — 96374 THER/PROPH/DIAG INJ IV PUSH: CPT

## 2018-04-07 PROCEDURE — 87086 URINE CULTURE/COLONY COUNT: CPT | Performed by: EMERGENCY MEDICINE

## 2018-04-07 RX ORDER — FUROSEMIDE 10 MG/ML
80 INJECTION INTRAMUSCULAR; INTRAVENOUS
Status: COMPLETED | OUTPATIENT
Start: 2018-04-07 | End: 2018-04-07

## 2018-04-07 RX ADMIN — FUROSEMIDE 80 MG: 10 INJECTION, SOLUTION INTRAMUSCULAR; INTRAVENOUS at 15:57

## 2018-04-07 NOTE — ED NOTES
Patient updated on status of stay. Patient up ad dangelo to bedside commode. Son at bedside. Patient resting comfortably with no complaints.

## 2018-04-07 NOTE — ED NOTES
Patient ambulated to restroom to urinate, however, she did not provide urine specimen as she was not aware. She is now aware that a specimen is needed and she will try again shortly. Patient provided with cup of water.

## 2018-04-07 NOTE — ED PROVIDER NOTES
EMERGENCY DEPARTMENT HISTORY AND PHYSICAL EXAM      Date: 4/7/2018  Patient Name: Miriam Pond    History of Presenting Illness     Chief Complaint   Patient presents with    Rib Pain     L rib pain x 2 days, pt reports dry cough    Leg Swelling     worsening in bilateral leg swelling x 3 days     Express - 2:00 PM  I have seen and evaluated this patient in the Express Care portion of triage for L rib pain, increased leg swelling (L>R) and mild SOB x two days. The patients care will begin now and orders have been placed. This patient will be seen and provided further care in the Emergency Room. Written by Dahlia Joe, a scribe for HODAN Shepherd. History Provided By: Patient    HPI: Miriam Pond, 66 y.o. female with PMHx significant for HTN, A-fib, multiple myeloma, pacemaker presents  to the ED with cc mild to moderate left rib pain x several days that worsened today. She reports mild SOB with associated dry cough. Pt reports that swelling in her bilaterally legs has moderately increased from her baseline. She reports rib pain exacerbated by raising left arm. Pt denies any recent changes to her Lasix dose. She reports compliance with Lasix with no improvement of her leg swelling. Pt denies CP. She denies N/V/D.       PCP: Micheline López MD    There are no other complaints, changes, or physical findings at this time. Current Outpatient Prescriptions   Medication Sig Dispense Refill    lisinopril (PRINIVIL, ZESTRIL) 40 mg tablet Take 1 Tab by mouth daily. 30 Tab 3    furosemide (LASIX) 40 mg tablet Take 1 Tab by mouth daily. 30 Tab 3    oxyCODONE-acetaminophen (PERCOCET)  mg per tablet Take 1 Tab by mouth every eight (8) hours as needed for Pain. Max Daily Amount: 3 Tabs. 50 Tab 0    REVLIMID 10 mg cap Take  by mouth. Indications: 1 tab daily      potassium chloride (K-DUR, KLOR-CON) 20 mEq tablet Take 1 Tab by mouth two (2) times a day.  60 Tab 11    apixaban (ELIQUIS) 5 mg tablet Take 1 Tab by mouth two (2) times a day. 60 Tab 3    metoprolol succinate (TOPROL-XL) 25 mg XL tablet Take 0.5 Tabs by mouth daily. 30 Tab 11    aluminum hydrox-magnesium carb (GAVISCON EXTRA STRENGTH) 160-105 mg chew Take 1-2 tablets by mouth four (4) times daily as needed.  omeprazole (PRILOSEC) 40 mg capsule Take 1 Cap by mouth daily. 30 Cap 1    naloxone (NARCAN) 4 mg/actuation nasal spray Use 1 spray intranasally into 1 nostril. Use a new Narcan nasal spray for subsequent doses and administer into alternating nostrils. May repeat every 2 to 3 minutes as needed. 2 Each 0    cyclobenzaprine (FLEXERIL) 5 mg tablet Take 1 Tab by mouth three (3) times daily as needed for Muscle Spasm(s). 15 Tab 0       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), NYHA class 2 ([facility] [address] 8/11/2017    Arthritis     Atrial fibrillation (HCC)     Cancer (HCC)     multiple myeloma    Hypertension     S/P AV kathleen ablation 8/11/2017    Status post biventricular pacemaker 8/11/2017 8/11/17        Past Surgical History:  Past Surgical History:   Procedure Laterality Date    HX GYN      HX ORTHOPAEDIC      knee    HX PACEMAKER  08/11/2017       Family History:  Family History   Problem Relation Age of Onset    Stroke Mother     No Known Problems Father        Social History:  Social History   Substance Use Topics    Smoking status: Never Smoker    Smokeless tobacco: Never Used    Alcohol use No       Allergies: Allergies   Allergen Reactions    Codeine Other (comments)         Review of Systems   Review of Systems   Constitutional: Negative for fatigue and fever. HENT: Negative. Eyes: Negative. Respiratory: Positive for cough and shortness of breath. Negative for wheezing. Cardiovascular: Positive for leg swelling (bilateral). Negative for chest pain.    Gastrointestinal: Negative for blood in stool, constipation, diarrhea, nausea and vomiting. Endocrine: Negative. Genitourinary: Negative for difficulty urinating and dysuria. Musculoskeletal:        Positive for left rib pain. Skin: Negative for rash. Allergic/Immunologic: Negative. Neurological: Negative for weakness and numbness. Hematological: Negative. Psychiatric/Behavioral: Negative. Physical Exam   Physical Exam   Constitutional: She is oriented to person, place, and time. She appears well-developed and well-nourished. HENT:   Head: Normocephalic and atraumatic. Mouth/Throat: Mucous membranes are normal.   Eyes: EOM are normal. Pupils are equal, round, and reactive to light. Neck: Normal range of motion. No JVD present. No tracheal deviation present. Cardiovascular: Normal rate, regular rhythm, normal heart sounds and intact distal pulses. Exam reveals no gallop and no friction rub. No murmur heard. Pulmonary/Chest: Effort normal and breath sounds normal. No stridor. She has no wheezes. She has no rales. No reproducible chest wall tenderness to left lateral chest.    Abdominal: Soft. Bowel sounds are normal. She exhibits no distension and no mass. There is no tenderness. There is no guarding. Musculoskeletal: Normal range of motion. She exhibits no tenderness. 3+ pitting edema bilat LE. Neurological: She is alert and oriented to person, place, and time. Skin: Skin is warm and dry. No rash noted. Psychiatric: She has a normal mood and affect.  Her behavior is normal. Judgment and thought content normal.         Diagnostic Study Results     Labs -     Recent Results (from the past 12 hour(s))   METABOLIC PANEL, BASIC    Collection Time: 04/07/18  3:18 PM   Result Value Ref Range    Sodium 144 136 - 145 mmol/L    Potassium 4.0 3.5 - 5.1 mmol/L    Chloride 108 97 - 108 mmol/L    CO2 33 (H) 21 - 32 mmol/L    Anion gap 3 (L) 5 - 15 mmol/L    Glucose 93 65 - 100 mg/dL    BUN 13 6 - 20 MG/DL    Creatinine 0.79 0.55 - 1.02 MG/DL    BUN/Creatinine ratio 16 12 - 20      GFR est AA >60 >60 ml/min/1.73m2    GFR est non-AA >60 >60 ml/min/1.73m2    Calcium 8.9 8.5 - 10.1 MG/DL   CBC WITH AUTOMATED DIFF    Collection Time: 04/07/18  3:18 PM   Result Value Ref Range    WBC 3.2 (L) 3.6 - 11.0 K/uL    RBC 3.67 (L) 3.80 - 5.20 M/uL    HGB 9.6 (L) 11.5 - 16.0 g/dL    HCT 31.5 (L) 35.0 - 47.0 %    MCV 85.8 80.0 - 99.0 FL    MCH 26.2 26.0 - 34.0 PG    MCHC 30.5 30.0 - 36.5 g/dL    RDW 16.0 (H) 11.5 - 14.5 %    PLATELET 743 (L) 500 - 400 K/uL    MPV 12.4 8.9 - 12.9 FL    NRBC 0.0 0  WBC    ABSOLUTE NRBC 0.00 0.00 - 0.01 K/uL    NEUTROPHILS 48 32 - 75 %    LYMPHOCYTES 20 12 - 49 %    MONOCYTES 29 (H) 5 - 13 %    EOSINOPHILS 2 0 - 7 %    BASOPHILS 1 0 - 1 %    IMMATURE GRANULOCYTES 0 0.0 - 0.5 %    ABS. NEUTROPHILS 1.6 (L) 1.8 - 8.0 K/UL    ABS. LYMPHOCYTES 0.6 (L) 0.8 - 3.5 K/UL    ABS. MONOCYTES 0.9 0.0 - 1.0 K/UL    ABS. EOSINOPHILS 0.1 0.0 - 0.4 K/UL    ABS. BASOPHILS 0.0 0.0 - 0.1 K/UL    ABS. IMM.  GRANS. 0.0 0.00 - 0.04 K/UL    DF AUTOMATED      RBC COMMENTS OVALOCYTES  PRESENT        RBC COMMENTS POIKILOCYTOSIS  PRESENT       NT-PRO BNP    Collection Time: 04/07/18  3:18 PM   Result Value Ref Range    NT pro-BNP 1195 (H) 0 - 450 PG/ML   TROPONIN I    Collection Time: 04/07/18  3:18 PM   Result Value Ref Range    Troponin-I, Qt. <0.04 <0.05 ng/mL   URINALYSIS W/ REFLEX CULTURE    Collection Time: 04/07/18  4:43 PM   Result Value Ref Range    Color YELLOW/STRAW      Appearance CLEAR CLEAR      Specific gravity 1.007 1.003 - 1.030      pH (UA) 7.0 5.0 - 8.0      Protein NEGATIVE  NEG mg/dL    Glucose NEGATIVE  NEG mg/dL    Ketone NEGATIVE  NEG mg/dL    Bilirubin NEGATIVE  NEG      Blood NEGATIVE  NEG      Urobilinogen 0.2 0.2 - 1.0 EU/dL    Nitrites NEGATIVE  NEG      Leukocyte Esterase NEGATIVE  NEG      WBC 0-4 0 - 4 /hpf    RBC 0-5 0 - 5 /hpf    Epithelial cells FEW FEW /lpf    Bacteria 1+ (A) NEG /hpf    UA:UC IF INDICATED URINE CULTURE ORDERED (A) CNI      Hyaline cast 0-2 0 - 5 /lpf       Radiologic Studies -   Study Result      EXAM:  XR RIBS LT W PA CXR MIN 3 V     INDICATION:   left rib pain; lower extremity edema     COMPARISON: CTA and chest x-ray 1/2/2018.     FINDINGS: A frontal radiograph of the chest and 3 oblique views of the left ribs  demonstrate no fracture. There is no pneumothorax or pleural effusion. There is  stable cardiomegaly. Left pacemaker and intact appearing leads redemonstrated. Atherosclerotic calcination tortuosity affect the aortic arch. The chest wall  structures and visualized upper abdomen show no acute findings with incidental  note of degenerative spine and shoulder changes     IMPRESSION  IMPRESSION:  No rib fracture or focal lesion identified. Medical Decision Making   I am the first provider for this patient. I reviewed the vital signs, available nursing notes, past medical history, past surgical history, family history and social history. Vital Signs-Reviewed the patient's vital signs. Patient Vitals for the past 12 hrs:   Temp Pulse Resp BP SpO2   04/07/18 1700 - - - (!) 150/103 99 %   04/07/18 1600 - - - 151/84 98 %   04/07/18 1557 - 75 - 151/84 -   04/07/18 1343 98.1 °F (36.7 °C) 76 16 (!) 136/108 98 %       Records Reviewed: Old Medical Records    Provider Notes (Medical Decision Making):   Pt presenting with L-sided chest pain, worse with arm movement. Pt also has been having worsening BL lower extremity swelling. Pt with no SOB and lung sounds are clear. DDx includes atypical chest pain, ACS, pneumonia, pulmonary edema, pleurisy, anemia. Low suspicion for PE as patient is on Eliquis. Will check labs, cardiac enzymes, ekg, CXR, pro-bnp. ED Course:   Initial assessment performed. The patients presenting problems have been discussed, and they are in agreement with the care plan formulated and outlined with them.   I have encouraged them to ask questions as they arise throughout their visit. 5:58 PM  Updated pt on labs and imaging results. Ready for discharge. Disposition:  Discharge Note:  6:00 PM  The pt is ready for discharge. The pt's signs, symptoms, diagnosis, and discharge instructions have been discussed and pt has conveyed their understanding. The pt is to follow up as recommended or return to ER should their symptoms worsen. Plan has been discussed and pt is in agreement. PLAN:  1. Current Discharge Medication List        2. Follow-up Information     Follow up With Details Comments [id number] MD Beckie Schedule an appointment as soon as possible for a visit  Luis Fernando [address]  [phone]      [facility] EMERGENCY DEPT  As needed, If symptoms worsen [address] [place] [address]  [phone]        Return to ED if worse     Diagnosis     Clinical Impression:   1. Atypical chest pain    2. Bilateral lower extremity edema        Attestations: This note is prepared by René Lama, acting as a Scribe for . Pck 125, DO: The scribe's documentation has been prepared under my direction and personally reviewed by me in its entirety. I confirm that the notes above accurately reflects all work, treatment, procedures, and medical decision making performed by me.

## 2018-04-07 NOTE — ED NOTES
MD Art Back reviewed discharge instructions with the patient and son. The patient and son verbalized understanding. Patient discharged home with vitals at baseline. Patient ambulatory out of ED with use of cane. Transportation provided by son. No further complaints noted.

## 2018-04-07 NOTE — DISCHARGE INSTRUCTIONS
Leg and Ankle Edema: Care Instructions  Please your Lasix to 2 tablets (for a total of 80 mg) for the next 3 days. Your Care Instructions  Swelling in the legs, ankles, and feet is called edema. It is common after you sit or stand for a while. Long plane flights or car rides often cause swelling in the legs and feet. You may also have swelling if you have to stand for long periods of time at your job. Problems with the veins in the legs (varicose veins) and changes in hormones can also cause swelling. Sometimes the swelling in the ankles and feet is caused by a more serious problem, such as heart failure, infection, blood clots, or liver or kidney disease. Follow-up care is a key part of your treatment and safety. Be sure to make and go to all appointments, and call your doctor if you are having problems. It's also a good idea to know your test results and keep a list of the medicines you take. How can you care for yourself at home? · If your doctor gave you medicine, take it as prescribed. Call your doctor if you think you are having a problem with your medicine. · Whenever you are resting, raise your legs up. Try to keep the swollen area higher than the level of your heart. · Take breaks from standing or sitting in one position. ¨ Walk around to increase the blood flow in your lower legs. ¨ Move your feet and ankles often while you stand, or tighten and relax your leg muscles. · Wear support stockings. Put them on in the morning, before swelling gets worse. · Eat a balanced diet. Lose weight if you need to. · Limit the amount of salt (sodium) in your diet. Salt holds fluid in the body and may increase swelling. When should you call for help? Call 911 anytime you think you may need emergency care. For example, call if:  ? · You have symptoms of a blood clot in your lung (called a pulmonary embolism). These may include:  ¨ Sudden chest pain. ¨ Trouble breathing. ¨ Coughing up blood.    ?Call your doctor now or seek immediate medical care if:  ? · You have signs of a blood clot, such as:  ¨ Pain in your calf, back of the knee, thigh, or groin. ¨ Redness and swelling in your leg or groin. ? · You have symptoms of infection, such as:  ¨ Increased pain, swelling, warmth, or redness. ¨ Red streaks or pus. ¨ A fever. ? Watch closely for changes in your health, and be sure to contact your doctor if:  ? · Your swelling is getting worse. ? · You have new or worsening pain in your legs. ? · You do not get better as expected. Where can you learn more? Go to http://mila-syeda.info/. Enter G223 in the search box to learn more about \"Leg and Ankle Edema: Care Instructions. \"  Current as of: March 20, 2017  Content Version: 11.4  © 4066-8927 [employer]. Care instructions adapted under license by [employer] (which disclaims liability or warranty for this information). If you have questions about a medical condition or this instruction, always ask your healthcare professional. [place] [address] any warranty or liability for your use of this information. Musculoskeletal Chest Pain: Care Instructions  Your Care Instructions    Chest pain is not always a sign that something is wrong with your heart or that you have another serious problem. The doctor thinks your chest pain is caused by strained muscles or ligaments, inflamed chest cartilage, or another problem in your chest, rather than by your heart. You may need more tests to find the cause of your chest pain. Follow-up care is a key part of your treatment and safety. Be sure to make and go to all appointments, and call your doctor if you are having problems. It's also a good idea to know your test results and keep a list of the medicines you take. How can you care for yourself at home? · Take pain medicines exactly as directed.   ¨ If the doctor gave you a prescription medicine for pain, take it as prescribed. ¨ If you are not taking a prescription pain medicine, ask your doctor if you can take an over-the-counter medicine. · Rest and protect the sore area. · Stop, change, or take a break from any activity that may be causing your pain or soreness. · Put ice or a cold pack on the sore area for 10 to 20 minutes at a time. Try to do this every 1 to 2 hours for the next 3 days (when you are awake) or until the swelling goes down. Put a thin cloth between the ice and your skin. · After 2 or 3 days, apply a heating pad set on low or a warm cloth to the area that hurts. Some doctors suggest that you go back and forth between hot and cold. · Do not wrap or tape your ribs for support. This may cause you to take smaller breaths, which could increase your risk of lung problems. · Mentholated creams such as Bengay or Icy Hot may soothe sore muscles. Follow the instructions on the package. · Follow your doctor's instructions for exercising. · Gentle stretching and massage may help you get better faster. Stretch slowly to the point just before pain begins, and hold the stretch for at least 15 to 30 seconds. Do this 3 or 4 times a day. Stretch just after you have applied heat. · As your pain gets better, slowly return to your normal activities. Any increased pain may be a sign that you need to rest a while longer. When should you call for help? Call 911 anytime you think you may need emergency care. For example, call if:  ? · You have chest pain or pressure. This may occur with:  ¨ Sweating. ¨ Shortness of breath. ¨ Nausea or vomiting. ¨ Pain that spreads from the chest to the neck, jaw, or one or both shoulders or arms. ¨ Dizziness or lightheadedness. ¨ A fast or uneven pulse. After calling 911, chew 1 adult-strength aspirin. Wait for an ambulance. Do not try to drive yourself. ? · You have sudden chest pain and shortness of breath, or you cough up blood.    ?Call your doctor now or seek immediate medical care if:  ? · You have any trouble breathing. ? · Your chest pain gets worse. ? · Your chest pain occurs consistently with exercise and is relieved by rest.   ? Watch closely for changes in your health, and be sure to contact your doctor if:  ? · Your chest pain does not get better after 1 week. Where can you learn more? Go to http://mila-syeda.info/. Enter V293 in the search box to learn more about \"Musculoskeletal Chest Pain: Care Instructions. \"  Current as of: March 20, 2017  Content Version: 11.4  © 7247-9798 [employer]. Care instructions adapted under license by [employer] (which disclaims liability or warranty for this information). If you have questions about a medical condition or this instruction, always ask your healthcare professional. [address] any warranty or liability for your use of this information.

## 2018-04-09 ENCOUNTER — PATIENT OUTREACH (OUTPATIENT)
Dept: INTERNAL MEDICINE CLINIC | Age: 78
End: 2018-04-09

## 2018-04-09 ENCOUNTER — OFFICE VISIT (OUTPATIENT)
Dept: INTERNAL MEDICINE CLINIC | Age: 78
End: 2018-04-09

## 2018-04-09 VITALS
TEMPERATURE: 97.8 F | DIASTOLIC BLOOD PRESSURE: 73 MMHG | HEART RATE: 75 BPM | WEIGHT: 182.5 LBS | RESPIRATION RATE: 14 BRPM | HEIGHT: 70 IN | SYSTOLIC BLOOD PRESSURE: 110 MMHG | BODY MASS INDEX: 26.13 KG/M2

## 2018-04-09 DIAGNOSIS — C90.00 MULTIPLE MYELOMA, REMISSION STATUS UNSPECIFIED (HCC): ICD-10-CM

## 2018-04-09 DIAGNOSIS — R63.4 WEIGHT LOSS: Primary | ICD-10-CM

## 2018-04-09 DIAGNOSIS — R10.13 DYSPEPSIA: ICD-10-CM

## 2018-04-09 DIAGNOSIS — I42.8 OTHER CARDIOMYOPATHY (HCC): ICD-10-CM

## 2018-04-09 NOTE — MR AVS SNAPSHOT
[address] [facility] 
 
 
 [address] [id number] 
[phone] Patient: Vale Pallas MRN: [id number] JDY:[id number] Visit Information Date & Time Provider Department Dept. Phone Encounter #  
 4/9/2018 11:30 AM Yvrose Lucas MD [facility] Medicine and Primary Care  [id number] Your Appointments 5/22/2018  9:30 AM  
Any with Yvrose Lucas MD  
[address] [place] [address] and Primary Care [facility] Appt Note: 3 month follow up  
 [address] [facility]  
[phone]  
  
   
 [id number]  
  
    
 5/30/2018 11:00 AM  
ESTABLISHED PATIENT with Radha Mota MD  
[facility]) Appt Note: 3 month f/u  
 [address] [facility]  
[phone] [address] [facility]  
  
    
 6/12/2018  9:15 AM  
PACEMAKER with PACEMAKER, [facility]) Appt Note: [facility]  
[phone] [address] [facility]  
  
    
 6/12/2018  9:15 AM  
6 MONTH with Marcie Ortega MD  
[facility]) Appt Note: [facility]  
[phone] [address] [facility] Upcoming Health Maintenance Date Due  
 MEDICARE YEARLY EXAM 1/23/2019 GLAUCOMA SCREENING Q2Y 2/12/2020 DTaP/Tdap/Td series (2 - Td) 5/19/2026 Allergies as of 4/9/2018  Review Complete On: 4/7/2018 By: Martha Momin RN Severity Noted Reaction Type Reactions Codeine  09/05/2014    Other (comments) Current Immunizations  Reviewed on 8/29/2017 Name Date Influenza Vaccine 9/15/2014 Not reviewed this visit You Were Diagnosed With   
  
 Codes Comments Weight loss    -  Primary ICD-10-CM: R63.4 ICD-9-CM: 783.21 Dyspepsia     ICD-10-CM: R10.13 ICD-9-CM: 536.8 Multiple myeloma, remission status unspecified (HCC)     ICD-10-CM: C90.00 ICD-9-CM: 203.00 Other cardiomyopathy ([address]     ICD-10-CM: I42.8 ICD-9-CM: 425.4 Vitals BP Pulse Temp Resp Height(growth percentile) Weight(growth percentile) 110/73 (BP 1 Location: Right arm, BP Patient Position: Sitting) 75 97.8 °F (36.6 °C) (Oral) 14 5' 10\" (1.778 m) 182 lb 8 oz (82.8 kg) BMI OB Status Smoking Status 26.19 kg/m2 Hysterectomy Never Smoker Vitals History BMI and BSA Data Body Mass Index Body Surface Area  
 26.19 kg/m 2 2.02 m 2 Preferred Pharmacy Pharmacy Name Phone [facility] [address] [place] [address] [phone] Your Updated Medication List  
  
   
This list is accurate as of 4/9/18  2:25 PM.  Always use your most recent med list.  
  
  
  
  
 apixaban 5 mg tablet Commonly known as:  Verlee Infield Take 1 Tab by mouth two (2) times a day. cyclobenzaprine 5 mg tablet Commonly known as:  FLEXERIL Take 1 Tab by mouth three (3) times daily as needed for Muscle Spasm(s). furosemide 40 mg tablet Commonly known as:  LASIX Take 1 Tab by mouth daily. GAVISCON EXTRA STRENGTH 160-105 mg [place] Generic drug:  aluminum hydrox-magnesium carb Take 1-2 tablets by mouth four (4) times daily as needed. lisinopril 40 mg tablet Commonly known as:  Velora Moe Take 1 Tab by mouth daily. metoprolol succinate 25 mg XL tablet Commonly known as:  TOPROL-XL Take 0.5 Tabs by mouth daily. naloxone 4 mg/actuation nasal spray Commonly known as:  [employer] Use 1 spray intranasally into 1 nostril. Use a new Narcan nasal spray for subsequent doses and administer into alternating nostrils. May repeat every 2 to 3 minutes as needed. omeprazole 40 mg capsule Commonly known as:  PRILOSEC Take 1 Cap by mouth daily. oxyCODONE-acetaminophen  mg per tablet Commonly known as:  PERCOCET Take 1 Tab by mouth every eight (8) hours as needed for Pain. Max Daily Amount: 3 Tabs. potassium chloride 20 mEq tablet Commonly known as:  K-DUR, KLOR-CON Take 1 Tab by mouth two (2) times a day. REVLIMID 10 mg Cap Generic drug:  lenalidomide Take  by mouth. Indications: 1 tab daily We Performed the Following METABOLIC PANEL, COMPREHENSIVE [27330 CPT(R)] TSH 3RD GENERATION [05737 CPT(R)] To-Do List   
 04/17/2018 Imaging:  CT ABD W WO CONT Introducing [facility]! Jimbo Chavez introduces [employer] patient portal. Now you can access parts of your medical record, email your doctor's office, and request medication refills online. 1. In your internet browser, go to [email or web address] 2. Click on the First Time User? Click Here link in the Sign In box. You will see the New Member Sign Up page. 3. Enter your [employer] Access Code exactly as it appears below. You will not need to use this code after youve completed the sign-up process. If you do not sign up before the expiration date, you must request a new code. · [employer] Access Code: [email or web address] Expires: 5/27/2018 10:41 AM 
 
4. Enter the last four digits of your Social Security Number (xxxx) and Date of Birth (mm/dd/yyyy) as indicated and click Submit. You will be taken to the next sign-up page. 5. Create a Soluble Systemst ID. This will be your [employer] login ID and cannot be changed, so think of one that is secure and easy to remember. 6. Create a [employer] password. You can change your password at any time. 7. Enter your Password Reset Question and Answer. This can be used at a later time if you forget your password. 8. Enter your e-mail address. You will receive e-mail notification when new information is available in [address] 9. Click Sign Up. You can now view and download portions of your medical record. 10. Click the Download Summary menu link to download a portable copy of your medical information. If you have questions, please visit the Frequently Asked Questions section of the [facility] website. Remember, [facility] is NOT to be used for urgent needs. For medical emergencies, dial 911. Now available from your iPhone and Android! Please provide this summary of care documentation to your next provider. Your primary care clinician is listed as Deanna Taylor. If you have any questions after today's visit, please call [phone].

## 2018-04-09 NOTE — PROGRESS NOTES
NNTOCED [facility] 2018: Atypical Chest Pain/Handy Lower Extremity Edema    Initial contact:  NN received call from patient with message left on VM-patient stated she had gone to ED and needed to see PCP. Patient noted as listed on [facility] discharge report dated 2018. Left message on voicemail x2. Will scheduled patient for immediate f/u post d/c appointment. Will attempt to contact again. Need to complete post-discharge assessment. ED [facility] ED 2018:  Assessment:  Respiratory: Positive for cough and shortness of breath. Negative for wheezing. Cardiovascular: Positive for leg swelling (bilateral). ED Vitals:  150/103;  136/108   Radiology Study:  CT Chest:  The chest wall  structures and visualized upper abdomen show no acute findings with incidental  note of degenerative spine and shoulder changes    Pt presenting with L-sided chest pain, worse with arm movement. Pt also has been having worsening BL lower extremity swelling. Pt with no SOB and lung sounds are clear. DDx includes atypical chest pain, ACS, pneumonia, pulmonary edema, pleurisy, anemia. Low suspicion for PE as patient is on Eliquis. Will check labs, cardiac enzymes, ekg, CXR, pro-bnp. Hospital Discharge Follow-Up      Date/Time:  2018 11:06 AM    Patient was admitted to [facility]  ED on 2017 and discharged on 2018  for Chest Pain/Leg Swell. The physician discharge summary was available at the time of outreach. Patient was contacted within 2 business days of discharge. Inpatient RRAT score: 12    Top Challenges reviewed with the provider   Patient has pacemaker--c/o pain in chest area;  Leg swellings. Was this a readmission? no   Patient stated reason for the readmission: N/A    Method of communication with provider :  Face to Face    Nurse Navigator (NN) contacted the patient by telephone to perform post hospital discharge assessment. Verified name and  with patient as identifiers.  Provided introduction to self, and explanation of the Nurse Navigator role. Reviewed discharge instructions and red flags with  patient who verbalized understanding. Patient given an opportunity to ask questions and does have further concerns; patient is requesting to see PCP ASAP--today. NN scheduled appointment for today. The patient agrees to contact the PCP office for questions related to their healthcare. NN provided contact information for future reference. Summary of patients top problems:  1. Pain in chest area--c/o pain under pacemaker location. 2. Leg Swellings--w/ discuss w/ PCP whether meds or diet. 3.     Home Health orders at discharge: PT, OT, SN, SLP, SW, H, telehealth, none  [address]: None  Date of initial visit: today--4/9/2018    Durable Medical Equipment ordered/company: n/a  Durable Medical Equipment received: n/a    Barriers to care? None    Advance Care Planning:   Does patient have an Advance Directive:  education provided       Medication:   New Medications at Discharge: Percocet  Changed Medications at Discharge: none  Discontinued Medications at Discharge: none    Medication reconciliation was performed with patient, who verbalizes understanding of administration of home medications. There were no barriers to obtaining medications identified at this time. Referral to Pharm D needed: no     Current Outpatient Prescriptions   Medication Sig    lisinopril (PRINIVIL, ZESTRIL) 40 mg tablet Take 1 Tab by mouth daily.  omeprazole (PRILOSEC) 40 mg capsule Take 1 Cap by mouth daily.  furosemide (LASIX) 40 mg tablet Take 1 Tab by mouth daily.  oxyCODONE-acetaminophen (PERCOCET)  mg per tablet Take 1 Tab by mouth every eight (8) hours as needed for Pain. Max Daily Amount: 3 Tabs.  naloxone (NARCAN) 4 mg/actuation nasal spray Use 1 spray intranasally into 1 nostril. Use a new Narcan nasal spray for subsequent doses and administer into alternating nostrils.  May repeat every 2 to 3 minutes as needed.  cyclobenzaprine (FLEXERIL) 5 mg tablet Take 1 Tab by mouth three (3) times daily as needed for Muscle Spasm(s).  REVLIMID 10 mg cap Take  by mouth. Indications: 1 tab daily    potassium chloride (K-DUR, KLOR-CON) 20 mEq tablet Take 1 Tab by mouth two (2) times a day.  apixaban (ELIQUIS) 5 mg tablet Take 1 Tab by mouth two (2) times a day.  metoprolol succinate (TOPROL-XL) 25 mg XL tablet Take 0.5 Tabs by mouth daily.  aluminum hydrox-magnesium carb (GAVISCON EXTRA STRENGTH) 160-105 mg chew Take 1-2 tablets by mouth four (4) times daily as needed. No current facility-administered medications for this visit. There are no discontinued medications. PCP/Specialist follow up: Future Appointments  Date Time Provider Belen Russell   5/22/2018 9:30 AM Sharri Castellanos MD [place] [id number]   5/30/2018 11:00 AM Eleanor Hines MD [facility] SAUL SCHED   6/12/2018 9:15 AM Dennis Christian MD [facility] [address]   6/12/2018 9:15 AM PACEMAKER, [facility] [address] Attends follow-up appointments as directed. Patient requesting to see PCP ASAP. NN w/ schedule patient for office visit in AM.       Coordinate pain management plan for patient. Coordinate pain management plan for patient. Patient agreed to place warm soaks to later knee for severe pain and to take pain medication PRN.     1/22/2018:  C/o severe gas pain and back pain. Patient seen in ED [facility] ED 1/2/2018 for Back Pain. Patient stated she was in the office for a Cardiology appointment on 1/2/2018; stated the had so many patients they were sending some to the ED and she was one. Today patient states she is taking Gaviscon as ordered by PCP (4x/day PRN). States pain in ED was on the left side, but now on right side and abdomin. States she ate beans on Friday, but has moved bowels daily.   States the pain is not from the beans for this is the 3rd day since consuming.  Knowledge and adherence of prescribed medication (ie. action, side effects, missed dose, etc.).  Patient verbalizes understanding of self-management goals of living with Congestive Heart Failure      Self-schedules and keeps appointments         Goal:  Patient will keep appointment scheduled as post ED d/c f/u.     Goal completion date:  4/9/2018

## 2018-04-09 NOTE — PROGRESS NOTES
Chief Complaint   Patient presents with   [facility] ED Follow-up     Patient at ED [facility] for rib pain and leg swelling on 4/7/18     1. Have you been to the ER, urgent care clinic since your last visit? Hospitalized since your last visit? Yes When: 4/7/18 Where: [facility] Reason for visit: rib pain and leg swelling    2. Have you seen or consulted any other health care providers outside of the [address] [place] [address] since your last visit? Include any pap smears or colon screening.  No

## 2018-04-10 LAB
ALBUMIN SERPL-MCNC: 3.6 G/DL (ref 3.5–4.8)
ALBUMIN/GLOB SERPL: 1.2 {RATIO} (ref 1.2–2.2)
ALP SERPL-CCNC: 163 IU/L (ref 39–117)
ALT SERPL-CCNC: 43 IU/L (ref 0–32)
AST SERPL-CCNC: 47 IU/L (ref 0–40)
BACTERIA SPEC CULT: ABNORMAL
BACTERIA SPEC CULT: ABNORMAL
BILIRUB SERPL-MCNC: 1 MG/DL (ref 0–1.2)
BUN SERPL-MCNC: 16 MG/DL (ref 8–27)
BUN/CREAT SERPL: 20 (ref 12–28)
CALCIUM SERPL-MCNC: 9.4 MG/DL (ref 8.7–10.3)
CC UR VC: ABNORMAL
CHLORIDE SERPL-SCNC: 105 MMOL/L (ref 96–106)
CO2 SERPL-SCNC: 31 MMOL/L (ref 18–29)
CREAT SERPL-MCNC: 0.82 MG/DL (ref 0.57–1)
GFR SERPLBLD CREATININE-BSD FMLA CKD-EPI: 69 ML/MIN/1.73
GFR SERPLBLD CREATININE-BSD FMLA CKD-EPI: 79 ML/MIN/1.73
GLOBULIN SER CALC-MCNC: 2.9 G/DL (ref 1.5–4.5)
GLUCOSE SERPL-MCNC: 109 MG/DL (ref 65–99)
POTASSIUM SERPL-SCNC: 3.7 MMOL/L (ref 3.5–5.2)
PROT SERPL-MCNC: 6.5 G/DL (ref 6–8.5)
SERVICE CMNT-IMP: ABNORMAL
SODIUM SERPL-SCNC: 148 MMOL/L (ref 134–144)
TSH SERPL DL<=0.005 MIU/L-ACNC: <0.006 UIU/ML (ref 0.45–4.5)

## 2018-04-10 NOTE — PROGRESS NOTES
Chart review complete. Pt had no nitrites, leukocytes, or WBCs in urine. Called pt and she confirms that she is asymptomatic. Denies fevers as well. Had PCP follow-up yesterday with no acute or abnormal findings. No indication for ABX at this point in time.

## 2018-04-13 ENCOUNTER — LAB ONLY (OUTPATIENT)
Dept: INTERNAL MEDICINE CLINIC | Age: 78
End: 2018-04-13

## 2018-04-13 DIAGNOSIS — R63.4 WEIGHT LOSS: Primary | ICD-10-CM

## 2018-04-13 NOTE — MR AVS SNAPSHOT
[facility] 
 
 
 [address] [address] 
[phone] Patient: Amee Gasca MRN: [id number]:6[birth year][id number] Visit Information Date & Time Provider Department Dept. Phone Encounter #  
 4/13/2018  9:40 AM NURSE [address] [place] [address] Sports Medicine and [address] [place] [address] [phone] [id number] Your Appointments 4/19/2018  1:15 PM  
PROCEDURE with PACEMAKER, [facility] [address] [place] [address]) Appt Note: hospital follow up 4/10/18 [place] per pt  
 [address] [facility]  
[phone] [address] [facility]  
  
    
 4/19/2018  1:30 PM  
ESTABLISHED PATIENT with Misael Castelan NP [facility] [address] [place] [address]) Appt Note: hospital follow up 4/10/18 [place] per pt  
 [address] [facility]  
[phone] [address] [facility]  
  
    
 4/23/2018 10:00 AM  
Any with Feliciano Nugent MD  
[address] [place] [address] and Primary Care [address] [place] [address]) Appt Note: 2 week f/u  
 [address] [facility]  
  
   
 [address] [id number] 5/22/2018  9:30 AM  
Any with Feliciano Nugent MD  
[address] [place] [address] and Primary Care [address] [place] [address]) Appt Note: 3 month follow up  
 [address] [place] [address]  
[phone]  
  
    
 5/30/2018 11:00 AM  
ESTABLISHED PATIENT with Fatmata Ko MD  
[facility] [address] [place] [address]) Appt Note: 3 month f/u  
 [address] [facility]  
[phone] [address] [facility]  
  
    
 6/12/2018  9:15 AM  
PACEMAKER with PACEMAKER, [facility] [address] [place] [address]) Appt Note: Per Dr. Yesica Riley [facility]  
[phone] [address] [facility] 6/12/2018  9:15 AM  
6 MONTH with 91 Bj Ling MD  
[facility] [address]) Appt Note: Per Dr. Radha Barrett [facility]  
[phone] [address] [facility] Upcoming Health Maintenance Date Due  
 MEDICARE YEARLY EXAM 1/23/2019 GLAUCOMA SCREENING Q2Y 2/12/2020 DTaP/Tdap/Td series (2 - Td) 5/19/2026 Allergies as of 4/13/2018  Review Complete On: 4/7/2018 By: Aileen Douglass RN Severity Noted Reaction Type Reactions Codeine  09/05/2014    Other (comments) Current Immunizations  Reviewed on 8/29/2017 Name Date Influenza Vaccine 9/15/2014 Not reviewed this visit Vitals OB Status Smoking Status Hysterectomy Never Smoker Your Updated Medication List  
  
   
This list is accurate as of 4/13/18 10:38 AM.  Always use your most recent med list.  
  
  
  
  
 apixaban 5 mg tablet Commonly known as:  Juanita Copier Take 1 Tab by mouth two (2) times a day. cyclobenzaprine 5 mg tablet Commonly known as:  FLEXERIL Take 1 Tab by mouth three (3) times daily as needed for Muscle Spasm(s). furosemide 40 mg tablet Commonly known as:  LASIX Take 1 Tab by mouth daily. GAVISCON EXTRA STRENGTH 160-105 mg Geryl Dee Generic drug:  aluminum hydrox-magnesium carb Take 1-2 tablets by mouth four (4) times daily as needed. lisinopril 40 mg tablet Commonly known as:  Therisa Semen Take 1 Tab by mouth daily. metoprolol succinate 25 mg XL tablet Commonly known as:  TOPROL-XL Take 0.5 Tabs by mouth daily. naloxone 4 mg/actuation nasal spray Commonly known as:  [employer] Use 1 spray intranasally into 1 nostril. Use a new Narcan nasal spray for subsequent doses and administer into alternating nostrils. May repeat every 2 to 3 minutes as needed. omeprazole 40 mg capsule Commonly known as:  PRILOSEC  
 Take 1 Cap by mouth daily. oxyCODONE-acetaminophen  mg per tablet Commonly known as:  PERCOCET Take 1 Tab by mouth every eight (8) hours as needed for Pain. Max Daily Amount: 3 Tabs. potassium chloride 20 mEq tablet Commonly known as:  K-DUR, KLOR-CON Take 1 Tab by mouth two (2) times a day. REVLIMID 10 mg Cap Generic drug:  lenalidomide Take  by mouth. Indications: 1 tab daily To-Do List   
 04/16/2018  7:00 AM  
  Appointment with [facility] CT 1 at [facility] RAD CT ([phone]) CONTRAST STUDY: 1. The patient should not eat solid food four hours before the appointment but should be encouraged to drink clear liquids. 2.  If you have to drink oral contrast, please pick it up any weekday prior to your appointment, if you cannot please check in 2 hrs before appt time. 3.  The patient will require IV access for contrast administration. 4.  The patient should not take Ibuprofen (Advil, Motrin, etc.) and Naproxen Sodium (Aleve, etc.)  on the day of the exam. Stopping non-steroidal anti-inflammatory agents (NSAIDs) like Ibuprofen decreases the risk of kidney damage from the x-ray contrast (dye). 5.  Bring any non [facility] facility films/images pertaining to the area of interest with you on the day of appointment. 6.  Bring current lab work if available (within last 90 days CMP) ***If scheduled at [facility], iSTAT is not available, labs will need to be done before appointment*** 7. Check in at registration at least 30 minutes before appt time unless you were instructed to do otherwise. 08/03/2018 7:00 AM  
  Appointment with [facility] CT 1 at [facility] RAD CT ([phone]) CONTRAST STUDY: 1. The patient should not eat solid food four hours before the appointment but should be encouraged to drink clear liquids. 2.  If you have to drink oral contrast, please pick it up any weekday prior to your appointment, if you cannot please check in 2 hrs before appt time. 3.  The patient will require IV access for contrast administration. 4.  The patient should not take Ibuprofen (Advil, Motrin, etc.) and Naproxen Sodium (Aleve, etc.)  on the day of the exam. Stopping non-steroidal anti-inflammatory agents (NSAIDs) like Ibuprofen decreases the risk of kidney damage from the x-ray contrast (dye). 5.  Bring any non [facility] facility films/images pertaining to the area of interest with you on the day of appointment. 6.  Bring current lab work if available (within last 90 days CMP) ***If scheduled at [facility], iSTAT is not available, labs will need to be done before appointment*** 7. Check in at registration at least 30 minutes before appt time unless you were instructed to do otherwise. Introducing [facility] [place] & [facility] SERVICES! Manoj Childers introduces [employer] patient portal. Now you can access parts of your medical record, email your doctor's office, and request medication refills online. 1. In your internet browser, go to [email or web address] 2. Click on the First Time User? Click Here link in the Sign In box. You will see the New Member Sign Up page. 3. Enter your [employer] Access Code exactly as it appears below. You will not need to use this code after youve completed the sign-up process. If you do not sign up before the expiration date, you must request a new code. · [employer] Access Code: [email or web address] Expires: 5/27/2018 10:41 AM 
 
4. Enter the last four digits of your Social Security Number (xxxx) and Date of Birth (mm/dd/yyyy) as indicated and click Submit. You will be taken to the next sign-up page. 5. Create a iwit ID. This will be your [employer] [facility] login ID and cannot be changed, so think of one that is secure and easy to remember. 6. Create a [employer] [facility] [facility] password. You can change your password at any time. 7. Enter your Password Reset Question and Answer. This can be used at a later time if you forget your password. 8. Enter your e-mail address. You will receive e-mail notification when new information is available in [address] 9. Click Sign Up. You can now view and download portions of your medical record. 10. Click the Download Summary menu link to download a portable copy of your medical information. If you have questions, please visit the Frequently Asked Questions section of the [employer] website. Remember, [employer] is NOT to be used for urgent needs. For medical emergencies, dial 911. Now available from your iPhone and Android! Please provide this summary of care documentation to your next provider. Your primary care clinician is listed as Deanna Taylor. If you have any questions after today's visit, please call [phone].

## 2018-04-14 DIAGNOSIS — E05.90 HYPERTHYROIDISM: Primary | ICD-10-CM

## 2018-04-14 LAB — T4 SERPL-MCNC: >24.9 UG/DL (ref 4.5–12)

## 2018-04-15 NOTE — PROGRESS NOTES
[facility]  [place] [address]  [address] [place] [address]  Phone:  [phone]  Fax: [phone]       Chief Complaint   [facility]   [facility]     [facility] at [address] [facility] for rib pain and leg swelling on 4/7/18   . SUBJECTIVE:    Meir Valverde is a 66 y.o. female comes in for return visit having gone to the emergency room because abdominal pain. It is circumferential and is associated with mild nausea. She is somewhat better now. She also complains of intermittent swelling of her lower extremities but she has had this previously from her history of chronic venous insufficiency. The most dramatic thing since I last saw her 1-2 weeks ago is that she has lost an additional ten pounds. This represents a significant clinical problem. She follows with oncology for her multiple myeloma and she has gotten the same treatment that she has gotten over the last several months so this is not a factor. She has a past history of primary hypertension and osteoarthritis. Current Outpatient Prescriptions   Medication Sig Dispense Refill    lisinopril (PRINIVIL, ZESTRIL) 40 mg tablet Take 1 Tab by mouth daily. 30 Tab 3    omeprazole (PRILOSEC) 40 mg capsule Take 1 Cap by mouth daily. 30 Cap 1    furosemide (LASIX) 40 mg tablet Take 1 Tab by mouth daily. 30 Tab 3    oxyCODONE-acetaminophen (PERCOCET)  mg per tablet Take 1 Tab by mouth every eight (8) hours as needed for Pain. Max Daily Amount: 3 Tabs. 50 Tab 0    naloxone (NARCAN) 4 mg/actuation nasal spray Use 1 spray intranasally into 1 nostril. Use a new Narcan nasal spray for subsequent doses and administer into alternating nostrils. May repeat every 2 to 3 minutes as needed. 2 Each 0    cyclobenzaprine (FLEXERIL) 5 mg tablet Take 1 Tab by mouth three (3) times daily as needed for Muscle Spasm(s). 15 Tab 0    REVLIMID 10 mg cap Take  by mouth.  Indications: 1 tab daily      potassium chloride (K-DUR, KLOR-CON) 20 mEq tablet Take 1 Tab by mouth two (2) times a day. 60 Tab 11    apixaban (ELIQUIS) 5 mg tablet Take 1 Tab by mouth two (2) times a day. 60 Tab 3    metoprolol succinate (TOPROL-XL) 25 mg XL tablet Take 0.5 Tabs by mouth daily. 30 Tab 11    aluminum hydrox-magnesium carb (GAVISCON EXTRA STRENGTH) 160-105 mg chew Take 1-2 tablets by mouth four (4) times daily as needed.        Past Medical History:   Diagnosis Date    Acute combined systolic and diastolic HF (heart failure), NYHA class 2 ([facility] [address] 8/11/2017    Arthritis     Atrial fibrillation (HCC)     Cancer (HCC)     multiple myeloma    Hypertension     S/P AV kathleen ablation 8/11/2017    Status post biventricular pacemaker 8/11/2017 8/11/17      Past Surgical History:   Procedure Laterality Date    HX GYN      HX ORTHOPAEDIC      knee    HX PACEMAKER  08/11/2017     Allergies   Allergen Reactions    Codeine Other (comments)         REVIEW OF SYSTEMS:  General: negative for - chills or fever  ENT: negative for - headaches, nasal congestion or tinnitus  Respiratory: negative for - cough, hemoptysis, shortness of breath or wheezing  Cardiovascular : negative for - chest pain, edema, palpitations or shortness of breath  Gastrointestinal: negative for - abdominal pain, blood in stools, heartburn or nausea/vomiting  Genito-Urinary: no dysuria, trouble voiding, or hematuria  Musculoskeletal: negative for - gait disturbance, joint pain, joint stiffness or joint swelling  Neurological: no TIA or stroke symptoms  Hematologic: no bruises, no bleeding, no swollen glands  Integument: no lumps, mole changes, nail changes or rash  Endocrine: no malaise/lethargy or unexpected weight changes      Social History     Social History    Marital status:      Spouse name: N/A    Number of children: N/A    Years of education: N/A     Social History Main Topics    Smoking status: Never Smoker    Smokeless tobacco: Never Used  Alcohol use No    Drug use: No    Sexual activity: Not Currently     Other Topics Concern    None     Social History Narrative     Family History   Problem Relation Age of Onset    Stroke Mother     No Known Problems Father        OBJECTIVE:    Visit Vitals    /73 (BP 1 Location: Right arm, BP Patient Position: Sitting)    Pulse 75    Temp 97.8 °F (36.6 °C) (Oral)    Resp 14    Ht 5' 10\" (1.778 m)    Wt 182 lb 8 oz (82.8 kg)    BMI 26.19 kg/m2     CONSTITUTIONAL: well , well nourished, appears age appropriate  EYES: perrla, eom intact  ENMT:moist mucous membranes, pharynx clear  NECK: supple. Thyroid normal  RESPIRATORY: Chest: clear to ascultation and percussion   CARDIOVASCULAR: Heart: regular rate and rhythm  GASTROINTESTINAL: Abdomen: soft, bowel sounds active  HEMATOLOGIC: no pathological lymph nodes palpated  MUSCULOSKELETAL: Extremities: no edema, pulse 1+   INTEGUMENT: No unusual rashes or suspicious skin lesions noted. Nails appear normal.  NEUROLOGIC: non-focal exam   MENTAL STATUS: alert and oriented, appropriate affect      ASSESSMENT:  1. Weight loss    2. Dyspepsia    3. Multiple myeloma, remission status unspecified (HCC)    4. Other cardiomyopathy ([facility] [address]        PLAN:    1. The weight loss is quite bothersome. Appropriate labs will be obtained. I will also obtain a CT scan of her abdomen. 2. She has dyspepsia and I suggested treatment this with a PPI which she actually has at home in the form of omeprazole. 3. Her multiple myeloma appears to be stable. 4. 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 .  Orders Placed This Encounter    CT ABD W WO CONT    METABOLIC PANEL, COMPREHENSIVE    TSH 3RD GENERATION         Follow-up Disposition:  Return in about 2 weeks (around 4/23/2018).       Yvrose Lucas MD

## 2018-04-16 ENCOUNTER — HOSPITAL ENCOUNTER (OUTPATIENT)
Dept: CT IMAGING | Age: 78
Discharge: HOME | End: 2018-04-16
Attending: INTERNAL MEDICINE
Payer: MEDICARE

## 2018-04-16 DIAGNOSIS — R10.13 DYSPEPSIA: ICD-10-CM

## 2018-04-16 DIAGNOSIS — R63.4 WEIGHT LOSS: ICD-10-CM

## 2018-04-16 PROCEDURE — 74011636320 HC RX REV CODE- 636/320: Performed by: INTERNAL MEDICINE

## 2018-04-16 PROCEDURE — 74160 CT ABDOMEN W/CONTRAST: CPT

## 2018-04-16 PROCEDURE — 74011250636 HC RX REV CODE- 250/636: Performed by: INTERNAL MEDICINE

## 2018-04-16 RX ORDER — SODIUM CHLORIDE 0.9 % (FLUSH) 0.9 %
10 SYRINGE (ML) INJECTION
Status: COMPLETED | OUTPATIENT
Start: 2018-04-16 | End: 2018-04-16

## 2018-04-16 RX ORDER — SODIUM CHLORIDE 9 MG/ML
50 INJECTION, SOLUTION INTRAVENOUS
Status: COMPLETED | OUTPATIENT
Start: 2018-04-16 | End: 2018-04-16

## 2018-04-16 RX ADMIN — IOPAMIDOL 100 ML: 755 INJECTION, SOLUTION INTRAVENOUS at 07:08

## 2018-04-16 RX ADMIN — Medication 10 ML: at 07:08

## 2018-04-16 RX ADMIN — SODIUM CHLORIDE 50 ML/HR: 900 INJECTION, SOLUTION INTRAVENOUS at 07:08

## 2018-04-19 ENCOUNTER — OFFICE VISIT (OUTPATIENT)
Dept: CARDIOLOGY CLINIC | Age: 78
End: 2018-04-19

## 2018-04-19 ENCOUNTER — CLINICAL SUPPORT (OUTPATIENT)
Dept: CARDIOLOGY CLINIC | Age: 78
End: 2018-04-19

## 2018-04-19 VITALS
HEART RATE: 76 BPM | BODY MASS INDEX: 27 KG/M2 | RESPIRATION RATE: 18 BRPM | OXYGEN SATURATION: 99 % | HEIGHT: 70 IN | SYSTOLIC BLOOD PRESSURE: 124 MMHG | DIASTOLIC BLOOD PRESSURE: 80 MMHG | WEIGHT: 188.6 LBS

## 2018-04-19 DIAGNOSIS — I48.92 ATRIAL FLUTTER, UNSPECIFIED TYPE (HCC): ICD-10-CM

## 2018-04-19 DIAGNOSIS — I48.20 CHRONIC ATRIAL FIBRILLATION (HCC): Chronic | ICD-10-CM

## 2018-04-19 DIAGNOSIS — Z95.0 PACEMAKER: Primary | ICD-10-CM

## 2018-04-19 DIAGNOSIS — I49.9 IRREGULAR HEARTBEAT: Primary | ICD-10-CM

## 2018-04-19 DIAGNOSIS — I42.8 OTHER CARDIOMYOPATHY (HCC): ICD-10-CM

## 2018-04-19 DIAGNOSIS — Z98.890 S/P AV NODAL ABLATION: ICD-10-CM

## 2018-04-19 DIAGNOSIS — I10 ESSENTIAL HYPERTENSION: ICD-10-CM

## 2018-04-19 DIAGNOSIS — Z95.0 STATUS POST BIVENTRICULAR PACEMAKER: ICD-10-CM

## 2018-04-19 LAB — SPECIMEN STATUS REPORT, ROLRST: NORMAL

## 2018-04-19 NOTE — MR AVS SNAPSHOT
[address] [facility] 
[phone] Patient: Isrrael Wilson MRN: [id number] NMU:8/89/5789 Visit Information Date & Time Provider Department Dept. Phone Encounter #  
 4/19/2018  1:15 PM Terrijeanna Rodriguez J[facility]NIOR [facility] [phone] [id number] Your Appointments 4/23/2018 10:00 AM  
Any with Marni Montez MD  
[address] [place] [address] and Primary Care [facility] Appt Note: 2 week f/u  
 [address] [facility]  
  
   
 [address] [address] 5/22/2018  9:30 AM  
Any with Marni Montez MD  
[address] [place] [address] and Primary Care [facility] Appt Note: 3 month follow up  
 [address] [place] [address]  
[phone]  
  
    
 5/30/2018 11:00 AM  
ESTABLISHED PATIENT with Mau Pastrana MD  
[facility]) Appt Note: 3 month f/u  
 [address] [facility]  
[phone] [address]  
  
    
 6/12/2018  9:15 AM  
PACEMAKER with PACEMAKER, [facility]) [facility]  
[phone] [address]  
  
    
 6/12/2018  9:15 AM  
6 MONTH with Alyse Cochran MD  
[facility]) Appt Note: [facility]  
[phone] [address] Upcoming Health Maintenance Date Due  
 MEDICARE YEARLY EXAM 1/23/2019 GLAUCOMA SCREENING Q2Y 2/12/2020 DTaP/Tdap/Td series (2 - Td) 5/19/2026 Allergies as of 4/19/2018  Review Complete On: 4/19/2018 By: Rasheed Guzman Severity Noted Reaction Type Reactions Codeine  09/05/2014    Other (comments) Current Immunizations  Reviewed on 8/29/2017 Name Date Influenza Vaccine 9/15/2014 Not reviewed this visit You Were Diagnosed With   
  
 Codes Comments Pacemaker    -  Primary ICD-10-CM: Z95.0 ICD-9-CM: V45.01 S/P AV kathleen ablation     ICD-10-CM: H21.665 ICD-9-CM: V45.89 Atrial flutter, unspecified type ([facility] [address]     ICD-10-CM: I48.92 
ICD-9-CM: 427.32 Vitals OB Status Smoking Status Hysterectomy Never Smoker Preferred Pharmacy Pharmacy Name Phone [facility] [address] [facility], [address] [place] [address] [phone] Your Updated Medication List  
  
   
This list is accurate as of 4/19/18  1:56 PM.  Always use your most recent med list.  
  
  
  
  
 apixaban 5 mg tablet Commonly known as:  Morna Bo Take 1 Tab by mouth two (2) times a day. cyclobenzaprine 5 mg tablet Commonly known as:  FLEXERIL Take 1 Tab by mouth three (3) times daily as needed for Muscle Spasm(s). furosemide 40 mg tablet Commonly known as:  LASIX Take 1 Tab by mouth daily. GAVISCON EXTRA STRENGTH 160-105 mg Storm Lamp Generic drug:  aluminum hydrox-magnesium carb Take 1-2 tablets by mouth four (4) times daily as needed. lisinopril 40 mg tablet Commonly known as:  Annita Flight Take 1 Tab by mouth daily. metoprolol succinate 25 mg XL tablet Commonly known as:  TOPROL-XL Take 0.5 Tabs by mouth daily. naloxone 4 mg/actuation nasal spray Commonly known as:  [employer] Use 1 spray intranasally into 1 nostril. Use a new Narcan nasal spray for subsequent doses and administer into alternating nostrils. May repeat every 2 to 3 minutes as needed. omeprazole 40 mg capsule Commonly known as:  PRILOSEC Take 1 Cap by mouth daily. oxyCODONE-acetaminophen  mg per tablet Commonly known as:  PERCOCET  
 Take 1 Tab by mouth every eight (8) hours as needed for Pain. Max Daily Amount: 3 Tabs. potassium chloride 20 mEq tablet Commonly known as:  K-DUR, KLOR-CON Take 1 Tab by mouth two (2) times a day. REVLIMID 10 mg Cap Generic drug:  lenalidomide Take  by mouth. Indications: 1 tab daily We Performed the Following PACEMAKER CHECK [id number] CPT(R)] To-Do List   
 05/15/2018 8:00 AM  
  Appointment with [address] 1 at [facility] RAD NUC MED ([phone]) 1. Please bring in any recent (last 3 months) Nuclear Medicine or Ultrasound films of the thyroid. 2.  You must bring a LIST or BAG of all current medication you are taking with you to your appointment. 3.  If patient is taking a Thyroid medation, you may need to stop medication 4-6 weeks prior to procedure. Consult the ordering physician prior to stopping. 4.  If patient is taking an Anti-Thyroid medication, you may need to stop 5-7 days prior to procedure. Constult the ordering physican prior to stopping. 5.Nothing to eat or drink 4 hours before arriving to the appointment. 05/16/2018 8:00 AM  
  Appointment with [address] 2 at [facility] NUC MED ([phone]) 1. Please bring in any recent (last 3 months) Nuclear Medicine or Ultrasound films of the thyroid. 2. You must bring a LIST or BAG of all current medication you are taking with you to your appointment. 3. If patient is taking a Thyroid medication, you may need to stop medication 4-6 weeks prior to procedure. Consult the ordering physician prior to stopping. 4. If patient is taking an Anti-Thyroid medication, you may need to stop 5-7 days prior to procedure. Constult the ordering physican prior to stopping. 05/16/2018 9:00 AM  
  Appointment with Elin Bess 1 at [facility] Ultrasound ([phone]) GENERAL INSTRUCTIONS  1.  Bring outside films ([employer]) pertaining to the affected area with you on the day of appointment. 2. A written order with a valid diagnosis and Physicians signature is required for all scheduled tests. 3. Check in at registration 30 minutes before your appointment time unless you were instructed to do otherwise. 08/03/2018 7:00 AM  
  Appointment with [address] 1 at [facility] CT ([phone]) CONTRAST STUDY: 1. The patient should not eat solid food four hours before the appointment but should be encouraged to drink clear liquids. 2.  If you have to drink oral contrast, please pick it up any weekday prior to your appointment, if you cannot please check in 2 hrs before appt time. 3.  The patient will require IV access for contrast administration. 4.  The patient should not take Ibuprofen (Advil, Motrin, etc.) and Naproxen Sodium (Aleve, etc.)  on the day of the exam. Stopping non-steroidal anti-inflammatory agents (NSAIDs) like Ibuprofen decreases the risk of kidney damage from the x-ray contrast (dye). 5.  Bring any non [facility] facility films/images pertaining to the area of interest with you on the day of appointment. 6.  Bring current lab work if available (within last 90 days CMP) ***If scheduled at [facility], iSTAT is not available, labs will need to be done before appointment*** 7. Check in at registration at least 30 minutes before appt time unless you were instructed to do otherwise. Introducing [facility] [place] & HEALTH SERVICES! Miah Larry introduces [employer] patient portal. Now you can access parts of your medical record, email your doctor's office, and request medication refills online. 1. In your internet browser, go to [email or web address] 2. Click on the First Time User? Click Here link in the Sign In box. You will see the New Member Sign Up page. 3. Enter your [employer] Access Code exactly as it appears below. You will not need to use this code after youve completed the sign-up process.  If you do not sign up before the expiration date, you must request a new code. · [employer] Access Code: [email or web address] Expires: 5/27/2018 10:41 AM 
 
4. Enter the last four digits of your Social Security Number (xxxx) and Date of Birth (mm/dd/yyyy) as indicated and click Submit. You will be taken to the next sign-up page. 5. Create a [employer] ID. This will be your [employer] login ID and cannot be changed, so think of one that is secure and easy to remember. 6. Create a [employer] password. You can change your password at any time. 7. Enter your Password Reset Question and Answer. This can be used at a later time if you forget your password. 8. Enter your e-mail address. You will receive e-mail notification when new information is available in [address] 9. Click Sign Up. You can now view and download portions of your medical record. 10. Click the Download Summary menu link to download a portable copy of your medical information. If you have questions, please visit the Frequently Asked Questions section of the [employer] website. Remember, [employer] is NOT to be used for urgent needs. For medical emergencies, dial 911. Now available from your iPhone and Android! Please provide this summary of care documentation to your next provider. Your primary care clinician is listed as Deanna Taylor. If you have any questions after today's visit, please call [phone].

## 2018-04-19 NOTE — PROGRESS NOTES
Subjective:      Dimas Garcia is a 66 y.o. female is here for annual device follow up. She is doing well aside from minor fatigue level. The patient denies chest pain/ shortness of breath, orthopnea, PND, LE edema, palpitations, syncope, presyncope.        Patient Active Problem List    Diagnosis Date Noted    Hyperthyroidism 04/14/2018    Irregular heartbeat 12/05/2017    Weight loss 09/21/2017    S/P cardiac cath 09/07/2017    Acute combined systolic and diastolic HF (heart failure), NYHA class 2 ([address] 08/11/2017    Status post biventricular pacemaker 08/11/2017    S/P AV kathleen ablation 08/11/2017    Atrial flutter ([address] 08/10/2017    Cardiomyopathy ([address] 08/10/2017    Acute pulmonary embolism ([address] 08/10/2017    CHF (congestive heart failure) ([address] 08/08/2017    Sialadenitis 06/29/2017    Muscular deconditioning 04/22/2017    Synovitis of shoulder 04/21/2017    Back pain 04/30/2016    Jaw pain 12/07/2015    Primary osteoarthritis of both knees 08/24/2015    Synovitis 05/18/2015    Contusion of knee 05/09/2015    Venous insufficiency 04/07/2015    Intractable back pain 01/25/2015    Reflux esophagitis 10/31/2014    Multiple myeloma ([address] 10/31/2014    Nocturia 10/31/2014    Complex renal cyst 10/11/2014    Low back pain 10/10/2014    Chronic atrial fibrillation ([address] 09/12/2014    Anemia 09/08/2014    Chest pain on exertion 09/07/2014    HTN (hypertension) 09/07/2014    Chest pain 09/05/2014    Osteoarthritis 09/05/2014      Arben Nix MD  Past Medical History:   Diagnosis Date    Acute combined systolic and diastolic HF (heart failure), NYHA class 2 ([address] 8/11/2017    Arthritis     Atrial fibrillation (HCC)     Cancer (HCC)     multiple myeloma    Hypertension     S/P AV kathleen ablation 8/11/2017    Status post biventricular pacemaker 8/11/2017 8/11/17       Past Surgical History:   Procedure Laterality Date    HX GYN      HX ORTHOPAEDIC      knee    HX PACEMAKER  08/11/2017     Allergies   Allergen Reactions    Codeine Other (comments)      Family History   Problem Relation Age of Onset    Stroke Mother     No Known Problems Father     negative for cardiac disease  Social History     Social History    Marital status:      Spouse name: N/A    Number of children: N/A    Years of education: N/A     Social History Main Topics    Smoking status: Never Smoker    Smokeless tobacco: Never Used    Alcohol use No    Drug use: No    Sexual activity: Not Currently     Other Topics Concern    None     Social History Narrative     Current Outpatient Prescriptions   Medication Sig    lisinopril (PRINIVIL, ZESTRIL) 40 mg tablet Take 1 Tab by mouth daily.  omeprazole (PRILOSEC) 40 mg capsule Take 1 Cap by mouth daily.  furosemide (LASIX) 40 mg tablet Take 1 Tab by mouth daily.  oxyCODONE-acetaminophen (PERCOCET)  mg per tablet Take 1 Tab by mouth every eight (8) hours as needed for Pain. Max Daily Amount: 3 Tabs.  cyclobenzaprine (FLEXERIL) 5 mg tablet Take 1 Tab by mouth three (3) times daily as needed for Muscle Spasm(s).  REVLIMID 10 mg cap Take  by mouth. Indications: 1 tab daily    potassium chloride (K-DUR, KLOR-CON) 20 mEq tablet Take 1 Tab by mouth two (2) times a day.  apixaban (ELIQUIS) 5 mg tablet Take 1 Tab by mouth two (2) times a day.  metoprolol succinate (TOPROL-XL) 25 mg XL tablet Take 0.5 Tabs by mouth daily.  aluminum hydrox-magnesium carb (GAVISCON EXTRA STRENGTH) 160-105 mg chew Take 1-2 tablets by mouth four (4) times daily as needed.  naloxone (NARCAN) 4 mg/actuation nasal spray Use 1 spray intranasally into 1 nostril. Use a new Narcan nasal spray for subsequent doses and administer into alternating nostrils. May repeat every 2 to 3 minutes as needed. No current facility-administered medications for this visit.        Vitals:    04/19/18 1319   BP: 124/80   Pulse: 76   Resp: 18   SpO2: 99%   Weight: 188 lb 9.6 oz (85.5 kg)   Height: 5' 10\" (1.778 m)       I have reviewed the nurses notes, vitals, problem list, allergy list, medical history, family, social history and medications. Review of Symptoms:    General: +fatigue, Pt denies excessive weight gain or loss. Pt is able to conduct ADL's  HEENT: Denies blurred vision, headaches, epistaxis and difficulty swallowing. Respiratory: Denies shortness of breath, KNOTT, wheezing or stridor. Cardiovascular: Denies precordial pain, palpitations, edema or PND  Gastrointestinal: Denies poor appetite, indigestion, abdominal pain or blood in stool  Urinary: Denies dysuria, pyuria  Musculoskeletal: Denies pain or swelling from muscles or joints  Neurologic: Denies tremor, paresthesias, or sensory motor disturbance  Skin: Denies rash, itching or texture change. Psych: Denies depression      Physical Exam:      General: Well developed, in no acute distress. HEENT: Eyes - PERRL, no jvd  Heart:  Normal S1/S2 negative S3 or S4. Regular, no murmur, gallop or rub.   Respiratory: Clear bilaterally x 4, no wheezing or rales  Abdomen:   Soft, non-tender, bowel sounds are active.   Extremities:  No edema, normal cap refill, no cyanosis. Musculoskeletal: No clubbing  Neuro: A&Ox3, speech clear, gait stable. Skin: Skin color is normal. No rashes or lesions.  Non diaphoretic  Vascular: 2+ pulses symmetric in all extremities    Cardiographics    Ekg: ventricular pacing  BSC BiV PM: 99% BIV pacing    Results for orders placed or performed during the hospital encounter of 01/02/18   EKG, 12 LEAD, INITIAL   Result Value Ref Range    Ventricular Rate 75 BPM    Atrial Rate 375 BPM    QRS Duration 154 ms    Q-T Interval 500 ms    QTC Calculation (Bezet) 558 ms    Calculated R Axis -94 degrees    Calculated T Axis 78 degrees    Diagnosis       Ventricular-paced rhythm  Abnormal ECG Underlying afib   When compared with ECG of 29-AUG-2017 11:06,  Electronic ventricular pacemaker has replaced Atrial flutter  Confirmed by Osito Villafana ([id number]) on 1/2/2018 4:24:24 PM           Lab Results   Component Value Date/Time    WBC 3.2 (L) 04/07/2018 03:18 PM    HGB 9.6 (L) 04/07/2018 03:18 PM    HCT 31.5 (L) 04/07/2018 03:18 PM    PLATELET 843 (L) 60/99/5344 03:18 PM    MCV 85.8 04/07/2018 03:18 PM      Lab Results   Component Value Date/Time    Sodium 148 (H) 04/09/2018 02:14 PM    Potassium 3.7 04/09/2018 02:14 PM    Chloride 105 04/09/2018 02:14 PM    CO2 31 (H) 04/09/2018 02:14 PM    Anion gap 3 (L) 04/07/2018 03:18 PM    Glucose 109 (H) 04/09/2018 02:14 PM    BUN 16 04/09/2018 02:14 PM    Creatinine 0.82 04/09/2018 02:14 PM    BUN/Creatinine ratio 20 04/09/2018 02:14 PM    GFR est AA 79 04/09/2018 02:14 PM    GFR est non-AA 69 04/09/2018 02:14 PM    Calcium 9.4 04/09/2018 02:14 PM    Bilirubin, total 1.0 04/09/2018 02:14 PM    AST (SGOT) 47 (H) 04/09/2018 02:14 PM    Alk. phosphatase 163 (H) 04/09/2018 02:14 PM    Protein, total 6.5 04/09/2018 02:14 PM    Albumin 3.6 04/09/2018 02:14 PM    Globulin 3.9 01/02/2018 11:39 AM    A-G Ratio 1.2 04/09/2018 02:14 PM    ALT (SGPT) 43 (H) 04/09/2018 02:14 PM         Assessment:     Assessment:        ICD-10-CM ICD-9-CM    1. Irregular heartbeat I49.9 427.9 AMB POC EKG ROUTINE W/ 12 LEADS, INTER & REP   2. Chronic atrial fibrillation (HCC) I48.2 427.31    3. Other cardiomyopathy ([address] I42.8 425.4    4. Essential hypertension I10 401.9    5. S/P AV kathleen ablation Z98.890 V45.89    6. Status post biventricular pacemaker Z95.0 V45.01      Orders Placed This Encounter    AMB POC EKG ROUTINE W/ 12 LEADS, INTER & REP     Order Specific Question:   Reason for Exam:     Answer:   Routine        Plan:   Ms. Lucinda Bergman is here for annual device follow up. She has some fatigue. EKG show ventricular pacing. Her device interrogation demonstrates normal functioning with BIV pacing. She remains on Eliquis and is tolerating well. Echocardiogram from 2/2018 demonstrates an EF of 35-40%. Continue per device clinic and follow up with Dr. Mamie Arias in one year. Continue medical management for cardiomyopathy, HTN, chronic AF. Thank you for allowing me to participate in Magluly Weinstein 's care.     Rashida Regalado NP

## 2018-04-19 NOTE — PROGRESS NOTES
1. Have you been to the ER, urgent care clinic since your last visit? Hospitalized since your last visit? Yes, on 4/7/18 at [facility] for rib pain    2. Have you seen or consulted any other health care providers outside of the [facility] since your last visit? Include any pap smears or colon screening.  No    Chief Complaint   Patient presents with   [facility] Follow Up     for rib pain; pt reports on and off achy pain to L side of chest; onset 3 wks ago    Irregular Heart Beat    Ankle swelling     pt c/o mild swelling to ankles bilaterally, worse on the left; denies any pain, numbness, tingling

## 2018-04-23 ENCOUNTER — OFFICE VISIT (OUTPATIENT)
Dept: INTERNAL MEDICINE CLINIC | Age: 78
End: 2018-04-23

## 2018-04-23 VITALS
RESPIRATION RATE: 14 BRPM | OXYGEN SATURATION: 99 % | SYSTOLIC BLOOD PRESSURE: 153 MMHG | BODY MASS INDEX: 26.93 KG/M2 | HEART RATE: 76 BPM | HEIGHT: 70 IN | DIASTOLIC BLOOD PRESSURE: 90 MMHG | WEIGHT: 188.1 LBS | TEMPERATURE: 98.1 F

## 2018-04-23 DIAGNOSIS — I48.92 ATRIAL FLUTTER, UNSPECIFIED TYPE (HCC): ICD-10-CM

## 2018-04-23 DIAGNOSIS — I10 ESSENTIAL HYPERTENSION: ICD-10-CM

## 2018-04-23 DIAGNOSIS — I42.8 OTHER CARDIOMYOPATHY (HCC): ICD-10-CM

## 2018-04-23 DIAGNOSIS — C90.00 MULTIPLE MYELOMA, REMISSION STATUS UNSPECIFIED (HCC): ICD-10-CM

## 2018-04-23 DIAGNOSIS — R63.4 WEIGHT LOSS: ICD-10-CM

## 2018-04-23 DIAGNOSIS — E05.90 HYPERTHYROIDISM: Primary | ICD-10-CM

## 2018-04-23 LAB
SPECIMEN STATUS REPORT, ROLRST: NORMAL
T4 FREE SERPL-MCNC: 7.07 NG/DL (ref 0.82–1.77)

## 2018-04-23 RX ORDER — METHIMAZOLE 5 MG/1
15 TABLET ORAL 2 TIMES DAILY
Qty: 90 TAB | Refills: 0 | Status: SHIPPED | OUTPATIENT
Start: 2018-04-23 | End: 2018-05-10 | Stop reason: SDUPTHER

## 2018-04-23 NOTE — PROGRESS NOTES
Chief Complaint   Patient presents with    Results     2 week follow up/CT scan     1. Have you been to the ER, urgent care clinic since your last visit? Hospitalized since your last visit? No    2. Have you seen or consulted any other health care providers outside of the [address] [place] [address] since your last visit? Include any pap smears or colon screening.  No

## 2018-04-23 NOTE — MR AVS SNAPSHOT
[address] [facility] 
 
 
 [address] [id number] 
[phone] Patient: Simona Goodson MRN: [id number] ELÍAS:[id number] Visit Information Date & Time Provider Department Dept. Phone Encounter #  
 4/23/2018 10:00 AM Lan Phillips MD [facility] and [address] [id number] Your Appointments 5/22/2018  9:30 AM  
Any with Lan Phillips MD  
[address] [place] [address] and [facility] Appt Note: 3 month follow up  
 [address] [facility]  
[phone]  
  
   
 [address] [id number]  
  
    
 5/30/2018 11:00 AM  
ESTABLISHED PATIENT with Aby Schwartz MD  
[facility]) Appt Note: 3 month f/u  
 [address] [facility]  
[phone] [address] [facility]  
  
    
 6/12/2018  9:15 AM  
PACEMAKER with PACEMAKER, [facility]) Appt Note: Per Dr. Moses Abarca  
[phone] [address] [facility]  
  
    
 6/12/2018  9:15 AM  
6 MONTH with Sanna Murray MD  
[facility]) Appt Note: Per Dr. Moses Abarca  
[phone] [address] [facility] Upcoming Health Maintenance Date Due  
 MEDICARE YEARLY EXAM 1/23/2019 GLAUCOMA SCREENING Q2Y 2/12/2020 DTaP/Tdap/Td series (2 - Td) 5/19/2026 Allergies as of 4/23/2018  Review Complete On: 4/23/2018 By: Sammi Sauer Severity Noted Reaction Type Reactions Codeine  09/05/2014    Other (comments) Current Immunizations  Reviewed on 8/29/2017 Name Date Influenza Vaccine 9/15/2014 Not reviewed this visit You Were Diagnosed With   
  
 Codes Comments Hyperthyroidism    -  Primary ICD-10-CM: E05.90 ICD-9-CM: 242.90 Multiple myeloma, remission status unspecified (HCC)     ICD-10-CM: C90.00 ICD-9-CM: 203.00 Atrial flutter, unspecified type ([facility]     ICD-10-CM: I48.92 
ICD-9-CM: 427.32 Other cardiomyopathy ([facility]     ICD-10-CM: I42.8 ICD-9-CM: 425.4 Vitals BP Pulse Temp Resp Height(growth percentile) Weight(growth percentile) 153/90 (BP 1 Location: Right arm, BP Patient Position: Sitting) 76 98.1 °F (36.7 °C) (Oral) 14 5' 10\" (1.778 m) 188 lb 1.6 oz (85.3 kg) SpO2 BMI OB Status Smoking Status 99% 26.99 kg/m2 Hysterectomy Never Smoker Vitals History BMI and BSA Data Body Mass Index Body Surface Area  
 26.99 kg/m 2 2.05 m 2 Preferred Pharmacy Pharmacy Name Phone [facility] [address] [place] [address] [phone] Your Updated Medication List  
  
   
This list is accurate as of 4/23/18 11:40 AM.  Always use your most recent med list.  
  
  
  
  
 apixaban 5 mg tablet Commonly known as:  Lorraine Seed Take 1 Tab by mouth two (2) times a day. cyclobenzaprine 5 mg tablet Commonly known as:  FLEXERIL Take 1 Tab by mouth three (3) times daily as needed for Muscle Spasm(s). furosemide 40 mg tablet Commonly known as:  LASIX Take 1 Tab by mouth daily. GAVISCON EXTRA STRENGTH 160-105 mg [employer]) Generic drug:  aluminum hydrox-magnesium carb Take 1-2 tablets by mouth four (4) times daily as needed. lisinopril 40 mg tablet Commonly known as:  Joanne Golder Take 1 Tab by mouth daily. metoprolol succinate 25 mg XL tablet Commonly known as:  TOPROL-XL Take 0.5 Tabs by mouth daily. naloxone 4 mg/actuation nasal spray Commonly known as:  [employer] Use 1 spray intranasally into 1 nostril. Use a new Narcan nasal spray for subsequent doses and administer into alternating nostrils.  May repeat every 2 to 3 minutes as needed. omeprazole 40 mg capsule Commonly known as:  PRILOSEC Take 1 Cap by mouth daily. oxyCODONE-acetaminophen  mg per tablet Commonly known as:  PERCOCET Take 1 Tab by mouth every eight (8) hours as needed for Pain. Max Daily Amount: 3 Tabs. potassium chloride 20 mEq tablet Commonly known as:  K-DUR, KLOR-CON Take 1 Tab by mouth two (2) times a day. REVLIMID 10 mg Cap Generic drug:  lenalidomide Take  by mouth. Indications: 1 tab daily We Performed the Following REFERRAL TO ENDOCRINOLOGY [VPH18 Custom] Comments: Hyperthyroidism To-Do List   
 05/15/2018 8:00 AM  
  Appointment with Jad Plasc[address] 1 at [facility] RAD NUC MED ([phone]) 1. Please bring in any recent (last 3 months) Nuclear Medicine or Ultrasound films of the thyroid. 2.  You must bring a LIST or BAG of all current medication you are taking with you to your appointment. 3.  If patient is taking a Thyroid medation, you may need to stop medication 4-6 weeks prior to procedure. Consult the ordering physician prior to stopping. 4.  If patient is taking an Anti-Thyroid medication, you may need to stop 5-7 days prior to procedure. Constult the ordering physican prior to stopping. 5.Nothing to eat or drink 4 hours before arriving to the appointment. 05/16/2018 8:00 AM  
  Appointment with [address] [place] [address] 2 at [facility] RAD NUC MED ([phone]) 1. Please bring in any recent (last 3 months) Nuclear Medicine or Ultrasound films of the thyroid. 2. You must bring a LIST or BAG of all current medication you are taking with you to your appointment. 3. If patient is taking a Thyroid medication, you may need to stop medication 4-6 weeks prior to procedure. Consult the ordering physician prior to stopping. 4. If patient is taking an Anti-Thyroid medication, you may need to stop 5-7 days prior to procedure. Constult the ordering physican prior to stopping. 05/16/2018 9:00 AM  
  Appointment with Elin Bess 1 at [facility] Ultrasound ([phone]) GENERAL INSTRUCTIONS  1. Bring outside films ([employer]) pertaining to the affected area with you on the day of appointment. 2. A written order with a valid diagnosis and Physicians signature is required for all scheduled tests. 3. Check in at registration 30 minutes before your appointment time unless you were instructed to do otherwise. 08/03/2018 7:00 AM  
  Appointment with [address] 1 at [facility] ([phone]) CONTRAST STUDY: 1. The patient should not eat solid food four hours before the appointment but should be encouraged to drink clear liquids. 2.  If you have to drink oral contrast, please pick it up any weekday prior to your appointment, if you cannot please check in 2 hrs before appt time. 3.  The patient will require IV access for contrast administration. 4.  The patient should not take Ibuprofen (Advil, Motrin, etc.) and Naproxen Sodium (Aleve, etc.)  on the day of the exam. Stopping non-steroidal anti-inflammatory agents (NSAIDs) like Ibuprofen decreases the risk of kidney damage from the x-ray contrast (dye). 5.  Bring any non [facility] facility films/images pertaining to the area of interest with you on the day of appointment. 6.  Bring current lab work if available (within last 90 days CMP) ***If scheduled at [facility], iSTAT is not available, labs will need to be done before appointment*** 7. Check in at registration at least 30 minutes before appt time unless you were instructed to do otherwise. Referral Information Referral ID Referred By Referred To  
  
 [id number] Reinier Dwyer MD   
   [address] [place] [address] [address] [place], [address] [place] [address] Phone: [phone] Fax: [phone] Visits Status Start Date End Date 1 New Request 4/23/18 4/23/19 If your referral has a status of pending review or denied, additional information will be sent to support the outcome of this decision. Introducing [facility]! [employer] introduces [employer] patient portal. Now you can access parts of your medical record, email your doctor's office, and request medication refills online. 1. In your internet browser, go to [email or web address] 2. Click on the First Time User? Click Here link in the Sign In box. You will see the New Member Sign Up page. 3. Enter your [employer] Access Code exactly as it appears below. You will not need to use this code after youve completed the sign-up process. If you do not sign up before the expiration date, you must request a new code. · [employer] Access Code: [email or web address] Expires: 5/27/2018 10:41 AM 
 
4. Enter the last four digits of your Social Security Number (xxxx) and Date of Birth (mm/dd/yyyy) as indicated and click Submit. You will be taken to the next sign-up page. 5. Create a [employer] ID. This will be your [employer] login ID and cannot be changed, so think of one that is secure and easy to remember. 6. Create a [employer] password. You can change your password at any time. 7. Enter your Password Reset Question and Answer. This can be used at a later time if you forget your password. 8. Enter your e-mail address. You will receive e-mail notification when new information is available in [address] 9. Click Sign Up. You can now view and download portions of your medical record. 10. Click the Download Summary menu link to download a portable copy of your medical information. If you have questions, please visit the Frequently Asked Questions section of the [employer] website. Remember, [employer] is NOT to be used for urgent needs. For medical emergencies, dial 911. Now available from your iPhone and Android! Please provide this summary of care documentation to your next provider. Your primary care clinician is listed as Deanna Taylor. If you have any questions after today's visit, please call [phone].

## 2018-05-07 ENCOUNTER — OFFICE VISIT (OUTPATIENT)
Dept: INTERNAL MEDICINE CLINIC | Age: 78
End: 2018-05-07

## 2018-05-07 VITALS
HEART RATE: 74 BPM | BODY MASS INDEX: 26.03 KG/M2 | DIASTOLIC BLOOD PRESSURE: 87 MMHG | HEIGHT: 70 IN | OXYGEN SATURATION: 97 % | SYSTOLIC BLOOD PRESSURE: 145 MMHG | WEIGHT: 181.8 LBS | TEMPERATURE: 98.2 F | RESPIRATION RATE: 16 BRPM

## 2018-05-07 DIAGNOSIS — I42.8 OTHER CARDIOMYOPATHY (HCC): ICD-10-CM

## 2018-05-07 DIAGNOSIS — I48.20 CHRONIC ATRIAL FIBRILLATION (HCC): Chronic | ICD-10-CM

## 2018-05-07 DIAGNOSIS — E05.90 HYPERTHYROIDISM: Primary | ICD-10-CM

## 2018-05-07 DIAGNOSIS — C90.00 MULTIPLE MYELOMA, REMISSION STATUS UNSPECIFIED (HCC): ICD-10-CM

## 2018-05-07 DIAGNOSIS — R53.81 PHYSICAL DECONDITIONING: ICD-10-CM

## 2018-05-07 DIAGNOSIS — M17.0 PRIMARY OSTEOARTHRITIS OF BOTH KNEES: ICD-10-CM

## 2018-05-07 RX ORDER — OXYCODONE AND ACETAMINOPHEN 10; 325 MG/1; MG/1
1 TABLET ORAL
Qty: 50 TAB | Refills: 0 | Status: SHIPPED | OUTPATIENT
Start: 2018-05-07 | End: 2018-06-15 | Stop reason: SDUPTHER

## 2018-05-07 NOTE — PROGRESS NOTES
Chief Complaint   Patient presents with    Hyperthyroidism     2 week follow up      1. Have you been to the ER, urgent care clinic since your last visit? Hospitalized since your last visit? No    2. Have you seen or consulted any other health care providers outside of the [facility] [place] [facility] since your last visit? Include any pap smears or colon screening.  No

## 2018-05-07 NOTE — PROGRESS NOTES
[address] [place] [address] and Primary Care  [place] [address]  [address] [place] [address]  Phone:  [phone]  Fax: [phone]       Chief Complaint   Patient presents with    Hyperthyroidism     2 week follow up    . SUBJECTIVE:    Vale Pallas is a 66 y.o. female comes in accompanied by her son. I explained the hyperthyroidism to him and let him know that treatment has been started when she was here last time. She is on Tapazole 15 mg bid. She is has only lost 1/4 pound this visit as compared to 12 pounds on her previous visit. She feels weak and fatigued, however. Most recently, she has a flare up on pain in her neck and shoulders. She is taking Percocet as needed. This is musculoskeletal and she has periodic flare ups of this. She follows with her oncologist for multiple myeloma. She denies any shortness of breath, orthopnea or PND. She has cardiomyopathy. Current Outpatient Prescriptions   Medication Sig Dispense Refill    oxyCODONE-acetaminophen (PERCOCET)  mg per tablet Take 1 Tab by mouth every eight (8) hours as needed for Pain. Max Daily Amount: 3 Tabs. 50 Tab 0    apixaban (ELIQUIS) 5 mg tablet Take 1 Tab by mouth two (2) times a day. 60 Tab 0    lisinopril (PRINIVIL, ZESTRIL) 40 mg tablet Take 1 Tab by mouth daily. 30 Tab 3    omeprazole (PRILOSEC) 40 mg capsule Take 1 Cap by mouth daily. 30 Cap 1    naloxone (NARCAN) 4 mg/actuation nasal spray Use 1 spray intranasally into 1 nostril. Use a new Narcan nasal spray for subsequent doses and administer into alternating nostrils. May repeat every 2 to 3 minutes as needed. 2 Each 0    cyclobenzaprine (FLEXERIL) 5 mg tablet Take 1 Tab by mouth three (3) times daily as needed for Muscle Spasm(s). 15 Tab 0    REVLIMID 10 mg cap Take  by mouth. Indications: 1 tab daily      potassium chloride (K-DUR, KLOR-CON) 20 mEq tablet Take 1 Tab by mouth two (2) times a day.  60 Tab 11    metoprolol succinate (TOPROL-XL) 25 mg XL tablet Take 0.5 Tabs by mouth daily. 30 Tab 11    aluminum hydrox-magnesium carb (GAVISCON EXTRA STRENGTH) 160-105 mg chew Take 1-2 tablets by mouth four (4) times daily as needed.  furosemide (LASIX) 40 mg tablet Take 1 Tab by mouth daily. 30 Tab 11    methIMAzole (TAPAZOLE) 5 mg tablet Take 2 Tabs by mouth two (2) times a day. 120 Tab 0    predniSONE (DELTASONE) 20 mg tablet Take 3 Tabs by mouth daily (after dinner).  15 Tab 0     Past Medical History:   Diagnosis Date    Acute combined systolic and diastolic HF (heart failure), NYHA class 2 (HCC) 8/11/2017    Arthritis     Atrial fibrillation (HCC)     Cancer (HCC)     multiple myeloma    Hypertension     S/P AV kathleen ablation 8/11/2017    Status post biventricular pacemaker 8/11/2017 8/11/17      Past Surgical History:   Procedure Laterality Date    HX GYN      HX ORTHOPAEDIC      knee    HX PACEMAKER  08/11/2017     Allergies   Allergen Reactions    Codeine Other (comments)         REVIEW OF SYSTEMS:  General: negative for - chills or fever  ENT: negative for - headaches, nasal congestion or tinnitus  Respiratory: negative for - cough, hemoptysis, shortness of breath or wheezing  Cardiovascular : negative for - chest pain, edema, palpitations or shortness of breath  Gastrointestinal: negative for - abdominal pain, blood in stools, heartburn or nausea/vomiting  Genito-Urinary: no dysuria, trouble voiding, or hematuria  Musculoskeletal: negative for - gait disturbance, joint pain, joint stiffness or joint swelling  Neurological: no TIA or stroke symptoms  Hematologic: no bruises, no bleeding, no swollen glands  Integument: no lumps, mole changes, nail changes or rash  Endocrine: no malaise/lethargy or unexpected weight changes      Social History     Social History    Marital status:      Spouse name: N/A    Number of children: N/A    Years of education: N/A     Social History Main Topics    Smoking status: Never Smoker    Smokeless tobacco: Never Used    Alcohol use No    Drug use: No    Sexual activity: Not Currently     Other Topics Concern    None     Social History Narrative     Family History   Problem Relation Age of Onset    Stroke Mother     No Known Problems Father        OBJECTIVE:    Visit Vitals    /87    Pulse 74    Temp 98.2 °F (36.8 °C) (Oral)    Resp 16    Ht 5' 10\" (1.778 m)    Wt 181 lb 12.8 oz (82.5 kg)    SpO2 97%    BMI 26.09 kg/m2     CONSTITUTIONAL: well , well nourished, appears age appropriate  EYES: perrla, eom intact  ENMT:moist mucous membranes, pharynx clear  NECK: supple. Thyroid normal  RESPIRATORY: Chest: clear to ascultation and percussion   CARDIOVASCULAR: Heart: regular rate and rhythm  GASTROINTESTINAL: Abdomen: soft, bowel sounds active  HEMATOLOGIC: no pathological lymph nodes palpated  MUSCULOSKELETAL: Extremities: no edema, pulse 1+   INTEGUMENT: No unusual rashes or suspicious skin lesions noted. Nails appear normal.  NEUROLOGIC: non-focal exam   MENTAL STATUS: alert and oriented, appropriate affect      ASSESSMENT:  1. Hyperthyroidism    2. Primary osteoarthritis of both knees    3. Other cardiomyopathy ([address]    4. Chronic atrial fibrillation (HCC)    5. Multiple myeloma, remission status unspecified ([address]    6. Physical deconditioning        PLAN:    1. The patient has hyperthyroidism. I will check her lab values and hopefully the values are indeed decreasing. If they are reasonably, no adjustments will be made but if they are not, I will increase her medication. 2. She is having no cardioactive problems aggravated by hyperthyroidism at this point. She appears to be in sinus rhythm today. 3. Her musculoskeletal pain will be treated symptomatically. 4. She will follow-up with oncology for multiple myeloma. 5. Her deconditioning is related to her hyperthyroidism and the fact that she is just not physically active.     6. She will follow with cardiology regarding the cardiomyopathy. She is well-compensated. .  Orders Placed This Encounter    CBC WITH AUTOMATED DIFF    T4, FREE    TSH 3RD GENERATION    oxyCODONE-acetaminophen (PERCOCET)  mg per tablet         Follow-up Disposition:  Return in about 4 weeks (around 6/4/2018).       Marichuy Valadez MD

## 2018-05-07 NOTE — MR AVS SNAPSHOT
[facility] 
 
 
 [address] [address] 
[phone] Patient: Aruna Solorzano MRN: [id number] PDK:1/47/9010 Visit Information Date & Time Provider Department Dept. Phone Encounter #  
 5/7/2018 10:30 AM Alex Leyva MD [facility] [phone] [id number] Your Appointments 5/22/2018  9:30 AM  
Any with Alex Leyva MD  
[address] [place] [address] and Primary Care [facility]) Appt Note: 3 month follow up  
 [address] [facility]  
[phone]  
  
   
 [address] [id number]  
  
    
 5/30/2018 11:00 AM  
ESTABLISHED PATIENT with Dorcas Guzman MD  
[facility]) Appt Note: 3 month f/u  
 [address] [facility]  
[phone] [address] [facility]  
  
    
 6/12/2018  9:15 AM  
PACEMAKER with PACEMAKER, [facility]) Appt Note: Per Dr. Prema Mendoza [facility]  
[phone] [address] [facility]  
  
    
 6/12/2018  9:15 AM  
6 MONTH with Jenny Barbosa MD  
[facility]) Appt Note: Per Dr. Prema Mendoza [facility]  
[phone] [address] [facility] [address] [id number]  
  
    
 6/28/2018  2:50 PM  
New Patient with MD Sancho Hernandez [facility]) Appt Note: np hyperthyroisdism ref by Dr. Jessie Raya [id number]([phone]) $0CP yd 04/23/18 appt scheduled by 's office [address] [facility] [phone] [address] [facility] Upcoming Health Maintenance Date Due Influenza Age 5 to Adult 8/1/2018 MEDICARE YEARLY EXAM 1/23/2019 GLAUCOMA SCREENING Q2Y 2/12/2020 DTaP/Tdap/Td series (2 - Td) 5/19/2026 Allergies as of 5/7/2018  Review Complete On: 5/7/2018 By: Zack Traore Severity Noted Reaction Type Reactions Codeine  09/05/2014    Other (comments) Current Immunizations  Reviewed on 8/29/2017 Name Date Influenza Vaccine 9/15/2014 Not reviewed this visit You Were Diagnosed With   
  
 Codes Comments Primary osteoarthritis of both knees    -  Primary ICD-10-CM: M17.0 ICD-9-CM: 715.16 Hyperthyroidism     ICD-10-CM: E05.90 ICD-9-CM: 242.90 Other cardiomyopathy ([facility]     ICD-10-CM: I42.8 ICD-9-CM: 425. 4 Chronic atrial fibrillation (HCC)     ICD-10-CM: R37.4 ICD-9-CM: 427.31   
 Multiple myeloma, remission status unspecified (HCC)     ICD-10-CM: C90.00 ICD-9-CM: 203.00 Physical deconditioning     ICD-10-CM: R53.81 ICD-9-CM: 799.3 Vitals BP Pulse Temp Resp Height(growth percentile) Weight(growth percentile) 145/87 74 98.2 °F (36.8 °C) (Oral) 16 5' 10\" (1.778 m) 181 lb 12.8 oz (82.5 kg) SpO2 BMI OB Status Smoking Status 97% 26.09 kg/m2 Hysterectomy Never Smoker Vitals History BMI and BSA Data Body Mass Index Body Surface Area 26.09 kg/m 2 2.02 m 2 Preferred Pharmacy Pharmacy Name Phone [facility] [address] [place] [address] [phone] Your Updated Medication List  
  
   
This list is accurate as of 5/7/18 11:25 AM.  Always use your most recent med list.  
  
  
  
  
 apixaban 5 mg tablet Commonly known as:  Murphy Corrente Take 1 Tab by mouth two (2) times a day. cyclobenzaprine 5 mg tablet Commonly known as:  FLEXERIL Take 1 Tab by mouth three (3) times daily as needed for Muscle Spasm(s). furosemide 40 mg tablet Commonly known as:  LASIX Take 1 Tab by mouth daily. GAVISCON EXTRA STRENGTH 160-105 mg Levester Rickie Generic drug:  aluminum hydrox-magnesium carb Take 1-2 tablets by mouth four (4) times daily as needed. lisinopril 40 mg tablet Commonly known as:  Lisa Drought Take 1 Tab by mouth daily. methIMAzole 5 mg tablet Commonly known as:  TAPAZOLE Take 3 Tabs by mouth two (2) times a day. metoprolol succinate 25 mg XL tablet Commonly known as:  TOPROL-XL Take 0.5 Tabs by mouth daily. naloxone 4 mg/actuation nasal spray Commonly known as:  [employer] Use 1 spray intranasally into 1 nostril. Use a new Narcan nasal spray for subsequent doses and administer into alternating nostrils. May repeat every 2 to 3 minutes as needed. omeprazole 40 mg capsule Commonly known as:  PRILOSEC Take 1 Cap by mouth daily. oxyCODONE-acetaminophen  mg per tablet Commonly known as:  PERCOCET Take 1 Tab by mouth every eight (8) hours as needed for Pain. Max Daily Amount: 3 Tabs. potassium chloride 20 mEq tablet Commonly known as:  K-DUR, KLOR-CON Take 1 Tab by mouth two (2) times a day. REVLIMID 10 mg Cap Generic drug:  lenalidomide Take  by mouth. Indications: 1 tab daily Prescriptions Printed Refills  
 oxyCODONE-acetaminophen (PERCOCET)  mg per tablet 0 Sig: Take 1 Tab by mouth every eight (8) hours as needed for Pain. Max Daily Amount: 3 Tabs. Class: Print Route: Oral  
  
We Performed the Following CBC WITH AUTOMATED DIFF [51410 CPT(R)] T4, FREE [id number] CPT(R)] TSH 3RD GENERATION [46377 CPT(R)] To-Do List   
 05/15/2018 8:00 AM  
  Appointment with [address] 1 at [facility] RAD NUC MED ([phone]) 1. Please bring in any recent (last 3 months) Nuclear Medicine or Ultrasound films of the thyroid. 2.  You must bring a LIST or BAG of all current medication you are taking with you to your appointment.   3.  If patient is taking a Thyroid medation, you may need to stop medication 4-6 weeks prior to procedure. Consult the ordering physician prior to stopping. 4.  If patient is taking an Anti-Thyroid medication, you may need to stop 5-7 days prior to procedure. Constult the ordering physican prior to stopping. 5.Nothing to eat or drink 4 hours before arriving to the appointment. 05/16/2018 8:00 AM  
  Appointment with [address] 2 at [facility] RAD NUC MED ([phone]) 1. Please bring in any recent (last 3 months) Nuclear Medicine or Ultrasound films of the thyroid. 2. You must bring a LIST or BAG of all current medication you are taking with you to your appointment. 3. If patient is taking a Thyroid medication, you may need to stop medication 4-6 weeks prior to procedure. Consult the ordering physician prior to stopping. 4. If patient is taking an Anti-Thyroid medication, you may need to stop 5-7 days prior to procedure. Constult the ordering physican prior to stopping. 05/16/2018 9:00 AM  
  Appointment with Elin Bess 1 at [facility] Ultrasound ([phone]) GENERAL INSTRUCTIONS  1. Bring outside films ([employer]) pertaining to the affected area with you on the day of appointment. 2. A written order with a valid diagnosis and Physicians signature is required for all scheduled tests. 3. Check in at registration 30 minutes before your appointment time unless you were instructed to do otherwise. 08/03/2018 7:00 AM  
  Appointment with [address] at [facility] RAD CT ([phone]) CONTRAST STUDY: 1. The patient should not eat solid food four hours before the appointment but should be encouraged to drink clear liquids. 2.  If you have to drink oral contrast, please pick it up any weekday prior to your appointment, if you cannot please check in 2 hrs before appt time. 3.  The patient will require IV access for contrast administration.  4.  The patient should not take Ibuprofen (Advil, Motrin, etc.) and Naproxen Sodium (Aleve, etc.)  on the day of the exam. Stopping non-steroidal anti-inflammatory agents (NSAIDs) like Ibuprofen decreases the risk of kidney damage from the x-ray contrast (dye). 5.  Bring any non [facility] facility films/images pertaining to the area of interest with you on the day of appointment. 6.  Bring current lab work if available (within last 90 days CMP) ***If scheduled at [facility], iSTAT is not available, labs will need to be done before appointment*** 7. Check in at registration at least 30 minutes before appt time unless you were instructed to do otherwise. Introducing [facility] & HEALTH SERVICES! [address] introduces [email or web address] patient portal. Now you can access parts of your medical record, email your doctor's office, and request medication refills online. 1. In your internet browser, go to [email or web address] 2. Click on the First Time User? Click Here link in the Sign In box. You will see the New Member Sign Up page. 3. Enter your [email or web address] Access Code exactly as it appears below. You will not need to use this code after youve completed the sign-up process. If you do not sign up before the expiration date, you must request a new code. · [email or web address] Access Code: [email or web address] Expires: 5/27/2018 10:41 AM 
 
4. Enter the last four digits of your Social Security Number (xxxx) and Date of Birth (mm/dd/yyyy) as indicated and click Submit. You will be taken to the next sign-up page. 5. Create a [employer] ID. This will be your [employer] login ID and cannot be changed, so think of one that is secure and easy to remember. 6. Create a [employer] password. You can change your password at any time. 7. Enter your Password Reset Question and Answer. This can be used at a later time if you forget your password. 8. Enter your e-mail address. You will receive e-mail notification when new information is available in [address] 9. Click Sign Up. You can now view and download portions of your medical record. 10. Click the Download Summary menu link to download a portable copy of your medical information. If you have questions, please visit the Frequently Asked Questions section of the [employer] website. Remember, [employer] is NOT to be used for urgent needs. For medical emergencies, dial 911. Now available from your iPhone and Android! Please provide this summary of care documentation to your next provider. Your primary care clinician is listed as Deanna Taylor. If you have any questions after today's visit, please call [phone].

## 2018-05-08 LAB
BASOPHILS # BLD AUTO: 0 X10E3/UL (ref 0–0.2)
BASOPHILS NFR BLD AUTO: 0 %
EOSINOPHIL # BLD AUTO: 0.1 X10E3/UL (ref 0–0.4)
EOSINOPHIL NFR BLD AUTO: 1 %
ERYTHROCYTE [DISTWIDTH] IN BLOOD BY AUTOMATED COUNT: 17.3 % (ref 12.3–15.4)
HCT VFR BLD AUTO: 31.2 % (ref 34–46.6)
HGB BLD-MCNC: 9.7 G/DL (ref 11.1–15.9)
IMM GRANULOCYTES # BLD: 0 X10E3/UL (ref 0–0.1)
IMM GRANULOCYTES NFR BLD: 0 %
LYMPHOCYTES # BLD AUTO: 0.7 X10E3/UL (ref 0.7–3.1)
LYMPHOCYTES NFR BLD AUTO: 14 %
MCH RBC QN AUTO: 25.3 PG (ref 26.6–33)
MCHC RBC AUTO-ENTMCNC: 31.1 G/DL (ref 31.5–35.7)
MCV RBC AUTO: 82 FL (ref 79–97)
MONOCYTES # BLD AUTO: 1 X10E3/UL (ref 0.1–0.9)
MONOCYTES NFR BLD AUTO: 20 %
MORPHOLOGY BLD-IMP: ABNORMAL
NEUTROPHILS # BLD AUTO: 3.2 X10E3/UL (ref 1.4–7)
NEUTROPHILS NFR BLD AUTO: 65 %
PLATELET # BLD AUTO: 222 X10E3/UL (ref 150–379)
RBC # BLD AUTO: 3.83 X10E6/UL (ref 3.77–5.28)
T4 FREE SERPL-MCNC: 2.93 NG/DL (ref 0.82–1.77)
TSH SERPL DL<=0.005 MIU/L-ACNC: <0.006 UIU/ML (ref 0.45–4.5)
WBC # BLD AUTO: 5 X10E3/UL (ref 3.4–10.8)

## 2018-05-09 ENCOUNTER — PATIENT OUTREACH (OUTPATIENT)
Dept: INTERNAL MEDICINE CLINIC | Age: 78
End: 2018-05-09

## 2018-05-09 DIAGNOSIS — M54.12 CERVICAL RADICULOPATHY: Primary | ICD-10-CM

## 2018-05-09 RX ORDER — PREDNISONE 20 MG/1
60 TABLET ORAL
Qty: 15 TAB | Refills: 0 | Status: SHIPPED | OUTPATIENT
Start: 2018-05-09 | End: 2018-06-16

## 2018-05-09 NOTE — PROGRESS NOTES
[facility] Health Promotion & Risk Prevention:  Med Refill    Goals Addressed             Most Recent     Coordinate pain management plan for patient. On track (5/9/2018)             Coordinate pain management plan for patient. Patient agreed to place warm soaks to later knee for severe pain and to take pain medication PRN.     1/22/2018:  C/o severe gas pain and back pain. Patient seen in ED [facility] ED 1/2/2018 for Back Pain. Patient stated she was in the office for a Cardiology appointment on 1/2/2018; stated the had so many patients they were sending some to the ED and she was one. Today patient states she is taking Gaviscon as ordered by PCP (4x/day PRN). States pain in ED was on the left side, but now on right side and abdomin. States she ate beans on Friday, but has moved bowels daily. States the pain is not from the beans for this is the 3rd day since consuming. 5/9/2018:  NN rec'd call from patient w/ c/o increasing neck pain; requested PCP to know the pain has increased since prednisone medication not refilled. Consult done w/ PCP; stated medication w/ be called in. Patient encouraged to continue to apply warm soaks to neck area q 4 hrs for pain relief and percocet as ordered by PCP. Patient agreeded. NN called patient back to inform that PCP will be calling prescription. PCP also request patient to return to office in 2 week as f/u needed for thyroid progress; stated she does not need to see him but a nurse's visit.   Appt scheduled Monday 5/21/2018  11:00AM    Goals completion 5/21/2018

## 2018-05-10 DIAGNOSIS — E05.90 HYPERTHYROIDISM: Primary | ICD-10-CM

## 2018-05-10 RX ORDER — METHIMAZOLE 5 MG/1
10 TABLET ORAL 2 TIMES DAILY
Qty: 120 TAB | Refills: 0 | Status: SHIPPED | OUTPATIENT
Start: 2018-05-10 | End: 2018-07-31 | Stop reason: SDUPTHER

## 2018-05-21 ENCOUNTER — PATIENT OUTREACH (OUTPATIENT)
Dept: INTERNAL MEDICINE CLINIC | Age: 78
End: 2018-05-21

## 2018-05-21 NOTE — PROGRESS NOTES
[facility] Health Support & Risk Prevention:  Thyroid Nuclear Med US    Goals Addressed             Most Recent     Attends follow-up appointments as directed. On track (1/22/2018)             Patient requesting to see PCP ASAP. NN w/ schedule patient for office visit in AM.    5/21/2018:  NN received call from patient asking if PCP wants Plavix med stopped; stated she rescheduled the missed procedure and now the date being 5/31/2018-Plavix is to be stopped 5 days in advance. Consult done w/ PCP: stated Thyroid procedure is too early and should be cancelled; stated call had come in regarding the same. NN returned call to patient to advise of the same.  Patient verbalizes understanding of self-management goals of living with Congestive Heart Failure   On track (5/21/2018)     Self-schedules and keeps appointments    On track (5/21/2018)      Goal completion date:  Effective today.

## 2018-05-28 NOTE — PROGRESS NOTES
[address] [place] [address] and Primary Care  [place] [address]  [address] [place] [address]  Phone:  [phone]  Fax: [phone]       Chief Complaint   Patient presents with    Results     2 week follow up/CT scan   . SUBJECTIVE:    Phuong Miller is a 66 y.o. female Comes in for return visit stating she's done fairly well. Unfortunately she continues to lose weight. Her labs indicate she's hyperthyroid. Ideally she needs to have radioactive iodine. Unfortunately she just received the CT scan with IV contrast, which will make this impossible to do for now because she can't get a thyroid scan with update. Her multiple myeloma is reasonably stable. She continues follow up with cardiology for cardiomyopathy complicated by atrial flutter. She is anticoagulated currently as she chronically has been. She has a history of primary hypertension. Finally, she did have a CT scan of her abdomen, which was negative. Workup was done for weight loss, which appears to be secondary to hyperthyroidism. Current Outpatient Prescriptions   Medication Sig Dispense Refill    lisinopril (PRINIVIL, ZESTRIL) 40 mg tablet Take 1 Tab by mouth daily. 30 Tab 3    omeprazole (PRILOSEC) 40 mg capsule Take 1 Cap by mouth daily. 30 Cap 1    naloxone (NARCAN) 4 mg/actuation nasal spray Use 1 spray intranasally into 1 nostril. Use a new Narcan nasal spray for subsequent doses and administer into alternating nostrils. May repeat every 2 to 3 minutes as needed. 2 Each 0    cyclobenzaprine (FLEXERIL) 5 mg tablet Take 1 Tab by mouth three (3) times daily as needed for Muscle Spasm(s). 15 Tab 0    REVLIMID 10 mg cap Take  by mouth. Indications: 1 tab daily      potassium chloride (K-DUR, KLOR-CON) 20 mEq tablet Take 1 Tab by mouth two (2) times a day. 60 Tab 11    metoprolol succinate (TOPROL-XL) 25 mg XL tablet Take 0.5 Tabs by mouth daily.  30 Tab 11    aluminum hydrox-magnesium carb (GAVISCON EXTRA STRENGTH) 160-105 mg chew Take 1-2 tablets by mouth four (4) times daily as needed.  furosemide (LASIX) 40 mg tablet Take 1 Tab by mouth daily. 30 Tab 11    methIMAzole (TAPAZOLE) 5 mg tablet Take 2 Tabs by mouth two (2) times a day. 120 Tab 0    predniSONE (DELTASONE) 20 mg tablet Take 3 Tabs by mouth daily (after dinner). 15 Tab 0    oxyCODONE-acetaminophen (PERCOCET)  mg per tablet Take 1 Tab by mouth every eight (8) hours as needed for Pain. Max Daily Amount: 3 Tabs. 50 Tab 0    apixaban (ELIQUIS) 5 mg tablet Take 1 Tab by mouth two (2) times a day.  61 Tab 0     Past Medical History:   Diagnosis Date    Acute combined systolic and diastolic HF (heart failure), NYHA class 2 (HCC) 8/11/2017    Arthritis     Atrial fibrillation (HCC)     Cancer (HCC)     multiple myeloma    Hypertension     S/P AV kathleen ablation 8/11/2017    Status post biventricular pacemaker 8/11/2017 8/11/17      Past Surgical History:   Procedure Laterality Date    HX GYN      HX ORTHOPAEDIC      knee    HX PACEMAKER  08/11/2017     Allergies   Allergen Reactions    Codeine Other (comments)         REVIEW OF SYSTEMS:  General: negative for - chills or fever  ENT: negative for - headaches, nasal congestion or tinnitus  Respiratory: negative for - cough, hemoptysis, shortness of breath or wheezing  Cardiovascular : negative for - chest pain, edema, palpitations or shortness of breath  Gastrointestinal: negative for - abdominal pain, blood in stools, heartburn or nausea/vomiting  Genito-Urinary: no dysuria, trouble voiding, or hematuria  Musculoskeletal: negative for - gait disturbance, joint pain, joint stiffness or joint swelling  Neurological: no TIA or stroke symptoms  Hematologic: no bruises, no bleeding, no swollen glands  Integument: no lumps, mole changes, nail changes or rash  Endocrine: no malaise/lethargy or unexpected weight changes      Social History     Social History    Marital status:  Spouse name: N/A    Number of children: N/A    Years of education: N/A     Social History Main Topics    Smoking status: Never Smoker    Smokeless tobacco: Never Used    Alcohol use No    Drug use: No    Sexual activity: Not Currently     Other Topics Concern    None     Social History Narrative     Family History   Problem Relation Age of Onset    Stroke Mother     No Known Problems Father        OBJECTIVE:    Visit Vitals    /90 (BP 1 Location: Right arm, BP Patient Position: Sitting)    Pulse 76    Temp 98.1 °F (36.7 °C) (Oral)    Resp 14    Ht 5' 10\" (1.778 m)    Wt 188 lb 1.6 oz (85.3 kg)    SpO2 99%    BMI 26.99 kg/m2     CONSTITUTIONAL: well , well nourished, appears age appropriate  EYES: perrla, eom intact  ENMT:moist mucous membranes, pharynx clear  NECK: supple. Thyroid normal  RESPIRATORY: Chest: clear to ascultation and percussion   CARDIOVASCULAR: Heart: regular rate and rhythm  GASTROINTESTINAL: Abdomen: soft, bowel sounds active  HEMATOLOGIC: no pathological lymph nodes palpated  MUSCULOSKELETAL: Extremities: no edema, pulse 1+   INTEGUMENT: No unusual rashes or suspicious skin lesions noted. Nails appear normal.  NEUROLOGIC: non-focal exam   MENTAL STATUS: alert and oriented, appropriate affect      ASSESSMENT:  1. Hyperthyroidism    2. Weight loss    3. Multiple myeloma, remission status unspecified ([address]    4. Atrial flutter, unspecified type ([address]    5. Other cardiomyopathy ([address]    6. Essential hypertension        PLAN:    1. The patient has hyperthyroidism. She will be referred to endocrinology, but unfortunately they couldn't give her an appointment for almost two months. I will empirically start her on Methimazole, two tablets twice a day, which is 10 mg, and see her back in the office in the next two weeks. 2. I suspect her weight loss is indeed related to this. 3. She will follow up with her medical oncologist for multiple myeloma.   4. Her cardiac status appears to be stable in spite of her hyperthyroidism. Her ventricular rate is reasonably well controlled and there is no flare of her cardiac decompensation at this point. 5. BP is excellent, no adjustments are made. .  Orders Placed This Encounter   [facility] ref [address]: methIMAzole (TAPAZOLE) 5 mg tablet         Follow-up Disposition:  Return in about 2 weeks (around 5/7/2018).       Marichuy Valadez MD

## 2018-05-29 ENCOUNTER — TELEPHONE (OUTPATIENT)
Dept: INTERNAL MEDICINE CLINIC | Age: 78
End: 2018-05-29

## 2018-05-29 NOTE — TELEPHONE ENCOUNTER
PATIENT NOTIFIED AND ASK HOW MUCH METHIMAZOLE SHE IS TAKING AND SHE SAYS 1 BID. PER DR. SEQUEIRA SHE CAN REMAIN ON THAT DOSE.

## 2018-06-15 ENCOUNTER — OFFICE VISIT (OUTPATIENT)
Dept: INTERNAL MEDICINE CLINIC | Age: 78
End: 2018-06-15

## 2018-06-15 ENCOUNTER — PATIENT OUTREACH (OUTPATIENT)
Dept: INTERNAL MEDICINE CLINIC | Age: 78
End: 2018-06-15

## 2018-06-15 VITALS
SYSTOLIC BLOOD PRESSURE: 152 MMHG | WEIGHT: 181.1 LBS | RESPIRATION RATE: 16 BRPM | TEMPERATURE: 97.7 F | DIASTOLIC BLOOD PRESSURE: 96 MMHG | HEIGHT: 70 IN | HEART RATE: 75 BPM | OXYGEN SATURATION: 98 % | BODY MASS INDEX: 25.93 KG/M2

## 2018-06-15 DIAGNOSIS — R63.4 WEIGHT LOSS: ICD-10-CM

## 2018-06-15 DIAGNOSIS — M17.0 PRIMARY OSTEOARTHRITIS OF BOTH KNEES: ICD-10-CM

## 2018-06-15 DIAGNOSIS — C90.00 MULTIPLE MYELOMA, REMISSION STATUS UNSPECIFIED (HCC): ICD-10-CM

## 2018-06-15 DIAGNOSIS — I48.92 ATRIAL FLUTTER, UNSPECIFIED TYPE (HCC): ICD-10-CM

## 2018-06-15 DIAGNOSIS — R10.9 ABDOMINAL PAIN, UNSPECIFIED ABDOMINAL LOCATION: ICD-10-CM

## 2018-06-15 DIAGNOSIS — I10 ESSENTIAL HYPERTENSION: ICD-10-CM

## 2018-06-15 DIAGNOSIS — E05.90 HYPERTHYROIDISM: Primary | ICD-10-CM

## 2018-06-15 RX ORDER — OXYCODONE AND ACETAMINOPHEN 10; 325 MG/1; MG/1
1 TABLET ORAL
Qty: 50 TAB | Refills: 0 | Status: SHIPPED | OUTPATIENT
Start: 2018-06-15 | End: 2019-02-22 | Stop reason: SDUPTHER

## 2018-06-15 NOTE — MR AVS SNAPSHOT
[address] [facility] [address] 
[phone] Patient: Satya Smyth MRN: [id number] PGT:3/[birth year]/9806 Visit Information Date & Time Provider Department Dept. Phone Encounter #  
 6/15/2018  9:30 AM Karen Aguirre  [facility] and [address] [id number] Your Appointments 6/20/2018 10:15 AM  
ESTABLISHED PATIENT with Javier Parkinson MD  
[facility] [address] [facility]) Appt Note: 3 month f/u; Per pt, r/s to next available. -scc [address] [facility]  
[phone] [address] [place] [address] [address]  
  
    
 6/28/2018  2:50 PM  
New Patient with Kristy Castellon MD  
[facility] [address] [facility]) Appt Note: np hyperthyroisdism ref by Dr. Pettit [facility]([phone]) $0CP yd 04/23/18 appt scheduled by 's office [place] [address] [phone] [address] [facility] Upcoming Health Maintenance Date Due Influenza Age 5 to Adult 8/1/2018 MEDICARE YEARLY EXAM 1/23/2019 GLAUCOMA SCREENING Q2Y 2/12/2020 DTaP/Tdap/Td series (2 - Td) 5/19/2026 Allergies as of 6/15/2018  Review Complete On: 6/15/2018 By: Eric Noriega Severity Noted Reaction Type Reactions Codeine  09/05/2014    Other (comments) Current Immunizations  Reviewed on 8/29/2017 Name Date Influenza Vaccine 9/15/2014 Not reviewed this visit You Were Diagnosed With   
  
 Codes Comments Hyperthyroidism    -  Primary ICD-10-CM: E05.90 ICD-9-CM: 242.90 Abdominal pain, unspecified abdominal location     ICD-10-CM: R10.9 ICD-9-CM: 789.00 Essential hypertension     ICD-10-CM: I10 
ICD-9-CM: 401.9 Multiple myeloma, remission status unspecified (HCC)     ICD-10-CM: C90.00 ICD-9-CM: 203.00 Atrial flutter, unspecified type ([facility]     ICD-10-CM: I48.92 
ICD-9-CM: 427.32 Vitals BP Pulse Temp Resp Height(growth percentile) Weight(growth percentile) (!) 152/96 75 97.7 °F (36.5 °C) (Oral) 16 5' 10\" (1.778 m) 181 lb 1.6 oz (82.1 kg) SpO2 BMI OB Status Smoking Status 98% 25.99 kg/m2 Hysterectomy Never Smoker Vitals History BMI and BSA Data Body Mass Index Body Surface Area  
 25.99 kg/m 2 2.01 m 2 Preferred Pharmacy Pharmacy Name Phone [facility] Formerly named [facility], [address] [place] [address] [phone] Your Updated Medication List  
  
   
This list is accurate as of 6/15/18 11:18 AM.  Always use your most recent med list.  
  
  
  
  
 apixaban 5 mg tablet Commonly known as:  Jacelyn Nathan Take 1 Tab by mouth two (2) times a day. cyclobenzaprine 5 mg tablet Commonly known as:  FLEXERIL Take 1 Tab by mouth three (3) times daily as needed for Muscle Spasm(s). furosemide 40 mg tablet Commonly known as:  LASIX Take 1 Tab by mouth daily. GAVISCON EXTRA STRENGTH 160-105 mg [facility] Generic drug:  aluminum hydrox-magnesium carb Take 1-2 tablets by mouth four (4) times daily as needed. lisinopril 40 mg tablet Commonly known as:  Mechele Livings Take 1 Tab by mouth daily. methIMAzole 5 mg tablet Commonly known as:  TAPAZOLE Take 2 Tabs by mouth two (2) times a day. metoprolol succinate 25 mg XL tablet Commonly known as:  TOPROL-XL Take 0.5 Tabs by mouth daily. naloxone 4 mg/actuation nasal spray Commonly known as:  [employer] Use 1 spray intranasally into 1 nostril. Use a new Narcan nasal spray for subsequent doses and administer into alternating nostrils. May repeat every 2 to 3 minutes as needed. omeprazole 40 mg capsule Commonly known as:  PRILOSEC Take 1 Cap by mouth daily. oxyCODONE-acetaminophen  mg per tablet Commonly known as:  PERCOCET Take 1 Tab by mouth every eight (8) hours as needed for Pain. Max Daily Amount: 3 Tabs. potassium chloride 20 mEq tablet Commonly known as:  K-DUR, KLOR-CON Take 1 Tab by mouth two (2) times a day. predniSONE 20 mg tablet Commonly known as:  Arlette Gong Take 3 Tabs by mouth daily (after dinner). REVLIMID 10 mg Cap Generic drug:  lenalidomide Take  by mouth. Indications: 1 tab daily We Performed the Following CBC WITH AUTOMATED DIFF [36261 CPT(R)] T4, FREE [id number] CPT(R)] TSH 3RD GENERATION [57394 CPT(R)] To-Do List   
 08/03/2018 7:00 AM  
  Appointment with [facility] 1 at [facility] ([phone]) CONTRAST STUDY: 1. The patient should not eat solid food four hours before the appointment but should be encouraged to drink clear liquids. 2.  If you have to drink oral contrast, please pick it up any weekday prior to your appointment, if you cannot please check in 2 hrs before appt time. 3.  The patient will require IV access for contrast administration. 4.  The patient should not take Ibuprofen (Advil, Motrin, etc.) and Naproxen Sodium (Aleve, etc.)  on the day of the exam. Stopping non-steroidal anti-inflammatory agents (NSAIDs) like Ibuprofen decreases the risk of kidney damage from the x-ray contrast (dye). 5.  Bring any non [facility] facility films/images pertaining to the area of interest with you on the day of appointment. 6.  Bring current lab work if available (within last 90 days CMP) ***If scheduled at [facility] is not available, labs will need to be done before appointment*** 7. Check in at registration at least 30 minutes before appt time unless you were instructed to do otherwise. Introducing [facility]! Mireille Bender introduces [employer] patient portal. Now you can access parts of your medical record, email your doctor's office, and request medication refills online. 1. In your internet browser, go to [email or web address] 2. Click on the First Time User? Click Here link in the Sign In box. You will see the New Member Sign Up page. 3. Enter your [employer] Access Code exactly as it appears below. You will not need to use this code after youve completed the sign-up process. If you do not sign up before the expiration date, you must request a new code. · [employer] Access Code: [email or web address] Expires: 9/13/2018 11:18 AM 
 
4. Enter the last four digits of your Social Security Number (xxxx) and Date of Birth (mm/dd/yyyy) as indicated and click Submit. You will be taken to the next sign-up page. 5. Create a [employer] ID. This will be your [employer] login ID and cannot be changed, so think of one that is secure and easy to remember. 6. Create a [employer] password. You can change your password at any time. 7. Enter your Password Reset Question and Answer. This can be used at a later time if you forget your password. 8. Enter your e-mail address. You will receive e-mail notification when new information is available in [address] 9. Click Sign Up. You can now view and download portions of your medical record. 10. Click the Download Summary menu link to download a portable copy of your medical information. If you have questions, please visit the Frequently Asked Questions section of the [employer] website. Remember, [employer] is NOT to be used for urgent needs. For medical emergencies, dial 911. Now available from your iPhone and Android! Please provide this summary of care documentation to your next provider. Your primary care clinician is listed as Deanna Taylor. If you have any questions after today's visit, please call [phone].

## 2018-06-15 NOTE — PROGRESS NOTES
Chief Complaint   Patient presents with    Hyperthyroidism     1 month follow up      1. Have you been to the ER, urgent care clinic since your last visit? Hospitalized since your last visit? No    2. Have you seen or consulted any other health care providers outside of the [address] [place] [address] since your last visit? Include any pap smears or colon screening. No     for oxycodone verified and approved per Dr. Sasha Mcdermott.

## 2018-06-16 ENCOUNTER — HOSPITAL ENCOUNTER (EMERGENCY)
Age: 78
Discharge: HOME | End: 2018-06-16
Attending: EMERGENCY MEDICINE | Admitting: EMERGENCY MEDICINE
Payer: MEDICARE

## 2018-06-16 ENCOUNTER — APPOINTMENT (OUTPATIENT)
Dept: GENERAL RADIOLOGY | Age: 78
End: 2018-06-16
Attending: EMERGENCY MEDICINE
Payer: MEDICARE

## 2018-06-16 VITALS
RESPIRATION RATE: 19 BRPM | HEIGHT: 70 IN | HEART RATE: 76 BPM | SYSTOLIC BLOOD PRESSURE: 103 MMHG | OXYGEN SATURATION: 99 % | TEMPERATURE: 99 F | BODY MASS INDEX: 25.77 KG/M2 | WEIGHT: 180 LBS | DIASTOLIC BLOOD PRESSURE: 75 MMHG

## 2018-06-16 DIAGNOSIS — R07.89 CHEST WALL PAIN: Primary | ICD-10-CM

## 2018-06-16 LAB
ALBUMIN SERPL-MCNC: 3.3 G/DL (ref 3.5–5)
ALBUMIN/GLOB SERPL: 0.9 {RATIO} (ref 1.1–2.2)
ALP SERPL-CCNC: 131 U/L (ref 45–117)
ALT SERPL-CCNC: 22 U/L (ref 12–78)
ANION GAP SERPL CALC-SCNC: 8 MMOL/L (ref 5–15)
AST SERPL-CCNC: 27 U/L (ref 15–37)
BASOPHILS # BLD AUTO: 0 X10E3/UL (ref 0–0.2)
BASOPHILS # BLD: 0 K/UL (ref 0–0.1)
BASOPHILS NFR BLD AUTO: 0 %
BASOPHILS NFR BLD: 0 % (ref 0–1)
BILIRUB SERPL-MCNC: 1.1 MG/DL (ref 0.2–1)
BUN SERPL-MCNC: 12 MG/DL (ref 6–20)
BUN/CREAT SERPL: 12 (ref 12–20)
CALCIUM SERPL-MCNC: 9.1 MG/DL (ref 8.5–10.1)
CHLORIDE SERPL-SCNC: 104 MMOL/L (ref 97–108)
CK MB CFR SERPL CALC: 1.1 % (ref 0–2.5)
CK MB SERPL-MCNC: 1.3 NG/ML (ref 5–25)
CK SERPL-CCNC: 119 U/L (ref 26–192)
CO2 SERPL-SCNC: 31 MMOL/L (ref 21–32)
CREAT SERPL-MCNC: 0.97 MG/DL (ref 0.55–1.02)
DIFFERENTIAL METHOD BLD: ABNORMAL
EOSINOPHIL # BLD AUTO: 0 X10E3/UL (ref 0–0.4)
EOSINOPHIL # BLD: 0.1 K/UL (ref 0–0.4)
EOSINOPHIL NFR BLD AUTO: 1 %
EOSINOPHIL NFR BLD: 1 % (ref 0–7)
ERYTHROCYTE [DISTWIDTH] IN BLOOD BY AUTOMATED COUNT: 17 % (ref 11.5–14.5)
ERYTHROCYTE [DISTWIDTH] IN BLOOD BY AUTOMATED COUNT: 17.6 % (ref 12.3–15.4)
GLOBULIN SER CALC-MCNC: 3.8 G/DL (ref 2–4)
GLUCOSE SERPL-MCNC: 85 MG/DL (ref 65–100)
HCT VFR BLD AUTO: 32.6 % (ref 35–47)
HCT VFR BLD AUTO: 33.5 % (ref 34–46.6)
HGB BLD-MCNC: 10.5 G/DL (ref 11.1–15.9)
HGB BLD-MCNC: 9.9 G/DL (ref 11.5–16)
IMM GRANULOCYTES # BLD: 0 K/UL (ref 0–0.04)
IMM GRANULOCYTES # BLD: 0 X10E3/UL (ref 0–0.1)
IMM GRANULOCYTES NFR BLD AUTO: 0 % (ref 0–0.5)
IMM GRANULOCYTES NFR BLD: 0 %
LYMPHOCYTES # BLD AUTO: 0.6 X10E3/UL (ref 0.7–3.1)
LYMPHOCYTES # BLD: 0.6 K/UL (ref 0.8–3.5)
LYMPHOCYTES NFR BLD AUTO: 18 %
LYMPHOCYTES NFR BLD: 12 % (ref 12–49)
MAGNESIUM SERPL-MCNC: 2.1 MG/DL (ref 1.6–2.4)
MCH RBC QN AUTO: 26 PG (ref 26.6–33)
MCH RBC QN AUTO: 26.2 PG (ref 26–34)
MCHC RBC AUTO-ENTMCNC: 30.4 G/DL (ref 30–36.5)
MCHC RBC AUTO-ENTMCNC: 31.3 G/DL (ref 31.5–35.7)
MCV RBC AUTO: 83 FL (ref 79–97)
MCV RBC AUTO: 86.2 FL (ref 80–99)
MONOCYTES # BLD AUTO: 0.8 X10E3/UL (ref 0.1–0.9)
MONOCYTES # BLD: 0.8 K/UL (ref 0–1)
MONOCYTES NFR BLD AUTO: 23 %
MONOCYTES NFR BLD: 16 % (ref 5–13)
MORPHOLOGY BLD-IMP: ABNORMAL
NEUTROPHILS # BLD AUTO: 2 X10E3/UL (ref 1.4–7)
NEUTROPHILS NFR BLD AUTO: 58 %
NEUTS SEG # BLD: 3.4 K/UL (ref 1.8–8)
NEUTS SEG NFR BLD: 71 % (ref 32–75)
NRBC # BLD: 0 K/UL (ref 0–0.01)
NRBC BLD-RTO: 0 PER 100 WBC
PLATELET # BLD AUTO: 162 K/UL (ref 150–400)
PLATELET # BLD AUTO: 172 X10E3/UL (ref 150–379)
PMV BLD AUTO: 11.7 FL (ref 8.9–12.9)
POTASSIUM SERPL-SCNC: 3.4 MMOL/L (ref 3.5–5.1)
PROT SERPL-MCNC: 7.1 G/DL (ref 6.4–8.2)
RBC # BLD AUTO: 3.78 M/UL (ref 3.8–5.2)
RBC # BLD AUTO: 4.04 X10E6/UL (ref 3.77–5.28)
RBC MORPH BLD: ABNORMAL
RBC MORPH BLD: ABNORMAL
SODIUM SERPL-SCNC: 143 MMOL/L (ref 136–145)
T4 FREE SERPL-MCNC: 1.09 NG/DL (ref 0.82–1.77)
TROPONIN I SERPL-MCNC: <0.05 NG/ML
TSH SERPL DL<=0.005 MIU/L-ACNC: 2.93 UIU/ML (ref 0.45–4.5)
WBC # BLD AUTO: 3.5 X10E3/UL (ref 3.4–10.8)
WBC # BLD AUTO: 4.9 K/UL (ref 3.6–11)

## 2018-06-16 PROCEDURE — 74011250636 HC RX REV CODE- 250/636: Performed by: EMERGENCY MEDICINE

## 2018-06-16 PROCEDURE — 84484 ASSAY OF TROPONIN QUANT: CPT | Performed by: EMERGENCY MEDICINE

## 2018-06-16 PROCEDURE — 82550 ASSAY OF CK (CPK): CPT | Performed by: EMERGENCY MEDICINE

## 2018-06-16 PROCEDURE — 74011000250 HC RX REV CODE- 250: Performed by: EMERGENCY MEDICINE

## 2018-06-16 PROCEDURE — 80053 COMPREHEN METABOLIC PANEL: CPT | Performed by: EMERGENCY MEDICINE

## 2018-06-16 PROCEDURE — 85025 COMPLETE CBC W/AUTO DIFF WBC: CPT | Performed by: EMERGENCY MEDICINE

## 2018-06-16 PROCEDURE — 96374 THER/PROPH/DIAG INJ IV PUSH: CPT

## 2018-06-16 PROCEDURE — 36415 COLL VENOUS BLD VENIPUNCTURE: CPT | Performed by: EMERGENCY MEDICINE

## 2018-06-16 PROCEDURE — 74011250637 HC RX REV CODE- 250/637: Performed by: EMERGENCY MEDICINE

## 2018-06-16 PROCEDURE — 83735 ASSAY OF MAGNESIUM: CPT | Performed by: EMERGENCY MEDICINE

## 2018-06-16 PROCEDURE — 93005 ELECTROCARDIOGRAM TRACING: CPT

## 2018-06-16 PROCEDURE — 99285 EMERGENCY DEPT VISIT HI MDM: CPT

## 2018-06-16 PROCEDURE — 71046 X-RAY EXAM CHEST 2 VIEWS: CPT

## 2018-06-16 RX ORDER — SODIUM CHLORIDE 0.9 % (FLUSH) 0.9 %
5-10 SYRINGE (ML) INJECTION EVERY 8 HOURS
Status: DISCONTINUED | OUTPATIENT
Start: 2018-06-16 | End: 2018-06-16 | Stop reason: HOSPADM

## 2018-06-16 RX ORDER — DIAZEPAM 5 MG/1
5 TABLET ORAL
Status: COMPLETED | OUTPATIENT
Start: 2018-06-16 | End: 2018-06-16

## 2018-06-16 RX ORDER — FENTANYL CITRATE 50 UG/ML
50 INJECTION, SOLUTION INTRAMUSCULAR; INTRAVENOUS
Status: COMPLETED | OUTPATIENT
Start: 2018-06-16 | End: 2018-06-16

## 2018-06-16 RX ORDER — PREDNISONE 20 MG/1
40 TABLET ORAL DAILY
Qty: 10 TAB | Refills: 0 | Status: SHIPPED | OUTPATIENT
Start: 2018-06-16 | End: 2018-06-21

## 2018-06-16 RX ORDER — SODIUM CHLORIDE 0.9 % (FLUSH) 0.9 %
5-10 SYRINGE (ML) INJECTION AS NEEDED
Status: DISCONTINUED | OUTPATIENT
Start: 2018-06-16 | End: 2018-06-16 | Stop reason: HOSPADM

## 2018-06-16 RX ADMIN — DIAZEPAM 5 MG: 5 TABLET ORAL at 16:06

## 2018-06-16 RX ADMIN — FENTANYL CITRATE 50 MCG: 50 INJECTION, SOLUTION INTRAMUSCULAR; INTRAVENOUS at 17:47

## 2018-06-16 RX ADMIN — LIDOCAINE HYDROCHLORIDE 40 ML: 20 SOLUTION ORAL; TOPICAL at 17:47

## 2018-06-16 NOTE — ED NOTES
Assumed care of patient. Patient presents with chief complaint of left sided chest discomfort since Wednesday. Patient states she originally assumed discomfort was indigestion due to corn on the cob she ate earlier in the day but pain persisted. Patient reports pain is reproducible with movement and reports pain with deep inspiration. Patient states she has experienced dry cough recently. Patient denies nausea, vomiting, diarrhea. Last bowel movement was yesterday, no blood in stool reported. Patient states she attempted to medicate with Aleve and a half of a Percocet with no relief. Patient is alert and oriented x4, respirations even and unlabored, VSS. Monitor x3, call bell within reach.

## 2018-06-16 NOTE — ED NOTES
Discharge instructions reviewed with patient. Discharge instructions given to patient per Dr. Mary Duarte. Patient able to return/verbalize discharge instructions. Copy of discharge instructions provided. Patient condition stable, respiratory status within normal limits, neuro status intact. Escorted by wheelchair out of ER, accompanied by family and ED tech.

## 2018-06-16 NOTE — DISCHARGE INSTRUCTIONS

## 2018-06-16 NOTE — PROGRESS NOTES
[address] [facility] and Primary Care  [place] [address]  [address] [place] [address]  Phone:  [phone]  Fax: [phone]       Chief Complaint   Patient presents with    Hyperthyroidism     1 month follow up    . SUBJECTIVE:    Amee Gasca is a 66 y.o. female comes in for a return visit stating that she has not done well because of vague pain in her upper chest wall and shoulders. She has been taking her thyroid medication as prescribed. Unfortunately, she has lost eight pounds since I last saw her. She states that her eating is fine. She denies any dyspnea on exertion, orthopnea, or PND. She does have a history of atrial fibrillation and is maintained on her direct oral anti-coagulant. She follows with her medical oncologist for her multiple myeloma. Current Outpatient Prescriptions   Medication Sig Dispense Refill    oxyCODONE-acetaminophen (PERCOCET)  mg per tablet Take 1 Tab by mouth every eight (8) hours as needed for Pain. Max Daily Amount: 3 Tabs. 50 Tab 0    furosemide (LASIX) 40 mg tablet Take 1 Tab by mouth daily. 30 Tab 11    methIMAzole (TAPAZOLE) 5 mg tablet Take 2 Tabs by mouth two (2) times a day. (Patient taking differently: Take 5 mg by mouth two (2) times a day.) 120 Tab 0    predniSONE (DELTASONE) 20 mg tablet Take 3 Tabs by mouth daily (after dinner). 15 Tab 0    lisinopril (PRINIVIL, ZESTRIL) 40 mg tablet Take 1 Tab by mouth daily. 30 Tab 3    omeprazole (PRILOSEC) 40 mg capsule Take 1 Cap by mouth daily. 30 Cap 1    naloxone (NARCAN) 4 mg/actuation nasal spray Use 1 spray intranasally into 1 nostril. Use a new Narcan nasal spray for subsequent doses and administer into alternating nostrils. May repeat every 2 to 3 minutes as needed. 2 Each 0    cyclobenzaprine (FLEXERIL) 5 mg tablet Take 1 Tab by mouth three (3) times daily as needed for Muscle Spasm(s). 15 Tab 0    REVLIMID 10 mg cap Take  by mouth.  Indications: 1 tab daily      potassium chloride (K-DUR, KLOR-CON) 20 mEq tablet Take 1 Tab by mouth two (2) times a day. 60 Tab 11    metoprolol succinate (TOPROL-XL) 25 mg XL tablet Take 0.5 Tabs by mouth daily. 30 Tab 11    aluminum hydrox-magnesium carb (GAVISCON EXTRA STRENGTH) 160-105 mg chew Take 1-2 tablets by mouth four (4) times daily as needed.  apixaban (ELIQUIS) 5 mg tablet Take 1 Tab by mouth two (2) times a day.  61 Tab 0     Past Medical History:   Diagnosis Date    Acute combined systolic and diastolic HF (heart failure), NYHA class 2 ([address] 8/11/2017    Arthritis     Atrial fibrillation (HCC)     Cancer (HCC)     multiple myeloma    Hypertension     S/P AV kathleen ablation 8/11/2017    Status post biventricular pacemaker 8/11/2017 8/11/17      Past Surgical History:   Procedure Laterality Date    HX GYN      HX ORTHOPAEDIC      knee    HX PACEMAKER  08/11/2017     Allergies   Allergen Reactions    Codeine Other (comments)         REVIEW OF SYSTEMS:  General: negative for - chills or fever  ENT: negative for - headaches, nasal congestion or tinnitus  Respiratory: negative for - cough, hemoptysis, shortness of breath or wheezing  Cardiovascular : negative for - chest pain, edema, palpitations or shortness of breath  Gastrointestinal: negative for - abdominal pain, blood in stools, heartburn or nausea/vomiting  Genito-Urinary: no dysuria, trouble voiding, or hematuria  Musculoskeletal: negative for - gait disturbance, joint pain, joint stiffness or joint swelling  Neurological: no TIA or stroke symptoms  Hematologic: no bruises, no bleeding, no swollen glands  Integument: no lumps, mole changes, nail changes or rash  Endocrine: no malaise/lethargy or unexpected weight changes      Social History     Social History    Marital status:      Spouse name: N/A    Number of children: N/A    Years of education: N/A     Social History Main Topics    Smoking status: Never Smoker    Smokeless tobacco: Never Used    Alcohol use No    Drug use: No    Sexual activity: Not Currently     Other Topics Concern    None     Social History Narrative     Family History   Problem Relation Age of Onset    Stroke Mother     No Known Problems Father        OBJECTIVE:    Visit Vitals    BP (!) 152/96    Pulse 75    Temp 97.7 °F (36.5 °C) (Oral)    Resp 16    Ht 5' 10\" (1.778 m)    Wt 181 lb 1.6 oz (82.1 kg)    SpO2 98%    BMI 25.99 kg/m2     CONSTITUTIONAL: well , well nourished, appears age appropriate  EYES: perrla, eom intact  ENMT:moist mucous membranes, pharynx clear  NECK: supple. Thyroid normal  RESPIRATORY: Chest: clear to ascultation and percussion   CARDIOVASCULAR: Heart: regular rate and rhythm  GASTROINTESTINAL: Abdomen: soft, bowel sounds active  HEMATOLOGIC: no pathological lymph nodes palpated  MUSCULOSKELETAL: Extremities: no edema, pulse 1+   INTEGUMENT: No unusual rashes or suspicious skin lesions noted. Nails appear normal.  NEUROLOGIC: non-focal exam   MENTAL STATUS: alert and oriented, appropriate affect      ASSESSMENT:  1. Hyperthyroidism    2. Weight loss    3. Abdominal pain, unspecified abdominal location    4. Essential hypertension    5. Multiple myeloma, remission status unspecified ([address]    6. Atrial flutter, unspecified type ([address]    7. Primary osteoarthritis of both knees        PLAN:    1. She does have a history of hyperthyroidism. Her methimazole dose is 5 mg b.i.d.  I will assess the values. The weight loss is mild suggestive maybe her thyroid status has worsened. Clinically, she appears to be quite stable with no suggestive evidence of sympathetic hyperactivity. 2. She has vague abdominal pain localized to her left flank region. No pathology is noted today. She is actually better. I will not pursue this. 3. Blood pressure is excellent today. No adjustments are made.    4. She will continue follow up with her medical oncologist.    5. Her ventricular rate is quite stable. There are no overt signs of congestive heart failure. 6. She does have osteoarthritis of her long running knees, but this is also stable. 7. The vague upper chest wall discomfort is musculoskeletal.  Nothing more needs to be done other than symptomatic treatment. .  Orders Placed This Encounter    TSH 3RD GENERATION    T4, FREE    CBC WITH AUTOMATED DIFF    TSH 3RD GENERATION    T4, FREE    oxyCODONE-acetaminophen (PERCOCET)  mg per tablet         Follow-up Disposition:  Return in about 4 weeks (around 7/13/2018).       Silvia Rangel MD

## 2018-06-16 NOTE — ED PROVIDER NOTES
EMERGENCY DEPARTMENT HISTORY AND PHYSICAL EXAM      Date: 6/16/2018  Patient Name: Medardo José    History of Presenting Illness     Chief Complaint   Patient presents with    Chest Pain     Into triage via wheelchair for c/o Lt sided CP, dry cough and SOB x 6/13.  Shortness of Breath    Cough       History Provided By: Patient    HPI: Medardo José, 66 y.o. female with PMHx significant for HTN, arthritis, CA, and A-fib, presents ambulatory  to the ED with cc of gradually worsening, persistent, 10/10 L lower chest wall pain and spasms that started on Wednesday this week, but got worse on Thursday alongside SOB, and dry cough. Pt reports that her SOB is secondary to the CP. Pt states that she ate corn which she originally believed to give her the pain due to indigestion. Pt states that she has taken Prednisone in the past for her chest wall pain which helped with relief. Pt states that her pain is 10/10 with movement and positional changes but otherwise does not hurt. Pt reports a hx of past spasms. Pt endorses taking Percocets yesterday evening for the pain, which she reports did not help with relief. Pt denies passing gas recently, and states that her last BM was yesterday. Pt denies any blood in her stool. Pt reports having a pacemaker, but denies a hx of heart attacks, but notes that she got the pacemaker for a slow heart beat. Pt states that she visited Dr. Emanuel Briggs for the pacemaker. Pt reports that she is taking Eliquis. Pt specifically denies having any nausea, vomiting, diarrhea. Pt does not endorse smoking tobacco and does not drink alcohol. Chief Complaint: L lower chest wall pain  Duration: since 6/13/18   Timing:  Gradual, Constant and Worsening  Location: L chest wall  Quality: N/A  Severity: 10 out of 10  Modifying Factors: Percocets did not help. Prednisone did help in the past.  Associated Symptoms: SOB    There are no other complaints, changes, or physical findings at this time.     PCP: Pérez Crowder MD   Cardiologist: Dr. Eileen Albrecht    Current Facility-Administered Medications   Medication Dose Route Frequency Provider Last Rate Last Dose    sodium chloride (NS) flush 5-10 mL  5-10 mL IntraVENous Q8H Samantha Derek, DO        sodium chloride (NS) flush 5-10 mL  5-10 mL IntraVENous PRN Samantha Derek, DO         Current Outpatient Prescriptions   Medication Sig Dispense Refill    predniSONE (DELTASONE) 20 mg tablet Take 2 Tabs by mouth daily for 5 days. With Breakfast 10 Tab 0    oxyCODONE-acetaminophen (PERCOCET)  mg per tablet Take 1 Tab by mouth every eight (8) hours as needed for Pain. Max Daily Amount: 3 Tabs. 50 Tab 0    apixaban (ELIQUIS) 5 mg tablet Take 1 Tab by mouth two (2) times a day. 60 Tab 0    furosemide (LASIX) 40 mg tablet Take 1 Tab by mouth daily. 30 Tab 11    methIMAzole (TAPAZOLE) 5 mg tablet Take 2 Tabs by mouth two (2) times a day. (Patient taking differently: Take 5 mg by mouth two (2) times a day.) 120 Tab 0    lisinopril (PRINIVIL, ZESTRIL) 40 mg tablet Take 1 Tab by mouth daily. 30 Tab 3    omeprazole (PRILOSEC) 40 mg capsule Take 1 Cap by mouth daily. 30 Cap 1    naloxone (NARCAN) 4 mg/actuation nasal spray Use 1 spray intranasally into 1 nostril. Use a new Narcan nasal spray for subsequent doses and administer into alternating nostrils. May repeat every 2 to 3 minutes as needed. 2 Each 0    cyclobenzaprine (FLEXERIL) 5 mg tablet Take 1 Tab by mouth three (3) times daily as needed for Muscle Spasm(s). 15 Tab 0    REVLIMID 10 mg cap Take  by mouth. Indications: 1 tab daily      potassium chloride (K-DUR, KLOR-CON) 20 mEq tablet Take 1 Tab by mouth two (2) times a day. 60 Tab 11    metoprolol succinate (TOPROL-XL) 25 mg XL tablet Take 0.5 Tabs by mouth daily. 30 Tab 11    aluminum hydrox-magnesium carb (GAVISCON EXTRA STRENGTH) 160-105 mg chew Take 1-2 tablets by mouth four (4) times daily as needed.          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), NYHA class 2 ([facility] [address] 8/11/2017    Arthritis     Atrial fibrillation (HCC)     Cancer (HCC)     multiple myeloma    Hypertension     S/P AV kathleen ablation 8/11/2017    Status post biventricular pacemaker 8/11/2017 8/11/17        Past Surgical History:  Past Surgical History:   Procedure Laterality Date    HX GYN      HX ORTHOPAEDIC      knee    HX PACEMAKER  08/11/2017       Family History:  Family History   Problem Relation Age of Onset    Stroke Mother     No Known Problems Father        Social History:  Social History   Substance Use Topics    Smoking status: Never Smoker    Smokeless tobacco: Never Used    Alcohol use No       Allergies: Allergies   Allergen Reactions    Codeine Other (comments)         Review of Systems   Review of Systems   Constitutional: Negative. Negative for appetite change, chills, fatigue and fever. HENT: Negative. Negative for congestion, rhinorrhea, sinus pressure and sore throat. Eyes: Negative. Respiratory: Positive for cough and shortness of breath. Negative for choking, chest tightness and wheezing. Cardiovascular: Positive for chest pain. Negative for palpitations and leg swelling. Gastrointestinal: Negative for abdominal pain, constipation, diarrhea, nausea and vomiting. Endocrine: Negative. Genitourinary: Negative. Negative for difficulty urinating, dysuria, flank pain and urgency.        -bloody stool   Musculoskeletal: Negative. Skin: Negative. Neurological: Negative. Negative for dizziness, speech difficulty, weakness, light-headedness, numbness and headaches. Psychiatric/Behavioral: Negative. All other systems reviewed and are negative. Physical Exam   Physical Exam   Constitutional: She is oriented to person, place, and time. She appears well-developed and well-nourished. No distress.    Elderly female, no acute distress   HENT:   Head: Normocephalic and atraumatic. Mouth/Throat: Oropharynx is clear and moist. No oropharyngeal exudate. Eyes: Conjunctivae and EOM are normal. Pupils are equal, round, and reactive to light. Neck: Normal range of motion. Neck supple. No JVD present. No tracheal deviation present. Cardiovascular: Normal rate, regular rhythm, normal heart sounds and intact distal pulses. No murmur heard. Pulmonary/Chest: Effort normal and breath sounds normal. No stridor. No respiratory distress. She has no wheezes. She has no rales. She exhibits tenderness (Left). Pacemaker   Abdominal: Soft. She exhibits no distension. There is no tenderness. There is no rebound and no guarding. Musculoskeletal: Normal range of motion. She exhibits no edema or tenderness. Neurological: She is alert and oriented to person, place, and time. No cranial nerve deficit. No gross motor or sensory deficits    Skin: Skin is warm and dry. She is not diaphoretic. Psychiatric: She has a normal mood and affect. Her behavior is normal.   Nursing note and vitals reviewed.       Diagnostic Study Results     Labs -     Recent Results (from the past 12 hour(s))   EKG, 12 LEAD, INITIAL    Collection Time: 06/16/18  3:02 PM   Result Value Ref Range    Ventricular Rate 75 BPM    Atrial Rate 68 BPM    QRS Duration 144 ms    Q-T Interval 448 ms    QTC Calculation (Bezet) 500 ms    Calculated R Axis -88 degrees    Calculated T Axis 78 degrees    Diagnosis       Ventricular-paced rhythm  Biventricular pacemaker detected  When compared with ECG of 02-JAN-2018 10:04,  No significant change was found     CBC WITH AUTOMATED DIFF    Collection Time: 06/16/18  4:07 PM   Result Value Ref Range    WBC 4.9 3.6 - 11.0 K/uL    RBC 3.78 (L) 3.80 - 5.20 M/uL    HGB 9.9 (L) 11.5 - 16.0 g/dL    HCT 32.6 (L) 35.0 - 47.0 %    MCV 86.2 80.0 - 99.0 FL    MCH 26.2 26.0 - 34.0 PG    MCHC 30.4 30.0 - 36.5 g/dL    RDW 17.0 (H) 11.5 - 14.5 %    PLATELET 162 150 - 400 K/uL    MPV 11.7 8.9 - 12.9 FL    NRBC 0.0 0  WBC    ABSOLUTE NRBC 0.00 0.00 - 0.01 K/uL    NEUTROPHILS 71 32 - 75 %    LYMPHOCYTES 12 12 - 49 %    MONOCYTES 16 (H) 5 - 13 %    EOSINOPHILS 1 0 - 7 %    BASOPHILS 0 0 - 1 %    IMMATURE GRANULOCYTES 0 0.0 - 0.5 %    ABS. NEUTROPHILS 3.4 1.8 - 8.0 K/UL    ABS. LYMPHOCYTES 0.6 (L) 0.8 - 3.5 K/UL    ABS. MONOCYTES 0.8 0.0 - 1.0 K/UL    ABS. EOSINOPHILS 0.1 0.0 - 0.4 K/UL    ABS. BASOPHILS 0.0 0.0 - 0.1 K/UL    ABS. IMM. GRANS. 0.0 0.00 - 0.04 K/UL    DF AUTOMATED      RBC COMMENTS ANISOCYTOSIS  1+        RBC COMMENTS OVALOCYTES  PRESENT       METABOLIC PANEL, COMPREHENSIVE    Collection Time: 06/16/18  4:07 PM   Result Value Ref Range    Sodium 143 136 - 145 mmol/L    Potassium 3.4 (L) 3.5 - 5.1 mmol/L    Chloride 104 97 - 108 mmol/L    CO2 31 21 - 32 mmol/L    Anion gap 8 5 - 15 mmol/L    Glucose 85 65 - 100 mg/dL    BUN 12 6 - 20 MG/DL    Creatinine 0.97 0.55 - 1.02 MG/DL    BUN/Creatinine ratio 12 12 - 20      GFR est AA >60 >60 ml/min/1.73m2    GFR est non-AA 56 (L) >60 ml/min/1.73m2    Calcium 9.1 8.5 - 10.1 MG/DL    Bilirubin, total 1.1 (H) 0.2 - 1.0 MG/DL    ALT (SGPT) 22 12 - 78 U/L    AST (SGOT) 27 15 - 37 U/L    Alk.  phosphatase 131 (H) 45 - 117 U/L    Protein, total 7.1 6.4 - 8.2 g/dL    Albumin 3.3 (L) 3.5 - 5.0 g/dL    Globulin 3.8 2.0 - 4.0 g/dL    A-G Ratio 0.9 (L) 1.1 - 2.2     CK W/ CKMB & INDEX    Collection Time: 06/16/18  4:07 PM   Result Value Ref Range     26 - 192 U/L    CK - MB 1.3 <3.6 NG/ML    CK-MB Index 1.1 0 - 2.5     MAGNESIUM    Collection Time: 06/16/18  4:07 PM   Result Value Ref Range    Magnesium 2.1 1.6 - 2.4 mg/dL   TROPONIN I    Collection Time: 06/16/18  4:07 PM   Result Value Ref Range    Troponin-I, Qt. <0.05 <0.05 ng/mL       Radiologic Studies -   XR CHEST PA LAT   Final Result        CT Results  (Last 48 hours)    None        CXR Results  (Last 48 hours)               06/16/18 1635  XR CHEST PA LAT Final result    Impression:  IMPRESSION: Cardiomegaly with cardiac pacemaker. No acute abnormality and no   change. Narrative:  EXAM:  XR CHEST PA LAT. INDICATION: Left-sided chest wall pain. COMPARISON: 4/7/2018. FINDINGS:    PA and lateral radiographs of the chest were obtained. There is a pacemaker in   the left chest and the patient is on a heart monitor. Lungs: The lungs are clear of mass, nodule, airspace disease or edema. Pleura: There is no pleural effusion or pneumothorax. Mediastinum: Cardiac silhouette is enlarged and the aorta is atherosclerotic and   ectatic but unchanged. Bones and soft tissues: There are degenerative changes of the spine. Medical Decision Making   I am the first provider for this patient. I reviewed the vital signs, available nursing notes, past medical history, past surgical history, family history and social history. Vital Signs-Reviewed the patient's vital signs.   Patient Vitals for the past 12 hrs:   Temp Pulse Resp BP SpO2   06/16/18 1915 - 76 19 103/75 99 %   06/16/18 1900 - 75 21 111/75 97 %   06/16/18 1845 - 75 17 103/69 99 %   06/16/18 1830 - 75 20 111/74 98 %   06/16/18 1815 - 75 16 103/71 98 %   06/16/18 1800 - 75 16 111/72 97 %   06/16/18 1745 - 75 24 112/69 100 %   06/16/18 1730 - 75 22 108/72 100 %   06/16/18 1715 - 75 19 - 100 %   06/16/18 1700 - 75 20 - 100 %   06/16/18 1645 - 75 19 - 100 %   06/16/18 1615 - 76 22 121/79 99 %   06/16/18 1600 - 75 22 105/68 99 %   06/16/18 1545 - 76 20 125/75 100 %   06/16/18 1455 99 °F (37.2 °C) 74 18 (!) 142/94 100 %       Pulse Oximetry Analysis - 100% on RA    Cardiac Monitor:   Rate: 74 bpm  Rhythm: Normal Sinus Rhythm      EKG interpretation: (Preliminary)  Ventricular paced, rate 75, Niki Ander, DO      Records Reviewed: Nursing Notes and Old Medical Records    Provider Notes (Medical Decision Making):   DDx: muscle spasm, ACS, electrolyte abnormality, PNA    ED Course: Initial assessment performed. The patients presenting problems have been discussed, and they are in agreement with the care plan formulated and outlined with them. I have encouraged them to ask questions as they arise throughout their visit. PROGRESS NOTE:  6:20 PM  Pt is felling better and the pain is resolved. Written by Surendra Farley ED Scribe, as dictated by Kyrie Crenshaw DO. PROGRESS NOTE:  7:15 PM  Pt is feeling better and ready to go home. Written by LUANNE Agosto Chaibe, as dictated by Kyrie Crenshaw DO.    Critical Care Time: 0 mintues    Disposition:  DISCHARGE NOTE  7:37 PM  The patient has been re-evaluated and is ready for discharge. Reviewed available results with patient. Counseled pt on diagnosis and care plan. Pt has expressed understanding, and all questions have been answered. Pt agrees with plan and agrees to F/U as recommended, or return to the ED if their sxs worsen. Discharge instructions have been provided and explained to the pt, along with reasons to return to the ED. Written by LUANNE Agosto Chaibe, as dictated by Kyrie Crenshaw DO. PLAN:  1. Current Discharge Medication List      CONTINUE these medications which have CHANGED    Details   predniSONE (DELTASONE) 20 mg tablet Take 2 Tabs by mouth daily for 5 days. With Breakfast  Qty: 10 Tab, Refills: 0           2. Follow-up Information     Follow up With Details Comments [id number] MD Beckie  As needed Luis [address]  [phone]          Return to ED if worse     Diagnosis     Clinical Impression:   1. Chest wall pain        Attestations: This note is prepared by Surendra Farley, acting as Scribe for Kyrie Crenshaw, [address] [place] [address]: The scribe's documentation has been prepared under my direction and personally reviewed by me in its entirety.  I confirm that the note above accurately reflects all work, treatment, procedures, and medical decision making performed by me.

## 2018-06-17 LAB
ATRIAL RATE: 68 BPM
CALCULATED R AXIS, ECG10: -88 DEGREES
CALCULATED T AXIS, ECG11: 78 DEGREES
DIAGNOSIS, 93000: NORMAL
Q-T INTERVAL, ECG07: 448 MS
QRS DURATION, ECG06: 144 MS
QTC CALCULATION (BEZET), ECG08: 500 MS
VENTRICULAR RATE, ECG03: 75 BPM

## 2018-06-17 NOTE — PROGRESS NOTES
Reduce methimazole to 5 mg daily-----think I told you to do this before but remind patient again----see me

## 2018-06-18 ENCOUNTER — PATIENT OUTREACH (OUTPATIENT)
Dept: INTERNAL MEDICINE CLINIC | Age: 78
End: 2018-06-18

## 2018-06-18 NOTE — PROGRESS NOTES
NN Health Support & Risk Prevention:  Wellbeing: Abdom Discomfort    Goals Addressed             Most Recent     Coordinate pain management plan for patient. On track (6/15/2018)             Coordinate pain management plan for patient. Patient agreed to place warm soaks to later knee for severe pain and to take pain medication PRN.     1/22/2018:  C/o severe gas pain and back pain. Patient seen in ED [facility] ED 1/2/2018 for Back Pain. Patient stated she was in the office for a Cardiology appointment on 1/2/2018; stated the had so many patients they were sending some to the ED and she was one. Today patient states she is taking Gaviscon as ordered by PCP (4x/day PRN). States pain in ED was on the left side, but now on right side and abdomin. States she ate beans on Friday, but has moved bowels daily. States the pain is not from the beans for this is the 3rd day since consuming. 5/9/2018:  NN rec'd call from patient w/ c/o increasing neck pain; requested PCP to know the pain has increased since prednisone medication not refilled. Consult done w/ PCP; stated medication w/ be called in. Patient encouraged to continue to apply warm soaks to neck area q 4 hrs for pain relief and percocet as ordered by PCP. Patient agreeded. NN called patient back to inform that PCP will be calling prescription. 6/18/2018:  Patient walk-in; c/o not feeling well/slight abdom pain as attended by PCP today. NN teachings:  Chewing food well can prevent abdominal/acid burning. Patient agrees to eating softer foods until discomfort is noted, and drinking plenty of water in the allowed amount. Patient agreed to eating less meat.               Goal completion:  7/20/2018

## 2018-06-19 ENCOUNTER — TELEPHONE (OUTPATIENT)
Dept: INTERNAL MEDICINE CLINIC | Age: 78
End: 2018-06-19

## 2018-06-20 ENCOUNTER — OFFICE VISIT (OUTPATIENT)
Dept: CARDIOLOGY CLINIC | Age: 78
End: 2018-06-20

## 2018-06-20 VITALS
HEART RATE: 73 BPM | BODY MASS INDEX: 26.2 KG/M2 | OXYGEN SATURATION: 92 % | HEIGHT: 70 IN | DIASTOLIC BLOOD PRESSURE: 90 MMHG | SYSTOLIC BLOOD PRESSURE: 140 MMHG | WEIGHT: 183 LBS

## 2018-06-20 DIAGNOSIS — I10 ESSENTIAL HYPERTENSION: ICD-10-CM

## 2018-06-20 DIAGNOSIS — I50.22 CHRONIC SYSTOLIC CONGESTIVE HEART FAILURE (HCC): Primary | ICD-10-CM

## 2018-06-20 DIAGNOSIS — I48.20 CHRONIC ATRIAL FIBRILLATION (HCC): Chronic | ICD-10-CM

## 2018-06-20 NOTE — PROGRESS NOTES
Chief Complaint   Patient presents with    CHF    Shortness of Breath     1. Have you been to the ER, urgent care clinic since your last visit? Hospitalized since your last visit? YES, [facility] ON 6/16/18 FOR COUGH, CHEST PAIN, AND SOB    2. Have you seen or consulted any other health care providers outside of the [facility] since your last visit? Include any pap smears or colon screening.  YES

## 2018-06-20 NOTE — MR AVS SNAPSHOT
[address] [facility] 
[phone] Patient: Vale Pallas MRN: [id number] KSC:[id number] Visit Information Date & Time Provider Department Dept. Phone Encounter #  
 6/20/2018 10:15 AM Radha Mota MD [facility] [phone] [id number] Your Appointments 6/28/2018  2:50 PM  
New Patient with Delicia Yang MD  
[facility] [address] [place] [address]) Appt Note: np hyperthyroisdism ref by Dr. Mauricio Beal [id number]([phone]) $0CP yd 04/23/18 appt scheduled by 's office [phone] [address] [place] [address] Road 7/13/2018  9:15 AM  
Any with Yvrose Lucas MD  
[address] [place] [address] and Primary Care [address] [place] [address]) Appt Note: 1 month follow up  
 [address] [place] [address]  
[phone]  
  
   
 [address] [id number]  
  
    
 12/21/2018  9:45 AM  
ESTABLISHED PATIENT with Radha Mota MD  
[facility] [address]) Appt Note: Per Dr. Dre Granger, 6 mo f/u-scc [address] [facility]  
[phone] [address] [facility] Upcoming Health Maintenance Date Due Influenza Age 5 to Adult 8/1/2018 MEDICARE YEARLY EXAM 1/23/2019 GLAUCOMA SCREENING Q2Y 2/12/2020 DTaP/Tdap/Td series (2 - Td) 5/19/2026 Allergies as of 6/20/2018  Review Complete On: 6/20/2018 By: Kay Peer Severity Noted Reaction Type Reactions Codeine  09/05/2014    Other (comments) Current Immunizations  Reviewed on 8/29/2017 Name Date Influenza Vaccine 9/15/2014 Not reviewed this visit You Were Diagnosed With   
  
 Codes Comments  Chronic systolic congestive heart failure ([facility] [address]    -  Primary ICD-10-CM: I50.22 ICD-9-CM: 428.22, 428.0 Essential hypertension     ICD-10-CM: I10 
ICD-9-CM: 401.9 Chronic atrial fibrillation (HCC)     ICD-10-CM: H27.0 ICD-9-CM: 427.31 Vitals BP Pulse Height(growth percentile) Weight(growth percentile) SpO2 BMI  
 140/90 (BP 1 Location: Left arm, BP Patient Position: Sitting) 73 5' 10\" (1.778 m) 183 lb (83 kg) 92% 26.26 kg/m2 OB Status Smoking Status Hysterectomy Never Smoker Vitals History BMI and BSA Data Body Mass Index Body Surface Area  
 26.26 kg/m 2 2.02 m 2 Preferred Pharmacy Pharmacy Name Phone [facility] [facility], [address] [place] [address] [phone] Your Updated Medication List  
  
   
This list is accurate as of 6/20/18 11:10 AM.  Always use your most recent med list.  
  
  
  
  
 apixaban 5 mg tablet Commonly known as:  Juanita Copier Take 1 Tab by mouth two (2) times a day. cyclobenzaprine 5 mg tablet Commonly known as:  FLEXERIL Take 1 Tab by mouth three (3) times daily as needed for Muscle Spasm(s). furosemide 40 mg tablet Commonly known as:  LASIX Take 1 Tab by mouth daily. GAVISCON EXTRA STRENGTH 160-105 mg Geryl Dee Generic drug:  aluminum hydrox-magnesium carb Take 1-2 tablets by mouth four (4) times daily as needed. lisinopril 40 mg tablet Commonly known as:  Therisa Semen Take 1 Tab by mouth daily. methIMAzole 5 mg tablet Commonly known as:  TAPAZOLE Take 2 Tabs by mouth two (2) times a day. metoprolol succinate 25 mg XL tablet Commonly known as:  TOPROL-XL Take 0.5 Tabs by mouth daily. naloxone 4 mg/actuation nasal spray Commonly known as:  [employer] Use 1 spray intranasally into 1 nostril. Use a new Narcan nasal spray for subsequent doses and administer into alternating nostrils. May repeat every 2 to 3 minutes as needed. omeprazole 40 mg capsule Commonly known as:  PRILOSEC  
 Take 1 Cap by mouth daily. oxyCODONE-acetaminophen  mg per tablet Commonly known as:  PERCOCET Take 1 Tab by mouth every eight (8) hours as needed for Pain. Max Daily Amount: 3 Tabs. potassium chloride 20 mEq tablet Commonly known as:  K-DUR, KLOR-CON Take 1 Tab by mouth two (2) times a day. predniSONE 20 mg tablet Commonly known as:  [place] Take 2 Tabs by mouth daily for 5 days. With Breakfast  
  
 REVLIMID 10 mg Cap Generic drug:  lenalidomide Take  by mouth. Indications: 1 tab daily We Performed the Following AMB POC EKG ROUTINE W/ 12 LEADS, INTER & REP [32643 CPT(R)] To-Do List   
 08/03/2018 7:00 AM  
  Appointment with [facility] CT 1 at [facility] CT ([phone]) CONTRAST STUDY: 1. The patient should not eat solid food four hours before the appointment but should be encouraged to drink clear liquids. 2.  If you have to drink oral contrast, please pick it up any weekday prior to your appointment, if you cannot please check in 2 hrs before appt time. 3.  The patient will require IV access for contrast administration. 4.  The patient should not take Ibuprofen (Advil, Motrin, etc.) and Naproxen Sodium (Aleve, etc.)  on the day of the exam. Stopping non-steroidal anti-inflammatory agents (NSAIDs) like Ibuprofen decreases the risk of kidney damage from the x-ray contrast (dye). 5.  Bring any non [facility] facility films/images pertaining to the area of interest with you on the day of appointment. 6.  Bring current lab work if available (within last 90 days [facility]) ***If scheduled at [facility] is not available, labs will need to be done before appointment*** 7. Check in at registration at least 30 minutes before appt time unless you were instructed to do otherwise. Introducing [facility]!    
 Eliot Azul introduces [employer] patient portal. Now you can access parts of your medical record, email your doctor's office, and request medication refills online. 1. In your internet browser, go to [email or web address] 2. Click on the First Time User? Click Here link in the Sign In box. You will see the New Member Sign Up page. 3. Enter your [employer] Access Code exactly as it appears below. You will not need to use this code after youve completed the sign-up process. If you do not sign up before the expiration date, you must request a new code. · [employer] Access Code: [email or web address] Expires: 9/13/2018 11:18 AM 
 
4. Enter the last four digits of your Social Security Number (xxxx) and Date of Birth (mm/dd/yyyy) as indicated and click Submit. You will be taken to the next sign-up page. 5. Create a [employer] ID. This will be your [employer] login ID and cannot be changed, so think of one that is secure and easy to remember. 6. Create a [employer] password. You can change your password at any time. 7. Enter your Password Reset Question and Answer. This can be used at a later time if you forget your password. 8. Enter your e-mail address. You will receive e-mail notification when new information is available in [address] 9. Click Sign Up. You can now view and download portions of your medical record. 10. Click the Download Summary menu link to download a portable copy of your medical information. If you have questions, please visit the Frequently Asked Questions section of the [employer] website. Remember, [employer] is NOT to be used for urgent needs. For medical emergencies, dial 911. Now available from your iPhone and Android! Please provide this summary of care documentation to your next provider. Your primary care clinician is listed as Deanna Taylor. If you have any questions after today's visit, please call [phone].

## 2018-06-20 NOTE — PROGRESS NOTES
NAME:  Shalini Dey   :   1940   MRN:   [id number]   PCP:  Sharri Castellanos MD           Subjective: The patient is a 66y.o. year old female  who returns for a routine follow-up. Since the last visit, patient reports no change in exercise tolerance, chest pain, edema, medication intolerance, palpitations, shortness of breath, PND/orthopnea wheezing, sputum, syncope, dizziness or light headedness. Doing well. Past Medical History:   Diagnosis Date    Acute combined systolic and diastolic HF (heart failure), NYHA class 2 ([address] 2017    Arthritis     Atrial fibrillation (HCC)     Cancer (HCC)     multiple myeloma    Hypertension     S/P AV kathleen ablation 2017    Status post biventricular pacemaker 2017         ICD-10-CM ICD-9-CM    1. Chronic systolic congestive heart failure (HCC) I50.22 428.22      428.0    2. Essential hypertension I10 401.9 AMB POC EKG ROUTINE W/ 12 LEADS, INTER & REP   3. Chronic atrial fibrillation (HCC) I48.2 427.31 AMB POC EKG ROUTINE W/ 12 LEADS, INTER & REP      Social History   Substance Use Topics    Smoking status: Never Smoker    Smokeless tobacco: Never Used    Alcohol use No      Family History   Problem Relation Age of Onset    Stroke Mother     No Known Problems Father         Review of Systems  General: Pt denies excessive weight gain or loss. Pt is able to conduct ADL's  HEENT: Denies blurred vision, headaches, epistaxis and difficulty swallowing. Respiratory: Denies shortness of breath, KNOTT, wheezing or stridor. Cardiovascular: Denies precordial pain, palpitations, edema or PND  Gastrointestinal: Denies poor appetite, indigestion, abdominal pain or blood in stool  Musculoskeletal: Denies pain or swelling from muscles or joints  Neurologic: Denies tremor, paresthesias, or sensory motor disturbance  Skin: Denies rash, itching or texture change.     Objective:       Vitals:    18 1003 06/20/18 1055   BP: (!) 148/92 140/90   Pulse: 73    SpO2: 92%    Weight: 183 lb (83 kg)    Height: 5' 10\" (1.778 m)     Body mass index is 26.26 kg/(m^2). General PE  Mental Status - Alert. General Appearance - Not in acute distress. Chest and Lung Exam   Inspection: Accessory muscles - No use of accessory muscles in breathing. Auscultation:   Breath sounds: - Normal.    Cardiovascular   Inspection: Jugular vein - Bilateral - Inspection Normal.  Palpation/Percussion:   Apical Impulse: - Normal.  Auscultation: Rhythm - Regular. Heart Sounds - S1 WNL and S2 WNL. No S3 or S4. Murmurs & Other Heart Sounds: Auscultation of the heart reveals - No Murmurs. Peripheral Vascular   Upper Extremity: Inspection - Bilateral - No Cyanotic nailbeds or Digital clubbing. Lower Extremity:   Palpation: Edema - Bilateral - 1+ edema. Data Review:     EKG -EKG: paced    Medications reviewed  Current Outpatient Prescriptions   Medication Sig    predniSONE (DELTASONE) 20 mg tablet Take 2 Tabs by mouth daily for 5 days. With Breakfast    oxyCODONE-acetaminophen (PERCOCET)  mg per tablet Take 1 Tab by mouth every eight (8) hours as needed for Pain. Max Daily Amount: 3 Tabs.  apixaban (ELIQUIS) 5 mg tablet Take 1 Tab by mouth two (2) times a day.  furosemide (LASIX) 40 mg tablet Take 1 Tab by mouth daily.  methIMAzole (TAPAZOLE) 5 mg tablet Take 2 Tabs by mouth two (2) times a day. (Patient taking differently: Take 5 mg by mouth two (2) times a day.)    lisinopril (PRINIVIL, ZESTRIL) 40 mg tablet Take 1 Tab by mouth daily.  omeprazole (PRILOSEC) 40 mg capsule Take 1 Cap by mouth daily.  naloxone (NARCAN) 4 mg/actuation nasal spray Use 1 spray intranasally into 1 nostril. Use a new Narcan nasal spray for subsequent doses and administer into alternating nostrils. May repeat every 2 to 3 minutes as needed.     cyclobenzaprine (FLEXERIL) 5 mg tablet Take 1 Tab by mouth three (3) times daily as needed for Muscle Spasm(s).  REVLIMID 10 mg cap Take  by mouth. Indications: 1 tab daily    potassium chloride (K-DUR, KLOR-CON) 20 mEq tablet Take 1 Tab by mouth two (2) times a day.  metoprolol succinate (TOPROL-XL) 25 mg XL tablet Take 0.5 Tabs by mouth daily.  aluminum hydrox-magnesium carb (GAVISCON EXTRA STRENGTH) 160-105 mg chew Take 1-2 tablets by mouth four (4) times daily as needed. No current facility-administered medications for this visit. Assessment:       ICD-10-CM ICD-9-CM    1. Chronic systolic congestive heart failure (HCC) I50.22 428.22      428.0    2. Essential hypertension I10 401.9 AMB POC EKG ROUTINE W/ 12 LEADS, INTER & REP   3. Chronic atrial fibrillation (HCC) I48.2 427.31 AMB POC EKG ROUTINE W/ 12 LEADS, INTER & REP        Plan:     Patient presents doing well and stable from cardiac standpoint. Was in ER for what sounds like URTI, she is on prednisone. Advised extra lasix for 2-3 days. Continue current care and follow up in 6 months.     Magdiel Lopez MD

## 2018-06-27 NOTE — PROGRESS NOTES
Hospital Discharge Follow-Up    Date/Time:  2018  2:00 PM    Patient was admitted to [facility] on  and discharged on 2018 for chest wall pain/SOB/cough. The physician discharge summary was available at the time of outreach. Patient was contacted within 2 business days of discharge. Top Challenges reviewed with the provider   none         Method of communication with provider :none  Inpatient RRAT score: 12  Was this a readmission? no   Patient stated reason for the readmission: n/a    Nurse Navigator (NN) contacted the patient by telephone to perform post hospital discharge assessment. Verified name and  with patient as identifiers. Provided introduction to self, and explanation of the Nurse Navigator role. Reviewed discharge instructions and red flags with patient who verbalized understanding. Patient given an opportunity to ask questions and does not have any further questions or concerns at this time. The patient agrees to contact the PCP office for questions related to their healthcare. NN provided contact information for future reference. Labs:    RBC 3.78 (L) M/uL      HGB 9.9 (L) g/dL     HCT 32.6 (L) %        Disease Specific:   CHF    Summary of patient's top problems:  1. KNOTT  2. Cough  3. Chest Wall Pain    Home Health orders at discharge: no  [address]: none  Date of initial visit: none    Durable Medical Equipment ordered/company: none  Durable Medical Equipment received: none    Barriers to care? Medication Management--patient needs medication change reminding;  Prednisone 20mg PO  Advance Care Planning:   No-discussed/ teaching done. Medication(s):     New Medications at Discharge: Prednisone 20mg PO daily  Changed Medications at Discharge: same  Discontinued Medications at Discharge: Prednisone 5mg . Medication reconciliation was performed with Patient, who verbalizes understanding of administration of home medications.   There were no barriers to obtaining medications identified at this time. Referral to Pharm D needed:  no    Current Outpatient Prescriptions   Medication Sig    oxyCODONE-acetaminophen (PERCOCET)  mg per tablet Take 1 Tab by mouth every eight (8) hours as needed for Pain. Max Daily Amount: 3 Tabs.  apixaban (ELIQUIS) 5 mg tablet Take 1 Tab by mouth two (2) times a day.  furosemide (LASIX) 40 mg tablet Take 1 Tab by mouth daily.  methIMAzole (TAPAZOLE) 5 mg tablet Take 2 Tabs by mouth two (2) times a day. (Patient taking differently: Take 5 mg by mouth two (2) times a day.)    lisinopril (PRINIVIL, ZESTRIL) 40 mg tablet Take 1 Tab by mouth daily.  omeprazole (PRILOSEC) 40 mg capsule Take 1 Cap by mouth daily.  naloxone (NARCAN) 4 mg/actuation nasal spray Use 1 spray intranasally into 1 nostril. Use a new Narcan nasal spray for subsequent doses and administer into alternating nostrils. May repeat every 2 to 3 minutes as needed.  cyclobenzaprine (FLEXERIL) 5 mg tablet Take 1 Tab by mouth three (3) times daily as needed for Muscle Spasm(s).  REVLIMID 10 mg cap Take  by mouth. Indications: 1 tab daily    potassium chloride (K-DUR, KLOR-CON) 20 mEq tablet Take 1 Tab by mouth two (2) times a day.  metoprolol succinate (TOPROL-XL) 25 mg XL tablet Take 0.5 Tabs by mouth daily.  aluminum hydrox-magnesium carb (GAVISCON EXTRA STRENGTH) 160-105 mg chew Take 1-2 tablets by mouth four (4) times daily as needed. No current facility-administered medications for this visit. There are no discontinued medications.     PCP/Specialist follow up: Future Appointments  Date Time Provider Belen Narvaezi   6/28/2018 2:50 PM Bruna Bautista MD [address]   7/13/2018 8725 Brenda Villareal MD [facility]   8/3/2018 7:00 AM [facility] 1 [facility]   12/21/2018 9:45 AM [place] Runner, MD [address] [place] [address] Attends follow-up appointments as directed. Patient requesting to see PCP ASAP. NN w/ schedule patient for office visit in AM.    5/21/2018:  NN received call from patient asking if PCP wants Plavix med stopped; stated she rescheduled the missed procedure and now the date being 5/31/2018-Plavix is to be stopped 5 days in advance. Consult done w/ PCP: stated Thyroid procedure is too early and should be cancelled; stated call had come in regarding the same. NN returned call to patient to advise of the same.  Coordinate pain management plan for patient. Coordinate pain management plan for patient. Patient agreed to place warm soaks to later knee for severe pain and to take pain medication PRN.     1/22/2018:  C/o severe gas pain and back pain. Patient seen in ED [facility] ED 1/2/2018 for Back Pain. Patient stated she was in the office for a Cardiology appointment on 1/2/2018; stated the had so many patients they were sending some to the ED and she was one. Today patient states she is taking Gaviscon as ordered by PCP (4x/day PRN). States pain in ED was on the left side, but now on right side and abdomin. States she ate beans on Friday, but has moved bowels daily. States the pain is not from the beans for this is the 3rd day since consuming. 5/9/2018:  NN rec'd call from patient w/ c/o increasing neck pain; requested PCP to know the pain has increased since prednisone medication not refilled. Consult done w/ PCP; stated medication w/ be called in. Patient encouraged to continue to apply warm soaks to neck area q 4 hrs for pain relief and percocet as ordered by PCP. Patient agreeded. NN called patient back to inform that PCP will be calling prescription. 6/18/2018:  Patient walk-in; c/o not feeling well/slight abdom pain as attended by PCP today. NN teachings:  Chewing food well can prevent abdominal/acid burning.   Patient agrees to eating softer foods until discomfort is noted, and drinking plenty of water in the allowed amount. Patient agreed to eating less meat.  Knowledge and adherence of prescribed medication (ie. action, side effects, missed dose, etc.).       Patient verbalizes understanding of self-management goals of living with Congestive Heart Failure      Self-schedules and keeps appointments

## 2018-06-28 ENCOUNTER — OFFICE VISIT (OUTPATIENT)
Dept: ENDOCRINOLOGY | Age: 78
End: 2018-06-28

## 2018-06-28 VITALS
HEART RATE: 75 BPM | DIASTOLIC BLOOD PRESSURE: 89 MMHG | SYSTOLIC BLOOD PRESSURE: 139 MMHG | HEIGHT: 70 IN | BODY MASS INDEX: 26.01 KG/M2 | WEIGHT: 181.7 LBS

## 2018-06-28 DIAGNOSIS — E05.90 HYPERTHYROIDISM: Primary | ICD-10-CM

## 2018-06-28 NOTE — MR AVS SNAPSHOT
[address] [facility] [address] [id number] [phone] Patient: Glen Pierre MRN: [id number] MYT:[birth year] Visit Information Date & Time Provider Department Dept. Phone Encounter #  
 6/28/2018  2:50 PM Betty Gauthier, [address] [place] [address] Diabetes and Endocrinology [id number] Follow-up Instructions Return in about 2 months (around 8/28/2018). Your Appointments 7/13/2018  9:15 AM  
Any with Hood Lay MD  
[address] [place] [address] and Primary Care [address] [place] [address]) Appt Note: 1 month follow up  
 [address] [place] [address]  
[phone]  
  
   
 [address] [place] [id number]  
  
    
 12/21/2018  9:45 AM  
ESTABLISHED PATIENT with Diego Hardy MD  
[facility] [address] [place] [address]) Appt Note: Per Dr. Kelly Dobson, 6 mo f/u-scc [address] [employer] [address] [facility]  
[phone] [address] [facility] Upcoming Health Maintenance Date Due Influenza Age 5 to Adult 8/1/2018 MEDICARE YEARLY EXAM 1/23/2019 GLAUCOMA SCREENING Q2Y 2/12/2020 DTaP/Tdap/Td series (2 - Td) 5/19/2026 Allergies as of 6/28/2018  Review Complete On: 6/28/2018 By: Betty Gauthier MD  
  
 Severity Noted Reaction Type Reactions Codeine  09/05/2014    Other (comments) Current Immunizations  Reviewed on 8/29/2017 Name Date Influenza Vaccine 9/15/2014 Not reviewed this visit You Were Diagnosed With   
  
 Codes Comments Hyperthyroidism    -  Primary ICD-10-CM: E05.90 ICD-9-CM: 242.90 Vitals BP Pulse Height(growth percentile) Weight(growth percentile) BMI OB Status 139/89 (BP 1 Location: Left arm, BP Patient Position: Sitting) 75 5' 10\" (1.778 m) 181 lb 11.2 oz (82.4 kg) 26.07 kg/m2 Hysterectomy Smoking Status Never Smoker BMI and BSA Data Body Mass Index Body Surface Area 26.07 kg/m 2 2.02 m 2 Preferred Pharmacy Pharmacy Name Phone [facility], [address] [place], [place] [address] [phone] Your Updated Medication List  
  
   
This list is accurate as of 6/28/18  3:11 PM.  Always use your most recent med list.  
  
  
  
  
 apixaban 5 mg tablet Commonly known as:  Doren Trent Take 1 Tab by mouth two (2) times a day. cyclobenzaprine 5 mg tablet Commonly known as:  FLEXERIL Take 1 Tab by mouth three (3) times daily as needed for Muscle Spasm(s). furosemide 40 mg tablet Commonly known as:  LASIX Take 1 Tab by mouth daily. GAVISCON EXTRA STRENGTH 160-105 mg [facility] Generic drug:  aluminum hydrox-magnesium carb Take 1-2 tablets by mouth four (4) times daily as needed. lisinopril 40 mg tablet Commonly known as:  Enriqueta Husbands Take 1 Tab by mouth daily. methIMAzole 5 mg tablet Commonly known as:  TAPAZOLE Take 2 Tabs by mouth two (2) times a day. metoprolol succinate 25 mg XL tablet Commonly known as:  TOPROL-XL Take 0.5 Tabs by mouth daily. naloxone 4 mg/actuation nasal spray Commonly known as:  [employer] Use 1 spray intranasally into 1 nostril. Use a new Narcan nasal spray for subsequent doses and administer into alternating nostrils. May repeat every 2 to 3 minutes as needed. omeprazole 40 mg capsule Commonly known as:  PRILOSEC Take 1 Cap by mouth daily. oxyCODONE-acetaminophen  mg per tablet Commonly known as:  PERCOCET Take 1 Tab by mouth every eight (8) hours as needed for Pain. Max Daily Amount: 3 Tabs. potassium chloride 20 mEq tablet Commonly known as:  K-DUR, KLOR-CON Take 1 Tab by mouth two (2) times a day. REVLIMID 10 mg Cap Generic drug:  lenalidomide Take  by mouth. Indications: 1 tab daily We Performed the Following   
 T3 TOTAL [id number] CPT(R)] T3 TOTAL L8426722 CPT(R)] T4, FREE E2473933 CPT(R)] T4, FREE [id number] CPT(R)] THYROID PEROXIDASE (TPO) AB [68564 CPT(R)] THYROID STIMULATING IMMUNOGLOBULIN [id number] CPT(R)] TSH 3RD GENERATION [78290 CPT(R)] TSH 3RD GENERATION [58738 CPT(R)] Follow-up Instructions Return in about 2 months (around 8/28/2018). To-Do List   
 06/28/2018 Imaging:  US THYROID/PARATHYROID/SOFT TISS   
  
 08/03/2018 7:00 AM  
  Appointment with [address] at [facility] ([phone]) CONTRAST STUDY: 1. The patient should not eat solid food four hours before the appointment but should be encouraged to drink clear liquids. 2.  If you have to drink oral contrast, please pick it up any weekday prior to your appointment, if you cannot please check in 2 hrs before appt time. 3.  The patient will require IV access for contrast administration. 4.  The patient should not take Ibuprofen (Advil, Motrin, etc.) and Naproxen Sodium (Aleve, etc.)  on the day of the exam. Stopping non-steroidal anti-inflammatory agents (NSAIDs) like Ibuprofen decreases the risk of kidney damage from the x-ray contrast (dye). 5.  Bring any non [facility] facility films/images pertaining to the area of interest with you on the day of appointment. 6.  Bring current lab work if available (within last 90 days CMP) ***If scheduled at [facility], iSTAT is not available, labs will need to be done before appointment*** 7. Check in at registration at least 30 minutes before appt time unless you were instructed to do otherwise. Patient Instructions 1. Continue methimazole at 5mg each morning (1 tablet) 2. Checking labs today to assess your status, will discuss results and plan by phone. 3. Plan to complete a thyroid ultrasound only, do not schedule or perform a (thyroid UPTAKE and SCAN), only a thyroid ultrasound, so that we can assess if you have any thyroid nodules or other issues. 4. Plan to come back in 2 months. Please use the lab order I provided to get your thyroid levels repeated in the lab 2 days before your next visit. This way we have the results when you come in and we are able to make decisions about your treatment based on that. Call with concerns,  
 
Lisseth Vidal. [address] [facility] Diabetes & Endocrinology [address] J[address] Lock Introducing [facility] & HEALTH SERVICES! [employer] introduces [employer] patient portal. Now you can access parts of your medical record, email your doctor's office, and request medication refills online. 1. In your internet browser, go to [email or web address] 2. Click on the First Time User? Click Here link in the Sign In box. You will see the New Member Sign Up page. 3. Enter your [employer] eto Access Code exactly as it appears below. You will not need to use this code after youve completed the sign-up process. If you do not sign up before the expiration date, you must request a new code. · [employer] eto Access Code: [email or web address] Expires: 9/13/2018 11:18 AM 
 
4. Enter the last four digits of your Social Security Number (xxxx) and Date of Birth (mm/dd/yyyy) as indicated and click Submit. You will be taken to the next sign-up page. 5. Create a [employer] eto ID. This will be your [employer] et[employer] login ID and cannot be changed, so think of one that is secure and easy to remember. 6. Create a [employer] password. You can change your password at any time. 7. Enter your Password Reset Question and Answer. This can be used at a later time if you forget your password. 8. Enter your e-mail address. You will receive e-mail notification when new information is available in [address] 9. Click Sign Up. You can now view and download portions of your medical record. 10. Click the Download Summary menu link to download a portable copy of your medical information.  
 
If you have questions, please visit the Frequently Asked Questions section of the [employer]. Remember, [employer] is NOT to be used for urgent needs. For medical emergencies, dial 911. Now available from your iPhone and Android! Please provide this summary of care documentation to your next provider. Your primary care clinician is listed as Deanna Taylor. If you have any questions after today's visit, please call [phone].

## 2018-06-28 NOTE — PATIENT INSTRUCTIONS
1. Continue methimazole at 5mg each morning (1 tablet)    2. Checking labs today to assess your status, will discuss results and plan by phone. 3. Plan to complete a thyroid ultrasound only, do not schedule or perform a (thyroid UPTAKE and SCAN), only a thyroid ultrasound, so that we can assess if you have any thyroid nodules or other issues. 4. Plan to come back in 2 months. Please use the lab order I provided to get your thyroid levels repeated in the lab 2 days before your next visit. This way we have the results when you come in and we are able to make decisions about your treatment based on that. Call with concerns,     James Vidal T.  [address] Endocrinology  [employer]

## 2018-06-28 NOTE — PROGRESS NOTES
CONSULTATION REQUESTED BY: Leroy Walker MD     REASON FOR CONSULT: Evaluation of hyperthyroidism    CHIEF COMPLAINT: Hyperthyroidism    HISTORY OF PRESENT ILLNESS:   King Lee is a 66 y.o. female with a PMHx as noted below who was referred to our endocrinology clinic for evaluation of Hyperthyroidism. Back around April 9th patient was loosing weight and a bit shaky,   She had her thyroid checked with findings of hyperthyroidism. Notably she has a history of afib/flutter and CHF. Patient has no personal history of prior thyroid disorders. Notably family history for thyroid disorders is negative. Patient denies palpitations, tremors. \"feel normal\",  Blood pressure and HR normal today,   She is on a beta blocker however, 25mg XL once daily,   Patient was started on methimazole 10mg daily, reduced to 5mg daily just over 1 week ago with normal levels. US and uptake/scan were ordered, however not performed.      Review of most recent thyroid function:  Lab Results   Component Value Date    TSH 2.930 06/15/2018    TSH <0.006 (L) 05/07/2018    TSH <0.006 (L) 04/09/2018    FT4 1.09 06/15/2018    FT4 2.93 (H) 05/07/2018    FT4 7.07 (H) 04/13/2018      TSILT = Thyroid stimulating antibodies  TMCLT = TPO antibodies  T3LT = Total T3 levels      PAST MEDICAL/SURGICAL HISTORY:   Past Medical History:   Diagnosis Date    Acute combined systolic and diastolic HF (heart failure), NYHA class 2 ([facility] [address] 8/11/2017    Arthritis     Atrial fibrillation (HCC)     Cancer (HCC)     multiple myeloma    Hypertension     S/P AV kathleen ablation 8/11/2017    Status post biventricular pacemaker 8/11/2017 8/11/17      Past Surgical History:   Procedure Laterality Date    HX GYN      HX ORTHOPAEDIC      knee    HX PACEMAKER  08/11/2017       ALLERGIES:   Allergies   Allergen Reactions    Codeine Other (comments)       MEDICATIONS ON ADMISSION:     Current Outpatient Prescriptions:     oxyCODONE-acetaminophen (PERCOCET)  mg per tablet, Take 1 Tab by mouth every eight (8) hours as needed for Pain. Max Daily Amount: 3 Tabs., Disp: 50 Tab, Rfl: 0    apixaban (ELIQUIS) 5 mg tablet, Take 1 Tab by mouth two (2) times a day., Disp: 60 Tab, Rfl: 0    furosemide (LASIX) 40 mg tablet, Take 1 Tab by mouth daily. , Disp: 30 Tab, Rfl: 11    methIMAzole (TAPAZOLE) 5 mg tablet, Take 2 Tabs by mouth two (2) times a day. (Patient taking differently: Take 5 mg by mouth two (2) times a day.), Disp: 120 Tab, Rfl: 0    lisinopril (PRINIVIL, ZESTRIL) 40 mg tablet, Take 1 Tab by mouth daily. , Disp: 30 Tab, Rfl: 3    omeprazole (PRILOSEC) 40 mg capsule, Take 1 Cap by mouth daily. , Disp: 30 Cap, Rfl: 1    naloxone (NARCAN) 4 mg/actuation nasal spray, Use 1 spray intranasally into 1 nostril. Use a new Narcan nasal spray for subsequent doses and administer into alternating nostrils. May repeat every 2 to 3 minutes as needed. , Disp: 2 Each, Rfl: 0    cyclobenzaprine (FLEXERIL) 5 mg tablet, Take 1 Tab by mouth three (3) times daily as needed for Muscle Spasm(s). , Disp: 15 Tab, Rfl: 0    REVLIMID 10 mg cap, Take  by mouth. Indications: 1 tab daily, Disp: , Rfl:     potassium chloride (K-DUR, KLOR-CON) 20 mEq tablet, Take 1 Tab by mouth two (2) times a day., Disp: 60 Tab, Rfl: 11    metoprolol succinate (TOPROL-XL) 25 mg XL tablet, Take 0.5 Tabs by mouth daily. , Disp: 30 Tab, Rfl: 11    aluminum hydrox-magnesium carb (GAVISCON EXTRA STRENGTH) 160-105 mg chew, Take 1-2 tablets by mouth four (4) times daily as needed. , Disp: , Rfl:     SOCIAL HISTORY:   Social History     Social History    Marital status:      Spouse name: N/A    Number of children: N/A    Years of education: N/A     Occupational History    Not on file.      Social History Main Topics    Smoking status: Never Smoker    Smokeless tobacco: Never Used    Alcohol use No    Drug use: No    Sexual activity: Not Currently     Other Topics Concern    Not on file     Social History Narrative       FAMILY HISTORY:  Family History   Problem Relation Age of Onset    Stroke Mother     No Known Problems Father        REVIEW OF SYSTEMS: Complete ROS assessed and noted for that which is described above, all else are negative. Eyes: normal  ENT: normal  CVS: normal  Resp: normal  GI: normal  : normal  GYN: normal  Endocrine: normal  Integument: normal  Musculoskeletal: normal  Neuro: normal  Psych: normal      PHYSICAL EXAMINATION:    VITAL SIGNS:  Visit Vitals    /89 (BP 1 Location: Left arm, BP Patient Position: Sitting)    Pulse 75    Ht 5' 10\" (1.778 m)    Wt 181 lb 11.2 oz (82.4 kg)    BMI 26.07 kg/m2       GENERAL: NCAT, Sitting comfortably, NAD  EYES: EOMI, non-icteric, no proptosis  Ear/Nose/Throat: NCAT, no inflammation, thyroid gland is smooth and not enlarged, nodularity is not felt on palpation. LYMPH NODES: No LAD  CARDIOVASCULAR: S1 S2, RRR, No murmur, 2+ radial pulses  RESPIRATORY: CTA b/l, no wheeze/rales  GASTROINTESTINAL: soft, NT, ND  MUSCULOSKELETAL: Normal ROM, no atrophy  SKIN: warm, no edema/rash/ or other skin changes  NEUROLOGIC: 5/5 power all extremities, AAOx3, no tremor   PSYCHIATRIC: Normal affect, Normal insight and judgement      REVIEW OF LABORATORY AND RADIOLOGY DATA:   Labs and documentation have been reviewed as described above. ASSESSMENT AND PLAN:   Stephanie Hager is a 66 y.o. female with a PMHx as noted above who was referred to our endocrinology clinic for evaluation of hyperthyroidism. Hyperthyroidism    Patient has been on methmazole for some time now and so a thyroid uptake / scan cannot be performed, however we can obtain an US to view any nodules that may be present. Considering how high her thyroid hormone levels it is suspicious, however we will also check for thyroid antibodies. She appears clinically euthyroid today and is feeling well.  She is on a beta blocker and has a pacemaker placed early this year per patient, and I wonder if all that was precipitated by her hyperthyroidism, very probable. I did advise her of the potential of having graves disease vs a toxic thyroid nodule for which we will evaluate. Continue 5mg tablet of methimazole each morning,   Will check labs with antibodies today to assess status,   Will obtain thyroid ultrasound,   Will discuss results and plan with patient by phone,     Plan to have patient RTC in 2 months with pre-labs,    Elissa BANDA.  [address] [facility] Endocrinology  [employer]

## 2018-06-29 LAB
T3 SERPL-MCNC: 90 NG/DL (ref 71–180)
T4 FREE SERPL-MCNC: 1.06 NG/DL (ref 0.82–1.77)
THYROPEROXIDASE AB SERPL-ACNC: 14 IU/ML (ref 0–34)
TSH SERPL DL<=0.005 MIU/L-ACNC: 4.71 UIU/ML (ref 0.45–4.5)
TSI ACT/NOR SER: <0.1 IU/L (ref 0–0.55)

## 2018-07-02 NOTE — PROGRESS NOTES
Patient with prior hyperthyroidism, started on methimazole, was recently tapered to 5mg, and her TSH continues to rise. Currently her TSH is 4.7. I suspect that its possible that she had a thyroiditis considering how quickly her methimazole is tapered, however it would still be ideal to complete the thyroid ultrasound to evaluate for nodules. I will recommend she reduce her dose further to only a 1/2 tablet, (2.5mg) once daily for now. Prelabs before next visit as previously ordered. I attempted to call the patient however there was no answer, Inell [place]son could you try to notify her a little later,  Thanks ! Sylvia Gee.  [address] [facility] Endocrinology  [facility]

## 2018-07-03 ENCOUNTER — TELEPHONE (OUTPATIENT)
Dept: ENDOCRINOLOGY | Age: 78
End: 2018-07-03

## 2018-07-03 NOTE — TELEPHONE ENCOUNTER
7/3/2018, 2:08 PM    Attempted to call Gunjan Velasquez, reached voice mail. I left a message to return my call.       Geronimo Carlisle

## 2018-07-03 NOTE — TELEPHONE ENCOUNTER
----- Message from Yue Salgado MD sent at 7/2/2018  2:32 PM EDT -----  Patient with prior hyperthyroidism, started on methimazole, was recently tapered to 5mg, and her TSH continues to rise. Currently her TSH is 4.7. I suspect that its possible that she had a thyroiditis considering how quickly her methimazole is tapered, however it would still be ideal to complete the thyroid ultrasound to evaluate for nodules. I will recommend she reduce her dose further to only a 1/2 tablet, (2.5mg) once daily for now. Prelabs before next visit as previously ordered. I attempted to call the patient however there was no answer, Prabhu Ahuja could you try to notify her a little later,  Thanks ! Vincenzo Alonso.  39 [facility] Endocrinology  [address] [place] [address]

## 2018-07-05 ENCOUNTER — HOSPITAL ENCOUNTER (OUTPATIENT)
Dept: ULTRASOUND IMAGING | Age: 78
Discharge: HOME | End: 2018-07-05
Attending: INTERNAL MEDICINE
Payer: MEDICARE

## 2018-07-05 DIAGNOSIS — E05.90 HYPERTHYROIDISM: ICD-10-CM

## 2018-07-05 PROCEDURE — 76536 US EXAM OF HEAD AND NECK: CPT

## 2018-07-06 NOTE — PROGRESS NOTES
No thyroid nodules on thyroid ultrasound,   Negative TSI and TPO thyroid antibodies,  No clear cause of prior hyperthyroidism,  Thyroiditis still suspected,   Patient feeling tired and sluggish,  I recommended 1/2 tab for one week (2.5mg) methimazole, then to discontinue it. I recommended she call me if she has any hyperthyroid symptoms while off methimazole. I reminded her of Shahbaz mejia, you can discard the prior result-note since I have reached her and advised her already,   Thanks !     .isaura

## 2018-07-13 ENCOUNTER — OFFICE VISIT (OUTPATIENT)
Dept: INTERNAL MEDICINE CLINIC | Age: 78
End: 2018-07-13

## 2018-07-13 VITALS
RESPIRATION RATE: 18 BRPM | WEIGHT: 184.4 LBS | DIASTOLIC BLOOD PRESSURE: 84 MMHG | OXYGEN SATURATION: 96 % | TEMPERATURE: 97.2 F | BODY MASS INDEX: 26.4 KG/M2 | HEART RATE: 76 BPM | HEIGHT: 70 IN | SYSTOLIC BLOOD PRESSURE: 139 MMHG

## 2018-07-13 DIAGNOSIS — C90.00 MULTIPLE MYELOMA, REMISSION STATUS UNSPECIFIED (HCC): ICD-10-CM

## 2018-07-13 DIAGNOSIS — R63.4 WEIGHT LOSS: ICD-10-CM

## 2018-07-13 DIAGNOSIS — R29.898 MUSCULAR DECONDITIONING: ICD-10-CM

## 2018-07-13 DIAGNOSIS — I42.8 OTHER CARDIOMYOPATHY (HCC): Primary | ICD-10-CM

## 2018-07-13 DIAGNOSIS — I48.20 CHRONIC ATRIAL FIBRILLATION (HCC): Chronic | ICD-10-CM

## 2018-07-13 DIAGNOSIS — M17.0 PRIMARY OSTEOARTHRITIS OF BOTH KNEES: ICD-10-CM

## 2018-07-13 DIAGNOSIS — E05.90 HYPERTHYROIDISM: ICD-10-CM

## 2018-07-13 DIAGNOSIS — I10 ESSENTIAL HYPERTENSION: ICD-10-CM

## 2018-07-13 NOTE — MR AVS SNAPSHOT
[address] [facility] [address] 
[phone] Patient: Stephanie Guardado MRN: [id number] HFS:[id number]/31/[birth year]47 Visit Information Date & Time Provider Department Dept. Phone Encounter #  
 7/13/2018  9:15 AM Khalif Rivera [facility] [phone] [id number] Your Appointments 8/31/2018  3:10 PM  
Follow Up with MD Sancho [facility]) Appt Note: 2 month f/u   Thyroid [phone] [address] [place] [address] Road  
  
    
 12/21/2018  9:45 AM  
ESTABLISHED PATIENT with Polina Caceres MD  
[address] [place] [address] [facility]) Appt Note: Per Dr. Vipul Urbina, 6 mo f/u-scc [address]  
[phone] [address] [place] [address] Upcoming Health Maintenance Date Due Influenza Age 5 to Adult 8/1/2018 MEDICARE YEARLY EXAM 1/23/2019 GLAUCOMA SCREENING Q2Y 2/12/2020 DTaP/Tdap/Td series (2 - Td) 5/19/2026 Allergies as of 7/13/2018  Review Complete On: 7/13/2018 By: Sunday Block LPN Severity Noted Reaction Type Reactions Codeine  09/05/2014    Other (comments) Current Immunizations  Reviewed on 8/29/2017 Name Date Influenza Vaccine 9/15/2014 Not reviewed this visit You Were Diagnosed With   
  
 Codes Comments Chronic atrial fibrillation (HCC)    -  Primary ICD-10-CM: O63.1 ICD-9-CM: 427.31 Other cardiomyopathy ([facility] [address]     ICD-10-CM: I42.8 ICD-9-CM: 425.4 Multiple myeloma, remission status unspecified (HCC)     ICD-10-CM: C90.00 ICD-9-CM: 203.00 Hyperthyroidism     ICD-10-CM: E05.90 ICD-9-CM: 242.90 Muscular deconditioning     ICD-10-CM: R29.898 ICD-9-CM: 781.99   
 Essential hypertension     ICD-10-CM: I10 
ICD-9-CM: 401.9 Vitals BP Pulse Temp Resp Height(growth percentile) Weight(growth percentile) 139/84 (BP 1 Location: Left arm, BP Patient Position: Sitting) 76 97.2 °F (36.2 °C) (Oral) 18 5' 10\" (1.778 m) 184 lb 6.4 oz (83.6 kg) SpO2 BMI OB Status Smoking Status 96% 26.46 kg/m2 Hysterectomy Never Smoker Vitals History BMI and BSA Data Body Mass Index Body Surface Area  
 26.46 kg/m 2 2.03 m 2 Preferred Pharmacy Pharmacy Name Phone [facility] [id number] [facility], [address] [place], [place] [address]vd. [phone] Your Updated Medication List  
  
   
This list is accurate as of 7/13/18 10:06 AM.  Always use your most recent med list.  
  
  
  
  
 apixaban 5 mg tablet Commonly known as:  Cliffton Rubinstein Take 1 Tab by mouth two (2) times a day. cyclobenzaprine 5 mg tablet Commonly known as:  FLEXERIL Take 1 Tab by mouth three (3) times daily as needed for Muscle Spasm(s). furosemide 40 mg tablet Commonly known as:  LASIX Take 1 Tab by mouth daily. GAVISCON EXTRA STRENGTH 160-105 mg Rajinder Spikes Generic drug:  aluminum hydrox-magnesium carb Take 1-2 tablets by mouth four (4) times daily as needed. lisinopril 40 mg tablet Commonly known as:  Ora Bee Take 1 Tab by mouth daily. methIMAzole 5 mg tablet Commonly known as:  TAPAZOLE Take 2 Tabs by mouth two (2) times a day. metoprolol succinate 25 mg XL tablet Commonly known as:  TOPROL-XL Take 0.5 Tabs by mouth daily. naloxone 4 mg/actuation nasal spray Commonly known as:  [employer] Use 1 spray intranasally into 1 nostril. Use a new Narcan nasal spray for subsequent doses and administer into alternating nostrils. May repeat every 2 to 3 minutes as needed. omeprazole 40 mg capsule Commonly known as:  PRILOSEC Take 1 Cap by mouth daily. oxyCODONE-acetaminophen  mg per tablet Commonly known as:  PERCOCET Take 1 Tab by mouth every eight (8) hours as needed for Pain. Max Daily Amount: 3 Tabs. potassium chloride 20 mEq tablet Commonly known as:  K-DUR, KLOR-CON Take 1 Tab by mouth two (2) times a day. REVLIMID 10 mg Cap Generic drug:  lenalidomide Take  by mouth. Indications: 1 tab daily Prescriptions Sent to Pharmacy Refills  
 apixaban (ELIQUIS) 5 mg tablet 11 Sig: Take 1 Tab by mouth two (2) times a day. Class: Normal  
 Pharmacy: [address] [place] [address], [address] [place] [address] #: [phone] Route: Oral  
  
To-Do List   
 08/03/2018 7:00 AM  
  Appointment with [facility] CT 1 at [facility] CT ([phone]) CONTRAST STUDY: 1. The patient should not eat solid food four hours before the appointment but should be encouraged to drink clear liquids. 2.  If you have to drink oral contrast, please pick it up any weekday prior to your appointment, if you cannot please check in 2 hrs before appt time. 3.  The patient will require IV access for contrast administration. 4.  The patient should not take Ibuprofen (Advil, Motrin, etc.) and Naproxen Sodium (Aleve, etc.)  on the day of the exam. Stopping non-steroidal anti-inflammatory agents (NSAIDs) like Ibuprofen decreases the risk of kidney damage from the x-ray contrast (dye). 5.  Bring any non [facility] facility films/images pertaining to the area of interest with you on the day of appointment. 6.  Bring current lab work if available (within last 90 days CMP) ***If scheduled at [facility] is not available, labs will need to be done before appointment*** 7. Check in at registration at least 30 minutes before appt time unless you were instructed to do otherwise. Introducing [facility]! [employer] introduces [employer] patient portal. Now you can access parts of your medical record, email your doctor's office, and request medication refills online. 1. In your internet browser, go to [email or web address] 2. Click on the First Time User? Click Here link in the Sign In box. You will see the New Member Sign Up page. 3. Enter your [facility] Access Code exactly as it appears below. You will not need to use this code after youve completed the sign-up process. If you do not sign up before the expiration date, you must request a new code. · [employer] Access Code: [email or web address] Expires: 9/13/2018 11:18 AM 
 
4. Enter the last four digits of your Social Security Number (xxxx) and Date of Birth (mm/dd/yyyy) as indicated and click Submit. You will be taken to the next sign-up page. 5. Create a [employer] ID. This will be your [facility] login ID and cannot be changed, so think of one that is secure and easy to remember. 6. Create a [facility] password. You can change your password at any time. 7. Enter your Password Reset Question and Answer. This can be used at a later time if you forget your password. 8. Enter your e-mail address. You will receive e-mail notification when new information is available in [address] 9. Click Sign Up. You can now view and download portions of your medical record. 10. Click the Download Summary menu link to download a portable copy of your medical information. If you have questions, please visit the Frequently Asked Questions section of the [employer] website. Remember, [employer] is NOT to be used for urgent needs. For medical emergencies, dial 911. Now available from your iPhone and Android! Please provide this summary of care documentation to your next provider. Your primary care clinician is listed as Deanna Taylor. If you have any questions after today's visit, please call [phone].

## 2018-07-13 NOTE — PROGRESS NOTES
Chief Complaint   Patient presents with    Hypothyroidism    Hypertension       1. Have you been to the ER, urgent care clinic since your last visit? Hospitalized since your last visit? No    2. Have you seen or consulted any other health care providers outside of the [address] [place] [address] since your last visit? Include any pap smears or colon screening. No    Body mass index is 26.46 kg/(m^2).

## 2018-07-14 PROBLEM — I48.92 ATRIAL FLUTTER (HCC): Status: RESOLVED | Noted: 2017-08-10 | Resolved: 2018-07-14

## 2018-07-14 PROBLEM — I49.9 IRREGULAR HEARTBEAT: Status: RESOLVED | Noted: 2017-12-05 | Resolved: 2018-07-14

## 2018-07-14 PROBLEM — M65.819 SYNOVITIS OF SHOULDER: Status: RESOLVED | Noted: 2017-04-21 | Resolved: 2018-07-14

## 2018-07-14 NOTE — PROGRESS NOTES
[address] [place] [address] and Primary Care  [place] [address]  [address] [place] [address]  Phone:  [phone]  Fax: [phone]       Chief Complaint   Patient presents with    Hypothyroidism    Hypertension   . SUBJECTIVE:    Avery Cabezas is a 66 y.o. female She comes in for a return visit stating that she has done well. She did finally see the endocrinologist, who reduced her Methimazole to 2.5 mg daily. She feels well. She has not lost any more weight. Fortunately, now she is not having any pain in her neck and shoulders, which is great. Her mobility remains a bit impaired primarily because of osteoarthritis of her knees. She follows up with her medical oncologist for her multiple myeloma. She remains on her Eliquis for her paroxysmal atrial fibrillation. She has a past history of primary hypertension and dyslipidemia. Current Outpatient Prescriptions   Medication Sig Dispense Refill    apixaban (ELIQUIS) 5 mg tablet Take 1 Tab by mouth two (2) times a day. 60 Tab 11    oxyCODONE-acetaminophen (PERCOCET)  mg per tablet Take 1 Tab by mouth every eight (8) hours as needed for Pain. Max Daily Amount: 3 Tabs. 50 Tab 0    furosemide (LASIX) 40 mg tablet Take 1 Tab by mouth daily. 30 Tab 11    methIMAzole (TAPAZOLE) 5 mg tablet Take 2 Tabs by mouth two (2) times a day. (Patient taking differently: Take 5 mg by mouth two (2) times a day.) 120 Tab 0    lisinopril (PRINIVIL, ZESTRIL) 40 mg tablet Take 1 Tab by mouth daily. 30 Tab 3    omeprazole (PRILOSEC) 40 mg capsule Take 1 Cap by mouth daily. 30 Cap 1    naloxone (NARCAN) 4 mg/actuation nasal spray Use 1 spray intranasally into 1 nostril. Use a new Narcan nasal spray for subsequent doses and administer into alternating nostrils. May repeat every 2 to 3 minutes as needed. 2 Each 0    REVLIMID 10 mg cap Take  by mouth.  Indications: 1 tab daily      potassium chloride (K-DUR, KLOR-CON) 20 mEq tablet Take 1 Tab by mouth two (2) times a day. 60 Tab 11    metoprolol succinate (TOPROL-XL) 25 mg XL tablet Take 0.5 Tabs by mouth daily. 30 Tab 11    aluminum hydrox-magnesium carb (GAVISCON EXTRA STRENGTH) 160-105 mg chew Take 1-2 tablets by mouth four (4) times daily as needed.  cyclobenzaprine (FLEXERIL) 5 mg tablet Take 1 Tab by mouth three (3) times daily as needed for Muscle Spasm(s).  15 Tab 0     Past Medical History:   Diagnosis Date    Acute combined systolic and diastolic HF (heart failure), NYHA class 2 ([facility] [address] 8/11/2017    Arthritis     Atrial fibrillation (HCC)     Cancer (HCC)     multiple myeloma    Hypertension     S/P AV kathleen ablation 8/11/2017    Status post biventricular pacemaker 8/11/2017 8/11/17      Past Surgical History:   Procedure Laterality Date    HX GYN      HX ORTHOPAEDIC      knee    HX PACEMAKER  08/11/2017     Allergies   Allergen Reactions    Codeine Other (comments)         REVIEW OF SYSTEMS:  General: negative for - chills or fever  ENT: negative for - headaches, nasal congestion or tinnitus  Respiratory: negative for - cough, hemoptysis, shortness of breath or wheezing  Cardiovascular : negative for - chest pain, edema, palpitations or shortness of breath  Gastrointestinal: negative for - abdominal pain, blood in stools, heartburn or nausea/vomiting  Genito-Urinary: no dysuria, trouble voiding, or hematuria  Musculoskeletal: negative for - gait disturbance, joint pain, joint stiffness or joint swelling  Neurological: no TIA or stroke symptoms  Hematologic: no bruises, no bleeding, no swollen glands  Integument: no lumps, mole changes, nail changes or rash  Endocrine: no malaise/lethargy or unexpected weight changes      Social History     Social History    Marital status:      Spouse name: N/A    Number of children: N/A    Years of education: N/A     Social History Main Topics    Smoking status: Never Smoker    Smokeless tobacco: Never Used    Alcohol use No    Drug use: No    Sexual activity: Not Currently     Other Topics Concern    None     Social History Narrative     Family History   Problem Relation Age of Onset    Stroke Mother     No Known Problems Father        OBJECTIVE:    Visit Vitals    /84 (BP 1 Location: Left arm, BP Patient Position: Sitting)    Pulse 76    Temp 97.2 °F (36.2 °C) (Oral)    Resp 18    Ht 5' 10\" (1.778 m)    Wt 184 lb 6.4 oz (83.6 kg)    SpO2 96%    BMI 26.46 kg/m2     CONSTITUTIONAL: well , well nourished, appears age appropriate  EYES: perrla, eom intact  ENMT:moist mucous membranes, pharynx clear  NECK: supple. Thyroid normal  RESPIRATORY: Chest: clear to ascultation and percussion   CARDIOVASCULAR: Heart: regular rate and rhythm  GASTROINTESTINAL: Abdomen: soft, bowel sounds active  HEMATOLOGIC: no pathological lymph nodes palpated  MUSCULOSKELETAL: Extremities: trace edema, pulse 1+, moderate degenerative changes both knees  INTEGUMENT: No unusual rashes or suspicious skin lesions noted. Nails appear normal.  NEUROLOGIC: non-focal exam   MENTAL STATUS: alert and oriented, appropriate affect      ASSESSMENT:  1. Other cardiomyopathy ([address]    2. Chronic atrial fibrillation (HCC)    3. Multiple myeloma, remission status unspecified ([address]    4. Hyperthyroidism    5. Muscular deconditioning    6. Essential hypertension    7. Primary osteoarthritis of both knees    8. Weight loss        PLAN:    1. She will follow up with endocrinologist for her hyperthyroidism, which is stable. 2. Fortunately she is not having any musculoskeletal pain, which is great. Continued observation with symptomatic treatment. 3. She does have cardiomyopathy, which is stable with no overt signs of congestive heart failure. 4. Her chronic atrial fibrillation is stable at this point. She follows up with cardiology. 5. Her deconditioning is about the same. She needs to walk more. We talked about this extensively. 6. Her blood pressure is excellent today and no adjustments were made. 7. Her weight is stable. The actual weight loss has helped, particularly as it relates to her osteoarthritis and walking. .  Orders Placed This Encounter    apixaban (ELIQUIS) 5 mg tablet         Follow-up Disposition:  Return in about 2 months (around 9/13/2018).       Nadia Hinkle MD

## 2018-07-20 ENCOUNTER — APPOINTMENT (OUTPATIENT)
Dept: GENERAL RADIOLOGY | Age: 78
End: 2018-07-20
Attending: PHYSICIAN ASSISTANT
Payer: MEDICARE

## 2018-07-20 ENCOUNTER — HOSPITAL ENCOUNTER (EMERGENCY)
Age: 78
Discharge: HOME | End: 2018-07-20
Attending: EMERGENCY MEDICINE
Payer: MEDICARE

## 2018-07-20 VITALS
HEART RATE: 75 BPM | WEIGHT: 181 LBS | RESPIRATION RATE: 18 BRPM | HEIGHT: 70 IN | SYSTOLIC BLOOD PRESSURE: 154 MMHG | DIASTOLIC BLOOD PRESSURE: 95 MMHG | TEMPERATURE: 98.4 F | BODY MASS INDEX: 25.91 KG/M2 | OXYGEN SATURATION: 95 %

## 2018-07-20 DIAGNOSIS — M17.0 PRIMARY OSTEOARTHRITIS OF BOTH KNEES: Primary | ICD-10-CM

## 2018-07-20 DIAGNOSIS — S86.911A KNEE STRAIN, RIGHT, INITIAL ENCOUNTER: ICD-10-CM

## 2018-07-20 PROCEDURE — 74011250637 HC RX REV CODE- 250/637: Performed by: PHYSICIAN ASSISTANT

## 2018-07-20 PROCEDURE — 99283 EMERGENCY DEPT VISIT LOW MDM: CPT

## 2018-07-20 PROCEDURE — 73562 X-RAY EXAM OF KNEE 3: CPT

## 2018-07-20 RX ORDER — OXYCODONE AND ACETAMINOPHEN 5; 325 MG/1; MG/1
1 TABLET ORAL
Status: COMPLETED | OUTPATIENT
Start: 2018-07-20 | End: 2018-07-20

## 2018-07-20 RX ADMIN — OXYCODONE HYDROCHLORIDE AND ACETAMINOPHEN 1 TABLET: 5; 325 TABLET ORAL at 08:05

## 2018-07-20 NOTE — DISCHARGE INSTRUCTIONS
Arthritis: Care Instructions  Your Care Instructions  Arthritis, also called osteoarthritis, is a breakdown of the cartilage that cushions your joints. When the cartilage wears down, your bones rub against each other. This causes pain and stiffness. Many people have some arthritis as they age. Arthritis most often affects the joints of the spine, hands, hips, knees, or feet. You can take simple measures to protect your joints, ease your pain, and help you stay active. Follow-up care is a key part of your treatment and safety. Be sure to make and go to all appointments, and call your doctor if you are having problems. It's also a good idea to know your test results and keep a list of the medicines you take. How can you care for yourself at home? · Stay at a healthy weight. Being overweight puts extra strain on your joints. · Talk to your doctor or physical therapist about exercises that will help ease joint pain. ¨ Stretch. You may enjoy gentle forms of yoga to help keep your joints and muscles flexible. ¨ Walk instead of jog. Other types of exercise that are less stressful on the joints include riding a bicycle, swimming, remedios chi, or water exercise. ¨ Lift weights. Strong muscles help reduce stress on your joints. Stronger thigh muscles, for example, take some of the stress off of the knees and hips. Learn the right way to lift weights so you do not make joint pain worse. · Take your medicines exactly as prescribed. Call your doctor if you think you are having a problem with your medicine. · Take pain medicines exactly as directed. ¨ If the doctor gave you a prescription medicine for pain, take it as prescribed. ¨ If you are not taking a prescription pain medicine, ask your doctor if you can take an over-the-counter medicine. · Use a cane, crutch, walker, or another device if you need help to get around. These can help rest your joints.  You also can use other things to make life easier, such as a higher toilet seat and padded handles on kitchen utensils. · Do not sit in low chairs, which can make it hard to get up. · Put heat or cold on your sore joints as needed. Use whichever helps you most. You also can take turns with hot and cold packs. ¨ Apply heat 2 or 3 times a day for 20 to 30 minutes-using a heating pad, hot shower, or hot pack-to relieve pain and stiffness. ¨ Put ice or a cold pack on your sore joint for 10 to 20 minutes at a time. Put a thin cloth between the ice and your skin. When should you call for help? Call your doctor now or seek immediate medical care if:    · You have sudden swelling, warmth, or pain in any joint.     · You have joint pain and a fever or rash.     · You have such bad pain that you cannot use a joint.    Watch closely for changes in your health, and be sure to contact your doctor if:    · You have mild joint symptoms that continue even with more than 6 weeks of care at home.     · You have stomach pain or other problems with your medicine. Where can you learn more? Go to http://milaHang w/syeda.info/. Enter I387 in the search box to learn more about \"Arthritis: Care Instructions. \"  Current as of: October 10, 2017  Content Version: 11.7  © 0887-1638 [employer]. Care instructions adapted under license by [employer] (which disclaims liability or warranty for this information). If you have questions about a medical condition or this instruction, always ask your healthcare professional. [place] [address] any warranty or liability for your use of this information. Knee Arthritis: Care Instructions  Your Care Instructions    Knee arthritis is a breakdown of the cartilage that cushions your knee joint. When the cartilage wears down, your bones rub against each other. This causes pain and stiffness. Knee arthritis tends to get worse with time.   Treatment for knee arthritis involves reducing pain, making the leg muscles stronger, and staying at a healthy body weight. The treatment usually does not improve the health of the cartilage, but it can reduce pain and improve how well your knee works. You can take simple measures to protect your knee joints, ease your pain, and help you stay active. Follow-up care is a key part of your treatment and safety. Be sure to make and go to all appointments, and call your doctor if you are having problems. It's also a good idea to know your test results and keep a list of the medicines you take. How can you care for yourself at home? · Know that knee arthritis will cause more pain on some days than on others. · Stay at a healthy weight. Lose weight if you are overweight. When you stand up, the pressure on your knees from every pound of body weight is multiplied four times. So if you lose 10 pounds, you will reduce the pressure on your knees by 40 pounds. · Talk to your doctor or physical therapist about exercises that will help ease joint pain. ¨ Stretch to help prevent stiffness and to prevent injury before you exercise. You may enjoy gentle forms of yoga to help keep your knee joints and muscles flexible. ¨ Walk instead of jog. ¨ Ride a bike. This makes your thigh muscles stronger and takes pressure off your knee. ¨ Wear well-fitting and comfortable shoes. ¨ Exercise in chest-deep water. This can help you exercise longer with less pain. ¨ Avoid exercises that include squatting or kneeling. They can put a lot of strain on your knees. ¨ Talk to your doctor to make sure that the exercise you do is not making the arthritis worse. · Do not sit for long periods of time. Try to walk once in a while to keep your knee from getting stiff. · Ask your doctor or physical therapist whether shoe inserts may reduce your arthritis pain. · If you can afford it, get new athletic shoes at least every year. This can help reduce the strain on your knees.   · Use a device to help you do everyday activities. ¨ A cane or walking stick can help you keep your balance when you walk. Hold the cane or walking stick in the hand opposite the painful knee. ¨ If you feel like you may fall when you walk, try using crutches or a front-wheeled walker. These can prevent falls that could cause more damage to your knee. ¨ A knee brace may help keep your knee stable and prevent pain. ¨ You also can use other things to make life easier, such as a higher toilet seat and handrails in the bathtub or shower. · Take pain medicines exactly as directed. ¨ Do not wait until you are in severe pain. You will get better results if you take it sooner. ¨ If you are not taking a prescription pain medicine, take an over-the-counter medicine such as acetaminophen (Tylenol), ibuprofen (Advil, Motrin), or naproxen (Aleve). Read and follow all instructions on the label. ¨ Do not take two or more pain medicines at the same time unless the doctor told you to. Many pain medicines have acetaminophen, which is Tylenol. Too much acetaminophen (Tylenol) can be harmful. ¨ Tell your doctor if you take a blood thinner, have diabetes, or have allergies to shellfish. · Ask your doctor if you might benefit from a shot of steroid medicine into your knee. This may provide pain relief for several months. · Many people take the supplements glucosamine and chondroitin for osteoarthritis. Some people feel they help, but the medical research does not show that they work. Talk to your doctor before you take these supplements. When should you call for help? Call your doctor now or seek immediate medical care if:    · You have sudden swelling, warmth, or pain in your knee.     · You have knee pain and a fever or rash.     · You have such bad pain that you cannot use your knee.    Watch closely for changes in your health, and be sure to contact your doctor if you have any problems. Where can you learn more?   Go to http://mila-syeda.info/. Enter E098 in the search box to learn more about \"Knee Arthritis: Care Instructions. \"  Current as of: October 10, 2017  Content Version: 11.7  © 9683-2854 [employer]. Care instructions adapted under license by [employer] (which disclaims liability or warranty for this information). If you have questions about a medical condition or this instruction, always ask your healthcare professional. [address] any warranty or liability for your use of this information. Knee Arthritis: Exercises  Your Care Instructions  Here are some examples of exercises for knee arthritis. Start each exercise slowly. Ease off the exercise if you start to have pain. Your doctor or physical therapist will tell you when you can start these exercises and which ones will work best for you. How to do the exercises  Knee flexion with heel slide    1. Lie on your back with your knees bent. 2. Slide your heel back by bending your affected knee as far as you can. Then hook your other foot around your ankle to help pull your heel even farther back. 3. Hold for about 6 seconds, then rest for up to 10 seconds. 4. Repeat 8 to 12 times. 5. Switch legs and repeat steps 1 through 4, even if only one knee is sore. Quad sets    1. Sit with your affected leg straight and supported on the floor or a firm bed. Place a small, rolled-up towel under your knee. Your other leg should be bent, with that foot flat on the floor. 2. Tighten the thigh muscles of your affected leg by pressing the back of your knee down into the towel. 3. Hold for about 6 seconds, then rest for up to 10 seconds. 4. Repeat 8 to 12 times. 5. Switch legs and repeat steps 1 through 4, even if only one knee is sore. Straight-leg raises to the front    1. Lie on your back with your good knee bent so that your foot rests flat on the floor. Your affected leg should be straight.  Make sure that your low back has a normal curve. You should be able to slip your hand in between the floor and the small of your back, with your palm touching the floor and your back touching the back of your hand. 2. Tighten the thigh muscles in your affected leg by pressing the back of your knee flat down to the floor. Hold your knee straight. 3. Keeping the thigh muscles tight and your leg straight, lift your affected leg up so that your heel is about 12 inches off the floor. Hold for about 6 seconds, then lower slowly. 4. Relax for up to 10 seconds between repetitions. 5. Repeat 8 to 12 times. 6. Switch legs and repeat steps 1 through 5, even if only one knee is sore. Active knee flexion    1. Lie on your stomach with your knees straight. If your kneecap is uncomfortable, roll up a washcloth and put it under your leg just above your kneecap. 2. Lift the foot of your affected leg by bending the knee so that you bring the foot up toward your buttock. If this motion hurts, try it without bending your knee quite as far. This may help you avoid any painful motion. 3. Slowly move your leg up and down. 4. Repeat 8 to 12 times. 5. Switch legs and repeat steps 1 through 4, even if only one knee is sore. Quadriceps stretch (facedown)    1. Lie flat on your stomach, and rest your face on the floor. 2. Wrap a towel or belt strap around the lower part of your affected leg. Then use the towel or belt strap to slowly pull your heel toward your buttock until you feel a stretch. 3. Hold for about 15 to 30 seconds, then relax your leg against the towel or belt strap. 4. Repeat 2 to 4 times. 5. Switch legs and repeat steps 1 through 4, even if only one knee is sore. Stationary exercise bike    1. If you do not have a stationary exercise bike at home, you can find one to ride at your local health club or community center.   2. Adjust the height of the bike seat so that your knee is slightly bent when your leg is extended downward. If your knee hurts when the pedal reaches the top, you can raise the seat so that your knee does not bend as much. 3. Start slowly. At first, try to do 5 to 10 minutes of cycling with little to no resistance. Then increase your time and the resistance bit by bit until you can do 20 to 30 minutes without pain. 4. If you start to have pain, rest your knee until your pain gets back to the level that is normal for you. Or cycle for less time or with less effort. Follow-up care is a key part of your treatment and safety. Be sure to make and go to all appointments, and call your doctor if you are having problems. It's also a good idea to know your test results and keep a list of the medicines you take. Where can you learn more? Go to http://mila-syeda.info/. Enter C159 in the search box to learn more about \"Knee Arthritis: Exercises. \"  Current as of: November 29, 2017  Content Version: 11.7  © 7444-2600 [employer]. Care instructions adapted under license by [employer] (which disclaims liability or warranty for this information). If you have questions about a medical condition or this instruction, always ask your healthcare professional. [address] any warranty or liability for your use of this information. Osteoarthritis: Care Instructions  Your Care Instructions    Arthritis is a common health problem in which the joints are inflamed. There are several kinds of arthritis. Osteoarthritis is caused by a breakdown of cartilage, the hard, thick tissue that cushions the joints. It causes pain, stiffness, and swelling, often in the spine, fingers, hips, and knees. Osteoarthritis can happen at any age, but it is most common in older people. Osteoarthritis never goes away completely, but it can be controlled. Medicine and home treatment can reduce the pain and prevent the arthritis from getting worse.   Follow-up care is a key part of your treatment and safety. Be sure to make and go to all appointments, and call your doctor if you are having problems. It's also a good idea to know your test results and keep a list of the medicines you take. How can you care for yourself at home? · Take a warm shower or bath in the morning to relieve stiffness. Avoid sitting still afterwards. · If the joint is not swollen, use moist heat, like a warm, damp towel, for 20 to 30 minutes, 2 or 3 times a day. Do not use heat on a swollen joint. · If the joint is swollen, use ice or cold packs for 10 to 20 minutes, once an hour. Cold will help relieve pain and reduce inflammation. Put a thin cloth between the ice and your skin. · To prevent stiffness, gently move the joint through its full range of motion several times a day. · If the joint hurts, avoid activities that put a strain on it for a few days. Take rest breaks throughout the day. · Get regular exercise. Walking, swimming, yoga, biking, remedios chi, and water aerobics are good exercises that are gentle on the joints. · Reach and stay at a healthy weight. If you need to lose or maintain weight, regular exercise and a healthy diet will help. Extra weight can strain the joints, especially the knees and hips, and make the pain worse. Losing even a few pounds may help. · Take pain medicines exactly as directed. ¨ If the doctor gave you a prescription medicine for pain, take it as prescribed. ¨ If you are not taking a prescription pain medicine, ask your doctor if you can take an over-the-counter medicine. When should you call for help?   Call your doctor now or seek immediate medical care if:    · The pain is so bad that you cannot use the joint.     · You have sudden back pain with weakness in your legs or loss of bowel or bladder control.     · Your stools are black and tarlike or have streaks of blood.     · You have severe pain and swelling in more than one joint.    Watch closely for changes in your health, and be sure to contact your doctor if:    · You have side effects from the medicines, like belly pain, ongoing heartburn, or nausea.     · Joint pain continues for more than 6 weeks, and home treatment is not helping. Where can you learn more? Go to http://mila-syeda.info/. Enter L820 in the search box to learn more about \"Osteoarthritis: Care Instructions. \"  Current as of: October 10, 2017  Content Version: 11.7  © 8398-6199 [employer]. Care instructions adapted under license by [employer] (which disclaims liability or warranty for this information). If you have questions about a medical condition or this instruction, always ask your healthcare professional. [address] any warranty or liability for your use of this information.

## 2018-07-20 NOTE — ED NOTES
Patient presents to ED with son. C/c right knee pain that began yesterday. Patient started using Rolator because of the pain and inability to walk. Patient took percocet yesterday without relief.

## 2018-07-20 NOTE — ED NOTES
CHARLOTTE Verdugo reviewed discharge instructions with the patient and son. The patient and son verbalized understanding. Patient to vehicle using personal Rolator. Patient has transportation provided by son.

## 2018-07-20 NOTE — ED PROVIDER NOTES
EMERGENCY DEPARTMENT HISTORY AND PHYSICAL EXAM      Date: 7/20/2018  Patient Name: Michelle Vizcarra    History of Presenting Illness     Chief Complaint   Patient presents with    Knee Pain     Ambulatory w/ rollator & son w/ c/o R knee pain since yesterday, denies injury or trauma or any issues in the past.        History Provided By: Patient    HPI: Michelle Vizcarra, 66 y.o. female with PMHx significant for HTN, arthritis, CHF, presents ambualtory to the ED with cc of R knee pain that began yesterday. Pt states she was sitting down shredding paper when she experienced sharp knee pain. She denies any recent trauma, injury, falls. She has been ambulatory with her rollator, but this worsens her pain. She took percocet last night for her pain which moderately improved it. She denies any history of knee pain, but does report arthritis. Denies leg swelling, CP, SOB, abrasion, falls, trauma. PCP: Ines Perry MD    There are no other complaints, changes, or physical findings at this time. Current Outpatient Prescriptions   Medication Sig Dispense Refill    menthol (BIOFREEZE, MENTHOL,) 4 % gel 1 g by Apply Externally route every six (6) hours. 118 mL 0    apixaban (ELIQUIS) 5 mg tablet Take 1 Tab by mouth two (2) times a day. 60 Tab 11    oxyCODONE-acetaminophen (PERCOCET)  mg per tablet Take 1 Tab by mouth every eight (8) hours as needed for Pain. Max Daily Amount: 3 Tabs. 50 Tab 0    furosemide (LASIX) 40 mg tablet Take 1 Tab by mouth daily. 30 Tab 11    methIMAzole (TAPAZOLE) 5 mg tablet Take 2 Tabs by mouth two (2) times a day. (Patient taking differently: Take 5 mg by mouth two (2) times a day.) 120 Tab 0    lisinopril (PRINIVIL, ZESTRIL) 40 mg tablet Take 1 Tab by mouth daily. 30 Tab 3    omeprazole (PRILOSEC) 40 mg capsule Take 1 Cap by mouth daily. 30 Cap 1    naloxone (NARCAN) 4 mg/actuation nasal spray Use 1 spray intranasally into 1 nostril.  Use a new Narcan nasal spray for subsequent doses and administer into alternating nostrils. May repeat every 2 to 3 minutes as needed. 2 Each 0    cyclobenzaprine (FLEXERIL) 5 mg tablet Take 1 Tab by mouth three (3) times daily as needed for Muscle Spasm(s). 15 Tab 0    REVLIMID 10 mg cap Take  by mouth. Indications: 1 tab daily      potassium chloride (K-DUR, KLOR-CON) 20 mEq tablet Take 1 Tab by mouth two (2) times a day. 60 Tab 11    metoprolol succinate (TOPROL-XL) 25 mg XL tablet Take 0.5 Tabs by mouth daily. 30 Tab 11    aluminum hydrox-magnesium carb (GAVISCON EXTRA STRENGTH) 160-105 mg chew Take 1-2 tablets by mouth four (4) times daily as needed. 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), NYHA class 2 ([facility] [address] 8/11/2017    Arthritis     Atrial fibrillation (HCC)     Cancer (HCC)     multiple myeloma    Hypertension     S/P AV kathleen ablation 8/11/2017    Status post biventricular pacemaker 8/11/2017 8/11/17        Past Surgical History:  Past Surgical History:   Procedure Laterality Date    HX GYN      HX ORTHOPAEDIC      knee    HX PACEMAKER  08/11/2017       Family History:  Family History   Problem Relation Age of Onset    Stroke Mother     No Known Problems Father        Social History:  Social History   Substance Use Topics    Smoking status: Never Smoker    Smokeless tobacco: Never Used    Alcohol use No       Allergies: Allergies   Allergen Reactions    Codeine Other (comments)         Review of Systems   Review of Systems   Constitutional: Negative. Negative for activity change, appetite change, chills and fever. HENT: Negative for rhinorrhea and sore throat. Eyes: Negative for pain and visual disturbance. Respiratory: Negative for cough, shortness of breath and wheezing. Cardiovascular: Negative for chest pain, palpitations and leg swelling. Gastrointestinal: Negative for nausea and vomiting. Genitourinary: Negative for dysuria and hematuria. Musculoskeletal: Positive for arthralgias (R knee). Negative for joint swelling and myalgias. Skin: Negative for color change, rash and wound. Neurological: Negative for dizziness and headaches. All other systems reviewed and are negative. Physical Exam   Physical Exam   Constitutional: She is oriented to person, place, and time. Vital signs are normal. She appears well-developed. No distress. 66 y.o.  female in NAD, elevated BMI. Communicates appropriately and in full sentences  Son at bedside. HENT:   Head: Normocephalic and atraumatic. Eyes: Conjunctivae are normal. Pupils are equal, round, and reactive to light. Neck: Normal range of motion. Neck supple. No nuchal rigidity or meningeal signs   Pulmonary/Chest: Effort normal. No respiratory distress. Musculoskeletal: She exhibits tenderness. She exhibits no edema. No neurologic, motor, vascular, or compartment embarrassment observed on exam. No focal neurologic deficits. R KNEE: No swelling,ecchymosis, or effusion. Mild, diffuse TTP without point tenderness. Pt has decreased flexion/extension movements of R knee. Distal NV intact. Cap refill < 2 sec. Ambulatory with rollator. Neurological: She is alert and oriented to person, place, and time. Skin: Skin is warm and dry. She is not diaphoretic. No erythema. Psychiatric: She has a normal mood and affect. Her behavior is normal.   Nursing note and vitals reviewed. Diagnostic Study Results     Radiologic Studies -   XR KNEE RT 3 V   Final Result        Initial Result:     Impression:     IMPRESSION:    1. No evidence of acute fracture. 2. Severe tricompartmental degenerative disease with moderate joint effusion.        Narrative:     EXAM:  XR KNEE RT 3 V    INDICATION:   right knee pain, no known injury, Hx of arthritis and chronic pain    COMPARISON: Right knee radiograph 5/4/2015.     FINDINGS: 3 views of the right knee were obtained. Normal alignment. No evidence  of acute fracture. Severe tricompartmental degenerative disease of the knee. Moderate joint effusion. A calcific/ossific density is seen at the medial  posterior knee, which may represent a loose body within a Baker's cyst.         Medical Decision Making   I am the first provider for this patient. I reviewed the vital signs, available nursing notes, past medical history, past surgical history, family history and social history. Vital Signs-Reviewed the patient's vital signs. Patient Vitals for the past 12 hrs:   Temp Pulse Resp BP SpO2   07/20/18 0801 98.4 °F (36.9 °C) 75 18 (!) 154/95 95 %         Records Reviewed: Nursing Notes, Old Medical Records and Previous Radiology Studies    Provider Notes (Medical Decision Making):   DDx: arthritis, strain, sprain, obesity    ED Course:   Initial assessment performed. The patients presenting problems have been discussed, and they are in agreement with the care plan formulated and outlined with them. I have encouraged them to ask questions as they arise throughout their visit. Progress Note:  8:51 AM  Pt states her pain is feeling improved following analgesia. awaiting XR results. Will continue to monitor. DISCHARGE NOTE:  Daya Thrasher's  results have been reviewed with her. She has been counseled regarding her diagnosis. She verbally conveys understanding and agreement of the signs, symptoms, diagnosis, treatment and prognosis and additionally agrees to follow up as recommended with Dr. Julianna Beverly MD in 24 - 48 hours. She also agrees with the care-plan and conveys that all of her questions have been answered.   I have also put together some discharge instructions for her that include: 1) educational information regarding their diagnosis, 2) how to care for their diagnosis at home, as well a 3) list of reasons why they would want to return to the ED prior to their follow-up appointment, should their condition change. She and/or family's questions have been answered. I have encouraged them to see the official results in [facility]\" or to retrieve the specifics of their results from medical records. PLAN:  1. Return precautions as discussed  2. Follow-up with providers as directed  3. Medications as prescribed    Return to ED if worse     Diagnosis     Clinical Impression:   1. Primary osteoarthritis of both knees    2. Knee strain, right, initial encounter        Discharge Medication List as of 7/20/2018  9:04 AM      START taking these medications    Details   menthol (BIOFREEZE, MENTHOL,) 4 % gel 1 g by Apply Externally route every six (6) hours. , Normal, Disp-118 mL, R-0         CONTINUE these medications which have NOT CHANGED    Details   apixaban (ELIQUIS) 5 mg tablet Take 1 Tab by mouth two (2) times a day., Normal, Disp-60 Tab, R-11      oxyCODONE-acetaminophen (PERCOCET)  mg per tablet Take 1 Tab by mouth every eight (8) hours as needed for Pain. Max Daily Amount: 3 Tabs., Print, Disp-50 Tab, R-0      furosemide (LASIX) 40 mg tablet Take 1 Tab by mouth daily. , Normal, Disp-30 Tab, R-11      methIMAzole (TAPAZOLE) 5 mg tablet Take 2 Tabs by mouth two (2) times a day., Normal, Disp-120 Tab, R-0      lisinopril (PRINIVIL, ZESTRIL) 40 mg tablet Take 1 Tab by mouth daily. , No Print, Disp-30 Tab, R-3      omeprazole (PRILOSEC) 40 mg capsule Take 1 Cap by mouth daily. , Normal, Disp-30 Cap, R-1      naloxone (NARCAN) 4 mg/actuation nasal spray Use 1 spray intranasally into 1 nostril. Use a new Narcan nasal spray for subsequent doses and administer into alternating nostrils. May repeat every 2 to 3 minutes as needed. , Print, Disp-2 Each, R-0      cyclobenzaprine (FLEXERIL) 5 mg tablet Take 1 Tab by mouth three (3) times daily as needed for Muscle Spasm(s). , Normal, Disp-15 Tab, R-0      REVLIMID 10 mg cap Take  by mouth.  Indications: 1 tab daily, Historical Med, CYRUS potassium chloride (K-DUR, KLOR-CON) 20 mEq tablet Take 1 Tab by mouth two (2) times a day., Normal, Disp-60 Tab, R-11      metoprolol succinate (TOPROL-XL) 25 mg XL tablet Take 0.5 Tabs by mouth daily. , Normal, Disp-30 Tab, R-11      aluminum hydrox-magnesium carb (GAVISCON EXTRA STRENGTH) 160-105 mg chew Take 1-2 tablets by mouth four (4) times daily as needed., Historical Med             Follow-up Information     Follow up With Details Comments Contact Info    Reza Santos MD Schedule an appointment as soon as possible for a visit in 2 days As needed, If symptoms worsen [address] [place] [address]      Abraham Kim MD Call today  [address]  [address]  [address] [phone]                This note will not be viewable in [address]

## 2018-07-21 DIAGNOSIS — M65.9 SYNOVITIS OF KNEE: Primary | ICD-10-CM

## 2018-07-21 RX ORDER — PREDNISONE 10 MG/1
20 TABLET ORAL
Qty: 21 TAB | Refills: 0 | Status: SHIPPED | OUTPATIENT
Start: 2018-07-21 | End: 2018-09-14 | Stop reason: CLARIF

## 2018-07-23 ENCOUNTER — PATIENT OUTREACH (OUTPATIENT)
Dept: INTERNAL MEDICINE CLINIC | Age: 78
End: 2018-07-23

## 2018-07-23 ENCOUNTER — OFFICE VISIT (OUTPATIENT)
Dept: INTERNAL MEDICINE CLINIC | Age: 78
End: 2018-07-23

## 2018-07-23 VITALS
OXYGEN SATURATION: 98 % | DIASTOLIC BLOOD PRESSURE: 91 MMHG | WEIGHT: 186.7 LBS | SYSTOLIC BLOOD PRESSURE: 158 MMHG | BODY MASS INDEX: 26.73 KG/M2 | TEMPERATURE: 98.4 F | HEART RATE: 75 BPM | RESPIRATION RATE: 16 BRPM | HEIGHT: 70 IN

## 2018-07-23 DIAGNOSIS — M54.50 MIDLINE LOW BACK PAIN WITHOUT SCIATICA, UNSPECIFIED CHRONICITY: ICD-10-CM

## 2018-07-23 DIAGNOSIS — C90.00 MULTIPLE MYELOMA, REMISSION STATUS UNSPECIFIED (HCC): ICD-10-CM

## 2018-07-23 DIAGNOSIS — I10 ESSENTIAL HYPERTENSION: ICD-10-CM

## 2018-07-23 DIAGNOSIS — M65.9 SYNOVITIS OF KNEE: Primary | ICD-10-CM

## 2018-07-23 DIAGNOSIS — E05.90 HYPERTHYROIDISM: ICD-10-CM

## 2018-07-23 NOTE — MR AVS SNAPSHOT
[facility] 
 
 
 [address] [address] 
[phone] Patient: Zuleyka Goodrich MRN: [id number] GYO:[id number] Visit Information Date & Time Provider Department Dept. Phone Encounter #  
 7/23/2018 11:45 AM Khalif Hannah [facility] [id number] Your Appointments 8/31/2018  3:10 PM  
Follow Up with Edilberto Perkins MD  
[facility] Appt Note: 2 month f/u   [facility] [address] [phone] [address] 9/14/2018  9:30 AM  
Any with Manuel Booth MD  
[address] [place] [address] and Primary Care [facility] Appt Note: 2 month follow up  
 [address]  
[phone]  
  
   
 [address] [id number]  
  
    
 12/21/2018  9:45 AM  
ESTABLISHED PATIENT with Dahlia Acuna MD  
[facility] Appt Note: Per Dr. Kinsey Acosta, 6 mo f/u-scc [id number] [facility]  
[phone] [address] [facility] Upcoming Health Maintenance Date Due Influenza Age 5 to Adult 8/1/2018 MEDICARE YEARLY EXAM 1/23/2019 GLAUCOMA SCREENING Q2Y 2/12/2020 DTaP/Tdap/Td series (2 - Td) 5/19/2026 Allergies as of 7/23/2018  Review Complete On: 7/23/2018 By: Bryce Verma Severity Noted Reaction Type Reactions Codeine  09/05/2014    Other (comments) Current Immunizations  Reviewed on 8/29/2017 Name Date Influenza Vaccine 9/15/2014 Not reviewed this visit You Were Diagnosed With   
  
 Codes Comments Synovitis of knee    -  Primary ICD-10-CM: M65.9 ICD-9-CM: 727.09   
 Multiple myeloma, remission status unspecified (HCC)     ICD-10-CM: C90.00 ICD-9-CM: 203.00 Essential hypertension     ICD-10-CM: I10 
ICD-9-CM: 401.9 Hyperthyroidism     ICD-10-CM: E05.90 ICD-9-CM: 242.90 Midline low back pain without sciatica, unspecified chronicity     ICD-10-CM: M54.5 ICD-9-CM: 724.2 Vitals BP Pulse Temp Resp Height(growth percentile) Weight(growth percentile) (!) 158/91 75 98.4 °F (36.9 °C) (Oral) 16 5' 10\" (1.778 m) 186 lb 11.2 oz (84.7 kg) SpO2 BMI OB Status Smoking Status 98% 26.79 kg/m2 Hysterectomy Never Smoker Vitals History BMI and BSA Data Body Mass Index Body Surface Area  
 26.79 kg/m 2 2.05 m 2 Preferred Pharmacy Pharmacy Name Phone [phone] [phone] Your Updated Medication List  
  
   
This list is accurate as of 7/23/18  1:42 PM.  Always use your most recent med list.  
  
  
  
  
 apixaban 5 mg tablet Commonly known as:  Cliffton Rubinstein Take 1 Tab by mouth two (2) times a day. cyclobenzaprine 5 mg tablet Commonly known as:  FLEXERIL Take 1 Tab by mouth three (3) times daily as needed for Muscle Spasm(s). furosemide 40 mg tablet Commonly known as:  LASIX Take 1 Tab by mouth daily. GAVISCON EXTRA STRENGTH 160-105 mg Rajinder Spikes Generic drug:  aluminum hydrox-magnesium carb Take 1-2 tablets by mouth four (4) times daily as needed. lisinopril 40 mg tablet Commonly known as:  Ora Bee Take 1 Tab by mouth daily. menthol 4 % Gel Commonly known as:  BIOFREEZE (Todd) 1 g by Apply Externally route every six (6) hours. methIMAzole 5 mg tablet Commonly known as:  TAPAZOLE Take 2 Tabs by mouth two (2) times a day. metoprolol succinate 25 mg XL tablet Commonly known as:  TOPROL-XL Take 0.5 Tabs by mouth daily. naloxone 4 mg/actuation nasal spray Commonly known as:  [employer] Use 1 spray intranasally into 1 nostril.  Use a new Narcan nasal spray for subsequent doses and administer into alternating nostrils. May repeat every 2 to 3 minutes as needed. omeprazole 40 mg capsule Commonly known as:  PRILOSEC Take 1 Cap by mouth daily. oxyCODONE-acetaminophen  mg per tablet Commonly known as:  PERCOCET Take 1 Tab by mouth every eight (8) hours as needed for Pain. Max Daily Amount: 3 Tabs. potassium chloride 20 mEq tablet Commonly known as:  K-DUR, KLOR-CON Take 1 Tab by mouth two (2) times a day. predniSONE 10 mg tablet Commonly known as:  Marcello Lever Take 2 Tabs by mouth three (3) times daily (after meals). REVLIMID 10 mg Cap Generic drug:  lenalidomide Take  by mouth. Indications: 1 tab daily To-Do List   
 08/03/2018 7:00 AM  
  Appointment with [facility] [address] at [facility] ([phone]) CONTRAST STUDY: 1. The patient should not eat solid food four hours before the appointment but should be encouraged to drink clear liquids. 2.  If you have to drink oral contrast, please pick it up any weekday prior to your appointment, if you cannot please check in 2 hrs before appt time. 3.  The patient will require IV access for contrast administration. 4.  The patient should not take Ibuprofen (Advil, Motrin, etc.) and Naproxen Sodium (Aleve, etc.)  on the day of the exam. Stopping non-steroidal anti-inflammatory agents (NSAIDs) like Ibuprofen decreases the risk of kidney damage from the x-ray contrast (dye). 5.  Bring any non [facility] facility films/images pertaining to the area of interest with you on the day of appointment. 6.  Bring current lab work if available (within last 90 days CMP) ***If scheduled at [facility], iSTAT is not available, labs will need to be done before appointment*** 7. Check in at registration at least 30 minutes before appt time unless you were instructed to do otherwise. Introducing [facility]!    
 [employer] introduces [employer] patient portal. Now you can access parts of your medical record, email your doctor's office, and request medication refills online. 1. In your internet browser, go to [email or web address] 2. Click on the First Time User? Click Here link in the Sign In box. You will see the New Member Sign Up page. 3. Enter your [employer] Access Code exactly as it appears below. You will not need to use this code after youve completed the sign-up process. If you do not sign up before the expiration date, you must request a new code. · [employer] Access Code: [email or web address] Expires: 9/13/2018 11:18 AM 
 
4. Enter the last four digits of your Social Security Number (xxxx) and Date of Birth (mm/dd/yyyy) as indicated and click Submit. You will be taken to the next sign-up page. 5. Create a [employer] ID. This will be your [employer] login ID and cannot be changed, so think of one that is secure and easy to remember. 6. Create a [employer] password. You can change your password at any time. 7. Enter your Password Reset Question and Answer. This can be used at a later time if you forget your password. 8. Enter your e-mail address. You will receive e-mail notification when new information is available in [address] 9. Click Sign Up. You can now view and download portions of your medical record. 10. Click the Download Summary menu link to download a portable copy of your medical information. If you have questions, please visit the Frequently Asked Questions section of the [employer] website. Remember, [employer] is NOT to be used for urgent needs. For medical emergencies, dial 911. Now available from your iPhone and Android! Please provide this summary of care documentation to your next provider. Your primary care clinician is listed as Deanna Taylor. If you have any questions after today's visit, please call [phone].

## 2018-07-23 NOTE — PROGRESS NOTES
Chief Complaint   Patient presents with    Knee Pain     Patient requesting steroid injection in right knee. 1. Have you been to the ER, urgent care clinic since your last visit? Hospitalized since your last visit? Yes When: 7/20/18 Where: [facility] Reason for visit: knee pain    2. Have you seen or consulted any other health care providers outside of the [address] [place] [address] since your last visit? Include any pap smears or colon screening. [facility]  OFFICE PROCEDURE PROGRESS NOTE        Chart reviewed for the following:   Amelia CHAPMAN, have reviewed the History, Physical and updated the Allergic reactions for Daya H [address] performed immediately prior to start of procedure:   Amelia CHAPMAN, have performed the following reviews on [employer] prior to the start of the procedure:            * Patient was identified by name and date of birth   * Agreement on procedure being performed was verified  * Risks and Benefits explained to the patient  * Procedure site verified and marked as necessary  * Patient was positioned for comfort  * Consent was signed and verified     Time: 1:20 pm      Date of procedure: 7/25/2018    Procedure performed by:  Rsos Plaza MD    Provider assisted by:  Cristy Bee LPN    Patient assisted by: N/A    How tolerated by patient: tolerated the procedure well with no complications    Post Procedural Pain Scale: 2 - Hurts Little Bit    Comments: none

## 2018-07-23 NOTE — PROGRESS NOTES
[address] [place] [address] and Primary Care  [facility]  [address] [place] [address]  Phone:  [phone]  Fax: [phone]       Chief Complaint   Patient presents with    Knee Pain     Patient requesting steroid injection in right knee. .      SUBJECTIVE:    Shaun Dexter is a 66 y.o. female comes in for return visit complaining of a week's history of pain in her right knee. This was spontaneous. This is superimposed on her existing osteoarthritis. She insisted on getting steroids this weekend but I told her it would not work. She literally needs an injection. Her thyroid status appears to be reasonably stable. She is seeing the endocrinologist regarding this. She follows with oncology for her multiple myeloma. Her musculoskeletal pain is reasonably stable in general.    She remains on her antihypertensive medication as prescribed. Current Outpatient Prescriptions   Medication Sig Dispense Refill    predniSONE (DELTASONE) 10 mg tablet Take 2 Tabs by mouth three (3) times daily (after meals). 21 Tab 0    menthol (BIOFREEZE, MENTHOL,) 4 % gel 1 g by Apply Externally route every six (6) hours. 118 mL 0    apixaban (ELIQUIS) 5 mg tablet Take 1 Tab by mouth two (2) times a day. 60 Tab 11    oxyCODONE-acetaminophen (PERCOCET)  mg per tablet Take 1 Tab by mouth every eight (8) hours as needed for Pain. Max Daily Amount: 3 Tabs. 50 Tab 0    furosemide (LASIX) 40 mg tablet Take 1 Tab by mouth daily. 30 Tab 11    methIMAzole (TAPAZOLE) 5 mg tablet Take 2 Tabs by mouth two (2) times a day. (Patient taking differently: Take 5 mg by mouth two (2) times a day.) 120 Tab 0    lisinopril (PRINIVIL, ZESTRIL) 40 mg tablet Take 1 Tab by mouth daily. 30 Tab 3    omeprazole (PRILOSEC) 40 mg capsule Take 1 Cap by mouth daily. 30 Cap 1    naloxone (NARCAN) 4 mg/actuation nasal spray Use 1 spray intranasally into 1 nostril.  Use a new Narcan nasal spray for subsequent doses and administer into alternating nostrils. May repeat every 2 to 3 minutes as needed. 2 Each 0    cyclobenzaprine (FLEXERIL) 5 mg tablet Take 1 Tab by mouth three (3) times daily as needed for Muscle Spasm(s). 15 Tab 0    REVLIMID 10 mg cap Take  by mouth. Indications: 1 tab daily      potassium chloride (K-DUR, KLOR-CON) 20 mEq tablet Take 1 Tab by mouth two (2) times a day. 60 Tab 11    metoprolol succinate (TOPROL-XL) 25 mg XL tablet Take 0.5 Tabs by mouth daily. 30 Tab 11    aluminum hydrox-magnesium carb (GAVISCON EXTRA STRENGTH) 160-105 mg chew Take 1-2 tablets by mouth four (4) times daily as needed.        Past Medical History:   Diagnosis Date    Acute combined systolic and diastolic HF (heart failure), NYHA class 2 ([facility] [address] 8/11/2017    Arthritis     Atrial fibrillation (HCC)     Cancer (HCC)     multiple myeloma    Hypertension     S/P AV kathleen ablation 8/11/2017    Status post biventricular pacemaker 8/11/2017 8/11/17      Past Surgical History:   Procedure Laterality Date    HX GYN      HX ORTHOPAEDIC      knee    HX PACEMAKER  08/11/2017     Allergies   Allergen Reactions    Codeine Other (comments)         REVIEW OF SYSTEMS:  General: negative for - chills or fever  ENT: negative for - headaches, nasal congestion or tinnitus  Respiratory: negative for - cough, hemoptysis, shortness of breath or wheezing  Cardiovascular : negative for - chest pain, edema, palpitations or shortness of breath  Gastrointestinal: negative for - abdominal pain, blood in stools, heartburn or nausea/vomiting  Genito-Urinary: no dysuria, trouble voiding, or hematuria  Musculoskeletal: negative for - gait disturbance, joint pain, joint stiffness or joint swelling  Neurological: no TIA or stroke symptoms  Hematologic: no bruises, no bleeding, no swollen glands  Integument: no lumps, mole changes, nail changes or rash  Endocrine: no malaise/lethargy or unexpected weight changes      Social History     Social History    Marital status:      Spouse name: N/A    Number of children: N/A    Years of education: N/A     Social History Main Topics    Smoking status: Never Smoker    Smokeless tobacco: Never Used    Alcohol use No    Drug use: No    Sexual activity: Not Currently     Other Topics Concern    None     Social History Narrative     Family History   Problem Relation Age of Onset    Stroke Mother     No Known Problems Father        OBJECTIVE:    Visit Vitals    BP (!) 158/91    Pulse 75    Temp 98.4 °F (36.9 °C) (Oral)    Resp 16    Ht 5' 10\" (1.778 m)    Wt 186 lb 11.2 oz (84.7 kg)    SpO2 98%    BMI 26.79 kg/m2     CONSTITUTIONAL: well , well nourished, appears age appropriate  EYES: perrla, eom intact  ENMT:moist mucous membranes, pharynx clear  NECK: supple. Thyroid normal  RESPIRATORY: Chest: clear to ascultation and percussion   CARDIOVASCULAR: Heart: regular rate and rhythm  GASTROINTESTINAL: Abdomen: soft, bowel sounds active  HEMATOLOGIC: no pathological lymph nodes palpated  MUSCULOSKELETAL: Extremities: no edema, pulse 1+,pain elicited to ROM R knee   INTEGUMENT: No unusual rashes or suspicious skin lesions noted. Nails appear normal.  NEUROLOGIC: non-focal exam   MENTAL STATUS: alert and oriented, appropriate affect      ASSESSMENT:  1. Synovitis of knee    2. Multiple myeloma, remission status unspecified ([address]    3. Essential hypertension    4. Hyperthyroidism    5. Midline low back pain without sciatica, unspecified chronicity        PLAN:    1. The patient has a synovitis superimposed on her osteoarthritic right knee. Procedure:  Using sterile technique, I injected Kenalog 40 mg and xylocaine 1%, 5 cc into the lateral aspect of the knee. She tolerates the procedure well. She is not a great candidate for NSAIDs in view of of the direct oral anticoagulant. 2. Thyroid status appears to be stable based on chemical values previously noted.    3. Her musculoskeletal pain is quiescent currently. She takes analgesics as needed. 4. Her multiple myeloma is actively followed by oncology. She remains on chemotherapy. 5. Blood pressure is excellent today, no adjustments are made. Follow-up Disposition:  Return in about 2 months (around 9/23/2018).       Paco Jovel MD

## 2018-07-23 NOTE — PROGRESS NOTES
NNTOCED [facility] 2018:  OA Bilateral Knee  Hospital Discharge Follow-Up  Date/Time:  2018 1:00 PM    Patient was admitted to [facility] on 2018 and discharged from ED on 2018 for OA both knees/Knee Strain. The physician discharge summary was available at the time of outreach. Patient was contacted within 2  business weekend days of discharge. Top Challenges reviewed with the provider   Office visit         Method of communication with provider :face to face    Inpatient RRAT score: none given  Was this a readmission? no   Patient stated reason for the readmission: n/a     Nurse Navigator (NN) contacted the patient during office visit to perform post hospital discharge assessment. Verified name and  with patient as identifiers. Provided introduction to self, and explanation of the Nurse Navigator role. Reviewed discharge instructions and red flags with patient who verbalized understanding. Patient given an opportunity to ask questions and does not have any further questions or concerns at this time. The patient agrees to contact the PCP office for questions related to their healthcare. NN provided contact information for future reference. Disease Specific:   OA knee    Summary of patient's top problems:  1. Knee pain/chronic pain    Home Health orders at discharge: none  [address]: n/a  Date of initial visit: n/a    Durable Medical Equipment ordered/company: none-patient already has cane & walker if needed. Does walk with cane. Durable Medical Equipment received: n/a    Barriers to care? Knowledge regarding Disease & how to exercise to prevent stiffness.     Advance Care Planning:   Does patient have an Advance Directive:  not on file; education provided     Medication(s):   New Medications at Discharge: Menthol 4% Gel  Changed Medications at Discharge: none  Discontinued Medications at Discharge: none    Medication reconciliation was performed with patient, who verbalizes understanding of administration of home medications. There were no barriers to obtaining medications identified at this time. Referral to Pharm D needed: no     Current Outpatient Prescriptions   Medication Sig    predniSONE (DELTASONE) 10 mg tablet Take 2 Tabs by mouth three (3) times daily (after meals).  menthol (BIOFREEZE, MENTHOL,) 4 % gel 1 g by Apply Externally route every six (6) hours.  apixaban (ELIQUIS) 5 mg tablet Take 1 Tab by mouth two (2) times a day.  oxyCODONE-acetaminophen (PERCOCET)  mg per tablet Take 1 Tab by mouth every eight (8) hours as needed for Pain. Max Daily Amount: 3 Tabs.  furosemide (LASIX) 40 mg tablet Take 1 Tab by mouth daily.  methIMAzole (TAPAZOLE) 5 mg tablet Take 2 Tabs by mouth two (2) times a day. (Patient taking differently: Take 5 mg by mouth two (2) times a day.)    lisinopril (PRINIVIL, ZESTRIL) 40 mg tablet Take 1 Tab by mouth daily.  omeprazole (PRILOSEC) 40 mg capsule Take 1 Cap by mouth daily.  naloxone (NARCAN) 4 mg/actuation nasal spray Use 1 spray intranasally into 1 nostril. Use a new Narcan nasal spray for subsequent doses and administer into alternating nostrils. May repeat every 2 to 3 minutes as needed.  cyclobenzaprine (FLEXERIL) 5 mg tablet Take 1 Tab by mouth three (3) times daily as needed for Muscle Spasm(s).  REVLIMID 10 mg cap Take  by mouth. Indications: 1 tab daily    potassium chloride (K-DUR, KLOR-CON) 20 mEq tablet Take 1 Tab by mouth two (2) times a day.  metoprolol succinate (TOPROL-XL) 25 mg XL tablet Take 0.5 Tabs by mouth daily.  aluminum hydrox-magnesium carb (GAVISCON EXTRA STRENGTH) 160-105 mg chew Take 1-2 tablets by mouth four (4) times daily as needed. No current facility-administered medications for this visit. There are no discontinued medications.     [facility] follow up appointment(s): Future Appointments  Date Time Provider Belen Russell   8/3/2018 7:00 AM [facility] 1 [facility]   8/31/2018 3:10 PM Zeyad Martinez MD [facility] [address]   9/14/2018 9:30 AM Celio Beatty MD [facility] MAIN SAUL SCHED   12/21/2018 9:45 AM Eleanor Ayoub MD [address] [facility]      Non-[facility] follow up appointment(s): none  [facility]:  n/a   Goals Addressed             Most Recent     Attends follow-up appointments as directed. On track (7/23/2018)             Patient requesting to see PCP ASAP. NN w/ schedule patient for office visit in AM.    5/21/2018:  NN received call from patient asking if PCP wants Plavix med stopped; stated she rescheduled the missed procedure and now the date being 5/31/2018-Plavix is to be stopped 5 days in advance. Consult done w/ PCP: stated Thyroid procedure is too early and should be cancelled; stated call had come in regarding the same. NN returned call to patient to advise of the same. 7/23/2018:  Patient LM on NN VM stating she had spoken with PCP over weekend; told to come in to office. Injection to be given. Injections done today.  Coordinate pain management plan for patient. On track (6/15/2018)             Coordinate pain management plan for patient. Patient agreed to place warm soaks to later knee for severe pain and to take pain medication PRN.     1/22/2018:  C/o severe gas pain and back pain. Patient seen in [address] [facility] ED 1/2/2018 for Back Pain. Patient stated she was in the office for a Cardiology appointment on 1/2/2018; stated the had so many patients they were sending some to the ED and she was one. Today patient states she is taking Gaviscon as ordered by PCP (4x/day PRN). States pain in ED was on the left side, but now on right side and abdomin. States she ate beans on Friday, but has moved bowels daily. States the pain is not from the beans for this is the 3rd day since consuming.       5/9/2018:  NN rec'd call from patient w/ c/o increasing neck pain; requested PCP to know the pain has increased since prednisone medication not refilled. Consult done w/ PCP; stated medication w/ be called in. Patient encouraged to continue to apply warm soaks to neck area q 4 hrs for pain relief and percocet as ordered by PCP. Patient agreeded. NN called patient back to inform that PCP will be calling prescription. 6/18/2018:  Patient walk-in; c/o not feeling well/slight abdom pain as attended by PCP today. NN teachings:  Chewing food well can prevent abdominal/acid burning. Patient agrees to eating softer foods until discomfort is noted, and drinking plenty of water in the allowed amount. Patient agreed to eating less meat. 7/23/2018:  Patient received knee injections today during office visit. Patient teachings done with PCP on knee pain self care.             Goal completion:  7/23/2018

## 2018-07-25 RX ORDER — LIDOCAINE HYDROCHLORIDE 10 MG/ML
5 INJECTION, SOLUTION EPIDURAL; INFILTRATION; INTRACAUDAL; PERINEURAL ONCE
Qty: 5 ML | Refills: 0
Start: 2018-07-25 | End: 2018-07-25

## 2018-07-25 RX ORDER — TRIAMCINOLONE ACETONIDE 40 MG/ML
40 INJECTION, SUSPENSION INTRA-ARTICULAR; INTRAMUSCULAR ONCE
Qty: 1 ML | Refills: 0
Start: 2018-07-25 | End: 2018-07-25

## 2018-07-31 ENCOUNTER — TELEPHONE (OUTPATIENT)
Dept: ENDOCRINOLOGY | Age: 78
End: 2018-07-31

## 2018-07-31 RX ORDER — METHIMAZOLE 5 MG/1
2.5 TABLET ORAL DAILY
Qty: 60 TAB | Refills: 0 | Status: SHIPPED | OUTPATIENT
Start: 2018-07-31 | End: 2018-12-03

## 2018-07-31 NOTE — TELEPHONE ENCOUNTER
I attempted to return Ms. Thrasher's call and reached a voice mail. I left her another message letting her know that the Methimazole dose had been reduced as she had never called me back on my previous message to her. I also let her know that I would do a script for the new dose and send it to Dr. Saunders Fails to sign.   Trish Badillo

## 2018-07-31 NOTE — TELEPHONE ENCOUNTER
----- Message from Yunier Pan sent at 7/31/2018  1:18 PM EDT -----  Regarding: Dr. Kameron Sanchez  The patient is requesting that a refill for Rx Methimazole is called into the pharmacy.  [facility] NHFTAMQK)[phone] ([phone]

## 2018-08-03 ENCOUNTER — HOSPITAL ENCOUNTER (OUTPATIENT)
Dept: CT IMAGING | Age: 78
Discharge: HOME | End: 2018-08-03
Attending: INTERNAL MEDICINE
Payer: MEDICARE

## 2018-08-03 DIAGNOSIS — E85.9 MYELOMA ASSOCIATED AMYLOIDOSIS (HCC): ICD-10-CM

## 2018-08-03 DIAGNOSIS — C90.00 MYELOMA ASSOCIATED AMYLOIDOSIS (HCC): ICD-10-CM

## 2018-08-03 PROCEDURE — 74177 CT ABD & PELVIS W/CONTRAST: CPT

## 2018-08-03 PROCEDURE — 74011250636 HC RX REV CODE- 250/636: Performed by: INTERNAL MEDICINE

## 2018-08-03 PROCEDURE — 74011636320 HC RX REV CODE- 636/320: Performed by: INTERNAL MEDICINE

## 2018-08-03 PROCEDURE — 70491 CT SOFT TISSUE NECK W/DYE: CPT

## 2018-08-03 RX ORDER — SODIUM CHLORIDE 0.9 % (FLUSH) 0.9 %
10 SYRINGE (ML) INJECTION
Status: COMPLETED | OUTPATIENT
Start: 2018-08-03 | End: 2018-08-03

## 2018-08-03 RX ORDER — SODIUM CHLORIDE 9 MG/ML
50 INJECTION, SOLUTION INTRAVENOUS
Status: COMPLETED | OUTPATIENT
Start: 2018-08-03 | End: 2018-08-03

## 2018-08-03 RX ORDER — BARIUM SULFATE 20 MG/ML
900 SUSPENSION ORAL
Status: DISPENSED | OUTPATIENT
Start: 2018-08-03 | End: 2018-08-03

## 2018-08-03 RX ADMIN — Medication 10 ML: at 07:28

## 2018-08-03 RX ADMIN — SODIUM CHLORIDE 50 ML/HR: 900 INJECTION, SOLUTION INTRAVENOUS at 07:28

## 2018-08-03 RX ADMIN — IOPAMIDOL 100 ML: 755 INJECTION, SOLUTION INTRAVENOUS at 07:28

## 2018-08-31 ENCOUNTER — OFFICE VISIT (OUTPATIENT)
Dept: ENDOCRINOLOGY | Age: 78
End: 2018-08-31

## 2018-08-31 VITALS
DIASTOLIC BLOOD PRESSURE: 77 MMHG | SYSTOLIC BLOOD PRESSURE: 123 MMHG | HEIGHT: 70 IN | WEIGHT: 180.5 LBS | HEART RATE: 75 BPM | BODY MASS INDEX: 25.84 KG/M2

## 2018-08-31 DIAGNOSIS — E05.90 HYPERTHYROIDISM: Primary | ICD-10-CM

## 2018-08-31 NOTE — PATIENT INSTRUCTIONS
Continue methimazole 2.5mg (half tablet) daily for now,   We will check your levels and then stop it if stable, will call to let you know,   Plan to return to clinic in 3 months,   Please complete the labs ordered at the laboratory 2 days before next visit,     Call with concerns,     Rajesh Dang.  [address] [facility]

## 2018-08-31 NOTE — MR AVS SNAPSHOT
[address] [facility] [address] [address] [phone] Patient: Camilo Whyte MRN: [id number] NPV:[id number] Visit Information Date & Time Provider Department Dept. Phone Encounter #  
 8/31/2018  3:10 PM Macey Hutson, [facility] [phone] [id number] Follow-up Instructions Return in about 3 months (around 11/30/2018). Your Appointments 9/14/2018  9:30 AM  
Any with Judah Castañeda MD  
[address] [place] [address] and Primary Care [facility]) Appt Note: 2 month follow up  
 [address]  
[phone]  
  
   
 [address] [id number]  
  
    
 12/21/2018  9:45 AM  
ESTABLISHED PATIENT with Blessing Panda MD  
[facility]) Appt Note: Per Dr. Carolin Walker, 6 mo f/u-scc [id number] [facility]  
[phone] [address] [facility] Upcoming Health Maintenance Date Due Influenza Age 5 to Adult 8/1/2018 MEDICARE YEARLY EXAM 1/23/2019 GLAUCOMA SCREENING Q2Y 2/12/2020 DTaP/Tdap/Td series (2 - Td) 5/19/2026 Allergies as of 8/31/2018  Review Complete On: 8/31/2018 By: Macey Hutson MD  
  
 Severity Noted Reaction Type Reactions Codeine  09/05/2014    Other (comments) Current Immunizations  Reviewed on 8/29/2017 Name Date Influenza Vaccine 9/15/2014 Not reviewed this visit You Were Diagnosed With   
  
 Codes Comments Hyperthyroidism    -  Primary ICD-10-CM: E05.90 ICD-9-CM: 242.90 Vitals BP Pulse Height(growth percentile) Weight(growth percentile) BMI OB Status 123/77 (BP 1 Location: Left arm, BP Patient Position: Sitting) 75 5' 10\" (1.778 m) 180 lb 8 oz (81.9 kg) 25.9 kg/m2 Hysterectomy Smoking Status Never Smoker BMI and BSA Data Body Mass Index Body Surface Area  
 25.9 kg/m 2 2.01 m 2 Preferred Pharmacy Pharmacy Name Phone [facility], [address] [place] [address] [place] [phone] Your Updated Medication List  
  
   
This list is accurate as of 8/31/18  3:28 PM.  Always use your most recent med list.  
  
  
  
  
 apixaban 5 mg tablet Commonly known as:  Maria Luz Renny Take 1 Tab by mouth two (2) times a day. cyclobenzaprine 5 mg tablet Commonly known as:  FLEXERIL Take 1 Tab by mouth three (3) times daily as needed for Muscle Spasm(s). furosemide 40 mg tablet Commonly known as:  LASIX Take 1 Tab by mouth daily. GAVISCON EXTRA STRENGTH 160-105 mg Laura [place] Generic drug:  aluminum hydrox-magnesium carb Take 1-2 tablets by mouth four (4) times daily as needed. lisinopril 40 mg tablet Commonly known as:  Danii Aponteler Take 1 Tab by mouth daily. menthol 4 % Gel Commonly known as:  BIOFREEZE ([place]) 1 g by Apply Externally route every six (6) hours. methIMAzole 5 mg tablet Commonly known as:  TAPAZOLE Take 0.5 Tabs by mouth daily. metoprolol succinate 25 mg XL tablet Commonly known as:  TOPROL-XL Take 0.5 Tabs by mouth daily. naloxone 4 mg/actuation nasal spray Commonly known as:  [employer] Use 1 spray intranasally into 1 nostril. Use a new Narcan nasal spray for subsequent doses and administer into alternating nostrils. May repeat every 2 to 3 minutes as needed. omeprazole 40 mg capsule Commonly known as:  PRILOSEC Take 1 Cap by mouth daily. oxyCODONE-acetaminophen  mg per tablet Commonly known as:  PERCOCET Take 1 Tab by mouth every eight (8) hours as needed for Pain. Max Daily Amount: 3 Tabs. potassium chloride 20 mEq tablet Commonly known as:  K-DUR, KLOR-CON Take 1 Tab by mouth two (2) times a day. predniSONE 10 mg tablet Commonly known as:  Johana Aver  
 Take 2 Tabs by mouth three (3) times daily (after meals). REVLIMID 10 mg Cap Generic drug:  lenalidomide Take  by mouth. Indications: 1 tab daily We Performed the Following   
 T3 TOTAL [id number] CPT(R)] T4, FREE C2825140 CPT(R)] T4, FREE [id number] CPT(R)] TSH 3RD GENERATION [07456 CPT(R)] TSH 3RD GENERATION [89646 CPT(R)] Follow-up Instructions Return in about 3 months (around 11/30/2018). Patient Instructions Continue methimazole 2.5mg (half tablet) daily for now, We will check your levels and then stop it if stable, will call to let you know,  
Plan to return to clinic in 3 months, Please complete the labs ordered at the laboratory 2 days before next visit,  
 
Call with maria a, Yumi Adams. [address] [facility] Introducing [facility] SERVICES! [facility] introduces Author[employer]ee patient portal. Now you can access parts of your medical record, email your doctor's office, and request medication refills online. 1. In your internet browser, go to [email or web address] 2. Click on the First Time User? Click Here link in the Sign In box. You will see the New Member Sign Up page. 3. Enter your AuthorBee Access Code exactly as it appears below. You will not need to use this code after youve completed the sign-up process. If you do not sign up before the expiration date, you must request a new code. · AuthorBee Access Code: [email or web address] Expires: 9/13/2018 11:18 AM 
 
4. Enter the last four digits of your Social Security Number (xxxx) and Date of Birth (mm/dd/yyyy) as indicated and click Submit. You will be taken to the next sign-up page. 5. Create a CarWoo!t ID. This will be your CarWoo!t login ID and cannot be changed, so think of one that is secure and easy to remember. 6. Create a CarWoo!t password. You can change your password at any time. 7. Enter your Password Reset Question and Answer. This can be used at a later time if you forget your password. 8. Enter your e-mail address. You will receive e-mail notification when new information is available in [address] 9. Click Sign Up. You can now view and download portions of your medical record. 10. Click the Download Summary menu link to download a portable copy of your medical information. If you have questions, please visit the Frequently Asked Questions section of the VuMed[employer] website. Remember, VuMedi is NOT to be used for urgent needs. For medical emergencies, dial 911. Now available from your iPhone and Android! Please provide this summary of care documentation to your next provider. Your primary care clinician is listed as Deanna Taylor. If you have any questions after today's visit, please call [phone].

## 2018-08-31 NOTE — PROGRESS NOTES
CHIEF COMPLAINT: f/u evaluation for hyperthyroidism. HISTORY OF PRESENT ILLNESS:   Leidy Narayanan is a 66 y.o. female with a PMHx as noted below who presents to the endocrinology clinic for f/u evaluation of hyperthyroidism. Patient with hx of afib/flutter was noted for hyperthyroid labs in the past,   She was put on methimazole and came for a visit,   On 5mg methimazole her TSH was rising,   Negative TSI and TPO antibodies, and No nodules on thyroid ultrasound,  It was not clear if she had true hyperthyroidism or a transient thyroiditis,  Most recently we had recommended she reduce her methimazole to half tab (2.5mg) daily,  She has not repeated her pre-labs for me to review today,   She reports feeling otherwise well today. Review of most recent thyroid function:  Lab Results   Component Value Date    TSH 4.710 (H) 06/28/2018    TSH 2.930 06/15/2018    TSH <0.006 (L) 05/07/2018    FT4 1.06 06/28/2018    FT4 1.09 06/15/2018    FT4 2.93 (H) 05/07/2018    T3LT 90 06/28/2018    TSILT <0.10 06/28/2018    TMCLT 14 06/28/2018      TSILT = Thyroid stimulating antibodies  TMCLT = TPO antibodies  T3LT = Total T3 levels        PAST MEDICAL/SURGICAL HISTORY:   Past Medical History:   Diagnosis Date    Acute combined systolic and diastolic HF (heart failure), NYHA class 2 ([facility] [address] 8/11/2017    Arthritis     Atrial fibrillation (HCC)     Cancer (HCC)     multiple myeloma    Hypertension     S/P AV kathleen ablation 8/11/2017    Status post biventricular pacemaker 8/11/2017 8/11/17      Past Surgical History:   Procedure Laterality Date    HX GYN      HX ORTHOPAEDIC      knee    HX PACEMAKER  08/11/2017       ALLERGIES:   Allergies   Allergen Reactions    Codeine Other (comments)       MEDICATIONS ON ADMISSION:     Current Outpatient Prescriptions:     lisinopril (PRINIVIL, ZESTRIL) 40 mg tablet, Take 1 Tab by mouth daily. , Disp: 90 Tab, Rfl: 3    metoprolol succinate (TOPROL-XL) 25 mg XL tablet, Take 0.5 Tabs by mouth daily. , Disp: 30 Tab, Rfl: 11    methIMAzole (TAPAZOLE) 5 mg tablet, Take 0.5 Tabs by mouth daily. , Disp: 60 Tab, Rfl: 0    predniSONE (DELTASONE) 10 mg tablet, Take 2 Tabs by mouth three (3) times daily (after meals). , Disp: 21 Tab, Rfl: 0    menthol (BIOFREEZE, MENTHOL,) 4 % gel, 1 g by Apply Externally route every six (6) hours. , Disp: 118 mL, Rfl: 0    apixaban (ELIQUIS) 5 mg tablet, Take 1 Tab by mouth two (2) times a day., Disp: 60 Tab, Rfl: 11    oxyCODONE-acetaminophen (PERCOCET)  mg per tablet, Take 1 Tab by mouth every eight (8) hours as needed for Pain. Max Daily Amount: 3 Tabs., Disp: 50 Tab, Rfl: 0    furosemide (LASIX) 40 mg tablet, Take 1 Tab by mouth daily. , Disp: 30 Tab, Rfl: 11    omeprazole (PRILOSEC) 40 mg capsule, Take 1 Cap by mouth daily. , Disp: 30 Cap, Rfl: 1    naloxone (NARCAN) 4 mg/actuation nasal spray, Use 1 spray intranasally into 1 nostril. Use a new Narcan nasal spray for subsequent doses and administer into alternating nostrils. May repeat every 2 to 3 minutes as needed. , Disp: 2 Each, Rfl: 0    cyclobenzaprine (FLEXERIL) 5 mg tablet, Take 1 Tab by mouth three (3) times daily as needed for Muscle Spasm(s). , Disp: 15 Tab, Rfl: 0    REVLIMID 10 mg cap, Take  by mouth. Indications: 1 tab daily, Disp: , Rfl:     potassium chloride (K-DUR, KLOR-CON) 20 mEq tablet, Take 1 Tab by mouth two (2) times a day., Disp: 60 Tab, Rfl: 11    aluminum hydrox-magnesium carb (GAVISCON EXTRA STRENGTH) 160-105 mg chew, Take 1-2 tablets by mouth four (4) times daily as needed. , Disp: , Rfl:     SOCIAL HISTORY:   Social History     Social History    Marital status:      Spouse name: N/A    Number of children: N/A    Years of education: N/A     Occupational History    Not on file.      Social History Main Topics    Smoking status: Never Smoker    Smokeless tobacco: Never Used    Alcohol use No    Drug use: No    Sexual activity: Not Currently     Other Topics Concern    Not on file     Social History Narrative       FAMILY HISTORY:  Family History   Problem Relation Age of Onset    Stroke Mother     No Known Problems Father        REVIEW OF SYSTEMS: Complete ROS assessed and noted for that which is described above, all else are negative. Eyes: normal  ENT: normal  CVS: normal  Resp: normal  GI: normal  : normal  GYN: normal  Endocrine: normal  Integument: normal  Musculoskeletal: normal  Neuro: normal  Psych: normal      PHYSICAL EXAMINATION:    VITAL SIGNS:  Visit Vitals    /77 (BP 1 Location: Left arm, BP Patient Position: Sitting)    Pulse 75    Ht 5' 10\" (1.778 m)    Wt 180 lb 8 oz (81.9 kg)    BMI 25.9 kg/m2       GENERAL: NCAT, Sitting comfortably, NAD  EYES: EOMI, non-icteric, no proptosis  Ear/Nose/Throat: NCAT, no inflammation, thyroid gland enlarged  LYMPH NODES: No LAD  CARDIOVASCULAR: S1 S2, RRR, No murmur, 2+ radial pulses  RESPIRATORY: CTA b/l, no wheeze/rales  GASTROINTESTINAL: soft, NT, ND,  MUSCULOSKELETAL: Normal ROM, no atrophy  SKIN: warm, no edema/rash/ or other skin changes  NEUROLOGIC: 5/5 power all extremities, no tremors, AAOx3  PSYCHIATRIC: Normal affect, Normal insight and judgement      REVIEW OF LABORATORY AND RADIOLOGY DATA:   Labs and documentation have been reviewed as described above. ASSESSMENT AND PLAN:   Shaun Dexter is a 66 y.o. female with a PMHx as noted above who presents to the endocrinology clinic for f/u evaluation of hyperthyroidism. Hyperthyroidism    Patient has negative TPO and TSI antibodies, and does not have any notable thyroid nodules on her thyroid ultrasound. As noted previously, it is possible that she only experienced a thyroiditis in which case methimazole would not be needed. Plan as below. Thyroid levels today,  Continue methimazole 2.5mg once daily, will discontinue if results suggest so,    RTC in 3 months with Yumi mejia MD Diabetes & Endocrinology  [facility]

## 2018-09-01 LAB
T3 SERPL-MCNC: 94 NG/DL (ref 71–180)
T4 FREE SERPL-MCNC: 1.32 NG/DL (ref 0.82–1.77)
TSH SERPL DL<=0.005 MIU/L-ACNC: 1.95 UIU/ML (ref 0.45–4.5)

## 2018-09-02 NOTE — PROGRESS NOTES
Patients thyroid hormone levels are normal. She should stop the methimazole to see if she does well off it. She should remember to keep her follow up appointment, and complete prelabs as ordered. If she feels like her heart is beating too quickly or feels unusually shaky before next visit, she should advise us of this for a sooner re-evaluation. Thanks,    Homer Winters.  [address] [facility] Endocrinology  [address] [place] [address]

## 2018-09-10 ENCOUNTER — TELEPHONE (OUTPATIENT)
Dept: ENDOCRINOLOGY | Age: 78
End: 2018-09-10

## 2018-09-10 NOTE — TELEPHONE ENCOUNTER
----- Message from Martha Davis MD sent at 9/2/2018  6:58 PM EDT -----  Patients thyroid hormone levels are normal. She should stop the methimazole to see if she does well off it. She should remember to keep her follow up appointment, and complete prelabs as ordered. If she feels like her heart is beating too quickly or feels unusually shaky before next visit, she should advise us of this for a sooner re-evaluation. Thanks,    Hakan Schneider.  [address] [facility] Endocrinology  [address] [place] [address]

## 2018-09-10 NOTE — TELEPHONE ENCOUNTER
I called Ms. Thrasher and relayed the message from Dr. Ga Clinton. She understood the information.   Prabhu Ahuja

## 2018-09-14 ENCOUNTER — OFFICE VISIT (OUTPATIENT)
Dept: INTERNAL MEDICINE CLINIC | Age: 78
End: 2018-09-14

## 2018-09-14 VITALS
HEART RATE: 73 BPM | RESPIRATION RATE: 16 BRPM | WEIGHT: 184 LBS | HEIGHT: 70 IN | SYSTOLIC BLOOD PRESSURE: 140 MMHG | DIASTOLIC BLOOD PRESSURE: 85 MMHG | TEMPERATURE: 98.3 F | OXYGEN SATURATION: 99 % | BODY MASS INDEX: 26.34 KG/M2

## 2018-09-14 DIAGNOSIS — C90.00 MULTIPLE MYELOMA, REMISSION STATUS UNSPECIFIED (HCC): ICD-10-CM

## 2018-09-14 DIAGNOSIS — R29.898 MUSCULAR DECONDITIONING: ICD-10-CM

## 2018-09-14 DIAGNOSIS — I10 ESSENTIAL HYPERTENSION: ICD-10-CM

## 2018-09-14 DIAGNOSIS — E05.90 HYPERTHYROIDISM: ICD-10-CM

## 2018-09-14 DIAGNOSIS — I87.2 VENOUS INSUFFICIENCY: ICD-10-CM

## 2018-09-14 DIAGNOSIS — M54.12 CERVICAL RADICULOPATHY: Primary | ICD-10-CM

## 2018-09-14 RX ORDER — PREDNISONE 20 MG/1
60 TABLET ORAL
Qty: 15 TAB | Refills: 0 | Status: SHIPPED | OUTPATIENT
Start: 2018-09-14 | End: 2018-12-14 | Stop reason: CLARIF

## 2018-09-14 NOTE — PROGRESS NOTES
Chief Complaint   Patient presents with    Hypertension     2 month follow up      1. Have you been to the ER, urgent care clinic since your last visit? Hospitalized since your last visit? No    2. Have you seen or consulted any other health care providers outside of the [address] [place] [address] since your last visit? Include any pap smears or colon screening.  No

## 2018-09-14 NOTE — MR AVS SNAPSHOT
[address] [facility]. [address] [address] 
[phone] Patient: Camilo Whyte MRN: [id number] CTV:6/62/2460 Visit Information Date & Time Provider Department Dept. Phone Encounter #  
 9/14/2018  9:30 AM MD Rosemary Puente [facility] [phone] [id number] Your Appointments 12/3/2018  9:50 AM  
Follow Up with Macey Hutson MD  
[facility] [address] [place] [address]) Appt Note: 3 month f/u  Hyperthyroidism [facility] [phone] [id number] [address] [facility]  
  
    
 12/21/2018  9:45 AM  
ESTABLISHED PATIENT with MD [facility] [address] [place] [address]) Appt Note: Per Dr. Carolin Walker, 6 mo f/u-scc [id number] [facility] [address]  
[phone] [id number] [facility] [address] Upcoming Health Maintenance Date Due Influenza Age 5 to Adult 8/1/2018 MEDICARE YEARLY EXAM 1/23/2019 GLAUCOMA SCREENING Q2Y 2/12/2020 DTaP/Tdap/Td series (2 - Td) 5/19/2026 Allergies as of 9/14/2018  Review Complete On: 9/14/2018 By: Irwin Joy Severity Noted Reaction Type Reactions Codeine  09/05/2014    Other (comments) Current Immunizations  Reviewed on 8/29/2017 Name Date Influenza Vaccine 9/15/2014 Not reviewed this visit You Were Diagnosed With   
  
 Codes Comments Cervical radiculopathy    -  Primary ICD-10-CM: M54.12 
ICD-9-CM: 723.4 Essential hypertension     ICD-10-CM: I10 
ICD-9-CM: 401.9 Venous insufficiency     ICD-10-CM: I87.2 ICD-9-CM: 459.81 Muscular deconditioning     ICD-10-CM: R29.898 ICD-9-CM: 781.99 Hyperthyroidism     ICD-10-CM: E05.90 ICD-9-CM: 242.90 Vitals BP Pulse Temp Resp Height(growth percentile) Weight(growth percentile) 140/85 73 98.3 °F (36.8 °C) (Oral) 16 5' 10\" (1.778 m) 184 lb (83.5 kg) SpO2 BMI OB Status Smoking Status 99% 26.4 kg/m2 Hysterectomy Never Smoker Vitals History BMI and BSA Data Body Mass Index Body Surface Area  
 26.4 kg/m 2 2.03 m 2 Preferred Pharmacy Pharmacy Name Phone [facility] [address] [place] [address] [place] [phone] Your Updated Medication List  
  
   
This list is accurate as of 9/14/18 10:21 AM.  Always use your most recent med list.  
  
  
  
  
 apixaban 5 mg tablet Commonly known as:  Wonda Deem Take 1 Tab by mouth two (2) times a day. cyclobenzaprine 5 mg tablet Commonly known as:  FLEXERIL Take 1 Tab by mouth three (3) times daily as needed for Muscle Spasm(s). furosemide 40 mg tablet Commonly known as:  LASIX Take 1 Tab by mouth daily. GAVISCON EXTRA STRENGTH 160-105 mg Geovanna Millan Generic drug:  aluminum hydrox-magnesium carb Take 1-2 tablets by mouth four (4) times daily as needed. lisinopril 40 mg tablet Commonly known as:  Quick Edman Take 1 Tab by mouth daily. menthol 4 % Gel Commonly known as:  BIOFREEZE ([place]) 1 g by Apply Externally route every six (6) hours. methIMAzole 5 mg tablet Commonly known as:  TAPAZOLE Take 0.5 Tabs by mouth daily. metoprolol succinate 25 mg XL tablet Commonly known as:  TOPROL-XL Take 0.5 Tabs by mouth daily. naloxone 4 mg/actuation nasal spray Commonly known as:  [employer] Use 1 spray intranasally into 1 nostril. Use a new Narcan nasal spray for subsequent doses and administer into alternating nostrils. May repeat every 2 to 3 minutes as needed. omeprazole 40 mg capsule Commonly known as:  PRILOSEC Take 1 Cap by mouth daily. oxyCODONE-acetaminophen  mg per tablet Commonly known as:  PERCOCET  
 Take 1 Tab by mouth every eight (8) hours as needed for Pain. Max Daily Amount: 3 Tabs. potassium chloride 20 mEq tablet Commonly known as:  K-DUR, KLOR-CON Take 1 Tab by mouth two (2) times a day. predniSONE 20 mg tablet Commonly known as:  Sharlotte Corner Take 3 Tabs by mouth daily (after dinner). REVLIMID 10 mg Cap Generic drug:  lenalidomide Take  by mouth. Indications: 1 tab daily Prescriptions Sent to Pharmacy Refills  
 predniSONE (DELTASONE) 20 mg tablet 0 Sig: Take 3 Tabs by mouth daily (after dinner). Class: Normal  
 Pharmacy: [address] [place] [address] #: [phone] Route: Oral  
  
We Performed the Following METABOLIC PANEL, BASIC [83443 CPT(R)] Introducing [facility] SERVICES! Jael Garcia introduces [employer] patient portal. Now you can access parts of your medical record, email your doctor's office, and request medication refills online. 1. In your internet browser, go to [email or web address] 2. Click on the First Time User? Click Here link in the Sign In box. You will see the New Member Sign Up page. 3. Enter your [employer] Access Code exactly as it appears below. You will not need to use this code after youve completed the sign-up process. If you do not sign up before the expiration date, you must request a new code. · [employer] Access Code: [email or web address] Expires: 12/13/2018 10:21 AM 
 
4. Enter the last four digits of your Social Security Number (xxxx) and Date of Birth (mm/dd/yyyy) as indicated and click Submit. You will be taken to the next sign-up page. 5. Create a [employer] ID. This will be your [employer] login ID and cannot be changed, so think of one that is secure and easy to remember. 6. Create a [employer] password. You can change your password at any time. 7. Enter your Password Reset Question and Answer.  This can be used at a later time if you forget your password. 8. Enter your e-mail address. You will receive e-mail notification when new information is available in [address] 9. Click Sign Up. You can now view and download portions of your medical record. 10. Click the Download Summary menu link to download a portable copy of your medical information. If you have questions, please visit the Frequently Asked Questions section of the [employer] website. Remember, [employer] is NOT to be used for urgent needs. For medical emergencies, dial 911. Now available from your iPhone and Android! Please provide this summary of care documentation to your next provider. Your primary care clinician is listed as Deanna Taylor. If you have any questions after today's visit, please call [phone].

## 2018-09-15 LAB
BUN SERPL-MCNC: 11 MG/DL (ref 8–27)
BUN/CREAT SERPL: 12 (ref 12–28)
CALCIUM SERPL-MCNC: 9.1 MG/DL (ref 8.7–10.3)
CHLORIDE SERPL-SCNC: 102 MMOL/L (ref 96–106)
CO2 SERPL-SCNC: 25 MMOL/L (ref 20–29)
CREAT SERPL-MCNC: 0.93 MG/DL (ref 0.57–1)
GLUCOSE SERPL-MCNC: 91 MG/DL (ref 65–99)
POTASSIUM SERPL-SCNC: 3.4 MMOL/L (ref 3.5–5.2)
SODIUM SERPL-SCNC: 147 MMOL/L (ref 134–144)

## 2018-09-16 NOTE — PROGRESS NOTES
[address] [place] [address] and Primary Care  [place] [address]  [address] [place] [address]  Phone:  [phone]  Fax: [phone]       Chief Complaint   Patient presents with    Hypertension     2 month follow up    . SUBJECTIVE:    Jason Castorena is a 66 y.o. female comes in for follow-up complaining of pain in her neck. This is predominantly right-sided and is aggravated nocturnally. This started several weeks ago. He frequently gets musculoskeletal pain in her neck shoulders and low back. She follows up with endocrinology for her hyperthyroidism. She is on no medication currently. Her last TSH was normal.    She follows up with her medical oncologist for multiple myeloma on a consistent basis. She has a past history of primary hypertension and osteoarthritis predominantly of her knees. Current Outpatient Prescriptions   Medication Sig Dispense Refill    predniSONE (DELTASONE) 20 mg tablet Take 3 Tabs by mouth daily (after dinner). 15 Tab 0    lisinopril (PRINIVIL, ZESTRIL) 40 mg tablet Take 1 Tab by mouth daily. 90 Tab 3    metoprolol succinate (TOPROL-XL) 25 mg XL tablet Take 0.5 Tabs by mouth daily. 30 Tab 11    methIMAzole (TAPAZOLE) 5 mg tablet Take 0.5 Tabs by mouth daily. 60 Tab 0    menthol (BIOFREEZE, MENTHOL,) 4 % gel 1 g by Apply Externally route every six (6) hours. 118 mL 0    apixaban (ELIQUIS) 5 mg tablet Take 1 Tab by mouth two (2) times a day. 60 Tab 11    oxyCODONE-acetaminophen (PERCOCET)  mg per tablet Take 1 Tab by mouth every eight (8) hours as needed for Pain. Max Daily Amount: 3 Tabs. 50 Tab 0    furosemide (LASIX) 40 mg tablet Take 1 Tab by mouth daily. 30 Tab 11    omeprazole (PRILOSEC) 40 mg capsule Take 1 Cap by mouth daily. 30 Cap 1    naloxone (NARCAN) 4 mg/actuation nasal spray Use 1 spray intranasally into 1 nostril. Use a new Narcan nasal spray for subsequent doses and administer into alternating nostrils.  May repeat every 2 to 3 minutes as needed. 2 Each 0    cyclobenzaprine (FLEXERIL) 5 mg tablet Take 1 Tab by mouth three (3) times daily as needed for Muscle Spasm(s). 15 Tab 0    REVLIMID 10 mg cap Take  by mouth. Indications: 1 tab daily      potassium chloride (K-DUR, KLOR-CON) 20 mEq tablet Take 1 Tab by mouth two (2) times a day. 60 Tab 11    aluminum hydrox-magnesium carb (GAVISCON EXTRA STRENGTH) 160-105 mg chew Take 1-2 tablets by mouth four (4) times daily as needed.        Past Medical History:   Diagnosis Date    Acute combined systolic and diastolic HF (heart failure), NYHA class 2 (HCC) 8/11/2017    Arthritis     Atrial fibrillation (HCC)     Cancer (HCC)     multiple myeloma    Hypertension     S/P AV kathleen ablation 8/11/2017    Status post biventricular pacemaker 8/11/2017 8/11/17      Past Surgical History:   Procedure Laterality Date    HX GYN      HX ORTHOPAEDIC      knee    HX PACEMAKER  08/11/2017     Allergies   Allergen Reactions    Codeine Other (comments)         REVIEW OF SYSTEMS:  General: negative for - chills or fever  ENT: negative for - headaches, nasal congestion or tinnitus  Respiratory: negative for - cough, hemoptysis, shortness of breath or wheezing  Cardiovascular : negative for - chest pain, edema, palpitations or shortness of breath  Gastrointestinal: negative for - abdominal pain, blood in stools, heartburn or nausea/vomiting  Genito-Urinary: no dysuria, trouble voiding, or hematuria  Musculoskeletal: negative for - gait disturbance, joint pain, joint stiffness or joint swelling  Neurological: no TIA or stroke symptoms  Hematologic: no bruises, no bleeding, no swollen glands  Integument: no lumps, mole changes, nail changes or rash  Endocrine: no malaise/lethargy or unexpected weight changes      Social History     Social History    Marital status:      Spouse name: N/A    Number of children: N/A    Years of education: N/A     Social History Main Topics    Smoking status: Never Smoker    Smokeless tobacco: Never Used    Alcohol use No    Drug use: No    Sexual activity: Not Currently     Other Topics Concern    None     Social History Narrative     Family History   Problem Relation Age of Onset    Stroke Mother     No Known Problems Father        OBJECTIVE:    Visit Vitals    /85    Pulse 73    Temp 98.3 °F (36.8 °C) (Oral)    Resp 16    Ht 5' 10\" (1.778 m)    Wt 184 lb (83.5 kg)    SpO2 99%    BMI 26.4 kg/m2     CONSTITUTIONAL: well , well nourished, appears age appropriate  EYES: perrla, eom intact  ENMT:moist mucous membranes, pharynx clear  NECK: supple. Thyroid normal, pain elicited to range of motion cervical spine  RESPIRATORY: Chest: clear to ascultation and percussion   CARDIOVASCULAR: Heart: regular rate and rhythm  GASTROINTESTINAL: Abdomen: soft, bowel sounds active  HEMATOLOGIC: no pathological lymph nodes palpated  MUSCULOSKELETAL: Extremities: trace edema distally, pulse 1+, mild degenerative changes noted both knees  INTEGUMENT: No unusual rashes or suspicious skin lesions noted. Nails appear normal.  NEUROLOGIC: non-focal exam   MENTAL STATUS: alert and oriented, appropriate affect      ASSESSMENT:  1. Cervical radiculopathy    2. Hyperthyroidism    3. Multiple myeloma, remission status unspecified ([facility] [address]    4. Essential hypertension    5. Venous insufficiency    6. Muscular deconditioning        PLAN:  1. She appears to have cervical radiculopathy. She has been taking her Percocet but is not helping. I will give her a short course of prednisone which generally does help. 2.  She will follow with endocrinology regarding her hyperthyroidism. 3.  She will also follow-up with her medical oncologist for her multiple myeloma. 4.  Blood pressure is excellent today and no adjustments are made. 5.  She continues with swelling of her lower extremities which is orthostatic in nature. She has had this for years.   This is secondary to her venous insufficiency. 6.  I encouraged her to increase her physical activity. She remains deconditioned. She needs to walk on a consistent basis outside. .  Orders Placed This Encounter    METABOLIC PANEL, BASIC    predniSONE (DELTASONE) 20 mg tablet         Follow-up Disposition:  Return in about 2 months (around 11/14/2018).       Toni Tai MD

## 2018-10-02 ENCOUNTER — TELEPHONE (OUTPATIENT)
Dept: INTERNAL MEDICINE CLINIC | Age: 78
End: 2018-10-02

## 2018-10-02 NOTE — TELEPHONE ENCOUNTER
PATIENT NOTIFIED BY PHONE PER DR. SEQUEIRA AND ASK TO TAKE HER POTASSIUM BID AND RETURN IN 2 WEEKS FOR A REPEAT BMP.

## 2018-10-12 ENCOUNTER — HOSPITAL ENCOUNTER (OUTPATIENT)
Dept: MAMMOGRAPHY | Age: 78
Discharge: HOME | End: 2018-10-12
Attending: INTERNAL MEDICINE
Payer: MEDICARE

## 2018-10-12 DIAGNOSIS — Z12.39 SCREENING BREAST EXAMINATION: ICD-10-CM

## 2018-10-12 PROCEDURE — 77067 SCR MAMMO BI INCL CAD: CPT

## 2018-10-16 ENCOUNTER — CLINICAL SUPPORT (OUTPATIENT)
Dept: INTERNAL MEDICINE CLINIC | Age: 78
End: 2018-10-16

## 2018-10-16 DIAGNOSIS — I10 ESSENTIAL HYPERTENSION: Primary | ICD-10-CM

## 2018-10-16 NOTE — MR AVS SNAPSHOT
[address] [facility] 
 
 
 [address] [id number] 
[phone] Patient: Henna Moeller MRN: [id number] ZOV:[id number] Visit Information Date & Time Provider Department Dept. Phone Encounter #  
 10/16/2018  9:40 AM NURSE [facility] and [facility] [phone] [id number] Your Appointments 12/3/2018  9:50 AM  
Follow Up with Kwame Romano MD  
[facility]) Appt Note: 3 month f/u  Hyperthyroidism [facility] [phone] [id number] [address] [place] [address]  
  
    
 12/14/2018  9:45 AM  
Any with Ramona Crabtree MD  
[address] [place] [address] and Primary Care [facility]) Appt Note: 3 month follow up  
 [address]  
[phone]  
  
   
 [id number]  
  
    
 12/21/2018  9:45 AM  
ESTABLISHED PATIENT with Lakhwinder Guzman MD  
[facility]) Appt Note: Per Dr. Taylor Vogt, 6 mo f/u-scc [address]  
[phone] [address] Upcoming Health Maintenance Date Due Shingrix Vaccine Age 50> (1 of 2) 3/27/1990 Influenza Age 5 to Adult 8/1/2018 MEDICARE YEARLY EXAM 1/23/2019 GLAUCOMA SCREENING Q2Y 2/12/2020 DTaP/Tdap/Td series (2 - Td) 5/19/2026 Allergies as of 10/16/2018  Review Complete On: 10/12/2018 By: Enriqueta Patches Severity Noted Reaction Type Reactions Codeine  09/05/2014    Other (comments) Current Immunizations  Reviewed on 8/29/2017 Name Date Influenza Vaccine 9/15/2014 Not reviewed this visit You Were Diagnosed With   
  
 Codes Comments Essential hypertension    -  Primary ICD-10-CM: I10 
ICD-9-CM: 401.9 Vitals OB Status Smoking Status Hysterectomy Never Smoker Preferred Pharmacy Pharmacy Name Phone [phone], [address] [place] [address] [place] [phone] Your Updated Medication List  
  
   
This list is accurate as of 10/16/18 11:55 AM.  Always use your most recent med list.  
  
  
  
  
 apixaban 5 mg tablet Commonly known as:  Deberah Sheila Take 1 Tab by mouth two (2) times a day. cyclobenzaprine 5 mg tablet Commonly known as:  FLEXERIL Take 1 Tab by mouth three (3) times daily as needed for Muscle Spasm(s). furosemide 40 mg tablet Commonly known as:  LASIX Take 1 Tab by mouth daily. GAVISCON EXTRA STRENGTH 160-105 mg Tello Barban Generic drug:  aluminum hydrox-magnesium carb Take 1-2 tablets by mouth four (4) times daily as needed. lisinopril 40 mg tablet Commonly known as:  Jazmin Brill Take 1 Tab by mouth daily. menthol 4 % Gel Commonly known as:  BIOFREEZE ([place]) 1 g by Apply Externally route every six (6) hours. methIMAzole 5 mg tablet Commonly known as:  TAPAZOLE Take 0.5 Tabs by mouth daily. metoprolol succinate 25 mg XL tablet Commonly known as:  TOPROL-XL Take 0.5 Tabs by mouth daily. naloxone 4 mg/actuation nasal spray Commonly known as:  [employer] Use 1 spray intranasally into 1 nostril. Use a new Narcan nasal spray for subsequent doses and administer into alternating nostrils. May repeat every 2 to 3 minutes as needed. omeprazole 40 mg capsule Commonly known as:  PRILOSEC Take 1 Cap by mouth daily. oxyCODONE-acetaminophen  mg per tablet Commonly known as:  PERCOCET Take 1 Tab by mouth every eight (8) hours as needed for Pain. Max Daily Amount: 3 Tabs. potassium chloride 20 mEq tablet Commonly known as:  K-DUR, KLOR-CON Take 1 Tab by mouth two (2) times a day. predniSONE 20 mg tablet Commonly known as:  Ary Boxer  
 Take 3 Tabs by mouth daily (after dinner). REVLIMID 10 mg Cap Generic drug:  lenalidomide Take  by mouth. Indications: 1 tab daily We Performed the Following METABOLIC PANEL, BASIC [94813 CPT(R)] Comments:  
 Per providers verbal orders Introducing [facility]! [employer] introduces [employer] patient portal. Now you can access parts of your medical record, email your doctor's office, and request medication refills online. 1. In your internet browser, go to [email or web address] 2. Click on the First Time User? Click Here link in the Sign In box. You will see the New Member Sign Up page. 3. Enter your [employer] Access Code exactly as it appears below. You will not need to use this code after youve completed the sign-up process. If you do not sign up before the expiration date, you must request a new code. · [employer]itos Access Code: [email or web address] Expires: 12/13/2018 10:21 AM 
 
4. Enter the last four digits of your Social Security Number (xxxx) and Date of Birth (mm/dd/yyyy) as indicated and click Submit. You will be taken to the next sign-up page. 5. Create a Bebitos ID. This will be your Bebitos login ID and cannot be changed, so think of one that is secure and easy to remember. 6. Create a Bebitos password. You can change your password at any time. 7. Enter your Password Reset Question and Answer. This can be used at a later time if you forget your password. 8. Enter your e-mail address. You will receive e-mail notification when new information is available in [address] 9. Click Sign Up. You can now view and download portions of your medical record. 10. Click the Download Summary menu link to download a portable copy of your medical information. If you have questions, please visit the Frequently Asked Questions section of the Bebitos website. Remember, Bebitos is NOT to be used for urgent needs. For medical emergencies, dial 911. Now available from your iPhone and Android! Please provide this summary of care documentation to your next provider. Your primary care clinician is listed as Deanna Taylor. If you have any questions after today's visit, please call [phone].

## 2018-10-16 NOTE — LETTER
10/20/2018 8:42 PM 
 
Ms. Hossein Taylor [address] [facility] [address] Dear Hossein Taylor: Please find your most recent results below. Resulted Orders METABOLIC PANEL, BASIC Result Value Ref Range Glucose 79 65 - 99 mg/dL BUN 12 8 - 27 mg/dL Creatinine 0.92 0.57 - 1.00 mg/dL GFR est non-AA 60 >59 mL/min/1.73 GFR est AA 69 >59 mL/min/1.73  
 BUN/Creatinine ratio 13 12 - 28 Sodium 149 (H) 134 - 144 mmol/L Potassium 3.4 (L) 3.5 - 5.2 mmol/L Chloride 105 96 - 106 mmol/L  
 CO2 27 20 - 29 mmol/L Calcium 9.2 8.7 - 10.3 mg/dL Narrative Performed at:  [address]  [id number] : Sunita Ghotra MD, Phone:  [phone] RECOMMENDATIONS: 
Office will call you about your potassium which is low. Please call me if you have any questions: [phone] Sincerely, [facility] NURSE

## 2018-10-17 LAB
BUN SERPL-MCNC: 12 MG/DL (ref 8–27)
BUN/CREAT SERPL: 13 (ref 12–28)
CALCIUM SERPL-MCNC: 9.2 MG/DL (ref 8.7–10.3)
CHLORIDE SERPL-SCNC: 105 MMOL/L (ref 96–106)
CO2 SERPL-SCNC: 27 MMOL/L (ref 20–29)
CREAT SERPL-MCNC: 0.92 MG/DL (ref 0.57–1)
GLUCOSE SERPL-MCNC: 79 MG/DL (ref 65–99)
POTASSIUM SERPL-SCNC: 3.4 MMOL/L (ref 3.5–5.2)
SODIUM SERPL-SCNC: 149 MMOL/L (ref 134–144)

## 2018-10-21 NOTE — PROGRESS NOTES
Asked patient on any potassium tablets if she is taking daily and I will assume she is still taking her furosemide daily

## 2018-10-23 ENCOUNTER — TELEPHONE (OUTPATIENT)
Dept: INTERNAL MEDICINE CLINIC | Age: 78
End: 2018-10-23

## 2018-10-23 NOTE — TELEPHONE ENCOUNTER
Patient notified per Dr. Lopez Forget and ask if she is taking her potassium bid, she states she had ran out but is doing so now.

## 2018-11-12 ENCOUNTER — PATIENT OUTREACH (OUTPATIENT)
Dept: INTERNAL MEDICINE CLINIC | Age: 78
End: 2018-11-12

## 2018-11-12 RX ORDER — PREDNISONE 20 MG/1
80 TABLET ORAL DAILY
COMMUNITY
Start: 2018-11-12 | End: 2018-11-16

## 2018-11-12 NOTE — PROGRESS NOTES
[facility] Health Promotion & Risk Prevention:  Neck Pain: Medication     Consult done w/ PCP. Patient c/o neck pain. PCP orders Prednisone PO. NN spoke with patient x2. States Prednisone is the only medication that helps when the pain is so aggravating. Patient agreed to apply warm soaks to neck intermittently.

## 2018-12-03 ENCOUNTER — OFFICE VISIT (OUTPATIENT)
Dept: ENDOCRINOLOGY | Age: 78
End: 2018-12-03

## 2018-12-03 VITALS
DIASTOLIC BLOOD PRESSURE: 77 MMHG | BODY MASS INDEX: 26.69 KG/M2 | SYSTOLIC BLOOD PRESSURE: 117 MMHG | HEART RATE: 76 BPM | HEIGHT: 70 IN | WEIGHT: 186.4 LBS

## 2018-12-03 DIAGNOSIS — E05.90 HYPERTHYROIDISM: Primary | ICD-10-CM

## 2018-12-03 NOTE — PROGRESS NOTES
CHIEF COMPLAINT: f/u evaluation for hyperthyroidism. HISTORY OF PRESENT ILLNESS:   Luz Bowles is a 66 y.o. female with a PMHx as noted below who presents to the endocrinology clinic for f/u evaluation of hyperthyroidism. Patient with hx of afib/flutter was noted for hyperthyroid labs in the past,   She was put on methimazole and came for a visit,   Negative TSI and TPO antibodies, and No nodules on thyroid ultrasound,  It was not clear if she had true hyperthyroidism or a transient thyroiditis,  On 5mg methimazole her TSH was rising, so I tapered her to (2.5mg) daily,  Most recently I had asked her to discontinue the methimazole to see how she does,  She reports feeling otherwise well today. She did not complete her thyroid prelabs today for review. Review of most recent thyroid function:  Lab Results   Component Value Date    TSH 1.950 08/31/2018    TSH 4.710 (H) 06/28/2018    TSH 2.930 06/15/2018    FT4 1.32 08/31/2018    FT4 1.06 06/28/2018    FT4 1.09 06/15/2018    T3LT 94 08/31/2018    T3LT 90 06/28/2018    TSILT <0.10 06/28/2018    TMCLT 14 06/28/2018      TSILT = Thyroid stimulating antibodies  TMCLT = TPO antibodies  T3LT = Total T3 levels        PAST MEDICAL/SURGICAL HISTORY:   Past Medical History:   Diagnosis Date    Acute combined systolic and diastolic HF (heart failure), NYHA class 2 ([facility] [address] 8/11/2017    Arthritis     Atrial fibrillation (HCC)     Cancer (HCC)     multiple myeloma    Hypertension     S/P AV kathleen ablation 8/11/2017    Status post biventricular pacemaker 8/11/2017 8/11/17      Past Surgical History:   Procedure Laterality Date    HX GYN      HX ORTHOPAEDIC      knee    HX PACEMAKER  08/11/2017       ALLERGIES:   Allergies   Allergen Reactions    Codeine Other (comments)       MEDICATIONS ON ADMISSION:     Current Outpatient Medications:     lisinopril (PRINIVIL, ZESTRIL) 40 mg tablet, Take 1 Tab by mouth daily. , Disp: 90 Tab, Rfl: 3    menthol (BIOFREEZE, MENTHOL,) 4 % gel, 1 g by Apply Externally route every six (6) hours. , Disp: 118 mL, Rfl: 0    apixaban (ELIQUIS) 5 mg tablet, Take 1 Tab by mouth two (2) times a day., Disp: 60 Tab, Rfl: 11    oxyCODONE-acetaminophen (PERCOCET)  mg per tablet, Take 1 Tab by mouth every eight (8) hours as needed for Pain. Max Daily Amount: 3 Tabs., Disp: 50 Tab, Rfl: 0    furosemide (LASIX) 40 mg tablet, Take 1 Tab by mouth daily. , Disp: 30 Tab, Rfl: 11    REVLIMID 10 mg cap, Take  by mouth. Indications: 1 tab daily, Disp: , Rfl:     potassium chloride (K-DUR, KLOR-CON) 20 mEq tablet, Take 1 Tab by mouth two (2) times a day., Disp: 60 Tab, Rfl: 11    predniSONE (DELTASONE) 20 mg tablet, Take 3 Tabs by mouth daily (after dinner). , Disp: 15 Tab, Rfl: 0    metoprolol succinate (TOPROL-XL) 25 mg XL tablet, Take 0.5 Tabs by mouth daily. , Disp: 30 Tab, Rfl: 11    omeprazole (PRILOSEC) 40 mg capsule, Take 1 Cap by mouth daily. , Disp: 30 Cap, Rfl: 1    naloxone (NARCAN) 4 mg/actuation nasal spray, Use 1 spray intranasally into 1 nostril. Use a new Narcan nasal spray for subsequent doses and administer into alternating nostrils. May repeat every 2 to 3 minutes as needed. , Disp: 2 Each, Rfl: 0    cyclobenzaprine (FLEXERIL) 5 mg tablet, Take 1 Tab by mouth three (3) times daily as needed for Muscle Spasm(s). , Disp: 15 Tab, Rfl: 0    aluminum hydrox-magnesium carb (GAVISCON EXTRA STRENGTH) 160-105 mg chew, Take 1-2 tablets by mouth four (4) times daily as needed. , Disp: , Rfl:     SOCIAL HISTORY:   Social History     Socioeconomic History    Marital status:      Spouse name: Not on file    Number of children: Not on file    Years of education: Not on file    Highest education level: Not on file   Social Needs    Financial resource strain: Not on file    Food insecurity - worry: Not on file    Food insecurity - inability: Not on file   Your Energy needs - medical: Not on file   Your Energy needs - non-medical: Not on file   Occupational History    Not on file   Tobacco Use    Smoking status: Never Smoker    Smokeless tobacco: Never Used   Substance and Sexual Activity    Alcohol use: No    Drug use: No    Sexual activity: Not Currently   Other Topics Concern    Not on file   Social History Narrative    Not on file       FAMILY HISTORY:  Family History   Problem Relation Age of Onset    Stroke Mother     No Known Problems Father        REVIEW OF SYSTEMS: Complete ROS assessed and noted for that which is described above, all else are negative. Eyes: normal  ENT: normal  CVS: normal  Resp: normal  GI: normal  : normal  GYN: normal  Endocrine: normal  Integument: normal  Musculoskeletal: normal  Neuro: normal  Psych: normal      PHYSICAL EXAMINATION:    VITAL SIGNS:  Visit Vitals  /77 (BP 1 Location: Right arm, BP Patient Position: Sitting)   Pulse 76   Ht 5' 10\" (1.778 m)   Wt 186 lb 6.4 oz (84.6 kg)   BMI 26.75 kg/m²       GENERAL: NCAT, Sitting comfortably, NAD  EYES: EOMI, non-icteric, no proptosis  Ear/Nose/Throat: NCAT, no inflammation  LYMPH NODES: No LAD  CARDIOVASCULAR: S1 S2, RRR, No murmur, 2+ radial pulses  RESPIRATORY: CTA b/l, no wheeze/rales  GASTROINTESTINAL: soft, NT, ND,  MUSCULOSKELETAL: Normal ROM, no atrophy  SKIN: warm, no edema/rash/ or other skin changes  NEUROLOGIC: 5/5 power all extremities, no tremors, AAOx3  PSYCHIATRIC: Normal affect, Normal insight and judgement      REVIEW OF LABORATORY AND RADIOLOGY DATA:   Labs and documentation have been reviewed as described above. ASSESSMENT AND PLAN:   Osiris Simpson is a 66 y.o. female with a PMHx as noted above who presents to the endocrinology clinic for f/u evaluation of hyperthyroidism. Hyperthyroidism    Patient has negative TPO and TSI antibodies, and does not have any notable thyroid nodules on her thyroid ultrasound.  She was able to tolerate tapering her methimazole quickly to 2.5mg daily very quickly and most recently has tolerated being off. It seems that she may not have any \"true\" hyperthyroidism and may have experienced a thyroiditis in the past. We will check her thyroid levels today and have her return in 6 months with prelabs for re-evaluation. Of note patient had a few questions about why some patients are on lifetime thyroid medications. We spent time discussing the different conditions of the thyroid and that patients should not compare themselves with one another since their conditions and treatments are quite different. She will be cooking and hosting her family for [place] time. She was welcomed to call with questions or concerns. RTC in 6 months with prelabs,   25 minutes spent together with patient today of which >50% of this time was spent in counseling and coordination of care. Dedrick Mauro.  [address] [facility] Endocrinology  [address] [place] [address]

## 2018-12-03 NOTE — PATIENT INSTRUCTIONS
See you back in 6 months, Please complete the thyroid blood test at lab [facility] 2 days before next visit. Call with concerns,     Warren Kiser.  [address] Endocrinology  [address] [place] [address]

## 2018-12-04 ENCOUNTER — TELEPHONE (OUTPATIENT)
Dept: ENDOCRINOLOGY | Age: 78
End: 2018-12-04

## 2018-12-04 LAB
T3 SERPL-MCNC: 92 NG/DL (ref 71–180)
T4 FREE SERPL-MCNC: 1.36 NG/DL (ref 0.82–1.77)
TSH SERPL DL<=0.005 MIU/L-ACNC: 1.11 UIU/ML (ref 0.45–4.5)

## 2018-12-04 NOTE — TELEPHONE ENCOUNTER
I returned this call and relayed the message from Dr. Jaki Hunter. She understood the information.   Claude Hallmark

## 2018-12-04 NOTE — TELEPHONE ENCOUNTER
----- Message from Emiliano Johnson sent at 12/4/2018  1:33 PM EST -----  Regarding: Dr. Jose Guadalupe Lara  Pt returned nurse call from this morning. Best contact number [phone].

## 2018-12-04 NOTE — PROGRESS NOTES
Thyroid levels look normal off methimazole,  Will review prelabs on next visit in 6 months,   Attempted to reach patient, left general message to call back,   If she does call back, she can be advised her of the above,     Julianna Matters.  [address] [facility] Endocrinology  [address] [place] [address]

## 2018-12-14 ENCOUNTER — OFFICE VISIT (OUTPATIENT)
Dept: INTERNAL MEDICINE CLINIC | Age: 78
End: 2018-12-14

## 2018-12-14 VITALS
HEART RATE: 74 BPM | OXYGEN SATURATION: 98 % | TEMPERATURE: 98.2 F | DIASTOLIC BLOOD PRESSURE: 77 MMHG | WEIGHT: 181.3 LBS | BODY MASS INDEX: 25.96 KG/M2 | RESPIRATION RATE: 16 BRPM | SYSTOLIC BLOOD PRESSURE: 163 MMHG | HEIGHT: 70 IN

## 2018-12-14 DIAGNOSIS — C90.00 MULTIPLE MYELOMA, REMISSION STATUS UNSPECIFIED (HCC): ICD-10-CM

## 2018-12-14 DIAGNOSIS — I10 ESSENTIAL HYPERTENSION: ICD-10-CM

## 2018-12-14 DIAGNOSIS — E05.90 HYPERTHYROIDISM: ICD-10-CM

## 2018-12-14 DIAGNOSIS — J45.40 MODERATE PERSISTENT REACTIVE AIRWAY DISEASE WITHOUT COMPLICATION: Primary | ICD-10-CM

## 2018-12-14 DIAGNOSIS — I48.20 CHRONIC ATRIAL FIBRILLATION (HCC): Chronic | ICD-10-CM

## 2018-12-14 RX ORDER — PREDNISONE 20 MG/1
20 TABLET ORAL
Qty: 21 TAB | Refills: 0 | Status: SHIPPED | OUTPATIENT
Start: 2018-12-14 | End: 2019-03-29 | Stop reason: SDUPTHER

## 2018-12-14 NOTE — PROGRESS NOTES
Chief Complaint   Patient presents with   Nata Fuentes states that she has been producing lots of \"phlem\" and a little blood x 3 days. 1. Have you been to the ER, urgent care clinic since your last visit? Hospitalized since your last visit? No    2. Have you seen or consulted any other health care providers outside of the [facility] since your last visit? Include any pap smears or colon screening.  No

## 2018-12-14 NOTE — PROGRESS NOTES
[address] [facility] and Primary Care  [place] [address]  [address]  [place] [address]  Phone:  [phone]  Fax: [phone]       Chief Complaint   Patient presents with   Rahel Parada states that she has been producing lots of \"phlem\" and a little blood x 3 days. .      SUBJECTIVE:    Robi Moreno is a 66 y.o. female who comes in complaining of a persistent cough. The cough is producing mucus which is mixed with blood. She is indeed taking Eliquis. Her coughing is somewhat worse nocturnally and she has noted audible wheezing nocturnally also. She denies any shortness of breath. She is on no thyroid medication currently. Fortunately, she appears to be in remission. She is on no chemotherapy from her medical oncologist for her multiple myeloma. She has a past history of primary hypertension and dyslipidemia. Current Outpatient Medications   Medication Sig Dispense Refill    predniSONE (DELTASONE) 20 mg tablet Take 20 mg by mouth three (3) times daily (after meals). 21 Tab 0    lisinopril (PRINIVIL, ZESTRIL) 40 mg tablet Take 1 Tab by mouth daily. 90 Tab 3    metoprolol succinate (TOPROL-XL) 25 mg XL tablet Take 0.5 Tabs by mouth daily. 30 Tab 11    menthol (BIOFREEZE, MENTHOL,) 4 % gel 1 g by Apply Externally route every six (6) hours. 118 mL 0    apixaban (ELIQUIS) 5 mg tablet Take 1 Tab by mouth two (2) times a day. 60 Tab 11    oxyCODONE-acetaminophen (PERCOCET)  mg per tablet Take 1 Tab by mouth every eight (8) hours as needed for Pain. Max Daily Amount: 3 Tabs. 50 Tab 0    furosemide (LASIX) 40 mg tablet Take 1 Tab by mouth daily. 30 Tab 11    omeprazole (PRILOSEC) 40 mg capsule Take 1 Cap by mouth daily. 30 Cap 1    naloxone (NARCAN) 4 mg/actuation nasal spray Use 1 spray intranasally into 1 nostril. Use a new Narcan nasal spray for subsequent doses and administer into alternating nostrils. May repeat every 2 to 3 minutes as needed.  2 Each 0  cyclobenzaprine (FLEXERIL) 5 mg tablet Take 1 Tab by mouth three (3) times daily as needed for Muscle Spasm(s). 15 Tab 0    REVLIMID 10 mg cap Take  by mouth. Indications: 1 tab daily      potassium chloride (K-DUR, KLOR-CON) 20 mEq tablet Take 1 Tab by mouth two (2) times a day. 60 Tab 11    aluminum hydrox-magnesium carb (GAVISCON EXTRA STRENGTH) 160-105 mg chew Take 1-2 tablets by mouth four (4) times daily as needed.        Past Medical History:   Diagnosis Date    Acute combined systolic and diastolic HF (heart failure), NYHA class 2 ([facility] [address] 8/11/2017    Arthritis     Atrial fibrillation (HCC)     Cancer (HCC)     multiple myeloma    Hypertension     S/P AV kathleen ablation 8/11/2017    Status post biventricular pacemaker 8/11/2017 8/11/17      Past Surgical History:   Procedure Laterality Date    HX GYN      HX ORTHOPAEDIC      knee    HX PACEMAKER  08/11/2017     Allergies   Allergen Reactions    Codeine Other (comments)         REVIEW OF SYSTEMS:  General: negative for - chills or fever  ENT: negative for - headaches, nasal congestion or tinnitus  Respiratory: negative for - cough, hemoptysis, shortness of breath or wheezing  Cardiovascular : negative for - chest pain, edema, palpitations or shortness of breath  Gastrointestinal: negative for - abdominal pain, blood in stools, heartburn or nausea/vomiting  Genito-Urinary: no dysuria, trouble voiding, or hematuria  Musculoskeletal: negative for - gait disturbance, joint pain, joint stiffness or joint swelling  Neurological: no TIA or stroke symptoms  Hematologic: no bruises, no bleeding, no swollen glands  Integument: no lumps, mole changes, nail changes or rash  Endocrine: no malaise/lethargy or unexpected weight changes      Social History     Socioeconomic History    Marital status:      Spouse name: Not on file    Number of children: Not on file    Years of education: Not on file    Highest education level: Not on file   Tobacco Use    Smoking status: Never Smoker    Smokeless tobacco: Never Used   Substance and Sexual Activity    Alcohol use: No    Drug use: No    Sexual activity: Not Currently     Family History   Problem Relation Age of Onset    Stroke Mother     No Known Problems Father        OBJECTIVE:    Visit Vitals  /77   Pulse 74   Temp 98.2 °F (36.8 °C) (Oral)   Resp 16   Ht 5' 10\" (1.778 m)   Wt 181 lb 4.8 oz (82.2 kg)   SpO2 98%   BMI 26.01 kg/m²     CONSTITUTIONAL: well , well nourished, appears age appropriate  EYES: perrla, eom intact  ENMT:moist mucous membranes, pharynx clear  NECK: supple. Thyroid normal  RESPIRATORY: Chest: clear to ascultation and percussion   CARDIOVASCULAR: Heart: regular rate and rhythm  GASTROINTESTINAL: Abdomen: soft, bowel sounds active  HEMATOLOGIC: no pathological lymph nodes palpated  MUSCULOSKELETAL: Extremities: no edema, pulse 1+   INTEGUMENT: No unusual rashes or suspicious skin lesions noted. Nails appear normal.  NEUROLOGIC: non-focal exam   MENTAL STATUS: alert and oriented, appropriate affect      ASSESSMENT:  1. Moderate persistent reactive airway disease without complication    2. Chronic atrial fibrillation (HCC)    3. Essential hypertension    4. Hyperthyroidism    5. Multiple myeloma, remission status unspecified ([facility])        PLAN:    1. The patient has an upper respiratory infection complicated by reactive airway disease. I will give her prednisone 20 mg TID PC. She is to drink plenty of liquids and I suspect the pseudohemoptysis will sydni. Again, this is aggravated by the DOCA. 2. Her rhythm is regular today. She remains on her anticoagulant, Eliquis. 3. Her blood pressure is excellent, no adjustments are made. Specifically, her blood pressure today is 128/70.    4. She will continue a statin in view of her increased cardiovascular risk.     5. She will follow with her medical oncologist.    6. She will also follow-up with the endocrinologist for her hyperthyroidism. Follow-up Disposition:  Return in about 2 months (around 2/14/2019).       Hal Zamarripa MD

## 2018-12-21 ENCOUNTER — OFFICE VISIT (OUTPATIENT)
Dept: CARDIOLOGY CLINIC | Age: 78
End: 2018-12-21

## 2018-12-21 VITALS
HEART RATE: 75 BPM | WEIGHT: 187.2 LBS | OXYGEN SATURATION: 97 % | DIASTOLIC BLOOD PRESSURE: 70 MMHG | RESPIRATION RATE: 16 BRPM | HEIGHT: 70 IN | BODY MASS INDEX: 26.8 KG/M2 | SYSTOLIC BLOOD PRESSURE: 112 MMHG

## 2018-12-21 DIAGNOSIS — I48.20 CHRONIC ATRIAL FIBRILLATION (HCC): Primary | ICD-10-CM

## 2018-12-21 DIAGNOSIS — I50.22 CHRONIC SYSTOLIC CONGESTIVE HEART FAILURE (HCC): ICD-10-CM

## 2018-12-21 DIAGNOSIS — I42.8 OTHER CARDIOMYOPATHY (HCC): ICD-10-CM

## 2018-12-21 DIAGNOSIS — I10 ESSENTIAL HYPERTENSION: ICD-10-CM

## 2018-12-21 NOTE — PROGRESS NOTES
[address] [address]  [phone]     Subjective:      Niranjan Davison is a 66 y.o. female is here for routine f/u. Some LE swelling. The patient denies chest pain/ shortness of breath, orthopnea, PND,  palpitations, syncope, or presyncope.        Patient Active Problem List    Diagnosis Date Noted    Essential hypertension 12/21/2018    Cervical radiculopathy 05/09/2018    Hyperthyroidism 04/14/2018    Weight loss 09/21/2017    S/P cardiac cath 09/07/2017    Acute combined systolic and diastolic HF (heart failure), NYHA class 2 ([address] 08/11/2017    Status post biventricular pacemaker 08/11/2017    S/P AV kathleen ablation 08/11/2017    Cardiomyopathy ([address] 08/10/2017    Acute pulmonary embolism ([address] 08/10/2017    CHF (congestive heart failure) ([address] 08/08/2017    Sialadenitis 06/29/2017    Muscular deconditioning 04/22/2017    Back pain 04/30/2016    Primary osteoarthritis of both knees 08/24/2015    Venous insufficiency 04/07/2015    Reflux esophagitis 10/31/2014    Multiple myeloma ([address] 10/31/2014    Complex renal cyst 10/11/2014    Low back pain 10/10/2014    Chronic atrial fibrillation ([address] 09/12/2014    Anemia 09/08/2014    HTN (hypertension) 09/07/2014    Osteoarthritis 09/05/2014      Osito Moeller MD  Past Medical History:   Diagnosis Date    Acute combined systolic and diastolic HF (heart failure), NYHA class 2 ([address] 8/11/2017    Arthritis     Atrial fibrillation (HCC)     Cancer (HCC)     multiple myeloma    Hypertension     S/P AV kathleen ablation 8/11/2017    Status post biventricular pacemaker 8/11/2017 8/11/17       Past Surgical History:   Procedure Laterality Date    HX GYN      HX ORTHOPAEDIC      knee    HX PACEMAKER  08/11/2017     Allergies   Allergen Reactions    Codeine Other (comments)      Family History   Problem Relation Age of Onset    Stroke Mother     No Known Problems Father       Social History Socioeconomic History    Marital status:      Spouse name: Not on file    Number of children: Not on file    Years of education: Not on file    Highest education level: Not on file   Social Needs    Financial resource strain: Not on file    Food insecurity - worry: Not on file    Food insecurity - inability: Not on file    Transportation needs - medical: Not on file   [employer] needs - non-medical: Not on file   Occupational History    Not on file   Tobacco Use    Smoking status: Never Smoker    Smokeless tobacco: Never Used   Substance and Sexual Activity    Alcohol use: No    Drug use: No    Sexual activity: Not Currently   Other Topics Concern    Not on file   Social History Narrative    Not on file      Current Outpatient Medications   Medication Sig    predniSONE (DELTASONE) 20 mg tablet Take 20 mg by mouth three (3) times daily (after meals).  lisinopril (PRINIVIL, ZESTRIL) 40 mg tablet Take 1 Tab by mouth daily.  metoprolol succinate (TOPROL-XL) 25 mg XL tablet Take 0.5 Tabs by mouth daily.  menthol (BIOFREEZE, MENTHOL,) 4 % gel 1 g by Apply Externally route every six (6) hours.  apixaban (ELIQUIS) 5 mg tablet Take 1 Tab by mouth two (2) times a day.  oxyCODONE-acetaminophen (PERCOCET)  mg per tablet Take 1 Tab by mouth every eight (8) hours as needed for Pain. Max Daily Amount: 3 Tabs.  furosemide (LASIX) 40 mg tablet Take 1 Tab by mouth daily.  cyclobenzaprine (FLEXERIL) 5 mg tablet Take 1 Tab by mouth three (3) times daily as needed for Muscle Spasm(s).  REVLIMID 10 mg cap Take  by mouth. Indications: 1 tab daily    potassium chloride (K-DUR, KLOR-CON) 20 mEq tablet Take 1 Tab by mouth two (2) times a day.  aluminum hydrox-magnesium carb (GAVISCON EXTRA STRENGTH) 160-105 mg chew Take 1-2 tablets by mouth four (4) times daily as needed. No current facility-administered medications for this visit.           Review of Symptoms:  11 systems reviewed, negative other than as stated in the HPI    Physical ExamPhysical Exam:    Vitals:    12/21/18 0935 12/21/18 0946   BP: 110/70 112/70   Pulse: 75    Resp: 16    SpO2: 97%    Weight: 187 lb 3.2 oz (84.9 kg)    Height: 5' 10\" (1.778 m)      Body mass index is 26.86 kg/m². General PE   Gen:  NAD  Mental Status - Alert. General Appearance - Not in acute distress. Chest and Lung Exam   Inspection: Accessory muscles - No use of accessory muscles in breathing. Auscultation:   Breath sounds: - Normal.   Cardiovascular   Inspection: Jugular vein - Bilateral - Inspection Normal.   Palpation/Percussion:   Apical Impulse: - Normal.   Auscultation: Rhythm - Regular. Heart Sounds - S1 WNL and S2 WNL. No S3 or S4. Murmurs & Other Heart Sounds: Auscultation of the heart reveals - No Murmurs. Peripheral Vascular   Upper Extremity: Inspection - Bilateral - No Cyanotic nailbeds or Digital clubbing. Lower Extremity:   Palpation: Edema - Bilateral - + 2   Abdomen:   Soft, non-tender, bowel sounds are active.   Neuro: A&O times 3, CN and motor grossly WNL    Labs:   Lab Results   Component Value Date/Time    Cholesterol, total 156 08/11/2017 02:41 AM    Cholesterol, total 165 09/08/2014 06:50 AM    HDL Cholesterol 82 08/11/2017 02:41 AM    HDL Cholesterol 72 09/08/2014 06:50 AM    LDL, calculated 64 08/11/2017 02:41 AM    LDL, calculated 84.6 09/08/2014 06:50 AM    Triglyceride 50 08/11/2017 02:41 AM    Triglyceride 42 09/08/2014 06:50 AM    CHOL/HDL Ratio 1.9 08/11/2017 02:41 AM    CHOL/HDL Ratio 2.3 09/08/2014 06:50 AM     Lab Results   Component Value Date/Time     06/16/2018 04:07 PM     Lab Results   Component Value Date/Time    Sodium 149 (H) 10/16/2018 04:00 PM    Potassium 3.4 (L) 10/16/2018 04:00 PM    Chloride 105 10/16/2018 04:00 PM    CO2 27 10/16/2018 04:00 PM    Anion gap 8 06/16/2018 04:07 PM    Glucose 79 10/16/2018 04:00 PM    BUN 12 10/16/2018 04:00 PM    Creatinine 0.92 10/16/2018 04:00 PM    BUN/Creatinine ratio 13 10/16/2018 04:00 PM    GFR est AA 69 10/16/2018 04:00 PM    GFR est non-AA 60 10/16/2018 04:00 PM    Calcium 9.2 10/16/2018 04:00 PM    Bilirubin, total 1.1 (H) 06/16/2018 04:07 PM    AST (SGOT) 27 06/16/2018 04:07 PM    Alk. phosphatase 131 (H) 06/16/2018 04:07 PM    Protein, total 7.1 06/16/2018 04:07 PM    Albumin 3.3 (L) 06/16/2018 04:07 PM    Globulin 3.8 06/16/2018 04:07 PM    A-G Ratio 0.9 (L) 06/16/2018 04:07 PM    ALT (SGPT) 22 06/16/2018 04:07 PM       EKG:  Paced     Assessment:     Assessment:      1. Chronic atrial fibrillation (HCC)    2. Other cardiomyopathy ([facility]    3. Chronic systolic congestive heart failure ([facility]    4. Essential hypertension        Orders Placed This Encounter    AMB POC EKG ROUTINE W/ 12 LEADS, INTER & REP     Order Specific Question:   Reason for Exam:     Answer:   ROUTINE        Plan:     Patient presents for f/u,  doing well and stable from cardiac standpoint. Chronic HFrEF  AF, hx AVN ablation, post BIV-PM  EF 35-40%, mild MR/TR per echo in 2/18  Continue BB, Lisinopril  Continue Eliquis for CVA risk reduction. Denies any overt bleeding. Device followed by Dr Jay Bass stable at 187 lbs. Some LE swelling, improves with leg elevation and Lasix. Has not taken Lasix yet. BP well controlled    8/17 LDL at 64    Continue current care and f/u in 6 months. Yao Lee NP       [facility]    12/21/2018         Patient seen, examined by me personally. Plan discussed as detailed. Agree with note as outlined by  NP. I confirm findings in history and physical exam. No additional findings noted. Agree with plan as outlined above.      Lanie Noriega MD

## 2018-12-21 NOTE — PROGRESS NOTES
1. Have you been to the ER, urgent care clinic since your last visit? Hospitalized since your last visit? NO    2. Have you seen or consulted any other health care providers outside of the [address] [place] [address] since your last visit? Include any pap smears or colon screening.   YES PCP.    6 MONTH FOLLOW UP. NO CARDIAC C/O

## 2019-02-22 ENCOUNTER — OFFICE VISIT (OUTPATIENT)
Dept: INTERNAL MEDICINE CLINIC | Age: 79
End: 2019-02-22

## 2019-02-22 VITALS
TEMPERATURE: 97.8 F | SYSTOLIC BLOOD PRESSURE: 150 MMHG | DIASTOLIC BLOOD PRESSURE: 84 MMHG | WEIGHT: 194.7 LBS | BODY MASS INDEX: 27.94 KG/M2 | OXYGEN SATURATION: 95 % | HEART RATE: 75 BPM

## 2019-02-22 DIAGNOSIS — E05.90 HYPERTHYROIDISM: ICD-10-CM

## 2019-02-22 DIAGNOSIS — I10 ESSENTIAL HYPERTENSION: ICD-10-CM

## 2019-02-22 DIAGNOSIS — I48.20 CHRONIC ATRIAL FIBRILLATION (HCC): Primary | Chronic | ICD-10-CM

## 2019-02-22 DIAGNOSIS — M54.50 MIDLINE LOW BACK PAIN WITHOUT SCIATICA, UNSPECIFIED CHRONICITY: ICD-10-CM

## 2019-02-22 DIAGNOSIS — C90.00 MULTIPLE MYELOMA, REMISSION STATUS UNSPECIFIED (HCC): ICD-10-CM

## 2019-02-22 DIAGNOSIS — M17.0 PRIMARY OSTEOARTHRITIS OF BOTH KNEES: ICD-10-CM

## 2019-02-22 RX ORDER — OXYCODONE AND ACETAMINOPHEN 10; 325 MG/1; MG/1
1 TABLET ORAL
Qty: 40 TAB | Refills: 0 | Status: SHIPPED | OUTPATIENT
Start: 2019-02-22 | End: 2022-03-23

## 2019-02-22 NOTE — PROGRESS NOTES
Chief Complaint   Patient presents with    Hypertension     patient is here for a 2month follow up. 1. Have you been to the ER, urgent care clinic since your last visit? Hospitalized since your last visit? No    2. Have you seen or consulted any other health care providers outside of the [address] [place] [address] since your last visit? Include any pap smears or colon screening.  No

## 2019-02-23 LAB
BUN SERPL-MCNC: 12 MG/DL (ref 8–27)
BUN/CREAT SERPL: 11 (ref 12–28)
CALCIUM SERPL-MCNC: 9.3 MG/DL (ref 8.7–10.3)
CHLORIDE SERPL-SCNC: 106 MMOL/L (ref 96–106)
CO2 SERPL-SCNC: 25 MMOL/L (ref 20–29)
CREAT SERPL-MCNC: 1.05 MG/DL (ref 0.57–1)
GLUCOSE SERPL-MCNC: 99 MG/DL (ref 65–99)
POTASSIUM SERPL-SCNC: 3.6 MMOL/L (ref 3.5–5.2)
SODIUM SERPL-SCNC: 148 MMOL/L (ref 134–144)

## 2019-02-23 NOTE — PROGRESS NOTES
[address] [facility]  [place] [address]  [address] [address] [facility] [address]  Phone:  [phone]  Fax: [phone]       Chief Complaint   Patient presents with    Hypertension     patient is here for a 2month follow up. .      SUBJECTIVE:    Trish Lemus is a 66 y.o. female Comes in for return visit stating that she has done fairly well. She has no dyspnea on exertion, orthopnea or PND. She has not been to the emergency room lately either. Her biggest complaint is that of pain in her knees related to her osteoarthritis. She is able to function independently however. Her weight has been quite stable. She has a past history of primary hypertension and dyslipidemia. She does follow up with her medical oncologist for multiple myeloma, but has not been on any treatment of late. Current Outpatient Medications   Medication Sig Dispense Refill    oxyCODONE-acetaminophen (PERCOCET)  mg per tablet Take 1 Tab by mouth every eight (8) hours as needed for Pain. Max Daily Amount: 3 Tabs. 40 Tab 0    predniSONE (DELTASONE) 20 mg tablet Take 20 mg by mouth three (3) times daily (after meals). 21 Tab 0    lisinopril (PRINIVIL, ZESTRIL) 40 mg tablet Take 1 Tab by mouth daily. 90 Tab 3    metoprolol succinate (TOPROL-XL) 25 mg XL tablet Take 0.5 Tabs by mouth daily. 30 Tab 11    menthol (BIOFREEZE, MENTHOL,) 4 % gel 1 g by Apply Externally route every six (6) hours. 118 mL 0    apixaban (ELIQUIS) 5 mg tablet Take 1 Tab by mouth two (2) times a day. 60 Tab 11    furosemide (LASIX) 40 mg tablet Take 1 Tab by mouth daily. 30 Tab 11    REVLIMID 10 mg cap Take  by mouth. Indications: 1 tab daily      potassium chloride (K-DUR, KLOR-CON) 20 mEq tablet Take 1 Tab by mouth two (2) times a day. 60 Tab 11    cyclobenzaprine (FLEXERIL) 5 mg tablet Take 1 Tab by mouth three (3) times daily as needed for Muscle Spasm(s).  15 Tab 0    aluminum hydrox-magnesium carb (GAVISCON EXTRA STRENGTH) 160-105 mg chew Take 1-2 tablets by mouth four (4) times daily as needed.        Past Medical History:   Diagnosis Date    Acute combined systolic and diastolic HF (heart failure), NYHA class 2 ([address] 8/11/2017    Arthritis     Atrial fibrillation (HCC)     Cancer (HCC)     multiple myeloma    Hypertension     S/P AV kathleen ablation 8/11/2017    Status post biventricular pacemaker 8/11/2017 8/11/17      Past Surgical History:   Procedure Laterality Date    HX GYN      HX ORTHOPAEDIC      knee    HX PACEMAKER  08/11/2017     Allergies   Allergen Reactions    Codeine Other (comments)         REVIEW OF SYSTEMS:  General: negative for - chills or fever  ENT: negative for - headaches, nasal congestion or tinnitus  Respiratory: negative for - cough, hemoptysis, shortness of breath or wheezing  Cardiovascular : negative for - chest pain, edema, palpitations or shortness of breath  Gastrointestinal: negative for - abdominal pain, blood in stools, heartburn or nausea/vomiting  Genito-Urinary: no dysuria, trouble voiding, or hematuria  Musculoskeletal: negative for - gait disturbance, joint pain, joint stiffness or joint swelling  Neurological: no TIA or stroke symptoms  Hematologic: no bruises, no bleeding, no swollen glands  Integument: no lumps, mole changes, nail changes or rash  Endocrine: no malaise/lethargy or unexpected weight changes      Social History     Socioeconomic History    Marital status:      Spouse name: Not on file    Number of children: Not on file    Years of education: Not on file    Highest education level: Not on file   Tobacco Use    Smoking status: Never Smoker    Smokeless tobacco: Never Used   Substance and Sexual Activity    Alcohol use: No    Drug use: No    Sexual activity: Not Currently     Family History   Problem Relation Age of Onset    Stroke Mother     No Known Problems Father        OBJECTIVE:    Visit Vitals  /84   Pulse 75   Temp 97.8 °F (36.6 °C)   Wt 194 lb 11.2 oz (88.3 kg)   SpO2 95%   BMI 27.94 kg/m²     CONSTITUTIONAL: well , well nourished, appears age appropriate  EYES: perrla, eom intact  ENMT:moist mucous membranes, pharynx clear  NECK: supple. Thyroid normal  RESPIRATORY: Chest: clear to ascultation and percussion   CARDIOVASCULAR: Heart: regular rate and rhythm  GASTROINTESTINAL: Abdomen: soft, bowel sounds active  HEMATOLOGIC: no pathological lymph nodes palpated  MUSCULOSKELETAL: Extremities: no edema, pulse 1+   INTEGUMENT: No unusual rashes or suspicious skin lesions noted. Nails appear normal.  NEUROLOGIC: non-focal exam   MENTAL STATUS: alert and oriented, appropriate affect      ASSESSMENT:  1. Chronic atrial fibrillation ([address]    2. Primary osteoarthritis of both knees    3. Hyperthyroidism    4. Multiple myeloma, remission status unspecified ([address]    5. Midline low back pain without sciatica, unspecified chronicity    6. Essential hypertension        PLAN:    1. Her cardiac status appears to be quite stable. She is in sinus rhythm today based on the fact that her rhythm is quite regular. She remains on her anticoagulant, however, in view of the paroxysmal nature of it. There are no overt signs of congestive heart failure either. 2. From a thyroid standpoint she is euthyroid currently. 3. She will follow up with her medical oncologist for multiple myeloma. 4. She does have intermittent chronic low back pain, which does not really interfere with her functional status. 5. She has moderate osteoarthritis of both knees, but this is stable. 6. Her blood pressure is excellent today, no adjustments are made. 7. I encouraged her to remain as physically active as possible. .  Orders Placed This Encounter    METABOLIC PANEL, BASIC    oxyCODONE-acetaminophen (PERCOCET)  mg per tablet         Follow-up Disposition:  Return in about 3 months (around 5/22/2019).       Silvia Rangel MD

## 2019-03-14 ENCOUNTER — OFFICE VISIT (OUTPATIENT)
Dept: INTERNAL MEDICINE CLINIC | Age: 79
End: 2019-03-14

## 2019-03-14 VITALS
OXYGEN SATURATION: 98 % | HEART RATE: 74 BPM | SYSTOLIC BLOOD PRESSURE: 132 MMHG | HEIGHT: 70 IN | TEMPERATURE: 98.3 F | BODY MASS INDEX: 27.84 KG/M2 | WEIGHT: 194.5 LBS | DIASTOLIC BLOOD PRESSURE: 82 MMHG | RESPIRATION RATE: 16 BRPM

## 2019-03-14 DIAGNOSIS — E05.90 HYPERTHYROIDISM: ICD-10-CM

## 2019-03-14 DIAGNOSIS — M17.0 PRIMARY OSTEOARTHRITIS OF BOTH KNEES: Primary | ICD-10-CM

## 2019-03-14 DIAGNOSIS — I48.20 CHRONIC ATRIAL FIBRILLATION (HCC): Chronic | ICD-10-CM

## 2019-03-14 DIAGNOSIS — Z13.39 SCREENING FOR ALCOHOLISM: ICD-10-CM

## 2019-03-14 DIAGNOSIS — I42.8 OTHER CARDIOMYOPATHY (HCC): ICD-10-CM

## 2019-03-14 DIAGNOSIS — Z00.00 WELLNESS EXAMINATION: ICD-10-CM

## 2019-03-14 DIAGNOSIS — I10 ESSENTIAL HYPERTENSION: ICD-10-CM

## 2019-03-14 DIAGNOSIS — C90.00 MULTIPLE MYELOMA, REMISSION STATUS UNSPECIFIED (HCC): ICD-10-CM

## 2019-03-14 DIAGNOSIS — Z13.31 SCREENING FOR DEPRESSION: ICD-10-CM

## 2019-03-14 DIAGNOSIS — M65.9 SYNOVITIS OF KNEE: ICD-10-CM

## 2019-03-14 RX ORDER — TRIAMCINOLONE ACETONIDE 40 MG/ML
40 INJECTION, SUSPENSION INTRA-ARTICULAR; INTRAMUSCULAR ONCE
Qty: 1 ML | Refills: 0
Start: 2019-03-14 | End: 2019-03-14

## 2019-03-14 RX ORDER — LIDOCAINE HYDROCHLORIDE 10 MG/ML
5 INJECTION, SOLUTION EPIDURAL; INFILTRATION; INTRACAUDAL; PERINEURAL ONCE
Qty: 5 ML | Refills: 0
Start: 2019-03-14 | End: 2019-03-14

## 2019-03-14 NOTE — PROGRESS NOTES
Chief Complaint   Patient presents with   [address] [place] [address] Annual Wellness Visit     1. Have you been to the ER, urgent care clinic since your last visit? Hospitalized since your last visit? No    2. Have you seen or consulted any other health care providers outside of the [address] [place] [address] since your last visit? Include any pap smears or colon screening. No     A steroid injection was performed at left knee using 1% plain Lidocaine and 40 mg of Kenalog. This was well tolerated. [facility]  OFFICE PROCEDURE PROGRESS NOTE        Chart reviewed for the following:   Maribell CHAPMAN, have reviewed the History, Physical and updated the Allergic reactions for Daya [address] performed immediately prior to start of procedure:   Maribell CHAPMAN, have performed the following reviews on [employer] prior to the start of the procedure:            * Patient was identified by name and date of birth   * Agreement on procedure being performed was verified  * Risks and Benefits explained to the patient  * Procedure site verified and marked as necessary  * Patient was positioned for comfort  * Consent was signed and verified     Time: 12:05 pm      Date of procedure: 3/14/2019    Procedure performed by:  Freedom Osorio MD    Provider assisted by:  Sandro Glynn LPN    Patient assisted by: N/A    How tolerated by patient: tolerated the procedure well with no complications    Post Procedural Pain Scale: 2 - Hurts Little Bit    Comments: none

## 2019-03-15 LAB
BUN SERPL-MCNC: 13 MG/DL (ref 8–27)
BUN/CREAT SERPL: 15 (ref 12–28)
CALCIUM SERPL-MCNC: 9.3 MG/DL (ref 8.7–10.3)
CHLORIDE SERPL-SCNC: 103 MMOL/L (ref 96–106)
CO2 SERPL-SCNC: 31 MMOL/L (ref 20–29)
CREAT SERPL-MCNC: 0.86 MG/DL (ref 0.57–1)
GLUCOSE SERPL-MCNC: 64 MG/DL (ref 65–99)
POTASSIUM SERPL-SCNC: 3.7 MMOL/L (ref 3.5–5.2)
SODIUM SERPL-SCNC: 146 MMOL/L (ref 134–144)
T4 FREE SERPL-MCNC: 1.29 NG/DL (ref 0.82–1.77)
TSH SERPL DL<=0.005 MIU/L-ACNC: 1.44 UIU/ML (ref 0.45–4.5)

## 2019-03-16 NOTE — PROGRESS NOTES
[address] [place] [address] and Primary Care  [facility]  [address] [place] [address]  Phone:  [phone]  Fax: [phone]       Chief Complaint   Patient presents with   [facility] Wellness Visit   . SUBJECTIVE:    Latonya Boogie is a 66 y.o. female Comes in for return visit complaining of pain in her knees, left greater than the right. It has been present now for the last several weeks and is getting worse. She follows up with her medical oncologist for her multiple myeloma. No chemotherapy currently. She remains on her anticoagulant for her atrial fibrillation. She has a past history of primary hypertension. There has been no meaningful weight loss. We talk about this frequently, primarily in the context of her osteoarthritic knees. Current Outpatient Medications   Medication Sig Dispense Refill    oxyCODONE-acetaminophen (PERCOCET)  mg per tablet Take 1 Tab by mouth every eight (8) hours as needed for Pain. Max Daily Amount: 3 Tabs. 40 Tab 0    predniSONE (DELTASONE) 20 mg tablet Take 20 mg by mouth three (3) times daily (after meals). 21 Tab 0    lisinopril (PRINIVIL, ZESTRIL) 40 mg tablet Take 1 Tab by mouth daily. 90 Tab 3    metoprolol succinate (TOPROL-XL) 25 mg XL tablet Take 0.5 Tabs by mouth daily. 30 Tab 11    menthol (BIOFREEZE, MENTHOL,) 4 % gel 1 g by Apply Externally route every six (6) hours. 118 mL 0    apixaban (ELIQUIS) 5 mg tablet Take 1 Tab by mouth two (2) times a day. 60 Tab 11    furosemide (LASIX) 40 mg tablet Take 1 Tab by mouth daily. 30 Tab 11    cyclobenzaprine (FLEXERIL) 5 mg tablet Take 1 Tab by mouth three (3) times daily as needed for Muscle Spasm(s). 15 Tab 0    REVLIMID 10 mg cap Take  by mouth. Indications: 1 tab daily      potassium chloride (K-DUR, KLOR-CON) 20 mEq tablet Take 1 Tab by mouth two (2) times a day.  60 Tab 11    aluminum hydrox-magnesium carb (GAVISCON EXTRA STRENGTH) 160-105 mg chew Take 1-2 tablets by mouth four (4) times daily as needed.        Past Medical History:   Diagnosis Date    Acute combined systolic and diastolic HF (heart failure), NYHA class 2 ([facility] [address] 8/11/2017    Arthritis     Atrial fibrillation (HCC)     Cancer (HCC)     multiple myeloma    Hypertension     S/P AV kathleen ablation 8/11/2017    Status post biventricular pacemaker 8/11/2017 8/11/17      Past Surgical History:   Procedure Laterality Date    HX GYN      HX ORTHOPAEDIC      knee    HX PACEMAKER  08/11/2017     Allergies   Allergen Reactions    Codeine Other (comments)         REVIEW OF SYSTEMS:  General: negative for - chills or fever  ENT: negative for - headaches, nasal congestion or tinnitus  Respiratory: negative for - cough, hemoptysis, shortness of breath or wheezing  Cardiovascular : negative for - chest pain, edema, palpitations or shortness of breath  Gastrointestinal: negative for - abdominal pain, blood in stools, heartburn or nausea/vomiting  Genito-Urinary: no dysuria, trouble voiding, or hematuria  Musculoskeletal: negative for - gait disturbance, joint pain, joint stiffness or joint swelling  Neurological: no TIA or stroke symptoms  Hematologic: no bruises, no bleeding, no swollen glands  Integument: no lumps, mole changes, nail changes or rash  Endocrine: no malaise/lethargy or unexpected weight changes      Social History     Socioeconomic History    Marital status:      Spouse name: Not on file    Number of children: Not on file    Years of education: Not on file    Highest education level: Not on file   Tobacco Use    Smoking status: Never Smoker    Smokeless tobacco: Never Used   Substance and Sexual Activity    Alcohol use: No    Drug use: No    Sexual activity: Not Currently     Family History   Problem Relation Age of Onset    Stroke Mother     No Known Problems Father        OBJECTIVE:    Visit Vitals  /82   Pulse 74   Temp 98.3 °F (36.8 °C) (Oral)   Resp 16   Ht 5' 10\" (1.778 m) Wt 194 lb 8 oz (88.2 kg)   SpO2 98%   BMI 27.91 kg/m²     CONSTITUTIONAL: well , well nourished, appears age appropriate  EYES: perrla, eom intact  ENMT:moist mucous membranes, pharynx clear  NECK: supple. Thyroid normal  RESPIRATORY: Chest: clear to ascultation and percussion   CARDIOVASCULAR: Heart: regular rate and rhythm  GASTROINTESTINAL: Abdomen: soft, bowel sounds active  HEMATOLOGIC: no pathological lymph nodes palpated  MUSCULOSKELETAL: Extremities: trace edema, pulse 1+, pain elicited to flexion and hyperextension both knees left greater than right  INTEGUMENT: No unusual rashes or suspicious skin lesions noted. Nails appear normal.  NEUROLOGIC: non-focal exam   MENTAL STATUS: alert and oriented, appropriate affect      ASSESSMENT:  1. Primary osteoarthritis of both knees    2. Synovitis of knee    3. Chronic atrial fibrillation (HCC)    4. Other cardiomyopathy ([facility] [address]    5. Essential hypertension    6. Hyperthyroidism    7. Multiple myeloma, remission status unspecified ([facility] [address]    8. Screening for alcoholism    9. Screening for depression    10. Wellness examination        PLAN:    1. The patient has an acute synovitis of the left knee. Under sterile technique, I inject Kenalog 40 mg and Xylocaine 1% 5 cc into the lateral aspect of the knee. She tolerates the procedure well. Hopefully this will indeed help. 2. She has chronic atrial fibrillation. Her ventricular rate is quite stable today and regular actually. She will continue her DOAC as prescribed. 3. There are no overt signs of congestive heart failure today. Her cardiomyopathy is stable. 4. Her blood pressure is excellent, no adjustments are made. 5. Her thyroid status will be assessed. She has had hypothyroidism previously. She has gone into remission with use of the Tapazole. 6. She will follow up with her medical oncologist for her multiple myeloma.     .  Orders Placed This Encounter    Depression Screen Annual    METABOLIC PANEL, BASIC    TSH 3RD GENERATION    T4, FREE    triamcinolone acetonide (KENALOG) 40 mg/mL injection    lidocaine, PF, (XYLOCAINE) 10 mg/mL (1 %) injection         Follow-up Disposition:  Return in about 3 months (around 6/14/2019). Leroy Walker MD  This is the Subsequent Medicare Annual Wellness Exam, performed 12 months or more after the Initial AWV or the last Subsequent AWV    I have reviewed the patient's medical history in detail and updated the computerized patient record. History     Past Medical History:   Diagnosis Date    Acute combined systolic and diastolic HF (heart failure), NYHA class 2 ([facility] [address] 8/11/2017    Arthritis     Atrial fibrillation (HCC)     Cancer (HCC)     multiple myeloma    Hypertension     S/P AV kathleen ablation 8/11/2017    Status post biventricular pacemaker 8/11/2017 8/11/17       Past Surgical History:   Procedure Laterality Date    HX GYN      HX ORTHOPAEDIC      knee    HX PACEMAKER  08/11/2017     Current Outpatient Medications   Medication Sig Dispense Refill    oxyCODONE-acetaminophen (PERCOCET)  mg per tablet Take 1 Tab by mouth every eight (8) hours as needed for Pain. Max Daily Amount: 3 Tabs. 40 Tab 0    predniSONE (DELTASONE) 20 mg tablet Take 20 mg by mouth three (3) times daily (after meals). 21 Tab 0    lisinopril (PRINIVIL, ZESTRIL) 40 mg tablet Take 1 Tab by mouth daily. 90 Tab 3    metoprolol succinate (TOPROL-XL) 25 mg XL tablet Take 0.5 Tabs by mouth daily. 30 Tab 11    menthol (BIOFREEZE, MENTHOL,) 4 % gel 1 g by Apply Externally route every six (6) hours. 118 mL 0    apixaban (ELIQUIS) 5 mg tablet Take 1 Tab by mouth two (2) times a day. 60 Tab 11    furosemide (LASIX) 40 mg tablet Take 1 Tab by mouth daily. 30 Tab 11    cyclobenzaprine (FLEXERIL) 5 mg tablet Take 1 Tab by mouth three (3) times daily as needed for Muscle Spasm(s). 15 Tab 0    REVLIMID 10 mg cap Take  by mouth.  Indications: 1 tab daily      potassium chloride (K-DUR, KLOR-CON) 20 mEq tablet Take 1 Tab by mouth two (2) times a day. 60 Tab 11    aluminum hydrox-magnesium carb (GAVISCON EXTRA STRENGTH) 160-105 mg chew Take 1-2 tablets by mouth four (4) times daily as needed. Allergies   Allergen Reactions    Codeine Other (comments)     Family History   Problem Relation Age of Onset    Stroke Mother     No Known Problems Father      Social History     Tobacco Use    Smoking status: Never Smoker    Smokeless tobacco: Never Used   Substance Use Topics    Alcohol use: No     Patient Active Problem List   Diagnosis Code    Osteoarthritis M19.90    HTN (hypertension) I10    Anemia D64.9    Chronic atrial fibrillation (HCC) I48.2    Low back pain M54.5    Complex renal cyst N28.1    Reflux esophagitis K21.0    Multiple myeloma ([facility] C90.00    Venous insufficiency I87.2    Primary osteoarthritis of both knees M17.0    Back pain M54.9    Muscular deconditioning R29.898    Sialadenitis K11.20    CHF (congestive heart failure) ([facility]) I50.9    Cardiomyopathy ([address] I42.9    Acute pulmonary embolism ([facility]) I26.99    Acute combined systolic and diastolic HF (heart failure), NYHA class 2 ([facility]) I50.41    Status post biventricular pacemaker Z95.0    S/P AV kathleen ablation Z98.890    S/P cardiac cath Z98.890    Weight loss R63.4    Hyperthyroidism E05.90    Cervical radiculopathy M54.12    Essential hypertension I10       Depression Risk Factor Screening:     3 most recent PHQ Screens 3/14/2019   PHQ Not Done -   Little interest or pleasure in doing things Not at all   Feeling down, depressed, irritable, or hopeless Not at all   Total Score PHQ 2 0     Alcohol Risk Factor Screening: You do not drink alcohol or very rarely. Functional Ability and Level of Safety:   Hearing Loss  Hearing is good. Activities of Daily Living  The home contains: no safety equipment.   Patient does total self care    Fall Risk  Fall Risk Assessment, last 12 mths 3/14/2019 Able to walk? Yes   Fall in past 12 months? No   Fall with injury? -       Abuse Screen  Patient is not abused    Cognitive Screening   Evaluation of Cognitive Function:  Has your family/caregiver stated any concerns about your memory: no  Normal    Patient Care Team   Patient Care Team:  Dante Plaza MD as PCP - General (Internal Medicine)  Cordell Carranza MD (Cardiology)  Ryan Colvin MD as Physician (Cardiology)  Ricardo Roberts NP (Nurse Practitioner)    Assessment/Plan   Education and counseling provided:  Are appropriate based on today's review and evaluation    Diagnoses and all orders for this visit:    1. Primary osteoarthritis of both knees    2. Synovitis of knee  -     TRIAMCINOLONE ACETONIDE INJ  -     triamcinolone acetonide (KENALOG) 40 mg/mL injection; 1 mL by Intra artICUlar route once for 1 dose. -     lidocaine, PF, (XYLOCAINE) 10 mg/mL (1 %) injection; 5 mL by Intra artICUlar route once for 1 dose. 3. Chronic atrial fibrillation (HCC)    4. Other cardiomyopathy ([facility] [address]    5. Essential hypertension  -     METABOLIC PANEL, BASIC    6. Hyperthyroidism  -     TSH 3RD GENERATION  -     T4, FREE    7. Multiple myeloma, remission status unspecified (HCC)    8. Screening for alcoholism  -     [place] ANNUAL ALCOHOL SCREEN 15 MIN    9. Screening for depression  -     [place] [address]    10.  Wellness examination        Health Maintenance Due   Topic Date Due    Shingrix Vaccine Age 49> (1 of 2) 03/27/1990    Influenza Age 5 to Adult  08/01/2018

## 2019-03-16 NOTE — PATIENT INSTRUCTIONS
Medicare Wellness Visit, Female     The best way to live healthy is to have a lifestyle where you eat a well-balanced diet, exercise regularly, limit alcohol use, and quit all forms of tobacco/nicotine, if applicable. Regular preventive services are another way to keep healthy. Preventive services (vaccines, screening tests, monitoring & exams) can help personalize your care plan, which helps you manage your own care. Screening tests can find health problems at the earliest stages, when they are easiest to treat. [facility] follows the current, evidence-based guidelines published by the [facility] ([facility]) when recommending preventive services for our patients. Because we follow these guidelines, sometimes recommendations change over time as research supports it. (For example, mammograms used to be recommended annually. Even though Medicare will still pay for an annual mammogram, the newer guidelines recommend a mammogram every two years for women of average risk.)  Of course, you and your doctor may decide to screen more often for some diseases, based on your risk and your health status. Preventive services for you include:  - Medicare offers their members a free annual wellness visit, which is time for you and your primary care provider to discuss and plan for your preventive service needs. Take advantage of this benefit every year!  -All adults over the age of 72 should receive the recommended pneumonia vaccines. Current USPSTF guidelines recommend a series of two vaccines for the best pneumonia protection.   -All adults should have a flu vaccine yearly and a tetanus vaccine every 10 years. All adults age 61 and older should receive a shingles vaccine once in their lifetime.    -A bone mass density test is recommended when a woman turns 65 to screen for osteoporosis. This test is only recommended one time, as a screening.  Some providers will use this same test as a disease monitoring tool if you already have osteoporosis. -All adults age 38-68 who are overweight should have a diabetes screening test once every three years.   -Other screening tests and preventive services for persons with diabetes include: an eye exam to screen for diabetic retinopathy, a kidney function test, a foot exam, and stricter control over your cholesterol.   -Cardiovascular screening for adults with routine risk involves an electrocardiogram (ECG) at intervals determined by your doctor.   -Colorectal cancer screenings should be done for adults age 54-65 with no increased risk factors for colorectal cancer. There are a number of acceptable methods of screening for this type of cancer. Each test has its own benefits and drawbacks. Discuss with your doctor what is most appropriate for you during your annual wellness visit. The different tests include: colonoscopy (considered the best screening method), a fecal occult blood test, a fecal DNA test, and sigmoidoscopy. -Breast cancer screenings are recommended every other year for women of normal risk, age 54-69.  -Cervical cancer screenings for women over age 72 are only recommended with certain risk factors.   -All adults born between [facility] should be screened once for Hepatitis C.      Here is a list of your current Health Maintenance items (your personalized list of preventive services) with a due date:  Health Maintenance Due   Topic Date Due    Shingles Vaccine (1 of 2) 03/27/1990    Flu Vaccine  08/01/2018

## 2019-03-29 DIAGNOSIS — J45.40 MODERATE PERSISTENT REACTIVE AIRWAY DISEASE WITHOUT COMPLICATION: ICD-10-CM

## 2019-03-30 RX ORDER — PREDNISONE 20 MG/1
20 TABLET ORAL
Qty: 21 TAB | Refills: 0 | Status: SHIPPED | OUTPATIENT
Start: 2019-03-30 | End: 2020-01-06 | Stop reason: ALTCHOICE

## 2019-05-08 ENCOUNTER — HOSPITAL ENCOUNTER (OUTPATIENT)
Dept: CT IMAGING | Age: 79
Discharge: HOME | End: 2019-05-08
Attending: INTERNAL MEDICINE
Payer: MEDICARE

## 2019-05-08 DIAGNOSIS — C90.00 MULTIPLE MYELOMA NOT HAVING ACHIEVED REMISSION (HCC): ICD-10-CM

## 2019-05-08 DIAGNOSIS — R60.9 EDEMA, UNSPECIFIED: ICD-10-CM

## 2019-05-08 DIAGNOSIS — R51.9 HEAD ACHE: ICD-10-CM

## 2019-05-08 DIAGNOSIS — M79.602 LEFT ARM PAIN: ICD-10-CM

## 2019-05-08 DIAGNOSIS — Z79.01 LONG TERM (CURRENT) USE OF ANTICOAGULANTS: ICD-10-CM

## 2019-05-08 PROCEDURE — 70491 CT SOFT TISSUE NECK W/DYE: CPT

## 2019-05-08 PROCEDURE — 70470 CT HEAD/BRAIN W/O & W/DYE: CPT

## 2019-05-08 PROCEDURE — 74011636320 HC RX REV CODE- 636/320: Performed by: INTERNAL MEDICINE

## 2019-05-08 RX ORDER — SODIUM CHLORIDE 0.9 % (FLUSH) 0.9 %
10 SYRINGE (ML) INJECTION
Status: COMPLETED | OUTPATIENT
Start: 2019-05-08 | End: 2019-05-08

## 2019-05-08 RX ADMIN — Medication 10 ML: at 09:51

## 2019-05-08 RX ADMIN — IOPAMIDOL 100 ML: 755 INJECTION, SOLUTION INTRAVENOUS at 09:51

## 2019-06-04 ENCOUNTER — OFFICE VISIT (OUTPATIENT)
Dept: ENDOCRINOLOGY | Age: 79
End: 2019-06-04

## 2019-06-04 VITALS
HEART RATE: 75 BPM | HEIGHT: 70 IN | DIASTOLIC BLOOD PRESSURE: 81 MMHG | BODY MASS INDEX: 28.77 KG/M2 | WEIGHT: 201 LBS | SYSTOLIC BLOOD PRESSURE: 139 MMHG

## 2019-06-04 DIAGNOSIS — E05.90 HYPERTHYROIDISM: Primary | ICD-10-CM

## 2019-06-04 NOTE — PROGRESS NOTES
CHIEF COMPLAINT: f/u evaluation for hyperthyroidism. HISTORY OF PRESENT ILLNESS:   Keerthi Su is a 78 y.o. female with a PMHx as noted below who presents to the endocrinology clinic for f/u evaluation of hyperthyroidism. Patient with hx of afib/flutter was noted for hyperthyroid labs in the past,   She was put on methimazole and came for a visit,   Negative TSI and TPO antibodies, and No nodules on thyroid ultrasound,  It was not clear if she had true hyperthyroidism or a transient thyroiditis,  I had tapered her methimazole to OFF and she continued to do well,  Most recently as of March she was noted for normal thyroid levels,  Today she notes that she feels well without symptoms of hyper or hypothyroidism,  Review of most recent thyroid function:  Lab Results   Component Value Date    TSH 1.440 03/14/2019    TSH 1.110 12/03/2018    TSH 1.950 08/31/2018    FT4 1.29 03/14/2019    FT4 1.36 12/03/2018    FT4 1.32 08/31/2018    T3LT 92 12/03/2018    T3LT 94 08/31/2018    T3LT 90 06/28/2018    TSILT <0.10 06/28/2018    TMCLT 14 06/28/2018      TSILT = Thyroid stimulating antibodies  TMCLT = TPO antibodies  T3LT = Total T3 levels        PAST MEDICAL/SURGICAL HISTORY:   Past Medical History:   Diagnosis Date    Acute combined systolic and diastolic HF (heart failure), NYHA class 2 ([facility] [address] 8/11/2017    Arthritis     Atrial fibrillation ([facility] [address]     Cancer ([facility] [address]     multiple myeloma    Hypertension     S/P AV kathleen ablation 8/11/2017    Status post biventricular pacemaker 8/11/2017 8/11/17      Past Surgical History:   Procedure Laterality Date    HX GYN      HX ORTHOPAEDIC      knee    HX PACEMAKER  08/11/2017       ALLERGIES:   Allergies   Allergen Reactions    Codeine Other (comments)       MEDICATIONS ON ADMISSION:     Current Outpatient Medications:     lisinopril (PRINIVIL, ZESTRIL) 40 mg tablet, Take 1 Tab by mouth daily. , Disp: 90 Tab, Rfl: 3    metoprolol succinate (TOPROL-XL) 25 mg XL tablet, Take 0.5 Tabs by mouth daily. , Disp: 30 Tab, Rfl: 11    apixaban (ELIQUIS) 5 mg tablet, Take 1 Tab by mouth two (2) times a day., Disp: 60 Tab, Rfl: 11    furosemide (LASIX) 40 mg tablet, Take 1 Tab by mouth daily. , Disp: 30 Tab, Rfl: 11    potassium chloride (K-DUR, KLOR-CON) 20 mEq tablet, Take 1 Tab by mouth two (2) times a day., Disp: 60 Tab, Rfl: 11    predniSONE (DELTASONE) 20 mg tablet, Take 20 mg by mouth three (3) times daily (after meals). , Disp: 21 Tab, Rfl: 0    oxyCODONE-acetaminophen (PERCOCET)  mg per tablet, Take 1 Tab by mouth every eight (8) hours as needed for Pain. Max Daily Amount: 3 Tabs., Disp: 40 Tab, Rfl: 0    menthol (BIOFREEZE, MENTHOL,) 4 % gel, 1 g by Apply Externally route every six (6) hours. , Disp: 118 mL, Rfl: 0    cyclobenzaprine (FLEXERIL) 5 mg tablet, Take 1 Tab by mouth three (3) times daily as needed for Muscle Spasm(s). , Disp: 15 Tab, Rfl: 0    REVLIMID 10 mg cap, Take  by mouth. Indications: 1 tab daily, Disp: , Rfl:     aluminum hydrox-magnesium carb (GAVISCON EXTRA STRENGTH) 160-105 mg chew, Take 1-2 tablets by mouth four (4) times daily as needed. , Disp: , Rfl:     SOCIAL HISTORY:   Social History     Socioeconomic History    Marital status:      Spouse name: Not on file    Number of children: Not on file    Years of education: Not on file    Highest education level: Not on file   Occupational History    Not on file   Social Needs    Financial resource strain: Not on file    Food insecurity:     Worry: Not on file     Inability: Not on file    Transportation needs:     Medical: Not on file     Non-medical: Not on file   Tobacco Use    Smoking status: Never Smoker    Smokeless tobacco: Never Used   Substance and Sexual Activity    Alcohol use: No    Drug use: No    Sexual activity: Not Currently   Lifestyle    Physical activity:     Days per week: Not on file     Minutes per session: Not on file    Stress: Not on file Relationships    Social connections:     Talks on phone: Not on file     Gets together: Not on file     Attends Shinto service: Not on file     Active member of club or organization: Not on file     Attends meetings of clubs or organizations: Not on file     Relationship status: Not on file    Intimate partner violence:     Fear of current or ex partner: Not on file     Emotionally abused: Not on file     Physically abused: Not on file     Forced sexual activity: Not on file   Other Topics Concern    Not on file   Social History Narrative    Not on file       FAMILY HISTORY:  Family History   Problem Relation Age of Onset    Stroke Mother     No Known Problems Father        REVIEW OF SYSTEMS: Complete ROS assessed and noted for that which is described above, all else are negative. Eyes: normal  ENT: normal  CVS: normal  Resp: normal  GI: normal  : normal  GYN: normal  Endocrine: normal  Integument: normal  Musculoskeletal: normal  Neuro: normal  Psych: normal      PHYSICAL EXAMINATION:    VITAL SIGNS:  Visit Vitals  /81 (BP 1 Location: Left arm, BP Patient Position: Sitting)   Pulse 75   Ht 5' 10\" (1.778 m)   Wt 201 lb (91.2 kg)   BMI 28.84 kg/m²       GENERAL: NCAT, Sitting comfortably, NAD  EYES: EOMI, non-icteric, no proptosis  Ear/Nose/Throat: NCAT, no inflammation  LYMPH NODES: No LAD  CARDIOVASCULAR: S1 S2, RRR, No murmur, 2+ radial pulses  RESPIRATORY: CTA b/l, no wheeze/rales  GASTROINTESTINAL: soft, NT, ND,  MUSCULOSKELETAL: Normal ROM, no atrophy  SKIN: warm, no edema/rash/ or other skin changes  NEUROLOGIC: 5/5 power all extremities, no tremors, AAOx3  PSYCHIATRIC: Normal affect, Normal insight and judgement      REVIEW OF LABORATORY AND RADIOLOGY DATA:   Labs and documentation have been reviewed as described above. ASSESSMENT AND PLAN:   Fariha [place] is a 78 y.o. female with a PMHx as noted above who presents to the endocrinology clinic for f/u evaluation of hyperthyroidism. Hyperthyroidism    Patient is feeling well and continues to do well off methimazole. This suggests that what she had experienced previously is mostly a thyroiditis that has resolved. I have advised her there there is some risk of it recurring again in the future however it is reassuring to a degree that her thyroid autoantibodies are negative. At this time she was welcomed to return to clinic as needed in the future. She should monitor her TSH once per year with her primary team, in addition to anytime that she feels symptoms of hyperthyroidism. RTC PRN    Kevan Cheung.  [facility] End[facility]  [address] [place] [address]

## 2019-06-20 ENCOUNTER — OFFICE VISIT (OUTPATIENT)
Dept: INTERNAL MEDICINE CLINIC | Age: 79
End: 2019-06-20

## 2019-06-20 VITALS
HEART RATE: 74 BPM | HEIGHT: 70 IN | RESPIRATION RATE: 16 BRPM | DIASTOLIC BLOOD PRESSURE: 89 MMHG | OXYGEN SATURATION: 97 % | TEMPERATURE: 98.6 F | BODY MASS INDEX: 29.07 KG/M2 | WEIGHT: 203.1 LBS | SYSTOLIC BLOOD PRESSURE: 142 MMHG

## 2019-06-20 DIAGNOSIS — M17.0 PRIMARY OSTEOARTHRITIS OF BOTH KNEES: ICD-10-CM

## 2019-06-20 DIAGNOSIS — E66.3 OVERWEIGHT (BMI 25.0-29.9): ICD-10-CM

## 2019-06-20 DIAGNOSIS — I10 ESSENTIAL HYPERTENSION: ICD-10-CM

## 2019-06-20 DIAGNOSIS — I87.2 VENOUS INSUFFICIENCY: ICD-10-CM

## 2019-06-20 DIAGNOSIS — I42.8 OTHER CARDIOMYOPATHY (HCC): ICD-10-CM

## 2019-06-20 DIAGNOSIS — I48.20 CHRONIC ATRIAL FIBRILLATION (HCC): Primary | Chronic | ICD-10-CM

## 2019-06-20 DIAGNOSIS — E05.90 HYPERTHYROIDISM: ICD-10-CM

## 2019-06-20 NOTE — PROGRESS NOTES
Chief Complaint   Patient presents with    Irregular Heart Beat     3 month follow up     1. Have you been to the ER, urgent care clinic since your last visit? Hospitalized since your last visit? No    2. Have you seen or consulted any other health care providers outside of the [address] [place] [address] since your last visit? Include any pap smears or colon screening.  No

## 2019-06-21 LAB
BUN SERPL-MCNC: 14 MG/DL (ref 8–27)
BUN/CREAT SERPL: 18 (ref 12–28)
CALCIUM SERPL-MCNC: 9.4 MG/DL (ref 8.7–10.3)
CHLORIDE SERPL-SCNC: 106 MMOL/L (ref 96–106)
CO2 SERPL-SCNC: 26 MMOL/L (ref 20–29)
CREAT SERPL-MCNC: 0.78 MG/DL (ref 0.57–1)
GLUCOSE SERPL-MCNC: 55 MG/DL (ref 65–99)
POTASSIUM SERPL-SCNC: 3.5 MMOL/L (ref 3.5–5.2)
SODIUM SERPL-SCNC: 148 MMOL/L (ref 134–144)
TSH SERPL DL<=0.005 MIU/L-ACNC: 1.53 UIU/ML (ref 0.45–4.5)

## 2019-06-22 NOTE — PROGRESS NOTES
[facility]  [place] [address]  [address] [facility] [address]  Phone:  [phone]  Fax: [phone]       Chief Complaint   Patient presents with    Irregular Heart Beat     3 month follow up   . SUBJECTIVE:    Agustin Castano is a 78 y.o. female Comes in for return visit stating that she has done well. She continues to complain of swelling of her lower extremities, somewhat worse now, primarily because of the weather. She did go back to the endocrinologist, who told her that she did not need to come back again. He suspects this could have been a thyroiditis. Two weeks ago she had a UTI and was treated and went to the ER. She is not taking her Revlimid for her multiple myeloma and will follow up with her medical oncologist.    Her osteoarthritis is doing reasonably well as far as knees are concerned. She is not limited in what she can do. Her weight is quite stable also. She has a past history of primary hypertension and paroxysmal atrial fibrillation, for which she is on a DOAC. Current Outpatient Medications   Medication Sig Dispense Refill    predniSONE (DELTASONE) 20 mg tablet Take 20 mg by mouth three (3) times daily (after meals). 21 Tab 0    oxyCODONE-acetaminophen (PERCOCET)  mg per tablet Take 1 Tab by mouth every eight (8) hours as needed for Pain. Max Daily Amount: 3 Tabs. 40 Tab 0    lisinopril (PRINIVIL, ZESTRIL) 40 mg tablet Take 1 Tab by mouth daily. 90 Tab 3    metoprolol succinate (TOPROL-XL) 25 mg XL tablet Take 0.5 Tabs by mouth daily. 30 Tab 11    menthol (BIOFREEZE, MENTHOL,) 4 % gel 1 g by Apply Externally route every six (6) hours. 118 mL 0    apixaban (ELIQUIS) 5 mg tablet Take 1 Tab by mouth two (2) times a day. 60 Tab 11    furosemide (LASIX) 40 mg tablet Take 1 Tab by mouth daily. 30 Tab 11    cyclobenzaprine (FLEXERIL) 5 mg tablet Take 1 Tab by mouth three (3) times daily as needed for Muscle Spasm(s).  15 Tab 0    potassium chloride (K-DUR, KLOR-CON) 20 mEq tablet Take 1 Tab by mouth two (2) times a day. 60 Tab 11    aluminum hydrox-magnesium carb (GAVISCON EXTRA STRENGTH) 160-105 mg chew Take 1-2 tablets by mouth four (4) times daily as needed.  REVLIMID 10 mg cap Take  by mouth.  Indications: 1 tab daily       Past Medical History:   Diagnosis Date    Acute combined systolic and diastolic HF (heart failure), NYHA class 2 ([address] 8/11/2017    Arthritis     Atrial fibrillation (HCC)     Cancer (HCC)     multiple myeloma    Hypertension     S/P AV kathleen ablation 8/11/2017    Status post biventricular pacemaker 8/11/2017 8/11/17      Past Surgical History:   Procedure Laterality Date    HX GYN      HX ORTHOPAEDIC      knee    HX PACEMAKER  08/11/2017     Allergies   Allergen Reactions    Codeine Other (comments)         REVIEW OF SYSTEMS:  General: negative for - chills or fever  ENT: negative for - headaches, nasal congestion or tinnitus  Respiratory: negative for - cough, hemoptysis, shortness of breath or wheezing  Cardiovascular : negative for - chest pain, edema, palpitations or shortness of breath  Gastrointestinal: negative for - abdominal pain, blood in stools, heartburn or nausea/vomiting  Genito-Urinary: no dysuria, trouble voiding, or hematuria  Musculoskeletal: negative for - gait disturbance, joint pain, joint stiffness or joint swelling  Neurological: no TIA or stroke symptoms  Hematologic: no bruises, no bleeding, no swollen glands  Integument: no lumps, mole changes, nail changes or rash  Endocrine: no malaise/lethargy or unexpected weight changes      Social History     Socioeconomic History    Marital status:      Spouse name: Not on file    Number of children: Not on file    Years of education: Not on file    Highest education level: Not on file   Tobacco Use    Smoking status: Never Smoker    Smokeless tobacco: Never Used   Substance and Sexual Activity    Alcohol use: No    Drug use: No    Sexual activity: Not Currently     Family History   Problem Relation Age of Onset    Stroke Mother     No Known Problems Father        OBJECTIVE:    Visit Vitals  /89   Pulse 74   Temp 98.6 °F (37 °C) (Oral)   Resp 16   Ht 5' 10\" (1.778 m)   Wt 203 lb 1.6 oz (92.1 kg)   SpO2 97%   BMI 29.14 kg/m²     CONSTITUTIONAL: well , well nourished, appears age appropriate  EYES: perrla, eom intact  ENMT:moist mucous membranes, pharynx clear  NECK: supple. Thyroid normal  RESPIRATORY: Chest: clear to ascultation and percussion   CARDIOVASCULAR: Heart: regular rate and rhythm  GASTROINTESTINAL: Abdomen: soft, bowel sounds active  HEMATOLOGIC: no pathological lymph nodes palpated  MUSCULOSKELETAL: Extremities: no edema, pulse 1+   INTEGUMENT: No unusual rashes or suspicious skin lesions noted. Nails appear normal.  NEUROLOGIC: non-focal exam   MENTAL STATUS: alert and oriented, appropriate affect      ASSESSMENT:  1. Chronic atrial fibrillation (HCC)    2. Venous insufficiency    3. Other cardiomyopathy ([address]    4. Essential hypertension    5. Hyperthyroidism    6. Primary osteoarthritis of both knees    7. Overweight (BMI 25.0-29. 9)        PLAN:    1. Her rhythm is quite stable today. She has paroxysmal atrial fibrillation. She remains on her DOAC as stated earlier. 2. There is no evidence of cardiac decompensation. She has developed a cardiomyopathy with reduced systolic function. 3. The swelling of her lower extremities is multifactorial, but exclusively related currently to venous insufficiency. This is stable. 4. BP is excellent, no adjustments are made. 5. She is euthyroid currently and nothing more need be done. I will check a TSH today. 6. Her osteoarthritic knees are doing reasonably well. The most important thing she can do in the context of this is minimize any weight gain.     .  Orders Placed This Encounter    METABOLIC PANEL, BASIC    TSH 3RD GENERATION Follow-up and Dispositions    · Return in about 3 months (around 9/20/2019).            Rick Haddad MD

## 2019-07-16 ENCOUNTER — OFFICE VISIT (OUTPATIENT)
Dept: CARDIOLOGY CLINIC | Age: 79
End: 2019-07-16

## 2019-07-16 VITALS
HEART RATE: 76 BPM | SYSTOLIC BLOOD PRESSURE: 120 MMHG | OXYGEN SATURATION: 99 % | HEIGHT: 70 IN | RESPIRATION RATE: 16 BRPM | WEIGHT: 201.7 LBS | BODY MASS INDEX: 28.88 KG/M2 | DIASTOLIC BLOOD PRESSURE: 80 MMHG

## 2019-07-16 DIAGNOSIS — I48.20 CHRONIC ATRIAL FIBRILLATION (HCC): Primary | Chronic | ICD-10-CM

## 2019-07-16 DIAGNOSIS — I10 ESSENTIAL HYPERTENSION: ICD-10-CM

## 2019-07-16 DIAGNOSIS — Z95.0 STATUS POST BIVENTRICULAR PACEMAKER: ICD-10-CM

## 2019-07-16 DIAGNOSIS — I50.22 CHRONIC SYSTOLIC CONGESTIVE HEART FAILURE (HCC): ICD-10-CM

## 2019-07-16 DIAGNOSIS — Z98.890 S/P AV NODAL ABLATION: ICD-10-CM

## 2019-07-16 NOTE — PROGRESS NOTES
1. Have you been to the ER, urgent care clinic since your last visit? Hospitalized since your last visit? No    2. Have you seen or consulted any other health care providers outside of the [address] [place] [address] since your last visit? Include any pap smears or colon screening. Yes Oncology 5/2019    Chief Complaint   Patient presents with    Follow-up     Chronic A-Fib     Patient has no cardiac complaints. Not taking Revlimid at this time.

## 2019-07-16 NOTE — PROGRESS NOTES
Zeyad Watt, [facility]    Subjective/HPI:     Ms. Kimber Herrera is a 78 y.o. female is here for routine f/u. She has a PMHx of non-ischemic cardiomyopathy, chronic AF s/p AVN ablation with BiV PPM and HTN. She is doing well. She does not realize she has gained 20 lbs since last visit, but does admit her pants are little tighter. She eats has a sausage/neumann + egg/cheese biscuit every morning from [employer] or [employer]. She eats a lot of fruits in the evening. She denies complaints of chest pains, dizziness, orthopnea, PND or edema. She has no worsening shortness of breath from baseline. She denies palpitation symptoms.        PCP Provider  Sharie Angelucci, MD  Past Medical History:   Diagnosis Date    Acute combined systolic and diastolic HF (heart failure), NYHA class 2 ([facility] 8/11/2017    Arthritis     Atrial fibrillation (HCC)     Cancer (HCC)     multiple myeloma    Hypertension     S/P AV kathleen ablation 8/11/2017    Status post biventricular pacemaker 8/11/2017 8/11/17       Past Surgical History:   Procedure Laterality Date    HX GYN      HX ORTHOPAEDIC      knee    HX PACEMAKER  08/11/2017     Family History   Problem Relation Age of Onset    Stroke Mother     No Known Problems Father      Social History     Socioeconomic History    Marital status:      Spouse name: Not on file    Number of children: Not on file    Years of education: Not on file    Highest education level: Not on file   Occupational History    Not on file   Social Needs    Financial resource strain: Not on file    Food insecurity:     Worry: Not on file     Inability: Not on file    Transportation needs:     Medical: Not on file     Non-medical: Not on file   Tobacco Use    Smoking status: Never Smoker    Smokeless tobacco: Never Used   Substance and Sexual Activity    Alcohol use: No    Drug use: No    Sexual activity: Not Currently   Lifestyle    Physical activity:     Days per week: Not on file     Minutes per session: Not on file    Stress: Not on file   Relationships    Social connections:     Talks on phone: Not on file     Gets together: Not on file     Attends Shinto service: Not on file     Active member of club or organization: Not on file     Attends meetings of clubs or organizations: Not on file     Relationship status: Not on file    Intimate partner violence:     Fear of current or ex partner: Not on file     Emotionally abused: Not on file     Physically abused: Not on file     Forced sexual activity: Not on file   Other Topics Concern    Not on file   Social History Narrative    Not on file       Allergies   Allergen Reactions    Codeine Other (comments)        Current Outpatient Medications   Medication Sig    predniSONE (DELTASONE) 20 mg tablet Take 20 mg by mouth three (3) times daily (after meals). (Patient taking differently: Take 20 mg by mouth three (3) times daily as needed.)    oxyCODONE-acetaminophen (PERCOCET)  mg per tablet Take 1 Tab by mouth every eight (8) hours as needed for Pain. Max Daily Amount: 3 Tabs.  lisinopril (PRINIVIL, ZESTRIL) 40 mg tablet Take 1 Tab by mouth daily.  metoprolol succinate (TOPROL-XL) 25 mg XL tablet Take 0.5 Tabs by mouth daily.  menthol (BIOFREEZE, MENTHOL,) 4 % gel 1 g by Apply Externally route every six (6) hours.  apixaban (ELIQUIS) 5 mg tablet Take 1 Tab by mouth two (2) times a day.  furosemide (LASIX) 40 mg tablet Take 1 Tab by mouth daily.  cyclobenzaprine (FLEXERIL) 5 mg tablet Take 1 Tab by mouth three (3) times daily as needed for Muscle Spasm(s).  potassium chloride (K-DUR, KLOR-CON) 20 mEq tablet Take 1 Tab by mouth two (2) times a day.  aluminum hydrox-magnesium carb (GAVISCON EXTRA STRENGTH) 160-105 mg chew Take 1-2 tablets by mouth four (4) times daily as needed. No current facility-administered medications for this visit.          I have reviewed the problem list, allergy list, medical history, family, social history and medications. Review of Symptoms:    Review of Systems   Constitutional: Negative for chills, fever and weight loss. HENT: Negative for nosebleeds. Eyes: Negative for blurred vision and double vision. Respiratory: Negative for cough, shortness of breath and wheezing. Cardiovascular: Negative for chest pain, palpitations, orthopnea, leg swelling and PND. Gastrointestinal: Negative for abdominal pain, blood in stool, diarrhea, nausea and vomiting. Musculoskeletal: Negative for joint pain. Skin: Negative for rash. Neurological: Negative for dizziness, tingling and loss of consciousness. Endo/Heme/Allergies: Does not bruise/bleed easily. Physical Exam:      General: Well developed, in no acute distress, cooperative and alert  HEENT: No carotid bruits, no JVD, trach is midline. Neck Supple, PEERL, EOM intact. Heart:  reg rate and rhythm; normal S1/S2; no murmurs, gallops or rubs. Respiratory: Clear bilaterally x 4, no wheezing or rales  Abdomen:   Soft, non-tender, no distention, no masses. + BS. Extremities:  Normal cap refill, no cyanosis, atraumatic. Trace pitting edema BLE  Neuro: A&Ox3, speech clear, gait stable. Skin: Skin color is normal. No rashes or lesions.  Non diaphoretic  Vascular: 2+ pulses symmetric in all extremities    Vitals:    07/16/19 0947   BP: 120/80   Pulse: 76   Resp: 16   SpO2: 99%   Weight: 201 lb 11.2 oz (91.5 kg)   Height: 5' 10\" (1.778 m)       Cardiographics    ECG: V-paced rhythm  Results for orders placed or performed during the hospital encounter of 06/16/18   EKG, 12 LEAD, INITIAL   Result Value Ref Range    Ventricular Rate 75 BPM    Atrial Rate 68 BPM    QRS Duration 144 ms    Q-T Interval 448 ms    QTC Calculation (Bezet) 500 ms    Calculated R Axis -88 degrees    Calculated T Axis 78 degrees    Diagnosis       Ventricular-paced rhythm  Biventricular pacemaker detected  When compared with ECG of 02-JAN-2018 10:04,  No significant change was found  Confirmed by Mali Rico ([id number]) on 6/17/2018 3:38:21 PM         Cardiology Labs:  Lab Results   Component Value Date/Time    Cholesterol, total 156 08/11/2017 02:41 AM    HDL Cholesterol 82 08/11/2017 02:41 AM    LDL, calculated 64 08/11/2017 02:41 AM    Triglyceride 50 08/11/2017 02:41 AM    CHOL/HDL Ratio 1.9 08/11/2017 02:41 AM       Lab Results   Component Value Date/Time    Sodium 148 (H) 06/20/2019 11:31 AM    Potassium 3.5 06/20/2019 11:31 AM    Chloride 106 06/20/2019 11:31 AM    CO2 26 06/20/2019 11:31 AM    Anion gap 8 06/16/2018 04:07 PM    Glucose 55 (L) 06/20/2019 11:31 AM    BUN 14 06/20/2019 11:31 AM    Creatinine 0.78 06/20/2019 11:31 AM    BUN/Creatinine ratio 18 06/20/2019 11:31 AM    GFR est AA 84 06/20/2019 11:31 AM    GFR est non-AA 73 06/20/2019 11:31 AM    Calcium 9.4 06/20/2019 11:31 AM    Bilirubin, total 1.1 (H) 06/16/2018 04:07 PM    AST (SGOT) 27 06/16/2018 04:07 PM    Alk. phosphatase 131 (H) 06/16/2018 04:07 PM    Protein, total 7.1 06/16/2018 04:07 PM    Albumin 3.3 (L) 06/16/2018 04:07 PM    Globulin 3.8 06/16/2018 04:07 PM    A-G Ratio 0.9 (L) 06/16/2018 04:07 PM    ALT (SGPT) 22 06/16/2018 04:07 PM           Assessment:     Assessment:       ICD-10-CM ICD-9-CM    1. Chronic atrial fibrillation (HCC) I48.2 427.31 AMB POC EKG ROUTINE W/ 12 LEADS, INTER & REP   2. Chronic systolic congestive heart failure (HCC) I50.22 428.22      428.0    3. Essential hypertension I10 401.9    4. S/P AV kathleen ablation Z98.890 V45.89    5. Status post biventricular pacemaker Z95.0 V45.01         Plan:     1. Chronic atrial fibrillation (HCC)  S/p AVN ablation with BiV PPM  Continue with Eliquis and Toprol    2.  Chronic systolic congestive heart failure ([address]  Echo in 1/2018 with reduced LVEF 35-40%, unchanged from previous studies, severely dilated LA and dilated RA and mild MR  Discussed low sodium diet -- recommended she avoid her daily breakfast from [employer], which have too much sodium. Recommended cereal with milk or oatmeal and fruits  Continue Lisinopril, Toprol and Lasix  Repeat echo    3. Essential hypertension  BP controlled. Continue anti-hypertensive therapy. Discussed low sodium diet    4. S/P AV kathleen ablation  With BiV PPM implant    5. Status post biventricular pacemaker  Has not been checked since 4/2018 -- will make appointment to have device interrogated this year and f/u with Dr. Macy Ellington    F/u in 6 months        Gia Tanner NP       [facility]    7/16/2019         Patient seen, examined by me personally. Plan discussed as detailed. Agree with note as outlined by  NP. I confirm findings in history and physical exam. No additional findings noted. Agree with plan as outlined above. Check echo.     Jayy Castaneda MD

## 2019-07-18 RX ORDER — FUROSEMIDE 40 MG/1
40 TABLET ORAL DAILY
Qty: 30 TAB | Refills: 11 | Status: SHIPPED | OUTPATIENT
Start: 2019-07-18 | End: 2020-08-08

## 2019-07-18 RX ORDER — LISINOPRIL 40 MG/1
40 TABLET ORAL DAILY
Qty: 90 TAB | Refills: 3 | Status: SHIPPED | OUTPATIENT
Start: 2019-07-18 | End: 2020-07-29

## 2019-07-30 ENCOUNTER — OFFICE VISIT (OUTPATIENT)
Dept: INTERNAL MEDICINE CLINIC | Age: 79
End: 2019-07-30

## 2019-07-30 VITALS
TEMPERATURE: 98.2 F | RESPIRATION RATE: 16 BRPM | HEIGHT: 70 IN | BODY MASS INDEX: 28.8 KG/M2 | DIASTOLIC BLOOD PRESSURE: 98 MMHG | HEART RATE: 75 BPM | SYSTOLIC BLOOD PRESSURE: 156 MMHG | OXYGEN SATURATION: 98 % | WEIGHT: 201.2 LBS

## 2019-07-30 DIAGNOSIS — R53.81 PHYSICAL DECONDITIONING: ICD-10-CM

## 2019-07-30 DIAGNOSIS — E05.90 HYPERTHYROIDISM: ICD-10-CM

## 2019-07-30 DIAGNOSIS — I42.8 OTHER CARDIOMYOPATHY (HCC): ICD-10-CM

## 2019-07-30 DIAGNOSIS — I48.20 CHRONIC ATRIAL FIBRILLATION (HCC): Primary | Chronic | ICD-10-CM

## 2019-07-30 DIAGNOSIS — M17.0 PRIMARY OSTEOARTHRITIS OF BOTH KNEES: ICD-10-CM

## 2019-07-30 DIAGNOSIS — C90.00 MULTIPLE MYELOMA, REMISSION STATUS UNSPECIFIED (HCC): ICD-10-CM

## 2019-07-30 DIAGNOSIS — M47.22 OSTEOARTHRITIS OF SPINE WITH RADICULOPATHY, CERVICAL REGION: ICD-10-CM

## 2019-07-30 DIAGNOSIS — I10 ESSENTIAL HYPERTENSION: ICD-10-CM

## 2019-07-30 RX ORDER — AMLODIPINE BESYLATE 5 MG/1
5 TABLET ORAL DAILY
Qty: 30 TAB | Refills: 11 | Status: SHIPPED | OUTPATIENT
Start: 2019-07-30 | End: 2020-02-12 | Stop reason: SDUPTHER

## 2019-07-30 RX ORDER — CELECOXIB 200 MG/1
CAPSULE ORAL
Qty: 60 CAP | Refills: 3 | Status: SHIPPED | OUTPATIENT
Start: 2019-07-30 | End: 2021-09-23 | Stop reason: ALTCHOICE

## 2019-07-30 NOTE — PROGRESS NOTES
Chief Complaint   Patient presents with    Neck Pain     Patient states that she has been experiencing pain in the back of her neck x 3 days. 1. Have you been to the ER, urgent care clinic since your last visit? Hospitalized since your last visit? No    2. Have you seen or consulted any other health care providers outside of the [address] [place] [address] since your last visit? Include any pap smears or colon screening.  No

## 2019-07-30 NOTE — PROGRESS NOTES
[address] [place] [address] and Primary Care  [place] [address]  [address]  [place] [address]  Phone:  [phone]  Fax: [phone]       Chief Complaint   Patient presents with    Neck Pain     Patient states that she has been experiencing pain in the back of her neck x 3 days. .      SUBJECTIVE:    Sukhjinder Stanley is a 78 y.o. female Comes in for return visit complaining of recurrent pain in neck and left shoulder. She has had this previously. She took ibuprofen and it is gradually better. She is not taking the Percocet because it makes her drowsy. She is on no medication for her plasma cell dyscrasia. Her weight has been reasonably stable. She has a history of paroxysmal atrial fibrillation and remains on her DOAC. She has a past history of primary hypertension, as well as osteoarthritis involving both knees. Current Outpatient Medications   Medication Sig Dispense Refill    amLODIPine (NORVASC) 5 mg tablet Take 1 Tab by mouth daily. 30 Tab 11    celecoxib (CELEBREX) 200 mg capsule Take 1 capsule twice a day after meals as needed for joint pain 60 Cap 3    apixaban (ELIQUIS) 5 mg tablet Take 1 Tab by mouth two (2) times a day. 60 Tab 11    furosemide (LASIX) 40 mg tablet Take 1 Tab by mouth daily. 30 Tab 11    lisinopril (PRINIVIL, ZESTRIL) 40 mg tablet Take 1 Tab by mouth daily. 90 Tab 3    predniSONE (DELTASONE) 20 mg tablet Take 20 mg by mouth three (3) times daily (after meals). (Patient taking differently: Take 20 mg by mouth three (3) times daily as needed.) 21 Tab 0    oxyCODONE-acetaminophen (PERCOCET)  mg per tablet Take 1 Tab by mouth every eight (8) hours as needed for Pain. Max Daily Amount: 3 Tabs. 40 Tab 0    metoprolol succinate (TOPROL-XL) 25 mg XL tablet Take 0.5 Tabs by mouth daily. 30 Tab 11    menthol (BIOFREEZE, MENTHOL,) 4 % gel 1 g by Apply Externally route every six (6) hours.  118 mL 0    cyclobenzaprine (FLEXERIL) 5 mg tablet Take 1 Tab by mouth three (3) times daily as needed for Muscle Spasm(s). 15 Tab 0    potassium chloride (K-DUR, KLOR-CON) 20 mEq tablet Take 1 Tab by mouth two (2) times a day. 60 Tab 11    aluminum hydrox-magnesium carb (GAVISCON EXTRA STRENGTH) 160-105 mg chew Take 1-2 tablets by mouth four (4) times daily as needed. Past Medical History:   Diagnosis Date    Acute combined systolic and diastolic HF (heart failure), NYHA class 2 ([address] 8/11/2017    Arthritis     Atrial fibrillation (HCC)     Cancer (HCC)     multiple myeloma    Hypertension     S/P AV kathleen ablation 8/11/2017    Status post biventricular pacemaker 8/11/2017 8/11/17      Past Surgical History:   Procedure Laterality Date    HX GYN      HX ORTHOPAEDIC      knee    HX PACEMAKER  08/11/2017     Allergies   Allergen Reactions    Codeine Other (comments)         REVIEW OF SYSTEMS:  General: negative for - chills or fever  ENT: negative for - headaches, nasal congestion or tinnitus  Respiratory: negative for - cough, hemoptysis, shortness of breath or wheezing  Cardiovascular : negative for - chest pain, edema, palpitations or shortness of breath  Gastrointestinal: negative for - abdominal pain, blood in stools, heartburn or nausea/vomiting  Genito-Urinary: no dysuria, trouble voiding, or hematuria  Musculoskeletal: negative for - gait disturbance, joint pain, joint stiffness or joint swelling  Neurological: no TIA or stroke symptoms  Hematologic: no bruises, no bleeding, no swollen glands  Integument: no lumps, mole changes, nail changes or rash  Endocrine: no malaise/lethargy or unexpected weight changes      Social History     Socioeconomic History    Marital status:      Spouse name: Not on file    Number of children: Not on file    Years of education: Not on file    Highest education level: Not on file   Tobacco Use    Smoking status: Never Smoker    Smokeless tobacco: Never Used   Substance and Sexual Activity    Alcohol use:  No  Drug use: No    Sexual activity: Not Currently     Family History   Problem Relation Age of Onset    Stroke Mother     No Known Problems Father        OBJECTIVE:    Visit Vitals  BP (!) 156/98   Pulse 75   Temp 98.2 °F (36.8 °C) (Oral)   Resp 16   Ht 5' 10\" (1.778 m)   Wt 201 lb 3.2 oz (91.3 kg)   SpO2 98%   BMI 28.87 kg/m²     CONSTITUTIONAL: well , well nourished, appears age appropriate  EYES: perrla, eom intact  ENMT:moist mucous membranes, pharynx clear  NECK: supple. Thyroid normal  RESPIRATORY: Chest: clear to ascultation and percussion   CARDIOVASCULAR: Heart: regular rate and rhythm  GASTROINTESTINAL: Abdomen: soft, bowel sounds active  HEMATOLOGIC: no pathological lymph nodes palpated  MUSCULOSKELETAL: Extremities: no edema, pulse 1+   INTEGUMENT: No unusual rashes or suspicious skin lesions noted. Nails appear normal.  NEUROLOGIC: non-focal exam   MENTAL STATUS: alert and oriented, appropriate affect      ASSESSMENT:  1. Chronic atrial fibrillation (HCC)    2. Other cardiomyopathy ([address]    3. Hyperthyroidism    4. Primary osteoarthritis of both knees    5. Multiple myeloma, remission status unspecified ([address]    6. Physical deconditioning    7. Essential hypertension    8. Osteoarthritis of spine with radiculopathy, cervical region        PLAN:    1. Her rhythm is perfectly regular today. She does have paroxysmal atrial fibrillation. She remains on her Eliquis. 2. She is well compensated from a cardiac perspective with no signs of CHF. She does follow with a cardiologist.  3. She does have a history of hypothyroidism, but this is treated with Tapazole. She has been euthyroid since treatment stopped. 4. Her osteoarthritic knees are doing reasonably well under the circumstances. 5. Her multiple myeloma is also presumably stable.   She is on no medication currently, but does actively follow up with her hematologist.  6. I encouraged increasing her physical activity on a consistent basis to improve her conditioning. 7. Her BP is elevated. Follow-up and Dispositions    · Return in about 2 months (around 9/30/2019).            Reba Griggs MD

## 2019-07-31 ENCOUNTER — OFFICE VISIT (OUTPATIENT)
Dept: CARDIOLOGY CLINIC | Age: 79
End: 2019-07-31

## 2019-07-31 ENCOUNTER — CLINICAL SUPPORT (OUTPATIENT)
Dept: CARDIOLOGY CLINIC | Age: 79
End: 2019-07-31

## 2019-07-31 VITALS
BODY MASS INDEX: 28.83 KG/M2 | OXYGEN SATURATION: 96 % | DIASTOLIC BLOOD PRESSURE: 96 MMHG | WEIGHT: 201.4 LBS | SYSTOLIC BLOOD PRESSURE: 164 MMHG | HEART RATE: 74 BPM | RESPIRATION RATE: 18 BRPM | HEIGHT: 70 IN

## 2019-07-31 DIAGNOSIS — I48.20 CHRONIC ATRIAL FIBRILLATION (HCC): Primary | ICD-10-CM

## 2019-07-31 DIAGNOSIS — I51.7 LVH (LEFT VENTRICULAR HYPERTROPHY): ICD-10-CM

## 2019-07-31 DIAGNOSIS — Z98.890 S/P AV NODAL ABLATION: ICD-10-CM

## 2019-07-31 DIAGNOSIS — I49.9 IRREGULAR HEARTBEAT: ICD-10-CM

## 2019-07-31 DIAGNOSIS — I42.9 CARDIOMYOPATHY, UNSPECIFIED TYPE (HCC): ICD-10-CM

## 2019-07-31 DIAGNOSIS — Z95.0 STATUS POST BIVENTRICULAR PACEMAKER: ICD-10-CM

## 2019-07-31 DIAGNOSIS — Z95.0 PACEMAKER: Primary | ICD-10-CM

## 2019-07-31 LAB
BUN SERPL-MCNC: 19 MG/DL (ref 8–27)
BUN/CREAT SERPL: 18 (ref 12–28)
CALCIUM SERPL-MCNC: 9.6 MG/DL (ref 8.7–10.3)
CHLORIDE SERPL-SCNC: 105 MMOL/L (ref 96–106)
CO2 SERPL-SCNC: 28 MMOL/L (ref 20–29)
CREAT SERPL-MCNC: 1.03 MG/DL (ref 0.57–1)
GLUCOSE SERPL-MCNC: 97 MG/DL (ref 65–99)
POTASSIUM SERPL-SCNC: 3.9 MMOL/L (ref 3.5–5.2)
SODIUM SERPL-SCNC: 149 MMOL/L (ref 134–144)
TSH SERPL DL<=0.005 MIU/L-ACNC: 1.42 UIU/ML (ref 0.45–4.5)

## 2019-07-31 NOTE — PROGRESS NOTES
1. Have you been to the ER, urgent care clinic since your last visit? Hospitalized since your last visit? No    2. Have you seen or consulted any other health care providers outside of the [address] [place] [address] since your last visit? Include any pap smears or colon screening. No    Chief Complaint   Patient presents with    Irregular Heart Beat     annual f/u    Pacemaker Check     annual device check     Recently prescribed amlodipine and celebrex. Per pt, will  today.

## 2019-07-31 NOTE — PROGRESS NOTES
Subjective:      Mathieu Ratliff is a 78 y.o. female is here for annual EP follow up. The patient denies chest pain/ shortness of breath, orthopnea, PND, LE edema, palpitations, syncope, presyncope or fatigue. She admits to pain from cervical radiculopathy.      Patient Active Problem List    Diagnosis Date Noted    Anemia 09/08/2014     Priority: 1 - One    HTN (hypertension) 09/07/2014     Priority: 3 - Three    Essential hypertension 12/21/2018    Cervical radiculopathy 05/09/2018    Hyperthyroidism 04/14/2018    Weight loss 09/21/2017    S/P cardiac cath 09/07/2017    Acute combined systolic and diastolic HF (heart failure), NYHA class 2 ([address] 08/11/2017    Status post biventricular pacemaker 08/11/2017    S/P AV kathleen ablation 08/11/2017    Cardiomyopathy ([address] 08/10/2017    Acute pulmonary embolism ([address] 08/10/2017    CHF (congestive heart failure) ([address] 08/08/2017    Sialadenitis 06/29/2017    Muscular deconditioning 04/22/2017    Back pain 04/30/2016    Primary osteoarthritis of both knees 08/24/2015    Venous insufficiency 04/07/2015    Reflux esophagitis 10/31/2014    Multiple myeloma ([address] 10/31/2014    Complex renal cyst 10/11/2014    Low back pain 10/10/2014    Chronic atrial fibrillation ([address] 09/12/2014    Osteoarthritis 09/05/2014      Sharie Angelucci, MD  Past Medical History:   Diagnosis Date    Acute combined systolic and diastolic HF (heart failure), NYHA class 2 ([address] 8/11/2017    Arthritis     Atrial fibrillation (HCC)     Cancer (HCC)     multiple myeloma    Hypertension     S/P AV kathleen ablation 8/11/2017    Status post biventricular pacemaker 8/11/2017 8/11/17       Past Surgical History:   Procedure Laterality Date    HX GYN      HX ORTHOPAEDIC      knee    HX PACEMAKER  08/11/2017     Allergies   Allergen Reactions    Codeine Other (comments)      Family History   Problem Relation Age of Onset    Stroke Mother     No Known Problems Father     negative for cardiac disease  Social History     Socioeconomic History    Marital status:      Spouse name: Not on file    Number of children: Not on file    Years of education: Not on file    Highest education level: Not on file   Tobacco Use    Smoking status: Never Smoker    Smokeless tobacco: Never Used   Substance and Sexual Activity    Alcohol use: No    Drug use: No    Sexual activity: Not Currently     Current Outpatient Medications   Medication Sig    apixaban (ELIQUIS) 5 mg tablet Take 1 Tab by mouth two (2) times a day.  furosemide (LASIX) 40 mg tablet Take 1 Tab by mouth daily.  lisinopril (PRINIVIL, ZESTRIL) 40 mg tablet Take 1 Tab by mouth daily.  predniSONE (DELTASONE) 20 mg tablet Take 20 mg by mouth three (3) times daily (after meals). (Patient taking differently: Take 20 mg by mouth three (3) times daily as needed.)    oxyCODONE-acetaminophen (PERCOCET)  mg per tablet Take 1 Tab by mouth every eight (8) hours as needed for Pain. Max Daily Amount: 3 Tabs.  metoprolol succinate (TOPROL-XL) 25 mg XL tablet Take 0.5 Tabs by mouth daily.  menthol (BIOFREEZE, MENTHOL,) 4 % gel 1 g by Apply Externally route every six (6) hours.  cyclobenzaprine (FLEXERIL) 5 mg tablet Take 1 Tab by mouth three (3) times daily as needed for Muscle Spasm(s).  potassium chloride (K-DUR, KLOR-CON) 20 mEq tablet Take 1 Tab by mouth two (2) times a day.  aluminum hydrox-magnesium carb (GAVISCON EXTRA STRENGTH) 160-105 mg chew Take 1-2 tablets by mouth four (4) times daily as needed.  amLODIPine (NORVASC) 5 mg tablet Take 1 Tab by mouth daily.  celecoxib (CELEBREX) 200 mg capsule Take 1 capsule twice a day after meals as needed for joint pain     No current facility-administered medications for this visit.        Vitals:    07/31/19 0935   BP: (!) 164/96   Pulse: 74   Resp: 18   SpO2: 96%   Weight: 201 lb 6.4 oz (91.4 kg)   Height: 5' 10\" (1.778 m)       I have reviewed the nurses notes, vitals, problem list, allergy list, medical history, family, social history and medications. Review of Symptoms:    General: Pt denies excessive weight gain or loss. Pt is able to conduct ADL's  HEENT: Denies blurred vision, headaches, epistaxis and difficulty swallowing. Respiratory: Denies shortness of breath, KNOTT, wheezing or stridor. Cardiovascular: Denies precordial pain, palpitations, edema or PND  Gastrointestinal: Denies poor appetite, indigestion, abdominal pain or blood in stool  Urinary: Denies dysuria, pyuria  Musculoskeletal: Denies pain or swelling from muscles or joints  Neurologic: Denies tremor, paresthesias, or sensory motor disturbance  Skin: Denies rash, itching or texture change. Psych: Denies depression      Physical Exam:      General: Well developed, in no acute distress. HEENT: Eyes - PERRL, no jvd  Heart:  Normal S1/S2 negative S3 or S4. Regular, no murmur, gallop or rub. Respiratory: Clear bilaterally x 4, no wheezing or rales  Extremities:  tr edema, normal cap refill, no cyanosis. Musculoskeletal: No clubbing  Neuro: A&Ox3, speech clear, gait stable. Skin: Skin color is normal. No rashes or lesions.  Non diaphoretic  Vascular: 2+ pulses symmetric in all extremities    Cardiographics    Ekg: V paced    Results for orders placed or performed during the hospital encounter of 06/16/18   EKG, 12 LEAD, INITIAL   Result Value Ref Range    Ventricular Rate 75 BPM    Atrial Rate 68 BPM    QRS Duration 144 ms    Q-T Interval 448 ms    QTC Calculation (Bezet) 500 ms    Calculated R Axis -88 degrees    Calculated T Axis 78 degrees    Diagnosis       Ventricular-paced rhythm  Biventricular pacemaker detected  When compared with ECG of 02-JAN-2018 10:04,  No significant change was found  Confirmed by Courtney Ornelas ([id number]) on 6/17/2018 3:38:21 PM           Lab Results   Component Value Date/Time    WBC 4.9 06/16/2018 04:07 PM    HGB 9.9 (L) 06/16/2018 04:07 PM HCT 32.6 (L) 06/16/2018 04:07 PM    PLATELET 191 11/06/1064 04:07 PM    MCV 86.2 06/16/2018 04:07 PM      Lab Results   Component Value Date/Time    Sodium 149 (H) 07/30/2019 04:43 PM    Potassium 3.9 07/30/2019 04:43 PM    Chloride 105 07/30/2019 04:43 PM    CO2 28 07/30/2019 04:43 PM    Anion gap 8 06/16/2018 04:07 PM    Glucose 97 07/30/2019 04:43 PM    BUN 19 07/30/2019 04:43 PM    Creatinine 1.03 (H) 07/30/2019 04:43 PM    BUN/Creatinine ratio 18 07/30/2019 04:43 PM    GFR est AA 60 07/30/2019 04:43 PM    GFR est non-AA 52 (L) 07/30/2019 04:43 PM    Calcium 9.6 07/30/2019 04:43 PM    Bilirubin, total 1.1 (H) 06/16/2018 04:07 PM    AST (SGOT) 27 06/16/2018 04:07 PM    Alk. phosphatase 131 (H) 06/16/2018 04:07 PM    Protein, total 7.1 06/16/2018 04:07 PM    Albumin 3.3 (L) 06/16/2018 04:07 PM    Globulin 3.8 06/16/2018 04:07 PM    A-G Ratio 0.9 (L) 06/16/2018 04:07 PM    ALT (SGPT) 22 06/16/2018 04:07 PM      Lab Results   Component Value Date/Time    TSH 1.420 07/30/2019 04:43 PM        Assessment:           ICD-10-CM ICD-9-CM    1. Chronic atrial fibrillation (HCC) I48.2 427.31 AMB POC EKG ROUTINE W/ 12 LEADS, INTER & REP   2. Irregular heartbeat I49.9 427.9    3. LVH (left ventricular hypertrophy) I51.7 429.3    4. Cardiomyopathy, unspecified type (HCC) I42.9 425.4    5. S/P AV kathleen ablation Z98.890 V45.89    6. Status post biventricular pacemaker Z95.0 V45.01      Orders Placed This Encounter    AMB POC EKG ROUTINE W/ 12 LEADS, INTER & REP     Order Specific Question:   Reason for Exam:     Answer:   Routine        Plan:     Ms. Kathi Hickman is here for annual device follow up. . EKG show ventricular pacing. Her device interrogation demonstrates normal functioning with BIV pacing. Nine years remaining on battery. Bps up however PCP has adjusted her medications for improvement. She remains on Eliquis and is tolerating well. Echocardiogram from 2/2018 demonstrates an EF of 35-40%. Echo ordered by gen cards. Continue with Q6 mo device check and annual office follow ups.      Continue medical management for cardiomyopathy, HTN, chronic AF    Thank you for allowing me to participate in Haven Real 's care.       Kelton Fink NP

## 2019-08-06 ENCOUNTER — PATIENT OUTREACH (OUTPATIENT)
Dept: INTERNAL MEDICINE CLINIC | Age: 79
End: 2019-08-06

## 2019-08-06 NOTE — PROGRESS NOTES
Goals Addressed                 This Visit's Progress     Coordinate pain management plan for patient. On track     8/6/2019:  Patient c/o ongoing neck pain; states no help from the over the counter pain med. Agrees to warm compresses-states cold does not help. States when this occurs, nothing helps except Prednisone. Consult done w/ PCP. Prednisone 20mg script called in to Pharm. Agreed to contact PCP if continued pain occurs. EW         goal completion:  7 day supply 8/13/2019.

## 2019-09-12 RX ORDER — METOPROLOL SUCCINATE 25 MG/1
12.5 TABLET, EXTENDED RELEASE ORAL DAILY
Qty: 30 TAB | Refills: 11 | Status: SHIPPED | OUTPATIENT
Start: 2019-09-12 | End: 2020-11-04

## 2019-10-01 ENCOUNTER — OFFICE VISIT (OUTPATIENT)
Dept: INTERNAL MEDICINE CLINIC | Age: 79
End: 2019-10-01

## 2019-10-01 VITALS
TEMPERATURE: 97.8 F | RESPIRATION RATE: 18 BRPM | SYSTOLIC BLOOD PRESSURE: 137 MMHG | OXYGEN SATURATION: 94 % | WEIGHT: 204.3 LBS | BODY MASS INDEX: 29.25 KG/M2 | DIASTOLIC BLOOD PRESSURE: 86 MMHG | HEART RATE: 75 BPM | HEIGHT: 70 IN

## 2019-10-01 DIAGNOSIS — I48.20 CHRONIC ATRIAL FIBRILLATION (HCC): Primary | Chronic | ICD-10-CM

## 2019-10-01 DIAGNOSIS — E05.90 HYPERTHYROIDISM: ICD-10-CM

## 2019-10-01 DIAGNOSIS — I10 ESSENTIAL HYPERTENSION: ICD-10-CM

## 2019-10-01 DIAGNOSIS — M17.0 PRIMARY OSTEOARTHRITIS OF BOTH KNEES: ICD-10-CM

## 2019-10-01 DIAGNOSIS — I42.8 OTHER CARDIOMYOPATHY (HCC): ICD-10-CM

## 2019-10-01 DIAGNOSIS — C90.00 MULTIPLE MYELOMA, REMISSION STATUS UNSPECIFIED (HCC): ICD-10-CM

## 2019-10-01 DIAGNOSIS — R29.898 MUSCULAR DECONDITIONING: ICD-10-CM

## 2019-10-01 DIAGNOSIS — I87.2 VENOUS INSUFFICIENCY: ICD-10-CM

## 2019-10-01 NOTE — PROGRESS NOTES
Chief Complaint   Patient presents with    Irregular Heart Beat     2 month follow up      1. Have you been to the ER, urgent care clinic since your last visit? Hospitalized since your last visit? No    2. Have you seen or consulted any other health care providers outside of the [address] [place] [address] since your last visit? Include any pap smears or colon screening.  No

## 2019-10-01 NOTE — PROGRESS NOTES
[address] [facility]  [facility]  [address] [place] [address]  Phone:  [phone]  Fax: [phone]       Chief Complaint   Patient presents with    Irregular Heart Beat     2 month follow up    . SUBJECTIVE:    Sarita Stephenson is a 78 y.o. female Comes in for return visit stating that she has done reasonably well. Her walking is quite limited because of her osteoarthritis of her knees, right greater than left. She uses a cane. She does not have any options available other than weight reduction. There has been no meaningful weight loss since I last saw her. Fortunately she has not fallen either. She is not a great candidate for NSAIDs because of her anticoagulation. She follows up with cardiology for paroxysmal atrial fibrillation and remains on Eliquis. She also follows up with hematology for her multiple myeloma, which is reasonably stable. She complains of stiffness, which is generalized. She has a past history of primary hypertension and dyslipidemia. Current Outpatient Medications   Medication Sig Dispense Refill    metoprolol succinate (TOPROL-XL) 25 mg XL tablet Take 0.5 Tabs by mouth daily. 30 Tab 11    amLODIPine (NORVASC) 5 mg tablet Take 1 Tab by mouth daily. 30 Tab 11    celecoxib (CELEBREX) 200 mg capsule Take 1 capsule twice a day after meals as needed for joint pain 60 Cap 3    apixaban (ELIQUIS) 5 mg tablet Take 1 Tab by mouth two (2) times a day. 60 Tab 11    furosemide (LASIX) 40 mg tablet Take 1 Tab by mouth daily. 30 Tab 11    lisinopril (PRINIVIL, ZESTRIL) 40 mg tablet Take 1 Tab by mouth daily. 90 Tab 3    predniSONE (DELTASONE) 20 mg tablet Take 20 mg by mouth three (3) times daily (after meals). (Patient taking differently: Take 20 mg by mouth three (3) times daily as needed.) 21 Tab 0    oxyCODONE-acetaminophen (PERCOCET)  mg per tablet Take 1 Tab by mouth every eight (8) hours as needed for Pain.  Max Daily Amount: 3 Tabs. 40 Tab 0    menthol (BIOFREEZE, MENTHOL,) 4 % gel 1 g by Apply Externally route every six (6) hours. 118 mL 0    cyclobenzaprine (FLEXERIL) 5 mg tablet Take 1 Tab by mouth three (3) times daily as needed for Muscle Spasm(s). 15 Tab 0    potassium chloride (K-DUR, KLOR-CON) 20 mEq tablet Take 1 Tab by mouth two (2) times a day. 60 Tab 11    aluminum hydrox-magnesium carb (GAVISCON EXTRA STRENGTH) 160-105 mg chew Take 1-2 tablets by mouth four (4) times daily as needed.        Past Medical History:   Diagnosis Date    Acute combined systolic and diastolic HF (heart failure), NYHA class 2 ([facility] 8/11/2017    Arthritis     Atrial fibrillation (HCC)     Cancer (HCC)     multiple myeloma    Hypertension     S/P AV kathleen ablation 8/11/2017    Status post biventricular pacemaker 8/11/2017 8/11/17      Past Surgical History:   Procedure Laterality Date    HX GYN      HX ORTHOPAEDIC      knee    HX PACEMAKER  08/11/2017     Allergies   Allergen Reactions    Codeine Other (comments)         REVIEW OF SYSTEMS:  General: negative for - chills or fever  ENT: negative for - headaches, nasal congestion or tinnitus  Respiratory: negative for - cough, hemoptysis, shortness of breath or wheezing  Cardiovascular : negative for - chest pain, edema, palpitations or shortness of breath  Gastrointestinal: negative for - abdominal pain, blood in stools, heartburn or nausea/vomiting  Genito-Urinary: no dysuria, trouble voiding, or hematuria  Musculoskeletal: negative for - gait disturbance, joint pain, joint stiffness or joint swelling  Neurological: no TIA or stroke symptoms  Hematologic: no bruises, no bleeding, no swollen glands  Integument: no lumps, mole changes, nail changes or rash  Endocrine: no malaise/lethargy or unexpected weight changes      Social History     Socioeconomic History    Marital status:      Spouse name: Not on file    Number of children: Not on file    Years of education: Not on file    Highest education level: Not on file   Tobacco Use    Smoking status: Never Smoker    Smokeless tobacco: Never Used   Substance and Sexual Activity    Alcohol use: No    Drug use: No    Sexual activity: Not Currently     Family History   Problem Relation Age of Onset    Stroke Mother     No Known Problems Father        OBJECTIVE:    Visit Vitals  /86   Pulse 75   Temp 97.8 °F (36.6 °C) (Oral)   Resp 18   Ht 5' 10\" (1.778 m)   Wt 204 lb 4.8 oz (92.7 kg)   SpO2 94%   BMI 29.31 kg/m²     CONSTITUTIONAL: well , well nourished, appears age appropriate  EYES: perrla, eom intact  ENMT:moist mucous membranes, pharynx clear  NECK: supple. Thyroid normal  RESPIRATORY: Chest: clear to ascultation and percussion   CARDIOVASCULAR: Heart: regular rate and rhythm  GASTROINTESTINAL: Abdomen: soft, bowel sounds active  HEMATOLOGIC: no pathological lymph nodes palpated  MUSCULOSKELETAL: Extremities: no edema, pulse 1+   INTEGUMENT: No unusual rashes or suspicious skin lesions noted. Nails appear normal.  NEUROLOGIC: non-focal exam   MENTAL STATUS: alert and oriented, appropriate affect      ASSESSMENT:  1. Chronic atrial fibrillation    2. Venous insufficiency    3. Other cardiomyopathy ([address]    4. Essential hypertension    5. Hyperthyroidism    6. Muscular deconditioning    7. Multiple myeloma, remission status unspecified ([address]    8. Primary osteoarthritis of both knees        PLAN:    1. The patient appears to be doing reasonably well. The stiffness that she is having might well be related to polymyalgia rheumatica, but I am not going to place her on any steroids for now. I will observe. 2. She will follow up with cardiology for her atrial fibrillation. 3. She does have swelling of her lower extremities, but this is no worse and is actually getting better. Her right leg is somewhat worse than the left. 4. There are no overt signs of congestive heart failure today.   5. Blood pressure is excellent, no adjustments are made. 6. She remains deconditioned because she is just not walking enough. Her walking is limited by her severe osteoarthritis involving both knees, right greater than left. Options are quite limited. She does not need an injection today. 7. She will follow up with her hematologist for her multiple myeloma. Follow-up and Dispositions    · Return in about 3 months (around 1/1/2020).            Crispin Lock MD

## 2019-10-02 LAB
BUN SERPL-MCNC: 16 MG/DL (ref 8–27)
BUN/CREAT SERPL: 19 (ref 12–28)
CALCIUM SERPL-MCNC: 9.6 MG/DL (ref 8.7–10.3)
CHLORIDE SERPL-SCNC: 105 MMOL/L (ref 96–106)
CO2 SERPL-SCNC: 30 MMOL/L (ref 20–29)
CREAT SERPL-MCNC: 0.85 MG/DL (ref 0.57–1)
GLUCOSE SERPL-MCNC: 89 MG/DL (ref 65–99)
POTASSIUM SERPL-SCNC: 3.6 MMOL/L (ref 3.5–5.2)
SODIUM SERPL-SCNC: 149 MMOL/L (ref 134–144)
TSH SERPL DL<=0.005 MIU/L-ACNC: 1.54 UIU/ML (ref 0.45–4.5)

## 2019-10-30 ENCOUNTER — HOSPITAL ENCOUNTER (OUTPATIENT)
Dept: MAMMOGRAPHY | Age: 79
Discharge: HOME | End: 2019-10-30
Attending: INTERNAL MEDICINE
Payer: MEDICARE

## 2019-10-30 DIAGNOSIS — Z12.31 VISIT FOR SCREENING MAMMOGRAM: ICD-10-CM

## 2019-10-30 PROCEDURE — 77063 BREAST TOMOSYNTHESIS BI: CPT

## 2019-11-04 RX ORDER — POTASSIUM CHLORIDE 20 MEQ/1
20 TABLET, EXTENDED RELEASE ORAL 2 TIMES DAILY
Qty: 60 TAB | Refills: 11 | Status: SHIPPED | OUTPATIENT
Start: 2019-11-04 | End: 2020-11-07

## 2019-11-08 ENCOUNTER — HOSPITAL ENCOUNTER (OUTPATIENT)
Dept: PET IMAGING | Age: 79
Discharge: HOME | End: 2019-11-08
Attending: INTERNAL MEDICINE
Payer: MEDICARE

## 2019-11-08 VITALS — BODY MASS INDEX: 29.2 KG/M2 | HEIGHT: 70 IN | WEIGHT: 204 LBS

## 2019-11-08 DIAGNOSIS — C90.00 MULTIPLE MYELOMA NOT HAVING ACHIEVED REMISSION (HCC): ICD-10-CM

## 2019-11-08 PROCEDURE — A9552 F18 FDG: HCPCS

## 2019-11-08 RX ORDER — SODIUM CHLORIDE 0.9 % (FLUSH) 0.9 %
10 SYRINGE (ML) INJECTION
Status: COMPLETED | OUTPATIENT
Start: 2019-11-08 | End: 2019-11-08

## 2019-11-08 RX ADMIN — Medication 10 ML: at 12:05

## 2019-11-20 ENCOUNTER — PATIENT OUTREACH (OUTPATIENT)
Dept: INTERNAL MEDICINE CLINIC | Age: 79
End: 2019-11-20

## 2019-11-20 NOTE — PROGRESS NOTES
Goals Addressed                 This Visit's Progress     Patient/Family verbalizes understanding of self-management of chronic disease. On track     1.  11/20/2019:  Patient still c/o pain in neck; states still taking prednisone;  Still takes pain meds whenever needed but as little as possible. Agrees to applying warm paks to neck pain. Patient reminded of [address] after hours and on weekends. Patient agrees to low Na+ diet and medication compliance in support of preventing elev BP. EW    2. Patient call back to c/o dizziness at times;  States PCP placed her on a new BP lowering med but didn't have the list in front of her to know what one. Denied weight lost.  States she does not use the BP cuff and could not provide a requested BP. Agrees to not using salt but agrees that foods may have more than needed. States she has a little swelling in lower legs. Due to c/o dizziness and unable to give BP; scheduled for PCP visit. EW        goal f/u in AM office visit.

## 2019-11-20 NOTE — PROGRESS NOTES
ACM Complex CM:  Chronic Paraxysmal A-Fib/HTN /Multiple Myeloma    Ambulatory Care Management Note      Date/Time:  11/20/2019 2:39 PM    This patient was received as a referral from [address] [place] [address] reviewed with the provider   Neck Pain             Ambulatory  contacted patient for discussion and case managements of Dyslipidemia, HTN, Neck Pain, Mod Persistent Airway Disease/Multiple Myelomas-radiation treatments. Summary of patients top problems:   1. Non remission achievement myelomas  2. HTN  3. Neck Pain  4. Chronic AFib  Patient's challenges to self management identified:   Pain management      Medication Management:  Good adherence    Advance Care Planning:   Does patient have an Advance Directive:  not on file; education provided   Healthcare Decision Maker:  [address] [place], [place] [address]  Ne, Referrals-Radiology Tumor Rx, and Durable Medical Equipment: cane    PCP/Specialist follow up:   Future Appointments   Date Time Provider Belen Russell   1/2/2020  9:15 AM Jonna Nugent MD [facility]   1/30/2020  9:00 AM PACEMAKER, [facility]   2/3/2020  9:00 AM ECHO, [address]   2/4/2020  9:00 AM Uriel, [address] [place] MD [facility]   2/4/2020  9:00 AM PACEMAKER, [address]   8/13/2020  2:00 PM PACEMAKER, [address]   8/13/2020  2:20 PM [facility]          Goals      Patient/Family verbalizes understanding of self-management of chronic disease. 11/20/2019:  Patient still c/o pain in neck; states still taking prednisone;  Still takes pain meds whenever needed but as little as possible. Agrees to applying warm paks to neck pain. Patient reminded of [address] after hours and on weekends. Patient agrees to low Na+ diet and medication compliance in support of preventing elev BP.   EW        goal f/u 12/5/2019   Patient verbalized understanding of all information discussed. Patient has this Nurse Navigators contact information for any further questions, concerns, or needs.

## 2019-11-21 ENCOUNTER — OFFICE VISIT (OUTPATIENT)
Dept: INTERNAL MEDICINE CLINIC | Age: 79
End: 2019-11-21

## 2019-11-21 VITALS
WEIGHT: 209.1 LBS | TEMPERATURE: 99.7 F | HEIGHT: 70 IN | HEART RATE: 75 BPM | RESPIRATION RATE: 18 BRPM | DIASTOLIC BLOOD PRESSURE: 82 MMHG | BODY MASS INDEX: 29.94 KG/M2 | OXYGEN SATURATION: 98 % | SYSTOLIC BLOOD PRESSURE: 132 MMHG

## 2019-11-21 DIAGNOSIS — E66.9 OBESITY (BMI 30.0-34.9): ICD-10-CM

## 2019-11-21 DIAGNOSIS — I50.22 CHRONIC SYSTOLIC CONGESTIVE HEART FAILURE (HCC): ICD-10-CM

## 2019-11-21 DIAGNOSIS — M17.0 PRIMARY OSTEOARTHRITIS OF BOTH KNEES: ICD-10-CM

## 2019-11-21 DIAGNOSIS — C90.00 MULTIPLE MYELOMA, REMISSION STATUS UNSPECIFIED (HCC): ICD-10-CM

## 2019-11-21 DIAGNOSIS — H81.10 BENIGN PAROXYSMAL POSITIONAL VERTIGO, UNSPECIFIED LATERALITY: Primary | ICD-10-CM

## 2019-11-21 DIAGNOSIS — I10 ESSENTIAL HYPERTENSION: ICD-10-CM

## 2019-11-21 DIAGNOSIS — I48.20 CHRONIC ATRIAL FIBRILLATION (HCC): Chronic | ICD-10-CM

## 2019-11-21 RX ORDER — MECLIZINE HCL 12.5 MG 12.5 MG/1
12.5 TABLET ORAL
Qty: 60 TAB | Refills: 11 | Status: SHIPPED | OUTPATIENT
Start: 2019-11-21 | End: 2019-12-01

## 2019-11-21 NOTE — PROGRESS NOTES
1. Have you been to the ER, urgent care clinic since your last visit? Hospitalized since your last visit? No    2. Have you seen or consulted any other health care providers outside of the [address] [place] [address] since your last visit? Include any pap smears or colon screening.  No     Wants to discuss dizziness

## 2019-11-21 NOTE — PROGRESS NOTES
[facility]  [place] [address]  [address] [place] [address]  Phone:  [phone]  Fax: [phone]       Chief Complaint   Patient presents with    Dizziness   . SUBJECTIVE:    Hiral Tse is a 78 y.o. female Complaining about dizziness. It is positional dizziness that she has noted. She even gets this when sitting down driving. It has been present now for the last several weeks. She has had it before, but it improved. She has a history of high BP and I adjusted medication when she was last in. She wondered if this was contributing to the dizziness. She is following up with her hematologist for her multiple myeloma and is doing quite well from this perspective. She remains euthyroid also. Finally, there has been no meaningful weight loss. This is important because of the severity of her osteoarthritis in both knees, which is getting gradually worse unfortunately. Current Outpatient Medications   Medication Sig Dispense Refill    meclizine (ANTIVERT) 12.5 mg tablet Take 1 Tab by mouth three (3) times daily as needed for Dizziness for up to 10 days. 60 Tab 11    potassium chloride (K-DUR, KLOR-CON) 20 mEq tablet Take 1 Tab by mouth two (2) times a day. 60 Tab 11    metoprolol succinate (TOPROL-XL) 25 mg XL tablet Take 0.5 Tabs by mouth daily. 30 Tab 11    amLODIPine (NORVASC) 5 mg tablet Take 1 Tab by mouth daily. 30 Tab 11    celecoxib (CELEBREX) 200 mg capsule Take 1 capsule twice a day after meals as needed for joint pain 60 Cap 3    apixaban (ELIQUIS) 5 mg tablet Take 1 Tab by mouth two (2) times a day. 60 Tab 11    furosemide (LASIX) 40 mg tablet Take 1 Tab by mouth daily. 30 Tab 11    lisinopril (PRINIVIL, ZESTRIL) 40 mg tablet Take 1 Tab by mouth daily. 90 Tab 3    predniSONE (DELTASONE) 20 mg tablet Take 20 mg by mouth three (3) times daily (after meals).  (Patient taking differently: Take 20 mg by mouth three (3) times daily as needed.) 21 Tab 0    oxyCODONE-acetaminophen (PERCOCET)  mg per tablet Take 1 Tab by mouth every eight (8) hours as needed for Pain. Max Daily Amount: 3 Tabs. 40 Tab 0    menthol (BIOFREEZE, MENTHOL,) 4 % gel 1 g by Apply Externally route every six (6) hours. 118 mL 0    cyclobenzaprine (FLEXERIL) 5 mg tablet Take 1 Tab by mouth three (3) times daily as needed for Muscle Spasm(s). 15 Tab 0    aluminum hydrox-magnesium carb (GAVISCON EXTRA STRENGTH) 160-105 mg chew Take 1-2 tablets by mouth four (4) times daily as needed.        Past Medical History:   Diagnosis Date    Acute combined systolic and diastolic HF (heart failure), NYHA class 2 (HCC) 8/11/2017    Arthritis     Atrial fibrillation (HCC)     Cancer (HCC)     multiple myeloma    Hypertension     S/P AV kathleen ablation 8/11/2017    Status post biventricular pacemaker 8/11/2017 8/11/17      Past Surgical History:   Procedure Laterality Date    HX GYN      HX ORTHOPAEDIC      knee    HX PACEMAKER  08/11/2017     Allergies   Allergen Reactions    Codeine Other (comments)         REVIEW OF SYSTEMS:  General: negative for - chills or fever  ENT: negative for - headaches, nasal congestion or tinnitus  Respiratory: negative for - cough, hemoptysis, shortness of breath or wheezing  Cardiovascular : negative for - chest pain, edema, palpitations or shortness of breath  Gastrointestinal: negative for - abdominal pain, blood in stools, heartburn or nausea/vomiting  Genito-Urinary: no dysuria, trouble voiding, or hematuria  Musculoskeletal: negative for - gait disturbance, joint pain, joint stiffness or joint swelling  Neurological: no TIA or stroke symptoms  Hematologic: no bruises, no bleeding, no swollen glands  Integument: no lumps, mole changes, nail changes or rash  Endocrine: no malaise/lethargy or unexpected weight changes      Social History     Socioeconomic History    Marital status:      Spouse name: Not on file    Number of children: Not on file    Years of education: Not on file    Highest education level: Not on file   Social Needs    Financial resource strain: Not hard at all    Food insecurity:     Worry: Never true     Inability: Never true   [employer] needs:     Medical: No     Non-medical: No   Tobacco Use    Smoking status: Never Smoker    Smokeless tobacco: Never Used   Substance and Sexual Activity    Alcohol use: No    Drug use: No    Sexual activity: Not Currently   Lifestyle    Physical activity:     Days per week: 0 days     Minutes per session: 0 min    Stress: Not at all   Relationships    Social connections:     Talks on phone: More than three times a week     Gets together: More than three times a week     Attends [employer] service: More than 4 times per year     Active member of club or organization: Yes     Attends meetings of clubs or organizations: More than 4 times per year     Relationship status:    Other Topics Concern     Service No    Blood Transfusions No    Caffeine Concern No    Occupational Exposure No    Hobby Hazards No    Sleep Concern No    Stress Concern No    Weight Concern Yes    Special Diet Yes     Comment: HTN/  Low Sugary    Back Care Yes    Exercise No    Bike Helmet No    Seat Belt Yes    Self-Exams Yes   Social History Narrative    None      Family History   Problem Relation Age of Onset    Stroke Mother     No Known Problems Father        OBJECTIVE:    Visit Vitals  /82   Pulse 75   Temp 99.7 °F (37.6 °C) (Oral)   Resp 18   Ht 5' 10\" (1.778 m)   Wt 209 lb 1.6 oz (94.8 kg)   SpO2 98%   BMI 30.00 kg/m²     CONSTITUTIONAL: well , well nourished, appears age appropriate  EYES: perrla, eom intact  ENMT:moist mucous membranes, pharynx clear  NECK: supple.  Thyroid normal  RESPIRATORY: Chest: clear to ascultation and percussion   CARDIOVASCULAR: Heart: regular rate and rhythm  GASTROINTESTINAL: Abdomen: soft, bowel sounds active  HEMATOLOGIC: no pathological lymph nodes palpated  MUSCULOSKELETAL: Extremities: no edema, pulse 1+   INTEGUMENT: No unusual rashes or suspicious skin lesions noted. Nails appear normal.  NEUROLOGIC: non-focal exam   MENTAL STATUS: alert and oriented, appropriate affect      ASSESSMENT:  1. Benign paroxysmal positional vertigo, unspecified laterality    2. Essential hypertension    3. Chronic atrial fibrillation    4. Primary osteoarthritis of both knees    5. Obesity (BMI 30.0-34.9)    6. Multiple myeloma, remission status unspecified ([facility] [address]    7. [facility] systolic [facility] ([facility]        PLAN:    1. The patient has BPV. She will be treated with Meclizine 12.5 mg t.i.d. for the first two weeks and then t.i.d. prn thereafter. I explained to her this is indeed self limiting. 2. Her BP is entirely normal with no orthostatic changes. 3. Her rhythm is regular, although she has a history of paroxysmal a-fib and remains on Eliquis. 4. Her osteoarthritic knees continue to be a major problem. The most important thing she can do to improve this status is lose weight. 5. Weight reduction can indeed occur by eating meals, eliminating snacks and avoiding the consumption of processed carbs. 6. She will follow up with her oncologist regarding her multiple myeloma. Orders Placed This Encounter    METABOLIC PANEL, BASIC    meclizine (ANTIVERT) 12.5 mg tablet         Follow-up and Dispositions    · Return in about 4 weeks (around 12/19/2019).            Alejandra Goss MD

## 2019-11-22 LAB
BUN SERPL-MCNC: 14 MG/DL (ref 8–27)
BUN/CREAT SERPL: 16 (ref 12–28)
CALCIUM SERPL-MCNC: 9.7 MG/DL (ref 8.7–10.3)
CHLORIDE SERPL-SCNC: 102 MMOL/L (ref 96–106)
CO2 SERPL-SCNC: 26 MMOL/L (ref 20–29)
CREAT SERPL-MCNC: 0.85 MG/DL (ref 0.57–1)
GLUCOSE SERPL-MCNC: 61 MG/DL (ref 65–99)
POTASSIUM SERPL-SCNC: 3.8 MMOL/L (ref 3.5–5.2)
SODIUM SERPL-SCNC: 146 MMOL/L (ref 134–144)

## 2020-01-02 ENCOUNTER — OFFICE VISIT (OUTPATIENT)
Dept: INTERNAL MEDICINE CLINIC | Age: 80
End: 2020-01-02

## 2020-01-02 DIAGNOSIS — I10 ESSENTIAL HYPERTENSION: ICD-10-CM

## 2020-01-02 DIAGNOSIS — M17.0 PRIMARY OSTEOARTHRITIS OF BOTH KNEES: Primary | ICD-10-CM

## 2020-01-02 DIAGNOSIS — E05.90 HYPERTHYROIDISM: ICD-10-CM

## 2020-01-02 DIAGNOSIS — C90.00 MULTIPLE MYELOMA, REMISSION STATUS UNSPECIFIED (HCC): ICD-10-CM

## 2020-01-02 DIAGNOSIS — I87.2 VENOUS INSUFFICIENCY: ICD-10-CM

## 2020-01-02 DIAGNOSIS — I48.20 CHRONIC ATRIAL FIBRILLATION (HCC): Chronic | ICD-10-CM

## 2020-01-02 NOTE — PROGRESS NOTES
Chief Complaint   Patient presents with    Hypertension     3 month follow up      1. Have you been to the ER, urgent care clinic since your last visit? Hospitalized since your last visit? No    2. Have you seen or consulted any other health carers [facility] the [address] [place] [address] since your last visit? Include any pap smears or colon screening.  No

## 2020-01-04 LAB
BUN SERPL-MCNC: 13 MG/DL (ref 8–27)
BUN/CREAT SERPL: 16 (ref 12–28)
CALCIUM SERPL-MCNC: 9.9 MG/DL (ref 8.7–10.3)
CHLORIDE SERPL-SCNC: 107 MMOL/L (ref 96–106)
CO2 SERPL-SCNC: 26 MMOL/L (ref 20–29)
CREAT SERPL-MCNC: 0.79 MG/DL (ref 0.57–1)
GLUCOSE SERPL-MCNC: 77 MG/DL (ref 65–99)
POTASSIUM SERPL-SCNC: 3.7 MMOL/L (ref 3.5–5.2)
SODIUM SERPL-SCNC: 149 MMOL/L (ref 134–144)

## 2020-01-05 VITALS
BODY MASS INDEX: 30.65 KG/M2 | SYSTOLIC BLOOD PRESSURE: 140 MMHG | HEIGHT: 70 IN | WEIGHT: 214.1 LBS | OXYGEN SATURATION: 98 % | HEART RATE: 75 BPM | RESPIRATION RATE: 16 BRPM | DIASTOLIC BLOOD PRESSURE: 80 MMHG | TEMPERATURE: 98 F

## 2020-01-05 NOTE — PROGRESS NOTES
[facility]  [facility]  [address] [facility] [address]  Phone:  [phone]  Fax: [phone]       Chief Complaint   Patient presents with    Hypertension     3 month follow up    . SUBJECTIVE:    Carlton Ferrera is a 78 y.o. female Comes in for return visit stating that in general she is doing well. She is no longer having pain in her neck and back for now. Her mobility is impaired because of progressive osteoarthritis involving her knees. She is not interested in joint replacement. The most important thing as it relates to her knees is her weight. Further weight reduction is necessary to reduce pain and slow the rate of progression. She has been taking her antihypertensive medication as prescribed and has had no cardiovascular symptoms related to this. She remains on her DOAC for her paroxysmal atrial fibrillation. She does follow up with her medical oncologist for multiple myeloma. She remains on medication. Finally, she does have a history of hyperthyroidism, but she has been euthyroid for the last six to eight months. Current Outpatient Medications   Medication Sig Dispense Refill    potassium chloride (K-DUR, KLOR-CON) 20 mEq tablet Take 1 Tab by mouth two (2) times a day. 60 Tab 11    metoprolol succinate (TOPROL-XL) 25 mg XL tablet Take 0.5 Tabs by mouth daily. 30 Tab 11    amLODIPine (NORVASC) 5 mg tablet Take 1 Tab by mouth daily. 30 Tab 11    celecoxib (CELEBREX) 200 mg capsule Take 1 capsule twice a day after meals as needed for joint pain 60 Cap 3    apixaban (ELIQUIS) 5 mg tablet Take 1 Tab by mouth two (2) times a day. 60 Tab 11    furosemide (LASIX) 40 mg tablet Take 1 Tab by mouth daily. 30 Tab 11    lisinopril (PRINIVIL, ZESTRIL) 40 mg tablet Take 1 Tab by mouth daily. 90 Tab 3    predniSONE (DELTASONE) 20 mg tablet Take 20 mg by mouth three (3) times daily (after meals).  (Patient taking differently: Take 20 mg by mouth three (3) times daily as needed.) 21 Tab 0    oxyCODONE-acetaminophen (PERCOCET)  mg per tablet Take 1 Tab by mouth every eight (8) hours as needed for Pain. Max Daily Amount: 3 Tabs. 40 Tab 0    menthol (BIOFREEZE, MENTHOL,) 4 % gel 1 g by Apply Externally route every six (6) hours. 118 mL 0    cyclobenzaprine (FLEXERIL) 5 mg tablet Take 1 Tab by mouth three (3) times daily as needed for Muscle Spasm(s). 15 Tab 0    aluminum hydrox-magnesium carb (GAVISCON EXTRA STRENGTH) 160-105 mg chew Take 1-2 tablets by mouth four (4) times daily as needed.        Past Medical History:   Diagnosis Date    Acute combined systolic and diastolic HF (heart failure), NYHA class 2 ([facility] 8/11/2017    Arthritis     Atrial fibrillation (HCC)     Cancer (HCC)     multiple myeloma    Hypertension     S/P AV kathleen ablation 8/11/2017    Status post biventricular pacemaker 8/11/2017 8/11/17      Past Surgical History:   Procedure Laterality Date    HX GYN      HX ORTHOPAEDIC      knee    HX PACEMAKER  08/11/2017     Allergies   Allergen Reactions    Codeine Other (comments)         REVIEW OF SYSTEMS:  General: negative for - chills or fever  ENT: negative for - headaches, nasal congestion or tinnitus  Respiratory: negative for - cough, hemoptysis, shortness of breath or wheezing  Cardiovascular : negative for - chest pain, edema, palpitations or shortness of breath  Gastrointestinal: negative for - abdominal pain, blood in stools, heartburn or nausea/vomiting  Genito-Urinary: no dysuria, trouble voiding, or hematuria  Musculoskeletal: negative for - gait disturbance, joint pain, joint stiffness or joint swelling  Neurological: no TIA or stroke symptoms  Hematologic: no bruises, no bleeding, no swollen glands  Integument: no lumps, mole changes, nail changes or rash  Endocrine: no malaise/lethargy or unexpected weight changes      Social History     Socioeconomic History    Marital status:      Spouse name: Not on file    Number of children: 3    Years of education: Not on file    Highest education level: Not on file   Occupational History    Occupation: Retired from the state [employer] strain: Not hard at all   [employer] insecurity:     Worry: Never true     Inability: Never true   [employer] needs:     Medical: No     Non-medical: No   Tobacco Use    Smoking status: Never Smoker    Smokeless tobacco: Never Used   Substance and Sexual Activity    Alcohol use: No    Drug use: No    Sexual activity: Not Currently   Lifestyle    Physical activity:     Days per week: 0 days     Minutes per session: 0 min    Stress: Not at all   Relationships    Social connections:     Talks on phone: More than three times a week     Gets together: More than three times a week     Attends Islam service: More than 4 times per year     Active member of club or organization: Yes     Attends meetings of clubs or organizations: More than 4 times per year     Relationship status:    Other Topics Concern     Service No    Blood Transfusions No    Caffeine Concern No    Occupational Exposure No    Hobby Hazards No    Sleep Concern No    Stress Concern No    Weight Concern Yes    Special Diet Yes     Comment: HTN/  Low Sugary    Back Care Yes    Exercise No    Bike Helmet No    Seat Belt Yes    Self-Exams Yes     Family History   Problem Relation Age of Onset    Stroke Mother     No Known Problems Father        OBJECTIVE:    Visit Vitals  /80   Pulse 75   Temp 98 °F (36.7 °C) (Oral)   Resp 16   Ht 5' 10\" (1.778 m)   Wt 214 lb 1.6 oz (97.1 kg)   SpO2 98%   BMI 30.72 kg/m²     CONSTITUTIONAL: well , well nourished, appears age appropriate  EYES: perrla, eom intact  ENMT:moist mucous membranes, pharynx clear  NECK: supple.  Thyroid normal  RESPIRATORY: Chest: clear to ascultation and percussion   CARDIOVASCULAR: Heart: regular rate and rhythm  GASTROINTESTINAL: Abdomen: soft, bowel sounds active  HEMATOLOGIC: no pathological lymph nodes palpated  MUSCULOSKELETAL: Extremities: trace edema both legs distally, pulse 1, moderate degenerative changes noted both knees  INTEGUMENT: No unusual rashes or suspicious skin lesions noted. Nails appear normal.  NEUROLOGIC: non-focal exam   MENTAL STATUS: alert and oriented, appropriate affect      ASSESSMENT:  1. Primary osteoarthritis of both knees    2. Chronic atrial fibrillation    3. Essential hypertension    4. Multiple myeloma, remission status unspecified ([facility] [address]    5. Venous insufficiency    6. Hyperthyroidism        PLAN:    1. The patient has moderate to severe osteoarthritis involving both knees. She has had this for years and it is getting progressively worse, as would be expected, particularly given her age and her persistent obesity. The most important thing she could do is lose further weight. 2. Her heart rhythm appears to be reasonably regular today. She will continue medication for her atrial fibrillation, including a DOAC. She has had no adverse effects from her atrial fibrillation. 3. BP is acceptable today, no adjustments are made. 4. She does have swelling of her lower extremities, but this is orthostatic in nature related to chronic venous insufficiency and nothing more need be done for now. 5. She will follow up with her medical oncologist for her multiple myeloma. 6. Clinically she is indeed euthyroid. Her last several TSH's were normal.    .  Orders Placed This Encounter    METABOLIC PANEL, BASIC         Follow-up and Dispositions    · Return in about 3 months (around 4/2/2020).            Marco Antonio Leon MD

## 2020-01-06 ENCOUNTER — PATIENT OUTREACH (OUTPATIENT)
Dept: INTERNAL MEDICINE CLINIC | Age: 80
End: 2020-01-06

## 2020-01-06 RX ORDER — PREDNISONE 20 MG/1
20 TABLET ORAL
COMMUNITY
Start: 2020-01-06 | End: 2020-01-10

## 2020-01-06 NOTE — PROGRESS NOTES
ACM Complex CM:  Chronic Paraxysmal A-Fib/HTN /Multiple Myeloma    Goals Addressed                 This Visit's Progress     Knowledge and adherence of prescribed medication (ie. action, side effects, missed dose, etc.). On track     1/6/2020:  Patient c/o pain under right shoulder blade;  States been for a few days; saw PCP on 1/2/2020. States warm soaks to area has not helped; has taken nothing for the pain. Consult done w/ PCP; Prednisone 5 day course called in to [facility]. Patient informed. EW.         Patient/Family verbalizes understanding of self-management of chronic disease. On track     1.  11/20/2019:  Patient still c/o pain in neck; states still taking prednisone;  Still takes pain meds whenever needed but as little as possible. Agrees to applying warm paks to neck pain. Patient reminded of [address] after hours and on weekends. Patient agrees to low Na+ diet and medication compliance in support of preventing elev BP. EW    2. Patient call back to c/o dizziness at times;  States PCP placed her on a new BP lowering med but didn't have the list in front of her to know what one. Denied weight lost.  States she does not use the BP cuff and could not provide a requested BP. Agrees to not using salt but agrees that foods may have more than needed. States she has a little swelling in lower legs. Due to c/o dizziness and unable to give BP; scheduled for PCP visit. EW    1/6/2020:  Patient c/o pain under Rt Should Blade; denied warm soaks or pain med helped. Denied swelling. Patient was seen by PCP 1/2/2020. Consult done w/ PCP; Prednisone call in done.  EW         goal f/u:  1/13/2020

## 2020-01-30 ENCOUNTER — CLINICAL SUPPORT (OUTPATIENT)
Dept: CARDIOLOGY CLINIC | Age: 80
End: 2020-01-30

## 2020-01-30 DIAGNOSIS — Z98.890 S/P AV NODAL ABLATION: ICD-10-CM

## 2020-01-30 DIAGNOSIS — Z95.0 CARDIAC PACEMAKER IN SITU: Primary | ICD-10-CM

## 2020-02-04 ENCOUNTER — OFFICE VISIT (OUTPATIENT)
Dept: CARDIOLOGY CLINIC | Age: 80
End: 2020-02-04

## 2020-02-04 VITALS
RESPIRATION RATE: 16 BRPM | WEIGHT: 214.5 LBS | HEIGHT: 70 IN | HEART RATE: 74 BPM | OXYGEN SATURATION: 97 % | DIASTOLIC BLOOD PRESSURE: 66 MMHG | BODY MASS INDEX: 30.71 KG/M2 | SYSTOLIC BLOOD PRESSURE: 112 MMHG

## 2020-02-04 DIAGNOSIS — I50.22 CHRONIC SYSTOLIC CONGESTIVE HEART FAILURE (HCC): Primary | ICD-10-CM

## 2020-02-04 DIAGNOSIS — I48.0 PAROXYSMAL ATRIAL FIBRILLATION (HCC): ICD-10-CM

## 2020-02-04 DIAGNOSIS — Z98.890 S/P AV NODAL ABLATION: ICD-10-CM

## 2020-02-04 DIAGNOSIS — Z95.0 CARDIAC PACEMAKER IN SITU: ICD-10-CM

## 2020-02-04 DIAGNOSIS — I10 ESSENTIAL HYPERTENSION: ICD-10-CM

## 2020-02-04 NOTE — PROGRESS NOTES
[address], [place], [address] [employer]  [phone]     Subjective:      Diamond Koenig is a 78 y.o. female is here for routine f/u. She has a PMHx of non-ischemic cardiomyopathy, chronic AF s/p AVN ablation with BiV PPM and HTN. Last seen by us and EP 7/19. No longer on Eliquis, started on coumadin yesterday, INR will be followed by Dr Mora Ordonez. She continues to live independently, able to do ADLs, housework with no exertional symptoms. Her wt is up 10 lbs d/t eating sweets. Has unchanged leg swelling, takes Lasix and use compression stockings at times. The patient denies chest pain/ shortness of breath, orthopnea, PND, palpitations, syncope, or presyncope.        Patient Active Problem List    Diagnosis Date Noted    Anemia 09/08/2014     Priority: 1 - One    HTN (hypertension) 09/07/2014     Priority: 3 - Three    Essential hypertension 12/21/2018    Cervical radiculopathy 05/09/2018    Hyperthyroidism 04/14/2018    Weight loss 09/21/2017    S/P cardiac cath 09/07/2017    Acute combined systolic and diastolic HF (heart failure), NYHA class 2 ([address] 08/11/2017    Status post biventricular pacemaker 08/11/2017    S/P AV kathleen ablation 08/11/2017    Cardiomyopathy ([address] 08/10/2017    Acute pulmonary embolism ([address] 08/10/2017    CHF (congestive heart failure) ([address] 08/08/2017    Sialadenitis 06/29/2017    Muscular deconditioning 04/22/2017    Back pain 04/30/2016    Primary osteoarthritis of both knees 08/24/2015    Venous insufficiency 04/07/2015    Reflux esophagitis 10/31/2014    Multiple myeloma ([address] 10/31/2014    Complex renal cyst 10/11/2014    Low back pain 10/10/2014    Chronic atrial fibrillation 09/12/2014    Osteoarthritis 09/05/2014      Frederick Torres MD  Past Medical History:   Diagnosis Date    Acute combined systolic and diastolic HF (heart failure), NYHA class 2 ([address] 8/11/2017    Arthritis     Atrial fibrillation ([address]     Cancer ([facility]     multiple myeloma    Hypertension     S/P AV kathleen ablation 8/11/2017    Status post biventricular pacemaker 8/11/2017 8/11/17       Past Surgical History:   Procedure Laterality Date    HX GYN      HX ORTHOPAEDIC      knee    HX PACEMAKER  08/11/2017     Allergies   Allergen Reactions    Codeine Other (comments)      Family History   Problem Relation Age of Onset    Stroke Mother     No Known Problems Father       Social History     Socioeconomic History    Marital status:      Spouse name: Not on file    Number of children: 3    Years of education: Not on file    Highest education level: Not on file   Occupational History    Occupation: Retired from the [place] [address] resource strain: Not hard at all   OpenBook insecurity:     Worry: Never true     Inability: Never true   [email or web address] needs:     Medical: No     Non-medical: No   Tobacco Use    Smoking status: Never Smoker    Smokeless tobacco: Never Used   Substance and Sexual Activity    Alcohol use: No    Drug use: No    Sexual activity: Not Currently   Lifestyle    Physical activity:     Days per week: 0 days     Minutes per session: 0 min    Stress: Not at all   Relationships    Social connections:     Talks on phone: More than three times a week     Gets together: More than three times a week     Attends Jain service: More than 4 times per year     Active member of club or organization: Yes     Attends meetings of clubs or organizations: More than 4 times per year     Relationship status:      Intimate partner violence:     Fear of current or ex partner: Not on file     Emotionally abused: Not on file     Physically abused: Not on file     Forced sexual activity: Not on file   Other Topics Concern     Service No    Blood Transfusions No    Caffeine Concern No    Occupational Exposure No    Hobby Hazards No    Sleep Concern No    Stress Concern No    Weight Concern Yes    Special Diet Yes     Comment: HTN/  Low Sugary    Back Care Yes    Exercise No    Bike Helmet No    Seat Belt Yes    Self-Exams Yes   Social History Narrative    Not on file      Current Outpatient Medications   Medication Sig    warfarin (COUMADIN) 5 mg tablet Take 2 tabs daily x 2 days then 1 tab daily thereafter    potassium chloride (K-DUR, KLOR-CON) 20 mEq tablet Take 1 Tab by mouth two (2) times a day.  metoprolol succinate (TOPROL-XL) 25 mg XL tablet Take 0.5 Tabs by mouth daily.  amLODIPine (NORVASC) 5 mg tablet Take 1 Tab by mouth daily.  celecoxib (CELEBREX) 200 mg capsule Take 1 capsule twice a day after meals as needed for joint pain    furosemide (LASIX) 40 mg tablet Take 1 Tab by mouth daily.  lisinopril (PRINIVIL, ZESTRIL) 40 mg tablet Take 1 Tab by mouth daily.  oxyCODONE-acetaminophen (PERCOCET)  mg per tablet Take 1 Tab by mouth every eight (8) hours as needed for Pain. Max Daily Amount: 3 Tabs.  menthol (BIOFREEZE, MENTHOL,) 4 % gel 1 g by Apply Externally route every six (6) hours.  cyclobenzaprine (FLEXERIL) 5 mg tablet Take 1 Tab by mouth three (3) times daily as needed for Muscle Spasm(s).  aluminum hydrox-magnesium carb (GAVISCON EXTRA STRENGTH) 160-105 mg chew Take 1-2 tablets by mouth four (4) times daily as needed. No current facility-administered medications for this visit. Review of Symptoms:  11 systems reviewed, negative other than as stated in the HPI    Physical ExamPhysical Exam:    There were no vitals filed for this visit. There is no height or weight on file to calculate BMI. General PE  Gen:  NAD  Mental Status - Alert. General Appearance - Not in acute distress. HEENT:  PERRL, no carotid bruits or JVD  Chest and Lung Exam   Inspection: Accessory muscles - No use of accessory muscles in breathing.    Auscultation:   Breath sounds: - Normal.   Cardiovascular   Inspection: Jugular vein - Bilateral - Inspection Normal.   Palpation/Percussion:   Apical Impulse: - Normal.   Auscultation: Rhythm - Regular. Heart Sounds - S1 WNL and S2 WNL. No S3 or S4. Murmurs & Other Heart Sounds: Auscultation of the heart reveals - No Murmurs. Peripheral Vascular   Upper Extremity: Inspection - Bilateral - No Cyanotic nailbeds or Digital clubbing. Lower Extremity:   Palpation: Edema - Bilateral - No edema. Abdomen:   Soft, non-tender, bowel sounds are active. Neuro: A&O times 3, CN and motor grossly WNL    Labs:   Lab Results   Component Value Date/Time    Cholesterol, total 156 08/11/2017 02:41 AM    Cholesterol, total 165 09/08/2014 06:50 AM    HDL Cholesterol 82 08/11/2017 02:41 AM    HDL Cholesterol 72 09/08/2014 06:50 AM    LDL, calculated 64 08/11/2017 02:41 AM    LDL, calculated 84.6 09/08/2014 06:50 AM    Triglyceride 50 08/11/2017 02:41 AM    Triglyceride 42 09/08/2014 06:50 AM    CHOL/HDL Ratio 1.9 08/11/2017 02:41 AM    CHOL/HDL Ratio 2.3 09/08/2014 06:50 AM     Lab Results   Component Value Date/Time     06/16/2018 04:07 PM     Lab Results   Component Value Date/Time    Sodium 149 (H) 01/03/2020 04:01 PM    Potassium 3.7 01/03/2020 04:01 PM    Chloride 107 (H) 01/03/2020 04:01 PM    CO2 26 01/03/2020 04:01 PM    Anion gap 8 06/16/2018 04:07 PM    Glucose 77 01/03/2020 04:01 PM    BUN 13 01/03/2020 04:01 PM    Creatinine 0.79 01/03/2020 04:01 PM    BUN/Creatinine ratio 16 01/03/2020 04:01 PM    GFR est AA 82 01/03/2020 04:01 PM    GFR est non-AA 71 01/03/2020 04:01 PM    Calcium 9.9 01/03/2020 04:01 PM    Bilirubin, total 1.1 (H) 06/16/2018 04:07 PM    AST (SGOT) 27 06/16/2018 04:07 PM    Alk. phosphatase 131 (H) 06/16/2018 04:07 PM    Protein, total 7.1 06/16/2018 04:07 PM    Albumin 3.3 (L) 06/16/2018 04:07 PM    Globulin 3.8 06/16/2018 04:07 PM    A-G Ratio 0.9 (L) 06/16/2018 04:07 PM    ALT (SGPT) 22 06/16/2018 04:07 PM       EKG:  Vpaced     Assessment:     Assessment:      1.  Chronic systolic congestive heart failure ([address]    2. S/P AV kathleen ablation    3. Essential hypertension    4. Chronic atrial fibrillation    5. Cardiac pacemaker in situ        Orders Placed This Encounter    AMB POC EKG ROUTINE W/ 12 LEADS, INTER & REP     Order Specific Question:   Reason for Exam:     Answer:   ROUTINE        Plan:     Patient presents for f/u, doing well and stable from cardiac standpoint. Last seen by us and EP 7/19. No longer on Eliquis, started on coumadin yesterday, INR will be followed by Dr Bedolla Guardiviky. She continues to live independently, able to do ADLs, housework with no exertional symptoms. Her wt is up 10 lbs d/t eating sweets. Has unchanged leg swelling, takes Lasix and use compression stockings at times.       Hx Chronic systolic congestive heart failure   Improved systolic fxn 79-30% 9/7940  Echo in 1/2018 with reduced LVEF 35-40%  Continue Lisinopril, Toprol and Lasix  Stable kidney fxn 1/2020.       PAF  S/p AVN ablation with BiV PPM  EP 7/19: EKG show ventricular pacing. Her device interrogation demonstrates normal functioning with BIV pacing. Nine years remaining on battery  Continue with BB  Previously on eliquis, now on coumadin, INR will be followed by PCP     HTN  Controlled with current therapy    Multiple Myeloma, in remission  Followed by Dr Fei Rausch current care and f/u in 6 months. Ethel Rolon NP       [facility]    2/4/2020         Patient seen, examined by me personally. Plan discussed as detailed. Agree with note as outlined by  NP. I confirm findings in history and physical exam. No additional findings noted. Agree with plan as outlined above.      Emilee De Santiago MD

## 2020-02-04 NOTE — PROGRESS NOTES
1. Have you been to the ER, urgent care clinic since your last visit? Hospitalized since your last visit? NO    2. Have you seen or consulted any other health care providers outside of the [address] [place] [address] since your last visit? Include any pap smears or colon screening. NO    6 MONTH FOLLOW UP. C/O SLIGHT SWELLING IN LEFT LEG.

## 2020-02-07 ENCOUNTER — CLINICAL SUPPORT (OUTPATIENT)
Dept: INTERNAL MEDICINE CLINIC | Age: 80
End: 2020-02-07

## 2020-02-07 DIAGNOSIS — I26.99 ACUTE PULMONARY EMBOLISM, UNSPECIFIED PULMONARY EMBOLISM TYPE, UNSPECIFIED WHETHER ACUTE COR PULMONALE PRESENT (HCC): Primary | ICD-10-CM

## 2020-02-07 LAB
INR BLD: 1.7
PT POC: 20.4 SECONDS
VALID INTERNAL CONTROL?: YES

## 2020-02-07 NOTE — PROGRESS NOTES
Chief Complaint   Patient presents with    Coagulation disorder     Patient in office for pt/inr check. Patient states that she is taking Coumadin 5 mg daily. Results for orders placed or performed in visit on 02/07/20   AMB POC PT/INR   Result Value Ref Range    VALID INTERNAL CONTROL POC Yes     Prothrombin time (POC) 20.4 seconds    INR POC 1.7      Per Dr. Bedolla Guardiviky, he advises patient to add 1/2 tab on Sat and Sun, and return to office in one week for pt/inr check.

## 2020-02-12 ENCOUNTER — APPOINTMENT (OUTPATIENT)
Dept: CT IMAGING | Age: 80
End: 2020-02-12
Attending: EMERGENCY MEDICINE
Payer: MEDICARE

## 2020-02-12 ENCOUNTER — HOSPITAL ENCOUNTER (EMERGENCY)
Age: 80
Discharge: HOME | End: 2020-02-12
Attending: EMERGENCY MEDICINE
Payer: MEDICARE

## 2020-02-12 VITALS
OXYGEN SATURATION: 97 % | RESPIRATION RATE: 17 BRPM | HEART RATE: 78 BPM | SYSTOLIC BLOOD PRESSURE: 163 MMHG | BODY MASS INDEX: 30.06 KG/M2 | TEMPERATURE: 97.8 F | WEIGHT: 210 LBS | DIASTOLIC BLOOD PRESSURE: 111 MMHG | HEIGHT: 70 IN

## 2020-02-12 DIAGNOSIS — R42 VERTIGO: Primary | ICD-10-CM

## 2020-02-12 DIAGNOSIS — I10 ESSENTIAL HYPERTENSION: ICD-10-CM

## 2020-02-12 DIAGNOSIS — H65.92 MIDDLE EAR EFFUSION, LEFT: ICD-10-CM

## 2020-02-12 DIAGNOSIS — E87.6 HYPOKALEMIA: ICD-10-CM

## 2020-02-12 DIAGNOSIS — I16.0 HYPERTENSIVE URGENCY: ICD-10-CM

## 2020-02-12 LAB
ALBUMIN SERPL-MCNC: 3.8 G/DL (ref 3.5–5)
ALBUMIN/GLOB SERPL: 1 {RATIO} (ref 1.1–2.2)
ALP SERPL-CCNC: 112 U/L (ref 45–117)
ALT SERPL-CCNC: 21 U/L (ref 12–78)
ANION GAP SERPL CALC-SCNC: 5 MMOL/L (ref 5–15)
APPEARANCE UR: CLEAR
AST SERPL-CCNC: 20 U/L (ref 15–37)
BACTERIA URNS QL MICRO: ABNORMAL /HPF
BASOPHILS # BLD: 0 K/UL (ref 0–0.1)
BASOPHILS NFR BLD: 0 % (ref 0–1)
BILIRUB SERPL-MCNC: 0.4 MG/DL (ref 0.2–1)
BILIRUB UR QL: NEGATIVE
BUN SERPL-MCNC: 16 MG/DL (ref 6–20)
BUN/CREAT SERPL: 15 (ref 12–20)
CALCIUM SERPL-MCNC: 9.4 MG/DL (ref 8.5–10.1)
CHLORIDE SERPL-SCNC: 104 MMOL/L (ref 97–108)
CO2 SERPL-SCNC: 33 MMOL/L (ref 21–32)
COLOR UR: ABNORMAL
CREAT SERPL-MCNC: 1.04 MG/DL (ref 0.55–1.02)
DIFFERENTIAL METHOD BLD: ABNORMAL
EOSINOPHIL # BLD: 0.1 K/UL (ref 0–0.4)
EOSINOPHIL NFR BLD: 1 % (ref 0–7)
EPITH CASTS URNS QL MICRO: ABNORMAL /LPF
ERYTHROCYTE [DISTWIDTH] IN BLOOD BY AUTOMATED COUNT: 14.4 % (ref 11.5–14.5)
GLOBULIN SER CALC-MCNC: 4 G/DL (ref 2–4)
GLUCOSE SERPL-MCNC: 143 MG/DL (ref 65–100)
GLUCOSE UR STRIP.AUTO-MCNC: NEGATIVE MG/DL
HCT VFR BLD AUTO: 41.2 % (ref 35–47)
HGB BLD-MCNC: 12.5 G/DL (ref 11.5–16)
HGB UR QL STRIP: NEGATIVE
HYALINE CASTS URNS QL MICRO: ABNORMAL /LPF (ref 0–5)
IMM GRANULOCYTES # BLD AUTO: 0 K/UL (ref 0–0.04)
IMM GRANULOCYTES NFR BLD AUTO: 0 % (ref 0–0.5)
KETONES UR QL STRIP.AUTO: NEGATIVE MG/DL
LEUKOCYTE ESTERASE UR QL STRIP.AUTO: NEGATIVE
LYMPHOCYTES # BLD: 0.7 K/UL (ref 0.8–3.5)
LYMPHOCYTES NFR BLD: 13 % (ref 12–49)
MCH RBC QN AUTO: 26.6 PG (ref 26–34)
MCHC RBC AUTO-ENTMCNC: 30.3 G/DL (ref 30–36.5)
MCV RBC AUTO: 87.7 FL (ref 80–99)
MONOCYTES # BLD: 0.6 K/UL (ref 0–1)
MONOCYTES NFR BLD: 10 % (ref 5–13)
NEUTS SEG # BLD: 4.2 K/UL (ref 1.8–8)
NEUTS SEG NFR BLD: 76 % (ref 32–75)
NITRITE UR QL STRIP.AUTO: NEGATIVE
NRBC # BLD: 0 K/UL (ref 0–0.01)
NRBC BLD-RTO: 0 PER 100 WBC
PH UR STRIP: 6.5 [PH] (ref 5–8)
PLATELET # BLD AUTO: 188 K/UL (ref 150–400)
PMV BLD AUTO: 10.6 FL (ref 8.9–12.9)
POTASSIUM SERPL-SCNC: 3.3 MMOL/L (ref 3.5–5.1)
PROT SERPL-MCNC: 7.8 G/DL (ref 6.4–8.2)
PROT UR STRIP-MCNC: NEGATIVE MG/DL
RBC # BLD AUTO: 4.7 M/UL (ref 3.8–5.2)
RBC #/AREA URNS HPF: ABNORMAL /HPF (ref 0–5)
RBC MORPH BLD: ABNORMAL
SODIUM SERPL-SCNC: 142 MMOL/L (ref 136–145)
SP GR UR REFRACTOMETRY: 1.01 (ref 1–1.03)
UA: UC IF INDICATED,UAUC: ABNORMAL
UROBILINOGEN UR QL STRIP.AUTO: 0.2 EU/DL (ref 0.2–1)
WBC # BLD AUTO: 5.6 K/UL (ref 3.6–11)
WBC URNS QL MICRO: ABNORMAL /HPF (ref 0–4)

## 2020-02-12 PROCEDURE — 81001 URINALYSIS AUTO W/SCOPE: CPT

## 2020-02-12 PROCEDURE — 70450 CT HEAD/BRAIN W/O DYE: CPT

## 2020-02-12 PROCEDURE — 87077 CULTURE AEROBIC IDENTIFY: CPT

## 2020-02-12 PROCEDURE — 36415 COLL VENOUS BLD VENIPUNCTURE: CPT

## 2020-02-12 PROCEDURE — 99285 EMERGENCY DEPT VISIT HI MDM: CPT

## 2020-02-12 PROCEDURE — 85025 COMPLETE CBC W/AUTO DIFF WBC: CPT

## 2020-02-12 PROCEDURE — 87186 SC STD MICRODIL/AGAR DIL: CPT

## 2020-02-12 PROCEDURE — 87086 URINE CULTURE/COLONY COUNT: CPT

## 2020-02-12 PROCEDURE — 74011250637 HC RX REV CODE- 250/637: Performed by: EMERGENCY MEDICINE

## 2020-02-12 PROCEDURE — 74011250636 HC RX REV CODE- 250/636: Performed by: EMERGENCY MEDICINE

## 2020-02-12 PROCEDURE — 80053 COMPREHEN METABOLIC PANEL: CPT

## 2020-02-12 RX ORDER — MECLIZINE HCL 12.5 MG 12.5 MG/1
25 TABLET ORAL
Status: COMPLETED | OUTPATIENT
Start: 2020-02-12 | End: 2020-02-12

## 2020-02-12 RX ORDER — AMLODIPINE BESYLATE 10 MG/1
10 TABLET ORAL DAILY
Qty: 30 TAB | Refills: 0 | Status: SHIPPED | OUTPATIENT
Start: 2020-02-12 | End: 2020-03-17 | Stop reason: SDUPTHER

## 2020-02-12 RX ORDER — METOPROLOL TARTRATE 25 MG/1
25 TABLET, FILM COATED ORAL
Status: COMPLETED | OUTPATIENT
Start: 2020-02-12 | End: 2020-02-12

## 2020-02-12 RX ORDER — POTASSIUM CHLORIDE 750 MG/1
40 TABLET, FILM COATED, EXTENDED RELEASE ORAL
Status: COMPLETED | OUTPATIENT
Start: 2020-02-12 | End: 2020-02-12

## 2020-02-12 RX ADMIN — METOPROLOL TARTRATE 25 MG: 25 TABLET ORAL at 22:03

## 2020-02-12 RX ADMIN — MECLIZINE 25 MG: 12.5 TABLET ORAL at 22:03

## 2020-02-12 RX ADMIN — POTASSIUM CHLORIDE 40 MEQ: 750 TABLET, FILM COATED, EXTENDED RELEASE ORAL at 22:03

## 2020-02-13 NOTE — ED PROVIDER NOTES
EMERGENCY DEPARTMENT HISTORY AND PHYSICAL EXAM      Date: 2/12/2020  Patient Name: Nadiya Ingram    History of Presenting Illness     Chief Complaint   Patient presents with    Dizziness     hx of vertigo, was taking antivert with relief for ten days, hasn't taken it in a few days and dizziness is back. pt reports dizziness and near syncope when standing       History Provided By: Patient    HPI: Nadiya Ingram, 78 y.o. female  presents to the ED with cc of dizziness and vertigo. Patient states that she has had the symptoms off and on intermittently for several she took meclizine for 10 days which seemed to help prevent it stop it but now it has come back after she stopped taking the medicines. She also has a fullness in her left ear. There is been decreased hearing in the left ear. No sinus congestion. No fevers. Occasional headache. No chest pain or shortness of breath. No nausea or vomiting. Patient states that she feels it most when she is standing up and walking. She will also occasionally feel it when she sitting still or lying down. It does not make it worse to move her head back and forth left or right. She denies any change in speech. No numbness in her face or extremities. No weakness in her extremities. There are no other complaints, changes, or physical findings at this time. PCP: Gilbert Suazo MD    No current facility-administered medications on file prior to encounter. Current Outpatient Medications on File Prior to Encounter   Medication Sig Dispense Refill    warfarin (COUMADIN) 5 mg tablet Take 2 tabs daily x 2 days then 1 tab daily thereafter 32 Tab 11    potassium chloride (K-DUR, KLOR-CON) 20 mEq tablet Take 1 Tab by mouth two (2) times a day. 60 Tab 11    metoprolol succinate (TOPROL-XL) 25 mg XL tablet Take 0.5 Tabs by mouth daily.  30 Tab 11    celecoxib (CELEBREX) 200 mg capsule Take 1 capsule twice a day after meals as needed for joint pain 60 Cap 3    [DISCONTINUED] amLODIPine (NORVASC) 5 mg tablet Take 1 Tab by mouth daily. 30 Tab 11    furosemide (LASIX) 40 mg tablet Take 1 Tab by mouth daily. 30 Tab 11    lisinopril (PRINIVIL, ZESTRIL) 40 mg tablet Take 1 Tab by mouth daily. 90 Tab 3    oxyCODONE-acetaminophen (PERCOCET)  mg per tablet Take 1 Tab by mouth every eight (8) hours as needed for Pain. Max Daily Amount: 3 Tabs. 40 Tab 0    menthol (BIOFREEZE, MENTHOL,) 4 % gel 1 g by Apply Externally route every six (6) hours. 118 mL 0    cyclobenzaprine (FLEXERIL) 5 mg tablet Take 1 Tab by mouth three (3) times daily as needed for Muscle Spasm(s). 15 Tab 0    aluminum hydrox-magnesium carb (GAVISCON EXTRA STRENGTH) 160-105 mg chew Take 1-2 tablets by mouth four (4) times daily as needed. 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), NYHA class 2 ([address] 8/11/2017    Arthritis     Atrial fibrillation (HCC)     Cancer (HCC)     multiple myeloma    Hypertension     S/P AV kathleen ablation 8/11/2017    Status post biventricular pacemaker 8/11/2017 8/11/17        Past Surgical History:  Past Surgical History:   Procedure Laterality Date    HX GYN      HX ORTHOPAEDIC      knee    HX PACEMAKER  08/11/2017       Family History:  Family History   Problem Relation Age of Onset    Stroke Mother     No Known Problems Father        Social History:  Social History     Tobacco Use    Smoking status: Never Smoker    Smokeless tobacco: Never Used   Substance Use Topics    Alcohol use: No    Drug use: No       Allergies: Allergies   Allergen Reactions    Codeine Other (comments)         Review of Systems   Review of Systems   Constitutional: Negative for chills and fever. HENT: Positive for hearing loss. Negative for congestion, ear pain, rhinorrhea, sore throat and trouble swallowing. Eyes: Negative for visual disturbance.    Respiratory: Negative for cough, chest tightness and shortness of breath. Cardiovascular: Negative for chest pain and palpitations. Gastrointestinal: Negative for abdominal pain, blood in stool, constipation, diarrhea, nausea and vomiting. Genitourinary: Negative for decreased urine volume, difficulty urinating, dysuria and frequency. Musculoskeletal: Negative for back pain and neck pain. Skin: Negative for color change and rash. Neurological: Positive for dizziness, light-headedness and headaches. Negative for speech difficulty and weakness. Physical Exam   Physical Exam  Vitals signs and nursing note reviewed. Constitutional:       General: She is not in acute distress. Appearance: She is well-developed. She is not ill-appearing. HENT:      Left Ear: A middle ear effusion is present. Eyes:      Conjunctiva/sclera: Conjunctivae normal.   Neck:      Musculoskeletal: Neck supple. Cardiovascular:      Rate and Rhythm: Normal rate and regular rhythm. Pulmonary:      Effort: Pulmonary effort is normal. No accessory muscle usage or respiratory distress. Breath sounds: Normal breath sounds. Abdominal:      General: There is no distension. Palpations: Abdomen is soft. Tenderness: There is no abdominal tenderness. Lymphadenopathy:      Cervical: No cervical adenopathy. Skin:     General: Skin is warm and dry. Neurological:      Mental Status: She is alert and oriented to person, place, and time. Cranial Nerves: No cranial nerve deficit. Sensory: No sensory deficit.          Diagnostic Study Results     Labs -     Recent Results (from the past 24 hour(s))   CBC WITH AUTOMATED DIFF    Collection Time: 02/12/20  7:01 PM   Result Value Ref Range    WBC 5.6 3.6 - 11.0 K/uL    RBC 4.70 3.80 - 5.20 M/uL    HGB 12.5 11.5 - 16.0 g/dL    HCT 41.2 35.0 - 47.0 %    MCV 87.7 80.0 - 99.0 FL    MCH 26.6 26.0 - 34.0 PG    MCHC 30.3 30.0 - 36.5 g/dL    RDW 14.4 11.5 - 14.5 %    PLATELET 516 247 - 400 K/uL    MPV 10.6 8.9 - 12.9 FL    NRBC 0.0 0  WBC    ABSOLUTE NRBC 0.00 0.00 - 0.01 K/uL    NEUTROPHILS 76 (H) 32 - 75 %    LYMPHOCYTES 13 12 - 49 %    MONOCYTES 10 5 - 13 %    EOSINOPHILS 1 0 - 7 %    BASOPHILS 0 0 - 1 %    IMMATURE GRANULOCYTES 0 0.0 - 0.5 %    ABS. NEUTROPHILS 4.2 1.8 - 8.0 K/UL    ABS. LYMPHOCYTES 0.7 (L) 0.8 - 3.5 K/UL    ABS. MONOCYTES 0.6 0.0 - 1.0 K/UL    ABS. EOSINOPHILS 0.1 0.0 - 0.4 K/UL    ABS. BASOPHILS 0.0 0.0 - 0.1 K/UL    ABS. IMM. GRANS. 0.0 0.00 - 0.04 K/UL    DF AUTOMATED      RBC COMMENTS OVALOCYTES  1+       METABOLIC PANEL, COMPREHENSIVE    Collection Time: 02/12/20  7:01 PM   Result Value Ref Range    Sodium 142 136 - 145 mmol/L    Potassium 3.3 (L) 3.5 - 5.1 mmol/L    Chloride 104 97 - 108 mmol/L    CO2 33 (H) 21 - 32 mmol/L    Anion gap 5 5 - 15 mmol/L    Glucose 143 (H) 65 - 100 mg/dL    BUN 16 6 - 20 MG/DL    Creatinine 1.04 (H) 0.55 - 1.02 MG/DL    BUN/Creatinine ratio 15 12 - 20      GFR est AA >60 >60 ml/min/1.73m2    GFR est non-AA 51 (L) >60 ml/min/1.73m2    Calcium 9.4 8.5 - 10.1 MG/DL    Bilirubin, total 0.4 0.2 - 1.0 MG/DL    ALT (SGPT) 21 12 - 78 U/L    AST (SGOT) 20 15 - 37 U/L    Alk.  phosphatase 112 45 - 117 U/L    Protein, total 7.8 6.4 - 8.2 g/dL    Albumin 3.8 3.5 - 5.0 g/dL    Globulin 4.0 2.0 - 4.0 g/dL    A-G Ratio 1.0 (L) 1.1 - 2.2     URINALYSIS W/ REFLEX CULTURE    Collection Time: 02/12/20  7:01 PM   Result Value Ref Range    Color YELLOW/STRAW      Appearance CLEAR CLEAR      Specific gravity 1.013 1.003 - 1.030      pH (UA) 6.5 5.0 - 8.0      Protein NEGATIVE  NEG mg/dL    Glucose NEGATIVE  NEG mg/dL    Ketone NEGATIVE  NEG mg/dL    Bilirubin NEGATIVE  NEG      Blood NEGATIVE  NEG      Urobilinogen 0.2 0.2 - 1.0 EU/dL    Nitrites NEGATIVE  NEG      Leukocyte Esterase NEGATIVE  NEG      WBC 0-4 0 - 4 /hpf    RBC 0-5 0 - 5 /hpf    Epithelial cells FEW FEW /lpf    Bacteria 2+ (A) NEG /hpf    UA:UC IF INDICATED URINE CULTURE ORDERED Hyaline cast 0-2 0 - 5 /lpf       Radiologic Studies -   CT HEAD WO CONT   Final Result   IMPRESSION: No acute intracranial hemorrhage, mass or infarct. Stable pattern of   mild atrophy and chronic white matter change most compatible with small vessel   ischemic and/or senescent change. CT Results  (Last 48 hours)               02/12/20 2040  CT HEAD WO CONT Final result    Impression:  IMPRESSION: No acute intracranial hemorrhage, mass or infarct. Stable pattern of   mild atrophy and chronic white matter change most compatible with small vessel   ischemic and/or senescent change. Narrative:  EXAM: CT HEAD WO CONT       INDICATION: dizziness       COMPARISON: CT brain 5/8/2019. Ashley Montalvo CONTRAST: None. TECHNIQUE: Unenhanced CT of the head was performed using 5 mm images. Brain and   bone windows were generated. CT dose reduction was achieved through use of a   standardized protocol tailored for this examination and automatic exposure   control for dose modulation. FINDINGS: There is no acute intracranial hemorrhage, mass, mass effect or   herniation. Ventricles and sulci show no significant change in mild   proportionate and symmetric prominence. There is no significant change in   pattern of mild periventricular white matter hypodensity. The gray-white matter   differentiation is well-preserved. The mastoid air cells are well pneumatized. The visualized paranasal sinuses are normal.               CXR Results  (Last 48 hours)    None            Medical Decision Making   I am the first provider for this patient. I reviewed the vital signs, available nursing notes, past medical history, past surgical history, family history and social history. Vital Signs-Reviewed the patient's vital signs.   Patient Vitals for the past 24 hrs:   Temp Pulse Resp BP SpO2   02/12/20 2138 -- 75 18 (!) 188/119 92 %   02/12/20 2133 -- 77 15 (!) 177/123 94 %   02/12/20 2129 -- 76 17 164/78 97 %   02/12/20 1804 97.8 °F (36.6 °C) 74 18 (!) 172/95 100 %         Records Reviewed: Nursing Notes, Old Medical Records, Previous Radiology Studies and Previous Laboratory Studies    Provider Notes (Medical Decision Making):   Patient presents with persistent intermittent vertigo symptoms. She has been having some ear complaints and fullness in her left ear. It does appear to be a middle ear effusion. This really is contributing to her vertigo. Also has been having some high blood pressure readings here in the ER which uncontrolled hypertension reviewing her medications she certainly has room to go up on her amlodipine and metoprolol. I will first recommend that she increase her amlodipine to 10 mg daily. Follow-up with primary care in a week. Also refer her to ear nose and throat and she may continue meclizine as needed. CT of her head does not show any intracranial hemorrhage or signs of stroke. She does not appear to have a UTI. Her potassium levels is minimally low at 3.3. ED Course:   Initial assessment performed. The patients presenting problems have been discussed, and they are in agreement with the care plan formulated and outlined with them. I have encouraged them to ask questions as they arise throughout their visit. Orders Placed This Encounter    CULTURE, URINE    CT HEAD WO CONT    CBC WITH AUTOMATED DIFF    METABOLIC PANEL, COMPREHENSIVE    URINALYSIS W/ REFLEX CULTURE    B/P LYING/SIT/STANDING    INSERT PERIPHERAL IV ONE TIME STAT    meclizine (ANTIVERT) tablet 25 mg    potassium chloride SR (KLOR-CON 10) tablet 40 mEq    metoprolol tartrate (LOPRESSOR) tablet 25 mg    amLODIPine (NORVASC) 10 mg tablet       Procedures      Critical Care Time:   0    Disposition:  Discharge  9:55 PM    The patient's emergency department evaluation is now complete. I have reviewed all labs, imaging, and pertinent information. I have discussed all results with the patient and/or family.   Based on our evaluation today I do believe that the patient is safe to be discharged home. The patient has been provided with at home instructions that are pertinent to their complaint today, although these may not be specific to the exact diagnosis. I have reviewed the patient's home medications and attempted to reconcile if not already done so by pharmacy or nursing staff. I have discussed all new prescriptions with the patient. The patient has been encouraged to follow-up with primary care doctor and/or specialist, and these have been discussed with the patient. The patient has been advised that they may return to the emergency department if they have any worsening symptoms and or new symptoms that are of concern to them. Verbal discharge instructions may have also been provided to the patient that may not be specifically contained in the written discharge instructions. The patient has been given opportunity to ask questions prior to discharge. PLAN:  1. Current Discharge Medication List      CONTINUE these medications which have CHANGED    Details   amLODIPine (NORVASC) 10 mg tablet Take 1 Tab by mouth daily. Qty: 30 Tab, Refills: 0    Associated Diagnoses: Essential hypertension           2. Follow-up Information     Follow up With Specialties Details Why Contact Info    Lorena Lazo MD Internal Medicine Schedule an appointment as soon as possible for a visit in 1 week  [place] [address]      Darrius Storey MD Otolaryngology Schedule an appointment as soon as possible for a visit  [facility] [address]  [address]  [address] [phone]          Return to ED if worse     Diagnosis     Clinical Impression:   1. Vertigo    2. Hypertensive urgency    3. Middle ear effusion, left    4. Hypokalemia    5. Essential hypertension            This note will not be viewable in MyChart.

## 2020-02-13 NOTE — ED NOTES
Kelli VELASCO reviewed discharge instructions with the patient and daughter. The patient verbalized understanding. All questions and concerns were addressed. The patient is discharged via wheelchair in the care of family members with instructions and prescriptions called in. Pt is alert and oriented x 4. Respirations are clear and unlabored.

## 2020-02-14 ENCOUNTER — OFFICE VISIT (OUTPATIENT)
Dept: INTERNAL MEDICINE CLINIC | Age: 80
End: 2020-02-14

## 2020-02-14 VITALS
BODY MASS INDEX: 30.75 KG/M2 | RESPIRATION RATE: 16 BRPM | HEART RATE: 74 BPM | OXYGEN SATURATION: 97 % | DIASTOLIC BLOOD PRESSURE: 80 MMHG | HEIGHT: 70 IN | TEMPERATURE: 98.3 F | WEIGHT: 214.8 LBS | SYSTOLIC BLOOD PRESSURE: 116 MMHG

## 2020-02-14 DIAGNOSIS — I10 ESSENTIAL HYPERTENSION: ICD-10-CM

## 2020-02-14 DIAGNOSIS — M17.0 PRIMARY OSTEOARTHRITIS OF BOTH KNEES: ICD-10-CM

## 2020-02-14 DIAGNOSIS — I42.8 OTHER CARDIOMYOPATHY (HCC): ICD-10-CM

## 2020-02-14 DIAGNOSIS — I48.20 CHRONIC ATRIAL FIBRILLATION (HCC): Chronic | ICD-10-CM

## 2020-02-14 DIAGNOSIS — H81.10 BENIGN PAROXYSMAL POSITIONAL VERTIGO, UNSPECIFIED LATERALITY: Primary | ICD-10-CM

## 2020-02-14 DIAGNOSIS — E66.9 OBESITY (BMI 30.0-34.9): ICD-10-CM

## 2020-02-14 LAB
INR BLD: 1.6
PT POC: 19.2 SECONDS
VALID INTERNAL CONTROL?: YES

## 2020-02-14 NOTE — PROGRESS NOTES
Chief Complaint   Patient presents with   Echo ED Follow-up     Patient seen at [facility] on 2/12/20 for dizziness. 1. Have you been to the ER, urgent care clinic since your last visit? Hospitalized since your last visit? Yes When: 2/12/20 Where: [facility] Reason for visit: dizziness    2. Have you seen or consulted any other health care providers outside of the [address] [place] [address] since your last visit? Include any pap smears or colon screening. No    Results for orders placed or performed in visit on 02/14/20   AMB POC PT/INR   Result Value Ref Range    VALID INTERNAL CONTROL POC Yes     Prothrombin time (POC) 19.2 seconds    INR POC 1.6      Patient states that she is taking 5 mg of Coumadin daily.

## 2020-02-14 NOTE — PROGRESS NOTES
[address] [place] [address] and Primary Care  [place] [address]  [address] [place] [address]  Phone:  [phone]  Fax: [phone]       Chief Complaint   Patient presents with   [facility] ED Follow-up     Patient seen at [facility] on 2/12/20 for dizziness. .      SUBJECTIVE:    Kalli Glass is a 78 y.o. female Comes in for return visit complaining of a three day history of dizziness described as a spinning sensation. She went to the ER and peripheral vertigo was confirmed. She is now back on Meclizine 12.5 mg t.i.d. and it is actually better. At the same time she had a presumed decrease in hearing in the left ear, but this is actually getting better currently. It was suggested she see an ENT physician. Her gait remains a bit unsteady, primarily because of progressive osteoarthritis of her knees. Unfortunately, she continues to gain weight. She has a past history of primary hypertension, chronic atrial fibrillation. Current Outpatient Medications   Medication Sig Dispense Refill    amLODIPine (NORVASC) 10 mg tablet Take 1 Tab by mouth daily. 30 Tab 0    warfarin (COUMADIN) 5 mg tablet Take 2 tabs daily x 2 days then 1 tab daily thereafter 32 Tab 11    potassium chloride (K-DUR, KLOR-CON) 20 mEq tablet Take 1 Tab by mouth two (2) times a day. 60 Tab 11    metoprolol succinate (TOPROL-XL) 25 mg XL tablet Take 0.5 Tabs by mouth daily. 30 Tab 11    celecoxib (CELEBREX) 200 mg capsule Take 1 capsule twice a day after meals as needed for joint pain 60 Cap 3    furosemide (LASIX) 40 mg tablet Take 1 Tab by mouth daily. 30 Tab 11    lisinopril (PRINIVIL, ZESTRIL) 40 mg tablet Take 1 Tab by mouth daily. 90 Tab 3    oxyCODONE-acetaminophen (PERCOCET)  mg per tablet Take 1 Tab by mouth every eight (8) hours as needed for Pain. Max Daily Amount: 3 Tabs. 40 Tab 0    menthol (BIOFREEZE, MENTHOL,) 4 % gel 1 g by Apply Externally route every six (6) hours.  118 mL 0    cyclobenzaprine (FLEXERIL) 5 mg tablet Take 1 Tab by mouth three (3) times daily as needed for Muscle Spasm(s). 15 Tab 0    aluminum hydrox-magnesium carb (GAVISCON EXTRA STRENGTH) 160-105 mg chew Take 1-2 tablets by mouth four (4) times daily as needed.        Past Medical History:   Diagnosis Date    Acute combined systolic and diastolic HF (heart failure), NYHA class 2 ([address] 8/11/2017    Arthritis     Atrial fibrillation (HCC)     Cancer (HCC)     multiple myeloma    Hypertension     S/P AV kathleen ablation 8/11/2017    Status post biventricular pacemaker 8/11/2017 8/11/17      Past Surgical History:   Procedure Laterality Date    HX GYN      HX ORTHOPAEDIC      knee    HX PACEMAKER  08/11/2017     Allergies   Allergen Reactions    Codeine Other (comments)         REVIEW OF SYSTEMS:  General: negative for - chills or fever  ENT: negative for - headaches, nasal congestion or tinnitus  Respiratory: negative for - cough, hemoptysis, shortness of breath or wheezing  Cardiovascular : negative for - chest pain, edema, palpitations or shortness of breath  Gastrointestinal: negative for - abdominal pain, blood in stools, heartburn or nausea/vomiting  Genito-Urinary: no dysuria, trouble voiding, or hematuria  Musculoskeletal: negative for - gait disturbance, joint pain, joint stiffness or joint swelling  Neurological: no TIA or stroke symptoms  Hematologic: no bruises, no bleeding, no swollen glands  Integument: no lumps, mole changes, nail changes or rash  Endocrine: no malaise/lethargy or unexpected weight changes      Social History     Socioeconomic History    Marital status:      Spouse name: Not on file    Number of children: 3    Years of education: Not on file    Highest education level: Not on file   Occupational History    Occupation: Retired from the [address] [employer] resource strain: Not hard at all   [employer] insecurity:     Worry: Never true     Inability: Never true   [place] [employer] needs:     Medical: No     Non-medical: No   Tobacco Use    Smoking status: Never Smoker    Smokeless tobacco: Never Used   Substance and Sexual Activity    Alcohol use: No    Drug use: No    Sexual activity: Not Currently   Lifestyle    Physical activity:     Days per week: 0 days     Minutes per session: 0 min    Stress: Not at all   Relationships    Social connections:     Talks on phone: More than three times a week     Gets together: More than three times a week     Attends Orthodox service: More than 4 times per year     Active member of club or organization: Yes     Attends meetings of clubs or organizations: More than 4 times per year     Relationship status:    Other Topics Concern     Service No    Blood Transfusions No    Caffeine Concern No    Occupational Exposure No    Hobby Hazards No    Sleep Concern No    Stress Concern No    Weight Concern Yes    Special Diet Yes     Comment: HTN/  Low Sugary    Back Care Yes    Exercise No    Bike Helmet No    Seat Belt Yes    Self-Exams Yes     Family History   Problem Relation Age of Onset    Stroke Mother     No Known Problems Father        OBJECTIVE:    Visit Vitals  /80   Pulse 74   Temp 98.3 °F (36.8 °C) (Oral)   Resp 16   Ht 5' 10\" (1.778 m)   Wt 214 lb 12.8 oz (97.4 kg)   SpO2 97%   BMI 30.82 kg/m²     CONSTITUTIONAL: well , well nourished, appears age appropriate  EYES: perrla, eom intact  ENMT:moist mucous membranes, pharynx clear  NECK: supple. Thyroid normal  RESPIRATORY: Chest: clear to ascultation and percussion   CARDIOVASCULAR: Heart: regular rate and rhythm  GASTROINTESTINAL: Abdomen: soft, bowel sounds active  HEMATOLOGIC: no pathological lymph nodes palpated  MUSCULOSKELETAL: Extremities: no edema, pulse 1+   INTEGUMENT: No unusual rashes or suspicious skin lesions noted.  Nails appear normal.  NEUROLOGIC: non-focal exam   MENTAL STATUS: alert and oriented, appropriate affect      ASSESSMENT:  1. Benign paroxysmal positional vertigo, unspecified laterality    2. Essential hypertension    3. Primary osteoarthritis of both knees    4. Chronic atrial fibrillation    5. Other cardiomyopathy ([address]    6. Obesity (BMI 30.0-34. 9)        PLAN:    1. Ventricular rate is regular today. She does have a history of paroxysmal a-fib. She remains on Warfarin. INR today is 1.6. She will therefore increase her Warfarin to 7.5 mg Saturday and Sunday and 5 mg all other days. She will return to the office in two weeks for repeat INR. 2. There are no overt signs of CHF, she is well compensated. She does have a nonischemic cardiomyopathy. 3. BP is excellent, no adjustments are made. 4. She has rather moderate to severe osteoarthritis involving both knees. The most important thing she can do is minimize weight gain and more importantly lose weight. I remind her of this each time she comes in because the more weight she gains the worse her knees will get. 5. As far as her vertigo is concerned, I do not think anything more need be done. I would suggest waiting for another week before she goes to see the ENT physician regarding the slight reduction in hearing in the left ear. Obviously if this persists she will have to be seen. 6. I again encouraged weight reduction. This can occur via eating meals, eliminating snacks and avoiding the consumption of processed carbohydrates. .  Orders Placed This Encounter    REFERRAL TO ENT-OTOLARYNGOLOGY    AMB POC PT/INR         Follow-up and Dispositions    · Return in about 2 months (around 4/14/2020), or RTO INR in 2 weeks.            Kenna Anderson MD

## 2020-02-15 LAB
BACTERIA SPEC CULT: ABNORMAL
CC UR VC: ABNORMAL
SERVICE CMNT-IMP: ABNORMAL

## 2020-02-17 RX ORDER — CEPHALEXIN 500 MG/1
500 CAPSULE ORAL 3 TIMES DAILY
Qty: 15 CAP | Refills: 0 | Status: SHIPPED | OUTPATIENT
Start: 2020-02-17 | End: 2021-04-02

## 2020-02-17 NOTE — PROGRESS NOTES
Called patient. Verified demographics. Informed patient of UC result. She notes that she is not currently taking Abx. Efiled Keflex Rx to the pharmacy.

## 2020-02-19 ENCOUNTER — PATIENT OUTREACH (OUTPATIENT)
Dept: INTERNAL MEDICINE CLINIC | Age: 80
End: 2020-02-19

## 2020-03-17 DIAGNOSIS — I10 ESSENTIAL HYPERTENSION: ICD-10-CM

## 2020-03-17 RX ORDER — AMLODIPINE BESYLATE 10 MG/1
10 TABLET ORAL DAILY
Qty: 30 TAB | Refills: 11 | Status: SHIPPED | OUTPATIENT
Start: 2020-03-17 | End: 2021-03-15

## 2020-03-17 NOTE — TELEPHONE ENCOUNTER
Patient wants to know if she should be taking 10mg  All the time since she was started on 10mg from a hospital.

## 2020-04-20 ENCOUNTER — OFFICE VISIT (OUTPATIENT)
Dept: INTERNAL MEDICINE CLINIC | Age: 80
End: 2020-04-20

## 2020-04-20 VITALS
TEMPERATURE: 97.8 F | SYSTOLIC BLOOD PRESSURE: 123 MMHG | HEIGHT: 70 IN | WEIGHT: 218.1 LBS | HEART RATE: 75 BPM | BODY MASS INDEX: 31.22 KG/M2 | DIASTOLIC BLOOD PRESSURE: 84 MMHG | RESPIRATION RATE: 16 BRPM | OXYGEN SATURATION: 98 %

## 2020-04-20 DIAGNOSIS — R29.898 MUSCULAR DECONDITIONING: ICD-10-CM

## 2020-04-20 DIAGNOSIS — E05.90 HYPERTHYROIDISM: ICD-10-CM

## 2020-04-20 DIAGNOSIS — E78.5 DYSLIPIDEMIA: ICD-10-CM

## 2020-04-20 DIAGNOSIS — C90.01 MULTIPLE MYELOMA IN REMISSION (HCC): ICD-10-CM

## 2020-04-20 DIAGNOSIS — M17.0 PRIMARY OSTEOARTHRITIS OF BOTH KNEES: ICD-10-CM

## 2020-04-20 DIAGNOSIS — I48.20 CHRONIC ATRIAL FIBRILLATION (HCC): Primary | Chronic | ICD-10-CM

## 2020-04-20 DIAGNOSIS — E66.9 OBESITY (BMI 30.0-34.9): ICD-10-CM

## 2020-04-20 DIAGNOSIS — R73.02 IMPAIRED GLUCOSE TOLERANCE: ICD-10-CM

## 2020-04-20 DIAGNOSIS — I10 ESSENTIAL HYPERTENSION: ICD-10-CM

## 2020-04-20 LAB
INR BLD: 2.2
PT POC: 27 SECONDS
VALID INTERNAL CONTROL?: YES

## 2020-04-20 NOTE — PROGRESS NOTES
Chief Complaint   Patient presents with    Irregular Heart Beat     Patient states that she is taking Coumadin 5 mg tab daily and Sat and Soliz 1 1/2 tabs. 1. Have you been to the ER, urgent care clinic since your last visit? Hospitalized since your last visit? No    2. Have you seen or consulted any other health care providers outside of the [address] [place] [address] since your last visit? Include any pap smears or colon screening.  No    Results for orders placed or performed in visit on 04/20/20   AMB POC PT/INR   Result Value Ref Range    VALID INTERNAL CONTROL POC Yes     Prothrombin time (POC) 27.0 seconds    INR POC 2.2

## 2020-04-20 NOTE — PROGRESS NOTES
[address] [place] [address] and Primary Care  [place] [address]  [address] [place] [address]  Phone:  [phone]  Fax: [phone]       Chief Complaint   Patient presents with    Irregular Heart Beat     Patient states that she is taking Coumadin 5 mg tab daily and Sat and Soliz 1 1/2 tabs. .      SUBJECTIVE:    Sheila Montgomery is a [de-identified] y.o. female Comes in for return visit stating that she has done really well. Her biggest problem is her osteoarthritis of her knees. She uses a cane currently. The primary reason for the persistence of the discomfort in her knees is her weight. There has been no meaningful weight loss. She remains reasonably mobile, getting out if she wants to get out of the house and do her activities of daily living without problems. She is not getting any chemotherapy for her multiple myeloma currently. She does have a history of hyperthyroidism, which resolved. Her last TSH was stable with no suppression. She has a past history of primary hypertension and dyslipidemia. She has chronic atrial fibrillation with controlled ventricular response, but in reality this is paroxysmal.  She takes Warfarin on a regular basis. INR today is 2.2. Current Outpatient Medications   Medication Sig Dispense Refill    amLODIPine (NORVASC) 10 mg tablet Take 1 Tab by mouth daily. 30 Tab 11    cephALEXin (KEFLEX) 500 mg capsule Take 1 Cap by mouth three (3) times daily. 15 Cap 0    warfarin (COUMADIN) 5 mg tablet Take 2 tabs daily x 2 days then 1 tab daily thereafter 32 Tab 11    potassium chloride (K-DUR, KLOR-CON) 20 mEq tablet Take 1 Tab by mouth two (2) times a day. 60 Tab 11    metoprolol succinate (TOPROL-XL) 25 mg XL tablet Take 0.5 Tabs by mouth daily. 30 Tab 11    celecoxib (CELEBREX) 200 mg capsule Take 1 capsule twice a day after meals as needed for joint pain 60 Cap 3    furosemide (LASIX) 40 mg tablet Take 1 Tab by mouth daily.  30 Tab 11    lisinopril (PRINIVIL, ZESTRIL) 40 mg tablet Take 1 Tab by mouth daily. 90 Tab 3    oxyCODONE-acetaminophen (PERCOCET)  mg per tablet Take 1 Tab by mouth every eight (8) hours as needed for Pain. Max Daily Amount: 3 Tabs. 40 Tab 0    menthol (BIOFREEZE, MENTHOL,) 4 % gel 1 g by Apply Externally route every six (6) hours. 118 mL 0    cyclobenzaprine (FLEXERIL) 5 mg tablet Take 1 Tab by mouth three (3) times daily as needed for Muscle Spasm(s). 15 Tab 0    aluminum hydrox-magnesium carb (GAVISCON EXTRA STRENGTH) 160-105 mg chew Take 1-2 tablets by mouth four (4) times daily as needed.        Past Medical History:   Diagnosis Date    Acute combined systolic and diastolic HF (heart failure), NYHA class 2 ([facility] 8/11/2017    Arthritis     Atrial fibrillation (HCC)     Cancer (HCC)     multiple myeloma    Hypertension     S/P AV kathleen ablation 8/11/2017    Status post biventricular pacemaker 8/11/2017 8/11/17      Past Surgical History:   Procedure Laterality Date    HX GYN      HX ORTHOPAEDIC      knee    HX PACEMAKER  08/11/2017     Allergies   Allergen Reactions    Codeine Other (comments)         REVIEW OF SYSTEMS:  General: negative for - chills or fever  ENT: negative for - headaches, nasal congestion or tinnitus  Respiratory: negative for - cough, hemoptysis, shortness of breath or wheezing  Cardiovascular : negative for - chest pain, edema, palpitations or shortness of breath  Gastrointestinal: negative for - abdominal pain, blood in stools, heartburn or nausea/vomiting  Genito-Urinary: no dysuria, trouble voiding, or hematuria  Musculoskeletal: negative for - gait disturbance, joint pain, joint stiffness or joint swelling  Neurological: no TIA or stroke symptoms  Hematologic: no bruises, no bleeding, no swollen glands  Integument: no lumps, mole changes, nail changes or rash  Endocrine: no malaise/lethargy or unexpected weight changes      Social History     Socioeconomic History    Marital status:  Spouse name: Not on file    Number of children: 3    Years of education: Not on file    Highest education level: Not on file   Occupational History    Occupation: Retired from the [place] [address] resource strain: Not hard at all   [place] insecurity     Worry: Never true     Inability: Never true   Clerk needs     Medical: No     Non-medical: No   Tobacco Use    Smoking status: Never Smoker    Smokeless tobacco: Never Used   Substance and Sexual Activity    Alcohol use: No    Drug use: No    Sexual activity: Not Currently   Lifestyle    Physical activity     Days per week: 0 days     Minutes per session: 0 min    Stress: Not at all   Relationships    Social connections     Talks on phone: More than three times a week     Gets together: More than three times a week     Attends Advent service: More than 4 times per year     Active member of club or organization: Yes     Attends meetings of clubs or organizations: More than 4 times per year     Relationship status:    Other Topics Concern     Service No    Blood Transfusions No    Caffeine Concern No    Occupational Exposure No    Hobby Hazards No    Sleep Concern No    Stress Concern No    Weight Concern Yes    Special Diet Yes     Comment: HTN/  Low Sugary    Back Care Yes    Exercise No    Bike Helmet No    Seat Belt Yes    Self-Exams Yes     Family History   Problem Relation Age of Onset    Stroke Mother     No Known Problems Father        OBJECTIVE:    Visit Vitals  /84   Pulse 75   Temp 97.8 °F (36.6 °C) (Oral)   Resp 16   Ht 5' 10\" (1.778 m)   Wt 218 lb 1.6 oz (98.9 kg)   SpO2 98%   BMI 31.29 kg/m²     CONSTITUTIONAL: well , well nourished, appears age appropriate  EYES: perrla, eom intact  ENMT:moist mucous membranes, pharynx clear  NECK: supple.  Thyroid normal  RESPIRATORY: Chest: clear to ascultation and percussion   CARDIOVASCULAR: Heart: regular rate and rhythm  GASTROINTESTINAL: Abdomen: soft, bowel sounds active  HEMATOLOGIC: no pathological lymph nodes palpated  MUSCULOSKELETAL: Extremities: no edema, pulse 1+, moderate degenerative changes noted both knees  INTEGUMENT: No unusual rashes or suspicious skin lesions noted. Nails appear normal.  NEUROLOGIC: non-focal exam   MENTAL STATUS: alert and oriented, appropriate affect      ASSESSMENT:  1. Chronic atrial fibrillation    2. Essential hypertension    3. Hyperthyroidism    4. Muscular deconditioning    5. Primary osteoarthritis of both knees    6. Impaired glucose tolerance    7. Obesity (BMI 30.0-34.9)    8. Multiple myeloma in remission ([facility]    9. Dyslipidemia        PLAN:    1. INR is acceptable today, no adjustments are made. 2. Blood pressure is also excellent, no adjustments are made either. 3. TSH will be checked in view of her history of hyperthyroidism. 4. She is physically deconditioned and needs to walk more. This will be obviously limited by the severity of her osteoarthritis. 5. Unfortunately, there is not much to offer her as far as the osteoarthritis. She is not a great surgical candidate at this point. The most important thing she can do is lose weight. 6. She has moderate hyperglycemia. I suspect she probably has impaired glucose tolerance. I will await the results of her hemoglobin A1c.  7. Weight reduction is mandatory. This can be accomplished by eating meals, eliminating snacks and avoiding the consumption of processed carbohydrates. .  Orders Placed This Encounter    METABOLIC PANEL, BASIC    TSH 3RD GENERATION    APOLIPOPROTEIN B    HEMOGLOBIN A1C WITH EAG    AMB POC PT/INR         Follow-up and Dispositions    · Return in about 4 weeks (around 5/18/2020).            Benita Proctor MD

## 2020-04-21 LAB
APO B SERPL-MCNC: 66 MG/DL
BUN SERPL-MCNC: 16 MG/DL (ref 8–27)
BUN/CREAT SERPL: 18 (ref 12–28)
CALCIUM SERPL-MCNC: 9.8 MG/DL (ref 8.7–10.3)
CHLORIDE SERPL-SCNC: 103 MMOL/L (ref 96–106)
CO2 SERPL-SCNC: 28 MMOL/L (ref 20–29)
CREAT SERPL-MCNC: 0.91 MG/DL (ref 0.57–1)
EST. AVERAGE GLUCOSE BLD GHB EST-MCNC: 117 MG/DL
GLUCOSE SERPL-MCNC: 82 MG/DL (ref 65–99)
HBA1C MFR BLD: 5.7 % (ref 4.8–5.6)
POTASSIUM SERPL-SCNC: 3.8 MMOL/L (ref 3.5–5.2)
SODIUM SERPL-SCNC: 145 MMOL/L (ref 134–144)
TSH SERPL DL<=0.005 MIU/L-ACNC: 1.9 UIU/ML (ref 0.45–4.5)

## 2020-05-06 ENCOUNTER — OFFICE VISIT (OUTPATIENT)
Dept: CARDIOLOGY CLINIC | Age: 80
End: 2020-05-06

## 2020-05-06 DIAGNOSIS — I48.20 CHRONIC ATRIAL FIBRILLATION (HCC): ICD-10-CM

## 2020-05-06 DIAGNOSIS — Z95.0 CARDIAC PACEMAKER IN SITU: Primary | ICD-10-CM

## 2020-05-22 ENCOUNTER — OFFICE VISIT (OUTPATIENT)
Dept: INTERNAL MEDICINE CLINIC | Age: 80
End: 2020-05-22

## 2020-05-22 VITALS
RESPIRATION RATE: 16 BRPM | WEIGHT: 218.7 LBS | HEIGHT: 70 IN | TEMPERATURE: 97.7 F | DIASTOLIC BLOOD PRESSURE: 79 MMHG | SYSTOLIC BLOOD PRESSURE: 139 MMHG | BODY MASS INDEX: 31.31 KG/M2

## 2020-05-22 DIAGNOSIS — Z00.00 WELLNESS EXAMINATION: ICD-10-CM

## 2020-05-22 DIAGNOSIS — C90.01 MULTIPLE MYELOMA IN REMISSION (HCC): ICD-10-CM

## 2020-05-22 DIAGNOSIS — Z13.39 SCREENING FOR ALCOHOLISM: ICD-10-CM

## 2020-05-22 DIAGNOSIS — Z13.31 SCREENING FOR DEPRESSION: ICD-10-CM

## 2020-05-22 DIAGNOSIS — M17.0 PRIMARY OSTEOARTHRITIS OF BOTH KNEES: ICD-10-CM

## 2020-05-22 DIAGNOSIS — E66.9 OBESITY (BMI 30.0-34.9): ICD-10-CM

## 2020-05-22 DIAGNOSIS — I50.41 ACUTE COMBINED SYSTOLIC AND DIASTOLIC HF (HEART FAILURE), NYHA CLASS 2 (HCC): ICD-10-CM

## 2020-05-22 DIAGNOSIS — I48.20 CHRONIC ATRIAL FIBRILLATION (HCC): Primary | Chronic | ICD-10-CM

## 2020-05-22 LAB
INR BLD: 2.4
PT POC: 28.2 SECONDS
VALID INTERNAL CONTROL?: YES

## 2020-05-22 NOTE — PROGRESS NOTES
[address] [facility]  [place] [address]  [address] [place] [address]  Phone:  [phone]  Fax: [phone]       Chief Complaint   Patient presents with    Irregular Heart Beat     Patient states that she is taking Coumadin 5 mg daily and 7.5 mg Sat and Sun.  Annual Wellness Visit   . SUBJECTIVE:    Asael Perkins is a [de-identified] y.o. female Comes in for return visit stating that she has done well. She has no complaints for the most part other than pain in her knees and as a direct result of this, difficulty walking. Fortunately, she has not fallen. She remains quite physically deconditioned because she is not really ambulating as much as she should, primarily due to the pandemic. She denies any shortness of breath, orthopnea or PND. Her multiple myeloma is somewhat in remission at this point. She remains on Warfarin for atrial fibrillation and her INR today is acceptable. She has a past history of primary hypertension and dyslipidemia, as well as obesity. Current Outpatient Medications   Medication Sig Dispense Refill    amLODIPine (NORVASC) 10 mg tablet Take 1 Tab by mouth daily. 30 Tab 11    cephALEXin (KEFLEX) 500 mg capsule Take 1 Cap by mouth three (3) times daily. 15 Cap 0    warfarin (COUMADIN) 5 mg tablet Take 2 tabs daily x 2 days then 1 tab daily thereafter 32 Tab 11    potassium chloride (K-DUR, KLOR-CON) 20 mEq tablet Take 1 Tab by mouth two (2) times a day. 60 Tab 11    metoprolol succinate (TOPROL-XL) 25 mg XL tablet Take 0.5 Tabs by mouth daily. 30 Tab 11    celecoxib (CELEBREX) 200 mg capsule Take 1 capsule twice a day after meals as needed for joint pain 60 Cap 3    furosemide (LASIX) 40 mg tablet Take 1 Tab by mouth daily. 30 Tab 11    lisinopril (PRINIVIL, ZESTRIL) 40 mg tablet Take 1 Tab by mouth daily. 90 Tab 3    oxyCODONE-acetaminophen (PERCOCET)  mg per tablet Take 1 Tab by mouth every eight (8) hours as needed for Pain. Max Daily Amount: 3 Tabs. 40 Tab 0    menthol (BIOFREEZE, MENTHOL,) 4 % gel 1 g by Apply Externally route every six (6) hours. 118 mL 0    cyclobenzaprine (FLEXERIL) 5 mg tablet Take 1 Tab by mouth three (3) times daily as needed for Muscle Spasm(s). 15 Tab 0    aluminum hydrox-magnesium carb (GAVISCON EXTRA STRENGTH) 160-105 mg chew Take 1-2 tablets by mouth four (4) times daily as needed.        Past Medical History:   Diagnosis Date    Acute combined systolic and diastolic HF (heart failure), NYHA class 2 ([facility] 8/11/2017    Arthritis     Atrial fibrillation (HCC)     Cancer (HCC)     multiple myeloma    Hypertension     S/P AV kathleen ablation 8/11/2017    Status post biventricular pacemaker 8/11/2017 8/11/17      Past Surgical History:   Procedure Laterality Date    HX GYN      HX ORTHOPAEDIC      knee    HX PACEMAKER  08/11/2017     Allergies   Allergen Reactions    Codeine Other (comments)         REVIEW OF SYSTEMS:  General: negative for - chills or fever  ENT: negative for - headaches, nasal congestion or tinnitus  Respiratory: negative for - cough, hemoptysis, shortness of breath or wheezing  Cardiovascular : negative for - chest pain, edema, palpitations or shortness of breath  Gastrointestinal: negative for - abdominal pain, blood in stools, heartburn or nausea/vomiting  Genito-Urinary: no dysuria, trouble voiding, or hematuria  Musculoskeletal: negative for - gait disturbance, joint pain, joint stiffness or joint swelling  Neurological: no TIA or stroke symptoms  Hematologic: no bruises, no bleeding, no swollen glands  Integument: no lumps, mole changes, nail changes or rash  Endocrine: no malaise/lethargy or unexpected weight changes      Social History     Socioeconomic History    Marital status:      Spouse name: Not on file    Number of children: 3    Years of education: Not on file    Highest education level: Not on file   Occupational History    Occupation: Retired from the [employer]   Social Needs    Financial resource strain: Not hard at all   Lali-Karla insecurity     Worry: Never true     Inability: Never true   [employer] needs     Medical: No     Non-medical: No   Tobacco Use    Smoking status: Never Smoker    Smokeless tobacco: Never Used   Substance and Sexual Activity    Alcohol use: No    Drug use: No    Sexual activity: Not Currently   Lifestyle    Physical activity     Days per week: 0 days     Minutes per session: 0 min    Stress: Not at all   Relationships    Social connections     Talks on phone: More than three times a week     Gets together: More than three times a week     Attends Zoroastrian service: More than 4 times per year     Active member of club or organization: Yes     Attends meetings of clubs or organizations: More than 4 times per year     Relationship status:    Other Topics Concern     Service No    Blood Transfusions No    Caffeine Concern No    Occupational Exposure No    Hobby Hazards No    Sleep Concern No    Stress Concern No    Weight Concern Yes    Special Diet Yes     Comment: HTN/  Low Sugary    Back Care Yes    Exercise No    Bike Helmet No    Seat Belt Yes    Self-Exams Yes     Family History   Problem Relation Age of Onset    Stroke Mother     No Known Problems Father        OBJECTIVE:    Visit Vitals  /79   Temp 97.7 °F (36.5 °C) (Oral)   Resp 16   Ht 5' 10\" (1.778 m)   Wt 218 lb 11.2 oz (99.2 kg)   BMI 31.38 kg/m²     CONSTITUTIONAL: well , well nourished, appears age appropriate  EYES: perrla, eom intact  ENMT:moist mucous membranes, pharynx clear  NECK: supple.  Thyroid normal  RESPIRATORY: Chest: clear to ascultation and percussion   CARDIOVASCULAR: Heart: regular rate and rhythm  GASTROINTESTINAL: Abdomen: soft, bowel sounds active  HEMATOLOGIC: no pathological lymph nodes palpated  MUSCULOSKELETAL: Extremities: no edema, pulse 1+   INTEGUMENT: No unusual rashes or suspicious skin lesions noted. Nails appear normal.  NEUROLOGIC: non-focal exam   MENTAL STATUS: alert and oriented, appropriate affect      ASSESSMENT:  1. Chronic atrial fibrillation ([address]    2. Acute combined systolic and diastolic HF (heart failure), NYHA class 2 ([address]    3. Primary osteoarthritis of both knees    4. Multiple myeloma in remission (HCC)    5. Obesity (BMI 30.0-34.9)    6. Screening for alcoholism    7. Screening for depression    8. Wellness examination        PLAN:    1. Her ventricular rate is quite stable. It is grossly irregular, as would be expected. INR today is 2.4 and no adjustments are made. 2. There are no overt signs of congestive heart failure. She does have significant diastolic dysfunction, but it appears to be well compensated. She remains on her diuretic. 3. She has moderate to severe osteoarthritis of her knees. There has been no meaningful improvement as would be expected. The most important thing she can do to reduce her pain and improve her mobility is to lose weight. 4. Her multiple myeloma is in a partial remission. Apparently she is on no chemotherapy currently. She does follow up with medical oncology. 5. As I stated earlier, she does indeed need to lose weight. This can be accomplished by eating meals, eliminating snacks and avoiding the consumption of processed carbohydrates. 6. I strongly encouraged her to get out of the house and walk more. She is also advised to get more sunlight. .  Orders Placed This Encounter    Depression Screen Annual    AMB POC PT/INR         Follow-up and Dispositions    · Return in about 2 months (around 7/22/2020), or RTO 1 month for INR. Kristal Joya MD  This is the Subsequent Medicare Annual Wellness Exam, performed 12 months or more after the Initial AWV or the last Subsequent AWV    I have reviewed the patient's medical history in detail and updated the computerized patient record.      History     Patient Active Problem List   Diagnosis Code    Osteoarthritis M19.90    Anemia D64.9    Chronic atrial fibrillation (HCC) I48.20    Low back pain M54.5    Complex renal cyst N28.1    Reflux esophagitis K21.0    Multiple myeloma (HCC) C90.00    Venous insufficiency I87.2    Primary osteoarthritis of both knees M17.0    Back pain M54.9    Muscular deconditioning R29.898    Sialadenitis K11.20    CHF (congestive heart failure) (HCC) I50.9    Cardiomyopathy (HCC) I42.9    Acute pulmonary embolism (HCC) I26.99    Acute combined systolic and diastolic HF (heart failure), NYHA class 2 (HCC) I50.41    Status post biventricular pacemaker Z95.0    S/P AV kathleen ablation Z98.890    S/P cardiac cath Z98.890    Weight loss R63.4    Hyperthyroidism E05.90    Cervical radiculopathy M54.12    Essential hypertension I10     Past Medical History:   Diagnosis Date    Acute combined systolic and diastolic HF (heart failure), NYHA class 2 ([facility]) 8/11/2017    Arthritis     Atrial fibrillation (HCC)     Cancer (HCC)     multiple myeloma    Hypertension     S/P AV kathleen ablation 8/11/2017    Status post biventricular pacemaker 8/11/2017 8/11/17       Past Surgical History:   Procedure Laterality Date    HX GYN      HX ORTHOPAEDIC      knee    HX PACEMAKER  08/11/2017     Current Outpatient Medications   Medication Sig Dispense Refill    amLODIPine (NORVASC) 10 mg tablet Take 1 Tab by mouth daily. 30 Tab 11    cephALEXin (KEFLEX) 500 mg capsule Take 1 Cap by mouth three (3) times daily. 15 Cap 0    warfarin (COUMADIN) 5 mg tablet Take 2 tabs daily x 2 days then 1 tab daily thereafter 32 Tab 11    potassium chloride (K-DUR, KLOR-CON) 20 mEq tablet Take 1 Tab by mouth two (2) times a day. 60 Tab 11    metoprolol succinate (TOPROL-XL) 25 mg XL tablet Take 0.5 Tabs by mouth daily.  30 Tab 11    celecoxib (CELEBREX) 200 mg capsule Take 1 capsule twice a day after meals as needed for joint pain 60 Cap 3    furosemide (LASIX) 40 mg tablet Take 1 Tab by mouth daily. 30 Tab 11    lisinopril (PRINIVIL, ZESTRIL) 40 mg tablet Take 1 Tab by mouth daily. 90 Tab 3    oxyCODONE-acetaminophen (PERCOCET)  mg per tablet Take 1 Tab by mouth every eight (8) hours as needed for Pain. Max Daily Amount: 3 Tabs. 40 Tab 0    menthol (BIOFREEZE, MENTHOL,) 4 % gel 1 g by Apply Externally route every six (6) hours. 118 mL 0    cyclobenzaprine (FLEXERIL) 5 mg tablet Take 1 Tab by mouth three (3) times daily as needed for Muscle Spasm(s). 15 Tab 0    aluminum hydrox-magnesium carb (GAVISCON EXTRA STRENGTH) 160-105 mg chew Take 1-2 tablets by mouth four (4) times daily as needed. Allergies   Allergen Reactions    Codeine Other (comments)       Family History   Problem Relation Age of Onset    Stroke Mother     No Known Problems Father      Social History     Tobacco Use    Smoking status: Never Smoker    Smokeless tobacco: Never Used   Substance Use Topics    Alcohol use: No       Depression Risk Factor Screening:     3 most recent PHQ Screens 2/14/2020   PHQ Not Done -   Little interest or pleasure in doing things Not at all   Feeling down, depressed, irritable, or hopeless Not at all   Total Score PHQ 2 0       Alcohol Risk Factor Screening:   Do you average 1 drink per night or more than 7 drinks a week:  No    On any one occasion in the past three months have you have had more than 3 drinks containing alcohol:  No      Functional Ability and Level of Safety:   Hearing: Hearing is good. Activities of Daily Living: The home contains: no safety equipment. Patient does total self care    Ambulation: with mild difficulty    Fall Risk:  Fall Risk Assessment, last 12 mths 2/4/2020   Able to walk? Yes   Fall in past 12 months? No   Fall with injury?  -       Abuse Screen:  Patient is not abused    Cognitive Screening   Has your family/caregiver stated any concerns about your memory: no  Cognitive Screening: Not applicable    Patient Care Team Patient Care Team:  Tor Guillen MD as PCP - General (Internal Medicine)  Tor Guillen MD as PCP - [address] [place] [address] Provider  Cyrus Giordano MD (Cardiology)  Kiara Selby MD as Physician (Cardiology)  Gee Alaniz, NP (Nurse Practitioner)  TOD Dean (Nurse Practitioner)  Ronel Wood, RN as [facility] (General Practice)  Ronel Wood, RN as Ambulatory Care Manager    Assessment/Plan   Education and counseling provided:  Are appropriate based on today's review and evaluation    Diagnoses and all orders for this visit:    1. Chronic atrial fibrillation (HCC)  -     AMB POC PT/INR    2. Acute combined systolic and diastolic HF (heart failure), NYHA class 2 ([address]    3. Primary osteoarthritis of both knees    4. Multiple myeloma in remission (HCC)    5. Obesity (BMI 30.0-34.9)    6. Screening for alcoholism  -     [place] ANNUAL ALCOHOL SCREEN 15 MIN    7. Screening for depression  -     DEPRESSION SCREEN ANNUAL    8.  Wellness examination        Health Maintenance Due   Topic Date Due    GLAUCOMA SCREENING Q2Y  02/12/2020    Medicare Yearly Exam  03/14/2020

## 2020-05-22 NOTE — PROGRESS NOTES
Chief Complaint   Patient presents with    Irregular Heart Beat     follow up      1. Have you been to the ER, urgent care clinic since your last visit? Hospitalized since your last visit? No    2. Have you seen or consulted any other health care providers outside of the [address] [place] [address] since your last visit? Include any pap smears or colon screening.  No    Results for orders placed or performed in visit on 05/22/20   AMB POC PT/INR   Result Value Ref Range    VALID INTERNAL CONTROL POC Yes     Prothrombin time (POC) 28.2 seconds    INR POC 2.4

## 2020-05-22 NOTE — PATIENT INSTRUCTIONS

## 2020-06-15 ENCOUNTER — PATIENT OUTREACH (OUTPATIENT)
Dept: INTERNAL MEDICINE CLINIC | Age: 80
End: 2020-06-15

## 2020-06-15 DIAGNOSIS — J40 BRONCHITIS: Primary | ICD-10-CM

## 2020-06-15 RX ORDER — AZITHROMYCIN 250 MG/1
TABLET, FILM COATED ORAL
Qty: 6 TAB | Refills: 0 | Status: SHIPPED | OUTPATIENT
Start: 2020-06-15 | End: 2020-06-20

## 2020-06-16 ENCOUNTER — DOCUMENTATION ONLY (OUTPATIENT)
Dept: INTERNAL MEDICINE CLINIC | Age: 80
End: 2020-06-16

## 2020-06-16 NOTE — PROGRESS NOTES
Education Provided due to At-Risk Condition:  Cough/Chest Congestion  Patient contacted regarding recent discharge and COVID-19 risk. Discussed COVID-19 related testing which was not done at this time. Test results were not done. Patient informed of results, if available? no    Care Transition Nurse/ Ambulatory Care Manager contacted the patient by telephone to perform Case Management assessment. Verified name and  with patient as identifiers. Patient has following risk factors of: Chronic AFib. Advised obtaining a 90-day supply of all daily and as-needed medications when possible. Education provided regarding infection prevention, and signs and symptoms of COVID-19 and when to seek medical attention with patient who verbalized understanding. Discussed exposure protocols and quarantine from [address] you at higher risk for severe illness  and given an opportunity for questions and concerns. The patient agrees to contact the COVID-19 hotline [phone] or PCP office for questions related to their healthcare. CTN/ACM provided contact information for future reference. From CDC: Are you at higher risk for severe illness?  Wash your hands often.  Avoid close contact (6 feet, which is about two arm lengths) with people who are sick.  Put distance between yourself and other people if COVID-19 is spreading in your community.  Clean and disinfect frequently touched surfaces.  Avoid all cruise travel and non-essential air travel.  Call your healthcare professional if you have concerns about COVID-19 and your underlying condition or if you are sick. For more information on steps you can take to protect yourself, see CDC's How to Protect Yourself      Patient/family/caregiver given information for Adalberto Coyne and agrees to enroll no      Plan for follow-up call in 7-14 days based on severity of symptoms and risk factors.

## 2020-06-30 ENCOUNTER — CLINICAL SUPPORT (OUTPATIENT)
Dept: INTERNAL MEDICINE CLINIC | Age: 80
End: 2020-06-30

## 2020-06-30 DIAGNOSIS — I48.20 CHRONIC ATRIAL FIBRILLATION (HCC): Primary | Chronic | ICD-10-CM

## 2020-06-30 LAB
INR BLD: 2
PT POC: 23.5 SECONDS
VALID INTERNAL CONTROL?: YES

## 2020-06-30 NOTE — PROGRESS NOTES
Chief Complaint   Patient presents with    Coagulation disorder     Patient in office for pt/inr check. Patient states that she is taking Coumadin 5 mg M-F and 7.5 mg.      Results for orders placed or performed in visit on 06/30/20   AMB POC PT/INR   Result Value Ref Range    VALID INTERNAL CONTROL POC Yes     Prothrombin time (POC) 23.5 seconds    INR POC 2.0      Per Dr. Sunshine Bowen, no changes and return to office in one month for pt/inr check.

## 2020-07-28 ENCOUNTER — CLINICAL SUPPORT (OUTPATIENT)
Dept: INTERNAL MEDICINE CLINIC | Age: 80
End: 2020-07-28

## 2020-07-28 DIAGNOSIS — I48.20 CHRONIC ATRIAL FIBRILLATION (HCC): Primary | Chronic | ICD-10-CM

## 2020-07-28 LAB
INR BLD: 1.9
PT POC: 22.2 SECONDS
VALID INTERNAL CONTROL?: YES

## 2020-07-28 NOTE — PROGRESS NOTES
Chief Complaint   Patient presents with    Coagulation disorder     Patient in office for pt/inr check. Patient states that she is taking Coumadin 5 mg M-F and 7.5 mg S and S. Results for orders placed or performed in visit on 07/28/20   AMB POC PT/INR   Result Value Ref Range    VALID INTERNAL CONTROL POC Yes     Prothrombin time (POC) 22.2 seconds    INR POC 1.9      Per Dr. Cony Grace, no changes and return to office in one month for pt/inr check.

## 2020-07-29 RX ORDER — LISINOPRIL 40 MG/1
TABLET ORAL
Qty: 90 TAB | Refills: 3 | Status: SHIPPED | OUTPATIENT
Start: 2020-07-29 | End: 2020-08-08

## 2020-08-08 RX ORDER — LISINOPRIL 40 MG/1
TABLET ORAL
Qty: 90 TAB | Refills: 3 | Status: SHIPPED | OUTPATIENT
Start: 2020-08-08 | End: 2021-10-17

## 2020-08-08 RX ORDER — FUROSEMIDE 40 MG/1
TABLET ORAL
Qty: 30 TAB | Refills: 11 | Status: SHIPPED | OUTPATIENT
Start: 2020-08-08 | End: 2021-10-17

## 2020-08-25 ENCOUNTER — OFFICE VISIT (OUTPATIENT)
Dept: INTERNAL MEDICINE CLINIC | Age: 80
End: 2020-08-25
Payer: MEDICARE

## 2020-08-25 VITALS
RESPIRATION RATE: 16 BRPM | SYSTOLIC BLOOD PRESSURE: 130 MMHG | HEIGHT: 70 IN | BODY MASS INDEX: 31.8 KG/M2 | TEMPERATURE: 98.4 F | DIASTOLIC BLOOD PRESSURE: 80 MMHG | HEART RATE: 74 BPM | OXYGEN SATURATION: 97 % | WEIGHT: 222.1 LBS

## 2020-08-25 DIAGNOSIS — E66.9 OBESITY (BMI 30.0-34.9): ICD-10-CM

## 2020-08-25 DIAGNOSIS — C90.01 MULTIPLE MYELOMA IN REMISSION (HCC): ICD-10-CM

## 2020-08-25 DIAGNOSIS — M17.0 PRIMARY OSTEOARTHRITIS OF BOTH KNEES: ICD-10-CM

## 2020-08-25 DIAGNOSIS — I48.20 CHRONIC ATRIAL FIBRILLATION (HCC): Primary | Chronic | ICD-10-CM

## 2020-08-25 DIAGNOSIS — E05.90 HYPERTHYROIDISM: ICD-10-CM

## 2020-08-25 DIAGNOSIS — I50.41 ACUTE COMBINED SYSTOLIC AND DIASTOLIC HF (HEART FAILURE), NYHA CLASS 2 (HCC): ICD-10-CM

## 2020-08-25 LAB
INR BLD: 2.2
PT POC: 26.8 SECONDS
VALID INTERNAL CONTROL?: YES

## 2020-08-25 PROCEDURE — G8417 CALC BMI ABV UP PARAM F/U: HCPCS | Performed by: INTERNAL MEDICINE

## 2020-08-25 PROCEDURE — G8754 DIAS BP LESS 90: HCPCS | Performed by: INTERNAL MEDICINE

## 2020-08-25 PROCEDURE — 1090F PRES/ABSN URINE INCON ASSESS: CPT | Performed by: INTERNAL MEDICINE

## 2020-08-25 PROCEDURE — G8536 NO DOC ELDER MAL SCRN: HCPCS | Performed by: INTERNAL MEDICINE

## 2020-08-25 PROCEDURE — G8399 PT W/DXA RESULTS DOCUMENT: HCPCS | Performed by: INTERNAL MEDICINE

## 2020-08-25 PROCEDURE — 85610 PROTHROMBIN TIME: CPT | Performed by: INTERNAL MEDICINE

## 2020-08-25 PROCEDURE — 99215 OFFICE O/P EST HI 40 MIN: CPT | Performed by: INTERNAL MEDICINE

## 2020-08-25 PROCEDURE — G8432 DEP SCR NOT DOC, RNG: HCPCS | Performed by: INTERNAL MEDICINE

## 2020-08-25 PROCEDURE — G8752 SYS BP LESS 140: HCPCS | Performed by: INTERNAL MEDICINE

## 2020-08-25 PROCEDURE — G8427 DOCREV CUR MEDS BY ELIG CLIN: HCPCS | Performed by: INTERNAL MEDICINE

## 2020-08-25 PROCEDURE — 1101F PT FALLS ASSESS-DOCD LE1/YR: CPT | Performed by: INTERNAL MEDICINE

## 2020-08-25 NOTE — PROGRESS NOTES
Chief Complaint   Patient presents with    Irregular Heart Beat     Patient states that she is taking coumadin 5 mg daily, and 7 mg on [address] and [address].        1. Have you been to the ER, urgent care clinic since your last visit? Hospitalized since your last visit? No    2. Have you seen or consulted any other health care providers outside of the [address] [place] [address] since your last visit? Include any pap smears or colon screening.  No      Results for orders placed or performed in visit on 08/25/20   AMB POC PT/INR   Result Value Ref Range    VALID INTERNAL CONTROL POC Yes     Prothrombin time (POC) 26.8 seconds    INR POC 2.2

## 2020-08-26 LAB
BUN SERPL-MCNC: 13 MG/DL (ref 8–27)
BUN/CREAT SERPL: 16 (ref 12–28)
CALCIUM SERPL-MCNC: 9.5 MG/DL (ref 8.7–10.3)
CHLORIDE SERPL-SCNC: 100 MMOL/L (ref 96–106)
CO2 SERPL-SCNC: 28 MMOL/L (ref 20–29)
CREAT SERPL-MCNC: 0.82 MG/DL (ref 0.57–1)
GLUCOSE SERPL-MCNC: 68 MG/DL (ref 65–99)
POTASSIUM SERPL-SCNC: 4 MMOL/L (ref 3.5–5.2)
SODIUM SERPL-SCNC: 146 MMOL/L (ref 134–144)
TSH SERPL DL<=0.005 MIU/L-ACNC: 1.8 UIU/ML (ref 0.45–4.5)

## 2020-08-28 ENCOUNTER — HOSPITAL ENCOUNTER (OUTPATIENT)
Dept: PET IMAGING | Age: 80
Discharge: HOME | End: 2020-08-28
Attending: INTERNAL MEDICINE
Payer: MEDICARE

## 2020-08-28 VITALS — BODY MASS INDEX: 31.5 KG/M2 | HEIGHT: 70 IN | WEIGHT: 220 LBS

## 2020-08-28 DIAGNOSIS — R51.9 HEAD ACHE: ICD-10-CM

## 2020-08-28 DIAGNOSIS — C90.00 MULTIPLE MYELOMA NOT HAVING ACHIEVED REMISSION (HCC): ICD-10-CM

## 2020-08-28 DIAGNOSIS — M79.602 LEFT ARM PAIN: ICD-10-CM

## 2020-08-28 DIAGNOSIS — R60.9 EDEMA: ICD-10-CM

## 2020-08-28 DIAGNOSIS — Z79.01 LONG TERM (CURRENT) USE OF ANTICOAGULANTS: ICD-10-CM

## 2020-08-28 DIAGNOSIS — N39.0 URINARY TRACT INFECTION, SITE NOT SPECIFIED: ICD-10-CM

## 2020-08-28 PROCEDURE — A9552 F18 FDG: HCPCS

## 2020-08-28 RX ORDER — SODIUM CHLORIDE 0.9 % (FLUSH) 0.9 %
10 SYRINGE (ML) INJECTION
Status: COMPLETED | OUTPATIENT
Start: 2020-08-28 | End: 2020-08-28

## 2020-08-28 RX ADMIN — Medication 10 ML: at 13:57

## 2020-08-29 PROBLEM — E66.9 OBESITY (BMI 30.0-34.9): Status: ACTIVE | Noted: 2020-08-29

## 2020-08-29 NOTE — PROGRESS NOTES
[address] [place] [address] and Primary Care  [place] [address]  [address] [place] [address]  Phone:  [phone]  Fax: [phone]       Chief Complaint   Patient presents with    Irregular Heart Beat     Patient states that she is taking coumadin 5 mg daily, and 7 mg on Sa and Soliz. .      SUBJECTIVE:    Dru Michael is a [de-identified] y.o. female Comes in for return visit, stating that she has done reasonably well. She continues to have problems with her knees, which is related to her progressive osteoarthritis. The primary problem that is complicating this is her continued obesity. She has had no meaningful weight loss. She follows up with oncologist for her multiple myeloma. She has a past history of primary hypertension, dyslipidemia, as well as hypothyroidism. Current Outpatient Medications   Medication Sig Dispense Refill    furosemide (LASIX) 40 mg tablet take 1 tablet by mouth once daily 30 Tab 11    lisinopriL (PRINIVIL, ZESTRIL) 40 mg tablet take 1 tablet by mouth once daily 90 Tab 3    amLODIPine (NORVASC) 10 mg tablet Take 1 Tab by mouth daily. 30 Tab 11    cephALEXin (KEFLEX) 500 mg capsule Take 1 Cap by mouth three (3) times daily. 15 Cap 0    warfarin (COUMADIN) 5 mg tablet Take 2 tabs daily x 2 days then 1 tab daily thereafter 32 Tab 11    potassium chloride (K-DUR, KLOR-CON) 20 mEq tablet Take 1 Tab by mouth two (2) times a day. 60 Tab 11    metoprolol succinate (TOPROL-XL) 25 mg XL tablet Take 0.5 Tabs by mouth daily. 30 Tab 11    celecoxib (CELEBREX) 200 mg capsule Take 1 capsule twice a day after meals as needed for joint pain 60 Cap 3    oxyCODONE-acetaminophen (PERCOCET)  mg per tablet Take 1 Tab by mouth every eight (8) hours as needed for Pain. Max Daily Amount: 3 Tabs. 40 Tab 0    menthol (BIOFREEZE, MENTHOL,) 4 % gel 1 g by Apply Externally route every six (6) hours.  118 mL 0    cyclobenzaprine (FLEXERIL) 5 mg tablet Take 1 Tab by mouth three (3) times daily as needed for Muscle Spasm(s). 15 Tab 0    aluminum hydrox-magnesium carb (GAVISCON EXTRA STRENGTH) 160-105 mg chew Take 1-2 tablets by mouth four (4) times daily as needed.        Past Medical History:   Diagnosis Date    Acute combined systolic and diastolic HF (heart failure), NYHA class 2 ([facility] [address] 8/11/2017    Arthritis     Atrial fibrillation (HCC)     Cancer (HCC)     multiple myeloma    Hypertension     S/P AV kathleen ablation 8/11/2017    Status post biventricular pacemaker 8/11/2017 8/11/17      Past Surgical History:   Procedure Laterality Date    HX GYN      HX ORTHOPAEDIC      knee    HX PACEMAKER  08/11/2017     Allergies   Allergen Reactions    Codeine Other (comments)         REVIEW OF SYSTEMS:  General: negative for - chills or fever  ENT: negative for - headaches, nasal congestion or tinnitus  Respiratory: negative for - cough, hemoptysis, shortness of breath or wheezing  Cardiovascular : negative for - chest pain, edema, palpitations or shortness of breath  Gastrointestinal: negative for - abdominal pain, blood in stools, heartburn or nausea/vomiting  Genito-Urinary: no dysuria, trouble voiding, or hematuria  Musculoskeletal: negative for - gait disturbance, joint pain, joint stiffness or joint swelling  Neurological: no TIA or stroke symptoms  Hematologic: no bruises, no bleeding, no swollen glands  Integument: no lumps, mole changes, nail changes or rash  Endocrine: no malaise/lethargy or unexpected weight changes      Social History     Socioeconomic History    Marital status:      Spouse name: Not on file    Number of children: 3    Years of education: Not on file    Highest education level: Not on file   Occupational History    Occupation: Retired from the [place] [address] resource strain: Not hard at all   Lali-Karla insecurity     Worry: Never true     Inability: Never true    Transportation needs     Medical: No     Non-medical: No   Tobacco Use    Smoking status: Never Smoker    Smokeless tobacco: Never Used   Substance and Sexual Activity    Alcohol use: No    Drug use: No    Sexual activity: Not Currently   Lifestyle    Physical activity     Days per week: 0 days     Minutes per session: 0 min    Stress: Not at all   Relationships    Social connections     Talks on phone: More than three times a week     Gets together: More than three times a week     Attends Christian service: More than 4 times per year     Active member of club or organization: Yes     Attends meetings of clubs or organizations: More than 4 times per year     Relationship status:    Other Topics Concern     Service No    Blood Transfusions No    Caffeine Concern No    Occupational Exposure No    Hobby Hazards No    Sleep Concern No    Stress Concern No    Weight Concern Yes    Special Diet Yes     Comment: HTN/  Low Sugary    Back Care Yes    Exercise No    Bike Helmet No    Seat Belt Yes    Self-Exams Yes     Family History   Problem Relation Age of Onset    Stroke Mother     No Known Problems Father        OBJECTIVE:    Visit Vitals  /80   Pulse 74   Temp 98.4 °F (36.9 °C) (Oral)   Resp 16   Ht 5' 10\" (1.778 m)   Wt 222 lb 1.6 oz (100.7 kg)   SpO2 97%   BMI 31.87 kg/m²     CONSTITUTIONAL: well , well nourished, appears age appropriate  EYES: perrla, eom intact  ENMT:moist mucous membranes, pharynx clear  NECK: supple. Thyroid normal  RESPIRATORY: Chest: clear to ascultation and percussion   CARDIOVASCULAR: Heart: regular rate and rhythm  GASTROINTESTINAL: Abdomen: soft, bowel sounds active  HEMATOLOGIC: no pathological lymph nodes palpated  MUSCULOSKELETAL: Extremities: no edema, pulse 1+   INTEGUMENT: No unusual rashes or suspicious skin lesions noted. Nails appear normal.  NEUROLOGIC: non-focal exam   MENTAL STATUS: alert and oriented, appropriate affect      ASSESSMENT:  1. Chronic atrial fibrillation ([address]    2.  Acute combined systolic and diastolic HF (heart failure), NYHA class 2 ([address]    3. Hyperthyroidism    4. Multiple myeloma in remission (HCC)    5. Obesity (BMI 30.0-34.9)    6. Primary osteoarthritis of both knees        PLAN:    1. She has a history of atrial fibrillation, paroxysmal predominantly. She remains on her Warfarin. INR today was 2.2, which is excellent. She is taking 5 mg Monday through Friday and 7.5 mg on Saturday and Sunday. The dose has been stable for the last several months. 2. Her blood pressure is excellent today, no adjustments are made. 3. She does have a history of congestive heart failure, secondary predominantly to diastolic dysfunction, but she has been well compensated for the last several years. 4. She will follow up with her medical oncologist for her multiple myeloma. 5. I strongly encouraged the patient to lose weight. This again can be accomplished by eating meals, eliminating snacks and avoiding the consumption of processed carbohydrates. This is especially important as it relates to her osteoarthritic knees. 6. There is not much to offer as it relates to her osteoarthritis other than Tylenol and weight loss. .  Orders Placed This Encounter    METABOLIC PANEL, BASIC    TSH 3RD GENERATION    AMB POC PT/INR         Follow-up and Dispositions    · Return in about 3 months (around 11/25/2020), or monthly INR.            Violet Chang MD

## 2020-09-08 NOTE — PROGRESS NOTES
Subjective:      Jacey Hayes is a [de-identified] y.o. female is here for annual EP follow up. The patient denies chest pain/ shortness of breath, orthopnea, PND, LE edema, palpitations, syncope, presyncope or fatigue. Jacey Hayes  is on [address] [place] [address] Road, reports no melena, hematuria, or obvious signs of bleeding. No falls. INR  Followed by PCP.        Patient Active Problem List    Diagnosis Date Noted    Anemia 09/08/2014     Priority: 1 - One    DJD (degenerative joint disease) 09/07/2014     Priority: 3 - Three    Obesity (BMI 30.0-34.9) 08/29/2020    Essential hypertension 12/21/2018    Cervical radiculopathy 05/09/2018    Hyperthyroidism 04/14/2018    Weight loss 09/21/2017    S/P cardiac cath 09/07/2017    Acute combined systolic and diastolic HF (heart failure), NYHA class 2 ([address] 08/11/2017    Status post biventricular pacemaker 08/11/2017    S/P AV kathleen ablation 08/11/2017    Cardiomyopathy ([address] 08/10/2017    Acute pulmonary embolism ([address] 08/10/2017    CHF (congestive heart failure) ([address] 08/08/2017    Sialadenitis 06/29/2017    Muscular deconditioning 04/22/2017    Back pain 04/30/2016    Primary osteoarthritis of both knees 08/24/2015    Venous insufficiency 04/07/2015    Reflux esophagitis 10/31/2014    Multiple myeloma ([address] 10/31/2014    Complex renal cyst 10/11/2014    Low back pain 10/10/2014    Chronic atrial fibrillation ([address] 09/12/2014    Osteoarthritis 09/05/2014      Inés Grant MD  Past Medical History:   Diagnosis Date    Acute combined systolic and diastolic HF (heart failure), NYHA class 2 ([address] 8/11/2017    Arthritis     Atrial fibrillation (HCC)     Cancer (HCC)     multiple myeloma    Hypertension     S/P AV kathleen ablation 8/11/2017    Status post biventricular pacemaker 8/11/2017 8/11/17       Past Surgical History:   Procedure Laterality Date    HX GYN      HX ORTHOPAEDIC      knee    HX PACEMAKER  08/11/2017     Allergies   Allergen Reactions    Codeine Other (comments)      Family History   Problem Relation Age of Onset    Stroke Mother     No Known Problems Father     negative for cardiac disease  Social History     Socioeconomic History    Marital status:      Spouse name: Not on file    Number of children: 3    Years of education: Not on file    Highest education level: Not on file   Occupational History    Occupation: Retired from the [place] [address] resource strain: Not hard at all   [address] insecurity     Worry: Never true     Inability: Never true   [employer] needs     Medical: No     Non-medical: No   Tobacco Use    Smoking status: Never Smoker    Smokeless tobacco: Never Used   Substance and Sexual Activity    Alcohol use: No    Drug use: No    Sexual activity: Not Currently   Lifestyle    Physical activity     Days per week: 0 days     Minutes per session: 0 min    Stress: Not at all   Relationships    Social connections     Talks on phone: More than three times a week     Gets together: More than three times a week     Attends Sikhism service: More than 4 times per year     Active member of club or organization: Yes     Attends meetings of clubs or organizations: More than 4 times per year     Relationship status:    Other Topics Concern     Service No    Blood Transfusions No    Caffeine Concern No    Occupational Exposure No    Hobby Hazards No    Sleep Concern No    Stress Concern No    Weight Concern Yes    Special Diet Yes     Comment: HTN/  Low Sugary    Back Care Yes    Exercise No    Bike Helmet No    Seat Belt Yes    Self-Exams Yes     Current Outpatient Medications   Medication Sig    furosemide (LASIX) 40 mg tablet take 1 tablet by mouth once daily    lisinopriL (PRINIVIL, ZESTRIL) 40 mg tablet take 1 tablet by mouth once daily    amLODIPine (NORVASC) 10 mg tablet Take 1 Tab by mouth daily.     cephALEXin (KEFLEX) 500 mg capsule Take 1 Cap by mouth three (3) times daily.  warfarin (COUMADIN) 5 mg tablet Take 2 tabs daily x 2 days then 1 tab daily thereafter    potassium chloride (K-DUR, KLOR-CON) 20 mEq tablet Take 1 Tab by mouth two (2) times a day.  metoprolol succinate (TOPROL-XL) 25 mg XL tablet Take 0.5 Tabs by mouth daily.  celecoxib (CELEBREX) 200 mg capsule Take 1 capsule twice a day after meals as needed for joint pain    oxyCODONE-acetaminophen (PERCOCET)  mg per tablet Take 1 Tab by mouth every eight (8) hours as needed for Pain. Max Daily Amount: 3 Tabs.  menthol (BIOFREEZE, MENTHOL,) 4 % gel 1 g by Apply Externally route every six (6) hours.  cyclobenzaprine (FLEXERIL) 5 mg tablet Take 1 Tab by mouth three (3) times daily as needed for Muscle Spasm(s).  aluminum hydrox-magnesium carb (GAVISCON EXTRA STRENGTH) 160-105 mg chew Take 1-2 tablets by mouth four (4) times daily as needed. No current facility-administered medications for this visit. Vitals:    09/09/20 0949   BP: 128/82   Pulse: 74   Resp: 18   SpO2: 99%   Weight: 220 lb (99.8 kg)   Height: 5' 10\" (1.778 m)       I have reviewed the nurses notes, vitals, problem list, allergy list, medical history, family, social history and medications. Review of Symptoms:    General: Pt denies excessive weight gain or loss. Pt is able to conduct ADL's  HEENT: Denies blurred vision, headaches, epistaxis and difficulty swallowing. Respiratory: Denies shortness of breath, KNOTT, wheezing or stridor. Cardiovascular: Denies precordial pain, palpitations, edema or PND  Gastrointestinal: Denies poor appetite, indigestion, abdominal pain or blood in stool  Urinary: Denies dysuria, pyuria  Musculoskeletal: Denies pain or swelling from muscles or joints  Neurologic: Denies tremor, paresthesias, or sensory motor disturbance  Skin: Denies rash, itching or texture change.   Psych: Denies depression      Physical Exam:      General: Well developed, in no acute distress. HEENT: Eyes - PERRL, no jvd  Heart:  Normal S1/S2 negative S3 or S4. Regular, no murmur, gallop or rub. Respiratory: Clear bilaterally x 4, no wheezing or rales  Extremities:  Tr edema, normal cap refill, no cyanosis. Musculoskeletal: No clubbing  Neuro: A&Ox3, speech clear, gait stable. Skin: Skin color is normal. No rashes or lesions. Non diaphoretic. no ulcers  Vascular: 2+ pulses symmetric in all extremities  Psych - judgement intact and orientation is wnl       Cardiographics    Ekg:  V paced    Results for orders placed or performed during the hospital encounter of 06/16/18   EKG, 12 LEAD, INITIAL   Result Value Ref Range    Ventricular Rate 75 BPM    Atrial Rate 68 BPM    QRS Duration 144 ms    Q-T Interval 448 ms    QTC Calculation (Bezet) 500 ms    Calculated R Axis -88 degrees    Calculated T Axis 78 degrees    Diagnosis       Ventricular-paced rhythm  Biventricular pacemaker detected  When compared with ECG of 02-JAN-2018 10:04,  No significant change was found  Confirmed by Aundrea Form ([id number]) on 6/17/2018 3:38:21 PM       Echo 2/2020  · Normal cavity size and systolic function (ejection fraction normal). Mild concentric hypertrophy. Estimated left ventricular ejection fraction is 50 - 55%. Moderate (grade 2) left ventricular diastolic dysfunction Severe (grade 3) left ventricular diastolic dysfunction. · Severely dilated left atrium. · Severely dilated right atrium. · Mild tricuspid valve regurgitation is present. · Mild pulmonary hypertension. · Mild ascending aorta dilatation.            Lab Results   Component Value Date/Time    WBC 5.6 02/12/2020 07:01 PM    HGB 12.5 02/12/2020 07:01 PM    HCT 41.2 02/12/2020 07:01 PM    PLATELET 992 55/31/7381 07:01 PM    MCV 87.7 02/12/2020 07:01 PM      Lab Results   Component Value Date/Time    Sodium 146 (H) 08/25/2020 12:36 PM    Potassium 4.0 08/25/2020 12:36 PM    Chloride 100 08/25/2020 12:36 PM    CO2 28 08/25/2020 12:36 PM    Anion gap 5 02/12/2020 07:01 PM    Glucose 68 08/25/2020 12:36 PM    BUN 13 08/25/2020 12:36 PM    Creatinine 0.82 08/25/2020 12:36 PM    BUN/Creatinine ratio 16 08/25/2020 12:36 PM    GFR est AA 78 08/25/2020 12:36 PM    GFR est non-AA 68 08/25/2020 12:36 PM    Calcium 9.5 08/25/2020 12:36 PM    Bilirubin, total 0.4 02/12/2020 07:01 PM    Alk. phosphatase 112 02/12/2020 07:01 PM    Protein, total 7.8 02/12/2020 07:01 PM    Albumin 3.8 02/12/2020 07:01 PM    Globulin 4.0 02/12/2020 07:01 PM    A-G Ratio 1.0 (L) 02/12/2020 07:01 PM    ALT (SGPT) 21 02/12/2020 07:01 PM      Lab Results   Component Value Date/Time    TSH 1.800 08/25/2020 12:36 PM        Assessment:           ICD-10-CM ICD-9-CM    1. Status post biventricular pacemaker  Z95.0 V45.01 AMB POC EKG ROUTINE W/ 12 LEADS, INTER & REP   2. S/P AV kathleen ablation  Z98.890 V45.89    3. Essential hypertension  I10 401.9    4. Pacemaker  Z95.0 V45.01      Orders Placed This Encounter    AMB POC EKG ROUTINE W/ 12 LEADS, INTER & REP     Order Specific Question:   Reason for Exam:     Answer:   routine        Plan:     Ms. Elizabeth Romero is here for annual device follow up. EKG show ventricular pacing. Her device interrogation demonstrates normal functioning with 98% BIV pacing, 8 years remaining on battery. One NSVT of 4 sec, on bb. She remains on warfarin (CHADSVASC=5) and is tolerating well. Echocardiogram from 2/2020 demonstrates an EF of  50 - 55%. Continue with remote monitoring of PPM and I will see her back in 1 year.      Continue medical management for cardiomyopathy, HTN, chronic AF. Thank you for allowing me to participate in Jv Christen 's care.       Richard Gannon NP

## 2020-09-09 ENCOUNTER — OFFICE VISIT (OUTPATIENT)
Dept: CARDIOLOGY CLINIC | Age: 80
End: 2020-09-09
Payer: MEDICARE

## 2020-09-09 ENCOUNTER — CLINICAL SUPPORT (OUTPATIENT)
Dept: CARDIOLOGY CLINIC | Age: 80
End: 2020-09-09
Payer: MEDICARE

## 2020-09-09 VITALS
HEART RATE: 74 BPM | WEIGHT: 220 LBS | HEIGHT: 70 IN | BODY MASS INDEX: 31.5 KG/M2 | RESPIRATION RATE: 18 BRPM | OXYGEN SATURATION: 99 % | SYSTOLIC BLOOD PRESSURE: 128 MMHG | DIASTOLIC BLOOD PRESSURE: 82 MMHG

## 2020-09-09 VITALS — SYSTOLIC BLOOD PRESSURE: 128 MMHG | DIASTOLIC BLOOD PRESSURE: 80 MMHG

## 2020-09-09 DIAGNOSIS — Z95.0 CARDIAC PACEMAKER IN SITU: Primary | ICD-10-CM

## 2020-09-09 DIAGNOSIS — I48.20 CHRONIC ATRIAL FIBRILLATION (HCC): ICD-10-CM

## 2020-09-09 DIAGNOSIS — Z98.890 S/P AV NODAL ABLATION: ICD-10-CM

## 2020-09-09 DIAGNOSIS — Z95.0 STATUS POST BIVENTRICULAR PACEMAKER: ICD-10-CM

## 2020-09-09 DIAGNOSIS — I50.22 CHRONIC SYSTOLIC CONGESTIVE HEART FAILURE (HCC): Primary | ICD-10-CM

## 2020-09-09 DIAGNOSIS — I48.0 PAROXYSMAL ATRIAL FIBRILLATION (HCC): ICD-10-CM

## 2020-09-09 DIAGNOSIS — I10 ESSENTIAL HYPERTENSION: ICD-10-CM

## 2020-09-09 DIAGNOSIS — Z95.0 PACEMAKER: ICD-10-CM

## 2020-09-09 DIAGNOSIS — Z95.0 STATUS POST BIVENTRICULAR PACEMAKER: Primary | ICD-10-CM

## 2020-09-09 PROCEDURE — 93280 PM DEVICE PROGR EVAL DUAL: CPT | Performed by: INTERNAL MEDICINE

## 2020-09-09 PROCEDURE — G8399 PT W/DXA RESULTS DOCUMENT: HCPCS | Performed by: INTERNAL MEDICINE

## 2020-09-09 PROCEDURE — G8536 NO DOC ELDER MAL SCRN: HCPCS | Performed by: INTERNAL MEDICINE

## 2020-09-09 PROCEDURE — 99213 OFFICE O/P EST LOW 20 MIN: CPT | Performed by: INTERNAL MEDICINE

## 2020-09-09 PROCEDURE — 1101F PT FALLS ASSESS-DOCD LE1/YR: CPT | Performed by: NURSE PRACTITIONER

## 2020-09-09 PROCEDURE — 1101F PT FALLS ASSESS-DOCD LE1/YR: CPT | Performed by: INTERNAL MEDICINE

## 2020-09-09 PROCEDURE — 99214 OFFICE O/P EST MOD 30 MIN: CPT | Performed by: NURSE PRACTITIONER

## 2020-09-09 PROCEDURE — G8432 DEP SCR NOT DOC, RNG: HCPCS | Performed by: NURSE PRACTITIONER

## 2020-09-09 PROCEDURE — G8752 SYS BP LESS 140: HCPCS | Performed by: INTERNAL MEDICINE

## 2020-09-09 PROCEDURE — G8417 CALC BMI ABV UP PARAM F/U: HCPCS | Performed by: INTERNAL MEDICINE

## 2020-09-09 PROCEDURE — G8752 SYS BP LESS 140: HCPCS | Performed by: NURSE PRACTITIONER

## 2020-09-09 PROCEDURE — G8754 DIAS BP LESS 90: HCPCS | Performed by: INTERNAL MEDICINE

## 2020-09-09 PROCEDURE — G8427 DOCREV CUR MEDS BY ELIG CLIN: HCPCS | Performed by: NURSE PRACTITIONER

## 2020-09-09 PROCEDURE — 93005 ELECTROCARDIOGRAM TRACING: CPT | Performed by: NURSE PRACTITIONER

## 2020-09-09 PROCEDURE — G0463 HOSPITAL OUTPT CLINIC VISIT: HCPCS | Performed by: NURSE PRACTITIONER

## 2020-09-09 PROCEDURE — G8432 DEP SCR NOT DOC, RNG: HCPCS | Performed by: INTERNAL MEDICINE

## 2020-09-09 PROCEDURE — G8417 CALC BMI ABV UP PARAM F/U: HCPCS | Performed by: NURSE PRACTITIONER

## 2020-09-09 PROCEDURE — G8427 DOCREV CUR MEDS BY ELIG CLIN: HCPCS | Performed by: INTERNAL MEDICINE

## 2020-09-09 PROCEDURE — 1090F PRES/ABSN URINE INCON ASSESS: CPT | Performed by: INTERNAL MEDICINE

## 2020-09-09 PROCEDURE — G8399 PT W/DXA RESULTS DOCUMENT: HCPCS | Performed by: NURSE PRACTITIONER

## 2020-09-09 PROCEDURE — G8754 DIAS BP LESS 90: HCPCS | Performed by: NURSE PRACTITIONER

## 2020-09-09 PROCEDURE — 93010 ELECTROCARDIOGRAM REPORT: CPT | Performed by: NURSE PRACTITIONER

## 2020-09-09 PROCEDURE — 1090F PRES/ABSN URINE INCON ASSESS: CPT | Performed by: NURSE PRACTITIONER

## 2020-09-09 PROCEDURE — G0463 HOSPITAL OUTPT CLINIC VISIT: HCPCS | Performed by: INTERNAL MEDICINE

## 2020-09-09 PROCEDURE — G8536 NO DOC ELDER MAL SCRN: HCPCS | Performed by: NURSE PRACTITIONER

## 2020-09-09 NOTE — PROGRESS NOTES
Chief Complaint   Patient presents with    Pacemaker Check     Annual follow up     Dizziness     at times - does have vertigo      1. Have you been to the ER, urgent care clinic since your last visit? Hospitalized since your last visit? No     2. Have you seen or consulted any other health care providers outside of the [address] [place] [address] since your last visit? Include any pap smears or colon screening.   No

## 2020-09-09 NOTE — LETTER
9/9/20 Patient: Amy Gray YOB: 1940 Date of Visit: 9/9/2020 James Smith MD 
[place] [address] VIA In Basket Dear James Smith MD, Thank you for referring Ms. Jose Carlos Phillips to [address] for evaluation. My notes for this consultation are attached. If you have questions, please do not hesitate to call me. I look forward to following your patient along with you. Sincerely, Evan Wahl MD

## 2020-09-09 NOTE — PROGRESS NOTES
[address], [address] [facility]  [phone]     Subjective:      Wendi Harris is a [de-identified] y.o. female is here for routine f/u. She has a PMHx of non-ischemic cardiomyopathy, chronic AF s/p AVN ablation with BiV PPM and HTN. Last seen by us in 2/2020. She remains in usual state of health. She continues to live independently, denies any fall. She is on Coumadin therapy, denies any bleeding/excessive bruising. INR followed by PCP. Also saw EP today for annual follow up. The patient denies chest pain/ shortness of breath, orthopnea, PND, LE edema, palpitations, syncope, or presyncope.        Patient Active Problem List    Diagnosis Date Noted    Anemia 09/08/2014     Priority: 1 - One    DJD (degenerative joint disease) 09/07/2014     Priority: 3 - Three    Obesity (BMI 30.0-34.9) 08/29/2020    Essential hypertension 12/21/2018    Cervical radiculopathy 05/09/2018    Hyperthyroidism 04/14/2018    Weight loss 09/21/2017    S/P cardiac cath 09/07/2017    Acute combined systolic and diastolic HF (heart failure), NYHA class 2 ([address] 08/11/2017    Status post biventricular pacemaker 08/11/2017    S/P AV kathleen ablation 08/11/2017    Cardiomyopathy ([address] 08/10/2017    Acute pulmonary embolism ([address] 08/10/2017    CHF (congestive heart failure) ([address] 08/08/2017    Sialadenitis 06/29/2017    Muscular deconditioning 04/22/2017    Back pain 04/30/2016    Primary osteoarthritis of both knees 08/24/2015    Venous insufficiency 04/07/2015    Reflux esophagitis 10/31/2014    Multiple myeloma ([address] 10/31/2014    Complex renal cyst 10/11/2014    Low back pain 10/10/2014    Chronic atrial fibrillation ([address] 09/12/2014    Osteoarthritis 09/05/2014      Nerissa Kilpatrick MD  Past Medical History:   Diagnosis Date    Acute combined systolic and diastolic HF (heart failure), NYHA class 2 ([address] 8/11/2017    Arthritis     Atrial fibrillation ([address]     Cancer ([address] multiple myeloma    Hypertension     S/P AV kathleen ablation 8/11/2017    Status post biventricular pacemaker 8/11/2017 8/11/17       Past Surgical History:   Procedure Laterality Date    HX GYN      HX ORTHOPAEDIC      knee    HX PACEMAKER  08/11/2017     Allergies   Allergen Reactions    Codeine Other (comments)      Family History   Problem Relation Age of Onset    Stroke Mother     No Known Problems Father       Social History     Socioeconomic History    Marital status:      Spouse name: Not on file    Number of children: 3    Years of education: Not on file    Highest education level: Not on file   Occupational History    Occupation: Retired from the [place] [address] resource strain: Not hard at all   "[employer] insecurity     Worry: Never true     Inability: Never true   [employer] needs     Medical: No     Non-medical: No   Tobacco Use    Smoking status: Never Smoker    Smokeless tobacco: Never Used   Substance and Sexual Activity    Alcohol use: No    Drug use: No    Sexual activity: Not Currently   Lifestyle    Physical activity     Days per week: 0 days     Minutes per session: 0 min    Stress: Not at all   Relationships    Social connections     Talks on phone: More than three times a week     Gets together: More than three times a week     Attends Hoahaoism service: More than 4 times per year     Active member of club or organization: Yes     Attends meetings of clubs or organizations: More than 4 times per year     Relationship status:      Intimate partner violence     Fear of current or ex partner: Not on file     Emotionally abused: Not on file     Physically abused: Not on file     Forced sexual activity: Not on file   Other Topics Concern     Service No    Blood Transfusions No    Caffeine Concern No    Occupational Exposure No    Hobby Hazards No    Sleep Concern No    Stress Concern No    Weight Concern Yes    Special Diet Yes     Comment: HTN/  Low Sugary    Back Care Yes    Exercise No    Bike Helmet No    Seat Belt Yes    Self-Exams Yes   Social History Narrative    Not on file      Current Outpatient Medications   Medication Sig    furosemide (LASIX) 40 mg tablet take 1 tablet by mouth once daily    lisinopriL (PRINIVIL, ZESTRIL) 40 mg tablet take 1 tablet by mouth once daily    amLODIPine (NORVASC) 10 mg tablet Take 1 Tab by mouth daily.  warfarin (COUMADIN) 5 mg tablet Take 2 tabs daily x 2 days then 1 tab daily thereafter    potassium chloride (K-DUR, KLOR-CON) 20 mEq tablet Take 1 Tab by mouth two (2) times a day.  metoprolol succinate (TOPROL-XL) 25 mg XL tablet Take 0.5 Tabs by mouth daily.  cephALEXin (KEFLEX) 500 mg capsule Take 1 Cap by mouth three (3) times daily.  celecoxib (CELEBREX) 200 mg capsule Take 1 capsule twice a day after meals as needed for joint pain    oxyCODONE-acetaminophen (PERCOCET)  mg per tablet Take 1 Tab by mouth every eight (8) hours as needed for Pain. Max Daily Amount: 3 Tabs.  menthol (BIOFREEZE, MENTHOL,) 4 % gel 1 g by Apply Externally route every six (6) hours.  cyclobenzaprine (FLEXERIL) 5 mg tablet Take 1 Tab by mouth three (3) times daily as needed for Muscle Spasm(s).  aluminum hydrox-magnesium carb (GAVISCON EXTRA STRENGTH) 160-105 mg chew Take 1-2 tablets by mouth four (4) times daily as needed. No current facility-administered medications for this visit. Review of Symptoms:  11 systems reviewed, negative other than as stated in the HPI    Physical ExamPhysical Exam:    Vitals:    09/09/20 1009   BP: 128/80     There is no height or weight on file to calculate BMI. General PE  Gen:  NAD  Mental Status - Alert. General Appearance - Not in acute distress. HEENT:  PERRL, no carotid bruits or JVD  Chest and Lung Exam   Inspection: Accessory muscles - No use of accessory muscles in breathing.    Auscultation:   Breath sounds: - Normal.   Cardiovascular   Inspection: Jugular vein - Bilateral - Inspection Normal.   Palpation/Percussion:   Apical Impulse: - Normal.   Auscultation: Rhythm - Regular. Heart Sounds - S1 WNL and S2 WNL. No S3 or S4. Murmurs & Other Heart Sounds: Auscultation of the heart reveals - No Murmurs. Peripheral Vascular   Upper Extremity: Inspection - Bilateral - No Cyanotic nailbeds or Digital clubbing. Lower Extremity:   Palpation: Edema - Bilateral - No edema. Abdomen:   Soft, non-tender, bowel sounds are active. Neuro: A&O times 3, CN and motor grossly WNL    Labs:   Lab Results   Component Value Date/Time    Cholesterol, total 156 08/11/2017 02:41 AM    Cholesterol, total 165 09/08/2014 06:50 AM    HDL Cholesterol 82 08/11/2017 02:41 AM    HDL Cholesterol 72 09/08/2014 06:50 AM    LDL, calculated 64 08/11/2017 02:41 AM    LDL, calculated 84.6 09/08/2014 06:50 AM    Triglyceride 50 08/11/2017 02:41 AM    Triglyceride 42 09/08/2014 06:50 AM    CHOL/HDL Ratio 1.9 08/11/2017 02:41 AM    CHOL/HDL Ratio 2.3 09/08/2014 06:50 AM     Lab Results   Component Value Date/Time     06/16/2018 04:07 PM     Lab Results   Component Value Date/Time    Sodium 146 (H) 08/25/2020 12:36 PM    Potassium 4.0 08/25/2020 12:36 PM    Chloride 100 08/25/2020 12:36 PM    CO2 28 08/25/2020 12:36 PM    Anion gap 5 02/12/2020 07:01 PM    Glucose 68 08/25/2020 12:36 PM    BUN 13 08/25/2020 12:36 PM    Creatinine 0.82 08/25/2020 12:36 PM    BUN/Creatinine ratio 16 08/25/2020 12:36 PM    GFR est AA 78 08/25/2020 12:36 PM    GFR est non-AA 68 08/25/2020 12:36 PM    Calcium 9.5 08/25/2020 12:36 PM    Bilirubin, total 0.4 02/12/2020 07:01 PM    Alk. phosphatase 112 02/12/2020 07:01 PM    Protein, total 7.8 02/12/2020 07:01 PM    Albumin 3.8 02/12/2020 07:01 PM    Globulin 4.0 02/12/2020 07:01 PM    A-G Ratio 1.0 (L) 02/12/2020 07:01 PM    ALT (SGPT) 21 02/12/2020 07:01 PM       EKG:  Paced     Assessment:     Assessment:      1.  Chronic systolic congestive heart failure ([address]    2. S/P AV kathleen ablation    3. Essential hypertension    4. Paroxysmal atrial fibrillation (HCC)    5. Status post biventricular pacemaker        No orders of the defined types were placed in this encounter. Plan:        PAF  S/p AVN ablation with BiV PPM  Device followed by EP, seen by them today  Continue Coumadin INR will be followed by PCP     Hx Chronic systolic congestive heart failure   Improved systolic fxn 16-21% 5/6588  Echo in 1/2018 with reduced LVEF 35-40%  Continue Lisinopril, Toprol and Lasix  Stable kidney fxn 8/2020      HTN  Controlled with current therapy     Multiple Myeloma, in remission  Followed by Dr Denton Joyce current care and f/u in 6 months.       Jenna Moody NP       [facility]    9/9/2020         Patient seen, examined by me personally. Plan discussed as detailed. Agree with note as outlined by  NP. I confirm findings in history and physical exam. No additional findings noted. Agree with plan as outlined above.      Henry Jones MD

## 2020-09-23 RX ORDER — CEFUROXIME AXETIL 500 MG/1
500 TABLET ORAL 2 TIMES DAILY
Qty: 14 TAB | Refills: 0 | Status: SHIPPED | OUTPATIENT
Start: 2020-09-23 | End: 2021-04-02

## 2020-10-15 ENCOUNTER — CLINICAL SUPPORT (OUTPATIENT)
Dept: INTERNAL MEDICINE CLINIC | Age: 80
End: 2020-10-15
Payer: MEDICARE

## 2020-10-15 DIAGNOSIS — I26.99 ACUTE PULMONARY EMBOLISM, UNSPECIFIED PULMONARY EMBOLISM TYPE, UNSPECIFIED WHETHER ACUTE COR PULMONALE PRESENT (HCC): Primary | ICD-10-CM

## 2020-10-15 LAB
INR BLD: 2.2
PT POC: 26.9 SECONDS
VALID INTERNAL CONTROL?: YES

## 2020-10-15 PROCEDURE — 85610 PROTHROMBIN TIME: CPT | Performed by: INTERNAL MEDICINE

## 2020-10-16 NOTE — PROGRESS NOTES
Chief Complaint   Patient presents with    Coagulation disorder     Patient in office for pt/inr check. Patient states that she is taking coumadin 5 mg daily and 7.5 mg Sat and Sun. Results for orders placed or performed in visit on 10/15/20   AMB POC PT/INR   Result Value Ref Range    VALID INTERNAL CONTROL POC Yes     Prothrombin time (POC) 26.9 seconds    INR POC 2.2        Per Dr. Leeroy Salter, no changes and return to office in one month for pt/inr check.

## 2020-11-07 RX ORDER — POTASSIUM CHLORIDE 20 MEQ/1
TABLET, EXTENDED RELEASE ORAL
Qty: 60 TAB | Refills: 11 | Status: SHIPPED | OUTPATIENT
Start: 2020-11-07 | End: 2021-11-20

## 2020-11-16 ENCOUNTER — CLINICAL SUPPORT (OUTPATIENT)
Dept: INTERNAL MEDICINE CLINIC | Age: 80
End: 2020-11-16
Payer: MEDICARE

## 2020-11-16 DIAGNOSIS — I48.20 CHRONIC ATRIAL FIBRILLATION (HCC): Primary | ICD-10-CM

## 2020-11-16 LAB
INR BLD: 2.4
PT POC: 28.9 SECONDS
VALID INTERNAL CONTROL?: YES

## 2020-11-16 PROCEDURE — 85610 PROTHROMBIN TIME: CPT | Performed by: INTERNAL MEDICINE

## 2020-11-16 NOTE — PROGRESS NOTES
Chief Complaint   Patient presents with    Coagulation disorder     Patient in office for pt/inr check. Patient states that she is taking coumadin 5 mg M-F, and 7.5 mg Sat and Sun. Results for orders placed or performed in visit on 11/16/20   AMB POC PT/INR   Result Value Ref Range    VALID INTERNAL CONTROL POC Yes     Prothrombin time (POC) 28.9 seconds    INR POC 2.4          Per Dr. Carol Washington, no changes and return to office in one month for pt/inr check.

## 2020-12-17 ENCOUNTER — OFFICE VISIT (OUTPATIENT)
Dept: CARDIOLOGY CLINIC | Age: 80
End: 2020-12-17
Payer: MEDICARE

## 2020-12-17 DIAGNOSIS — I48.20 CHRONIC ATRIAL FIBRILLATION (HCC): ICD-10-CM

## 2020-12-17 DIAGNOSIS — Z95.0 CARDIAC PACEMAKER IN SITU: Primary | ICD-10-CM

## 2020-12-17 DIAGNOSIS — I50.22 CHRONIC SYSTOLIC CONGESTIVE HEART FAILURE (HCC): ICD-10-CM

## 2020-12-17 PROCEDURE — 93294 REM INTERROG EVL PM/LDLS PM: CPT | Performed by: INTERNAL MEDICINE

## 2020-12-17 PROCEDURE — 93296 REM INTERROG EVL PM/IDS: CPT | Performed by: INTERNAL MEDICINE

## 2021-01-09 RX ORDER — WARFARIN SODIUM 5 MG/1
TABLET ORAL
Qty: 32 TAB | Refills: 11 | Status: SHIPPED | OUTPATIENT
Start: 2021-01-09 | End: 2022-01-05

## 2021-01-18 ENCOUNTER — CLINICAL SUPPORT (OUTPATIENT)
Dept: INTERNAL MEDICINE CLINIC | Age: 81
End: 2021-01-18
Payer: MEDICARE

## 2021-01-18 DIAGNOSIS — Z51.81 ENCOUNTER FOR MONITORING COUMADIN THERAPY: Primary | ICD-10-CM

## 2021-01-18 DIAGNOSIS — Z79.01 ENCOUNTER FOR MONITORING COUMADIN THERAPY: Primary | ICD-10-CM

## 2021-01-18 LAB
INR BLD: 1.6
PT POC: 19 SECONDS
VALID INTERNAL CONTROL?: YES

## 2021-01-18 PROCEDURE — 85610 PROTHROMBIN TIME: CPT | Performed by: INTERNAL MEDICINE

## 2021-01-18 NOTE — PROGRESS NOTES
Celso Yip is a [de-identified] y.o. female who presents today for Anticoagulation monitoring. Current dose:  Coumadin 5 mg has been on hold x 4 days for dental procedure tomorrow  Medication Changes:  yes - resume coumadin 48 hrs after procedure is done  Dietary Changes:  no    Latest INR:  Results for orders placed or performed in visit on 01/18/21   AMB POC PT/INR   Result Value Ref Range    VALID INTERNAL CONTROL POC Yes     Prothrombin time (POC) 19.0 seconds    INR POC 1.6          New Coumadin dose:.current treatment plan is effective, no change in therapy. Next check to be scheduled for  3 weeks.   Per Dr. Mayuri Lopez LPN

## 2021-02-10 ENCOUNTER — OFFICE VISIT (OUTPATIENT)
Dept: INTERNAL MEDICINE CLINIC | Age: 81
End: 2021-02-10
Payer: MEDICARE

## 2021-02-10 VITALS
BODY MASS INDEX: 32.16 KG/M2 | HEART RATE: 75 BPM | RESPIRATION RATE: 14 BRPM | TEMPERATURE: 98.1 F | OXYGEN SATURATION: 98 % | WEIGHT: 224.6 LBS | SYSTOLIC BLOOD PRESSURE: 142 MMHG | HEIGHT: 70 IN | DIASTOLIC BLOOD PRESSURE: 91 MMHG

## 2021-02-10 DIAGNOSIS — I48.20 CHRONIC ATRIAL FIBRILLATION (HCC): Chronic | ICD-10-CM

## 2021-02-10 DIAGNOSIS — I87.2 VENOUS INSUFFICIENCY: ICD-10-CM

## 2021-02-10 DIAGNOSIS — M17.12 PRIMARY OSTEOARTHRITIS OF LEFT KNEE: ICD-10-CM

## 2021-02-10 DIAGNOSIS — I10 ESSENTIAL HYPERTENSION: ICD-10-CM

## 2021-02-10 DIAGNOSIS — M17.0 PRIMARY OSTEOARTHRITIS OF BOTH KNEES: Primary | ICD-10-CM

## 2021-02-10 DIAGNOSIS — E66.9 OBESITY (BMI 30.0-34.9): ICD-10-CM

## 2021-02-10 DIAGNOSIS — E05.90 HYPERTHYROIDISM: ICD-10-CM

## 2021-02-10 DIAGNOSIS — I42.8 OTHER CARDIOMYOPATHY (HCC): ICD-10-CM

## 2021-02-10 DIAGNOSIS — C90.01 MULTIPLE MYELOMA IN REMISSION (HCC): ICD-10-CM

## 2021-02-10 LAB
ANION GAP SERPL CALC-SCNC: 6 MMOL/L (ref 5–15)
BUN SERPL-MCNC: 11 MG/DL (ref 6–20)
BUN/CREAT SERPL: 13 (ref 12–20)
CALCIUM SERPL-MCNC: 9.6 MG/DL (ref 8.5–10.1)
CHLORIDE SERPL-SCNC: 107 MMOL/L (ref 97–108)
CO2 SERPL-SCNC: 29 MMOL/L (ref 21–32)
CREAT SERPL-MCNC: 0.86 MG/DL (ref 0.55–1.02)
GLUCOSE SERPL-MCNC: 97 MG/DL (ref 65–100)
INR BLD: 2.5
POTASSIUM SERPL-SCNC: 3.5 MMOL/L (ref 3.5–5.1)
PT POC: 30.6 SECONDS (ref 2.5–?)
SODIUM SERPL-SCNC: 142 MMOL/L (ref 136–145)
VALID INTERNAL CONTROL?: YES

## 2021-02-10 PROCEDURE — G8510 SCR DEP NEG, NO PLAN REQD: HCPCS | Performed by: INTERNAL MEDICINE

## 2021-02-10 PROCEDURE — G8536 NO DOC ELDER MAL SCRN: HCPCS | Performed by: INTERNAL MEDICINE

## 2021-02-10 PROCEDURE — 99215 OFFICE O/P EST HI 40 MIN: CPT | Performed by: INTERNAL MEDICINE

## 2021-02-10 PROCEDURE — G8399 PT W/DXA RESULTS DOCUMENT: HCPCS | Performed by: INTERNAL MEDICINE

## 2021-02-10 PROCEDURE — G8417 CALC BMI ABV UP PARAM F/U: HCPCS | Performed by: INTERNAL MEDICINE

## 2021-02-10 PROCEDURE — 85610 PROTHROMBIN TIME: CPT | Performed by: INTERNAL MEDICINE

## 2021-02-10 PROCEDURE — 96372 THER/PROPH/DIAG INJ SC/IM: CPT | Performed by: INTERNAL MEDICINE

## 2021-02-10 PROCEDURE — G8753 SYS BP > OR = 140: HCPCS | Performed by: INTERNAL MEDICINE

## 2021-02-10 PROCEDURE — G8427 DOCREV CUR MEDS BY ELIG CLIN: HCPCS | Performed by: INTERNAL MEDICINE

## 2021-02-10 PROCEDURE — G8755 DIAS BP > OR = 90: HCPCS | Performed by: INTERNAL MEDICINE

## 2021-02-10 PROCEDURE — 1090F PRES/ABSN URINE INCON ASSESS: CPT | Performed by: INTERNAL MEDICINE

## 2021-02-10 PROCEDURE — 1101F PT FALLS ASSESS-DOCD LE1/YR: CPT | Performed by: INTERNAL MEDICINE

## 2021-02-10 RX ORDER — LIDOCAINE HYDROCHLORIDE 10 MG/ML
5 INJECTION INFILTRATION; PERINEURAL ONCE
Status: COMPLETED | OUTPATIENT
Start: 2021-02-10 | End: 2021-02-10

## 2021-02-10 RX ORDER — TRIAMCINOLONE ACETONIDE 40 MG/ML
40 INJECTION, SUSPENSION INTRA-ARTICULAR; INTRAMUSCULAR ONCE
Status: COMPLETED | OUTPATIENT
Start: 2021-02-10 | End: 2021-02-10

## 2021-02-10 RX ADMIN — LIDOCAINE HYDROCHLORIDE 5 ML: 10 INJECTION INFILTRATION; PERINEURAL at 11:00

## 2021-02-10 RX ADMIN — TRIAMCINOLONE ACETONIDE 40 MG: 40 INJECTION, SUSPENSION INTRA-ARTICULAR; INTRAMUSCULAR at 11:00

## 2021-02-10 NOTE — PROGRESS NOTES
Chief Complaint   Patient presents with    Irregular Heart Beat     routine follow up         1. Have you been to the ER, urgent care clinic since your last visit? Hospitalized since your last visit? No    2. Have you seen or consulted any other health care providers outside of the [address] [place] [address] since your last visit? Include any pap smears or colon screening.  No

## 2021-02-10 NOTE — PROGRESS NOTES
[address] [facility]  [place] [address]  [address] [address] [facility] [address] [address]  Phone:  [phone]  Fax: [phone]       Chief Complaint   Patient presents with    Irregular Heart Beat     routine follow up   . SUBJECTIVE:    Elton Claude is a [de-identified] y.o. female comes in for a return visit stating that she has done reasonably well. She continues to have pain in her back radiating to right buttock, but this is getting worse than usual.    She has noted slight increased urinary frequency. Three or four days ago, she developed increasing dizziness described as vertiginous. She has meclizine, but needs a refill on this. She is not getting any chemotherapy for her multiple myeloma, which presumably is quite stable. She has a history of paroxysmal atrial fibrillation and takes warfarin on a regular basis, just 5 mg five days a week and 2.5 mg on Saturday and Sunday. INR remains excellent. Finally, her biggest complaint is that of pain in her knees, particularly her left knee. It interferes with her ability to ambulate. Fortunately, she has not fallen. She has not lost any meaningful weight. She has a past history of primary hypertension and dyslipidemia. Current Outpatient Medications   Medication Sig Dispense Refill    warfarin (COUMADIN) 5 mg tablet take 2 tablets by mouth daily for 2 days then take 1 tablet daily THEREAFTER 32 Tab 11    potassium chloride (K-DUR, KLOR-CON) 20 mEq tablet take 1 tablet by mouth twice a day 60 Tab 11    metoprolol succinate (TOPROL-XL) 25 mg XL tablet take 1/2 tablet by mouth once daily 90 Tab 3    cefUROXime (CEFTIN) 500 mg tablet Take 1 Tab by mouth two (2) times a day. 14 Tab 0    furosemide (LASIX) 40 mg tablet take 1 tablet by mouth once daily 30 Tab 11    lisinopriL (PRINIVIL, ZESTRIL) 40 mg tablet take 1 tablet by mouth once daily 90 Tab 3    amLODIPine (NORVASC) 10 mg tablet Take 1 Tab by mouth daily.  30 Tab 11    cephALEXin (KEFLEX) 500 mg capsule Take 1 Cap by mouth three (3) times daily. 15 Cap 0    celecoxib (CELEBREX) 200 mg capsule Take 1 capsule twice a day after meals as needed for joint pain 60 Cap 3    oxyCODONE-acetaminophen (PERCOCET)  mg per tablet Take 1 Tab by mouth every eight (8) hours as needed for Pain. Max Daily Amount: 3 Tabs. 40 Tab 0    menthol (BIOFREEZE, MENTHOL,) 4 % gel 1 g by Apply Externally route every six (6) hours. 118 mL 0    cyclobenzaprine (FLEXERIL) 5 mg tablet Take 1 Tab by mouth three (3) times daily as needed for Muscle Spasm(s). 15 Tab 0    aluminum hydrox-magnesium carb (GAVISCON EXTRA STRENGTH) 160-105 mg chew Take 1-2 tablets by mouth four (4) times daily as needed.        Past Medical History:   Diagnosis Date    Acute combined systolic and diastolic HF (heart failure), NYHA class 2 ([facility] 8/11/2017    Arthritis     Atrial fibrillation (HCC)     Cancer (HCC)     multiple myeloma    Hypertension     S/P AV kathleen ablation 8/11/2017    Status post biventricular pacemaker 8/11/2017 8/11/17      Past Surgical History:   Procedure Laterality Date    HX GYN      HX ORTHOPAEDIC      knee    HX PACEMAKER  08/11/2017     Allergies   Allergen Reactions    Codeine Other (comments)         REVIEW OF SYSTEMS:  General: negative for - chills or fever  ENT: negative for - headaches, nasal congestion or tinnitus  Respiratory: negative for - cough, hemoptysis, shortness of breath or wheezing  Cardiovascular : negative for - chest pain, edema, palpitations or shortness of breath  Gastrointestinal: negative for - abdominal pain, blood in stools, heartburn or nausea/vomiting  Genito-Urinary: no dysuria, trouble voiding, or hematuria  Musculoskeletal: negative for - gait disturbance, joint pain, joint stiffness or joint swelling  Neurological: no TIA or stroke symptoms  Hematologic: no bruises, no bleeding, no swollen glands  Integument: no lumps, mole changes, nail changes or rash  Endocrine: no malaise/lethargy or unexpected weight changes      Social History     Socioeconomic History    Marital status:      Spouse name: Not on file    Number of children: 3    Years of education: Not on file    Highest education level: Not on file   Occupational History    Occupation: Retired from the [place] [address] resource strain: Not hard at all   [employer] insecurity     Worry: Never true     Inability: Never true   [employer] needs     Medical: No     Non-medical: No   Tobacco Use    Smoking status: Never Smoker    Smokeless tobacco: Never Used   Substance and Sexual Activity    Alcohol use: No    Drug use: No    Sexual activity: Not Currently   Lifestyle    Physical activity     Days per week: 0 days     Minutes per session: 0 min    Stress: Not at all   Relationships    Social connections     Talks on phone: More than three times a week     Gets together: More than three times a week     Attends [employer] service: More than 4 times per year     Active member of club or organization: Yes     Attends meetings of clubs or organizations: More than 4 times per year     Relationship status:     Other Topics Concern     Service No    Blood Transfusions No    Caffeine Concern No    Occupational Exposure No    Hobby Hazards No    Sleep Concern No    Stress Concern No    Weight Concern Yes    Special Diet Yes     Comment: HTN/  Low Sugary    Back Care Yes    Exercise No    Bike Helmet No    Seat Belt Yes    Self-Exams Yes     Family History   Problem Relation Age of Onset    Stroke Mother     No Known Problems Father        OBJECTIVE:    Visit Vitals  BP (!) 142/91   Pulse 75   Temp 98.1 °F (36.7 °C) (Oral)   Resp 14   Ht 5' 10\" (1.778 m)   Wt 224 lb 9.6 oz (101.9 kg)   SpO2 98%   BMI 32.23 kg/m²     CONSTITUTIONAL: well , well nourished, appears age appropriate  EYES: perrla, eom intact  ENMT:moist mucous membranes, pharynx clear  NECK: supple. Thyroid normal  RESPIRATORY: Chest: clear to ascultation and percussion   CARDIOVASCULAR: Heart: regular rate and rhythm  GASTROINTESTINAL: Abdomen: soft, bowel sounds active  HEMATOLOGIC: no pathological lymph nodes palpated  MUSCULOSKELETAL: Extremities: trace edema, pulse 1+, mild degenerative changes noted both knees, severe pain elicited to range of motion left knee  INTEGUMENT: No unusual rashes or suspicious skin lesions noted. Nails appear normal.  NEUROLOGIC: non-focal exam   MENTAL STATUS: alert and oriented, appropriate affect      ASSESSMENT:  1. Primary osteoarthritis of both knees    2. Chronic atrial fibrillation (HCC)    3. Venous insufficiency    4. Other cardiomyopathy ([facility]    5. Essential hypertension    6. Hyperthyroidism    7. Multiple myeloma in remission ([facility]    8. Obesity (BMI 30.0-34.9)    9. Primary osteoarthritis of left knee        PLAN:  1. The patient has had a flare of osteoarthritis involving her left knee predominantly. She has severe osteoarthritis involving both knees, however. 2. With a sterile technique, I injected Kenalog 40 mg and Xylocaine 1% 5 mL in to the lateral aspect of the left knee. She tolerates the procedure well. 3. Her ventricular rate is quite stable. She remains on warfarin and her INR is excellent today; therefore, no changes are made. She will continue the same dose that I commented earlier that she was indeed taking. 4. She does have venous insufficiency in both lower extremities, but this is not bad currently. 5. No overt signs of cardiac decompensation exist today. 6. Blood pressure is excellent. No adjustments are made. 7. Her multiple myeloma appears to be doing quite well and she obviously is in a remission; therefore, no chemotherapy. 8. I encouraged her to lose weight. This can be accomplished by eating meals, eliminating snacks, and avoiding the consumption of processed carbohydrates.   9. She does have a history of hypothyroidism. TSH will be checked today. .  Orders Placed This Encounter    METABOLIC PANEL, BASIC    TSH 3RD GENERATION    AMB POC PT/INR    lidocaine (XYLOCAINE) 10 mg/mL (1 %) injection 5 mL    triamcinolone acetonide (KENALOG-40) 40 mg/mL injection 40 mg         Follow-up and Dispositions    · Return in about 4 months (around 6/10/2021), or monthly for INR.            Jose A Knott MD

## 2021-02-16 RX ORDER — MECLIZINE HCL 12.5 MG 12.5 MG/1
12.5 TABLET ORAL
Qty: 60 TAB | Refills: 5 | Status: SHIPPED | OUTPATIENT
Start: 2021-02-16 | End: 2021-02-26

## 2021-03-01 ENCOUNTER — IMMUNIZATION (OUTPATIENT)
Dept: FAMILY MEDICINE CLINIC | Age: 81
End: 2021-03-01

## 2021-03-01 DIAGNOSIS — Z23 ENCOUNTER FOR IMMUNIZATION: Primary | ICD-10-CM

## 2021-03-01 PROCEDURE — 91300 COVID-19, MRNA, LNP-S, PF, 30MCG/0.3ML DOSE(PFIZER): CPT

## 2021-03-01 PROCEDURE — 0001A COVID-19, MRNA, LNP-S, PF, 30MCG/0.3ML DOSE(PFIZER): CPT

## 2021-03-15 ENCOUNTER — HOSPITAL ENCOUNTER (EMERGENCY)
Age: 81
Discharge: HOME | End: 2021-03-15
Attending: EMERGENCY MEDICINE
Payer: MEDICARE

## 2021-03-15 ENCOUNTER — APPOINTMENT (OUTPATIENT)
Dept: CT IMAGING | Age: 81
End: 2021-03-15
Attending: EMERGENCY MEDICINE
Payer: MEDICARE

## 2021-03-15 VITALS
OXYGEN SATURATION: 98 % | BODY MASS INDEX: 32.89 KG/M2 | HEIGHT: 70 IN | DIASTOLIC BLOOD PRESSURE: 71 MMHG | WEIGHT: 229.72 LBS | TEMPERATURE: 98.1 F | SYSTOLIC BLOOD PRESSURE: 166 MMHG | RESPIRATION RATE: 16 BRPM | HEART RATE: 74 BPM

## 2021-03-15 DIAGNOSIS — M54.50 ACUTE RIGHT-SIDED LOW BACK PAIN WITHOUT SCIATICA: Primary | ICD-10-CM

## 2021-03-15 DIAGNOSIS — N39.0 URINARY TRACT INFECTION WITHOUT HEMATURIA, SITE UNSPECIFIED: ICD-10-CM

## 2021-03-15 LAB
ALBUMIN SERPL-MCNC: 3.9 G/DL (ref 3.5–5)
ALBUMIN/GLOB SERPL: 1 {RATIO} (ref 1.1–2.2)
ALP SERPL-CCNC: 91 U/L (ref 45–117)
ALT SERPL-CCNC: 22 U/L (ref 12–78)
ANION GAP SERPL CALC-SCNC: 0 MMOL/L (ref 5–15)
APPEARANCE UR: CLEAR
AST SERPL-CCNC: 15 U/L (ref 15–37)
BACTERIA URNS QL MICRO: ABNORMAL /HPF
BASOPHILS # BLD: 0 K/UL (ref 0–0.1)
BASOPHILS NFR BLD: 1 % (ref 0–1)
BILIRUB SERPL-MCNC: 0.5 MG/DL (ref 0.2–1)
BILIRUB UR QL: NEGATIVE
BUN SERPL-MCNC: 14 MG/DL (ref 6–20)
BUN/CREAT SERPL: 15 (ref 12–20)
CALCIUM SERPL-MCNC: 9.5 MG/DL (ref 8.5–10.1)
CHLORIDE SERPL-SCNC: 108 MMOL/L (ref 97–108)
CO2 SERPL-SCNC: 33 MMOL/L (ref 21–32)
COLOR UR: ABNORMAL
CREAT SERPL-MCNC: 0.92 MG/DL (ref 0.55–1.02)
DIFFERENTIAL METHOD BLD: ABNORMAL
EOSINOPHIL # BLD: 0 K/UL (ref 0–0.4)
EOSINOPHIL NFR BLD: 1 % (ref 0–7)
EPITH CASTS URNS QL MICRO: ABNORMAL /LPF
ERYTHROCYTE [DISTWIDTH] IN BLOOD BY AUTOMATED COUNT: 15.4 % (ref 11.5–14.5)
GLOBULIN SER CALC-MCNC: 4.1 G/DL (ref 2–4)
GLUCOSE SERPL-MCNC: 68 MG/DL (ref 65–100)
GLUCOSE UR STRIP.AUTO-MCNC: NEGATIVE MG/DL
HCT VFR BLD AUTO: 38.8 % (ref 35–47)
HGB BLD-MCNC: 11.8 G/DL (ref 11.5–16)
HGB UR QL STRIP: NEGATIVE
HYALINE CASTS URNS QL MICRO: ABNORMAL /LPF (ref 0–5)
IMM GRANULOCYTES # BLD AUTO: 0 K/UL (ref 0–0.04)
IMM GRANULOCYTES NFR BLD AUTO: 0 % (ref 0–0.5)
INR PPP: 2.2 (ref 0.9–1.1)
KETONES UR QL STRIP.AUTO: NEGATIVE MG/DL
LEUKOCYTE ESTERASE UR QL STRIP.AUTO: ABNORMAL
LYMPHOCYTES # BLD: 0.7 K/UL (ref 0.8–3.5)
LYMPHOCYTES NFR BLD: 20 % (ref 12–49)
MCH RBC QN AUTO: 26.2 PG (ref 26–34)
MCHC RBC AUTO-ENTMCNC: 30.4 G/DL (ref 30–36.5)
MCV RBC AUTO: 86 FL (ref 80–99)
MONOCYTES # BLD: 0.5 K/UL (ref 0–1)
MONOCYTES NFR BLD: 13 % (ref 5–13)
NEUTS SEG # BLD: 2.5 K/UL (ref 1.8–8)
NEUTS SEG NFR BLD: 65 % (ref 32–75)
NITRITE UR QL STRIP.AUTO: NEGATIVE
NRBC # BLD: 0 K/UL (ref 0–0.01)
NRBC BLD-RTO: 0 PER 100 WBC
PH UR STRIP: 6 [PH] (ref 5–8)
PLATELET # BLD AUTO: 172 K/UL (ref 150–400)
PMV BLD AUTO: 10.5 FL (ref 8.9–12.9)
POTASSIUM SERPL-SCNC: 3.3 MMOL/L (ref 3.5–5.1)
PROT SERPL-MCNC: 8 G/DL (ref 6.4–8.2)
PROT UR STRIP-MCNC: NEGATIVE MG/DL
PROTHROMBIN TIME: 22.4 SEC (ref 9–11.1)
RBC # BLD AUTO: 4.51 M/UL (ref 3.8–5.2)
RBC #/AREA URNS HPF: ABNORMAL /HPF (ref 0–5)
RBC MORPH BLD: ABNORMAL
SODIUM SERPL-SCNC: 141 MMOL/L (ref 136–145)
SP GR UR REFRACTOMETRY: 1.01 (ref 1–1.03)
UA: UC IF INDICATED,UAUC: ABNORMAL
UROBILINOGEN UR QL STRIP.AUTO: 0.2 EU/DL (ref 0.2–1)
WBC # BLD AUTO: 3.7 K/UL (ref 3.6–11)
WBC URNS QL MICRO: ABNORMAL /HPF (ref 0–4)

## 2021-03-15 PROCEDURE — 99283 EMERGENCY DEPT VISIT LOW MDM: CPT

## 2021-03-15 PROCEDURE — 85610 PROTHROMBIN TIME: CPT

## 2021-03-15 PROCEDURE — 96374 THER/PROPH/DIAG INJ IV PUSH: CPT

## 2021-03-15 PROCEDURE — 81001 URINALYSIS AUTO W/SCOPE: CPT

## 2021-03-15 PROCEDURE — 74011250636 HC RX REV CODE- 250/636: Performed by: EMERGENCY MEDICINE

## 2021-03-15 PROCEDURE — 80053 COMPREHEN METABOLIC PANEL: CPT

## 2021-03-15 PROCEDURE — 36415 COLL VENOUS BLD VENIPUNCTURE: CPT

## 2021-03-15 PROCEDURE — 74176 CT ABD & PELVIS W/O CONTRAST: CPT

## 2021-03-15 PROCEDURE — 85025 COMPLETE CBC W/AUTO DIFF WBC: CPT

## 2021-03-15 RX ORDER — KETOROLAC TROMETHAMINE 30 MG/ML
15 INJECTION, SOLUTION INTRAMUSCULAR; INTRAVENOUS
Status: COMPLETED | OUTPATIENT
Start: 2021-03-15 | End: 2021-03-15

## 2021-03-15 RX ORDER — METHOCARBAMOL 500 MG/1
500 TABLET, FILM COATED ORAL
Qty: 15 TAB | Refills: 0 | Status: SHIPPED | OUTPATIENT
Start: 2021-03-15 | End: 2022-03-23

## 2021-03-15 RX ORDER — CEFDINIR 300 MG/1
300 CAPSULE ORAL 2 TIMES DAILY
Qty: 14 CAP | Refills: 0 | Status: SHIPPED | OUTPATIENT
Start: 2021-03-15 | End: 2021-04-02

## 2021-03-15 RX ADMIN — KETOROLAC TROMETHAMINE 15 MG: 30 INJECTION, SOLUTION INTRAMUSCULAR at 11:37

## 2021-03-15 NOTE — ED PROVIDER NOTES
EMERGENCY DEPARTMENT HISTORY AND PHYSICAL EXAM      Date: 3/15/2021  Patient Name: Elton Claude    History of Presenting Illness     Chief Complaint   Patient presents with    Back Pain     rt low back pain radiating around to her abdomen x 3 weeks. pt states she feels nauseous       History Provided By: Patient    HPI: Elton Claude, [de-identified] y.o. female presents to the ED with cc of back pain. Patient reports pain in back. R sided lower back. Initially thought was a pulled muscle and trying OTC remedies. Pain radiates around to abdomen. Not improving. Increased urination. R sided back pain, intermittent, worse with movement. Radiates to front. Patient reports associated nausea. No abdominal pain. No chest pain or shortness of breath. Patient is on Coumadin for atrial fibrillation, she has not checked her INR. Denies trauma. No fevers or chills. No rash. There are no other complaints, changes, or physical findings at this time. PCP: Jude Malin MD    No current facility-administered medications on file prior to encounter. Current Outpatient Medications on File Prior to Encounter   Medication Sig Dispense Refill    warfarin (COUMADIN) 5 mg tablet take 2 tablets by mouth daily for 2 days then take 1 tablet daily THEREAFTER 32 Tab 11    potassium chloride (K-DUR, KLOR-CON) 20 mEq tablet take 1 tablet by mouth twice a day 60 Tab 11    metoprolol succinate (TOPROL-XL) 25 mg XL tablet take 1/2 tablet by mouth once daily 90 Tab 3    cefUROXime (CEFTIN) 500 mg tablet Take 1 Tab by mouth two (2) times a day. 14 Tab 0    furosemide (LASIX) 40 mg tablet take 1 tablet by mouth once daily 30 Tab 11    lisinopriL (PRINIVIL, ZESTRIL) 40 mg tablet take 1 tablet by mouth once daily 90 Tab 3    amLODIPine (NORVASC) 10 mg tablet Take 1 Tab by mouth daily. 30 Tab 11    cephALEXin (KEFLEX) 500 mg capsule Take 1 Cap by mouth three (3) times daily.  15 Cap 0    celecoxib (CELEBREX) 200 mg capsule Take 1 capsule twice a day after meals as needed for joint pain 60 Cap 3    oxyCODONE-acetaminophen (PERCOCET)  mg per tablet Take 1 Tab by mouth every eight (8) hours as needed for Pain. Max Daily Amount: 3 Tabs. 40 Tab 0    menthol (BIOFREEZE, MENTHOL,) 4 % gel 1 g by Apply Externally route every six (6) hours. 118 mL 0    cyclobenzaprine (FLEXERIL) 5 mg tablet Take 1 Tab by mouth three (3) times daily as needed for Muscle Spasm(s). 15 Tab 0    aluminum hydrox-magnesium carb (GAVISCON EXTRA STRENGTH) 160-105 mg chew Take 1-2 tablets by mouth four (4) times daily as needed. 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), NYHA class 2 ([facility] [place] [address] 8/11/2017    Arthritis     Atrial fibrillation (HCC)     Cancer (HCC)     multiple myeloma    Hypertension     S/P AV kathleen ablation 8/11/2017    Status post biventricular pacemaker 8/11/2017 8/11/17        Past Surgical History:  Past Surgical History:   Procedure Laterality Date    HX GYN      HX ORTHOPAEDIC      knee    HX PACEMAKER  08/11/2017       Family History:  Family History   Problem Relation Age of Onset    Stroke Mother     No Known Problems Father        Social History:  Social History     Tobacco Use    Smoking status: Never Smoker    Smokeless tobacco: Never Used   Substance Use Topics    Alcohol use: No    Drug use: No       Allergies: Allergies   Allergen Reactions    Codeine Other (comments)         Review of Systems   Review of Systems   Constitutional: Negative for activity change, chills and fever. HENT: Negative for facial swelling and voice change. Eyes: Negative for redness. Respiratory: Negative for cough, shortness of breath and wheezing. Cardiovascular: Negative for chest pain and leg swelling. Gastrointestinal: Negative for abdominal pain, diarrhea, nausea and vomiting. Endocrine: Positive for polyuria. Genitourinary: Positive for flank pain. Negative for decreased urine volume. Musculoskeletal: Positive for arthralgias and back pain. Negative for gait problem. Skin: Negative for pallor and rash. Neurological: Negative for tremors and facial asymmetry. Psychiatric/Behavioral: Negative for agitation. All other systems reviewed and are negative. Physical Exam   Physical Exam  Vitals signs and nursing note reviewed. Constitutional:       Comments: 80-year-old female, appears younger than stated age   HENT:      Head: Normocephalic and atraumatic. Neck:      Musculoskeletal: Normal range of motion. Cardiovascular:      Rate and Rhythm: Normal rate and regular rhythm. Heart sounds: No murmur. No friction rub. No gallop. Pulmonary:      Effort: Pulmonary effort is normal.      Breath sounds: Normal breath sounds. Abdominal:      Palpations: Abdomen is soft. Tenderness: There is no abdominal tenderness. Musculoskeletal: Normal range of motion. General: Tenderness (Right SI joint and right paraspinal) present. No swelling. Skin:     General: Skin is warm. Capillary Refill: Capillary refill takes less than 2 seconds. Neurological:      General: No focal deficit present. Mental Status: She is alert.       Comments: No focal weakness in lower extremities   Psychiatric:         Mood and Affect: Mood normal.         Diagnostic Study Results     Labs -     Recent Results (from the past 12 hour(s))   CBC WITH AUTOMATED DIFF    Collection Time: 03/15/21 11:21 AM   Result Value Ref Range    WBC 3.7 3.6 - 11.0 K/uL    RBC 4.51 3.80 - 5.20 M/uL    HGB 11.8 11.5 - 16.0 g/dL    HCT 38.8 35.0 - 47.0 %    MCV 86.0 80.0 - 99.0 FL    MCH 26.2 26.0 - 34.0 PG    MCHC 30.4 30.0 - 36.5 g/dL    RDW 15.4 (H) 11.5 - 14.5 %    PLATELET 417 402 - 676 K/uL    MPV 10.5 8.9 - 12.9 FL    NRBC 0.0 0  WBC    ABSOLUTE NRBC 0.00 0.00 - 0.01 K/uL    NEUTROPHILS 65 32 - 75 %    LYMPHOCYTES 20 12 - 49 %    MONOCYTES 13 5 - 13 %    EOSINOPHILS 1 0 - 7 %    BASOPHILS 1 0 - 1 %    IMMATURE GRANULOCYTES 0 0.0 - 0.5 %    ABS. NEUTROPHILS 2.5 1.8 - 8.0 K/UL    ABS. LYMPHOCYTES 0.7 (L) 0.8 - 3.5 K/UL    ABS. MONOCYTES 0.5 0.0 - 1.0 K/UL    ABS. EOSINOPHILS 0.0 0.0 - 0.4 K/UL    ABS. BASOPHILS 0.0 0.0 - 0.1 K/UL    ABS. IMM. GRANS. 0.0 0.00 - 0.04 K/UL    DF SMEAR SCANNED      RBC COMMENTS NORMOCYTIC, NORMOCHROMIC     METABOLIC PANEL, COMPREHENSIVE    Collection Time: 03/15/21 11:21 AM   Result Value Ref Range    Sodium 141 136 - 145 mmol/L    Potassium 3.3 (L) 3.5 - 5.1 mmol/L    Chloride 108 97 - 108 mmol/L    CO2 33 (H) 21 - 32 mmol/L    Anion gap 0 (L) 5 - 15 mmol/L    Glucose 68 65 - 100 mg/dL    BUN 14 6 - 20 MG/DL    Creatinine 0.92 0.55 - 1.02 MG/DL    BUN/Creatinine ratio 15 12 - 20      GFR est AA >60 >60 ml/min/1.73m2    GFR est non-AA 59 (L) >60 ml/min/1.73m2    Calcium 9.5 8.5 - 10.1 MG/DL    Bilirubin, total 0.5 0.2 - 1.0 MG/DL    ALT (SGPT) 22 12 - 78 U/L    AST (SGOT) 15 15 - 37 U/L    Alk.  phosphatase 91 45 - 117 U/L    Protein, total 8.0 6.4 - 8.2 g/dL    Albumin 3.9 3.5 - 5.0 g/dL    Globulin 4.1 (H) 2.0 - 4.0 g/dL    A-G Ratio 1.0 (L) 1.1 - 2.2     PROTHROMBIN TIME + INR    Collection Time: 03/15/21 11:21 AM   Result Value Ref Range    INR 2.2 (H) 0.9 - 1.1      Prothrombin time 22.4 (H) 9.0 - 11.1 sec   URINALYSIS W/ REFLEX CULTURE    Collection Time: 03/15/21 11:22 AM    Specimen: Urine   Result Value Ref Range    Color YELLOW/STRAW      Appearance CLEAR CLEAR      Specific gravity 1.006 1.003 - 1.030      pH (UA) 6.0 5.0 - 8.0      Protein Negative NEG mg/dL    Glucose Negative NEG mg/dL    Ketone Negative NEG mg/dL    Bilirubin Negative NEG      Blood Negative NEG      Urobilinogen 0.2 0.2 - 1.0 EU/dL    Nitrites Negative NEG      Leukocyte Esterase TRACE (A) NEG      WBC 5-10 0 - 4 /hpf    RBC 0-5 0 - 5 /hpf    Epithelial cells FEW FEW /lpf    Bacteria 1+ (A) NEG /hpf    UA:UC IF INDICATED CULTURE NOT INDICATED BY UA RESULT CNI      Hyaline cast 0-2 0 - 5 /lpf       Radiologic Studies -   CT ABD PELV WO CONT   Final Result   No renal or ureteral stone or evidence of hydronephrosis. Bilateral renal cysts   again demonstrated. No acute abnormality. CT Results  (Last 48 hours)               03/15/21 1041  CT ABD PELV WO CONT Final result    Impression:  No renal or ureteral stone or evidence of hydronephrosis. Bilateral renal cysts   again demonstrated. No acute abnormality. Narrative:  EXAM: CT ABD PELV WO CONT       INDICATION: R flank pain, nausea       COMPARISON: 4/16/2018       CONTRAST:  None. TECHNIQUE:    Thin axial images were obtained through the abdomen and pelvis. Coronal and   sagittal reformats were generated. Oral contrast was not administered. CT dose   reduction was achieved through use of a standardized protocol tailored for this   examination and automatic exposure control for dose modulation. The absence of intravenous contrast material reduces the sensitivity for   evaluation of the vasculature and solid organs. FINDINGS:    LOWER THORAX: No significant abnormality in the incidentally imaged lower chest.   LIVER: No mass. BILIARY TREE: Gallbladder is within normal limits. CBD is not dilated. SPLEEN: within normal limits. PANCREAS: No focal abnormality. ADRENALS: Unremarkable. KIDNEYS/URETERS: Small bilateral renal cysts. No renal or ureteral stone or   evidence of hydronephrosis. STOMACH: Unremarkable. SMALL BOWEL: No dilatation or wall thickening. COLON: No dilatation or wall thickening. APPENDIX: Within normal limits. PERITONEUM: No ascites or pneumoperitoneum. RETROPERITONEUM: No lymphadenopathy or aortic aneurysm. REPRODUCTIVE ORGANS: The uterus is surgically absent. URINARY BLADDER: No mass or calculus. BONES: Degenerative changes are seen in the lumbar spine. ABDOMINAL WALL: No mass or hernia.    ADDITIONAL COMMENTS: N/A               CXR Results  (Last 48 hours)    None          Medical Decision Making   I am the first provider for this patient. I reviewed the vital signs, available nursing notes, past medical history, past surgical history, family history and social history. Vital Signs-Reviewed the patient's vital signs. Patient Vitals for the past 12 hrs:   Temp Pulse Resp BP SpO2   03/15/21 1000 98.1 °F (36.7 °C) 74 16 (!) 166/71 98 %       Records Reviewed: Nursing Notes and Old Medical Records    Provider Notes (Medical Decision Making):     [de-identified] female with history of atrial fibrillation on Coumadin presents emergency department with a chief complaint of right paraspinal back pain that radiates to her abdomen. Associated nausea. Differential is broad, likely musculoskeletal, but will check CT given increased urinary frequency and flank pain to exclude nephrolithiasis, insufficiency fracture, other intra-abdominal pathology. Check INR to exclude spontaneous retroperitoneal hematoma. ED Course:   Initial assessment performed. The patients presenting problems have been discussed, and they are in agreement with the care plan formulated and outlined with them. I have encouraged them to ask questions as they arise throughout their visit. ED Course as of Mar 15 1555   Mon Mar 15, 2021   1252 Patient reassessed, resting comfortably in bed. Urinalysis does show 5-10 white blood cells, will treat given increased urinary frequency. I have a low suspicion for pyelonephritis, but will give an antibiotic Omnicef with coverage dose of this. CT is negative. INR is therapeutic. Will give low-dose of Robaxin. Tylenol short course. [MB]      ED Course User Index  [MB] MD Rao Gutierres MD      Disposition:    Discharged    DISCHARGE PLAN:  1.    Discharge Medication List as of 3/15/2021  1:00 PM      START taking these medications    Details   methocarbamoL (ROBAXIN) 500 mg tablet Take 1 Tab by mouth three (3) times daily as needed for Muscle Spasm(s). , Normal, Disp-15 Tab, R-0      cefdinir (OMNICEF) 300 mg capsule Take 1 Cap by mouth two (2) times a day., Normal, Disp-14 Cap, R-0         CONTINUE these medications which have NOT CHANGED    Details   warfarin (COUMADIN) 5 mg tablet take 2 tablets by mouth daily for 2 days then take 1 tablet daily THEREAFTER, Normal, Disp-32 Tab, R-11      potassium chloride (K-DUR, KLOR-CON) 20 mEq tablet take 1 tablet by mouth twice a day, Normal, Disp-60 Tab, R-11      metoprolol succinate (TOPROL-XL) 25 mg XL tablet take 1/2 tablet by mouth once daily, Normal, Disp-90 Tab, R-3      cefUROXime (CEFTIN) 500 mg tablet Take 1 Tab by mouth two (2) times a day., Normal, Disp-14 Tab, R-0      furosemide (LASIX) 40 mg tablet take 1 tablet by mouth once daily, Normal, Disp-30 Tab,R-11      lisinopriL (PRINIVIL, ZESTRIL) 40 mg tablet take 1 tablet by mouth once daily, Normal, Disp-90 Tab,R-3      amLODIPine (NORVASC) 10 mg tablet Take 1 Tab by mouth daily. , Normal, Disp-30 Tab, R-11      cephALEXin (KEFLEX) 500 mg capsule Take 1 Cap by mouth three (3) times daily. , Normal, Disp-15 Cap, R-0      celecoxib (CELEBREX) 200 mg capsule Take 1 capsule twice a day after meals as needed for joint pain, Normal, Disp-60 Cap, R-3      oxyCODONE-acetaminophen (PERCOCET)  mg per tablet Take 1 Tab by mouth every eight (8) hours as needed for Pain. Max Daily Amount: 3 Tabs., Print, Disp-40 Tab, R-0      menthol (BIOFREEZE, MENTHOL,) 4 % gel 1 g by Apply Externally route every six (6) hours. , Normal, Disp-118 mL, R-0      cyclobenzaprine (FLEXERIL) 5 mg tablet Take 1 Tab by mouth three (3) times daily as needed for Muscle Spasm(s). , Normal, Disp-15 Tab, R-0      aluminum hydrox-magnesium carb (GAVISCON EXTRA STRENGTH) 160-105 mg chew Take 1-2 tablets by mouth four (4) times daily as needed., Historical Med           2.    Follow-up Information     Follow up With Specialties Details Why Contact Info    Keya Reynolds MD Internal Medicine In 3 days  [place] [address]  [address] [phone]          3. Return to ED if worse     Diagnosis     Clinical Impression:   1. Acute right-sided low back pain without sciatica    2. Urinary tract infection without hematuria, site unspecified        Attestations:    Brett Betancourt MD    Please note that this dictation was completed with [employer], the computer voice recognition software. Quite often unanticipated grammatical, syntax, homophones, and other interpretive errors are inadvertently transcribed by the computer software. Please disregard these errors. Please excuse any errors that have escaped final proofreading. Thank you.

## 2021-03-15 NOTE — DISCHARGE INSTRUCTIONS
Please follow-up with your primary care doctor. Use icy hot or Salonpas patches with lidocaine over the skin. You can take a short amount of Tylenol. You can also use the muscle relaxers as needed, but these can make you sleepy.

## 2021-03-16 ENCOUNTER — PATIENT OUTREACH (OUTPATIENT)
Dept: INTERNAL MEDICINE CLINIC | Age: 81
End: 2021-03-16

## 2021-03-16 NOTE — PROGRESS NOTES
ED 3/15/2021:  UTI w/o hematuria/Acute right-sided low back pain w/o sciatica    Patient on Complex CM Enrollment Report/fu Contact. Left message on voice mail with my contact information for return call. Need to assess for ongoing needs and CCM enrollment/fu.

## 2021-03-16 NOTE — ACP (ADVANCE CARE PLANNING)
Advance Care Planning:   Does patient have an Advance Directive:  not on file; education provided   Billy Soria (son) remains Healthcare Decision Maker.

## 2021-03-16 NOTE — PROGRESS NOTES
Education Provided due to At Risk Condition: ED 3/15/2021:  UTI w/o hematuria/Acute right-sided low back pain w/o sciatica    Ambulatory Care Management Note    Date/Time:  3/16/2021 10:23 AM    This Ambulatory Care Manager (ACM) reviewed and updated the following screenings during this call; Self Management Assessment. Patient's challenges to self management identified:   medical condition      Medication Management:  good adherence    Advance Care Planning:   Does patient have an Advance Directive:  not on file; education provided   Gordo Garcia (son) remains Healthcare Decision Maker. [employer], Referrals, and Durable Medical Equipment: none      Health Maintenance Due   Topic Date Due    GLAUCOMA SCREENING Q2Y  02/12/2020    Flu Vaccine (1) 09/01/2020     Health Maintenance reviewed - COVID 19 vacc dose 1 received already. Patient was asked to consider health care goals that they would like to focus on with this ACM. ACM will follow up with patient to discuss goals and establish care plan in the next 7-14 days. Plan: work on BP.        PCP/Specialist follow up:   Future Appointments   Date Time Provider Belen Russell   3/18/2021  2:45 PM REMOTE_[facility] BS AMB   3/22/2021 12:30 PM VACCINE NURSE Mynor Marques  AMB   4/2/2021  9:30 AM Dianna Trevino MD [facility] BS AMB   9/23/2021  9:15 AM PACEMAKER, [facility] BS AMB   9/23/2021  9:40 AM Nidia James, ANP [facility] BS AMB

## 2021-03-18 ENCOUNTER — OFFICE VISIT (OUTPATIENT)
Dept: CARDIOLOGY CLINIC | Age: 81
End: 2021-03-18
Payer: MEDICARE

## 2021-03-18 DIAGNOSIS — Z95.0 CARDIAC PACEMAKER IN SITU: Primary | ICD-10-CM

## 2021-03-18 DIAGNOSIS — I48.20 CHRONIC ATRIAL FIBRILLATION (HCC): ICD-10-CM

## 2021-03-18 PROCEDURE — 93296 REM INTERROG EVL PM/IDS: CPT | Performed by: INTERNAL MEDICINE

## 2021-03-18 PROCEDURE — 93294 REM INTERROG EVL PM/LDLS PM: CPT | Performed by: INTERNAL MEDICINE

## 2021-03-22 ENCOUNTER — IMMUNIZATION (OUTPATIENT)
Dept: FAMILY MEDICINE CLINIC | Age: 81
End: 2021-03-22

## 2021-03-22 DIAGNOSIS — Z23 ENCOUNTER FOR IMMUNIZATION: Primary | ICD-10-CM

## 2021-03-22 PROCEDURE — 91300 COVID-19, MRNA, LNP-S, PF, 30MCG/0.3ML DOSE(PFIZER): CPT | Performed by: FAMILY MEDICINE

## 2021-03-22 PROCEDURE — 0002A COVID-19, MRNA, LNP-S, PF, 30MCG/0.3ML DOSE(PFIZER): CPT | Performed by: FAMILY MEDICINE

## 2021-03-23 ENCOUNTER — OFFICE VISIT (OUTPATIENT)
Dept: INTERNAL MEDICINE CLINIC | Age: 81
End: 2021-03-23
Payer: MEDICARE

## 2021-03-23 VITALS
TEMPERATURE: 98.1 F | BODY MASS INDEX: 32.31 KG/M2 | WEIGHT: 225.7 LBS | SYSTOLIC BLOOD PRESSURE: 139 MMHG | OXYGEN SATURATION: 97 % | HEIGHT: 70 IN | DIASTOLIC BLOOD PRESSURE: 89 MMHG | RESPIRATION RATE: 18 BRPM | HEART RATE: 77 BPM

## 2021-03-23 DIAGNOSIS — G89.29 CHRONIC RIGHT-SIDED LOW BACK PAIN WITHOUT SCIATICA: ICD-10-CM

## 2021-03-23 DIAGNOSIS — C90.01 MULTIPLE MYELOMA IN REMISSION (HCC): ICD-10-CM

## 2021-03-23 DIAGNOSIS — M17.0 PRIMARY OSTEOARTHRITIS OF BOTH KNEES: ICD-10-CM

## 2021-03-23 DIAGNOSIS — M54.50 CHRONIC RIGHT-SIDED LOW BACK PAIN WITHOUT SCIATICA: ICD-10-CM

## 2021-03-23 DIAGNOSIS — E66.9 OBESITY (BMI 30.0-34.9): ICD-10-CM

## 2021-03-23 DIAGNOSIS — I48.20 CHRONIC ATRIAL FIBRILLATION (HCC): Primary | Chronic | ICD-10-CM

## 2021-03-23 DIAGNOSIS — I10 ESSENTIAL HYPERTENSION: ICD-10-CM

## 2021-03-23 LAB
INR BLD: 2.3
PT POC: 27 SECONDS
VALID INTERNAL CONTROL?: YES

## 2021-03-23 PROCEDURE — G8536 NO DOC ELDER MAL SCRN: HCPCS | Performed by: INTERNAL MEDICINE

## 2021-03-23 PROCEDURE — G8752 SYS BP LESS 140: HCPCS | Performed by: INTERNAL MEDICINE

## 2021-03-23 PROCEDURE — 85610 PROTHROMBIN TIME: CPT | Performed by: INTERNAL MEDICINE

## 2021-03-23 PROCEDURE — 99215 OFFICE O/P EST HI 40 MIN: CPT | Performed by: INTERNAL MEDICINE

## 2021-03-23 PROCEDURE — G8417 CALC BMI ABV UP PARAM F/U: HCPCS | Performed by: INTERNAL MEDICINE

## 2021-03-23 PROCEDURE — G8754 DIAS BP LESS 90: HCPCS | Performed by: INTERNAL MEDICINE

## 2021-03-23 PROCEDURE — G8427 DOCREV CUR MEDS BY ELIG CLIN: HCPCS | Performed by: INTERNAL MEDICINE

## 2021-03-23 PROCEDURE — G8399 PT W/DXA RESULTS DOCUMENT: HCPCS | Performed by: INTERNAL MEDICINE

## 2021-03-23 PROCEDURE — 1090F PRES/ABSN URINE INCON ASSESS: CPT | Performed by: INTERNAL MEDICINE

## 2021-03-23 PROCEDURE — 1101F PT FALLS ASSESS-DOCD LE1/YR: CPT | Performed by: INTERNAL MEDICINE

## 2021-03-23 PROCEDURE — G8510 SCR DEP NEG, NO PLAN REQD: HCPCS | Performed by: INTERNAL MEDICINE

## 2021-03-23 NOTE — PROGRESS NOTES
Chief Complaint   Patient presents with   [facility] ED Follow-up     Patient seen at [facility] ED on 3/15/21 for back pain. 1. Have you been to the ER, urgent care clinic since your last visit? Hospitalized since your last visit? Yes When: 3/15/21 Where: [facility] ED Reason for visit: back pain. 2. Have you seen or consulted any other health care providers outside of the [address] [place] [address] since your last visit? Include any pap smears or colon screening.  No       Results for orders placed or performed in visit on 03/23/21   AMB POC PT/INR   Result Value Ref Range    VALID INTERNAL CONTROL POC Yes     Prothrombin time (POC) 27.0 seconds    INR POC 2.3

## 2021-03-24 NOTE — PROGRESS NOTES
[address] [place] [address] and Primary Care  [place] [address]  [address] [facility] [address]  Phone:  [phone]  Fax: [phone]       Chief Complaint   Patient presents with   [facility] ED Follow-up     Patient seen at [facility] ED on 3/15/21 for back pain. .      SUBJECTIVE:    Haim Bentley is a [de-identified] y.o. female comes in for a return visit complaining of pain in her low back area radiating to her right buttock. She went to the emergency room several days ago and the workup was negative as would be expected. This is a chronic ongoing problem. Symptomatic treatment is all that need be done at this point. There has been no meaningful weight loss since I last saw her. She has progressive osteoarthritis involving both knees and as a result she uses a rollator. Weight reduction is the only thing that will help this. She has a past history of primary hypertension as well as dyslipidemia and her multiple myeloma. She is on no chemotherapy currently. Current Outpatient Medications   Medication Sig Dispense Refill    amLODIPine (NORVASC) 10 mg tablet take 1 tablet by mouth once daily 90 Tab 3    methocarbamoL (ROBAXIN) 500 mg tablet Take 1 Tab by mouth three (3) times daily as needed for Muscle Spasm(s). 15 Tab 0    cefdinir (OMNICEF) 300 mg capsule Take 1 Cap by mouth two (2) times a day. 14 Cap 0    warfarin (COUMADIN) 5 mg tablet take 2 tablets by mouth daily for 2 days then take 1 tablet daily THEREAFTER 32 Tab 11    potassium chloride (K-DUR, KLOR-CON) 20 mEq tablet take 1 tablet by mouth twice a day 60 Tab 11    metoprolol succinate (TOPROL-XL) 25 mg XL tablet take 1/2 tablet by mouth once daily 90 Tab 3    cefUROXime (CEFTIN) 500 mg tablet Take 1 Tab by mouth two (2) times a day.  14 Tab 0    furosemide (LASIX) 40 mg tablet take 1 tablet by mouth once daily 30 Tab 11    lisinopriL (PRINIVIL, ZESTRIL) 40 mg tablet take 1 tablet by mouth once daily 90 Tab 3    cephALEXin (KEFLEX) 500 mg capsule Take 1 Cap by mouth three (3) times daily. 15 Cap 0    celecoxib (CELEBREX) 200 mg capsule Take 1 capsule twice a day after meals as needed for joint pain 60 Cap 3    oxyCODONE-acetaminophen (PERCOCET)  mg per tablet Take 1 Tab by mouth every eight (8) hours as needed for Pain. Max Daily Amount: 3 Tabs. 40 Tab 0    menthol (BIOFREEZE, MENTHOL,) 4 % gel 1 g by Apply Externally route every six (6) hours. 118 mL 0    cyclobenzaprine (FLEXERIL) 5 mg tablet Take 1 Tab by mouth three (3) times daily as needed for Muscle Spasm(s). 15 Tab 0    aluminum hydrox-magnesium carb (GAVISCON EXTRA STRENGTH) 160-105 mg chew Take 1-2 tablets by mouth four (4) times daily as needed.        Past Medical History:   Diagnosis Date    Acute combined systolic and diastolic HF (heart failure), NYHA class 2 ([facility] 8/11/2017    Arthritis     Atrial fibrillation (HCC)     Cancer (HCC)     multiple myeloma    Hypertension     S/P AV kathleen ablation 8/11/2017    Status post biventricular pacemaker 8/11/2017 8/11/17      Past Surgical History:   Procedure Laterality Date    HX GYN      HX ORTHOPAEDIC      knee    HX PACEMAKER  08/11/2017     Allergies   Allergen Reactions    Codeine Other (comments)         REVIEW OF SYSTEMS:  General: negative for - chills or fever  ENT: negative for - headaches, nasal congestion or tinnitus  Respiratory: negative for - cough, hemoptysis, shortness of breath or wheezing  Cardiovascular : negative for - chest pain, edema, palpitations or shortness of breath  Gastrointestinal: negative for - abdominal pain, blood in stools, heartburn or nausea/vomiting  Genito-Urinary: no dysuria, trouble voiding, or hematuria  Musculoskeletal: negative for - gait disturbance, joint pain, joint stiffness or joint swelling  Neurological: no TIA or stroke symptoms  Hematologic: no bruises, no bleeding, no swollen glands  Integument: no lumps, mole changes, nail changes or rash  Endocrine: no malaise/lethargy or unexpected weight changes      Social History     Socioeconomic History    Marital status:      Spouse name: Not on file    Number of children: 3    Years of education: Not on file    Highest education level: Not on file   Occupational History    Occupation: Retired from the [place] [address] resource strain: Not hard at all   Vivint insecurity     Worry: Never true     Inability: Never true   [employer] needs     Medical: No     Non-medical: No   Tobacco Use    Smoking status: Never Smoker    Smokeless tobacco: Never Used   Substance and Sexual Activity    Alcohol use: No    Drug use: No    Sexual activity: Not Currently   Lifestyle    Physical activity     Days per week: 0 days     Minutes per session: 0 min    Stress: Not at all   Relationships    Social connections     Talks on phone: More than three times a week     Gets together: More than three times a week     Attends Muslim service: More than 4 times per year     Active member of club or organization: Yes     Attends meetings of clubs or organizations: More than 4 times per year     Relationship status:    Other Topics Concern     Service No    Blood Transfusions No    Caffeine Concern No    Occupational Exposure No    Hobby Hazards No    Sleep Concern No    Stress Concern No    Weight Concern Yes    Special Diet Yes     Comment: HTN/  Low Sugary    Back Care Yes    Exercise No    Bike Helmet No    Seat Belt Yes    Self-Exams Yes     Family History   Problem Relation Age of Onset    Stroke Mother     No Known Problems Father        OBJECTIVE:    Visit Vitals  /89   Pulse 77   Temp 98.1 °F (36.7 °C) (Oral)   Resp 18   Ht 5' 10\" (1.778 m)   Wt 225 lb 11.2 oz (102.4 kg)   SpO2 97%   BMI 32.38 kg/m²     CONSTITUTIONAL: well , well nourished, appears age appropriate  EYES: perrla, eom intact  ENMT:moist mucous membranes, pharynx clear  NECK: supple. Thyroid normal  RESPIRATORY: Chest: clear to ascultation and percussion   CARDIOVASCULAR: Heart: regular rate and rhythm  GASTROINTESTINAL: Abdomen: soft, bowel sounds active  HEMATOLOGIC: no pathological lymph nodes palpated  MUSCULOSKELETAL: Extremities: no edema, pulse 1+, degenerative changes noted both knees  INTEGUMENT: No unusual rashes or suspicious skin lesions noted. Nails appear normal.  NEUROLOGIC: non-focal exam   MENTAL STATUS: alert and oriented, appropriate affect      ASSESSMENT:  1. Chronic atrial fibrillation ([address]    2. Primary osteoarthritis of both knees    3. Chronic right-sided low back pain without sciatica    4. Multiple myeloma in remission (HCC)    5. Obesity (BMI 30.0-34.9)    6. Essential hypertension        PLAN:  1. Her ventricular rate is reasonable today. INR is also acceptable. No adjustments are made. 2. She has primary osteoarthritis of her both knees and there is nothing that can be done other than meaningful weight loss. I have told her this for years. No meaningful weight loss has indeed occurred. 3. She has chronic low back pain radiating to the right. I suspect this is probably on the basis of lumbar spondylosis and mechanical dysfunction. Symptomatic treatment for now. 4. She will follow up with Oncology regarding her multiple myeloma, which appears to be in remission for now. 5. She really needs to lose weight. This can be accomplished by eating meals, eliminating snacks, and avoiding the consumption of processed carbohydrates. 6. Blood pressure is excellent today. No adjustments are made. .  Orders Placed This Encounter    AMB POC PT/INR         Follow-up and Dispositions    · Return in about 3 months (around 6/23/2021), or monthly for INR.            Man Mera MD

## 2021-03-31 ENCOUNTER — PATIENT OUTREACH (OUTPATIENT)
Dept: CASE MANAGEMENT | Age: 81
End: 2021-03-31

## 2021-03-31 NOTE — PROGRESS NOTES
Patient on Complex CM Enrollment Report/fu Contact. Left message on voice mail with my contact information for return call. Need to assess for ongoing needs and CCM enrollment/fu.

## 2021-04-02 ENCOUNTER — OFFICE VISIT (OUTPATIENT)
Dept: CARDIOLOGY CLINIC | Age: 81
End: 2021-04-02
Payer: MEDICARE

## 2021-04-02 VITALS
RESPIRATION RATE: 16 BRPM | DIASTOLIC BLOOD PRESSURE: 86 MMHG | OXYGEN SATURATION: 97 % | HEIGHT: 70 IN | SYSTOLIC BLOOD PRESSURE: 130 MMHG | BODY MASS INDEX: 32.48 KG/M2 | WEIGHT: 226.9 LBS | HEART RATE: 70 BPM

## 2021-04-02 DIAGNOSIS — Z95.0 STATUS POST BIVENTRICULAR PACEMAKER: ICD-10-CM

## 2021-04-02 DIAGNOSIS — I50.22 CHRONIC SYSTOLIC CONGESTIVE HEART FAILURE (HCC): Primary | ICD-10-CM

## 2021-04-02 DIAGNOSIS — Z98.890 S/P AV NODAL ABLATION: ICD-10-CM

## 2021-04-02 DIAGNOSIS — I10 ESSENTIAL HYPERTENSION: ICD-10-CM

## 2021-04-02 DIAGNOSIS — I48.20 CHRONIC ATRIAL FIBRILLATION (HCC): ICD-10-CM

## 2021-04-02 PROCEDURE — G0463 HOSPITAL OUTPT CLINIC VISIT: HCPCS | Performed by: INTERNAL MEDICINE

## 2021-04-02 PROCEDURE — 99214 OFFICE O/P EST MOD 30 MIN: CPT | Performed by: INTERNAL MEDICINE

## 2021-04-02 PROCEDURE — G8536 NO DOC ELDER MAL SCRN: HCPCS | Performed by: INTERNAL MEDICINE

## 2021-04-02 PROCEDURE — 93005 ELECTROCARDIOGRAM TRACING: CPT | Performed by: INTERNAL MEDICINE

## 2021-04-02 PROCEDURE — 1101F PT FALLS ASSESS-DOCD LE1/YR: CPT | Performed by: INTERNAL MEDICINE

## 2021-04-02 PROCEDURE — G8399 PT W/DXA RESULTS DOCUMENT: HCPCS | Performed by: INTERNAL MEDICINE

## 2021-04-02 PROCEDURE — G8432 DEP SCR NOT DOC, RNG: HCPCS | Performed by: INTERNAL MEDICINE

## 2021-04-02 PROCEDURE — G8427 DOCREV CUR MEDS BY ELIG CLIN: HCPCS | Performed by: INTERNAL MEDICINE

## 2021-04-02 PROCEDURE — 93010 ELECTROCARDIOGRAM REPORT: CPT | Performed by: INTERNAL MEDICINE

## 2021-04-02 PROCEDURE — G8417 CALC BMI ABV UP PARAM F/U: HCPCS | Performed by: INTERNAL MEDICINE

## 2021-04-02 PROCEDURE — G8754 DIAS BP LESS 90: HCPCS | Performed by: INTERNAL MEDICINE

## 2021-04-02 PROCEDURE — 1090F PRES/ABSN URINE INCON ASSESS: CPT | Performed by: INTERNAL MEDICINE

## 2021-04-02 PROCEDURE — G8752 SYS BP LESS 140: HCPCS | Performed by: INTERNAL MEDICINE

## 2021-04-02 NOTE — PROGRESS NOTES
[address] [place] [address]  [phone]     Subjective:      Sharon Diaz is a 80 y.o. female is here for a f/u appt. Last seen in our office 9/9/2020. She has a PMHx of non-ischemic cardiomyopathy, chronic AF s/p AVN ablation with BiV PPM and HTN. She denies chest pain/ shortness of breath, orthopnea, PND, palpitations, syncope, or presyncope. She gets some swelling in her legs as the day progresses, resolves overnight.       Patient Active Problem List    Diagnosis Date Noted    Anemia 09/08/2014     Priority: 1 - One    DJD (degenerative joint disease) 09/07/2014     Priority: 3 - Three    Obesity (BMI 30.0-34.9) 08/29/2020    Essential hypertension 12/21/2018    Cervical radiculopathy 05/09/2018    Hyperthyroidism 04/14/2018    Weight loss 09/21/2017    S/P cardiac cath 09/07/2017    Acute combined systolic and diastolic HF (heart failure), NYHA class 2 ([address] 08/11/2017    Status post biventricular pacemaker 08/11/2017    S/P AV kathleen ablation 08/11/2017    Cardiomyopathy ([address] 08/10/2017    Acute pulmonary embolism ([address] 08/10/2017    CHF (congestive heart failure) ([address] 08/08/2017    Sialadenitis 06/29/2017    Muscular deconditioning 04/22/2017    Back pain 04/30/2016    Primary osteoarthritis of both knees 08/24/2015    Venous insufficiency 04/07/2015    Reflux esophagitis 10/31/2014    Multiple myeloma ([address] 10/31/2014    Complex renal cyst 10/11/2014    Low back pain 10/10/2014    Chronic atrial fibrillation ([address] 09/12/2014    Osteoarthritis 09/05/2014      Fidel Colby MD  Past Medical History:   Diagnosis Date    Acute combined systolic and diastolic HF (heart failure), NYHA class 2 ([address] 8/11/2017    Arthritis     Atrial fibrillation (HCC)     Cancer ([address]     multiple myeloma    Hypertension     S/P AV kathleen ablation 8/11/2017    Status post biventricular pacemaker 8/11/2017 8/11/17       Past Surgical History: Procedure Laterality Date    HX GYN      HX ORTHOPAEDIC      knee    HX PACEMAKER  08/11/2017     Allergies   Allergen Reactions    Codeine Other (comments)      Family History   Problem Relation Age of Onset    Stroke Mother     No Known Problems Father       Social History     Socioeconomic History    Marital status:      Spouse name: Not on file    Number of children: 3    Years of education: Not on file    Highest education level: Not on file   Occupational History    Occupation: Retired from the [place] [address] resource strain: Not hard at all   [employer] insecurity     Worry: Never true     Inability: Never true   [employer] needs     Medical: No     Non-medical: No   Tobacco Use    Smoking status: Never Smoker    Smokeless tobacco: Never Used   Substance and Sexual Activity    Alcohol use: No    Drug use: No    Sexual activity: Not Currently   Lifestyle    Physical activity     Days per week: 0 days     Minutes per session: 0 min    Stress: Not at all   Relationships    Social connections     Talks on phone: More than three times a week     Gets together: More than three times a week     Attends Sikhism service: More than 4 times per year     Active member of club or organization: Yes     Attends meetings of clubs or organizations: More than 4 times per year     Relationship status:      Intimate partner violence     Fear of current or ex partner: Not on file     Emotionally abused: Not on file     Physically abused: Not on file     Forced sexual activity: Not on file   Other Topics Concern     Service No    Blood Transfusions No    Caffeine Concern No    Occupational Exposure No    Hobby Hazards No    Sleep Concern No    Stress Concern No    Weight Concern Yes    Special Diet Yes     Comment: HTN/  Low Sugary    Back Care Yes    Exercise No    Bike Helmet No    Seat Belt Yes    Self-Exams Yes   Social History Narrative  Not on file      Current Outpatient Medications   Medication Sig    amLODIPine (NORVASC) 10 mg tablet take 1 tablet by mouth once daily    methocarbamoL (ROBAXIN) 500 mg tablet Take 1 Tab by mouth three (3) times daily as needed for Muscle Spasm(s).  warfarin (COUMADIN) 5 mg tablet take 2 tablets by mouth daily for 2 days then take 1 tablet daily THEREAFTER    potassium chloride (K-DUR, KLOR-CON) 20 mEq tablet take 1 tablet by mouth twice a day    metoprolol succinate (TOPROL-XL) 25 mg XL tablet take 1/2 tablet by mouth once daily    furosemide (LASIX) 40 mg tablet take 1 tablet by mouth once daily    lisinopriL (PRINIVIL, ZESTRIL) 40 mg tablet take 1 tablet by mouth once daily    celecoxib (CELEBREX) 200 mg capsule Take 1 capsule twice a day after meals as needed for joint pain    oxyCODONE-acetaminophen (PERCOCET)  mg per tablet Take 1 Tab by mouth every eight (8) hours as needed for Pain. Max Daily Amount: 3 Tabs.  menthol (BIOFREEZE, MENTHOL,) 4 % gel 1 g by Apply Externally route every six (6) hours.  cyclobenzaprine (FLEXERIL) 5 mg tablet Take 1 Tab by mouth three (3) times daily as needed for Muscle Spasm(s).  aluminum hydrox-magnesium carb (GAVISCON EXTRA STRENGTH) 160-105 mg chew Take 1-2 tablets by mouth four (4) times daily as needed. No current facility-administered medications for this visit. Review of Symptoms:  11 systems reviewed, negative other than as stated in the HPI    Physical ExamPhysical Exam:    Vitals:    04/02/21 0945   BP: 130/86   Pulse: 70   Resp: 16   SpO2: 97%   Weight: 226 lb 14.4 oz (102.9 kg)   Height: 5' 10\" (1.778 m)     Body mass index is 32.56 kg/m². General PE  Gen:  NAD  Mental Status - Alert. General Appearance - Not in acute distress. HEENT:  PERRL, no carotid bruits or JVD  Chest and Lung Exam   Inspection: Accessory muscles - No use of accessory muscles in breathing.    Auscultation:   Breath sounds: - Normal.   Cardiovascular Inspection: Jugular vein - Bilateral - Inspection Normal.   Palpation/Percussion:   Apical Impulse: - Normal.   Auscultation: Rhythm - Regular. Heart Sounds - S1 WNL and S2 WNL. No S3 or S4. Murmurs & Other Heart Sounds: Auscultation of the heart reveals -+HARISH   Peripheral Vascular   Upper Extremity: Inspection - Bilateral - No Cyanotic nailbeds or Digital clubbing. Lower Extremity:   Palpation: Edema - Bilateral - No edema. Abdomen:   Soft, non-tender, bowel sounds are active. Neuro: A&O times 3, CN and motor grossly WNL. +cane    Labs:   Lab Results   Component Value Date/Time    Cholesterol, total 156 08/11/2017 02:41 AM    Cholesterol, total 165 09/08/2014 06:50 AM    HDL Cholesterol 82 08/11/2017 02:41 AM    HDL Cholesterol 72 09/08/2014 06:50 AM    LDL, calculated 64 08/11/2017 02:41 AM    LDL, calculated 84.6 09/08/2014 06:50 AM    Triglyceride 50 08/11/2017 02:41 AM    Triglyceride 42 09/08/2014 06:50 AM    CHOL/HDL Ratio 1.9 08/11/2017 02:41 AM    CHOL/HDL Ratio 2.3 09/08/2014 06:50 AM     Lab Results   Component Value Date/Time     06/16/2018 04:07 PM     Lab Results   Component Value Date/Time    Sodium 141 03/15/2021 11:21 AM    Potassium 3.3 (L) 03/15/2021 11:21 AM    Chloride 108 03/15/2021 11:21 AM    CO2 33 (H) 03/15/2021 11:21 AM    Anion gap 0 (L) 03/15/2021 11:21 AM    Glucose 68 03/15/2021 11:21 AM    BUN 14 03/15/2021 11:21 AM    Creatinine 0.92 03/15/2021 11:21 AM    BUN/Creatinine ratio 15 03/15/2021 11:21 AM    GFR est AA >60 03/15/2021 11:21 AM    GFR est non-AA 59 (L) 03/15/2021 11:21 AM    Calcium 9.5 03/15/2021 11:21 AM    Bilirubin, total 0.5 03/15/2021 11:21 AM    Alk. phosphatase 91 03/15/2021 11:21 AM    Protein, total 8.0 03/15/2021 11:21 AM    Albumin 3.9 03/15/2021 11:21 AM    Globulin 4.1 (H) 03/15/2021 11:21 AM    A-G Ratio 1.0 (L) 03/15/2021 11:21 AM    ALT (SGPT) 22 03/15/2021 11:21 AM       EKG:  VPaced     Assessment:     Assessment:      1.  Chronic systolic congestive heart failure ([address]    2. Chronic atrial fibrillation (HCC)    3. S/P AV kathleen ablation    4. Essential hypertension    5. Status post biventricular pacemaker        Orders Placed This Encounter    AMB POC EKG ROUTINE W/ 12 LEADS, INTER & REP     Order Specific Question:   Reason for Exam:     Answer:   screen        Plan:   Chronic afib  S/p AVN ablation with BiV PPM  Denies symptoms today of palpitations. Device followed by EP, last seen by them 9/9/2020 with device interrogation 3/2021 appropriate function, no events. Continue Coumadin. INR followed by PCP     Hx Chronic systolic congestive heart failure   Per echo 50-55% 2/2020  Echo in 1/2018 with reduced LVEF 35-40%  Denies symptoms today, appears euvolemic, compensated  Continue Lisinopril, Toprol and Lasix  Stable kidney fxn creatinine 0.92 3/15/21     HTN  Remains well controlled with current therapy     Multiple Myeloma, in remission  Followed by Dr Daryl Shoemaker current care and f/u in 6 months.       Fuad Conrad NP       [facility]    4/2/2021         Patient seen, examined by me personally. Plan discussed as detailed. Agree with note as outlined by  NP with modifications as noted. My independent physical exam reveals : Physical Exam   Constitutional: She is oriented to person, place, and time. She appears well-developed and well-nourished. HENT:   Head: Normocephalic. Neck: Neck supple. Cardiovascular:   Murmur heard. Pulmonary/Chest: Effort normal and breath sounds normal.   Musculoskeletal:         General: No edema. Neurological: She is alert and oriented to person, place, and time. Skin: Skin is warm and dry. Psychiatric: She has a normal mood and affect. Nursing note and vitals reviewed. No additional findings noted. Agree with plan as outlined above with modifications as noted.      Mary Wiley MD

## 2021-04-02 NOTE — PROGRESS NOTES
Identified pt with two pt identifiers(name and ). Reviewed record in preparation for visit and have obtained necessary documentation. No chief complaint on file. There are no preventive care reminders to display for this patient. There were no vitals taken for this visit. Pain Scale: /10    Coordination of Care Questionnaire:  :   1. Have you been to the ER, urgent care clinic since your last visit? Hospitalized since your last visit? Yes Where: [facility] 3/15/2021 for back pain    2. Have you seen or consulted any other health care providers outside of the [address] [place] [address] since your last visit? Include any pap smears or colon screening.  No

## 2021-04-12 ENCOUNTER — PATIENT OUTREACH (OUTPATIENT)
Dept: CASE MANAGEMENT | Age: 81
End: 2021-04-12

## 2021-05-05 ENCOUNTER — PATIENT OUTREACH (OUTPATIENT)
Dept: CASE MANAGEMENT | Age: 81
End: 2021-05-05

## 2021-05-05 NOTE — PROGRESS NOTES
Goals Addressed                 This Visit's Progress     Patient verbalizes understanding of self-management goals of living with Congestive Heart Failure   On track     4/12/2021: CHF: reported better progress: denied swollen ankles today. Review:  Na+ intake reduction: elimination of processed foods as much as possible. CDC guidelines reviewed: physical distancing/adequate hydration/mask wearing. Agreement voiced. EW    5/15/2021:  Patient reports doing much better; now as followed up with Cardiology. Reports less Na+ intake. Less ankle swelling. Feels better overall. K+ was reported 3.3; patient had not eaten prior to office appointment. Discussed foods with K+ that may be good for K+ replenish as well as K_Dur medication compliance.   EW         f/u:  5/16

## 2021-05-05 NOTE — PROGRESS NOTES
Education Provided due to At Risk Condition: ED 3/15/2021:  UTI w/o hematuria/Acute right-sided low back pain w/o sciatica cont'd f/u    Patient on Complex CM Enrollment Report/fu Contact. Left message on voice mail with my contact information for return call. Need to assess for ongoing needs and CCM enrollment/fu.

## 2021-06-17 ENCOUNTER — OFFICE VISIT (OUTPATIENT)
Dept: CARDIOLOGY CLINIC | Age: 81
End: 2021-06-17
Payer: MEDICARE

## 2021-06-17 DIAGNOSIS — Z95.0 CARDIAC PACEMAKER IN SITU: Primary | ICD-10-CM

## 2021-06-17 DIAGNOSIS — I48.20 CHRONIC ATRIAL FIBRILLATION (HCC): ICD-10-CM

## 2021-06-17 PROCEDURE — 93294 REM INTERROG EVL PM/LDLS PM: CPT | Performed by: INTERNAL MEDICINE

## 2021-06-17 PROCEDURE — 93296 REM INTERROG EVL PM/IDS: CPT | Performed by: INTERNAL MEDICINE

## 2021-06-28 ENCOUNTER — OFFICE VISIT (OUTPATIENT)
Dept: INTERNAL MEDICINE CLINIC | Age: 81
End: 2021-06-28
Payer: MEDICARE

## 2021-06-28 VITALS
DIASTOLIC BLOOD PRESSURE: 66 MMHG | RESPIRATION RATE: 18 BRPM | SYSTOLIC BLOOD PRESSURE: 96 MMHG | HEART RATE: 75 BPM | WEIGHT: 222.1 LBS | BODY MASS INDEX: 31.8 KG/M2 | OXYGEN SATURATION: 98 % | HEIGHT: 70 IN | TEMPERATURE: 98.2 F

## 2021-06-28 DIAGNOSIS — Z13.31 SCREENING FOR DEPRESSION: ICD-10-CM

## 2021-06-28 DIAGNOSIS — C90.01 MULTIPLE MYELOMA IN REMISSION (HCC): ICD-10-CM

## 2021-06-28 DIAGNOSIS — E05.90 HYPERTHYROIDISM: ICD-10-CM

## 2021-06-28 DIAGNOSIS — Z00.00 WELLNESS EXAMINATION: ICD-10-CM

## 2021-06-28 DIAGNOSIS — I48.20 CHRONIC ATRIAL FIBRILLATION (HCC): Primary | ICD-10-CM

## 2021-06-28 DIAGNOSIS — M17.0 PRIMARY OSTEOARTHRITIS OF BOTH KNEES: ICD-10-CM

## 2021-06-28 DIAGNOSIS — E66.9 OBESITY (BMI 30.0-34.9): ICD-10-CM

## 2021-06-28 DIAGNOSIS — I10 ESSENTIAL HYPERTENSION: ICD-10-CM

## 2021-06-28 LAB
INR BLD: 2.9
PT POC: 35.7 SECONDS
VALID INTERNAL CONTROL?: YES

## 2021-06-28 PROCEDURE — 99214 OFFICE O/P EST MOD 30 MIN: CPT | Performed by: INTERNAL MEDICINE

## 2021-06-28 PROCEDURE — G8510 SCR DEP NEG, NO PLAN REQD: HCPCS | Performed by: INTERNAL MEDICINE

## 2021-06-28 PROCEDURE — G8399 PT W/DXA RESULTS DOCUMENT: HCPCS | Performed by: INTERNAL MEDICINE

## 2021-06-28 PROCEDURE — G8417 CALC BMI ABV UP PARAM F/U: HCPCS | Performed by: INTERNAL MEDICINE

## 2021-06-28 PROCEDURE — 1090F PRES/ABSN URINE INCON ASSESS: CPT | Performed by: INTERNAL MEDICINE

## 2021-06-28 PROCEDURE — G8754 DIAS BP LESS 90: HCPCS | Performed by: INTERNAL MEDICINE

## 2021-06-28 PROCEDURE — 85610 PROTHROMBIN TIME: CPT | Performed by: INTERNAL MEDICINE

## 2021-06-28 PROCEDURE — G8427 DOCREV CUR MEDS BY ELIG CLIN: HCPCS | Performed by: INTERNAL MEDICINE

## 2021-06-28 PROCEDURE — G8536 NO DOC ELDER MAL SCRN: HCPCS | Performed by: INTERNAL MEDICINE

## 2021-06-28 PROCEDURE — G0439 PPPS, SUBSEQ VISIT: HCPCS | Performed by: INTERNAL MEDICINE

## 2021-06-28 PROCEDURE — G8752 SYS BP LESS 140: HCPCS | Performed by: INTERNAL MEDICINE

## 2021-06-28 PROCEDURE — 1101F PT FALLS ASSESS-DOCD LE1/YR: CPT | Performed by: INTERNAL MEDICINE

## 2021-06-28 NOTE — PROGRESS NOTES
1. Have you been to the ER, urgent care clinic since your last visit? Hospitalized since your last visit? No    2. Have you seen or consulted any other health care providers outside of the [address] [place] [address] since your last visit? Include any pap smears or colon screening.  No

## 2021-06-28 NOTE — PROGRESS NOTES
[address] [place] [address] and Primary Care  [place] [address]  [address] [facility] [address]  Phone:  [phone]  Fax: [phone]       Chief Complaint   Patient presents with   [facility] Wellness Visit   . SUBJECTIVE:    Jay Vaughn is a 80 y.o. female comes in for return visit stating that she has done reasonably well. She is not as physically active as she used to be and as a result her gait is becoming a bit slower. She is more deliberate with her walking currently. I think this is reflective of her aging as well as physical deconditioning. She follows up with Hematology and her myeloma is quite stable. She is no longer having the pain that she previously had in her cervical spine and shoulders. She has a past history of primary hypertension as well as dyslipidemia, and remains on her warfarin because of recurrent risk of DVT. Current Outpatient Medications   Medication Sig Dispense Refill    amLODIPine (NORVASC) 10 mg tablet take 1 tablet by mouth once daily 90 Tab 3    methocarbamoL (ROBAXIN) 500 mg tablet Take 1 Tab by mouth three (3) times daily as needed for Muscle Spasm(s). 15 Tab 0    warfarin (COUMADIN) 5 mg tablet take 2 tablets by mouth daily for 2 days then take 1 tablet daily THEREAFTER 32 Tab 11    potassium chloride (K-DUR, KLOR-CON) 20 mEq tablet take 1 tablet by mouth twice a day 60 Tab 11    metoprolol succinate (TOPROL-XL) 25 mg XL tablet take 1/2 tablet by mouth once daily 90 Tab 3    furosemide (LASIX) 40 mg tablet take 1 tablet by mouth once daily 30 Tab 11    lisinopriL (PRINIVIL, ZESTRIL) 40 mg tablet take 1 tablet by mouth once daily 90 Tab 3    celecoxib (CELEBREX) 200 mg capsule Take 1 capsule twice a day after meals as needed for joint pain 60 Cap 3    oxyCODONE-acetaminophen (PERCOCET)  mg per tablet Take 1 Tab by mouth every eight (8) hours as needed for Pain. Max Daily Amount: 3 Tabs.  40 Tab 0    menthol (BIOFREEZE, MENTHOL,) 4 % gel 1 g by Apply Externally route every six (6) hours. 118 mL 0    cyclobenzaprine (FLEXERIL) 5 mg tablet Take 1 Tab by mouth three (3) times daily as needed for Muscle Spasm(s). 15 Tab 0    aluminum hydrox-magnesium carb (GAVISCON EXTRA STRENGTH) 160-105 mg chew Take 1-2 tablets by mouth four (4) times daily as needed.        Past Medical History:   Diagnosis Date    Acute combined systolic and diastolic HF (heart failure), NYHA class 2 ([address] 8/11/2017    Arthritis     Atrial fibrillation (HCC)     Hypertension     S/P AV kathleen ablation 8/11/2017    Status post biventricular pacemaker 8/11/2017 8/11/17      Past Surgical History:   Procedure Laterality Date    HX GYN      HX ORTHOPAEDIC      knee    HX PACEMAKER  08/11/2017     Allergies   Allergen Reactions    Codeine Other (comments)         REVIEW OF SYSTEMS:  General: negative for - chills or fever  ENT: negative for - headaches, nasal congestion or tinnitus  Respiratory: negative for - cough, hemoptysis, shortness of breath or wheezing  Cardiovascular : negative for - chest pain, edema, palpitations or shortness of breath  Gastrointestinal: negative for - abdominal pain, blood in stools, heartburn or nausea/vomiting  Genito-Urinary: no dysuria, trouble voiding, or hematuria  Musculoskeletal: negative for - gait disturbance, joint pain, joint stiffness or joint swelling  Neurological: no TIA or stroke symptoms  Hematologic: no bruises, no bleeding, no swollen glands  Integument: no lumps, mole changes, nail changes or rash  Endocrine: no malaise/lethargy or unexpected weight changes      Social History     Socioeconomic History    Marital status:      Spouse name: Not on file    Number of children: 3    Years of education: Not on file    Highest education level: Not on file   Occupational History    Occupation: Retired from the [employer]   Tobacco Use    Smoking status: Never Smoker    Smokeless tobacco: Never Used   Vaping Use  Vaping Use: Never used   Substance and Sexual Activity    Alcohol use: No    Drug use: No    Sexual activity: Not Currently   Other Topics Concern     Service No    Blood Transfusions No    Caffeine Concern No    Occupational Exposure No    Hobby Hazards No    Sleep Concern No    Stress Concern No    Weight Concern Yes    Special Diet Yes     Comment: HTN/  Low Sugary    Back Care Yes    Exercise No    Bike Helmet No    Seat Belt Yes    Self-Exams Yes     Social Determinants of Health     Financial Resource Strain:     Difficulty of Paying Living Expenses:    Food Insecurity:     Worried About Running Out of Food in the Last Year:     Ran Out of Food in the Last Year:    Transportation Needs:     Lack of Transportation (Medical):  Lack of Transportation (Non-Medical):    Physical Activity:     Days of Exercise per Week:     Minutes of Exercise per Session:    Stress:     Feeling of Stress :    Social Connections:     Frequency of Communication with Friends and Family:     Frequency of Social Gatherings with Friends and Family:     Attends [employer] Services:     Active Member of Clubs or Organizations:     Attends Club or Organization Meetings:     Marital Status:      Family History   Problem Relation Age of Onset    Stroke Mother     No Known Problems Father        OBJECTIVE:    Visit Vitals  BP 96/66   Pulse 75   Temp 98.2 °F (36.8 °C) (Oral)   Resp 18   Ht 5' 10\" (1.778 m)   Wt 222 lb 1.6 oz (100.7 kg)   SpO2 98%   BMI 31.87 kg/m²     CONSTITUTIONAL: well , well nourished, appears age appropriate  EYES: perrla, eom intact  ENMT:moist mucous membranes, pharynx clear  NECK: supple.  Thyroid normal  RESPIRATORY: Chest: clear to ascultation and percussion   CARDIOVASCULAR: Heart: regular rate and rhythm  GASTROINTESTINAL: Abdomen: soft, bowel sounds active  HEMATOLOGIC: no pathological lymph nodes palpated  MUSCULOSKELETAL: Extremities: no edema, pulse 1+, mild to moderate degenerative changes noted both knees  INTEGUMENT: No unusual rashes or suspicious skin lesions noted. Nails appear normal.  NEUROLOGIC: non-focal exam   MENTAL STATUS: alert and oriented, appropriate affect      ASSESSMENT:  1. Chronic atrial fibrillation ([address]    2. Essential hypertension    3. Hyperthyroidism    4. Primary osteoarthritis of both knees    5. Multiple myeloma in remission (HCC)    6. Obesity (BMI 30.0-34.9)    7. Screening for depression    8. Wellness examination        PLAN:  1. The patient has history of chronic atrial fibrillation, which is actually the reason she is anticoagulated. Her ventricular rate is quite stable. INR today is also reasonable and no adjustments are made. She returns to the office mostly for INRs. 2. Blood pressure is excellent today. In reality it is probably a bit low, but she is asymptomatic. If this trend continues then I will make an adjustment. 3. She did have a history of hypothyroidism, but this resolved. At this point, nothing needs to be done other than periodic monitoring of her TSH. 4. Her osteoarthritic knees have been the same. It is impacting her walking above and beyond her physical deconditioning. The most important thing that she can do to improve this is to lose weight. This can be accomplished by eating meals, eliminating snacks, and avoiding the consumption of processed carbohydrates. 5. She will follow up with her oncologist for her multiple myeloma, which appears to be quite stable. .  Orders Placed This Encounter    ANNUAL DEPRESSION SCREEN 9-76 MIN    METABOLIC PANEL, BASIC    AMB POC PT/INR         Follow-up and Dispositions    · Return in about 3 months (around 9/28/2021), or monthly INR.            Melissa Matias MD  This is the Subsequent Medicare Annual Wellness Exam, performed 12 months or more after the Initial AWV or the last Subsequent AWV    I have reviewed the patient's medical history in detail and updated the computerized patient record. Assessment/Plan   Education and counseling provided:  Are appropriate based on today's review and evaluation    1. Chronic atrial fibrillation (HCC)  -     AMB POC PT/INR  2. Essential hypertension  -     METABOLIC PANEL, BASIC; Future  3. Hyperthyroidism  4. Primary osteoarthritis of both knees  5. Multiple myeloma in remission (HCC)  6. Obesity (BMI 30.0-34.9)  7. Screening for depression  -     DEPRESSION SCREEN ANNUAL  8. Wellness examination       Depression Risk Factor Screening     3 most recent PHQ Screens 6/28/2021   PHQ Not Done -   Little interest or pleasure in doing things Not at all   Feeling down, depressed, irritable, or hopeless Not at all   Total Score PHQ 2 0       Alcohol Risk Screen    Do you average more than 1 drink per night or more than 7 drinks a week:  No    On any one occasion in the past three months have you have had more than 3 drinks containing alcohol:  No        Functional Ability and Level of Safety    Hearing: Hearing is good. Activities of Daily Living: The home contains: no safety equipment. Patient does total self care      Ambulation: with no difficulty     Fall Risk:  Fall Risk Assessment, last 12 mths 6/28/2021   Able to walk? Yes   Fall in past 12 months? 0   Do you feel unsteady? 0   Are you worried about falling 0   Fall with injury?  -      Abuse Screen:  Patient is not abused       Cognitive Screening    Has your family/caregiver stated any concerns about your memory: no     Cognitive Screening: Not applicable    Health Maintenance Due     Health Maintenance Due   Topic Date Due    Shingrix Vaccine Age 49> (1 of 2) Never done       Patient Care Team   Patient Care Team:  Allison Navas MD as PCP - General (Internal Medicine)  Allison Navas MD as PCP - [facility]  Rush Marques MD (Cardiology)  Cristina Tijerina MD as Physician (Cardiology)  Fransisco Da Silva NP (Nurse Practitioner)  Francois Obrien TOD Sarmiento (Nurse Practitioner)    History     Patient Active Problem List   Diagnosis Code    Osteoarthritis M19.90    DJD (degenerative joint disease) M19.90    Anemia D64.9    Chronic atrial fibrillation (HCC) I48.20    Low back pain M54.5    Complex renal cyst N28.1    Reflux esophagitis K21.00    Multiple myeloma (HCC) C90.00    Venous insufficiency I87.2    Primary osteoarthritis of both knees M17.0    Back pain M54.9    Muscular deconditioning R29.898    Sialadenitis K11.20    CHF (congestive heart failure) (HCC) I50.9    Cardiomyopathy ([address] I42.9    Acute pulmonary embolism ([facility]) I26.99    Acute combined systolic and diastolic HF (heart failure), NYHA class 2 ([facility]) I50.41    Status post biventricular pacemaker Z95.0    S/P AV kathleen ablation Z98.890    S/P cardiac cath Z98.890    Weight loss R63.4    Hyperthyroidism E05.90    Cervical radiculopathy M54.12    Essential hypertension I10    Obesity (BMI 30.0-34. 9) E66.9     Past Medical History:   Diagnosis Date    Acute combined systolic and diastolic HF (heart failure), NYHA class 2 ([facility]) 8/11/2017    Arthritis     Atrial fibrillation ([facility])     Hypertension     S/P AV kathleen ablation 8/11/2017    Status post biventricular pacemaker 8/11/2017 8/11/17       Past Surgical History:   Procedure Laterality Date    HX GYN      HX ORTHOPAEDIC      knee    HX PACEMAKER  08/11/2017     Current Outpatient Medications   Medication Sig Dispense Refill    amLODIPine (NORVASC) 10 mg tablet take 1 tablet by mouth once daily 90 Tab 3    methocarbamoL (ROBAXIN) 500 mg tablet Take 1 Tab by mouth three (3) times daily as needed for Muscle Spasm(s).  15 Tab 0    warfarin (COUMADIN) 5 mg tablet take 2 tablets by mouth daily for 2 days then take 1 tablet daily THEREAFTER 32 Tab 11    potassium chloride (K-DUR, KLOR-CON) 20 mEq tablet take 1 tablet by mouth twice a day 60 Tab 11    metoprolol succinate (TOPROL-XL) 25 mg XL tablet take 1/2 tablet by mouth once daily 90 Tab 3    furosemide (LASIX) 40 mg tablet take 1 tablet by mouth once daily 30 Tab 11    lisinopriL (PRINIVIL, ZESTRIL) 40 mg tablet take 1 tablet by mouth once daily 90 Tab 3    celecoxib (CELEBREX) 200 mg capsule Take 1 capsule twice a day after meals as needed for joint pain 60 Cap 3    oxyCODONE-acetaminophen (PERCOCET)  mg per tablet Take 1 Tab by mouth every eight (8) hours as needed for Pain. Max Daily Amount: 3 Tabs. 40 Tab 0    menthol (BIOFREEZE, MENTHOL,) 4 % gel 1 g by Apply Externally route every six (6) hours. 118 mL 0    cyclobenzaprine (FLEXERIL) 5 mg tablet Take 1 Tab by mouth three (3) times daily as needed for Muscle Spasm(s). 15 Tab 0    aluminum hydrox-magnesium carb (GAVISCON EXTRA STRENGTH) 160-105 mg chew Take 1-2 tablets by mouth four (4) times daily as needed.        Allergies   Allergen Reactions    Codeine Other (comments)       Family History   Problem Relation Age of Onset    Stroke Mother     No Known Problems Father      Social History     Tobacco Use    Smoking status: Never Smoker    Smokeless tobacco: Never Used   Substance Use Topics    Alcohol use: No         Mars Ponce MD

## 2021-06-29 LAB
ANION GAP SERPL CALC-SCNC: 4 MMOL/L (ref 5–15)
BUN SERPL-MCNC: 18 MG/DL (ref 6–20)
BUN/CREAT SERPL: 20 (ref 12–20)
CALCIUM SERPL-MCNC: 9.8 MG/DL (ref 8.5–10.1)
CHLORIDE SERPL-SCNC: 109 MMOL/L (ref 97–108)
CO2 SERPL-SCNC: 32 MMOL/L (ref 21–32)
CREAT SERPL-MCNC: 0.89 MG/DL (ref 0.55–1.02)
GLUCOSE SERPL-MCNC: 60 MG/DL (ref 65–100)
POTASSIUM SERPL-SCNC: 3.5 MMOL/L (ref 3.5–5.1)
SODIUM SERPL-SCNC: 145 MMOL/L (ref 136–145)

## 2021-06-30 ENCOUNTER — TRANSCRIBE ORDER (OUTPATIENT)
Dept: SCHEDULING | Age: 81
End: 2021-06-30

## 2021-06-30 DIAGNOSIS — C90.00 MYELOMA ASSOCIATED AMYLOIDOSIS (HCC): Primary | ICD-10-CM

## 2021-06-30 DIAGNOSIS — E85.9 MYELOMA ASSOCIATED AMYLOIDOSIS (HCC): Primary | ICD-10-CM

## 2021-07-01 ENCOUNTER — TRANSCRIBE ORDER (OUTPATIENT)
Dept: SCHEDULING | Age: 81
End: 2021-07-01

## 2021-07-01 DIAGNOSIS — C43.9 MELANOMA (HCC): Primary | ICD-10-CM

## 2021-07-06 ENCOUNTER — TRANSCRIBE ORDER (OUTPATIENT)
Dept: SCHEDULING | Age: 81
End: 2021-07-06

## 2021-07-06 DIAGNOSIS — M79.602 PAIN IN LEFT ARM: ICD-10-CM

## 2021-07-06 DIAGNOSIS — N39.0 URINARY TRACT INFECTION, SITE NOT SPECIFIED: ICD-10-CM

## 2021-07-06 DIAGNOSIS — R60.9 EDEMA, UNSPECIFIED: ICD-10-CM

## 2021-07-06 DIAGNOSIS — Z79.01 LONG TERM (CURRENT) USE OF ANTICOAGULANTS: ICD-10-CM

## 2021-07-06 DIAGNOSIS — R51.9 HEAD ACHE: ICD-10-CM

## 2021-07-12 NOTE — PATIENT INSTRUCTIONS

## 2021-08-03 ENCOUNTER — TRANSCRIBE ORDER (OUTPATIENT)
Dept: SCHEDULING | Age: 81
End: 2021-08-03

## 2021-08-03 DIAGNOSIS — Z12.31 VISIT FOR SCREENING MAMMOGRAM: Primary | ICD-10-CM

## 2021-08-31 ENCOUNTER — HOSPITAL ENCOUNTER (OUTPATIENT)
Dept: MAMMOGRAPHY | Age: 81
Discharge: HOME | End: 2021-08-31
Attending: INTERNAL MEDICINE
Payer: MEDICARE

## 2021-08-31 DIAGNOSIS — Z12.31 VISIT FOR SCREENING MAMMOGRAM: ICD-10-CM

## 2021-08-31 PROCEDURE — 77067 SCR MAMMO BI INCL CAD: CPT

## 2021-09-20 NOTE — PROGRESS NOTES
ELECTROPHYSIOLOGY        Subjective:      Hank Escalera is a 80 y.o. female is here for EP follow up. The patient denies chest pain/ shortness of breath, orthopnea, PND,   palpitations, syncope, presyncope or fatigue. Some lower left leg edema; has arthritis in her knees. Hank Escalera  is on [address] [place] [address], reports no melena, hematuria, or obvious signs of bleeding. No falls.      Patient Active Problem List    Diagnosis Date Noted    Anemia 09/08/2014    DJD (degenerative joint disease) 09/07/2014    Obesity (BMI 30.0-34.9) 08/29/2020    Essential hypertension 12/21/2018    Cervical radiculopathy 05/09/2018    Hyperthyroidism 04/14/2018    Weight loss 09/21/2017    S/P cardiac cath 09/07/2017    Acute combined systolic and diastolic HF (heart failure), NYHA class 2 ([address] 08/11/2017    Status post biventricular pacemaker 08/11/2017    S/P AV kathleen ablation 08/11/2017    Cardiomyopathy ([address] 08/10/2017    Acute pulmonary embolism ([address] 08/10/2017    CHF (congestive heart failure) ([address] 08/08/2017    Sialadenitis 06/29/2017    Muscular deconditioning 04/22/2017    Back pain 04/30/2016    Primary osteoarthritis of both knees 08/24/2015    Venous insufficiency 04/07/2015    Reflux esophagitis 10/31/2014    Multiple myeloma ([address] 10/31/2014    Complex renal cyst 10/11/2014    Low back pain 10/10/2014    Chronic atrial fibrillation ([address] 09/12/2014      Jc Morel MD  Past Medical History:   Diagnosis Date    Acute combined systolic and diastolic HF (heart failure), NYHA class 2 ([address] 8/11/2017    Arthritis     Atrial fibrillation ([address]     Hypertension     Pacemaker     S/P AV kathleen ablation 8/11/2017    Status post biventricular pacemaker 8/11/2017 8/11/17       Past Surgical History:   Procedure Laterality Date    HX GYN      HX ORTHOPAEDIC      knee    HX PACEMAKER  08/11/2017     Allergies   Allergen Reactions    Codeine Other (comments)      Family History Problem Relation Age of Onset    Stroke Mother     No Known Problems Father     negative for cardiac disease  Social History     Socioeconomic History    Marital status:      Spouse name: Not on file    Number of children: 3    Years of education: Not on file    Highest education level: Not on file   Occupational History    Occupation: Retired from the [employer]   Tobacco Use    Smoking status: Never Smoker    Smokeless tobacco: Never Used   Vaping Use    Vaping Use: Never used   Substance and Sexual Activity    Alcohol use: No    Drug use: No    Sexual activity: Not Currently   Other Topics Concern     Service No    Blood Transfusions No    Caffeine Concern No    Occupational Exposure No    Hobby Hazards No    Sleep Concern No    Stress Concern No    Weight Concern Yes    Special Diet Yes     Comment: HTN/  Low Sugary    Back Care Yes    Exercise No    Bike Helmet No    Seat Belt Yes    Self-Exams Yes     Social Determinants of Health     Financial Resource Strain:     Difficulty of Paying Living Expenses:    Food Insecurity:     Worried About Running Out of Food in the Last Year:     Ran Out of Food in the Last Year:    Transportation Needs:     Lack of Transportation (Medical):  Lack of Transportation (Non-Medical):    Physical Activity:     Days of Exercise per Week:     Minutes of Exercise per Session:    Stress:     Feeling of Stress :    Social Connections:     Frequency of Communication with Friends and Family:     Frequency of Social Gatherings with Friends and Family:     Attends [employer] Services:     Active Member of Clubs or Organizations:     Attends Club or Organization Meetings:     Marital Status:      Current Outpatient Medications   Medication Sig    amLODIPine (NORVASC) 10 mg tablet take 1 tablet by mouth once daily    methocarbamoL (ROBAXIN) 500 mg tablet Take 1 Tab by mouth three (3) times daily as needed for Muscle Spasm(s).  warfarin (COUMADIN) 5 mg tablet take 2 tablets by mouth daily for 2 days then take 1 tablet daily THEREAFTER    potassium chloride (K-DUR, KLOR-CON) 20 mEq tablet take 1 tablet by mouth twice a day    metoprolol succinate (TOPROL-XL) 25 mg XL tablet take 1/2 tablet by mouth once daily    furosemide (LASIX) 40 mg tablet take 1 tablet by mouth once daily    lisinopriL (PRINIVIL, ZESTRIL) 40 mg tablet take 1 tablet by mouth once daily    oxyCODONE-acetaminophen (PERCOCET)  mg per tablet Take 1 Tab by mouth every eight (8) hours as needed for Pain. Max Daily Amount: 3 Tabs.  menthol (BIOFREEZE, MENTHOL,) 4 % gel 1 g by Apply Externally route every six (6) hours.  aluminum hydrox-magnesium carb (GAVISCON EXTRA STRENGTH) 160-105 mg chew Take 1-2 tablets by mouth four (4) times daily as needed. No current facility-administered medications for this visit. Vitals:    09/23/21 0920 09/23/21 0952 09/23/21 0953   BP: 126/82 128/82 122/84   Pulse: 76 70 72   Resp: 18     SpO2: 98%     Weight: 223 lb (101.2 kg)     Height: 5' 10\" (1.778 m)         I have reviewed the nurses notes, vitals, problem list, allergy list, medical history, family, social history and medications. Review of Symptoms:  11 systems reviewed, negative other than as stated in the HPI      Physical Exam:      General: Well developed, in no acute distress. HEENT: Eyes - PERRL  Heart:  Normal S1/S2 negative S3 or S4. Regular, no murmur  Respiratory: Clear bilaterally x 4, no wheezing or rales  Extremities:  No edema, no cyanosis. Musculoskeletal: No clubbing  Neuro: A&Ox3, speech clear  Skin: No visible rashes or lesions. Non diaphoretic.  No visible ulcers  Vascular: 2+ pulses symmetric in all extremities  Psych - judgement intact and orientation is wnl       Cardiographics      Results for orders placed or performed during the hospital encounter of 06/16/18   EKG, 12 LEAD, INITIAL   Result Value Ref Range    Ventricular Rate 75 BPM    Atrial Rate 68 BPM    QRS Duration 144 ms    Q-T Interval 448 ms    QTC Calculation (Bezet) 500 ms    Calculated R Axis -88 degrees    Calculated T Axis 78 degrees    Diagnosis       Ventricular-paced rhythm  Biventricular pacemaker detected  When compared with ECG of 02-JAN-2018 10:04,  No significant change was found  Confirmed by Elva Jimenez ([id number]) on 6/17/2018 3:38:21 PM           Lab Results   Component Value Date/Time    WBC 3.7 03/15/2021 11:21 AM    HGB 11.8 03/15/2021 11:21 AM    HCT 38.8 03/15/2021 11:21 AM    PLATELET 521 17/35/8835 11:21 AM    MCV 86.0 03/15/2021 11:21 AM      Lab Results   Component Value Date/Time    Sodium 145 06/28/2021 10:37 AM    Potassium 3.5 06/28/2021 10:37 AM    Chloride 109 (H) 06/28/2021 10:37 AM    CO2 32 06/28/2021 10:37 AM    Anion gap 4 (L) 06/28/2021 10:37 AM    Glucose 60 (L) 06/28/2021 10:37 AM    BUN 18 06/28/2021 10:37 AM    Creatinine 0.89 06/28/2021 10:37 AM    BUN/Creatinine ratio 20 06/28/2021 10:37 AM    GFR est AA >60 06/28/2021 10:37 AM    GFR est non-AA >60 06/28/2021 10:37 AM    Calcium 9.8 06/28/2021 10:37 AM    Bilirubin, total 0.5 03/15/2021 11:21 AM    Alk. phosphatase 91 03/15/2021 11:21 AM    Protein, total 8.0 03/15/2021 11:21 AM    Albumin 3.9 03/15/2021 11:21 AM    Globulin 4.1 (H) 03/15/2021 11:21 AM    A-G Ratio 1.0 (L) 03/15/2021 11:21 AM    ALT (SGPT) 22 03/15/2021 11:21 AM      Lab Results   Component Value Date/Time    TSH 1.800 08/25/2020 12:36 PM      Echo:  2/3/20  · Normal cavity size and systolic function (ejection fraction normal). Mild concentric hypertrophy. Estimated left ventricular ejection fraction is 50 - 55%. Moderate (grade 2) left ventricular diastolic dysfunction Severe (grade 3) left ventricular diastolic dysfunction. · Severely dilated left atrium. · Severely dilated right atrium. · Mild tricuspid valve regurgitation is present. · Mild pulmonary hypertension. · Mild ascending aorta dilatation. Assessment:           ICD-10-CM ICD-9-CM    1. Chronic atrial fibrillation (HCC)  I48.20 427.31    2. Chronic systolic congestive heart failure (HCC)  I50.22 428.22      428.0    3. Essential hypertension  I10 401.9    4. Cardiac pacemaker in situ  Z95.0 V45.01    5. S/P AV kathleen ablation  Z98.890 V45.89    6. BMI 32.0-32.9,adult  Z68.32 V85.32      No orders of the defined types were placed in this encounter. Plan:     Ms. Luisito Wilson is here for annual device follow up. Alisha Bower show ventricular pacing. Her device interrogation demonstrates normal functioning with 98% BIV pacing, 7 years remaining on battery.    She remains on warfarin (CHADSVASC=5) and is tolerating well. Echocardiogram from 2/2020 demonstrates an EF of  50 - 55%. During this visit,  the patient and I had a comprehensive discussion of device management using principles of shared decision making. we reviewed device therapy, including the potential risks and benefits of device management. These risks include death, myocardial infarction, stroke, cardiac perforation, vascular injury, injury, pacing induced cardiomyopathy, inappropriate shocks (defibrillator) and other less severe complications. The patient demonstrated a clear understanding of these issues during out discussion. Our plans, determined together after thorough consideration, are outlined else where in this note. Enrolled in remote monitoring and I will see pt back in 1 year. Addressed all patient questions and concerns at this visit. Continue medical management for cardiomyopathy. Discussed side effects, efficacy and good medication compliance with pt re:  bb/ace. Thank you for allowing me to participate in [facility] 's care.       Guillaume Hollis NP

## 2021-09-23 ENCOUNTER — OFFICE VISIT (OUTPATIENT)
Dept: CARDIOLOGY CLINIC | Age: 81
End: 2021-09-23
Payer: MEDICARE

## 2021-09-23 VITALS
DIASTOLIC BLOOD PRESSURE: 84 MMHG | HEART RATE: 72 BPM | HEIGHT: 70 IN | OXYGEN SATURATION: 98 % | SYSTOLIC BLOOD PRESSURE: 122 MMHG | WEIGHT: 223 LBS | BODY MASS INDEX: 31.92 KG/M2 | RESPIRATION RATE: 18 BRPM

## 2021-09-23 DIAGNOSIS — Z95.0 CARDIAC PACEMAKER IN SITU: Primary | ICD-10-CM

## 2021-09-23 DIAGNOSIS — I48.20 CHRONIC ATRIAL FIBRILLATION (HCC): ICD-10-CM

## 2021-09-23 DIAGNOSIS — I48.20 CHRONIC ATRIAL FIBRILLATION (HCC): Primary | ICD-10-CM

## 2021-09-23 DIAGNOSIS — Z98.890 S/P AV NODAL ABLATION: ICD-10-CM

## 2021-09-23 DIAGNOSIS — I10 ESSENTIAL HYPERTENSION: ICD-10-CM

## 2021-09-23 DIAGNOSIS — Z95.0 CARDIAC PACEMAKER IN SITU: ICD-10-CM

## 2021-09-23 DIAGNOSIS — I50.22 CHRONIC SYSTOLIC CONGESTIVE HEART FAILURE (HCC): ICD-10-CM

## 2021-09-23 PROCEDURE — G8752 SYS BP LESS 140: HCPCS | Performed by: NURSE PRACTITIONER

## 2021-09-23 PROCEDURE — 93280 PM DEVICE PROGR EVAL DUAL: CPT | Performed by: INTERNAL MEDICINE

## 2021-09-23 PROCEDURE — G0463 HOSPITAL OUTPT CLINIC VISIT: HCPCS | Performed by: NURSE PRACTITIONER

## 2021-09-23 PROCEDURE — 1090F PRES/ABSN URINE INCON ASSESS: CPT | Performed by: NURSE PRACTITIONER

## 2021-09-23 PROCEDURE — G8432 DEP SCR NOT DOC, RNG: HCPCS | Performed by: NURSE PRACTITIONER

## 2021-09-23 PROCEDURE — G8754 DIAS BP LESS 90: HCPCS | Performed by: NURSE PRACTITIONER

## 2021-09-23 PROCEDURE — G8417 CALC BMI ABV UP PARAM F/U: HCPCS | Performed by: NURSE PRACTITIONER

## 2021-09-23 PROCEDURE — G8536 NO DOC ELDER MAL SCRN: HCPCS | Performed by: NURSE PRACTITIONER

## 2021-09-23 PROCEDURE — 99214 OFFICE O/P EST MOD 30 MIN: CPT | Performed by: NURSE PRACTITIONER

## 2021-09-23 PROCEDURE — G8399 PT W/DXA RESULTS DOCUMENT: HCPCS | Performed by: NURSE PRACTITIONER

## 2021-09-23 PROCEDURE — 1101F PT FALLS ASSESS-DOCD LE1/YR: CPT | Performed by: NURSE PRACTITIONER

## 2021-09-23 PROCEDURE — G8427 DOCREV CUR MEDS BY ELIG CLIN: HCPCS | Performed by: NURSE PRACTITIONER

## 2021-09-23 NOTE — PROGRESS NOTES
1. Have you been to the ER, urgent care clinic since your last visit? Hospitalized since your last visit? No    2. Have you seen or consulted any other health care providers outside of the [address] [place] [address] since your last visit? Include any pap smears or colon screening.  No         Chief Complaint   Patient presents with    Irregular Heart Beat     C/O  Dizziness

## 2021-09-27 ENCOUNTER — OFFICE VISIT (OUTPATIENT)
Dept: INTERNAL MEDICINE CLINIC | Age: 81
End: 2021-09-27
Payer: MEDICARE

## 2021-09-27 VITALS
SYSTOLIC BLOOD PRESSURE: 137 MMHG | TEMPERATURE: 98.2 F | HEART RATE: 75 BPM | OXYGEN SATURATION: 95 % | WEIGHT: 224.7 LBS | DIASTOLIC BLOOD PRESSURE: 84 MMHG | HEIGHT: 70 IN | BODY MASS INDEX: 32.17 KG/M2 | RESPIRATION RATE: 16 BRPM

## 2021-09-27 DIAGNOSIS — I48.20 CHRONIC ATRIAL FIBRILLATION (HCC): Primary | ICD-10-CM

## 2021-09-27 DIAGNOSIS — C90.01 MULTIPLE MYELOMA IN REMISSION (HCC): ICD-10-CM

## 2021-09-27 DIAGNOSIS — I10 ESSENTIAL HYPERTENSION: ICD-10-CM

## 2021-09-27 DIAGNOSIS — R29.898 MUSCULAR DECONDITIONING: ICD-10-CM

## 2021-09-27 DIAGNOSIS — L30.9 ECZEMA, UNSPECIFIED TYPE: ICD-10-CM

## 2021-09-27 DIAGNOSIS — M17.0 PRIMARY OSTEOARTHRITIS OF BOTH KNEES: ICD-10-CM

## 2021-09-27 LAB
INR BLD: 3.4
PT POC: 41.1 SECONDS
VALID INTERNAL CONTROL?: YES

## 2021-09-27 PROCEDURE — G8399 PT W/DXA RESULTS DOCUMENT: HCPCS | Performed by: INTERNAL MEDICINE

## 2021-09-27 PROCEDURE — G8752 SYS BP LESS 140: HCPCS | Performed by: INTERNAL MEDICINE

## 2021-09-27 PROCEDURE — 85610 PROTHROMBIN TIME: CPT | Performed by: INTERNAL MEDICINE

## 2021-09-27 PROCEDURE — G8754 DIAS BP LESS 90: HCPCS | Performed by: INTERNAL MEDICINE

## 2021-09-27 PROCEDURE — G8432 DEP SCR NOT DOC, RNG: HCPCS | Performed by: INTERNAL MEDICINE

## 2021-09-27 PROCEDURE — G8417 CALC BMI ABV UP PARAM F/U: HCPCS | Performed by: INTERNAL MEDICINE

## 2021-09-27 PROCEDURE — 1101F PT FALLS ASSESS-DOCD LE1/YR: CPT | Performed by: INTERNAL MEDICINE

## 2021-09-27 PROCEDURE — 1090F PRES/ABSN URINE INCON ASSESS: CPT | Performed by: INTERNAL MEDICINE

## 2021-09-27 PROCEDURE — 99214 OFFICE O/P EST MOD 30 MIN: CPT | Performed by: INTERNAL MEDICINE

## 2021-09-27 PROCEDURE — G8536 NO DOC ELDER MAL SCRN: HCPCS | Performed by: INTERNAL MEDICINE

## 2021-09-27 PROCEDURE — G8427 DOCREV CUR MEDS BY ELIG CLIN: HCPCS | Performed by: INTERNAL MEDICINE

## 2021-09-27 RX ORDER — CLOBETASOL PROPIONATE 0.5 MG/G
OINTMENT TOPICAL 2 TIMES DAILY
Qty: 45 G | Refills: 3 | Status: SHIPPED | OUTPATIENT
Start: 2021-09-27

## 2021-09-27 NOTE — PROGRESS NOTES
Chief Complaint   Patient presents with    Irregular Heart Beat     routine follow up     Anticoagulation     Patient states that she is taking coumadin 5 mg M-F and 7.5mg Sat and Sun.          1. Have you been to the ER, urgent care clinic since your last visit? Hospitalized since your last visit? No    2. Have you seen or consulted any other health care providers outside of the [address] [place] [address] since your last visit? Include any pap smears or colon screening.  No     Results for orders placed or performed in visit on 09/27/21   AMB POC PT/INR   Result Value Ref Range    VALID INTERNAL CONTROL POC Yes     Prothrombin time (POC) 41.1 seconds    INR POC 3.4

## 2021-09-27 NOTE — PROGRESS NOTES
[address] [place] [address] and Primary Care  [place] [address]  [address]  [place] [address]  Phone:  [phone]  Fax: [phone]       Chief Complaint   Patient presents with    Irregular Heart Beat     routine follow up     Anticoagulation     Patient states that she is taking coumadin 5 mg M-F and 7.5mg Sat and Sun. .      SUBJECTIVE:    Eric Pozo is a 80 y.o. female comes in for return visit stating that she has done reasonably well. She complains of a slightly irregularity of heart rate, but she has had a history of atrial fibrillation for which she remains on her warfarin. She does have a cardiologist and needs to follow up with him. INR today is acceptable. She states that her multiple myeloma remains stable. Her walking is becoming limited primarily because of progressive osteoarthritis of her knees. She has a past history of primary hypertension, dyslipidemia and obesity. The latter is one of the most things as it relates to her ability to walk on a more consistent basis. Current Outpatient Medications   Medication Sig Dispense Refill    clobetasoL (TEMOVATE) 0.05 % ointment Apply  to affected area two (2) times a day. 45 g 3    amLODIPine (NORVASC) 10 mg tablet take 1 tablet by mouth once daily 90 Tab 3    methocarbamoL (ROBAXIN) 500 mg tablet Take 1 Tab by mouth three (3) times daily as needed for Muscle Spasm(s). 15 Tab 0    warfarin (COUMADIN) 5 mg tablet take 2 tablets by mouth daily for 2 days then take 1 tablet daily THEREAFTER 32 Tab 11    potassium chloride (K-DUR, KLOR-CON) 20 mEq tablet take 1 tablet by mouth twice a day 60 Tab 11    metoprolol succinate (TOPROL-XL) 25 mg XL tablet take 1/2 tablet by mouth once daily 90 Tab 3    oxyCODONE-acetaminophen (PERCOCET)  mg per tablet Take 1 Tab by mouth every eight (8) hours as needed for Pain. Max Daily Amount: 3 Tabs.  40 Tab 0    menthol (BIOFREEZE, MENTHOL,) 4 % gel 1 g by Apply Externally route every six (6) hours. 118 mL 0    aluminum hydrox-magnesium carb (GAVISCON EXTRA STRENGTH) 160-105 mg chew Take 1-2 tablets by mouth four (4) times daily as needed.       furosemide (LASIX) 40 mg tablet take 1 tablet by mouth once daily 90 Tablet 3    lisinopriL (PRINIVIL, ZESTRIL) 40 mg tablet take 1 tablet by mouth once daily 90 Tablet 3     Past Medical History:   Diagnosis Date    Acute combined systolic and diastolic HF (heart failure), NYHA class 2 ([facility] [address] 8/11/2017    Arthritis     Atrial fibrillation (HCC)     Hypertension     Pacemaker     S/P AV kathleen ablation 8/11/2017    Status post biventricular pacemaker 8/11/2017 8/11/17      Past Surgical History:   Procedure Laterality Date    HX GYN      HX ORTHOPAEDIC      knee    HX PACEMAKER  08/11/2017     Allergies   Allergen Reactions    Codeine Other (comments)         REVIEW OF SYSTEMS:  General: negative for - chills or fever  ENT: negative for - headaches, nasal congestion or tinnitus  Respiratory: negative for - cough, hemoptysis, shortness of breath or wheezing  Cardiovascular : negative for - chest pain, edema, palpitations or shortness of breath  Gastrointestinal: negative for - abdominal pain, blood in stools, heartburn or nausea/vomiting  Genito-Urinary: no dysuria, trouble voiding, or hematuria  Musculoskeletal: negative for - gait disturbance, joint pain, joint stiffness or joint swelling  Neurological: no TIA or stroke symptoms  Hematologic: no bruises, no bleeding, no swollen glands  Integument: no lumps, mole changes, nail changes or rash  Endocrine: no malaise/lethargy or unexpected weight changes      Social History     Socioeconomic History    Marital status:      Spouse name: Not on file    Number of children: 3    Years of education: Not on file    Highest education level: Not on file   Occupational History    Occupation: Retired from the state [employer] Use    Smoking status: Never Smoker    Smokeless tobacco: Never Used   Vaping Use    Vaping Use: Never used   Substance and Sexual Activity    Alcohol use: No    Drug use: No    Sexual activity: Not Currently   Other Topics Concern     Service No    Blood Transfusions No    Caffeine Concern No    Occupational Exposure No    Hobby Hazards No    Sleep Concern No    Stress Concern No    Weight Concern Yes    Special Diet Yes     Comment: HTN/  Low Sugary    Back Care Yes    Exercise No    Bike Helmet No    Seat Belt Yes    Self-Exams Yes     Social Determinants of Health     Financial Resource Strain:     Difficulty of Paying Living Expenses:    Food Insecurity:     Worried About Running Out of Food in the Last Year:     Ran Out of Food in the Last Year:    Transportation Needs:     Lack of Transportation (Medical):  Lack of Transportation (Non-Medical):    Physical Activity:     Days of Exercise per Week:     Minutes of Exercise per Session:    Stress:     Feeling of Stress :    Social Connections:     Frequency of Communication with Friends and Family:     Frequency of Social Gatherings with Friends and Family:     Attends Adventist Services:     Active Member of Clubs or Organizations:     Attends Club or Organization Meetings:     Marital Status:      Family History   Problem Relation Age of Onset    Stroke Mother     No Known Problems Father        OBJECTIVE:    Visit Vitals  /84   Pulse 75   Temp 98.2 °F (36.8 °C) (Oral)   Resp 16   Ht 5' 10\" (1.778 m)   Wt 224 lb 11.2 oz (101.9 kg)   SpO2 95%   BMI 32.24 kg/m²     CONSTITUTIONAL: well , well nourished, appears age appropriate  EYES: perrla, eom intact  ENMT:moist mucous membranes, pharynx clear  NECK: supple.  Thyroid normal  RESPIRATORY: Chest: clear to ascultation and percussion   CARDIOVASCULAR: Heart: regular rate and rhythm  GASTROINTESTINAL: Abdomen: soft, bowel sounds active  HEMATOLOGIC: no pathological lymph nodes palpated  MUSCULOSKELETAL: Extremities: no edema, pulse 1+   INTEGUMENT: No unusual rashes or suspicious skin lesions noted. Nails appear normal.  NEUROLOGIC: non-focal exam   MENTAL STATUS: alert and oriented, appropriate affect      ASSESSMENT:  1. Chronic atrial fibrillation (HCC)    2. Eczema, unspecified type    3. Primary osteoarthritis of both knees    4. Muscular deconditioning    5. Essential hypertension    6. Multiple myeloma in remission ([facility] [address]        PLAN:  1. The patient's INR has been elevated. She will hold on warfarin for the next two days and then resume it at one and a half tablets on Sunday and one tablet all other days. This represents a reduction from the previous one tablet Monday to Friday and one and a half Saturday and Sunday. She will seen back in the office in the next two weeks for a repeat INR. 2. Her eczema is mild, but she will be treated with Temovate ointment applied locally b.i.d.  3. Her osteoarthritis is getting progressively worse of her knees. Her walking is quite slow and deliberate now, more so than previously. All of the problem is related to physical deconditioning. More walking she does, stronger she would get. 4. Blood pressure is excellent, no adjustments are made. 5. Her multiple myeloma is doing quite well. She follows it with her medical oncologist.  She is in remission currently. .  Orders Placed This Encounter    CBC WITH AUTOMATED DIFF    METABOLIC PANEL, BASIC    AMB POC PT/INR    clobetasoL (TEMOVATE) 0.05 % ointment         Follow-up and Dispositions    · Return in about 3 months (around 12/27/2021), or monthly for INR.            Sandra Minor MD

## 2021-09-28 LAB
ANION GAP SERPL CALC-SCNC: 4 MMOL/L (ref 5–15)
BASOPHILS # BLD: 0 K/UL (ref 0–0.1)
BASOPHILS NFR BLD: 1 % (ref 0–1)
BUN SERPL-MCNC: 14 MG/DL (ref 6–20)
BUN/CREAT SERPL: 16 (ref 12–20)
CALCIUM SERPL-MCNC: 9.3 MG/DL (ref 8.5–10.1)
CHLORIDE SERPL-SCNC: 107 MMOL/L (ref 97–108)
CO2 SERPL-SCNC: 31 MMOL/L (ref 21–32)
CREAT SERPL-MCNC: 0.87 MG/DL (ref 0.55–1.02)
DIFFERENTIAL METHOD BLD: ABNORMAL
EOSINOPHIL # BLD: 0.1 K/UL (ref 0–0.4)
EOSINOPHIL NFR BLD: 1 % (ref 0–7)
ERYTHROCYTE [DISTWIDTH] IN BLOOD BY AUTOMATED COUNT: 15.5 % (ref 11.5–14.5)
GLUCOSE SERPL-MCNC: 60 MG/DL (ref 65–100)
HCT VFR BLD AUTO: 39.2 % (ref 35–47)
HGB BLD-MCNC: 11.6 G/DL (ref 11.5–16)
IMM GRANULOCYTES # BLD AUTO: 0 K/UL (ref 0–0.04)
IMM GRANULOCYTES NFR BLD AUTO: 0 % (ref 0–0.5)
LYMPHOCYTES # BLD: 0.8 K/UL (ref 0.8–3.5)
LYMPHOCYTES NFR BLD: 23 % (ref 12–49)
MCH RBC QN AUTO: 25.7 PG (ref 26–34)
MCHC RBC AUTO-ENTMCNC: 29.6 G/DL (ref 30–36.5)
MCV RBC AUTO: 86.9 FL (ref 80–99)
MONOCYTES # BLD: 0.4 K/UL (ref 0–1)
MONOCYTES NFR BLD: 12 % (ref 5–13)
NEUTS SEG # BLD: 2.2 K/UL (ref 1.8–8)
NEUTS SEG NFR BLD: 63 % (ref 32–75)
NRBC # BLD: 0 K/UL (ref 0–0.01)
NRBC BLD-RTO: 0 PER 100 WBC
PLATELET # BLD AUTO: 167 K/UL (ref 150–400)
PMV BLD AUTO: 11 FL (ref 8.9–12.9)
POTASSIUM SERPL-SCNC: 3.3 MMOL/L (ref 3.5–5.1)
RBC # BLD AUTO: 4.51 M/UL (ref 3.8–5.2)
SODIUM SERPL-SCNC: 142 MMOL/L (ref 136–145)
WBC # BLD AUTO: 3.5 K/UL (ref 3.6–11)

## 2021-11-04 ENCOUNTER — PATIENT OUTREACH (OUTPATIENT)
Dept: CASE MANAGEMENT | Age: 81
End: 2021-11-04

## 2021-11-04 DIAGNOSIS — M54.16 LUMBAR RADICULOPATHY: Primary | ICD-10-CM

## 2021-11-04 RX ORDER — PREDNISONE 20 MG/1
60 TABLET ORAL
Qty: 15 TABLET | Refills: 0 | Status: SHIPPED | OUTPATIENT
Start: 2021-11-04

## 2021-11-04 NOTE — ACP (ADVANCE CARE PLANNING)
Advance Care Planning:   Does patient have an Advance Directive:  currently not on file; patient declined education. Renetta Dorsey remains as given Healthcare Decision Maker.

## 2021-11-04 NOTE — PROGRESS NOTES
Ambulatory Care Management Note    Date/Time:  11/4/2021 5:07 PM    This patient was received as a referral from Provider. Ambulatory Care Manager received call from patient, consulted w/ PCP then outreached again to patient today to offer care management services and for Lower Back Pain Management. Introduction to self and role of care manager provided. Patient accepted care management services at this time. Follow up call scheduled at this time. Patient has Ambulatory Care Manager's contact number for for any questions or concerns. Ambulatory Care Management Note    Date/Time:  11/4/2021 5:08 PM    This Ambulatory Care Manager (ACM) reviewed and updated the following screenings during this call; disease specific assessment     Patient's challenges to self management identified:   medical condition      Medication Management:  good adherence    Advance Care Planning:   Does patient have an Advance Directive:  currently not on file; patient declined education. Mayco Maza remains as given Healthcare Decision Maker. [employer], Referrals, and Durable Medical Equipment: none reported      Health Maintenance Due   Topic Date Due    Shingrix Vaccine Age 50> (1 of 2) Never done    COVID-19 Vaccine (3 - Pfizer risk 3-dose series) 04/19/2021    Flu Vaccine (1) 09/01/2021     Health Maintenance reviewed - COVID Vacc Booster. Patient was asked to consider health care goals that they would like to focus on with this ACM. ACM will follow up with patient to discuss goals and establish care plan in the next 7-14 days.        PCP/Specialist follow up:   Future Appointments   Date Time Provider Belen Russell   11/17/2021  9:15 AM NURSE [facility] MAIN BS AMB   12/6/2021  2:00 PM [facility] RCR PET DOSE 1 [facility] RADHA   12/6/2021  3:00 PM [facility] RCR PET 1 [facility]PET [facility] RAD[facility]   12/21/2021  1:15 PM Dianna Trevino MD [facility] BS AMB   12/27/2021  9:30 AM Марина Pace MD [facility] MAIN BS AMB   12/30/2021  9:30 AM REMOTE_RCAM [facility] BS AMB   10/20/2022  9:30 AM PACEMAKER, RCAM RCAMB BS AMB   10/20/2022  9:40 AM Nidia James, ANP [facility] AMB      Goals Addressed                 This Visit's Progress     Patient/Family verbalizes understanding of self-management of chronic disease. On track     11/4/2021:  Next call: CHF & Pain Management Interventions. Consult done w/ PCP re Lower Back Pain PMH & present condition.    EW ACM        goal f/u:  10 days

## 2021-11-15 ENCOUNTER — OFFICE VISIT (OUTPATIENT)
Dept: URGENT CARE | Age: 81
End: 2021-11-15
Payer: MEDICARE

## 2021-11-15 VITALS
RESPIRATION RATE: 16 BRPM | TEMPERATURE: 98.8 F | HEART RATE: 75 BPM | OXYGEN SATURATION: 96 % | SYSTOLIC BLOOD PRESSURE: 126 MMHG | DIASTOLIC BLOOD PRESSURE: 73 MMHG

## 2021-11-15 DIAGNOSIS — N39.0 ACUTE UTI: Primary | ICD-10-CM

## 2021-11-15 DIAGNOSIS — M54.50 RIGHT LOW BACK PAIN, UNSPECIFIED CHRONICITY, UNSPECIFIED WHETHER SCIATICA PRESENT: ICD-10-CM

## 2021-11-15 LAB
BILIRUB UR QL STRIP: NEGATIVE
GLUCOSE UR-MCNC: NEGATIVE MG/DL
KETONES P FAST UR STRIP-MCNC: NEGATIVE MG/DL
PH UR STRIP: 5 [PH] (ref 4.6–8)
PROT UR QL STRIP: NEGATIVE
SP GR UR STRIP: 1.01 (ref 1–1.03)
UA UROBILINOGEN AMB POC: NORMAL (ref 0.2–1)
URINALYSIS CLARITY POC: NORMAL
URINALYSIS COLOR POC: NORMAL
URINE BLOOD POC: NEGATIVE
URINE LEUKOCYTES POC: NORMAL
URINE NITRITES POC: NEGATIVE

## 2021-11-15 PROCEDURE — G8754 DIAS BP LESS 90: HCPCS | Performed by: NURSE PRACTITIONER

## 2021-11-15 PROCEDURE — 99203 OFFICE O/P NEW LOW 30 MIN: CPT | Performed by: NURSE PRACTITIONER

## 2021-11-15 PROCEDURE — 1101F PT FALLS ASSESS-DOCD LE1/YR: CPT | Performed by: NURSE PRACTITIONER

## 2021-11-15 PROCEDURE — G8536 NO DOC ELDER MAL SCRN: HCPCS | Performed by: NURSE PRACTITIONER

## 2021-11-15 PROCEDURE — G8399 PT W/DXA RESULTS DOCUMENT: HCPCS | Performed by: NURSE PRACTITIONER

## 2021-11-15 PROCEDURE — G8752 SYS BP LESS 140: HCPCS | Performed by: NURSE PRACTITIONER

## 2021-11-15 PROCEDURE — G8427 DOCREV CUR MEDS BY ELIG CLIN: HCPCS | Performed by: NURSE PRACTITIONER

## 2021-11-15 PROCEDURE — 81003 URINALYSIS AUTO W/O SCOPE: CPT | Performed by: NURSE PRACTITIONER

## 2021-11-15 PROCEDURE — G8417 CALC BMI ABV UP PARAM F/U: HCPCS | Performed by: NURSE PRACTITIONER

## 2021-11-15 PROCEDURE — 1090F PRES/ABSN URINE INCON ASSESS: CPT | Performed by: NURSE PRACTITIONER

## 2021-11-15 PROCEDURE — G8432 DEP SCR NOT DOC, RNG: HCPCS | Performed by: NURSE PRACTITIONER

## 2021-11-15 RX ORDER — CEPHALEXIN 500 MG/1
500 CAPSULE ORAL 2 TIMES DAILY
Qty: 14 CAPSULE | Refills: 0 | Status: SHIPPED | OUTPATIENT
Start: 2021-11-15 | End: 2021-11-22

## 2021-11-15 NOTE — PROGRESS NOTES
Here for lower abdominal pressure over the past week  States feels similar to UTI in past  Has back pain but promotes being treated for this by PCP- pain across lower back. No flank pain. Pushing urine out ok and denies n/v/d, dizziness or lethargy  Accompanied by daughter in office today.            Past Medical History:   Diagnosis Date    Acute combined systolic and diastolic HF (heart failure), NYHA class 2 ([address] 8/11/2017    Arthritis     Atrial fibrillation (HCC)     Hypertension     Pacemaker     S/P AV kathleen ablation 8/11/2017    Status post biventricular pacemaker 8/11/2017 8/11/17         Past Surgical History:   Procedure Laterality Date    HX GYN      HX ORTHOPAEDIC      knee    HX PACEMAKER  08/11/2017         Family History   Problem Relation Age of Onset    Stroke Mother     No Known Problems Father         Social History     Socioeconomic History    Marital status:      Spouse name: Not on file    Number of children: 3    Years of education: Not on file    Highest education level: Not on file   Occupational History    Occupation: Retired from the [employer]   Tobacco Use    Smoking status: Never Smoker    Smokeless tobacco: Never Used   Vaping Use    Vaping Use: Never used   Substance and Sexual Activity    Alcohol use: No    Drug use: No    Sexual activity: Not Currently   Other Topics Concern     Service No    Blood Transfusions No    Caffeine Concern No    Occupational Exposure No    Hobby Hazards No    Sleep Concern No    Stress Concern No    Weight Concern Yes    Special Diet Yes     Comment: HTN/  Low Sugary    Back Care Yes    Exercise No    Bike Helmet No    Seat Belt Yes    Self-Exams Yes   Social History Narrative    Not on file     Social Determinants of Health     Financial Resource Strain:     Difficulty of Paying Living Expenses: Not on file   Food Insecurity:     Worried About Running Out of Food in the Last Year: Not on file    [address] in the Last Year: Not on file   Transportation Needs:     Lack of Transportation (Medical): Not on file    Lack of Transportation (Non-Medical): Not on file   Physical Activity:     Days of Exercise per Week: Not on file    Minutes of Exercise per Session: Not on file   Stress:     Feeling of Stress : Not on file   Social Connections:     Frequency of Communication with Friends and Family: Not on file    Frequency of Social Gatherings with Friends and Family: Not on file    Attends Hindu Services: Not on file    Active Member of [address] [place] [address] [email or web address] or Organizations: Not on file    Attends Club or Organization Meetings: Not on file    Marital Status: Not on file   Intimate Partner Violence:     Fear of Current or Ex-Partner: Not on file    Emotionally Abused: Not on file    Physically Abused: Not on file    Sexually Abused: Not on file   Housing Stability:     Unable to Pay for Housing in the Last Year: Not on file    Number of [place] in the Last Year: Not on file    Unstable Housing in the Last Year: Not on file                ALLERGIES: Codeine    Review of Systems   All other systems reviewed and are negative. Vitals:    11/15/21 1045   BP: 126/73   Pulse: 75   Resp: 16   Temp: 98.8 °F (37.1 °C)   SpO2: 96%       Physical Exam  Vitals reviewed. Constitutional:       General: She is not in acute distress. Appearance: She is not diaphoretic. HENT:      Head: Normocephalic and atraumatic. Cardiovascular:      Rate and Rhythm: Normal rate and regular rhythm. Heart sounds: Normal heart sounds. No murmur heard. No friction rub. No gallop. Pulmonary:      Effort: Pulmonary effort is normal. No respiratory distress. Breath sounds: Normal breath sounds. No wheezing or rales. Abdominal:      General: There is no distension. Palpations: Abdomen is soft. There is no mass. Tenderness: There is no abdominal tenderness.  There is no guarding or rebound. Comments: Mild suprapubic TTP. All other quadrants non tender to palpation. Musculoskeletal:      Cervical back: Neck supple. Skin:     Coloration: Skin is not pale. Neurological:      Mental Status: She is alert and oriented to person, place, and time. Psychiatric:         Behavior: Behavior normal.         Thought Content: Thought content normal.         MDM     Differential Diagnosis; Clinical Impression; Plan:       CLINICAL IMPRESSION:  (N39.0) Acute UTI  (primary encounter diagnosis)  (M54.50) Right low back pain, unspecified chronicity, unspecified whether sciatica present    Orders Placed This Encounter      cephALEXin (KEFLEX) 500 mg capsule          Sig: Take 1 Capsule by mouth two (2) times a day for 7 days. Dispense:  14 Capsule          Refill:  0      Plan:  Back pain. Given hx of myeloma- should follow up with PCP/oncology within next week. This pain does not seem to be related to pyelo or UTI. UTI- IWONA present on UA  Will treat with cephalexin and send urine culture. Maintain adequate fluid intake      We have reviewed concerning signs/symptoms, normal vs abnormal progression of medical condition and when to seek immediate medical attention. Schedule with PCP or Urgent Care immediately for worsening or new symptoms. See your PCP if there is not at least some improvement in symptoms within the next 4 days. You should see your PCP for updates on your routine health maintenance.              Procedures

## 2021-11-17 ENCOUNTER — OFFICE VISIT (OUTPATIENT)
Dept: INTERNAL MEDICINE CLINIC | Age: 81
End: 2021-11-17

## 2021-11-17 VITALS
HEART RATE: 75 BPM | TEMPERATURE: 97.6 F | OXYGEN SATURATION: 95 % | RESPIRATION RATE: 16 BRPM | BODY MASS INDEX: 32.24 KG/M2 | HEIGHT: 70 IN

## 2021-11-17 DIAGNOSIS — M54.16 LUMBAR RADICULOPATHY: Primary | ICD-10-CM

## 2021-11-17 DIAGNOSIS — I10 ESSENTIAL HYPERTENSION: ICD-10-CM

## 2021-11-17 DIAGNOSIS — I48.91 ATRIAL FIBRILLATION, UNSPECIFIED TYPE (HCC): ICD-10-CM

## 2021-11-17 DIAGNOSIS — E66.9 OBESITY (BMI 30.0-34.9): ICD-10-CM

## 2021-11-17 DIAGNOSIS — M17.0 PRIMARY OSTEOARTHRITIS OF BOTH KNEES: ICD-10-CM

## 2021-11-17 LAB
INR BLD: 2
PT POC: 23.8 SECONDS
VALID INTERNAL CONTROL?: YES

## 2021-11-17 PROCEDURE — 99214 OFFICE O/P EST MOD 30 MIN: CPT | Performed by: INTERNAL MEDICINE

## 2021-11-17 PROCEDURE — G8756 NO BP MEASURE DOC: HCPCS | Performed by: INTERNAL MEDICINE

## 2021-11-17 PROCEDURE — 1101F PT FALLS ASSESS-DOCD LE1/YR: CPT | Performed by: INTERNAL MEDICINE

## 2021-11-17 PROCEDURE — G8399 PT W/DXA RESULTS DOCUMENT: HCPCS | Performed by: INTERNAL MEDICINE

## 2021-11-17 PROCEDURE — G8432 DEP SCR NOT DOC, RNG: HCPCS | Performed by: INTERNAL MEDICINE

## 2021-11-17 PROCEDURE — G8427 DOCREV CUR MEDS BY ELIG CLIN: HCPCS | Performed by: INTERNAL MEDICINE

## 2021-11-17 PROCEDURE — G8536 NO DOC ELDER MAL SCRN: HCPCS | Performed by: INTERNAL MEDICINE

## 2021-11-17 PROCEDURE — G8417 CALC BMI ABV UP PARAM F/U: HCPCS | Performed by: INTERNAL MEDICINE

## 2021-11-17 PROCEDURE — 1090F PRES/ABSN URINE INCON ASSESS: CPT | Performed by: INTERNAL MEDICINE

## 2021-11-17 PROCEDURE — 85610 PROTHROMBIN TIME: CPT | Performed by: INTERNAL MEDICINE

## 2021-11-17 NOTE — PROGRESS NOTES
Chief Complaint   Patient presents with   [facility] ED Follow-up     Patient states that she was seen at and urgent care center on 11/15/21 for back and abdominal pain.  Anticoagulation     Patient states that she is taking coumadin 5 mg M-F and 7.5 mg Sat and Sun.          1. Have you been to the ER, urgent care clinic since your last visit? Hospitalized since your last visit? Yes When: 11/15/21 Where: urgent care center Reason for visit: abdominal and back pain    2. Have you seen or consulted any other health care providers outside of the [address] [place] [address] since your last visit? Include any pap smears or colon screening. No       Results for orders placed or performed in visit on 11/17/21   AMB POC PT/INR   Result Value Ref Range    VALID INTERNAL CONTROL POC Yes     Prothrombin time (POC) 23.8 seconds    INR POC 2.0      Description    Per Dr. Sean Quinn, no changes and return to office in one month for pt/inr check.

## 2021-11-18 LAB
BACTERIA SPEC CULT: ABNORMAL
CC UR VC: ABNORMAL
SERVICE CMNT-IMP: ABNORMAL

## 2021-11-20 RX ORDER — POTASSIUM CHLORIDE 20 MEQ/1
TABLET, EXTENDED RELEASE ORAL
Qty: 60 TABLET | Refills: 11 | Status: SHIPPED | OUTPATIENT
Start: 2021-11-20

## 2021-11-21 PROBLEM — M54.16 LUMBAR RADICULOPATHY: Status: ACTIVE | Noted: 2021-11-21

## 2021-11-21 NOTE — PROGRESS NOTES
[address] [place] [address] and Primary Care  [place] [address]  [address] [facility] [address]  Phone:  [phone]  Fax: [phone]       Chief Complaint   Patient presents with   [facility] ED Follow-up     Patient states that she was seen at and urgent care center on 11/15/21 for back and abdominal pain.  Anticoagulation     Patient states that she is taking coumadin 5 mg M-F and 7.5 mg Sat and Sun. .      SUBJECTIVE:    Patricia Fowler is a 80 y.o. female comes in continuing to complain of pain in her low back area. This started about two to three weeks ago and is getting progressively worse. It radiates to the right buttock and into the thigh. I gave her a short course of prednisone, which helped, but once the medication was stopped the discomfort returned. She denies similar symptoms in the past.  She has not fallen of late. She does have significant spondylosis involving her entire spine. She remains on warfarin in view of her history of paroxysmal atrial fibrillation. She has a past history of primary hypertension, dyslipidemia, osteoarthritis involving both knees, as well as obesity. Current Outpatient Medications   Medication Sig Dispense Refill    cephALEXin (KEFLEX) 500 mg capsule Take 1 Capsule by mouth two (2) times a day for 7 days. 14 Capsule 0    predniSONE (DELTASONE) 20 mg tablet Take 60 mg by mouth daily (with breakfast). 15 Tablet 0    furosemide (LASIX) 40 mg tablet take 1 tablet by mouth once daily 90 Tablet 3    lisinopriL (PRINIVIL, ZESTRIL) 40 mg tablet take 1 tablet by mouth once daily 90 Tablet 3    clobetasoL (TEMOVATE) 0.05 % ointment Apply  to affected area two (2) times a day. 45 g 3    amLODIPine (NORVASC) 10 mg tablet take 1 tablet by mouth once daily 90 Tab 3    methocarbamoL (ROBAXIN) 500 mg tablet Take 1 Tab by mouth three (3) times daily as needed for Muscle Spasm(s).  15 Tab 0    warfarin (COUMADIN) 5 mg tablet take 2 tablets by mouth daily for 2 days then take 1 tablet daily THEREAFTER 32 Tab 11    metoprolol succinate (TOPROL-XL) 25 mg XL tablet take 1/2 tablet by mouth once daily 90 Tab 3    oxyCODONE-acetaminophen (PERCOCET)  mg per tablet Take 1 Tab by mouth every eight (8) hours as needed for Pain. Max Daily Amount: 3 Tabs. 40 Tab 0    menthol (BIOFREEZE, MENTHOL,) 4 % gel 1 g by Apply Externally route every six (6) hours. 118 mL 0    aluminum hydrox-magnesium carb (GAVISCON EXTRA STRENGTH) 160-105 mg chew Take 1-2 tablets by mouth four (4) times daily as needed.       potassium chloride (K-DUR, KLOR-CON M20) 20 mEq tablet take 1 tablet by mouth twice a day 60 Tablet 11     Past Medical History:   Diagnosis Date    Acute combined systolic and diastolic HF (heart failure), NYHA class 2 ([facility] [address] 8/11/2017    Arthritis     Atrial fibrillation (HCC)     Hypertension     Pacemaker     S/P AV kathleen ablation 8/11/2017    Status post biventricular pacemaker 8/11/2017 8/11/17      Past Surgical History:   Procedure Laterality Date    HX GYN      HX ORTHOPAEDIC      knee    HX PACEMAKER  08/11/2017     Allergies   Allergen Reactions    Codeine Other (comments)         REVIEW OF SYSTEMS:  General: negative for - chills or fever  ENT: negative for - headaches, nasal congestion or tinnitus  Respiratory: negative for - cough, hemoptysis, shortness of breath or wheezing  Cardiovascular : negative for - chest pain, edema, palpitations or shortness of breath  Gastrointestinal: negative for - abdominal pain, blood in stools, heartburn or nausea/vomiting  Genito-Urinary: no dysuria, trouble voiding, or hematuria  Musculoskeletal: negative for - gait disturbance, joint pain, joint stiffness or joint swelling  Neurological: no TIA or stroke symptoms  Hematologic: no bruises, no bleeding, no swollen glands  Integument: no lumps, mole changes, nail changes or rash  Endocrine: no malaise/lethargy or unexpected weight changes      Social History Socioeconomic History    Marital status:     Number of children: 3   Occupational History    Occupation: Retired from the state [employer] Use    Smoking status: Never Smoker    Smokeless tobacco: Never Used   Vaping Use    Vaping Use: Never used   Substance and Sexual Activity    Alcohol use: No    Drug use: No    Sexual activity: Not Currently   Other Topics Concern     Service No    Blood Transfusions No    Caffeine Concern No    Occupational Exposure No    Hobby Hazards No    Sleep Concern No    Stress Concern No    Weight Concern Yes    Special Diet Yes     Comment: HTN/  Low Sugary    Back Care Yes    Exercise No    Bike Helmet No    Seat Belt Yes    Self-Exams Yes     Family History   Problem Relation Age of Onset    Stroke Mother     No Known Problems Father        OBJECTIVE:    Visit Vitals  Pulse 75   Temp 97.6 °F (36.4 °C) (Oral)   Resp 16   Ht 5' 10\" (1.778 m)   SpO2 95%   BMI 32.24 kg/m²     CONSTITUTIONAL: well , well nourished, appears age appropriate  EYES: perrla, eom intact  ENMT:moist mucous membranes, pharynx clear  NECK: supple. Thyroid normal  RESPIRATORY: Chest: clear to ascultation and percussion   CARDIOVASCULAR: Heart: regular rate and rhythm  GASTROINTESTINAL: Abdomen: soft, bowel sounds active  HEMATOLOGIC: no pathological lymph nodes palpated  MUSCULOSKELETAL: Extremities: no edema, pulse 1+   INTEGUMENT: No unusual rashes or suspicious skin lesions noted. Nails appear normal.  NEUROLOGIC: non-focal exam   MENTAL STATUS: alert and oriented, appropriate affect      ASSESSMENT:  1. Lumbar radiculopathy    2. Atrial fibrillation, unspecified type (HCC)    3. Obesity (BMI 30.0-34.9)    4. Primary osteoarthritis of both knees    5. Essential hypertension        PLAN:  1. The patient appears to have a lumbar radiculopathy clinically. CT scan of the LS spine will be obtained. Options are quite limited.   She did get transient improvement with steroid usage. An epidural injection might be of benefit. She has a pacer and cannot obtain an MRI. 2. INR is acceptable. Her ventricular rate is quite stable today. No adjustments are made. 3. I again encouraged weight reduction. This can be accomplished by eating meals, eliminating snacks, and avoiding the consumption of processed carbohydrates. 4. Her osteoarthritic knees continue to be a major problem. Without meaningful weight loss, this will indeed continue. 5. Blood pressure is excellent. No adjustments are made.     .  Orders Placed This Encounter    Gui Bolanos POC PT/INR               Sandra Minor MD

## 2021-11-24 ENCOUNTER — PATIENT OUTREACH (OUTPATIENT)
Dept: CASE MANAGEMENT | Age: 81
End: 2021-11-24

## 2021-11-24 RX ORDER — CEPHALEXIN 500 MG/1
500 CAPSULE ORAL 2 TIMES DAILY
COMMUNITY
Start: 2021-11-15 | End: 2021-11-24 | Stop reason: ALTCHOICE

## 2021-12-01 ENCOUNTER — TRANSCRIBE ORDER (OUTPATIENT)
Dept: SCHEDULING | Age: 81
End: 2021-12-01

## 2021-12-06 ENCOUNTER — PATIENT OUTREACH (OUTPATIENT)
Dept: CASE MANAGEMENT | Age: 81
End: 2021-12-06

## 2021-12-06 ENCOUNTER — HOSPITAL ENCOUNTER (OUTPATIENT)
Dept: PET IMAGING | Age: 81
Discharge: HOME | End: 2021-12-06
Attending: INTERNAL MEDICINE
Payer: MEDICARE

## 2021-12-06 VITALS — WEIGHT: 215 LBS | HEIGHT: 70 IN | BODY MASS INDEX: 30.78 KG/M2

## 2021-12-06 DIAGNOSIS — C90.00 MULTIPLE MYELOMA, FAILED REMISSION (HCC): ICD-10-CM

## 2021-12-06 DIAGNOSIS — C43.9 MELANOMA (HCC): ICD-10-CM

## 2021-12-06 LAB
GLUCOSE BLD STRIP.AUTO-MCNC: 84 MG/DL (ref 65–117)
SERVICE CMNT-IMP: NORMAL

## 2021-12-06 PROCEDURE — A9552 F18 FDG: HCPCS

## 2021-12-06 RX ORDER — FLUDEOXYGLUCOSE F-18 200 MCI/ML
10 INJECTION INTRAVENOUS ONCE
Status: COMPLETED | OUTPATIENT
Start: 2021-12-06 | End: 2021-12-06

## 2021-12-06 RX ADMIN — FLUDEOXYGLUCOSE F-18 10.8 MILLICURIE: 200 INJECTION INTRAVENOUS at 14:10

## 2021-12-07 ENCOUNTER — PATIENT OUTREACH (OUTPATIENT)
Dept: CASE MANAGEMENT | Age: 81
End: 2021-12-07

## 2021-12-07 NOTE — PROGRESS NOTES
Patient on Complex CM Enrollment Report/fu Contact. Left message on voice mail with my contact information for return call. Need to assess for ongoing needs and CCM enrollment/fu. Patient states on VM she is doing much better; back and knees not hurting as when we last talked. Will continue to follow-up for 3rd Call Care Plan.

## 2021-12-08 ENCOUNTER — TELEPHONE (OUTPATIENT)
Dept: INTERNAL MEDICINE CLINIC | Age: 81
End: 2021-12-08

## 2021-12-14 NOTE — PROGRESS NOTES
11/24/2021:  ACM Note   Ambulatory Care Management Note      Date/Time:  11/24/2021     This patient was received as a referral from [address]. Top Challenges reviewed with the provider   Lumbar pain--seen in Urgent Care-UTI-started on Keflex                  Ambulatory  contacted patient for discussion and case management of Lower Back Pain   Summary of patients top problems: Patient 80 y.o. with long standing complaint of lumbar radiculopathy radiating to right buttock and thigh. She also had spondylosis involving her entire spine. She is frequently treated with prednisone which she states always helps. She was recently seen in Urgent Care and deemed to have UTI. Patient c/o irregular heart beats; is on anticoagulants (warfarin) for history of chronic paroxysmal AFib; has pacemaker device & is followed by Cardiology. Also has a Hx of primary hypertension, dyslipidemia, osteoarthritis both knees & obesity. 1.  Lower Back Pain worsening-diagnosed with UTI at Urgent Care. 2.  Obesity/Sedentary lifestyle:  Diet & adequate water intake. 3.  HTN    Patient's challenges to self management identified:   medical condition    Medication Management:  good adherence    Advance Care Planning:   Does patient have an Advance Directive:  currently not on file; education provided    As from 11/4/2021 Patient reports daughter Mayco Maza remains as Healthcare Decision Maker.      [employer], Referrals, and Durable Medical Equipment: Cardiology    PCP/Specialist follow up:   Future Appointments   Date Time Provider Belen Narvaezi   12/17/2021 12:15 PM Марина Pace MD [facility] MAIN BS AMB   12/21/2021  9:30 AM MD GARRY EliseMB BS AMB   12/27/2021  9:30 AM Марина Pace MD [facility] MAIN BS AMB   12/30/2021  9:30 AM REMOTE_RCAM RCAMB BS AMB   10/20/2022  9:30 AM PACEMAKER, RCAM RCAMB BS AMB   10/20/2022  9:40 AM Nidia James, ANP RCAMB BS AMB          Goals      Patient verbalizes understanding of self-management goals of living with Congestive Heart Failure      4/12/2021: CHF: reported better progress: denied swollen ankles today. Review:  Na+ intake reduction: elimination of processed foods as much as possible. CDC guidelines reviewed: physical distancing/adequate hydration/mask wearing. Agreement voiced. EW    5/15/2021:  Patient reports doing much better; now as followed up with Cardiology. Reports less Na+ intake. Less ankle swelling. Feels better overall. K+ was reported 3.3; patient had not eaten prior to office appointment. Discussed foods with K+ that may be good for K+ replenish as well as K_Dur medication compliance. EW        Patient/Family verbalizes understanding of self-management of chronic disease. 11/4/2021:  Next call: CHF & Pain Management Interventions. Consult done w/ PCP re Lower Back Pain PMH & present condition. EW AC    11/24/2021:  Patient reports attended Urgent Care Center due to lower back pain and radiating to lower abdomen. Reports completion of Keflex 7 day regime as ordered. Reports reduced pain. Agrees to continuing adequate water intake. Reports drinking lots of water and cranberry juice; denies drinking soft drinks. Agrees to follow-up with PCP within week as recommended at Urgent Care. Reports plans to follow-up with cancer specialist in the very near future. EW AC         goal update: 7-10 days. Patient verbalized understanding of all information discussed. Patient has this Ambulatory Care Manager's contact information for any further questions, concerns, or needs.

## 2021-12-15 ENCOUNTER — PATIENT OUTREACH (OUTPATIENT)
Dept: CASE MANAGEMENT | Age: 81
End: 2021-12-15

## 2021-12-16 ENCOUNTER — PATIENT OUTREACH (OUTPATIENT)
Dept: CASE MANAGEMENT | Age: 81
End: 2021-12-16

## 2021-12-16 NOTE — PROGRESS NOTES
Complex Case Management: Lower Back Pain/Lumbar Radiculopathy f/u    Patient on Complex CM Enrollment Report/fu Contact. Left message on voice mail with my contact information for return call. Need to assess for ongoing needs and CCM enrollment/fu.

## 2021-12-16 NOTE — LETTER
12/16/2021 2:00 PM      Ms. Tameka Smallwood  [address] [place] [id number]             Dear MsJuanpablo Haydee Reaves,      My name is Demetrius Couch. I am a Care Manager with [employer]. I often work with patients who could benefit from additional support understanding and managing their health. We are committed to providing you excellent care. I have been unable to reach you on this at [phone]. Please contact me at [phone] if you would like additional help with community resources. We appreciate the confidence you've shown by selecting us to provide your healthcare needs and I look forward to hearing from you soon.              Sincerely,    Darin Cazares RN, BSN, M.ED, PhD.  [address] [place] [address] [phone]) [phone]

## 2021-12-16 NOTE — PROGRESS NOTES
Patient on Complex CM Enrollment Report/fu Contact. Left message on voice mail with my contact information for return call. Need to assess for ongoing needs and CCM enrollment/fu. Get In Touch Letter mailed.

## 2021-12-17 ENCOUNTER — OFFICE VISIT (OUTPATIENT)
Dept: INTERNAL MEDICINE CLINIC | Age: 81
End: 2021-12-17
Payer: MEDICARE

## 2021-12-17 VITALS
SYSTOLIC BLOOD PRESSURE: 134 MMHG | DIASTOLIC BLOOD PRESSURE: 80 MMHG | HEIGHT: 70 IN | OXYGEN SATURATION: 95 % | TEMPERATURE: 98.1 F | WEIGHT: 228.7 LBS | HEART RATE: 75 BPM | RESPIRATION RATE: 16 BRPM | BODY MASS INDEX: 32.74 KG/M2

## 2021-12-17 DIAGNOSIS — I48.91 ATRIAL FIBRILLATION, UNSPECIFIED TYPE (HCC): ICD-10-CM

## 2021-12-17 DIAGNOSIS — C90.01 MULTIPLE MYELOMA IN REMISSION (HCC): ICD-10-CM

## 2021-12-17 DIAGNOSIS — M65.9 SYNOVITIS OF KNEE: Primary | ICD-10-CM

## 2021-12-17 DIAGNOSIS — E66.9 OBESITY (BMI 30.0-34.9): ICD-10-CM

## 2021-12-17 DIAGNOSIS — M17.0 PRIMARY OSTEOARTHRITIS OF BOTH KNEES: ICD-10-CM

## 2021-12-17 LAB
INR BLD: 2.8
PT POC: 33.5 SECONDS
VALID INTERNAL CONTROL?: YES

## 2021-12-17 PROCEDURE — G8432 DEP SCR NOT DOC, RNG: HCPCS | Performed by: INTERNAL MEDICINE

## 2021-12-17 PROCEDURE — 99214 OFFICE O/P EST MOD 30 MIN: CPT | Performed by: INTERNAL MEDICINE

## 2021-12-17 PROCEDURE — G8752 SYS BP LESS 140: HCPCS | Performed by: INTERNAL MEDICINE

## 2021-12-17 PROCEDURE — G8399 PT W/DXA RESULTS DOCUMENT: HCPCS | Performed by: INTERNAL MEDICINE

## 2021-12-17 PROCEDURE — G8754 DIAS BP LESS 90: HCPCS | Performed by: INTERNAL MEDICINE

## 2021-12-17 PROCEDURE — G8417 CALC BMI ABV UP PARAM F/U: HCPCS | Performed by: INTERNAL MEDICINE

## 2021-12-17 PROCEDURE — G8427 DOCREV CUR MEDS BY ELIG CLIN: HCPCS | Performed by: INTERNAL MEDICINE

## 2021-12-17 PROCEDURE — 85610 PROTHROMBIN TIME: CPT | Performed by: INTERNAL MEDICINE

## 2021-12-17 PROCEDURE — 1090F PRES/ABSN URINE INCON ASSESS: CPT | Performed by: INTERNAL MEDICINE

## 2021-12-17 PROCEDURE — 1101F PT FALLS ASSESS-DOCD LE1/YR: CPT | Performed by: INTERNAL MEDICINE

## 2021-12-17 PROCEDURE — G8536 NO DOC ELDER MAL SCRN: HCPCS | Performed by: INTERNAL MEDICINE

## 2021-12-17 NOTE — PROGRESS NOTES
Chief Complaint   Patient presents with    Results     CT scan         1. Have you been to the ER, urgent care clinic since your last visit? Hospitalized since your last visit? No    2. Have you seen or consulted any other health care providers outside of the [address] [place] [address] since your last visit? Include any pap smears or colon screening. No       Daya Cloud is a 80 y.o. female who presents today for Anticoagulation monitoring. Indication: Atrial Fibrillation  INR Goal: 2.0-3.0. Current dose:  Coumadin 5 mg M-F and 7.5 mg Sat and Sun. Missed Coumadin Doses:  None  Medication Changes:  no  Dietary Changes:  no    Symptoms: taking coumadin appropriately without any bleeding. Results for orders placed or performed in visit on 12/17/21   AMB POC PT/INR   Result Value Ref Range    VALID INTERNAL CONTROL POC Yes     Prothrombin time (POC) 33.5 seconds    INR POC 2.8         New Coumadin dose:.current treatment plan is effective, no change in therapy. Next check to be scheduled for  4 weeks. A steroid injection was performed at left knee using 1% plain Lidocaine and 40 mg of Kenalog. This was well tolerated.       [facility]  OFFICE PROCEDURE PROGRESS NOTE        Chart reviewed for the following:   Eileen CHAPMAN, have reviewed the History, Physical and updated the Allergic reactions for Daya LYON [address] performed immediately prior to start of procedure:   Eileen CHAPMAN, have performed the following reviews on [employer] prior to the start of the procedure:            * Patient was identified by name and date of birth   * Agreement on procedure being performed was verified  * Risks and Benefits explained to the patient  * Procedure site verified and marked as necessary  * Patient was positioned for comfort  * Consent was signed and verified     Time: 1:30 pm      Date of procedure: 12/30/2021    Procedure performed by:  Bakari Kim MD    Provider assisted by:  Sammie Yee LPN    Patient assisted by: N/A    How tolerated by patient: tolerated the procedure well with no complications    Post Procedural Pain Scale: 2 - Hurts Little Bit    Comments: none

## 2021-12-18 NOTE — PROGRESS NOTES
Chief Complaint   Patient presents with    Results     CT scan   . SUBECTIVE:    Natividad Lennox is a 80 y.o. female comes in for a return visit stating that she has done reasonably well. Her biggest problem is pain in her knees, particularly her left knee. This has flared up and she finds difficulty walking as a direct result. Above and beyond that, she is in moderate amount of pain. I have discussed her arthritis with her, which she has had for years as far as her knees are concerned, and the only thing that will have a meaningful impact is weight reduction, which she has not done. We talked about this at length. She has a past history of primary hypertension as well as her multiple myeloma. Current Outpatient Medications   Medication Sig Dispense Refill    potassium chloride (K-DUR, KLOR-CON M20) 20 mEq tablet take 1 tablet by mouth twice a day 60 Tablet 11    predniSONE (DELTASONE) 20 mg tablet Take 60 mg by mouth daily (with breakfast). 15 Tablet 0    furosemide (LASIX) 40 mg tablet take 1 tablet by mouth once daily 90 Tablet 3    lisinopriL (PRINIVIL, ZESTRIL) 40 mg tablet take 1 tablet by mouth once daily 90 Tablet 3    clobetasoL (TEMOVATE) 0.05 % ointment Apply  to affected area two (2) times a day. 45 g 3    amLODIPine (NORVASC) 10 mg tablet take 1 tablet by mouth once daily 90 Tab 3    methocarbamoL (ROBAXIN) 500 mg tablet Take 1 Tab by mouth three (3) times daily as needed for Muscle Spasm(s). 15 Tab 0    warfarin (COUMADIN) 5 mg tablet take 2 tablets by mouth daily for 2 days then take 1 tablet daily THEREAFTER 32 Tab 11    metoprolol succinate (TOPROL-XL) 25 mg XL tablet take 1/2 tablet by mouth once daily 90 Tab 3    oxyCODONE-acetaminophen (PERCOCET)  mg per tablet Take 1 Tab by mouth every eight (8) hours as needed for Pain. Max Daily Amount: 3 Tabs. 40 Tab 0    menthol (BIOFREEZE, MENTHOL,) 4 % gel 1 g by Apply Externally route every six (6) hours.  118 mL 0    aluminum hydrox-magnesium carb (GAVISCON EXTRA STRENGTH) 160-105 mg chew Take 1-2 tablets by mouth four (4) times daily as needed.        Past Medical History:   Diagnosis Date    Acute combined systolic and diastolic HF (heart failure), NYHA class 2 ([address] 8/11/2017    Arthritis     Atrial fibrillation (HCC)     Hypertension     Pacemaker     S/P AV kathleen ablation 8/11/2017    Status post biventricular pacemaker 8/11/2017 8/11/17      Past Surgical History:   Procedure Laterality Date    HX GYN      HX ORTHOPAEDIC      knee    HX PACEMAKER  08/11/2017     Allergies   Allergen Reactions    Codeine Other (comments)       REVIEW OF SYSTEMS:  Review of Systems - Negative except   ENT ROS: negative for - headaches, hearing change, nasal congestion, oral lesions, tinnitus, visual changes or   Respiratory ROS: no cough, shortness of breath, or wheezing  Cardiovascular ROS: no chest pain or dyspnea on exertion  Gastrointestinal ROS: no abdominal pain, change in bowel habits, or black or blood  Genito-Urinary ROS: no dysuria, trouble voiding, or hematuria  Musculoskeletal ROS: negative  Neurological ROS: no TIA or stroke symptoms      Social History     Socioeconomic History    Marital status:     Number of children: 3   Occupational History    Occupation: Retired from the [employer] Use    Smoking status: Never Smoker    Smokeless tobacco: Never Used   Vaping Use    Vaping Use: Never used   Substance and Sexual Activity    Alcohol use: No    Drug use: No    Sexual activity: Not Currently   Other Topics Concern     Service No    Blood Transfusions No    Caffeine Concern No    Occupational Exposure No    Hobby Hazards No    Sleep Concern No    Stress Concern No    Weight Concern Yes    Special Diet Yes     Comment: HTN/  Low Sugary    Back Care Yes    Exercise No    Bike Helmet No    Seat Belt Yes    Self-Exams Yes   r  Family History   Problem Relation Age of Onset    Stroke Mother     No Known Problems Father        OBJECTIVE:  Visit Vitals  /80   Pulse 75   Temp 98.1 °F (36.7 °C) (Oral)   Resp 16   Ht 5' 10\" (1.778 m)   Wt 228 lb 11.2 oz (103.7 kg)   SpO2 95%   BMI 32.82 kg/m²     ENT: perrla,  eom intact  NECK: supple. Thyroid normal, no JVD  CHEST: clear to ascultation and percussion   HEART: regular rate and rhythm  ABD: soft, bowel sounds active,   EXTREMITIES: no edema, pulse 1+, moderate degenerative changes noted both knees, mild effusion left knee pain elicited to flexion hyperextension  INTEGUMENT: clear      ASSESSMENT:  1. Synovitis of knee    2. Primary osteoarthritis of both knees    3. Obesity (BMI 30.0-34.9)    4. Atrial fibrillation, unspecified type ([address]    5. Multiple myeloma in remission ([facility]        PLAN:  1. The patient's synovitis is superimposed her existing osteoarthritis involving the left knee. Under sterile technique, I injected Kenalog 40 mg, Xylocaine 1% 5 cc. She tolerated the procedure quite well. 2. The most important thing that she can do as far as her osteoarthritis is concerned is to lose weight. This can be accomplished by eating meals, eliminating snacks, and avoiding the consumption of processed carbohydrates. 3. INR today is acceptable. She remains on warfarin. 4. Her PET scan is negative as far as her multiple myeloma is concerned. .  Orders Placed This Encounter    AMB POC PT/INR       Follow-up and Dispositions    · Return in about 3 months (around 3/17/2022), or monthly INR.            Herman Adan MD

## 2021-12-21 ENCOUNTER — OFFICE VISIT (OUTPATIENT)
Dept: CARDIOLOGY CLINIC | Age: 81
End: 2021-12-21
Payer: MEDICARE

## 2021-12-21 VITALS
HEART RATE: 74 BPM | RESPIRATION RATE: 18 BRPM | HEIGHT: 70 IN | SYSTOLIC BLOOD PRESSURE: 120 MMHG | DIASTOLIC BLOOD PRESSURE: 88 MMHG | WEIGHT: 226.8 LBS | OXYGEN SATURATION: 98 % | BODY MASS INDEX: 32.47 KG/M2

## 2021-12-21 DIAGNOSIS — Z98.890 S/P AV NODAL ABLATION: ICD-10-CM

## 2021-12-21 DIAGNOSIS — I50.22 CHRONIC SYSTOLIC CONGESTIVE HEART FAILURE (HCC): Primary | ICD-10-CM

## 2021-12-21 DIAGNOSIS — I48.20 CHRONIC ATRIAL FIBRILLATION (HCC): ICD-10-CM

## 2021-12-21 DIAGNOSIS — I10 PRIMARY HYPERTENSION: ICD-10-CM

## 2021-12-21 DIAGNOSIS — Z95.0 STATUS POST BIVENTRICULAR PACEMAKER: ICD-10-CM

## 2021-12-21 PROBLEM — I26.99 ACUTE PULMONARY EMBOLISM (HCC): Status: RESOLVED | Noted: 2017-08-10 | Resolved: 2021-12-21

## 2021-12-21 PROBLEM — I50.41 ACUTE COMBINED SYSTOLIC AND DIASTOLIC HF (HEART FAILURE), NYHA CLASS 2 (HCC): Status: RESOLVED | Noted: 2017-08-11 | Resolved: 2021-12-21

## 2021-12-21 PROCEDURE — 1090F PRES/ABSN URINE INCON ASSESS: CPT | Performed by: INTERNAL MEDICINE

## 2021-12-21 PROCEDURE — G8752 SYS BP LESS 140: HCPCS | Performed by: INTERNAL MEDICINE

## 2021-12-21 PROCEDURE — G0463 HOSPITAL OUTPT CLINIC VISIT: HCPCS | Performed by: INTERNAL MEDICINE

## 2021-12-21 PROCEDURE — G8510 SCR DEP NEG, NO PLAN REQD: HCPCS | Performed by: INTERNAL MEDICINE

## 2021-12-21 PROCEDURE — G8399 PT W/DXA RESULTS DOCUMENT: HCPCS | Performed by: INTERNAL MEDICINE

## 2021-12-21 PROCEDURE — 99213 OFFICE O/P EST LOW 20 MIN: CPT | Performed by: INTERNAL MEDICINE

## 2021-12-21 PROCEDURE — G8536 NO DOC ELDER MAL SCRN: HCPCS | Performed by: INTERNAL MEDICINE

## 2021-12-21 PROCEDURE — 93010 ELECTROCARDIOGRAM REPORT: CPT | Performed by: INTERNAL MEDICINE

## 2021-12-21 PROCEDURE — G8754 DIAS BP LESS 90: HCPCS | Performed by: INTERNAL MEDICINE

## 2021-12-21 PROCEDURE — 1101F PT FALLS ASSESS-DOCD LE1/YR: CPT | Performed by: INTERNAL MEDICINE

## 2021-12-21 PROCEDURE — G8417 CALC BMI ABV UP PARAM F/U: HCPCS | Performed by: INTERNAL MEDICINE

## 2021-12-21 PROCEDURE — G8427 DOCREV CUR MEDS BY ELIG CLIN: HCPCS | Performed by: INTERNAL MEDICINE

## 2021-12-21 PROCEDURE — 93005 ELECTROCARDIOGRAM TRACING: CPT | Performed by: INTERNAL MEDICINE

## 2021-12-21 RX ORDER — MECLIZINE HCL 12.5 MG 12.5 MG/1
12.5 TABLET ORAL AS NEEDED
COMMUNITY
Start: 2021-12-13 | End: 2022-03-03 | Stop reason: SDUPTHER

## 2021-12-21 NOTE — LETTER
12/21/2021    Patient: Patricia Fowler   YOB: 1940   Date of Visit: 12/21/2021     Esther Casey, [address] [facility] [address] [facility]  [address]  [facility] [address]  [place]    Dear Esther Casey MD,      Thank you for referring Ms. Luis Alberto Haines to [address] [place] [address] for evaluation. My notes for this consultation are attached. If you have questions, please do not hesitate to call me. I look forward to following your patient along with you.       Sincerely,    Aurea Becerra MD

## 2021-12-21 NOTE — PROGRESS NOTES
Subjective/HPI:     Carina Bowen is a 80 y.o. female is here for routine f/u. She has a PMHx of non-ischemic cardiomyopathy now resolved, chronic AF s/p AV kathleen ablation with BiV PPM, multiple myeloma, hx of PE, HTN. No speicific complaints today. Denies any chest pain, SOB, edema. Current Outpatient Medications on File Prior to Visit   Medication Sig Dispense Refill    meclizine (ANTIVERT) 12.5 mg tablet Take 12.5 mg by mouth as needed.  potassium chloride (K-DUR, KLOR-CON M20) 20 mEq tablet take 1 tablet by mouth twice a day 60 Tablet 11    predniSONE (DELTASONE) 20 mg tablet Take 60 mg by mouth daily (with breakfast). (Patient taking differently: Take 60 mg by mouth as needed.) 15 Tablet 0    furosemide (LASIX) 40 mg tablet take 1 tablet by mouth once daily 90 Tablet 3    lisinopriL (PRINIVIL, ZESTRIL) 40 mg tablet take 1 tablet by mouth once daily 90 Tablet 3    clobetasoL (TEMOVATE) 0.05 % ointment Apply  to affected area two (2) times a day. 45 g 3    amLODIPine (NORVASC) 10 mg tablet take 1 tablet by mouth once daily 90 Tab 3    warfarin (COUMADIN) 5 mg tablet take 2 tablets by mouth daily for 2 days then take 1 tablet daily THEREAFTER 32 Tab 11    metoprolol succinate (TOPROL-XL) 25 mg XL tablet take 1/2 tablet by mouth once daily 90 Tab 3    menthol (BIOFREEZE, MENTHOL,) 4 % gel 1 g by Apply Externally route every six (6) hours. 118 mL 0    aluminum hydrox-magnesium carb (GAVISCON EXTRA STRENGTH) 160-105 mg chew Take 1-2 tablets by mouth four (4) times daily as needed.  methocarbamoL (ROBAXIN) 500 mg tablet Take 1 Tab by mouth three (3) times daily as needed for Muscle Spasm(s). (Patient not taking: Reported on 12/21/2021) 15 Tab 0    oxyCODONE-acetaminophen (PERCOCET)  mg per tablet Take 1 Tab by mouth every eight (8) hours as needed for Pain. Max Daily Amount: 3 Tabs.  (Patient not taking: Reported on 12/21/2021) 40 Tab 0     No current facility-administered medications on file prior to visit. Review of Symptoms:    Review of Systems   Constitutional: Negative for chills, fever and weight loss. HENT: Negative for nosebleeds. Eyes: Negative for blurred vision and double vision. Respiratory: Negative for cough, shortness of breath and wheezing. Cardiovascular: Negative for chest pain, palpitations, orthopnea, leg swelling and PND. Skin: Negative for rash. Neurological: Negative for dizziness and loss of consciousness. Physical Exam:      Physical Exam  Vitals and nursing note reviewed. Constitutional:       Appearance: Normal appearance. Cardiovascular:      Rate and Rhythm: Normal rate and regular rhythm. Heart sounds: Normal heart sounds. Pulmonary:      Breath sounds: Normal breath sounds. Musculoskeletal:      Right lower leg: No edema. Left lower leg: No edema. Neurological:      Mental Status: She is alert and oriented to person, place, and time. Psychiatric:         Mood and Affect: Mood normal.         Vitals:    12/21/21 0903   BP: 120/88   BP 1 Location: Left upper arm   BP Patient Position: Sitting   BP Cuff Size: Large adult   Pulse: 74   Resp: 18   Height: 5' 10\" (1.778 m)   Weight: 226 lb 12.8 oz (102.9 kg)   SpO2: 98%       ECG done today shows paced     Assessment:       ICD-10-CM ICD-9-CM    1. Chronic systolic congestive heart failure (HCC)  I50.22 428.22 AMB POC EKG ROUTINE W/ 12 LEADS, INTER & REP     428.0 LIPID PANEL      HEPATIC FUNCTION PANEL   2. Chronic atrial fibrillation (HCC)  I48.20 427.31 AMB POC EKG ROUTINE W/ 12 LEADS, INTER & REP   3. Primary hypertension  I10 401.9 AMB POC EKG ROUTINE W/ 12 LEADS, INTER & REP   4. Status post biventricular pacemaker  Z95.0 V45.01 AMB POC EKG ROUTINE W/ 12 LEADS, INTER & REP   5. S/P AV kathleen ablation  Z98.890 V45.89 AMB POC EKG ROUTINE W/ 12 LEADS, INTER & REP        Plan:     1.  Chronic systolic congestive heart failure (HCC)  Hx of non-ischemic cardiomyopathy, now resolved  Echo done 2/2020 with preserved LVEF 50-55%  Euvolemic on exam today  Continue lisinopril, Toprol and Lasix    2. Chronic atrial fibrillation (HCC)  S/p AV kathleen ablation with BiV PPM implant  Continue warfarin for stroke prevention. INR managed by PCP    3. Primary hypertension  BP controlled. Continue anti-hypertensive therapy and low sodium diet    4. Status post biventricular pacemaker  Continue with routine device interrogation with Dr. Stephanie Martin    5. S/P AV kathleen ablation    Check lipids.  F/u with me in 6 months    Nichole Davis MD

## 2021-12-21 NOTE — PROGRESS NOTES
1. Have you been to the ER, urgent care clinic since your last visit? Hospitalized since your last visit? No.    2. Have you seen or consulted any other health care providers outside of the [address] [place] [address] since your last visit? Include any pap smears or colon screening. Seen at Urgent care for stomach pain and back pain in November.       Chief Complaint   Patient presents with    CHF     6 month- pt denies any cardiac symptoms    Hypertension

## 2021-12-30 ENCOUNTER — OFFICE VISIT (OUTPATIENT)
Dept: CARDIOLOGY CLINIC | Age: 81
End: 2021-12-30
Payer: MEDICARE

## 2021-12-30 DIAGNOSIS — I48.20 CHRONIC ATRIAL FIBRILLATION (HCC): ICD-10-CM

## 2021-12-30 DIAGNOSIS — Z95.0 CARDIAC PACEMAKER IN SITU: Primary | ICD-10-CM

## 2021-12-30 PROCEDURE — 93294 REM INTERROG EVL PM/LDLS PM: CPT | Performed by: INTERNAL MEDICINE

## 2022-01-03 ENCOUNTER — PATIENT OUTREACH (OUTPATIENT)
Dept: CASE MANAGEMENT | Age: 82
End: 2022-01-03

## 2022-01-03 NOTE — PROGRESS NOTES
Prednisone and doxycycline sent to the pharmacy but if she is that bad then take her to the ER  Call patient Treated with Keflex, await sensitivities

## 2022-01-05 RX ORDER — WARFARIN SODIUM 5 MG/1
TABLET ORAL
Qty: 32 TABLET | Refills: 11 | Status: SHIPPED | OUTPATIENT
Start: 2022-01-05

## 2022-01-10 NOTE — PROGRESS NOTES
Patient on Complex CM Enrollment Report/fu Contact. Left message on voice mail with my contact information for return call. Need to assess for ongoing needs and CCM enrollment/fu and 12/27/2021 CHF mailings f/u.

## 2022-01-15 RX ORDER — METOPROLOL SUCCINATE 25 MG/1
TABLET, EXTENDED RELEASE ORAL
Qty: 90 TABLET | Refills: 3 | Status: SHIPPED | OUTPATIENT
Start: 2022-01-15

## 2022-01-26 ENCOUNTER — PATIENT OUTREACH (OUTPATIENT)
Dept: CASE MANAGEMENT | Age: 82
End: 2022-01-26

## 2022-02-16 ENCOUNTER — PATIENT OUTREACH (OUTPATIENT)
Dept: CASE MANAGEMENT | Age: 82
End: 2022-02-16

## 2022-02-25 ENCOUNTER — PATIENT OUTREACH (OUTPATIENT)
Dept: CASE MANAGEMENT | Age: 82
End: 2022-02-25

## 2022-02-25 NOTE — PROGRESS NOTES
Goals Addressed                 This Visit's Progress     Patient verbalizes understanding of self-management goals of living with Congestive Heart Failure   On track     12/15/2021:  Lex Garcia on CHF to be done at next contact. .    1/3/2022:  [facility] [place] [facility] Follow-up: Diet Low Na+ follow-up. 2/16/2022:  Patient reports remaining with pain but the level that can be managed and tolerated. Patient Teach-back:  Low Na+ diet: reports looking at labels more now. Also learning to avoid a lot of process foods as Dr. Florrie Bloch reminds her. Reports accepting more water than other beverages. Reports increased bathroom visits with drinking more water but that is to be expected with the lasix. Homework:  discuss AM weight tracking.  JOSÉ ROCHE

## 2022-03-03 RX ORDER — MECLIZINE HCL 12.5 MG 12.5 MG/1
12.5 TABLET ORAL
Qty: 60 TABLET | Refills: 3 | Status: SHIPPED | OUTPATIENT
Start: 2022-03-03

## 2022-03-14 ENCOUNTER — PATIENT OUTREACH (OUTPATIENT)
Dept: CASE MANAGEMENT | Age: 82
End: 2022-03-14

## 2022-03-14 NOTE — PROGRESS NOTES
Complex Case Management: Lower Back Pain/Lumbar Radiculopathy  Heart Failure     Goals Addressed                 This Visit's Progress     Patient verbalizes understanding of self-management goals of living with Congestive Heart Failure   On track     12/15/2021:  Lex Garcia on CHF to be done at next contact. .    1/3/2022:  [facility] [place] [facility] Follow-up: Diet Low Na+ follow-up. 2/16/2022:  Patient reports remaining with pain but the level that can be managed and tolerated. Patient Teach-back:  Low Na+ diet: reports looking at labels more now. Also learning to avoid a lot of process foods as Dr. Florrie Bloch reminds her. Reports accepting more water than other beverages. Reports increased bathroom visits with drinking more water but that is to be expected with the lasix. Homework:  discuss AM weight tracking. EW AC    3/14/2022:  Heart Failure: Patient -Teach back: Zone: Green today:  Patient denies SOB, new swelling of hand, legs, feet nor stomach; denies   weight gain; denies CP. Patient reports remaining on low salt foods and remaining active: Reports walking in Stores such a [email or web address] today; remains aware of balancing activity & rest periods even in stores. Reports desiring to obtain new/nicer scale: reviewed Every Day Tips:  same scale & same place every morning; writing down and to journal weight daily. Encouraged to continue taking meds as ordered, minimizing fast or processed food intake as well as keeping water as the main liquid intake vs. sugary/acidic beverages in light of Na+ content. Review of when to contact PCP/Cardiology done-wt gain >/=5lbs.  EW [facility]                  CHF goal f/u: 10 days

## 2022-03-18 PROBLEM — E66.811 OBESITY (BMI 30.0-34.9): Status: ACTIVE | Noted: 2020-08-29

## 2022-03-18 PROBLEM — E66.9 OBESITY (BMI 30.0-34.9): Status: ACTIVE | Noted: 2020-08-29

## 2022-03-18 PROBLEM — I50.22 CHRONIC SYSTOLIC CONGESTIVE HEART FAILURE (HCC): Status: ACTIVE | Noted: 2021-12-21

## 2022-03-19 PROBLEM — I10 PRIMARY HYPERTENSION: Status: ACTIVE | Noted: 2018-12-21

## 2022-03-19 PROBLEM — Z98.890 S/P CARDIAC CATH: Status: ACTIVE | Noted: 2017-09-07

## 2022-03-19 PROBLEM — Z98.890 S/P AV NODAL ABLATION: Status: ACTIVE | Noted: 2017-08-11

## 2022-03-19 PROBLEM — K11.20 SIALADENITIS: Status: ACTIVE | Noted: 2017-06-29

## 2022-03-19 PROBLEM — R63.4 WEIGHT LOSS: Status: ACTIVE | Noted: 2017-09-21

## 2022-03-19 PROBLEM — E05.90 HYPERTHYROIDISM: Status: ACTIVE | Noted: 2018-04-14

## 2022-03-19 PROBLEM — M54.12 CERVICAL RADICULOPATHY: Status: ACTIVE | Noted: 2018-05-09

## 2022-03-19 PROBLEM — Z95.0 STATUS POST BIVENTRICULAR PACEMAKER: Status: ACTIVE | Noted: 2017-08-11

## 2022-03-20 PROBLEM — R29.898 MUSCULAR DECONDITIONING: Status: ACTIVE | Noted: 2017-04-22

## 2022-03-20 PROBLEM — M54.16 LUMBAR RADICULOPATHY: Status: ACTIVE | Noted: 2021-11-21

## 2022-03-22 DIAGNOSIS — I10 ESSENTIAL HYPERTENSION: ICD-10-CM

## 2022-03-22 RX ORDER — AMLODIPINE BESYLATE 10 MG/1
TABLET ORAL
Qty: 90 TABLET | Refills: 3 | Status: SHIPPED | OUTPATIENT
Start: 2022-03-22

## 2022-03-23 ENCOUNTER — OFFICE VISIT (OUTPATIENT)
Dept: INTERNAL MEDICINE CLINIC | Age: 82
End: 2022-03-23
Payer: MEDICARE

## 2022-03-23 VITALS
HEIGHT: 70 IN | DIASTOLIC BLOOD PRESSURE: 83 MMHG | RESPIRATION RATE: 14 BRPM | SYSTOLIC BLOOD PRESSURE: 132 MMHG | WEIGHT: 229.8 LBS | HEART RATE: 91 BPM | BODY MASS INDEX: 32.9 KG/M2 | TEMPERATURE: 97.6 F | OXYGEN SATURATION: 97 %

## 2022-03-23 DIAGNOSIS — R29.898 MUSCULAR DECONDITIONING: ICD-10-CM

## 2022-03-23 DIAGNOSIS — I10 PRIMARY HYPERTENSION: Primary | ICD-10-CM

## 2022-03-23 DIAGNOSIS — I48.91 ATRIAL FIBRILLATION, UNSPECIFIED TYPE (HCC): ICD-10-CM

## 2022-03-23 DIAGNOSIS — M17.0 PRIMARY OSTEOARTHRITIS OF BOTH KNEES: ICD-10-CM

## 2022-03-23 DIAGNOSIS — C90.01 MULTIPLE MYELOMA IN REMISSION (HCC): ICD-10-CM

## 2022-03-23 DIAGNOSIS — E78.5 DYSLIPIDEMIA: ICD-10-CM

## 2022-03-23 DIAGNOSIS — E66.9 OBESITY (BMI 30.0-34.9): ICD-10-CM

## 2022-03-23 PROCEDURE — 99214 OFFICE O/P EST MOD 30 MIN: CPT | Performed by: INTERNAL MEDICINE

## 2022-03-23 PROCEDURE — G8754 DIAS BP LESS 90: HCPCS | Performed by: INTERNAL MEDICINE

## 2022-03-23 PROCEDURE — 1101F PT FALLS ASSESS-DOCD LE1/YR: CPT | Performed by: INTERNAL MEDICINE

## 2022-03-23 PROCEDURE — G8536 NO DOC ELDER MAL SCRN: HCPCS | Performed by: INTERNAL MEDICINE

## 2022-03-23 PROCEDURE — G8399 PT W/DXA RESULTS DOCUMENT: HCPCS | Performed by: INTERNAL MEDICINE

## 2022-03-23 PROCEDURE — G8510 SCR DEP NEG, NO PLAN REQD: HCPCS | Performed by: INTERNAL MEDICINE

## 2022-03-23 PROCEDURE — G8427 DOCREV CUR MEDS BY ELIG CLIN: HCPCS | Performed by: INTERNAL MEDICINE

## 2022-03-23 PROCEDURE — G8752 SYS BP LESS 140: HCPCS | Performed by: INTERNAL MEDICINE

## 2022-03-23 PROCEDURE — G8417 CALC BMI ABV UP PARAM F/U: HCPCS | Performed by: INTERNAL MEDICINE

## 2022-03-23 PROCEDURE — 1090F PRES/ABSN URINE INCON ASSESS: CPT | Performed by: INTERNAL MEDICINE

## 2022-03-23 NOTE — PROGRESS NOTES
[facility]  [place] [address]  [address] [place] [address]  Phone:  [phone]  Fax: [phone]       Chief Complaint   Patient presents with    Hypertension   . SUBJECTIVE:    Valarie Martinez is a 80 y.o. female comes in for return visit stating that she has done reasonably well. She is not using a rollator because of her progressive osteoarthritis involving both knees. She maneuvers quite well with this. She denies any cardiovascular symptoms today. Her multiple myeloma is reasonably stable in that she is in remission and not getting chemotherapy. She remains on her warfarin and I will await the results of her INR. She has a past history of primary hypertension, dyslipidemia, as well as her obesity. Current Outpatient Medications   Medication Sig Dispense Refill    amLODIPine (NORVASC) 10 mg tablet take 1 tablet by mouth once daily 90 Tablet 3    meclizine (ANTIVERT) 12.5 mg tablet Take 1 Tablet by mouth three (3) times daily as needed for Dizziness. 60 Tablet 3    metoprolol succinate (TOPROL-XL) 25 mg XL tablet take 1/2 tablet by mouth once daily 90 Tablet 3    warfarin (COUMADIN) 5 mg tablet take 2 tablets by mouth daily for 2 days then take 1 tablet daily THEREAFTER 32 Tablet 11    potassium chloride (K-DUR, KLOR-CON M20) 20 mEq tablet take 1 tablet by mouth twice a day 60 Tablet 11    predniSONE (DELTASONE) 20 mg tablet Take 60 mg by mouth daily (with breakfast). (Patient taking differently: Take 60 mg by mouth as needed.) 15 Tablet 0    furosemide (LASIX) 40 mg tablet take 1 tablet by mouth once daily 90 Tablet 3    lisinopriL (PRINIVIL, ZESTRIL) 40 mg tablet take 1 tablet by mouth once daily 90 Tablet 3    clobetasoL (TEMOVATE) 0.05 % ointment Apply  to affected area two (2) times a day. 45 g 3    menthol (BIOFREEZE, MENTHOL,) 4 % gel 1 g by Apply Externally route every six (6) hours.  118 mL 0    aluminum hydrox-magnesium carb (GAVISCON EXTRA STRENGTH) 160-105 mg chew Take 1-2 tablets by mouth four (4) times daily as needed.        Past Medical History:   Diagnosis Date    Acute combined systolic and diastolic HF (heart failure), NYHA class 2 ([address] 8/11/2017    Arthritis     Atrial fibrillation (HCC)     Cancer (HCC)     multiple myeloma    [address]     Hypertension     Long term current use of anticoagulant therapy     Pacemaker     S/P AV kathleen ablation 8/11/2017    Status post biventricular pacemaker 8/11/2017 8/11/17      Past Surgical History:   Procedure Laterality Date    HX GYN      HX ORTHOPAEDIC      knee    HX PACEMAKER  08/11/2017     Allergies   Allergen Reactions    Codeine Other (comments)         REVIEW OF SYSTEMS:  General: negative for - chills or fever  ENT: negative for - headaches, nasal congestion or tinnitus  Respiratory: negative for - cough, hemoptysis, shortness of breath or wheezing  Cardiovascular : negative for - chest pain, edema, palpitations or shortness of breath  Gastrointestinal: negative for - abdominal pain, blood in stools, heartburn or nausea/vomiting  Genito-Urinary: no dysuria, trouble voiding, or hematuria  Musculoskeletal: negative for - gait disturbance, joint pain, joint stiffness or joint swelling  Neurological: no TIA or stroke symptoms  Hematologic: no bruises, no bleeding, no swollen glands  Integument: no lumps, mole changes, nail changes or rash  Endocrine: no malaise/lethargy or unexpected weight changes      Social History     Socioeconomic History    Marital status:     Number of children: 3   Occupational History    Occupation: Retired from the state [employer] Use    Smoking status: Never Smoker    Smokeless tobacco: Never Used   Vaping Use    Vaping Use: Never used   Substance and Sexual Activity    Alcohol use: No    Drug use: No    Sexual activity: Not Currently   Other Topics Concern     Service No    Blood Transfusions No    Caffeine Concern No    Occupational Exposure No    Hobby Hazards No    Sleep Concern No    Stress Concern No    Weight Concern Yes    Special Diet Yes     Comment: HTN/  Low Sugary    Back Care Yes    Exercise No    Bike Helmet No    Seat Belt Yes    Self-Exams Yes     Family History   Problem Relation Age of Onset    Stroke Mother     No Known Problems Father        OBJECTIVE:    Visit Vitals  /83 (BP 1 Location: Left upper arm, BP Patient Position: Sitting, BP Cuff Size: Large adult)   Pulse 91   Temp 97.6 °F (36.4 °C) (Oral)   Resp 14   Ht 5' 10\" (1.778 m)   Wt 229 lb 12.8 oz (104.2 kg)   SpO2 97%   BMI 32.97 kg/m²     CONSTITUTIONAL: well , well nourished, appears age appropriate  EYES: perrla, eom intact  ENMT:moist mucous membranes, pharynx clear  NECK: supple. Thyroid normal  RESPIRATORY: Chest: clear to ascultation and percussion   CARDIOVASCULAR: Heart: regular rate and rhythm  GASTROINTESTINAL: Abdomen: soft, bowel sounds active  HEMATOLOGIC: no pathological lymph nodes palpated  MUSCULOSKELETAL: Extremities: no edema, pulse 1+   INTEGUMENT: No unusual rashes or suspicious skin lesions noted. Nails appear normal.  NEUROLOGIC: non-focal exam   MENTAL STATUS: alert and oriented, appropriate affect      ASSESSMENT:  1. Primary hypertension    2. Atrial fibrillation, unspecified type ([address]    3. Muscular deconditioning    4. Primary osteoarthritis of both knees    5. Multiple myeloma in remission (HCC)    6. Obesity (BMI 30.0-34.9)    7. Dyslipidemia        PLAN:  1. The patient's blood pressure is excellent. No adjustments are made. 2. Ventricular rate is quite stable today. 3. She has to increase her physical activity because she remains physically deconditioned. She is limited because of her osteoarthritic knees, which is getting progressively worse unfortunately. This is the reason she is using a rollator currently.   The knees are not acutely inflamed, which is great.  Significant weight reduction will have a positive impact on her knees. 4. She does have a history of multiple myeloma but this is reasonably stable and she is indeed in a remission. She is on no chemotherapy currently. 5. She needs to lose weight as I have alluded to earlier. This can be accomplished by eating meals, eliminating snacks, and avoiding the consumption of processed carbohydrates. Follow-up and Dispositions    · Return in about 3 months (around 6/23/2022), or Monthly INR.            Herman Adan MD

## 2022-03-23 NOTE — PROGRESS NOTES
Rm    Chief Complaint   Patient presents with    Hypertension        There were no vitals taken for this visit. 1. Have you been to the ER, urgent care clinic since your last visit? Hospitalized since your last visit? No    2. Have you seen or consulted any other health care providers outside of the [address] [place] [address] since your last visit? Include any pap smears or colon screening. No        Health Maintenance Due   Topic Date Due    Shingrix Vaccine Age 49> (1 of 2) Never done    Pneumococcal 65+ yrs at Risk Vaccine (1 of 2 - PCV13) Never done    COVID-19 Vaccine (3 - Pfizer risk 4-dose series) 04/19/2021    Flu Vaccine (1) 09/01/2021        3 most recent PHQ Screens 3/23/2022   PHQ Not Done -   Little interest or pleasure in doing things Not at all   Feeling down, depressed, irritable, or hopeless Not at all   Total Score PHQ 2 0        Fall Risk Assessment, last 12 mths 3/23/2022   Able to walk? Yes   Fall in past 12 months? -   Do you feel unsteady? 0   Are you worried about falling 0   Fall with injury?  -       Learning Assessment 6/28/2021   PRIMARY LEARNER Patient   HIGHEST LEVEL OF EDUCATION - PRIMARY LEARNER  GRADUATED HIGH SCHOOL OR GED   BARRIERS PRIMARY LEARNER -   CO-LEARNER CAREGIVER -   PRIMARY LANGUAGE ENGLISH   LEARNER PREFERENCE PRIMARY LISTENING   ANSWERED BY patient   RELATIONSHIP SELF

## 2022-03-24 LAB
ALBUMIN SERPL-MCNC: 3.4 G/DL (ref 3.5–5)
ALBUMIN/GLOB SERPL: 0.9 {RATIO} (ref 1.1–2.2)
ALP SERPL-CCNC: 84 U/L (ref 45–117)
ALT SERPL-CCNC: 22 U/L (ref 12–78)
ANION GAP SERPL CALC-SCNC: 3 MMOL/L (ref 5–15)
APPEARANCE UR: ABNORMAL
AST SERPL-CCNC: 18 U/L (ref 15–37)
BACTERIA URNS QL MICRO: ABNORMAL /HPF
BASOPHILS # BLD: 0 K/UL (ref 0–0.1)
BASOPHILS NFR BLD: 1 % (ref 0–1)
BILIRUB SERPL-MCNC: 0.4 MG/DL (ref 0.2–1)
BILIRUB UR QL: NEGATIVE
BUN SERPL-MCNC: 17 MG/DL (ref 6–20)
BUN/CREAT SERPL: 18 (ref 12–20)
CALCIUM SERPL-MCNC: 9.1 MG/DL (ref 8.5–10.1)
CAOX CRY URNS QL MICRO: ABNORMAL
CHLORIDE SERPL-SCNC: 108 MMOL/L (ref 97–108)
CHOLEST SERPL-MCNC: 182 MG/DL
CO2 SERPL-SCNC: 31 MMOL/L (ref 21–32)
COLOR UR: ABNORMAL
CREAT SERPL-MCNC: 0.95 MG/DL (ref 0.55–1.02)
DIFFERENTIAL METHOD BLD: ABNORMAL
EOSINOPHIL # BLD: 0 K/UL (ref 0–0.4)
EOSINOPHIL NFR BLD: 1 % (ref 0–7)
EPITH CASTS URNS QL MICRO: ABNORMAL /LPF
ERYTHROCYTE [DISTWIDTH] IN BLOOD BY AUTOMATED COUNT: 15.7 % (ref 11.5–14.5)
GLOBULIN SER CALC-MCNC: 3.7 G/DL (ref 2–4)
GLUCOSE SERPL-MCNC: 69 MG/DL (ref 65–100)
GLUCOSE UR STRIP.AUTO-MCNC: NEGATIVE MG/DL
HCT VFR BLD AUTO: 38.1 % (ref 35–47)
HDLC SERPL-MCNC: 84 MG/DL
HDLC SERPL: 2.2 {RATIO} (ref 0–5)
HGB BLD-MCNC: 11.5 G/DL (ref 11.5–16)
HGB UR QL STRIP: NEGATIVE
IMM GRANULOCYTES # BLD AUTO: 0 K/UL (ref 0–0.04)
IMM GRANULOCYTES NFR BLD AUTO: 0 % (ref 0–0.5)
KETONES UR QL STRIP.AUTO: NEGATIVE MG/DL
LDLC SERPL CALC-MCNC: 91.6 MG/DL (ref 0–100)
LEUKOCYTE ESTERASE UR QL STRIP.AUTO: ABNORMAL
LYMPHOCYTES # BLD: 0.8 K/UL (ref 0.8–3.5)
LYMPHOCYTES NFR BLD: 18 % (ref 12–49)
MCH RBC QN AUTO: 26.8 PG (ref 26–34)
MCHC RBC AUTO-ENTMCNC: 30.2 G/DL (ref 30–36.5)
MCV RBC AUTO: 88.8 FL (ref 80–99)
MONOCYTES # BLD: 0.5 K/UL (ref 0–1)
MONOCYTES NFR BLD: 11 % (ref 5–13)
MUCOUS THREADS URNS QL MICRO: ABNORMAL /LPF
NEUTS SEG # BLD: 2.9 K/UL (ref 1.8–8)
NEUTS SEG NFR BLD: 69 % (ref 32–75)
NITRITE UR QL STRIP.AUTO: POSITIVE
NRBC # BLD: 0 K/UL (ref 0–0.01)
NRBC BLD-RTO: 0 PER 100 WBC
PH UR STRIP: 6 [PH] (ref 5–8)
PLATELET # BLD AUTO: 182 K/UL (ref 150–400)
PMV BLD AUTO: 11.8 FL (ref 8.9–12.9)
POTASSIUM SERPL-SCNC: 3.5 MMOL/L (ref 3.5–5.1)
PROT SERPL-MCNC: 7.1 G/DL (ref 6.4–8.2)
PROT UR STRIP-MCNC: NEGATIVE MG/DL
RBC # BLD AUTO: 4.29 M/UL (ref 3.8–5.2)
RBC #/AREA URNS HPF: ABNORMAL /HPF (ref 0–5)
RBC MORPH BLD: ABNORMAL
RBC MORPH BLD: ABNORMAL
SODIUM SERPL-SCNC: 142 MMOL/L (ref 136–145)
SP GR UR REFRACTOMETRY: 1.02 (ref 1–1.03)
TRIGL SERPL-MCNC: 32 MG/DL (ref ?–150)
TSH SERPL DL<=0.05 MIU/L-ACNC: 1.41 UIU/ML (ref 0.36–3.74)
UROBILINOGEN UR QL STRIP.AUTO: 0.2 EU/DL (ref 0.2–1)
VLDLC SERPL CALC-MCNC: 6.4 MG/DL
WBC # BLD AUTO: 4.2 K/UL (ref 3.6–11)
WBC URNS QL MICRO: ABNORMAL /HPF (ref 0–4)

## 2022-03-25 ENCOUNTER — PATIENT OUTREACH (OUTPATIENT)
Dept: CASE MANAGEMENT | Age: 82
End: 2022-03-25

## 2022-03-26 LAB — APO B SERPL-MCNC: 71 MG/DL

## 2022-03-29 ENCOUNTER — PATIENT OUTREACH (OUTPATIENT)
Dept: CASE MANAGEMENT | Age: 82
End: 2022-03-29

## 2022-03-29 RX ORDER — CEFUROXIME AXETIL 500 MG/1
500 TABLET ORAL 2 TIMES DAILY
COMMUNITY
Start: 2022-03-29 | End: 2022-04-05

## 2022-03-31 ENCOUNTER — OFFICE VISIT (OUTPATIENT)
Dept: CARDIOLOGY CLINIC | Age: 82
End: 2022-03-31
Payer: MEDICARE

## 2022-03-31 DIAGNOSIS — I48.20 CHRONIC ATRIAL FIBRILLATION (HCC): ICD-10-CM

## 2022-03-31 DIAGNOSIS — Z95.0 CARDIAC PACEMAKER IN SITU: Primary | ICD-10-CM

## 2022-03-31 PROCEDURE — 93296 REM INTERROG EVL PM/IDS: CPT | Performed by: INTERNAL MEDICINE

## 2022-03-31 PROCEDURE — 93294 REM INTERROG EVL PM/LDLS PM: CPT | Performed by: INTERNAL MEDICINE

## 2022-04-25 ENCOUNTER — PATIENT OUTREACH (OUTPATIENT)
Dept: CASE MANAGEMENT | Age: 82
End: 2022-04-25

## 2022-05-27 ENCOUNTER — PATIENT OUTREACH (OUTPATIENT)
Dept: CASE MANAGEMENT | Age: 82
End: 2022-05-27

## 2022-06-06 ENCOUNTER — PATIENT OUTREACH (OUTPATIENT)
Dept: CASE MANAGEMENT | Age: 82
End: 2022-06-06

## 2022-06-06 NOTE — PROGRESS NOTES
Patient has graduated from the Complex Case Management  program on 6/6/2022. Patient/family has the ability to self-manage at this time. Care management goals have been completed. No further Ambulatory Care Manager follow up scheduled. Goals Addressed                 This Visit's Progress     Patient verbalizes understanding of self-management goals of living with Congestive Heart Failure   On track     12/15/2021:  Guevara Schroeder on CHF to be done at next contact. .    1/3/2022:  HF [place] [facility] Follow-up: Diet Low Na+ follow-up. 2/16/2022:  Patient reports remaining with pain but the level that can be managed and tolerated. Patient Teach-back:  Low Na+ diet: reports looking at labels more now. Also learning to avoid a lot of process foods as Dr. Emily Welch reminds her. Reports accepting more water than other beverages. Reports increased bathroom visits with drinking more water but that is to be expected with the lasix. Homework:  discuss AM weight tracking. EW [facility]    3/14/2022:  Heart Failure: Patient -Teach back: Zone: Green today:  Patient denies SOB, new swelling of hand, legs, feet nor stomach; denies   weight gain; denies CP. Patient reports remaining on low salt foods and remaining active: Reports walking in Stores such a [employer] today; remains aware of balancing activity & rest periods even in stores. Reports desiring to obtain new/nicer scale: reviewed Every Day Tips:  same scale & same place every morning; writing down and to journal weight daily. Encouraged to continue taking meds as ordered, minimizing fast or processed food intake as well as keeping water as the main liquid intake vs. sugary/acidic beverages in light of Na+ content. Review of when to contact PCP/Cardiology done-wt gain >/=5lbs. EW [facility]       6/6/2022:  CHF:  Patient report continued Low Na+ diet. Patient denies dyspnea, dizziness, SOB, fatigue, edema of lower extremities.   Agrees to contact Cardiology and/or PCP should weight gain 5 lbs or more. Agrees to contact Providers before medications need to be refilled as to not run out. Feels she is on a good path for decreasing heart issues and maintaining back pain. EW A[facility]. Patient has Ambulatory Care Manager's contact information for any further questions, concerns, or needs.   Patients upcoming visits:    Future Appointments   Date Time Provider Belen Yvonne   6/23/2022 10:00 AM NURSE PC [facility] MAIN BS AMB   6/23/2022 10:15 AM Kae Lopez MD [facility] MAIN BS AMB   6/27/2022  9:00 AM Dianna Trevino MD [facility] BS AMB   6/30/2022  3:45 PM REMOTE_[facility]AM [facility] BS AMB   10/20/2022  9:30 AM PACEMAKER, RCAM [facility] BS AMB   10/20/2022  9:40 AM Nidia James, ANP [facility] BS AMB

## 2022-06-06 NOTE — PROGRESS NOTES
Patient graduated;  Episode resolved  Goals completed. Name from Team List  Care Management Note ended.

## 2022-06-23 ENCOUNTER — OFFICE VISIT (OUTPATIENT)
Dept: INTERNAL MEDICINE CLINIC | Age: 82
End: 2022-06-23
Payer: MEDICARE

## 2022-06-23 VITALS — HEIGHT: 70 IN | BODY MASS INDEX: 33.1 KG/M2 | WEIGHT: 231.2 LBS

## 2022-06-23 DIAGNOSIS — I87.2 VENOUS INSUFFICIENCY: ICD-10-CM

## 2022-06-23 DIAGNOSIS — E78.5 DYSLIPIDEMIA: ICD-10-CM

## 2022-06-23 DIAGNOSIS — E66.9 OBESITY (BMI 30.0-34.9): ICD-10-CM

## 2022-06-23 DIAGNOSIS — I50.22 CHRONIC SYSTOLIC CONGESTIVE HEART FAILURE (HCC): ICD-10-CM

## 2022-06-23 DIAGNOSIS — C90.01 MULTIPLE MYELOMA IN REMISSION (HCC): ICD-10-CM

## 2022-06-23 DIAGNOSIS — I48.91 ATRIAL FIBRILLATION, UNSPECIFIED TYPE (HCC): Primary | ICD-10-CM

## 2022-06-23 DIAGNOSIS — M17.0 PRIMARY OSTEOARTHRITIS OF BOTH KNEES: ICD-10-CM

## 2022-06-23 DIAGNOSIS — E05.90 HYPERTHYROIDISM: ICD-10-CM

## 2022-06-23 PROBLEM — N18.30 CHRONIC RENAL DISEASE, STAGE III (HCC): Status: ACTIVE | Noted: 2022-06-23

## 2022-06-23 LAB
ANION GAP SERPL CALC-SCNC: 6 MMOL/L (ref 5–15)
BASOPHILS # BLD: 0 K/UL (ref 0–0.1)
BASOPHILS NFR BLD: 1 % (ref 0–1)
BUN SERPL-MCNC: 12 MG/DL (ref 6–20)
BUN/CREAT SERPL: 13 (ref 12–20)
CALCIUM SERPL-MCNC: 9.6 MG/DL (ref 8.5–10.1)
CHLORIDE SERPL-SCNC: 106 MMOL/L (ref 97–108)
CO2 SERPL-SCNC: 32 MMOL/L (ref 21–32)
CREAT SERPL-MCNC: 0.92 MG/DL (ref 0.55–1.02)
DIFFERENTIAL METHOD BLD: ABNORMAL
EOSINOPHIL # BLD: 0 K/UL (ref 0–0.4)
EOSINOPHIL NFR BLD: 1 % (ref 0–7)
ERYTHROCYTE [DISTWIDTH] IN BLOOD BY AUTOMATED COUNT: 15.5 % (ref 11.5–14.5)
GLUCOSE SERPL-MCNC: 87 MG/DL (ref 65–100)
HCT VFR BLD AUTO: 37.9 % (ref 35–47)
HGB BLD-MCNC: 11.4 G/DL (ref 11.5–16)
IMM GRANULOCYTES # BLD AUTO: 0 K/UL (ref 0–0.04)
IMM GRANULOCYTES NFR BLD AUTO: 0 % (ref 0–0.5)
INR BLD: 2.4
LYMPHOCYTES # BLD: 0.8 K/UL (ref 0.8–3.5)
LYMPHOCYTES NFR BLD: 20 % (ref 12–49)
MCH RBC QN AUTO: 26.3 PG (ref 26–34)
MCHC RBC AUTO-ENTMCNC: 30.1 G/DL (ref 30–36.5)
MCV RBC AUTO: 87.3 FL (ref 80–99)
MONOCYTES # BLD: 0.6 K/UL (ref 0–1)
MONOCYTES NFR BLD: 15 % (ref 5–13)
NEUTS SEG # BLD: 2.4 K/UL (ref 1.8–8)
NEUTS SEG NFR BLD: 63 % (ref 32–75)
NRBC # BLD: 0 K/UL (ref 0–0.01)
NRBC BLD-RTO: 0 PER 100 WBC
PLATELET # BLD AUTO: 162 K/UL (ref 150–400)
PMV BLD AUTO: 10.4 FL (ref 8.9–12.9)
POTASSIUM SERPL-SCNC: 3.5 MMOL/L (ref 3.5–5.1)
PT POC: 28.4 SECONDS
RBC # BLD AUTO: 4.34 M/UL (ref 3.8–5.2)
RBC MORPH BLD: ABNORMAL
RBC MORPH BLD: ABNORMAL
SODIUM SERPL-SCNC: 144 MMOL/L (ref 136–145)
TSH SERPL DL<=0.05 MIU/L-ACNC: 1.42 UIU/ML (ref 0.36–3.74)
VALID INTERNAL CONTROL?: YES
WBC # BLD AUTO: 3.8 K/UL (ref 3.6–11)

## 2022-06-23 PROCEDURE — 1123F ACP DISCUSS/DSCN MKR DOCD: CPT | Performed by: INTERNAL MEDICINE

## 2022-06-23 PROCEDURE — G8510 SCR DEP NEG, NO PLAN REQD: HCPCS | Performed by: INTERNAL MEDICINE

## 2022-06-23 PROCEDURE — G8417 CALC BMI ABV UP PARAM F/U: HCPCS | Performed by: INTERNAL MEDICINE

## 2022-06-23 PROCEDURE — 99215 OFFICE O/P EST HI 40 MIN: CPT | Performed by: INTERNAL MEDICINE

## 2022-06-23 PROCEDURE — G8752 SYS BP LESS 140: HCPCS | Performed by: INTERNAL MEDICINE

## 2022-06-23 PROCEDURE — G8399 PT W/DXA RESULTS DOCUMENT: HCPCS | Performed by: INTERNAL MEDICINE

## 2022-06-23 PROCEDURE — 85610 PROTHROMBIN TIME: CPT | Performed by: INTERNAL MEDICINE

## 2022-06-23 PROCEDURE — 1090F PRES/ABSN URINE INCON ASSESS: CPT | Performed by: INTERNAL MEDICINE

## 2022-06-23 PROCEDURE — G8754 DIAS BP LESS 90: HCPCS | Performed by: INTERNAL MEDICINE

## 2022-06-23 PROCEDURE — G8536 NO DOC ELDER MAL SCRN: HCPCS | Performed by: INTERNAL MEDICINE

## 2022-06-23 PROCEDURE — G8427 DOCREV CUR MEDS BY ELIG CLIN: HCPCS | Performed by: INTERNAL MEDICINE

## 2022-06-23 PROCEDURE — 1101F PT FALLS ASSESS-DOCD LE1/YR: CPT | Performed by: INTERNAL MEDICINE

## 2022-06-23 NOTE — PROGRESS NOTES
[facility]  [place] [address]  [address]  [place] [address]  Phone:  [phone]  Fax: [phone]       Chief Complaint   Patient presents with    Anticoagulation     Patient states that she is taking coumadin 5mg for five days, and 7.5mg for two days. .      SUBJECTIVE:    Usman Batista is a 80 y.o. female comes in for return visit stating that she has done reasonably well. She is limited only by her knees because she has severe osteoarthritis involving her knees. She has noted swelling of her lower extremities which is orthostatic in nature. In reality she has had this repetitively for years. She goes to a cardiologist, Dr. Estelle Goodpasture, every six months or so. She has a past history of primary hypertension, dyslipidemia, as well as obesity. Current Outpatient Medications   Medication Sig Dispense Refill    amLODIPine (NORVASC) 10 mg tablet take 1 tablet by mouth once daily 90 Tablet 3    meclizine (ANTIVERT) 12.5 mg tablet Take 1 Tablet by mouth three (3) times daily as needed for Dizziness. 60 Tablet 3    metoprolol succinate (TOPROL-XL) 25 mg XL tablet take 1/2 tablet by mouth once daily 90 Tablet 3    warfarin (COUMADIN) 5 mg tablet take 2 tablets by mouth daily for 2 days then take 1 tablet daily THEREAFTER 32 Tablet 11    potassium chloride (K-DUR, KLOR-CON M20) 20 mEq tablet take 1 tablet by mouth twice a day 60 Tablet 11    predniSONE (DELTASONE) 20 mg tablet Take 60 mg by mouth daily (with breakfast). (Patient taking differently: Take 60 mg by mouth as needed.) 15 Tablet 0    furosemide (LASIX) 40 mg tablet take 1 tablet by mouth once daily 90 Tablet 3    lisinopriL (PRINIVIL, ZESTRIL) 40 mg tablet take 1 tablet by mouth once daily 90 Tablet 3    clobetasoL (TEMOVATE) 0.05 % ointment Apply  to affected area two (2) times a day. 45 g 3    menthol (BIOFREEZE, MENTHOL,) 4 % gel 1 g by Apply Externally route every six (6) hours.  118 mL 0  aluminum hydrox-magnesium carb (GAVISCON EXTRA STRENGTH) 160-105 mg chew Take 1-2 tablets by mouth four (4) times daily as needed.        Past Medical History:   Diagnosis Date    Acute combined systolic and diastolic HF (heart failure), NYHA class 2 ([address] 8/11/2017    Arthritis     Atrial fibrillation (HCC)     Cancer (HCC)     multiple myeloma    [facility]     Hypertension     Long term current use of anticoagulant therapy     Pacemaker     S/P AV kathleen ablation 8/11/2017    Status post biventricular pacemaker 8/11/2017 8/11/17      Past Surgical History:   Procedure Laterality Date    HX GYN      HX ORTHOPAEDIC      knee    HX PACEMAKER  08/11/2017     Allergies   Allergen Reactions    Codeine Other (comments)         REVIEW OF SYSTEMS:  General: negative for - chills or fever  ENT: negative for - headaches, nasal congestion or tinnitus  Respiratory: negative for - cough, hemoptysis, shortness of breath or wheezing  Cardiovascular : negative for - chest pain, edema, palpitations or shortness of breath  Gastrointestinal: negative for - abdominal pain, blood in stools, heartburn or nausea/vomiting  Genito-Urinary: no dysuria, trouble voiding, or hematuria  Musculoskeletal: negative for - gait disturbance, joint pain, joint stiffness or joint swelling  Neurological: no TIA or stroke symptoms  Hematologic: no bruises, no bleeding, no swollen glands  Integument: no lumps, mole changes, nail changes or rash  Endocrine: no malaise/lethargy or unexpected weight changes      Social History     Socioeconomic History    Marital status:     Number of children: 3   Occupational History    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 Use    Smoking status: Never Smoker    Smokeless tobacco: Never Used   Vaping Use    Vaping Use: Never used   Substance and Sexual Activity    Alcohol use: No    Drug use: No    Sexual activity: Not Currently   Other Topics Concern   Service No    Blood Transfusions No    Caffeine Concern No    Occupational Exposure No    Hobby Hazards No    Sleep Concern No    Stress Concern No    Weight Concern Yes    Special Diet Yes     Comment: HTN/  Low Sugary    Back Care Yes    Exercise No    Bike Helmet No    Seat Belt Yes    Self-Exams Yes     Family History   Problem Relation Age of Onset    Stroke Mother     No Known Problems Father        OBJECTIVE:    Visit Vitals  BP (P) 130/79   Pulse (P) 74   Temp (P) 98.3 °F (36.8 °C) (Oral)   Resp (P) 16   Ht 5' 10\" (1.778 m)   Wt 231 lb 3.2 oz (104.9 kg)   SpO2 (P) 97%   BMI 33.17 kg/m²     CONSTITUTIONAL: well , well nourished, appears age appropriate  EYES: perrla, eom intact  ENMT:moist mucous membranes, pharynx clear  NECK: supple. Thyroid normal  RESPIRATORY: Chest: clear to ascultation and percussion   CARDIOVASCULAR: Heart: regular rate and rhythm  GASTROINTESTINAL: Abdomen: soft, bowel sounds active  HEMATOLOGIC: no pathological lymph nodes palpated  MUSCULOSKELETAL: Extremities: no edema, pulse 1+   INTEGUMENT: No unusual rashes or suspicious skin lesions noted. Nails appear normal.  NEUROLOGIC: non-focal exam   MENTAL STATUS: alert and oriented, appropriate affect      ASSESSMENT:  1. Atrial fibrillation, unspecified type ([address]    2. Venous insufficiency    3. Chronic systolic congestive heart failure ([address]    4. Hyperthyroidism    5. Primary osteoarthritis of both knees    6. Multiple myeloma in remission (HCC)    7. Obesity (BMI 30.0-34.9)    8. Dyslipidemia        PLAN:  1. The patient's INR today is acceptable, no adjustments are made. 2. She has chronic venous insufficiency leading to the orthostatic swelling of her lower extremities. I explained to her that this has nothing whatsoever to do with her heart, liver or kidneys. 3. She has a history of congestive heart failure, but this is well compensated currently.   She will follow up with Cardiology as it relates to this, as well as her atrial fibrillation. Her ventricular rate is quite stable. 4. She has a history of hyperthyroidism, but she has been in remission for the last one to two years. 5. Finally, she has severe osteoarthritis involving both knees. The only thing that can be done is significant weight reduction. 6. She has multiple myeloma, but is in remission. 7. She needs to lose weight. This can be accomplished by eating meals, eliminating snacks, and avoiding the consumption of processed carbohydrates. Orders Placed This Encounter    METABOLIC PANEL, BASIC    CBC WITH AUTOMATED DIFF    APOLIPOPROTEIN B    TSH 3RD GENERATION    AMB POC PT/INR         Follow-up and Dispositions    · Return in about 3 months (around 9/23/2022), or RTO monthly for INR.            Rasheed Eugene MD

## 2022-06-23 NOTE — PROGRESS NOTES
Chief Complaint   Patient presents with    Anticoagulation     Patient states that she is taking coumadin 5mg for five days, and 7.5mg for two days. 1. Have you been to the ER, urgent care clinic since your last visit? Hospitalized since your last visit? No    2. Have you seen or consulted any other health care providers outside of the [address] [place] [address] since your last visit? Include any pap smears or colon screening. No       Daya Toney is a 80 y.o. female who presents today for Anticoagulation monitoring. Indication: Atrial Fibrillation  INR Goal: 2.0-3.0. Current dose:  Coumadin 5mg x 5 days, and 7.5mg x 2 days. Missed Coumadin Doses:  None  Medication Changes:  no  Dietary Changes:  no    Symptoms: taking coumadin appropriately without any bleeding. Results for orders placed or performed in visit on 06/23/22   AMB POC PT/INR   Result Value Ref Range    VALID INTERNAL CONTROL POC Yes     Prothrombin time (POC) 28.4 seconds    INR POC 2.4           New Coumadin dose:.current treatment plan is effective, no change in therapy. Next check to be scheduled for  4 weeks.     Miracle Castañeda LPN

## 2022-06-24 LAB — APO B SERPL-MCNC: 65 MG/DL

## 2022-08-26 ENCOUNTER — PATIENT OUTREACH (OUTPATIENT)
Dept: CASE MANAGEMENT | Age: 82
End: 2022-08-26

## 2022-08-26 NOTE — PROGRESS NOTES
Educated patient about risk for severe COVID-19 due to risk factors according to [facility] guidelinesACM reviewed discharge instructions, medical action plan and red flag symptoms with the patient who verbalized understanding. Discussed COVID vaccination status: yes. Education provided on COVID-19 vaccination as appropriate. Discussed exposure protocols and quarantine with CDC Guidelines. Patient was given an opportunity to verbalize any questions and concerns and agrees to contact ACM or health care provider for questions related to their healthcare. Consult w/ PCP:    Spoke with Yocasta Manning PharmD:    Medication-UTI -back pain  Ceftin 500mg PO BID x 7 days ordered.

## 2022-09-07 ENCOUNTER — OFFICE VISIT (OUTPATIENT)
Dept: INTERNAL MEDICINE CLINIC | Age: 82
End: 2022-09-07
Payer: MEDICARE

## 2022-09-07 VITALS
OXYGEN SATURATION: 98 % | DIASTOLIC BLOOD PRESSURE: 76 MMHG | RESPIRATION RATE: 14 BRPM | HEART RATE: 63 BPM | HEIGHT: 67 IN | WEIGHT: 228.9 LBS | TEMPERATURE: 97.7 F | SYSTOLIC BLOOD PRESSURE: 131 MMHG | BODY MASS INDEX: 35.93 KG/M2

## 2022-09-07 DIAGNOSIS — I10 PRIMARY HYPERTENSION: ICD-10-CM

## 2022-09-07 DIAGNOSIS — Z13.31 SCREENING FOR DEPRESSION: ICD-10-CM

## 2022-09-07 DIAGNOSIS — I26.99 ACUTE PULMONARY EMBOLISM, UNSPECIFIED PULMONARY EMBOLISM TYPE, UNSPECIFIED WHETHER ACUTE COR PULMONALE PRESENT (HCC): Primary | ICD-10-CM

## 2022-09-07 DIAGNOSIS — M17.0 PRIMARY OSTEOARTHRITIS OF BOTH KNEES: ICD-10-CM

## 2022-09-07 DIAGNOSIS — E66.01 SEVERE OBESITY (BMI 35.0-39.9) WITH COMORBIDITY (HCC): ICD-10-CM

## 2022-09-07 DIAGNOSIS — Z00.00 WELLNESS EXAMINATION: ICD-10-CM

## 2022-09-07 DIAGNOSIS — C90.01 MULTIPLE MYELOMA IN REMISSION (HCC): ICD-10-CM

## 2022-09-07 LAB
INR BLD: 2.4
PT POC: 28.4 SECONDS
VALID INTERNAL CONTROL?: YES

## 2022-09-07 PROCEDURE — G8510 SCR DEP NEG, NO PLAN REQD: HCPCS | Performed by: INTERNAL MEDICINE

## 2022-09-07 PROCEDURE — G8417 CALC BMI ABV UP PARAM F/U: HCPCS | Performed by: INTERNAL MEDICINE

## 2022-09-07 PROCEDURE — 1090F PRES/ABSN URINE INCON ASSESS: CPT | Performed by: INTERNAL MEDICINE

## 2022-09-07 PROCEDURE — G8536 NO DOC ELDER MAL SCRN: HCPCS | Performed by: INTERNAL MEDICINE

## 2022-09-07 PROCEDURE — G0439 PPPS, SUBSEQ VISIT: HCPCS | Performed by: INTERNAL MEDICINE

## 2022-09-07 PROCEDURE — G8399 PT W/DXA RESULTS DOCUMENT: HCPCS | Performed by: INTERNAL MEDICINE

## 2022-09-07 PROCEDURE — 85610 PROTHROMBIN TIME: CPT | Performed by: INTERNAL MEDICINE

## 2022-09-07 PROCEDURE — G8752 SYS BP LESS 140: HCPCS | Performed by: INTERNAL MEDICINE

## 2022-09-07 PROCEDURE — 99214 OFFICE O/P EST MOD 30 MIN: CPT | Performed by: INTERNAL MEDICINE

## 2022-09-07 PROCEDURE — 1123F ACP DISCUSS/DSCN MKR DOCD: CPT | Performed by: INTERNAL MEDICINE

## 2022-09-07 PROCEDURE — G8754 DIAS BP LESS 90: HCPCS | Performed by: INTERNAL MEDICINE

## 2022-09-07 PROCEDURE — G8427 DOCREV CUR MEDS BY ELIG CLIN: HCPCS | Performed by: INTERNAL MEDICINE

## 2022-09-07 PROCEDURE — 1101F PT FALLS ASSESS-DOCD LE1/YR: CPT | Performed by: INTERNAL MEDICINE

## 2022-09-07 NOTE — PROGRESS NOTES
Nikki Kilgore is a 80 y.o. female. Chief Complaint   Patient presents with    Annual Wellness Visit     Patient here for Annual Wellness visit. She does report vertigo today. Results for orders placed or performed in visit on 09/07/22   AMB POC PT/INR   Result Value Ref Range    VALID INTERNAL CONTROL POC Yes     Prothrombin time (POC) 28.4 seconds    INR POC 2.4      Patient here for wellness visit. PT/INR obtained today. Patient reports currently taking Warfarin 5 mg on pill five days per week and one and a half pills for two days. She will see Dr. Shin Hennessy today to discuss lab values. 1. Have you been to the ER, urgent care clinic since your last visit? Hospitalized since your last visit? No    2. Have you seen or consulted any other health care providers outside of the [address] [place] [address] since your last visit? Include any pap smears or colon screening.  No

## 2022-09-07 NOTE — PROGRESS NOTES
[address] [place] [address] and Primary Care  [place] [address]  [address] [address] [facility] [id number]  Phone:  [phone]  Fax: [phone]       Chief Complaint   Patient presents with    Annual Wellness Visit     Patient here for Annual Wellness visit. She does report vertigo today. .      SUBJECTIVE:    Curt Stern is a 80 y.o. female comes in for return visit stating that she has done reasonably well. She has no major complaints other than pain in her knees which is severely arthritic. She is making no meaningful progress here as would be expected. Her weight is the same, but she however needs to lose weight primarily because of the osteoarthritic knees. She has no pain in her shoulders, back or neck. Her multiple myeloma is now in remission. She has a past history of primary hypertension, dyslipidemia and hyperthyroidism. The latter is currently inactive to the point where she is now euthyroid. Current Outpatient Medications   Medication Sig Dispense Refill    amLODIPine (NORVASC) 10 mg tablet take 1 tablet by mouth once daily 90 Tablet 3    meclizine (ANTIVERT) 12.5 mg tablet Take 1 Tablet by mouth three (3) times daily as needed for Dizziness. 60 Tablet 3    metoprolol succinate (TOPROL-XL) 25 mg XL tablet take 1/2 tablet by mouth once daily 90 Tablet 3    warfarin (COUMADIN) 5 mg tablet take 2 tablets by mouth daily for 2 days then take 1 tablet daily THEREAFTER 32 Tablet 11    potassium chloride (K-DUR, KLOR-CON M20) 20 mEq tablet take 1 tablet by mouth twice a day 60 Tablet 11    furosemide (LASIX) 40 mg tablet take 1 tablet by mouth once daily 90 Tablet 3    lisinopriL (PRINIVIL, ZESTRIL) 40 mg tablet take 1 tablet by mouth once daily 90 Tablet 3    clobetasoL (TEMOVATE) 0.05 % ointment Apply  to affected area two (2) times a day. 45 g 3    menthol (BIOFREEZE, MENTHOL,) 4 % gel 1 g by Apply Externally route every six (6) hours.  118 mL 0    aluminum hydrox-magnesium carb (MYLANTA) 160-105 mg chew Take 1-2 tablets by mouth four (4) times daily as needed. predniSONE (DELTASONE) 20 mg tablet Take 60 mg by mouth daily (with breakfast). (Patient not taking: Reported on 9/7/2022) 15 Tablet 0     Past Medical History:   Diagnosis Date    Acute combined systolic and diastolic HF (heart failure), NYHA class 2 ([facility] [address] 8/11/2017    Arthritis     Atrial fibrillation (HCC)     Cancer (HCC)     multiple myeloma    Congestive heart failure ([facility] [address]     Hypertension     Long term current use of anticoagulant therapy     Pacemaker     S/P AV kathleen ablation 8/11/2017    Status post biventricular pacemaker 8/11/2017 8/11/17      Past Surgical History:   Procedure Laterality Date    HX GYN      HX ORTHOPAEDIC      knee    HX PACEMAKER  08/11/2017     Allergies   Allergen Reactions    Codeine Other (comments)         REVIEW OF SYSTEMS:  General: negative for - chills or fever  ENT: negative for - headaches, nasal congestion or tinnitus  Respiratory: negative for - cough, hemoptysis, shortness of breath or wheezing  Cardiovascular : negative for - chest pain, edema, palpitations or shortness of breath  Gastrointestinal: negative for - abdominal pain, blood in stools, heartburn or nausea/vomiting  Genito-Urinary: no dysuria, trouble voiding, or hematuria  Musculoskeletal: negative for - gait disturbance, joint pain, joint stiffness or joint swelling  Neurological: no TIA or stroke symptoms  Hematologic: no bruises, no bleeding, no swollen glands  Integument: no lumps, mole changes, nail changes or rash  Endocrine: no malaise/lethargy or unexpected weight changes      Social History     Socioeconomic History    Marital status:     Number of children: 3   Occupational History    Occupation: Retired from the [employer]   Tobacco Use    Smoking status: Never    Smokeless tobacco: Never   Vaping Use    Vaping Use: Never used   Substance and Sexual Activity    Alcohol use: No    Drug use:  No Sexual activity: Not Currently   Other Topics Concern     Service No    Blood Transfusions No    Caffeine Concern No    Occupational Exposure No    Hobby Hazards No    Sleep Concern No    Stress Concern No    Weight Concern Yes    Special Diet Yes     Comment: HTN/  Low Sugary    Back Care Yes    Exercise No    Bike Helmet No    Seat Belt Yes    Self-Exams Yes     Family History   Problem Relation Age of Onset    Stroke Mother     No Known Problems Father        OBJECTIVE:    Visit Vitals  /76 (BP 1 Location: Left upper arm, BP Patient Position: Sitting, BP Cuff Size: Large adult)   Pulse 63   Temp 97.7 °F (36.5 °C) (Oral)   Resp 14   Ht 5' 7\" (1.702 m)   Wt 228 lb 14.4 oz (103.8 kg)   SpO2 98%   BMI 35.85 kg/m²     CONSTITUTIONAL: well , well nourished, appears age appropriate  EYES: perrla, eom intact  ENMT:moist mucous membranes, pharynx clear  NECK: supple. Thyroid normal  RESPIRATORY: Chest: clear to ascultation and percussion   CARDIOVASCULAR: Heart: regular rate and rhythm  GASTROINTESTINAL: Abdomen: soft, bowel sounds active  HEMATOLOGIC: no pathological lymph nodes palpated  MUSCULOSKELETAL: Extremities: no edema, pulse 1+   INTEGUMENT: No unusual rashes or suspicious skin lesions noted. Nails appear normal.  NEUROLOGIC: non-focal exam   MENTAL STATUS: alert and oriented, appropriate affect      ASSESSMENT:  1. Acute pulmonary embolism, unspecified pulmonary embolism type, unspecified whether acute cor pulmonale present ([address]    2. Multiple myeloma in remission ([address]    3. Primary hypertension    4. Severe obesity (BMI 35.0-39. 9) with comorbidity ([address]    5. Primary osteoarthritis of both knees        PLAN:  1. She remains on her warfarin. INR today is at 2.4, no adjustment is made. 2. Her multiple myeloma is now in remission and hopefully this will continue. She will follow with Hematology. 3. Blood pressure is excellent, no adjustments are made. 4. She really needs to lose weight.   This can be accomplished by eating meals, eliminating snacks, and avoiding the consumption of processed carbohydrates. 5. Her osteoarthritic knees are getting progressively worse unfortunately. This requires some walking with a Rollator. Orders Placed This Encounter    METABOLIC PANEL, BASIC    CBC WITH AUTOMATED DIFF    AMB POC PT/INR               Barbara Dutta MD   This is the Subsequent Medicare Annual Wellness Exam, performed 12 months or more after the Initial AWV or the last Subsequent AWV    I have reviewed the patient's medical history in detail and updated the computerized patient record. Assessment/Plan   Education and counseling provided:  Are appropriate based on today's review and evaluation    1. Acute pulmonary embolism, unspecified pulmonary embolism type, unspecified whether acute cor pulmonale present (HCC)  -     AMB POC PT/INR  2. Multiple myeloma in remission ([facility] [address]  -     CBC WITH AUTOMATED DIFF; Future  3. Primary hypertension  -     METABOLIC PANEL, BASIC; Future  4. Severe obesity (BMI 35.0-39. 9) with comorbidity ([facility] [address]  5.  Primary osteoarthritis of both knees       Depression Risk Factor Screening     3 most recent PHQ Screens 9/7/2022   PHQ Not Done -   Little interest or pleasure in doing things Not at all   Feeling down, depressed, irritable, or hopeless Not at all   Total Score PHQ 2 0   Trouble falling or staying asleep, or sleeping too much Not at all   Feeling tired or having little energy Not at all   Poor appetite, weight loss, or overeating Not at all   Feeling bad about yourself - or that you are a failure or have let yourself or your family down Not at all   Trouble concentrating on things such as school, work, reading, or watching TV Not at all   Moving or speaking so slowly that other people could have noticed; or the opposite being so fidgety that others notice Not at all   Thoughts of being better off dead, or hurting yourself in some way Not at all   PHQ 9 Score 0 How difficult have these problems made it for you to do your work, take care of your home and get along with others Not difficult at all       Alcohol & Drug Abuse Risk Screen    Do you average more than 1 drink per night or more than 7 drinks a week:  No    On any one occasion in the past three months have you have had more than 3 drinks containing alcohol:  No          Functional Ability and Level of Safety    Hearing: Hearing is good. Activities of Daily Living: The home contains: no safety equipment. Patient does total self care      Ambulation: with no difficulty     Fall Risk:  Fall Risk Assessment, last 12 mths 9/7/2022   Able to walk? Yes   Fall in past 12 months? 0   Do you feel unsteady? 0   Are you worried about falling 0   Fall with injury?  -      Abuse Screen:  Patient is not abused       Cognitive Screening    Has your family/caregiver stated any concerns about your memory: no     Cognitive Screening: Not applicable    Health Maintenance Due     Health Maintenance Due   Topic Date Due    Pneumococcal 65+ years (1 - PCV) Never done    Shingrix Vaccine Age 49> (1 of 2) Never done    COVID-19 Vaccine (3 - Pfizer risk series) 04/19/2021    Flu Vaccine (1) 09/01/2022       Patient Care Team   Patient Care Team:  Keya Reynolds MD as PCP - General (Internal Medicine Physician)  Keya Reynolds MD as PCP - [facility]  Kristal Vieyra MD (Cardiovascular Disease Physician)  Yoel Bean MD as Physician (Cardiovascular Disease Physician)  Annie Berumen NP (Nurse Practitioner)  TOD Rodriguez (Nurse Practitioner)    History     Patient Active Problem List   Diagnosis Code    DJD (degenerative joint disease) M19.90    Anemia D64.9    Atrial fibrillation ([address] I48.91    Low back pain M54.50    Complex renal cyst N28.1    Reflux esophagitis K21.00    Multiple myeloma (HCC) C90.00    Venous insufficiency I87.2    Back pain M54.9    Muscular deconditioning R29.898 Sialadenitis K11.20    Status post biventricular pacemaker Z95.0    S/P AV kathleen ablation Z98.890    S/P cardiac cath Z98.890    Weight loss R63.4    Hyperthyroidism E05.90    Cervical radiculopathy M54.12    Primary hypertension I10    Obesity (BMI 30.0-34. 9) E66.9    Lumbar radiculopathy M54.16    Chronic systolic congestive heart failure (HCC) I50.22    Chronic renal disease, stage III N18.30     Past Medical History:   Diagnosis Date    Acute combined systolic and diastolic HF (heart failure), NYHA class 2 ([facility] [address] 8/11/2017    Arthritis     Atrial fibrillation ([facility] [address]     Cancer (HCC)     multiple myeloma    Congestive heart failure ([facility] [address]     Hypertension     Long term current use of anticoagulant therapy     Pacemaker     S/P AV kathleen ablation 8/11/2017    Status post biventricular pacemaker 8/11/2017 8/11/17       Past Surgical History:   Procedure Laterality Date    HX GYN      HX ORTHOPAEDIC      knee    HX PACEMAKER  08/11/2017     Current Outpatient Medications   Medication Sig Dispense Refill    amLODIPine (NORVASC) 10 mg tablet take 1 tablet by mouth once daily 90 Tablet 3    meclizine (ANTIVERT) 12.5 mg tablet Take 1 Tablet by mouth three (3) times daily as needed for Dizziness. 60 Tablet 3    metoprolol succinate (TOPROL-XL) 25 mg XL tablet take 1/2 tablet by mouth once daily 90 Tablet 3    warfarin (COUMADIN) 5 mg tablet take 2 tablets by mouth daily for 2 days then take 1 tablet daily THEREAFTER 32 Tablet 11    potassium chloride (K-DUR, KLOR-CON M20) 20 mEq tablet take 1 tablet by mouth twice a day 60 Tablet 11    furosemide (LASIX) 40 mg tablet take 1 tablet by mouth once daily 90 Tablet 3    lisinopriL (PRINIVIL, ZESTRIL) 40 mg tablet take 1 tablet by mouth once daily 90 Tablet 3    clobetasoL (TEMOVATE) 0.05 % ointment Apply  to affected area two (2) times a day. 45 g 3    menthol (BIOFREEZE, MENTHOL,) 4 % gel 1 g by Apply Externally route every six (6) hours.  118 mL 0    aluminum hydrox-magnesium carb (MYLANTA) 160-105 mg chew Take 1-2 tablets by mouth four (4) times daily as needed. predniSONE (DELTASONE) 20 mg tablet Take 60 mg by mouth daily (with breakfast).  (Patient not taking: Reported on 9/7/2022) 15 Tablet 0     Allergies   Allergen Reactions    Codeine Other (comments)       Family History   Problem Relation Age of Onset    Stroke Mother     No Known Problems Father      Social History     Tobacco Use    Smoking status: Never    Smokeless tobacco: Never   Substance Use Topics    Alcohol use: No         Michaelle Schaffer MD

## 2022-09-08 LAB
ANION GAP SERPL CALC-SCNC: 6 MMOL/L (ref 5–15)
BASOPHILS # BLD: 0 K/UL (ref 0–0.1)
BASOPHILS NFR BLD: 1 % (ref 0–1)
BUN SERPL-MCNC: 12 MG/DL (ref 6–20)
BUN/CREAT SERPL: 14 (ref 12–20)
CALCIUM SERPL-MCNC: 9.4 MG/DL (ref 8.5–10.1)
CHLORIDE SERPL-SCNC: 107 MMOL/L (ref 97–108)
CO2 SERPL-SCNC: 30 MMOL/L (ref 21–32)
CREAT SERPL-MCNC: 0.88 MG/DL (ref 0.55–1.02)
DIFFERENTIAL METHOD BLD: ABNORMAL
EOSINOPHIL # BLD: 0 K/UL (ref 0–0.4)
EOSINOPHIL NFR BLD: 1 % (ref 0–7)
ERYTHROCYTE [DISTWIDTH] IN BLOOD BY AUTOMATED COUNT: 15.8 % (ref 11.5–14.5)
GLUCOSE SERPL-MCNC: 71 MG/DL (ref 65–100)
HCT VFR BLD AUTO: 38.1 % (ref 35–47)
HGB BLD-MCNC: 11.5 G/DL (ref 11.5–16)
IMM GRANULOCYTES # BLD AUTO: 0 K/UL (ref 0–0.04)
IMM GRANULOCYTES NFR BLD AUTO: 0 % (ref 0–0.5)
LYMPHOCYTES # BLD: 0.9 K/UL (ref 0.8–3.5)
LYMPHOCYTES NFR BLD: 23 % (ref 12–49)
MCH RBC QN AUTO: 26.7 PG (ref 26–34)
MCHC RBC AUTO-ENTMCNC: 30.2 G/DL (ref 30–36.5)
MCV RBC AUTO: 88.6 FL (ref 80–99)
MONOCYTES # BLD: 0.6 K/UL (ref 0–1)
MONOCYTES NFR BLD: 14 % (ref 5–13)
NEUTS SEG # BLD: 2.5 K/UL (ref 1.8–8)
NEUTS SEG NFR BLD: 61 % (ref 32–75)
NRBC # BLD: 0 K/UL (ref 0–0.01)
NRBC BLD-RTO: 0 PER 100 WBC
PLATELET # BLD AUTO: 166 K/UL (ref 150–400)
PMV BLD AUTO: 10.6 FL (ref 8.9–12.9)
POTASSIUM SERPL-SCNC: 3.5 MMOL/L (ref 3.5–5.1)
RBC # BLD AUTO: 4.3 M/UL (ref 3.8–5.2)
SODIUM SERPL-SCNC: 143 MMOL/L (ref 136–145)
WBC # BLD AUTO: 4 K/UL (ref 3.6–11)

## 2022-09-09 ENCOUNTER — TRANSCRIBE ORDER (OUTPATIENT)
Dept: SCHEDULING | Age: 82
End: 2022-09-09

## 2022-09-09 DIAGNOSIS — Z12.31 ENCOUNTER FOR MAMMOGRAM TO ESTABLISH BASELINE MAMMOGRAM: Primary | ICD-10-CM

## 2022-09-11 NOTE — PATIENT INSTRUCTIONS

## 2022-10-06 ENCOUNTER — HOSPITAL ENCOUNTER (OUTPATIENT)
Dept: MAMMOGRAPHY | Age: 82
Discharge: HOME | End: 2022-10-06
Attending: INTERNAL MEDICINE
Payer: MEDICARE

## 2022-10-06 DIAGNOSIS — Z12.31 ENCOUNTER FOR MAMMOGRAM TO ESTABLISH BASELINE MAMMOGRAM: ICD-10-CM

## 2022-10-06 PROCEDURE — 77067 SCR MAMMO BI INCL CAD: CPT

## 2022-10-23 RX ORDER — LISINOPRIL 40 MG/1
TABLET ORAL
Qty: 90 TABLET | Refills: 3 | Status: SHIPPED | OUTPATIENT
Start: 2022-10-23

## 2022-10-23 RX ORDER — FUROSEMIDE 40 MG/1
TABLET ORAL
Qty: 90 TABLET | Refills: 3 | Status: SHIPPED | OUTPATIENT
Start: 2022-10-23

## 2022-11-16 RX ORDER — CEFUROXIME AXETIL 500 MG/1
500 TABLET ORAL 2 TIMES DAILY
Qty: 14 TABLET | Refills: 0 | Status: SHIPPED | OUTPATIENT
Start: 2022-11-16 | End: 2022-11-23

## 2022-11-17 RX ORDER — MECLIZINE HCL 12.5 MG 12.5 MG/1
12.5 TABLET ORAL
Qty: 60 TABLET | Refills: 3 | Status: SHIPPED | OUTPATIENT
Start: 2022-11-17

## 2022-12-16 ENCOUNTER — OFFICE VISIT (OUTPATIENT)
Dept: INTERNAL MEDICINE CLINIC | Age: 82
End: 2022-12-16
Payer: MEDICARE

## 2022-12-16 VITALS
BODY MASS INDEX: 35.67 KG/M2 | WEIGHT: 227.3 LBS | TEMPERATURE: 98 F | HEART RATE: 75 BPM | HEIGHT: 67 IN | OXYGEN SATURATION: 100 % | SYSTOLIC BLOOD PRESSURE: 130 MMHG | DIASTOLIC BLOOD PRESSURE: 82 MMHG | RESPIRATION RATE: 16 BRPM

## 2022-12-16 DIAGNOSIS — E66.9 OBESITY (BMI 30.0-34.9): ICD-10-CM

## 2022-12-16 DIAGNOSIS — M17.0 PRIMARY OSTEOARTHRITIS OF BOTH KNEES: ICD-10-CM

## 2022-12-16 DIAGNOSIS — I87.2 VENOUS INSUFFICIENCY: ICD-10-CM

## 2022-12-16 DIAGNOSIS — I26.99 ACUTE PULMONARY EMBOLISM, UNSPECIFIED PULMONARY EMBOLISM TYPE, UNSPECIFIED WHETHER ACUTE COR PULMONALE PRESENT (HCC): Primary | ICD-10-CM

## 2022-12-16 DIAGNOSIS — R29.898 MUSCULAR DECONDITIONING: ICD-10-CM

## 2022-12-16 LAB
INR BLD: 2.3
PT POC: 27.2 SECONDS
VALID INTERNAL CONTROL?: YES

## 2022-12-16 NOTE — PROGRESS NOTES
Farshad Nelson is a 80 y.o. female  Chief Complaint   Patient presents with    Follow-up    Hypertension     Patient here for follow up high blood pressure. 1. Have you been to the ER, urgent care clinic since your last visit? Hospitalized since your last visit? No    2. Have you seen or consulted any other health care providers outside of the [address] [place] [address] since your last visit? Include any pap smears or colon screening. No    Results for orders placed or performed in visit on 12/16/22   AMB POC PT/INR   Result Value Ref Range    VALID INTERNAL CONTROL POC Yes     Prothrombin time (POC) 27.2 seconds    INR POC 2.3      Patient reports taking Warfarin 5 mg Monday thru Friday and taking 7.5 mg on Saturdays and Sundays. Patient will see Dr. Marguerita Boas for directions on medication today.

## 2022-12-17 NOTE — PROGRESS NOTES
[address] [place] [address] and Primary Care  [place] [address]  [address] [place] [address]  Phone:  [phone]  Fax: [phone]       Chief Complaint   Patient presents with    Follow-up    Hypertension     Patient here for follow up high blood pressure. .      SUBJECTIVE:    Marivel Guy is a 80 y.o. female comes in for return visit stating that she has done reasonably well. Her walking is becoming slower and more limited primarily because of her progressive osteoarthritis. This is exacerbated by her progressive severe obesity. She has had no meaningful weight loss. She is much less physically active than she has been and this is also a contributing factor to her debility. She does follow up with her oncologist, but apparently her disease is inactive now which is her multiple myeloma. Protein levels are stable. She has a past history of primary hypertension, dyslipidemia and a history of pulmonary emboli, as well as atrial fibrillation. She is anticoagulated for this taking warfarin daily. She takes 5 mg Monday through Friday and 7.5 mg Saturday and Sunday. INRs have been quite stable. Finally she complains about swelling of her lower extremities left somewhat greater than the right. There is still an orthostatic component present. Current Outpatient Medications   Medication Sig Dispense Refill    potassium chloride (K-DUR, KLOR-CON M20) 20 mEq tablet take 1 tablet by mouth twice a day 180 Tablet 11    meclizine (ANTIVERT) 12.5 mg tablet Take 1 Tablet by mouth three (3) times daily as needed for Dizziness.  60 Tablet 3    lisinopriL (PRINIVIL, ZESTRIL) 40 mg tablet take 1 tablet by mouth once daily 90 Tablet 3    furosemide (LASIX) 40 mg tablet take 1 tablet by mouth once daily 90 Tablet 3    amLODIPine (NORVASC) 10 mg tablet take 1 tablet by mouth once daily 90 Tablet 3    metoprolol succinate (TOPROL-XL) 25 mg XL tablet take 1/2 tablet by mouth once daily 90 Tablet 3 warfarin (COUMADIN) 5 mg tablet take 2 tablets by mouth daily for 2 days then take 1 tablet daily THEREAFTER 32 Tablet 11    clobetasoL (TEMOVATE) 0.05 % ointment Apply  to affected area two (2) times a day. 45 g 3    menthol (BIOFREEZE, MENTHOL,) 4 % gel 1 g by Apply Externally route every six (6) hours. 118 mL 0    aluminum hydrox-magnesium carb (MYLANTA) 160-105 mg chew Take 1-2 tablets by mouth four (4) times daily as needed. predniSONE (DELTASONE) 20 mg tablet Take 60 mg by mouth daily (with breakfast).  (Patient not taking: No sig reported) 15 Tablet 0     Past Medical History:   Diagnosis Date    Acute combined systolic and diastolic HF (heart failure), NYHA class 2 ([address] 08/11/2017    Arthritis     Atrial fibrillation ([facility] [address]     Cancer (HCC)     multiple myeloma    Congestive heart failure ([facility] [address]     Hypertension     Long term current use of anticoagulant therapy     Menopause     Pacemaker     S/P AV kathleen ablation 08/11/2017    Status post biventricular pacemaker 08/11/2017 8/11/17      Past Surgical History:   Procedure Laterality Date    HX GYN      HX ORTHOPAEDIC      knee    HX PACEMAKER  08/11/2017     Allergies   Allergen Reactions    Codeine Other (comments)         REVIEW OF SYSTEMS:  General: negative for - chills or fever  ENT: negative for - headaches, nasal congestion or tinnitus  Respiratory: negative for - cough, hemoptysis, shortness of breath or wheezing  Cardiovascular : negative for - chest pain, edema, palpitations or shortness of breath  Gastrointestinal: negative for - abdominal pain, blood in stools, heartburn or nausea/vomiting  Genito-Urinary: no dysuria, trouble voiding, or hematuria  Musculoskeletal: negative for - gait disturbance, joint pain, joint stiffness or joint swelling  Neurological: no TIA or stroke symptoms  Hematologic: no bruises, no bleeding, no swollen glands  Integument: no lumps, mole changes, nail changes or rash  Endocrine: no malaise/lethargy or unexpected weight changes      Social History     Socioeconomic History    Marital status:     Number of children: 3   Occupational History    Occupation: Retired from the [employer] Use    Smoking status: Never    Smokeless tobacco: Never   Vaping Use    Vaping Use: Never used   Substance and Sexual Activity    Alcohol use: No    Drug use: No    Sexual activity: Not Currently   Other Topics Concern     Service No    Blood Transfusions No    Caffeine Concern No    Occupational Exposure No    Hobby Hazards No    Sleep Concern No    Stress Concern No    Weight Concern Yes    Special Diet Yes     Comment: HTN/  Low Sugary    Back Care Yes    Exercise No    Bike Helmet No    Seat Belt Yes    Self-Exams Yes     Family History   Problem Relation Age of Onset    Stroke Mother     No Known Problems Father        OBJECTIVE:    Visit Vitals  /82   Pulse 75   Temp 98 °F (36.7 °C) (Oral)   Resp 16   Ht 5' 7\" (1.702 m)   Wt 227 lb 4.8 oz (103.1 kg)   SpO2 100%   BMI 35.60 kg/m²     CONSTITUTIONAL: well , well nourished, appears age appropriate  EYES: perrla, eom intact  ENMT:moist mucous membranes, pharynx clear  NECK: supple. Thyroid normal  RESPIRATORY: Chest: clear to ascultation and percussion   CARDIOVASCULAR: Heart: regular rate and rhythm  GASTROINTESTINAL: Abdomen: soft, bowel sounds active  HEMATOLOGIC: no pathological lymph nodes palpated  MUSCULOSKELETAL: Extremities: Asymmetric edema distally, pulse 1+, moderate degenerative changes noted both knees  INTEGUMENT: No unusual rashes or suspicious skin lesions noted. Nails appear normal.  NEUROLOGIC: non-focal exam   MENTAL STATUS: alert and oriented, appropriate affect      ASSESSMENT:  1. Acute pulmonary embolism, unspecified pulmonary embolism type, unspecified whether acute cor pulmonale present (HCC)    2. Venous insufficiency    3. Muscular deconditioning    4. Primary osteoarthritis of both knees    5. Obesity (BMI 30.0-34. 9) PLAN:  1. INR today is acceptable. This is 2.3. No adjustments are made. 2. As far as her venous insufficiency is concerned this is quite stable. I explained to her that the disease entity is asymmetrical.  It remains orthostatic in nature and is not that bad given the time of the year. I have low index of suspicion that the patient has a superimposed DVT given the fact that she is fully anticoagulated currently. 3. She is physically deconditioned as a direct result of her physical inactivity. This is primarily because her obesity which is superimposed on the osteoarthritis of both knees which is getting progressively worse. Orders Placed This Encounter    AMB POC PT/INR         Follow-up and Dispositions    Return RTO monthly for INR.            Akanksha Song MD

## 2022-12-28 RX ORDER — WARFARIN SODIUM 5 MG/1
TABLET ORAL
Qty: 32 TABLET | Refills: 11 | Status: SHIPPED | OUTPATIENT
Start: 2022-12-28

## 2023-01-06 ENCOUNTER — PATIENT OUTREACH (OUTPATIENT)
Dept: CASE MANAGEMENT | Age: 83
End: 2023-01-06

## 2023-01-06 NOTE — PROGRESS NOTES
Ambulatory Care Management Note    Date/Time:  1/6/2023 3:01 PM    This patient was received as a referral from [employer]. Ambulatory Care Manager outreached to patient today to offer care management services. Introduction to self and role of care manager provided. Patient accepted care management services at this time. Follow up call scheduled at this time. Patient has Ambulatory Care Manager's contact number for any questions or concerns. Ambulatory Care Management Note    Date/Time:  1/6/2023 3:01 PM    This Ambulatory Care Manager (ACM) reviewed and updated the following screenings during this call; disease specific assessment     Patient's challenges to self-management identified:   medical condition      Medication Management:  good adherence    Advance Care Planning:   Does patient have an Advance Directive:  yes; reviewed and current     [employer], Referrals, and Durable Medical Equipment: Pulmonology      Health Maintenance Due   Topic Date Due    Pneumococcal 65+ years (1 - PCV) Never done    Shingles Vaccine (1 of 2) Never done    COVID-19 Vaccine (3 - Pfizer risk series) 04/19/2021    Flu Vaccine (1) 08/01/2022     Health Maintenance Reviewed: COVID-to consult w/ PCP. Patient was asked to consider health care goals that they would like to focus on with this ACM. ACM will follow up with patient to discuss goals and establish care plan in the next 7-14 days.        PCP/Specialist follow up:   Future Appointments   Date Time Provider Belen Russell   3/23/2023  9:00 AM Adrian Rodriguez MD [facility] MAIN BS AMB

## 2023-01-18 RX ORDER — METOPROLOL SUCCINATE 25 MG/1
TABLET, EXTENDED RELEASE ORAL
Qty: 90 TABLET | Refills: 3 | Status: SHIPPED | OUTPATIENT
Start: 2023-01-18

## 2023-02-10 ENCOUNTER — OFFICE VISIT (OUTPATIENT)
Dept: URGENT CARE | Age: 83
End: 2023-02-10
Payer: MEDICARE

## 2023-02-10 VITALS
DIASTOLIC BLOOD PRESSURE: 98 MMHG | HEART RATE: 67 BPM | TEMPERATURE: 98.7 F | SYSTOLIC BLOOD PRESSURE: 160 MMHG | OXYGEN SATURATION: 97 % | WEIGHT: 227 LBS | BODY MASS INDEX: 35.55 KG/M2 | RESPIRATION RATE: 18 BRPM

## 2023-02-10 DIAGNOSIS — N39.0 ACUTE UTI: ICD-10-CM

## 2023-02-10 DIAGNOSIS — R35.0 URINE FREQUENCY: Primary | ICD-10-CM

## 2023-02-10 LAB
BILIRUB UR QL STRIP: NEGATIVE
GLUCOSE UR-MCNC: NEGATIVE MG/DL
KETONES P FAST UR STRIP-MCNC: NEGATIVE MG/DL
PH UR STRIP: 6.5 [PH] (ref 4.6–8)
PROT UR QL STRIP: ABNORMAL
SP GR UR STRIP: 1.02 (ref 1–1.03)
UA UROBILINOGEN AMB POC: ABNORMAL (ref 0.2–1)
URINALYSIS CLARITY POC: ABNORMAL
URINALYSIS COLOR POC: YELLOW
URINE BLOOD POC: ABNORMAL
URINE LEUKOCYTES POC: ABNORMAL
URINE NITRITES POC: POSITIVE

## 2023-02-10 RX ORDER — CEFDINIR 300 MG/1
300 CAPSULE ORAL 2 TIMES DAILY
Qty: 14 CAPSULE | Refills: 0 | Status: SHIPPED | OUTPATIENT
Start: 2023-02-10 | End: 2023-02-17

## 2023-02-10 NOTE — PROGRESS NOTES
Here for bladder pressure, urinary urgency and urinary frequency onset  Onset 5 days ago  Some mild low back pain (not flank or kidney area)  Symptoms occur throughout the day  Overall not improving  Denies: fever, chills, n/v, severe abdominal pain, flank pain, blood in urine, inability to push out urine, vaginal discharge             Past Medical History:   Diagnosis Date    Acute combined systolic and diastolic HF (heart failure), NYHA class 2 ([facility] 08/11/2017    Arthritis     At[facility]     Cancer (HCC)     multiple myeloma    Congestive heart failure ([facility] [address]     Hypertension     Long term current use of anticoagulant therapy     Menopause     Pacemaker     S/P AV kathleen ablation 08/11/2017    Status post biventricular pacemaker 08/11/2017 8/11/17         Past Surgical History:   Procedure Laterality Date    HX GYN      HX ORTHOPAEDIC      knee    HX PACEMAKER  08/11/2017         Family History   Problem Relation Age of Onset    Stroke Mother     No Known Problems Father         Social History     Socioeconomic History    Marital status:      Spouse name: Not on file    Number of children: 3    Years of education: Not on file    Highest education level: Not on file   Occupational History    Occupation: Retired from the [employer]   Tobacco Use    Smoking status: Never    Smokeless tobacco: Never   Vaping Use    Vaping Use: Never used   Substance and Sexual Activity    Alcohol use: No    Drug use: No    Sexual activity: Not Currently   Other Topics Concern     Service No    Blood Transfusions No    Caffeine Concern No    Occupational Exposure No    Hobby Hazards No    Sleep Concern No    Stress Concern No    Weight Concern Yes    Special Diet Yes     Comment: HTN/  Low Sugary    Back Care Yes    Exercise No    Bike Helmet No    Seat Belt Yes    Self-Exams Yes   Social History Narrative    Not on file     Social Determinants of Health     Financial Resource Strain: Not on file Food Insecurity: Not on file   Transportation Needs: Not on file   Physical Activity: Not on file   Stress: Not on file   Social Connections: Not on file   Intimate Partner Violence: Not on file   Housing Stability: Not on file                ALLERGIES: Codeine    Review of Systems   All other systems reviewed and are negative. Vitals:    02/10/23 1349 02/10/23 1352   BP: (!) 161/91 (!) 160/98   Pulse: 67    Resp: 18    Temp: 98.7 °F (37.1 °C)    SpO2: 97%    Weight: 227 lb (103 kg)        Physical Exam  Constitutional:       General: She is not in acute distress. Appearance: She is not ill-appearing, toxic-appearing or diaphoretic. HENT:      Head: Normocephalic and atraumatic. Eyes:      Extraocular Movements: Extraocular movements intact. Cardiovascular:      Rate and Rhythm: Normal rate and regular rhythm. Pulses: Normal pulses. Heart sounds: Normal heart sounds. No murmur heard. No friction rub. No gallop. Pulmonary:      Effort: Pulmonary effort is normal. No respiratory distress. Breath sounds: Normal breath sounds. No wheezing or rales. Abdominal:      General: There is no distension. Palpations: Abdomen is soft. There is no mass. Tenderness: There is no abdominal tenderness. There is no right CVA tenderness, left CVA tenderness or guarding. Skin:     Coloration: Skin is not pale. Neurological:      Mental Status: She is alert and oriented to person, place, and time. Psychiatric:         Mood and Affect: Mood normal.         Behavior: Behavior normal.         Thought Content: Thought content normal.       MDM     Differential Diagnosis; Clinical Impression; Plan:       CLINICAL IMPRESSION:  (R35.0) Urine frequency  (primary encounter diagnosis)  (N39.0) Acute UTI    Orders Placed This Encounter      cefdinir (OMNICEF) 300 mg capsule          Sig: Take 1 Capsule by mouth two (2) times a day for 7 days.           Dispense:  14 Capsule          Refill: 0      Plan:  UA Results: trace IWONA, NIT pos, 1+blood, 1+ prot  Based on the results of your Urinalysis and your history of symptoms will treat for uncomplicated urinary tract infection with omnicef  Please take antibiotic as prescribed until completion. Maintain adequate fluid intake. Will send urine culture. We have reviewed concerning signs/symptoms, normal vs abnormal progression of medical condition and when to seek immediate medical attention. Schedule with PCP or Urgent Care immediately for worsening or new symptoms. See your PCP if there is not at least some improvement in symptoms within the next 4 days  You should see your PCP for updates on your routine health maintenance.              Procedures    Results for orders placed or performed in visit on 02/10/23   AMB POC URINALYSIS DIP STICK AUTO W/ MICRO   Result Value Ref Range    Color (UA POC) Yellow     Clarity (UA POC) Cloudy     Glucose (UA POC) Negative Negative    Bilirubin (UA POC) Negative Negative    Ketones (UA POC) Negative Negative    Specific gravity (UA POC) 1.025 1.001 - 1.035    Blood (UA POC) 1+ Negative    pH (UA POC) 6.5 4.6 - 8.0    Protein (UA POC) 1+ Negative    Urobilinogen (UA POC) 0.2 mg/dL 0.2 - 1    Nitrites (UA POC) Positive Negative    Leukocyte esterase (UA POC) Trace Negative

## 2023-02-15 DIAGNOSIS — N39.0 ACUTE UTI: Primary | ICD-10-CM

## 2023-02-15 RX ORDER — NITROFURANTOIN 25; 75 MG/1; MG/1
100 CAPSULE ORAL 2 TIMES DAILY
Qty: 14 CAPSULE | Refills: 0 | Status: SHIPPED | OUTPATIENT
Start: 2023-02-15 | End: 2023-02-22

## 2023-02-16 ENCOUNTER — PATIENT OUTREACH (OUTPATIENT)
Dept: CASE MANAGEMENT | Age: 83
End: 2023-02-16

## 2023-03-06 ENCOUNTER — PATIENT OUTREACH (OUTPATIENT)
Dept: CASE MANAGEMENT | Age: 83
End: 2023-03-06

## 2023-03-06 RX ORDER — NITROFURANTOIN 25; 75 MG/1; MG/1
100 CAPSULE ORAL 2 TIMES DAILY
COMMUNITY

## 2023-03-06 NOTE — PROGRESS NOTES
S/p Acute pulmonary embolism/Venous insufficiency/Muscular deconditioning OA-both knees/Obesity--bladder infection 3/3/2023 at [address] Ambulatory Care Management Note      Date/Time:  3/6/2023 2:31 PM    Patient Current Location: [place]     This patient was received as a referral from [address]. Top Challenges reviewed with the provider   Has continued the Acute Care Facility-for the urinary infections. Recently placed on Nitrofurantoin CR 100mg BID for 7 days (NP states she needed something stronger. Ambulatory  contacted patient for discussion and case management of  S/p Acute pulmonary embolism/Venous insufficiency/Muscular deconditioning OA-both knees/Obesity-bladder infections. Summary of patients top problems: Patient frequently [address] for bladder infections. Patient will receiving teachings on elev blood sugar levels from eating sugary food & sweetened cranberry juice. Continued teachings on water intake in light of bladder infection and Abx. Patient's challenges to self management identified:   medical condition      Medication Management:  good adherence    Advance Care Planning:   Does patient have an Advance Directive:  yes; reviewed and current     [employer], Referrals, and Durable Medical Equipment: none    PCP/Specialist follow up:   Future Appointments   Date Time Provider Belen Russell   3/23/2023  9:00 AM Oly Swenson MD [facility] MAIN BS AMB           Goals Addressed                   This Visit's Progress     Attends follow up appointments on schedule        3/5/2023: Patient reports appt sched to for PCP visit on 3/23/2023 she thinks-didn't have       Patient/Family verbalizes understanding of self-management of chronic disease. On track     3/6/2023:  Acute Care Facility: seen for bladder infection.   Patient reports eating sugary food and drinks (oat meal cookies, etc.) when he eats her coffee:  Patient frequently reports bladder infection. Teachings done on reduction sugar intake. Patient reports she drinks cranberry just regularly; encouraged if could change to sugary free cranberry juice. Reports drinking more water now than before. Stated she was on Ceftin for 7 days but NP changed before the 7 day period was up to stronger 100gm abx. EW ACM           Goal f/up: 2 weeks. Patient verbalized understanding of all information discussed. Patient has this Ambulatory Care Manager's contact information for any further questions, concerns, or needs.

## 2023-03-23 ENCOUNTER — OFFICE VISIT (OUTPATIENT)
Dept: INTERNAL MEDICINE CLINIC | Age: 83
End: 2023-03-23
Payer: MEDICARE

## 2023-03-23 VITALS
HEIGHT: 67 IN | WEIGHT: 227.9 LBS | DIASTOLIC BLOOD PRESSURE: 78 MMHG | SYSTOLIC BLOOD PRESSURE: 116 MMHG | HEART RATE: 75 BPM | RESPIRATION RATE: 18 BRPM | OXYGEN SATURATION: 97 % | TEMPERATURE: 98.1 F | BODY MASS INDEX: 35.77 KG/M2

## 2023-03-23 DIAGNOSIS — I10 PRIMARY HYPERTENSION: ICD-10-CM

## 2023-03-23 DIAGNOSIS — I26.99 ACUTE PULMONARY EMBOLISM, UNSPECIFIED PULMONARY EMBOLISM TYPE, UNSPECIFIED WHETHER ACUTE COR PULMONALE PRESENT (HCC): Primary | ICD-10-CM

## 2023-03-23 DIAGNOSIS — C90.01 MULTIPLE MYELOMA IN REMISSION (HCC): ICD-10-CM

## 2023-03-23 DIAGNOSIS — R29.898 MUSCULAR DECONDITIONING: ICD-10-CM

## 2023-03-23 DIAGNOSIS — I48.91 ATRIAL FIBRILLATION, UNSPECIFIED TYPE (HCC): ICD-10-CM

## 2023-03-23 DIAGNOSIS — I87.2 VENOUS INSUFFICIENCY: ICD-10-CM

## 2023-03-23 DIAGNOSIS — N39.0 URINARY TRACT INFECTION WITHOUT HEMATURIA, SITE UNSPECIFIED: ICD-10-CM

## 2023-03-23 LAB
INR BLD: 2.7
PT POC: 31.9 SECONDS
VALID INTERNAL CONTROL?: YES

## 2023-03-23 PROCEDURE — G8417 CALC BMI ABV UP PARAM F/U: HCPCS | Performed by: INTERNAL MEDICINE

## 2023-03-23 PROCEDURE — 1123F ACP DISCUSS/DSCN MKR DOCD: CPT | Performed by: INTERNAL MEDICINE

## 2023-03-23 PROCEDURE — G8536 NO DOC ELDER MAL SCRN: HCPCS | Performed by: INTERNAL MEDICINE

## 2023-03-23 PROCEDURE — 1101F PT FALLS ASSESS-DOCD LE1/YR: CPT | Performed by: INTERNAL MEDICINE

## 2023-03-23 PROCEDURE — 85610 PROTHROMBIN TIME: CPT | Performed by: INTERNAL MEDICINE

## 2023-03-23 PROCEDURE — 99214 OFFICE O/P EST MOD 30 MIN: CPT | Performed by: INTERNAL MEDICINE

## 2023-03-23 PROCEDURE — G8427 DOCREV CUR MEDS BY ELIG CLIN: HCPCS | Performed by: INTERNAL MEDICINE

## 2023-03-23 PROCEDURE — 3074F SYST BP LT 130 MM HG: CPT | Performed by: INTERNAL MEDICINE

## 2023-03-23 PROCEDURE — 3078F DIAST BP <80 MM HG: CPT | Performed by: INTERNAL MEDICINE

## 2023-03-23 PROCEDURE — G8399 PT W/DXA RESULTS DOCUMENT: HCPCS | Performed by: INTERNAL MEDICINE

## 2023-03-23 PROCEDURE — 1090F PRES/ABSN URINE INCON ASSESS: CPT | Performed by: INTERNAL MEDICINE

## 2023-03-23 PROCEDURE — G8510 SCR DEP NEG, NO PLAN REQD: HCPCS | Performed by: INTERNAL MEDICINE

## 2023-03-23 NOTE — PROGRESS NOTES
[address] [facility]  [facility]  [address] [place] [address]  Phone:  [phone]  Fax: [phone]       Chief Complaint   Patient presents with    Follow-up    Hypertension     Patient here for follow up high blood pressure and Warfarin use. .      SUBJECTIVE:    Eduardo Kendrick is a 80 y.o. female  Dictation on: 03/23/2023 10:58 AM by: Virginia Rodriguez [[id number]]          Current Outpatient Medications   Medication Sig Dispense Refill    nitrofurantoin, macrocrystal-monohydrate, (MACROBID) 100 mg capsule Take 100 mg by mouth two (2) times a day. metoprolol succinate (TOPROL-XL) 25 mg XL tablet take 1/2 tablet by mouth once daily 90 Tablet 3    warfarin (COUMADIN) 5 mg tablet take 2 tablets by mouth daily for 2 days then take 1 tablet daily THEREAFTER (Patient taking differently: Take 1 pill Monday thru Friday and 1 1/2 pills on Saturdays and Sundays.) 32 Tablet 11    potassium chloride (K-DUR, KLOR-CON M20) 20 mEq tablet take 1 tablet by mouth twice a day 180 Tablet 11    lisinopriL (PRINIVIL, ZESTRIL) 40 mg tablet take 1 tablet by mouth once daily 90 Tablet 3    furosemide (LASIX) 40 mg tablet take 1 tablet by mouth once daily 90 Tablet 3    amLODIPine (NORVASC) 10 mg tablet take 1 tablet by mouth once daily 90 Tablet 3    meclizine (ANTIVERT) 12.5 mg tablet Take 1 Tablet by mouth three (3) times daily as needed for Dizziness. (Patient not taking: No sig reported) 60 Tablet 3    predniSONE (DELTASONE) 20 mg tablet Take 60 mg by mouth daily (with breakfast). (Patient not taking: No sig reported) 15 Tablet 0    clobetasoL (TEMOVATE) 0.05 % ointment Apply  to affected area two (2) times a day. (Patient not taking: No sig reported) 45 g 3    menthol (BIOFREEZE, MENTHOL,) 4 % gel 1 g by Apply Externally route every six (6) hours.  (Patient not taking: No sig reported) 118 mL 0    aluminum hydrox-magnesium carb (MYLANTA) 160-105 mg chew Take 1-2 tablets by mouth four (4) times daily as needed.  (Patient not taking: No sig reported)       Past Medical History:   Diagnosis Date    Acute combined systolic and diastolic HF (heart failure), NYHA class 2 ([address] 08/11/2017    Arthritis     Atrial fibrillation (HCC)     Cancer (HCC)     multiple myeloma    Congestive heart failure ([facility]     Hypertension     Long term current use of anticoagulant therapy     Menopause     Pacemaker     S/P AV kathleen ablation 08/11/2017    Status post biventricular pacemaker 08/11/2017 8/11/17      Past Surgical History:   Procedure Laterality Date    HX GYN      HX ORTHOPAEDIC      knee    HX PACEMAKER  08/11/2017     Allergies   Allergen Reactions    Codeine Other (comments)         REVIEW OF SYSTEMS:  General: negative for - chills or fever  ENT: negative for - headaches, nasal congestion or tinnitus  Respiratory: negative for - cough, hemoptysis, shortness of breath or wheezing  Cardiovascular : negative for - chest pain, edema, palpitations or shortness of breath  Gastrointestinal: negative for - abdominal pain, blood in stools, heartburn or nausea/vomiting  Genito-Urinary: no dysuria, trouble voiding, or hematuria  Musculoskeletal: negative for - gait disturbance, joint pain, joint stiffness or joint swelling  Neurological: no TIA or stroke symptoms  Hematologic: no bruises, no bleeding, no swollen glands  Integument: no lumps, mole changes, nail changes or rash  Endocrine: no malaise/lethargy or unexpected weight changes      Social History     Socioeconomic History    Marital status:     Number of children: 3   Occupational History    Occupation: Retired from the [employer]   Tobacco Use    Smoking status: Never    Smokeless tobacco: Never   Vaping Use    Vaping Use: Never used   Substance and Sexual Activity    Alcohol use: No    Drug use: No    Sexual activity: Not Currently   Other Topics Concern     Service No    Blood Transfusions No    Caffeine Concern No    Occupational Exposure No Hobby Hazards No    Sleep Concern No    Stress Concern No    Weight Concern Yes    Special Diet Yes     Comment: HTN/  Low Sugary    Back Care Yes    Exercise No    Bike Helmet No    Seat Belt Yes    Self-Exams Yes     Social Determinants of Health     Financial Resource Strain: Low Risk     Difficulty of Paying Living Expenses: Not hard at all   Food Insecurity: No Food Insecurity    Worried About Running Out of Food in the Last Year: Never true    Ran Out of Food in the Last Year: Never true   Transportation Needs: No Transportation Needs    Lack of Transportation (Medical): No    Lack of Transportation (Non-Medical): No   Physical Activity: Inactive    Days of Exercise per Week: 0 days    Minutes of Exercise per Session: 0 min   Stress: No Stress Concern Present    Feeling of Stress : Not at all   Social Connections: Moderately Integrated    Frequency of Communication with Friends and Family: More than three times a week    Frequency of Social Gatherings with Friends and Family: Three times a week    Attends Yazdanism Services: More than 4 times per year    Active Member of Clubs or Organizations: Yes    Attends Club or Organization Meetings: More than 4 times per year    Marital Status:    Housing Stability: Low Risk     Unable to Pay for Housing in the Last Year: No    Number of Places Lived in the Last Year: 1    Unstable Housing in the Last Year: No     Family History   Problem Relation Age of Onset    Stroke Mother     No Known Problems Father        OBJECTIVE:    Visit Vitals  /78   Pulse 75   Temp 98.1 °F (36.7 °C) (Oral)   Resp 18   Ht 5' 7\" (1.702 m)   Wt 227 lb 14.4 oz (103.4 kg)   SpO2 97%   BMI 35.69 kg/m²     CONSTITUTIONAL: well , well nourished, appears age appropriate  EYES: perrla, eom intact  ENMT:moist mucous membranes, pharynx clear  NECK: supple.  Thyroid normal  RESPIRATORY: Chest: clear to ascultation and percussion   CARDIOVASCULAR: Heart: regular rate and rhythm  GASTROINTESTINAL: Abdomen: soft, bowel sounds active  HEMATOLOGIC: no pathological lymph nodes palpated  MUSCULOSKELETAL: Extremities: no edema, pulse 1+   INTEGUMENT: No unusual rashes or suspicious skin lesions noted. Nails appear normal.  NEUROLOGIC: non-focal exam   MENTAL STATUS: alert and oriented, appropriate affect      ASSESSMENT:  1. Acute pulmonary embolism, unspecified pulmonary embolism type, unspecified whether acute cor pulmonale present (HCC)    2. Venous insufficiency    3. Primary hypertension    4. Atrial fibrillation, unspecified type ([facility]    5. Muscular deconditioning    6. Multiple myeloma in remission ([facility]    7. Urinary tract infection without hematuria, site unspecified        PLAN:   Dictation on: 03/27/2023  1:48 PM by: Hermilo Paez [[id number]]   . Orders Placed This Encounter    CULTURE, URINE    URINALYSIS W/ RFLX MICROSCOPIC    AMB POC PT/INR         Follow-up and Dispositions    Return in about 3 months (around 6/23/2023), or monthly for INR.            Joe Pagan MD

## 2023-03-23 NOTE — PROGRESS NOTES
Angela Orellana is a 80 y.o. female  Chief Complaint   Patient presents with    Follow-up    Hypertension     Patient here for follow up high blood pressure and Warfarin use. Results for orders placed or performed in visit on 03/23/23   AMB POC PT/INR   Result Value Ref Range    VALID INTERNAL CONTROL POC Yes     Prothrombin time (POC) 31.9 seconds    INR POC 2.7        Patient reports taking Warfarin 5 mg one pill Monday thru Friday and 1 1/2 pill on Saturdays and Sundays. YES Answers must have Comments  1. \"Have you been to the ER, urgent care clinic since your last visit? Hospitalized since your last visit? \"    [x] YES When 2/10/2023, [facility] and Reason for visit UTI  [] NO       2. Have you seen or consulted any other health care providers outside of [address] [place] [address] since your last visit?     [] YES   [x] NO       3. For patients aged 39-70: Have you had a colorectal cancer screening such as a colonoscopy/FIT/Cologuard? Nurse/CMA to request records if not in chart   [] YES   [] NO   [x] NA, based on age    If the patient is female:      4. For female patients aged 41-77: Oscar Creamer you had a mammogram in the last two years?  Nurse/CMA to request records if not in chart   [] YES   [] NO   [x] NA, based on age    11. For female patients aged 21-65: Oscar Creamer you had a pap smear?   Nurse/CMA to request records if not in chart   [] YES   [] NO  [x] NA, based on age

## 2023-03-24 LAB
APPEARANCE UR: ABNORMAL
BACTERIA URNS QL MICRO: ABNORMAL /HPF
BILIRUB UR QL: NEGATIVE
COLOR UR: ABNORMAL
EPITH CASTS URNS QL MICRO: ABNORMAL /LPF
GLUCOSE UR STRIP.AUTO-MCNC: NEGATIVE MG/DL
HGB UR QL STRIP: ABNORMAL
HYALINE CASTS URNS QL MICRO: ABNORMAL /LPF (ref 0–5)
KETONES UR QL STRIP.AUTO: NEGATIVE MG/DL
LEUKOCYTE ESTERASE UR QL STRIP.AUTO: ABNORMAL
NITRITE UR QL STRIP.AUTO: NEGATIVE
PH UR STRIP: 7 (ref 5–8)
PROT UR STRIP-MCNC: 30 MG/DL
RBC #/AREA URNS HPF: ABNORMAL /HPF (ref 0–5)
SP GR UR REFRACTOMETRY: 1.02 (ref 1–1.03)
UROBILINOGEN UR QL STRIP.AUTO: 0.2 EU/DL (ref 0.2–1)
WBC URNS QL MICRO: >100 /HPF (ref 0–4)

## 2023-03-24 NOTE — PROGRESS NOTES
comes in for return visit stating that she has done well. She has not complaints for the most part other than her painful knees related to osteoarthritis. She saw her oncologist and her myeloma is in remission. Unfortunately there has been no meaningful weight loss. She has a history of chronic atrial fibrillation and remains on her warfarin. Her INR today is 2.7. She has a past history of primary hypertension, as well as dyslipidemia.

## 2023-03-26 LAB
BACTERIA SPEC CULT: ABNORMAL
CC UR VC: ABNORMAL
SERVICE CMNT-IMP: ABNORMAL

## 2023-03-28 NOTE — PROGRESS NOTES
1. The patient is doing quite well. She remains on her warfarin. INR today is acceptable. 2. She has chronic venous insufficiency, but this is stable. 3. Blood pressure is excellent, no adjustments are made. 4. She has physical deconditioning and needs to walk more, but she is limited by her arthritis involving both knees complicated by her weight. 5. Her multiple myeloma is now in remission.

## 2023-04-03 ENCOUNTER — OFFICE VISIT (OUTPATIENT)
Dept: URGENT CARE | Age: 83
End: 2023-04-03
Payer: MEDICARE

## 2023-04-03 DIAGNOSIS — R21 SKIN RASH: Primary | ICD-10-CM

## 2023-04-03 PROCEDURE — 1101F PT FALLS ASSESS-DOCD LE1/YR: CPT | Performed by: NURSE PRACTITIONER

## 2023-04-03 PROCEDURE — 99213 OFFICE O/P EST LOW 20 MIN: CPT | Performed by: NURSE PRACTITIONER

## 2023-04-03 PROCEDURE — 3075F SYST BP GE 130 - 139MM HG: CPT | Performed by: NURSE PRACTITIONER

## 2023-04-03 PROCEDURE — G8536 NO DOC ELDER MAL SCRN: HCPCS | Performed by: NURSE PRACTITIONER

## 2023-04-03 PROCEDURE — G8427 DOCREV CUR MEDS BY ELIG CLIN: HCPCS | Performed by: NURSE PRACTITIONER

## 2023-04-03 PROCEDURE — 3079F DIAST BP 80-89 MM HG: CPT | Performed by: NURSE PRACTITIONER

## 2023-04-03 PROCEDURE — G8432 DEP SCR NOT DOC, RNG: HCPCS | Performed by: NURSE PRACTITIONER

## 2023-04-03 PROCEDURE — G8417 CALC BMI ABV UP PARAM F/U: HCPCS | Performed by: NURSE PRACTITIONER

## 2023-04-03 PROCEDURE — 1123F ACP DISCUSS/DSCN MKR DOCD: CPT | Performed by: NURSE PRACTITIONER

## 2023-04-03 PROCEDURE — G8399 PT W/DXA RESULTS DOCUMENT: HCPCS | Performed by: NURSE PRACTITIONER

## 2023-04-03 PROCEDURE — 1090F PRES/ABSN URINE INCON ASSESS: CPT | Performed by: NURSE PRACTITIONER

## 2023-04-03 RX ORDER — TRIAMCINOLONE ACETONIDE 1 MG/G
OINTMENT TOPICAL 2 TIMES DAILY
Qty: 30 G | Refills: 0 | Status: SHIPPED | OUTPATIENT
Start: 2023-04-03 | End: 2023-04-10

## 2023-04-03 RX ORDER — KETOCONAZOLE 20 MG/G
CREAM TOPICAL DAILY
Qty: 15 G | Refills: 0 | Status: SHIPPED | OUTPATIENT
Start: 2023-04-03 | End: 2023-04-03

## 2023-04-03 NOTE — PROGRESS NOTES
Nidia Ramsay is a 80 y.o. female presenting to clinic today with a rash on the posterior aspect of her neck that she noticed about 6 days ago. She denies any pain, discharge, or warmth, but does report that it is itchy. No rash anywhere else on her body. Denies using any creams or ointmentsNo other complaints today. The history is provided by the patient and a relative. History limited by: nothing.    Rash        Past Medical History:   Diagnosis Date    Acute combined systolic and diastolic HF (heart failure), NYHA class 2 ([address] 08/11/2017    Arthritis     Atrial fibrillation (HCC)     Cancer (HCC)     multiple myeloma    Congestive heart failure ([address]     Hypertension     Long term current use of anticoagulant therapy     Menopause     Pacemaker     S/P AV kathleen ablation 08/11/2017    Status post biventricular pacemaker 08/11/2017 8/11/17         Past Surgical History:   Procedure Laterality Date    HX GYN      HX ORTHOPAEDIC      knee    HX PACEMAKER  08/11/2017         Family History   Problem Relation Age of Onset    Stroke Mother     No Known Problems Father         Social History     Socioeconomic History    Marital status:      Spouse name: Not on file    Number of children: 3    Years of education: Not on file    Highest education level: Not on file   Occupational History    Occupation: Retired from the [employer]   Tobacco Use    Smoking status: Never    Smokeless tobacco: Never   Vaping Use    Vaping Use: Never used   Substance and Sexual Activity    Alcohol use: No    Drug use: No    Sexual activity: Not Currently   Other Topics Concern     Service No    Blood Transfusions No    Caffeine Concern No    Occupational Exposure No    Hobby Hazards No    Sleep Concern No    Stress Concern No    Weight Concern Yes    Special Diet Yes     Comment: HTN/  Low Sugary    Back Care Yes    Exercise No    Bike Helmet No    Seat Belt Yes    Self-Exams Yes   Social History Narrative    Not on file     Social Determinants of Health     Financial Resource Strain: Low Risk     Difficulty of Paying Living Expenses: Not hard at all   Food Insecurity: No Food Insecurity    Worried About [address] in the Last Year: Never true    [address] in the Last Year: Never true   Transportation Needs: No Transportation Needs    Lack of Transportation (Medical): No    Lack of Transportation (Non-Medical): No   Physical Activity: Inactive    Days of Exercise per Week: 0 days    Minutes of Exercise per Session: 0 min   Stress: No Stress Concern Present    Feeling of Stress : Not at all   Social Connections: Moderately Integrated    Frequency of Communication with Friends and Family: More than three times a week    Frequency of Social Gatherings with Friends and Family: Three times a week    Attends Adventism Services: More than 4 times per year    Active Member of Clubs or Organizations: Yes    Attends Club or Organization Meetings: More than 4 times per year    Marital Status:    Intimate Partner Violence: Not on file   Housing Stability: Low Risk     Unable to Pay for Housing in the Last Year: No    Number of Places Lived in the Last Year: 1    Unstable Housing in the Last Year: No                ALLERGIES: Codeine    Review of Systems   Constitutional:  Negative for chills and fever. HENT:  Negative for congestion. Respiratory:  Negative for cough, chest tightness, shortness of breath and wheezing. Cardiovascular:  Negative for chest pain. Skin:  Positive for rash. Vitals:    04/03/23 1143 04/03/23 1146   BP: (!) 146/85 132/81   Pulse: 73    Resp: 16    Temp:  98.1 °F (36.7 °C)   SpO2: 97%    Weight: 227 lb (103 kg)        Physical Exam  Vitals and nursing note reviewed. Constitutional:       General: She is not in acute distress. Appearance: Normal appearance. She is not ill-appearing, toxic-appearing or diaphoretic. HENT:      Head: Normocephalic and atraumatic.    Eyes: Extraocular Movements: Extraocular movements intact. Conjunctiva/sclera: Conjunctivae normal.   Cardiovascular:      Rate and Rhythm: Normal rate. Pulmonary:      Effort: Pulmonary effort is normal. No respiratory distress. Breath sounds: Normal breath sounds. No wheezing or rhonchi. Comments: Speaking in complete sentences without difficulty. Skin:     General: Skin is warm and dry. Comments: POSTERIOR NECK: discrete, papular erythematous rash. No cellulitis or drainage observed, no excoriations. Neurological:      Mental Status: She is alert. Psychiatric:         Mood and Affect: Mood normal.         Behavior: Behavior normal.         Thought Content: Thought content normal.       MDM  PLAN:  Patient presents to clinic today with an itchy rash on the posterior neck. No s/sx infection. Rx triamcinolone ointment  Follow up with PCP if symptoms persist.  Go to ED with development of any acute symptoms. DIAGNOSES:    ICD-10-CM ICD-9-CM    1. Skin rash  R21 782.1         Medications Ordered Today   Medications    DISCONTD: ketoconazole (NIZORAL) 2 % topical cream     Sig: Apply  to affected area daily for 7 days. Dispense:  15 g     Refill:  0    triamcinolone acetonide (KENALOG) 0.1 % ointment     Sig: Apply  to affected area two (2) times a day for 7 days.  use thin layer     Dispense:  30 g     Refill:  0           Procedures

## 2023-04-03 NOTE — PATIENT INSTRUCTIONS
Follow up with your primary care provider if symptoms persist.  Go to the Emergency Department with development of any acute symptoms.

## 2023-04-24 ENCOUNTER — CLINICAL SUPPORT (OUTPATIENT)
Dept: INTERNAL MEDICINE CLINIC | Age: 83
End: 2023-04-24
Payer: MEDICARE

## 2023-04-24 DIAGNOSIS — I26.99 ACUTE PULMONARY EMBOLISM, UNSPECIFIED PULMONARY EMBOLISM TYPE, UNSPECIFIED WHETHER ACUTE COR PULMONALE PRESENT (HCC): Primary | ICD-10-CM

## 2023-04-24 LAB
INR BLD: 3.7
PT POC: 44.2 SECONDS
VALID INTERNAL CONTROL?: YES

## 2023-04-24 PROCEDURE — 85610 PROTHROMBIN TIME: CPT | Performed by: INTERNAL MEDICINE

## 2023-04-24 NOTE — PROGRESS NOTES
Patient in for PT/INR check. States she is taking 5 mg Mon-thru Fridays and 10 mg Sat and Sundays. PT = 44.2, INR =3.7. Dr. Bryce Vazquez notified. Pt instructed to hold coumadin Mon, Tues and Wed and resume on  Thurs taking 5 mg Mon thru Fri and 10 mg on Sat and Sundays and return in  2 weeks for recheck per Dr. Bryce Vazquez. Pt verbalized understanding. Written instructions give to patient as well.

## 2023-04-25 ENCOUNTER — OFFICE VISIT (OUTPATIENT)
Dept: INTERNAL MEDICINE CLINIC | Age: 83
End: 2023-04-25
Payer: MEDICARE

## 2023-04-25 VITALS
SYSTOLIC BLOOD PRESSURE: 121 MMHG | HEART RATE: 60 BPM | HEIGHT: 67 IN | RESPIRATION RATE: 20 BRPM | WEIGHT: 230.2 LBS | BODY MASS INDEX: 36.13 KG/M2 | OXYGEN SATURATION: 97 % | TEMPERATURE: 98.2 F | DIASTOLIC BLOOD PRESSURE: 82 MMHG

## 2023-04-25 DIAGNOSIS — L30.9 ECZEMA, UNSPECIFIED TYPE: Primary | ICD-10-CM

## 2023-04-25 DIAGNOSIS — R29.898 MUSCULAR DECONDITIONING: ICD-10-CM

## 2023-04-25 DIAGNOSIS — I48.0 PAROXYSMAL ATRIAL FIBRILLATION (HCC): ICD-10-CM

## 2023-04-25 DIAGNOSIS — E66.9 OBESITY (BMI 30.0-34.9): ICD-10-CM

## 2023-04-25 DIAGNOSIS — C90.01 MULTIPLE MYELOMA IN REMISSION (HCC): ICD-10-CM

## 2023-04-25 DIAGNOSIS — M17.0 PRIMARY OSTEOARTHRITIS OF BOTH KNEES: ICD-10-CM

## 2023-04-25 PROCEDURE — G8399 PT W/DXA RESULTS DOCUMENT: HCPCS | Performed by: INTERNAL MEDICINE

## 2023-04-25 PROCEDURE — 1101F PT FALLS ASSESS-DOCD LE1/YR: CPT | Performed by: INTERNAL MEDICINE

## 2023-04-25 PROCEDURE — G8427 DOCREV CUR MEDS BY ELIG CLIN: HCPCS | Performed by: INTERNAL MEDICINE

## 2023-04-25 PROCEDURE — 3074F SYST BP LT 130 MM HG: CPT | Performed by: INTERNAL MEDICINE

## 2023-04-25 PROCEDURE — 3078F DIAST BP <80 MM HG: CPT | Performed by: INTERNAL MEDICINE

## 2023-04-25 PROCEDURE — 1090F PRES/ABSN URINE INCON ASSESS: CPT | Performed by: INTERNAL MEDICINE

## 2023-04-25 PROCEDURE — G8536 NO DOC ELDER MAL SCRN: HCPCS | Performed by: INTERNAL MEDICINE

## 2023-04-25 PROCEDURE — G8510 SCR DEP NEG, NO PLAN REQD: HCPCS | Performed by: INTERNAL MEDICINE

## 2023-04-25 PROCEDURE — 1123F ACP DISCUSS/DSCN MKR DOCD: CPT | Performed by: INTERNAL MEDICINE

## 2023-04-25 PROCEDURE — G8417 CALC BMI ABV UP PARAM F/U: HCPCS | Performed by: INTERNAL MEDICINE

## 2023-04-25 PROCEDURE — 99214 OFFICE O/P EST MOD 30 MIN: CPT | Performed by: INTERNAL MEDICINE

## 2023-04-26 RX ORDER — WARFARIN SODIUM 5 MG/1
TABLET ORAL
Qty: 40 TABLET | Refills: 11 | OUTPATIENT
Start: 2023-04-26

## 2023-04-26 RX ORDER — CLOBETASOL PROPIONATE 0.5 MG/G
OINTMENT TOPICAL 2 TIMES DAILY
Qty: 30 G | Refills: 5 | OUTPATIENT
Start: 2023-04-26

## 2023-05-02 PROBLEM — L30.9 ECZEMA: Status: ACTIVE | Noted: 2023-05-02

## 2023-05-06 RX ORDER — AMLODIPINE BESYLATE 10 MG/1
TABLET ORAL
Qty: 90 TABLET | Refills: 0 | Status: SHIPPED | OUTPATIENT
Start: 2023-05-06

## 2023-05-09 ENCOUNTER — NURSE ONLY (OUTPATIENT)
Facility: CLINIC | Age: 83
End: 2023-05-09
Payer: MEDICARE

## 2023-05-09 DIAGNOSIS — Z79.01 ENCOUNTER FOR MONITORING COUMADIN THERAPY: Primary | ICD-10-CM

## 2023-05-09 DIAGNOSIS — Z51.81 ENCOUNTER FOR MONITORING COUMADIN THERAPY: Primary | ICD-10-CM

## 2023-05-09 LAB
POC INR: 2.4
PROTHROMBIN TIME, POC: 28.9

## 2023-05-09 PROCEDURE — 85610 PROTHROMBIN TIME: CPT | Performed by: INTERNAL MEDICINE

## 2023-05-26 RX ORDER — MECLIZINE HCL 12.5 MG/1
TABLET ORAL
Qty: 60 TABLET | Refills: 1 | Status: SHIPPED | OUTPATIENT
Start: 2023-05-26

## 2023-06-09 ENCOUNTER — NURSE ONLY (OUTPATIENT)
Facility: CLINIC | Age: 83
End: 2023-06-09
Payer: MEDICARE

## 2023-06-09 DIAGNOSIS — Z51.81 ENCOUNTER FOR MONITORING COUMADIN THERAPY: Primary | ICD-10-CM

## 2023-06-09 DIAGNOSIS — Z79.01 ENCOUNTER FOR MONITORING COUMADIN THERAPY: Primary | ICD-10-CM

## 2023-06-09 LAB
POC INR: 1.6
PROTHROMBIN TIME, POC: 19.8

## 2023-06-09 PROCEDURE — 85610 PROTHROMBIN TIME: CPT | Performed by: INTERNAL MEDICINE

## 2023-06-23 ENCOUNTER — TELEPHONE (OUTPATIENT)
Facility: CLINIC | Age: 83
End: 2023-06-23

## 2023-06-23 ENCOUNTER — NURSE ONLY (OUTPATIENT)
Facility: CLINIC | Age: 83
End: 2023-06-23

## 2023-06-23 DIAGNOSIS — Z79.01 ENCOUNTER FOR MONITORING COUMADIN THERAPY: Primary | ICD-10-CM

## 2023-06-23 DIAGNOSIS — Z51.81 ENCOUNTER FOR MONITORING COUMADIN THERAPY: Primary | ICD-10-CM

## 2023-06-23 LAB
POC INR: 1.6
PROTHROMBIN TIME, POC: 19.3

## 2023-06-23 NOTE — PROGRESS NOTES
Patient in for PT/ INR. Patient states she is taking 5 mg of coumadin on Mon through Fridays and 7.5 mg on Sat. And Sundays. PT= 19.3 and INR= 1.6. patient states she has been off her coumadin a few days for dental work. Dr. Olivia Shell notified. Pt instructed to stay on same dosing and return in 2 weeks for recheck per Dr. Olivia Shell.

## 2023-07-26 ENCOUNTER — OFFICE VISIT (OUTPATIENT)
Facility: CLINIC | Age: 83
End: 2023-07-26

## 2023-07-26 VITALS
DIASTOLIC BLOOD PRESSURE: 80 MMHG | BODY MASS INDEX: 35.17 KG/M2 | HEART RATE: 75 BPM | WEIGHT: 224.1 LBS | OXYGEN SATURATION: 100 % | TEMPERATURE: 98.3 F | HEIGHT: 67 IN | SYSTOLIC BLOOD PRESSURE: 120 MMHG | RESPIRATION RATE: 15 BRPM

## 2023-07-26 DIAGNOSIS — C90.01 MULTIPLE MYELOMA IN REMISSION (HCC): Primary | ICD-10-CM

## 2023-07-26 DIAGNOSIS — E78.5 DYSLIPIDEMIA: ICD-10-CM

## 2023-07-26 DIAGNOSIS — I87.2 VENOUS INSUFFICIENCY: ICD-10-CM

## 2023-07-26 DIAGNOSIS — L30.9 ECZEMA, UNSPECIFIED TYPE: ICD-10-CM

## 2023-07-26 DIAGNOSIS — I10 PRIMARY HYPERTENSION: ICD-10-CM

## 2023-07-26 DIAGNOSIS — D64.9 ANEMIA, UNSPECIFIED TYPE: ICD-10-CM

## 2023-07-27 LAB
ALBUMIN SERPL-MCNC: 3.5 G/DL (ref 3.5–5)
ALBUMIN/GLOB SERPL: 0.8 (ref 1.1–2.2)
ALP SERPL-CCNC: 72 U/L (ref 45–117)
ALT SERPL-CCNC: 20 U/L (ref 12–78)
ANION GAP SERPL CALC-SCNC: 5 MMOL/L (ref 5–15)
APPEARANCE UR: ABNORMAL
AST SERPL-CCNC: 21 U/L (ref 15–37)
BACTERIA URNS QL MICRO: ABNORMAL /HPF
BASOPHILS # BLD: 0 K/UL (ref 0–0.1)
BASOPHILS NFR BLD: 1 % (ref 0–1)
BILIRUB SERPL-MCNC: 0.5 MG/DL (ref 0.2–1)
BILIRUB UR QL: NEGATIVE
BUN SERPL-MCNC: 13 MG/DL (ref 6–20)
BUN/CREAT SERPL: 16 (ref 12–20)
CALCIUM SERPL-MCNC: 9.5 MG/DL (ref 8.5–10.1)
CHLORIDE SERPL-SCNC: 106 MMOL/L (ref 97–108)
CHOLEST SERPL-MCNC: 161 MG/DL
CO2 SERPL-SCNC: 30 MMOL/L (ref 21–32)
COLOR UR: ABNORMAL
COMMENT:: NORMAL
CREAT SERPL-MCNC: 0.81 MG/DL (ref 0.55–1.02)
CRP SERPL HS-MCNC: >9.5 MG/L
DIFFERENTIAL METHOD BLD: ABNORMAL
EOSINOPHIL # BLD: 0 K/UL (ref 0–0.4)
EOSINOPHIL NFR BLD: 1 % (ref 0–7)
EPITH CASTS URNS QL MICRO: ABNORMAL /LPF
ERYTHROCYTE [DISTWIDTH] IN BLOOD BY AUTOMATED COUNT: 15.9 % (ref 11.5–14.5)
GLOBULIN SER CALC-MCNC: 4.2 G/DL (ref 2–4)
GLUCOSE SERPL-MCNC: 83 MG/DL (ref 65–100)
GLUCOSE UR STRIP.AUTO-MCNC: NEGATIVE MG/DL
HCT VFR BLD AUTO: 37.7 % (ref 35–47)
HDLC SERPL-MCNC: 84 MG/DL
HDLC SERPL: 1.9 (ref 0–5)
HGB BLD-MCNC: 10.9 G/DL (ref 11.5–16)
HGB UR QL STRIP: ABNORMAL
HYALINE CASTS URNS QL MICRO: ABNORMAL /LPF (ref 0–5)
IMM GRANULOCYTES # BLD AUTO: 0 K/UL (ref 0–0.04)
IMM GRANULOCYTES NFR BLD AUTO: 0 % (ref 0–0.5)
KETONES UR QL STRIP.AUTO: NEGATIVE MG/DL
LDLC SERPL CALC-MCNC: 68.8 MG/DL (ref 0–100)
LEUKOCYTE ESTERASE UR QL STRIP.AUTO: ABNORMAL
LYMPHOCYTES # BLD: 1.2 K/UL (ref 0.8–3.5)
LYMPHOCYTES NFR BLD: 25 % (ref 12–49)
MCH RBC QN AUTO: 25.8 PG (ref 26–34)
MCHC RBC AUTO-ENTMCNC: 28.9 G/DL (ref 30–36.5)
MCV RBC AUTO: 89.1 FL (ref 80–99)
MONOCYTES # BLD: 0.6 K/UL (ref 0–1)
MONOCYTES NFR BLD: 13 % (ref 5–13)
NEUTS SEG # BLD: 2.8 K/UL (ref 1.8–8)
NEUTS SEG NFR BLD: 60 % (ref 32–75)
NITRITE UR QL STRIP.AUTO: POSITIVE
NRBC # BLD: 0 K/UL (ref 0–0.01)
NRBC BLD-RTO: 0 PER 100 WBC
PH UR STRIP: 6.5 (ref 5–8)
PLATELET # BLD AUTO: 169 K/UL (ref 150–400)
PMV BLD AUTO: 11.7 FL (ref 8.9–12.9)
POTASSIUM SERPL-SCNC: 3.4 MMOL/L (ref 3.5–5.1)
PROT SERPL-MCNC: 7.7 G/DL (ref 6.4–8.2)
PROT UR STRIP-MCNC: 30 MG/DL
RBC # BLD AUTO: 4.23 M/UL (ref 3.8–5.2)
RBC #/AREA URNS HPF: ABNORMAL /HPF (ref 0–5)
RBC MORPH BLD: ABNORMAL
SODIUM SERPL-SCNC: 141 MMOL/L (ref 136–145)
SP GR UR REFRACTOMETRY: 1.01 (ref 1–1.03)
SPECIMEN HOLD: NORMAL
TRIGL SERPL-MCNC: 41 MG/DL
TSH SERPL DL<=0.05 MIU/L-ACNC: 1.93 UIU/ML (ref 0.36–3.74)
UROBILINOGEN UR QL STRIP.AUTO: 0.2 EU/DL (ref 0.2–1)
VLDLC SERPL CALC-MCNC: 8.2 MG/DL
WBC # BLD AUTO: 4.6 K/UL (ref 3.6–11)
WBC URNS QL MICRO: >100 /HPF (ref 0–4)

## 2023-07-28 LAB — APO B SERPL-MCNC: 64 MG/DL

## 2023-08-02 RX ORDER — AMLODIPINE BESYLATE 10 MG/1
10 TABLET ORAL DAILY
Qty: 90 TABLET | Refills: 3 | Status: SHIPPED | OUTPATIENT
Start: 2023-08-02

## 2023-08-02 RX ORDER — MECLIZINE HCL 12.5 MG/1
TABLET ORAL
Qty: 60 TABLET | Refills: 1 | Status: SHIPPED | OUTPATIENT
Start: 2023-08-02

## 2023-08-02 RX ORDER — AMLODIPINE BESYLATE 10 MG/1
TABLET ORAL
Qty: 90 TABLET | Refills: 3 | Status: SHIPPED | OUTPATIENT
Start: 2023-08-02

## 2023-08-02 RX ORDER — CLOBETASOL PROPIONATE 0.5 MG/G
OINTMENT TOPICAL 2 TIMES DAILY
Qty: 30 G | Refills: 5 | Status: SHIPPED | OUTPATIENT
Start: 2023-08-02

## 2023-09-20 ENCOUNTER — TELEPHONE (OUTPATIENT)
Facility: CLINIC | Age: 83
End: 2023-09-20

## 2023-09-20 NOTE — TELEPHONE ENCOUNTER
----- Message from Marcelle Angeles MD sent at 9/4/2023 10:24 PM EDT -----  Patient needs to repeat UA with a urine culture

## 2023-09-25 ENCOUNTER — NURSE ONLY (OUTPATIENT)
Facility: CLINIC | Age: 83
End: 2023-09-25

## 2023-09-25 DIAGNOSIS — N39.0 URINARY TRACT INFECTION WITHOUT HEMATURIA, SITE UNSPECIFIED: Primary | ICD-10-CM

## 2023-09-26 LAB
APPEARANCE UR: ABNORMAL
BACTERIA URNS QL MICRO: ABNORMAL /HPF
BILIRUB UR QL: NEGATIVE
CAOX CRY URNS QL MICRO: ABNORMAL
COLOR UR: ABNORMAL
EPITH CASTS URNS QL MICRO: ABNORMAL /LPF
GLUCOSE UR STRIP.AUTO-MCNC: NEGATIVE MG/DL
HGB UR QL STRIP: ABNORMAL
KETONES UR QL STRIP.AUTO: NEGATIVE MG/DL
LEUKOCYTE ESTERASE UR QL STRIP.AUTO: ABNORMAL
NITRITE UR QL STRIP.AUTO: POSITIVE
PH UR STRIP: 5.5 (ref 5–8)
PROT UR STRIP-MCNC: 30 MG/DL
RBC #/AREA URNS HPF: ABNORMAL /HPF (ref 0–5)
SP GR UR REFRACTOMETRY: 1.02 (ref 1–1.03)
UROBILINOGEN UR QL STRIP.AUTO: 0.2 EU/DL (ref 0.2–1)
WBC URNS QL MICRO: ABNORMAL /HPF (ref 0–4)

## 2023-09-28 ENCOUNTER — HOSPITAL ENCOUNTER (OUTPATIENT)
Facility: HOSPITAL | Age: 83
End: 2023-09-28
Attending: INTERNAL MEDICINE
Payer: MEDICARE

## 2023-09-28 VITALS
SYSTOLIC BLOOD PRESSURE: 114 MMHG | OXYGEN SATURATION: 98 % | TEMPERATURE: 97.8 F | BODY MASS INDEX: 36.1 KG/M2 | RESPIRATION RATE: 18 BRPM | HEIGHT: 67 IN | DIASTOLIC BLOOD PRESSURE: 67 MMHG | WEIGHT: 230 LBS | HEART RATE: 73 BPM

## 2023-09-28 DIAGNOSIS — C90.01 MULTIPLE MYELOMA IN REMISSION (HCC): ICD-10-CM

## 2023-09-28 DIAGNOSIS — N30.00 ACUTE CYSTITIS WITHOUT HEMATURIA: Primary | ICD-10-CM

## 2023-09-28 LAB
BACTERIA SPEC CULT: ABNORMAL
BASOPHILS # BLD: 0 K/UL (ref 0–0.1)
BASOPHILS NFR BLD: 0 % (ref 0–1)
CC UR VC: ABNORMAL
DIFFERENTIAL METHOD BLD: ABNORMAL
EOSINOPHIL # BLD: 0.1 K/UL (ref 0–0.4)
EOSINOPHIL NFR BLD: 2 % (ref 0–7)
ERYTHROCYTE [DISTWIDTH] IN BLOOD BY AUTOMATED COUNT: 15.5 % (ref 11.5–14.5)
HCT VFR BLD AUTO: 36.4 % (ref 35–47)
HGB BLD-MCNC: 11.3 G/DL (ref 11.5–16)
IMM GRANULOCYTES # BLD AUTO: 0 K/UL (ref 0–0.04)
IMM GRANULOCYTES NFR BLD AUTO: 0 % (ref 0–0.5)
LYMPHOCYTES # BLD: 1.2 K/UL (ref 0.8–3.5)
LYMPHOCYTES NFR BLD: 28 % (ref 12–49)
MCH RBC QN AUTO: 26.5 PG (ref 26–34)
MCHC RBC AUTO-ENTMCNC: 31 G/DL (ref 30–36.5)
MCV RBC AUTO: 85.2 FL (ref 80–99)
METAMYELOCYTES NFR BLD MANUAL: 2 %
MONOCYTES # BLD: 0.5 K/UL (ref 0–1)
MONOCYTES NFR BLD: 11 % (ref 5–13)
NEUTS BAND NFR BLD MANUAL: 1 %
NEUTS SEG # BLD: 2.4 K/UL (ref 1.8–8)
NEUTS SEG NFR BLD: 56 % (ref 32–75)
NRBC # BLD: 0 K/UL (ref 0–0.01)
NRBC BLD-RTO: 0 PER 100 WBC
PLATELET # BLD AUTO: 149 K/UL (ref 150–400)
PMV BLD AUTO: 10.7 FL (ref 8.9–12.9)
RBC # BLD AUTO: 4.27 M/UL (ref 3.8–5.2)
RBC MORPH BLD: ABNORMAL
SERVICE CMNT-IMP: ABNORMAL
WBC # BLD AUTO: 4.2 K/UL (ref 3.6–11)

## 2023-09-28 PROCEDURE — 88360 TUMOR IMMUNOHISTOCHEM/MANUAL: CPT

## 2023-09-28 PROCEDURE — 85025 COMPLETE CBC W/AUTO DIFF WBC: CPT

## 2023-09-28 PROCEDURE — 38221 DX BONE MARROW BIOPSIES: CPT

## 2023-09-28 PROCEDURE — 36415 COLL VENOUS BLD VENIPUNCTURE: CPT

## 2023-09-28 PROCEDURE — 88184 FLOWCYTOMETRY/ TC 1 MARKER: CPT

## 2023-09-28 PROCEDURE — 6360000002 HC RX W HCPCS: Performed by: RADIOLOGY

## 2023-09-28 PROCEDURE — 88311 DECALCIFY TISSUE: CPT

## 2023-09-28 PROCEDURE — 88364 INSITU HYBRIDIZATION (FISH): CPT

## 2023-09-28 PROCEDURE — 88305 TISSUE EXAM BY PATHOLOGIST: CPT

## 2023-09-28 PROCEDURE — 2580000003 HC RX 258: Performed by: RADIOLOGY

## 2023-09-28 PROCEDURE — 88365 INSITU HYBRIDIZATION (FISH): CPT

## 2023-09-28 PROCEDURE — 88313 SPECIAL STAINS GROUP 2: CPT

## 2023-09-28 PROCEDURE — 88185 FLOWCYTOMETRY/TC ADD-ON: CPT

## 2023-09-28 RX ORDER — MIDAZOLAM HYDROCHLORIDE 1 MG/ML
5 INJECTION INTRAMUSCULAR; INTRAVENOUS
Status: DISCONTINUED | OUTPATIENT
Start: 2023-09-28 | End: 2023-09-28

## 2023-09-28 RX ORDER — CEFUROXIME AXETIL 500 MG/1
500 TABLET ORAL 2 TIMES DAILY
Qty: 14 TABLET | Refills: 0 | Status: SHIPPED | OUTPATIENT
Start: 2023-09-28 | End: 2023-10-05

## 2023-09-28 RX ORDER — SODIUM CHLORIDE 9 MG/ML
INJECTION, SOLUTION INTRAVENOUS CONTINUOUS
Status: DISCONTINUED | OUTPATIENT
Start: 2023-09-28 | End: 2023-09-28

## 2023-09-28 RX ORDER — FENTANYL CITRATE 50 UG/ML
100 INJECTION, SOLUTION INTRAMUSCULAR; INTRAVENOUS
Status: DISCONTINUED | OUTPATIENT
Start: 2023-09-28 | End: 2023-09-28

## 2023-09-28 RX ADMIN — MIDAZOLAM 2 MG: 1 INJECTION INTRAMUSCULAR; INTRAVENOUS at 11:00

## 2023-09-28 RX ADMIN — SODIUM CHLORIDE: 9 INJECTION, SOLUTION INTRAVENOUS at 11:00

## 2023-09-28 RX ADMIN — FENTANYL CITRATE 50 MCG: 50 INJECTION, SOLUTION INTRAMUSCULAR; INTRAVENOUS at 11:00

## 2023-09-28 NOTE — PROGRESS NOTES
0920:    Pt arrived to XR recovery for Bone Marrow BX. Pt is A/O x4 with no complaints of pain. VS to be collected and consent to be obtained. Pt bed is locked and in lowest position and call bell is within reach. 1110:    Name of procedure: Bone Marrow BX    Sedation medications given:    Versed: 2 mg    Fentanyl: 50 mcg    Reversal Agent Used: None    Sedation tolerated: Yes    Sedation start: 1100    Sedation end: 1110      Vital Signs:Stable    Fluids removed: None    Samples sent to lab: By Vinicio Kasper in cytology    Any complications related to procedure: Yes    Post Procedure Care Needed/order sets placed in [facility] care. Yes    Patient is at increased fall risk due to medication given. 1245:    Pt d/c home. Pt tolerating PO intake and procedure site is [facility]. I reviewed all d/c instructions. Pt stated understanding. Removed all IV/tele. Pt left with all personal belongings.

## 2023-09-28 NOTE — DISCHARGE INSTRUCTIONS
[facility]  Radiology Department  [phone]    Radiologist: Bere Roberto PA-C    Date: 9/28/2023       Bone Marrow Biopsy Discharge Instructions      Go home and rest  and restrict your activity the next 24 hours. You have been given sedating medications, so do not drive or make important decisions today. Resume your previous diet and prescribed medications. You may shower in 24 hours. Do not soak or swim until the biopsy site has healed completely to minimize any risk of infection. Watch site for redness, drainage, pus, foul odor, increasing pain and fevers. Should this occur call you doctor immediatly. You may take Tylenol if allowed, as directed on the label, for pain or discomfort. Avoid Ibuprofen (Advil, Motrin etc.) and Aspirin today as they may increase your risk of bleeding. Be sure to follow up with your physician, and let him know how you are progressing and to receive your results. If you have any questions about your procedure today please call and ask to speak to a radiology nurse.

## 2023-09-29 ENCOUNTER — TELEPHONE (OUTPATIENT)
Facility: CLINIC | Age: 83
End: 2023-09-29

## 2023-09-29 NOTE — PROGRESS NOTES
M for patient to return call. Wanted to inform her that she has an UTI and medication was sent to pharmacy per Dr. Refugio Benedict.

## 2023-09-29 NOTE — TELEPHONE ENCOUNTER
Spoke with patient and informed her that she has an UTI and Dr. Demi Arnett has sent medication to the pharmacy for her to .

## 2023-10-11 ENCOUNTER — HOSPITAL ENCOUNTER (OUTPATIENT)
Facility: HOSPITAL | Age: 83
Discharge: HOME | End: 2023-10-14
Attending: INTERNAL MEDICINE
Payer: MEDICARE

## 2023-10-11 DIAGNOSIS — C90.00 OSTEOSCLEROTIC MYELOMA (HCC): ICD-10-CM

## 2023-10-11 LAB
GLUCOSE BLD STRIP.AUTO-MCNC: 84 MG/DL (ref 65–117)
SERVICE CMNT-IMP: NORMAL

## 2023-10-11 PROCEDURE — A9552 F18 FDG: HCPCS | Performed by: INTERNAL MEDICINE

## 2023-10-11 PROCEDURE — 3430000000 HC RX DIAGNOSTIC RADIOPHARMACEUTICAL: Performed by: INTERNAL MEDICINE

## 2023-10-11 PROCEDURE — 78816 PET IMAGE W/CT FULL BODY: CPT

## 2023-10-11 PROCEDURE — 82962 GLUCOSE BLOOD TEST: CPT

## 2023-10-11 RX ORDER — FLUDEOXYGLUCOSE F-18 500 MCI/ML
10 INJECTION INTRAVENOUS ONCE
Status: COMPLETED | OUTPATIENT
Start: 2023-10-11 | End: 2023-10-11

## 2023-10-11 RX ADMIN — FLUDEOXYGLUCOSE F-18 9.95 MILLICURIE: 500 INJECTION INTRAVENOUS at 07:52

## 2023-10-13 RX ORDER — MECLIZINE HCL 12.5 MG/1
TABLET ORAL
Qty: 60 TABLET | Refills: 2 | Status: SHIPPED | OUTPATIENT
Start: 2023-10-13

## 2023-10-31 ENCOUNTER — OFFICE VISIT (OUTPATIENT)
Facility: CLINIC | Age: 83
End: 2023-10-31

## 2023-10-31 VITALS
RESPIRATION RATE: 17 BRPM | OXYGEN SATURATION: 94 % | BODY MASS INDEX: 35.79 KG/M2 | WEIGHT: 228 LBS | HEIGHT: 67 IN | DIASTOLIC BLOOD PRESSURE: 81 MMHG | HEART RATE: 73 BPM | SYSTOLIC BLOOD PRESSURE: 131 MMHG | TEMPERATURE: 98.2 F

## 2023-10-31 DIAGNOSIS — E66.01 SEVERE OBESITY (BMI 35.0-39.9) WITH COMORBIDITY (HCC): ICD-10-CM

## 2023-10-31 DIAGNOSIS — I26.99 OTHER ACUTE PULMONARY EMBOLISM WITHOUT ACUTE COR PULMONALE (HCC): Primary | ICD-10-CM

## 2023-10-31 DIAGNOSIS — I48.21 PERMANENT ATRIAL FIBRILLATION (HCC): ICD-10-CM

## 2023-10-31 DIAGNOSIS — M17.0 PRIMARY OSTEOARTHRITIS OF BOTH KNEES: ICD-10-CM

## 2023-10-31 DIAGNOSIS — Z00.00 MEDICARE ANNUAL WELLNESS VISIT, SUBSEQUENT: ICD-10-CM

## 2023-10-31 DIAGNOSIS — R42 VERTIGO: ICD-10-CM

## 2023-10-31 DIAGNOSIS — N39.0 URINARY TRACT INFECTION WITHOUT HEMATURIA, SITE UNSPECIFIED: ICD-10-CM

## 2023-10-31 DIAGNOSIS — N18.31 STAGE 3A CHRONIC KIDNEY DISEASE (HCC): ICD-10-CM

## 2023-10-31 DIAGNOSIS — I87.2 VENOUS INSUFFICIENCY: ICD-10-CM

## 2023-10-31 DIAGNOSIS — I10 PRIMARY HYPERTENSION: ICD-10-CM

## 2023-10-31 LAB
APPEARANCE UR: ABNORMAL
BACTERIA URNS QL MICRO: ABNORMAL /HPF
BILIRUB UR QL: NEGATIVE
COLOR UR: ABNORMAL
EPITH CASTS URNS QL MICRO: ABNORMAL /LPF
GLUCOSE UR STRIP.AUTO-MCNC: NEGATIVE MG/DL
HGB UR QL STRIP: ABNORMAL
HYALINE CASTS URNS QL MICRO: ABNORMAL /LPF (ref 0–5)
KETONES UR QL STRIP.AUTO: NEGATIVE MG/DL
LEUKOCYTE ESTERASE UR QL STRIP.AUTO: ABNORMAL
NITRITE UR QL STRIP.AUTO: POSITIVE
PH UR STRIP: 6.5 (ref 5–8)
POC INR: 1.9
PROT UR STRIP-MCNC: ABNORMAL MG/DL
PROTHROMBIN TIME, POC: 22.9
RBC #/AREA URNS HPF: ABNORMAL /HPF (ref 0–5)
SP GR UR REFRACTOMETRY: 1.01 (ref 1–1.03)
UROBILINOGEN UR QL STRIP.AUTO: 0.2 EU/DL (ref 0.2–1)
WBC URNS QL MICRO: >100 /HPF (ref 0–4)

## 2023-10-31 RX ORDER — LISINOPRIL 40 MG/1
TABLET ORAL
Qty: 90 TABLET | Refills: 3 | Status: SHIPPED | OUTPATIENT
Start: 2023-10-31

## 2023-10-31 RX ORDER — CEFUROXIME AXETIL 500 MG/1
500 TABLET ORAL 2 TIMES DAILY
Qty: 14 TABLET | Refills: 0 | Status: SHIPPED | OUTPATIENT
Start: 2023-10-31 | End: 2023-11-07

## 2023-10-31 RX ORDER — FUROSEMIDE 40 MG/1
TABLET ORAL
Qty: 90 TABLET | Refills: 3 | Status: SHIPPED | OUTPATIENT
Start: 2023-10-31

## 2023-10-31 RX ORDER — MECLIZINE HCL 12.5 MG/1
12.5 TABLET ORAL 3 TIMES DAILY PRN
Qty: 60 TABLET | Refills: 5 | Status: SHIPPED | OUTPATIENT
Start: 2023-10-31 | End: 2024-02-28

## 2023-10-31 ASSESSMENT — PATIENT HEALTH QUESTIONNAIRE - PHQ9
SUM OF ALL RESPONSES TO PHQ QUESTIONS 1-9: 0
1. LITTLE INTEREST OR PLEASURE IN DOING THINGS: 0
SUM OF ALL RESPONSES TO PHQ QUESTIONS 1-9: 0
SUM OF ALL RESPONSES TO PHQ QUESTIONS 1-9: 0
1. LITTLE INTEREST OR PLEASURE IN DOING THINGS: 0
SUM OF ALL RESPONSES TO PHQ9 QUESTIONS 1 & 2: 0
SUM OF ALL RESPONSES TO PHQ QUESTIONS 1-9: 0
SUM OF ALL RESPONSES TO PHQ QUESTIONS 1-9: 0
2. FEELING DOWN, DEPRESSED OR HOPELESS: 0
SUM OF ALL RESPONSES TO PHQ9 QUESTIONS 1 & 2: 0
2. FEELING DOWN, DEPRESSED OR HOPELESS: 0

## 2023-10-31 ASSESSMENT — LIFESTYLE VARIABLES
HOW OFTEN DO YOU HAVE A DRINK CONTAINING ALCOHOL: NEVER
HOW MANY STANDARD DRINKS CONTAINING ALCOHOL DO YOU HAVE ON A TYPICAL DAY: PATIENT DOES NOT DRINK

## 2023-11-02 LAB
BACTERIA SPEC CULT: ABNORMAL
CC UR VC: ABNORMAL
SERVICE CMNT-IMP: ABNORMAL

## 2023-11-05 RX ORDER — POTASSIUM CHLORIDE 20 MEQ/1
20 TABLET, EXTENDED RELEASE ORAL 2 TIMES DAILY
Qty: 60 TABLET | Refills: 11 | Status: SHIPPED | OUTPATIENT
Start: 2023-11-05

## 2023-11-18 RX ORDER — BENZONATATE 200 MG/1
200 CAPSULE ORAL 3 TIMES DAILY PRN
Qty: 30 CAPSULE | Refills: 1 | Status: SHIPPED | OUTPATIENT
Start: 2023-11-18

## 2023-11-21 DIAGNOSIS — N39.0 URINARY TRACT INFECTION WITHOUT HEMATURIA, SITE UNSPECIFIED: Primary | ICD-10-CM

## 2023-11-21 RX ORDER — CEFUROXIME AXETIL 250 MG/1
250 TABLET ORAL 2 TIMES DAILY
Qty: 14 TABLET | Refills: 0 | Status: SHIPPED | OUTPATIENT
Start: 2023-11-21 | End: 2023-11-28

## 2023-11-24 ENCOUNTER — TELEPHONE (OUTPATIENT)
Facility: CLINIC | Age: 83
End: 2023-11-24

## 2023-11-24 NOTE — TELEPHONE ENCOUNTER
----- Message from Hal Lovell MD sent at 11/21/2023  9:24 AM EST -----  UTI antibiotics sent to pharmacy

## 2024-01-02 ENCOUNTER — NURSE ONLY (OUTPATIENT)
Facility: CLINIC | Age: 84
End: 2024-01-02
Payer: MEDICARE

## 2024-01-02 DIAGNOSIS — Z51.81 ENCOUNTER FOR MONITORING COUMADIN THERAPY: Primary | ICD-10-CM

## 2024-01-02 DIAGNOSIS — Z79.01 ENCOUNTER FOR MONITORING COUMADIN THERAPY: Primary | ICD-10-CM

## 2024-01-02 LAB
POC INR: 4.5
PROTHROMBIN TIME, POC: 53.6

## 2024-01-02 PROCEDURE — 85610 PROTHROMBIN TIME: CPT | Performed by: INTERNAL MEDICINE

## 2024-01-02 NOTE — PROGRESS NOTES
Chief Complaint   Patient presents with    Other     Patient here for PT/INR check. She reports taking Warfarin 5 mg Monday through Friday one pill and 7.5 mg on Saturdays and Sundays.      Results for orders placed or performed in visit on 01/02/24   AMB POC PT/INR   Result Value Ref Range    Prothrombin time, POC 53.6     POC INR 4.5      Per Dr. Rizvi eat green veggies, hold Warfarin for three days and resume day four with 5mg daily. Return to the office in two weeks for PT/INR check.

## 2024-01-08 ENCOUNTER — OFFICE VISIT (OUTPATIENT)
Facility: CLINIC | Age: 84
End: 2024-01-08
Payer: MEDICARE

## 2024-01-08 VITALS
BODY MASS INDEX: 36.91 KG/M2 | WEIGHT: 235.2 LBS | DIASTOLIC BLOOD PRESSURE: 69 MMHG | TEMPERATURE: 97.9 F | SYSTOLIC BLOOD PRESSURE: 106 MMHG | OXYGEN SATURATION: 100 % | HEART RATE: 73 BPM | HEIGHT: 67 IN | RESPIRATION RATE: 16 BRPM

## 2024-01-08 DIAGNOSIS — I48.21 PERMANENT ATRIAL FIBRILLATION (HCC): ICD-10-CM

## 2024-01-08 DIAGNOSIS — I10 PRIMARY HYPERTENSION: ICD-10-CM

## 2024-01-08 DIAGNOSIS — R60.9 EDEMA, UNSPECIFIED TYPE: Primary | ICD-10-CM

## 2024-01-08 DIAGNOSIS — I87.2 VENOUS INSUFFICIENCY: ICD-10-CM

## 2024-01-08 DIAGNOSIS — C90.01 MULTIPLE MYELOMA IN REMISSION (HCC): ICD-10-CM

## 2024-01-08 DIAGNOSIS — E66.01 SEVERE OBESITY (BMI 35.0-39.9) WITH COMORBIDITY (HCC): ICD-10-CM

## 2024-01-08 PROCEDURE — 1123F ACP DISCUSS/DSCN MKR DOCD: CPT | Performed by: INTERNAL MEDICINE

## 2024-01-08 PROCEDURE — G8484 FLU IMMUNIZE NO ADMIN: HCPCS | Performed by: INTERNAL MEDICINE

## 2024-01-08 PROCEDURE — G8400 PT W/DXA NO RESULTS DOC: HCPCS | Performed by: INTERNAL MEDICINE

## 2024-01-08 PROCEDURE — G8427 DOCREV CUR MEDS BY ELIG CLIN: HCPCS | Performed by: INTERNAL MEDICINE

## 2024-01-08 PROCEDURE — 1090F PRES/ABSN URINE INCON ASSESS: CPT | Performed by: INTERNAL MEDICINE

## 2024-01-08 PROCEDURE — 99214 OFFICE O/P EST MOD 30 MIN: CPT | Performed by: INTERNAL MEDICINE

## 2024-01-08 PROCEDURE — G8417 CALC BMI ABV UP PARAM F/U: HCPCS | Performed by: INTERNAL MEDICINE

## 2024-01-08 PROCEDURE — 3078F DIAST BP <80 MM HG: CPT | Performed by: INTERNAL MEDICINE

## 2024-01-08 PROCEDURE — 1036F TOBACCO NON-USER: CPT | Performed by: INTERNAL MEDICINE

## 2024-01-08 PROCEDURE — 3074F SYST BP LT 130 MM HG: CPT | Performed by: INTERNAL MEDICINE

## 2024-01-08 SDOH — ECONOMIC STABILITY: HOUSING INSECURITY
IN THE LAST 12 MONTHS, WAS THERE A TIME WHEN YOU DID NOT HAVE A STEADY PLACE TO SLEEP OR SLEPT IN A SHELTER (INCLUDING NOW)?: NO

## 2024-01-08 SDOH — ECONOMIC STABILITY: INCOME INSECURITY: HOW HARD IS IT FOR YOU TO PAY FOR THE VERY BASICS LIKE FOOD, HOUSING, MEDICAL CARE, AND HEATING?: NOT HARD AT ALL

## 2024-01-08 SDOH — ECONOMIC STABILITY: FOOD INSECURITY: WITHIN THE PAST 12 MONTHS, THE FOOD YOU BOUGHT JUST DIDN'T LAST AND YOU DIDN'T HAVE MONEY TO GET MORE.: NEVER TRUE

## 2024-01-08 SDOH — ECONOMIC STABILITY: FOOD INSECURITY: WITHIN THE PAST 12 MONTHS, YOU WORRIED THAT YOUR FOOD WOULD RUN OUT BEFORE YOU GOT MONEY TO BUY MORE.: NEVER TRUE

## 2024-01-08 ASSESSMENT — ANXIETY QUESTIONNAIRES
6. BECOMING EASILY ANNOYED OR IRRITABLE: 0
1. FEELING NERVOUS, ANXIOUS, OR ON EDGE: 0
IF YOU CHECKED OFF ANY PROBLEMS ON THIS QUESTIONNAIRE, HOW DIFFICULT HAVE THESE PROBLEMS MADE IT FOR YOU TO DO YOUR WORK, TAKE CARE OF THINGS AT HOME, OR GET ALONG WITH OTHER PEOPLE: NOT DIFFICULT AT ALL
7. FEELING AFRAID AS IF SOMETHING AWFUL MIGHT HAPPEN: 0
3. WORRYING TOO MUCH ABOUT DIFFERENT THINGS: 0
GAD7 TOTAL SCORE: 0
5. BEING SO RESTLESS THAT IT IS HARD TO SIT STILL: 0
4. TROUBLE RELAXING: 0
2. NOT BEING ABLE TO STOP OR CONTROL WORRYING: 0

## 2024-01-08 ASSESSMENT — PATIENT HEALTH QUESTIONNAIRE - PHQ9
SUM OF ALL RESPONSES TO PHQ QUESTIONS 1-9: 0
1. LITTLE INTEREST OR PLEASURE IN DOING THINGS: 0
SUM OF ALL RESPONSES TO PHQ9 QUESTIONS 1 & 2: 0
2. FEELING DOWN, DEPRESSED OR HOPELESS: 0
SUM OF ALL RESPONSES TO PHQ QUESTIONS 1-9: 0

## 2024-01-08 NOTE — PROGRESS NOTES
Chief Complaint   Patient presents with    Follow-up    Leg Swelling     \"Have you been to the ER, urgent care clinic since your last visit?  Hospitalized since your last visit?\"    NO    “Have you seen or consulted any other health care providers outside of [facility] since your last visit?”    NO

## 2024-01-09 LAB
ANION GAP SERPL CALC-SCNC: 7 MMOL/L (ref 5–15)
BUN SERPL-MCNC: 27 MG/DL (ref 6–20)
BUN/CREAT SERPL: 23 (ref 12–20)
CALCIUM SERPL-MCNC: 8.9 MG/DL (ref 8.5–10.1)
CHLORIDE SERPL-SCNC: 111 MMOL/L (ref 97–108)
CO2 SERPL-SCNC: 29 MMOL/L (ref 21–32)
CREAT SERPL-MCNC: 1.15 MG/DL (ref 0.55–1.02)
GLUCOSE SERPL-MCNC: 81 MG/DL (ref 65–100)
NT PRO BNP: 1605 PG/ML
POTASSIUM SERPL-SCNC: 3.8 MMOL/L (ref 3.5–5.1)
SODIUM SERPL-SCNC: 147 MMOL/L (ref 136–145)

## 2024-01-17 NOTE — PROGRESS NOTES
1. The edema is a bit worse.  There are no overt signs of congestive heart failure present.  I will check a BMP and I will not make an adjustment in the Furosemide for now.  2. She does have a history of chronic venous insufficiency, leading to chronic swelling of her lower extremities.  This is slightly worse than her usual.  3. She has severe obesity and this has not changed.  4. Her multiple myeloma is in remission.  5. Blood pressure is excellent today.  6. INR is 2.7 and no adjustments are made.

## 2024-01-17 NOTE — PROGRESS NOTES
Comes in complaining of increasing swelling of her lower extremities.  She normally has moderate swelling related to her venous insufficiency.  Since I last saw her, she has gained about 8 pounds, representing a definite increase.  She denies any dyspnea on exertion, orthopnea or PND.    She has been taking her diuretic, which is Furosemide 40 mg q.d.    She has a past history of severe obesity, multiple myeloma, as well as primary hypertension and paroxysmal atrial fibrillation, for which she is taking a DOAC.

## 2024-01-17 NOTE — PROGRESS NOTES
[facility]  [facility]  [address]  [facility] [address]  Phone:  [phone]  Fax: [phone]       Chief Complaint   Patient presents with    Follow-up    Leg Swelling   .      SUBJECTIVE:    Kylee Koo is a 83 y.o. female  Dictation on: 01/16/2024 10:55 PM by: SAMIRA NEGRETE [[id number]]          Current Outpatient Medications   Medication Sig Dispense Refill    benzonatate (TESSALON) 200 MG capsule Take 1 capsule by mouth 3 times daily as needed for Cough 30 capsule 1    potassium chloride (KLOR-CON M) 20 MEQ extended release tablet Take 1 tablet by mouth 2 times daily 60 tablet 11    furosemide (LASIX) 40 MG tablet take 1 tablet by mouth once daily 90 tablet 3    lisinopril (PRINIVIL;ZESTRIL) 40 MG tablet take 1 tablet by mouth once daily 90 tablet 3    meclizine (ANTIVERT) 12.5 MG tablet Take 1 tablet by mouth 3 times daily as needed for Dizziness 60 tablet 5    amLODIPine (NORVASC) 10 MG tablet take 1 tablet by mouth once daily 90 tablet 3    amLODIPine (NORVASC) 10 MG tablet Take 1 tablet by mouth daily 90 tablet 3    clobetasol (TEMOVATE) 0.05 % ointment Apply topically 2 times daily 30 g 5    alum Hydroxide-Mag Carbonate 160-105 MG CHEW Take 1-2 tablets by mouth 4 times daily as needed      Menthol, Topical Analgesic, 4 % GEL Apply topically every 6 hours      metoprolol succinate (TOPROL XL) 25 MG extended release tablet take 1/2 tablet by mouth once daily      nitrofurantoin, macrocrystal-monohydrate, (MACROBID) 100 MG capsule Take by mouth 2 times daily      predniSONE (DELTASONE) 20 MG tablet Take by mouth daily (with breakfast)      warfarin (COUMADIN) 5 MG tablet take 2 tablets by mouth daily for 2 days then take 1 tablet daily THEREAFTER       No current facility-administered medications for this visit.     Past Medical History:   Diagnosis Date    Acute combined systolic and diastolic HF (heart failure), NYHA class 2 ([facility]) 08/11/2017    Arthritis     Atrial

## 2024-01-25 RX ORDER — METOPROLOL SUCCINATE 25 MG/1
TABLET, EXTENDED RELEASE ORAL
Qty: 90 TABLET | Refills: 3 | Status: SHIPPED | OUTPATIENT
Start: 2024-01-25

## 2024-01-31 ENCOUNTER — OFFICE VISIT (OUTPATIENT)
Facility: CLINIC | Age: 84
End: 2024-01-31

## 2024-01-31 VITALS
TEMPERATURE: 98.5 F | RESPIRATION RATE: 16 BRPM | HEIGHT: 67 IN | OXYGEN SATURATION: 99 % | BODY MASS INDEX: 37.26 KG/M2 | HEART RATE: 70 BPM | WEIGHT: 237.4 LBS | SYSTOLIC BLOOD PRESSURE: 105 MMHG | DIASTOLIC BLOOD PRESSURE: 71 MMHG

## 2024-01-31 DIAGNOSIS — I48.21 PERMANENT ATRIAL FIBRILLATION (HCC): ICD-10-CM

## 2024-01-31 DIAGNOSIS — E66.9 OBESITY (BMI 30.0-34.9): ICD-10-CM

## 2024-01-31 DIAGNOSIS — I87.2 VENOUS INSUFFICIENCY: ICD-10-CM

## 2024-01-31 DIAGNOSIS — I10 PRIMARY HYPERTENSION: ICD-10-CM

## 2024-01-31 DIAGNOSIS — C90.01 MULTIPLE MYELOMA IN REMISSION (HCC): ICD-10-CM

## 2024-01-31 DIAGNOSIS — I87.2 VENOUS STASIS DERMATITIS OF BOTH LOWER EXTREMITIES: ICD-10-CM

## 2024-01-31 DIAGNOSIS — R29.898 MUSCULAR DECONDITIONING: ICD-10-CM

## 2024-01-31 DIAGNOSIS — L97.909 ULCER OF LOWER EXTREMITY, UNSPECIFIED LATERALITY, UNSPECIFIED ULCER STAGE (HCC): Primary | ICD-10-CM

## 2024-01-31 SDOH — ECONOMIC STABILITY: INCOME INSECURITY: HOW HARD IS IT FOR YOU TO PAY FOR THE VERY BASICS LIKE FOOD, HOUSING, MEDICAL CARE, AND HEATING?: NOT HARD AT ALL

## 2024-01-31 SDOH — ECONOMIC STABILITY: FOOD INSECURITY: WITHIN THE PAST 12 MONTHS, YOU WORRIED THAT YOUR FOOD WOULD RUN OUT BEFORE YOU GOT MONEY TO BUY MORE.: NEVER TRUE

## 2024-01-31 SDOH — ECONOMIC STABILITY: FOOD INSECURITY: WITHIN THE PAST 12 MONTHS, THE FOOD YOU BOUGHT JUST DIDN'T LAST AND YOU DIDN'T HAVE MONEY TO GET MORE.: NEVER TRUE

## 2024-01-31 ASSESSMENT — ANXIETY QUESTIONNAIRES
GAD7 TOTAL SCORE: 0
7. FEELING AFRAID AS IF SOMETHING AWFUL MIGHT HAPPEN: 0
IF YOU CHECKED OFF ANY PROBLEMS ON THIS QUESTIONNAIRE, HOW DIFFICULT HAVE THESE PROBLEMS MADE IT FOR YOU TO DO YOUR WORK, TAKE CARE OF THINGS AT HOME, OR GET ALONG WITH OTHER PEOPLE: NOT DIFFICULT AT ALL
4. TROUBLE RELAXING: 0
2. NOT BEING ABLE TO STOP OR CONTROL WORRYING: 0
3. WORRYING TOO MUCH ABOUT DIFFERENT THINGS: 0
5. BEING SO RESTLESS THAT IT IS HARD TO SIT STILL: 0
6. BECOMING EASILY ANNOYED OR IRRITABLE: 0
1. FEELING NERVOUS, ANXIOUS, OR ON EDGE: 0

## 2024-01-31 ASSESSMENT — PATIENT HEALTH QUESTIONNAIRE - PHQ9
SUM OF ALL RESPONSES TO PHQ9 QUESTIONS 1 & 2: 0
SUM OF ALL RESPONSES TO PHQ QUESTIONS 1-9: 0
2. FEELING DOWN, DEPRESSED OR HOPELESS: 0
1. LITTLE INTEREST OR PLEASURE IN DOING THINGS: 0
SUM OF ALL RESPONSES TO PHQ QUESTIONS 1-9: 0

## 2024-01-31 NOTE — PROGRESS NOTES
Chief Complaint   Patient presents with    Follow-up    Hypertension    Wound Check     Right leg    Leg Swelling     Both legs     \"Have you been to the ER, urgent care clinic since your last visit?  Hospitalized since your last visit?\"    YES - When: approximately 1  weeks ago.  Where and Why: [facility]ollen legs , open wound  on right leg.    “Have you seen or consulted any other health care providers outside of [facility] since your last visit?”    NO

## 2024-02-03 LAB
BACTERIA SPEC CULT: ABNORMAL
GRAM STN SPEC: ABNORMAL
SERVICE CMNT-IMP: ABNORMAL

## 2024-02-04 RX ORDER — CIPROFLOXACIN 250 MG/1
250 TABLET, FILM COATED ORAL 2 TIMES DAILY
Qty: 28 TABLET | Refills: 0 | Status: SHIPPED | OUTPATIENT
Start: 2024-02-04 | End: 2024-02-18

## 2024-02-05 ENCOUNTER — HOSPITAL ENCOUNTER (OUTPATIENT)
Facility: HOSPITAL | Age: 84
Discharge: HOME | End: 2024-02-05
Attending: SURGERY
Payer: MEDICARE

## 2024-02-05 VITALS
TEMPERATURE: 97.6 F | SYSTOLIC BLOOD PRESSURE: 133 MMHG | RESPIRATION RATE: 16 BRPM | DIASTOLIC BLOOD PRESSURE: 67 MMHG | HEART RATE: 77 BPM

## 2024-02-05 DIAGNOSIS — L97.922 NON-PRESSURE CHRONIC ULCER OF LEFT LOWER LEG WITH FAT LAYER EXPOSED (HCC): ICD-10-CM

## 2024-02-05 DIAGNOSIS — R60.0 BILATERAL LEG EDEMA: ICD-10-CM

## 2024-02-05 DIAGNOSIS — L97.912 NON-PRESSURE CHRONIC ULCER OF RIGHT LOWER LEG WITH FAT LAYER EXPOSED (HCC): Primary | ICD-10-CM

## 2024-02-05 PROCEDURE — 29581 APPL MULTLAYER CMPRN SYS LEG: CPT

## 2024-02-05 PROCEDURE — 99214 OFFICE O/P EST MOD 30 MIN: CPT

## 2024-02-05 RX ORDER — TRIAMCINOLONE ACETONIDE 1 MG/G
OINTMENT TOPICAL ONCE
Status: CANCELLED | OUTPATIENT
Start: 2024-02-05 | End: 2024-02-05

## 2024-02-05 RX ORDER — LIDOCAINE HYDROCHLORIDE 20 MG/ML
JELLY TOPICAL ONCE
Status: CANCELLED | OUTPATIENT
Start: 2024-02-05 | End: 2024-02-05

## 2024-02-05 RX ORDER — LIDOCAINE 40 MG/G
CREAM TOPICAL ONCE
Status: CANCELLED | OUTPATIENT
Start: 2024-02-05 | End: 2024-02-05

## 2024-02-05 RX ORDER — SODIUM CHLOR/HYPOCHLOROUS ACID 0.033 %
SOLUTION, IRRIGATION IRRIGATION ONCE
Status: CANCELLED | OUTPATIENT
Start: 2024-02-05 | End: 2024-02-05

## 2024-02-05 RX ORDER — CLOBETASOL PROPIONATE 0.5 MG/G
OINTMENT TOPICAL ONCE
Status: CANCELLED | OUTPATIENT
Start: 2024-02-05 | End: 2024-02-05

## 2024-02-05 RX ORDER — BACITRACIN ZINC AND POLYMYXIN B SULFATE 500; 1000 [USP'U]/G; [USP'U]/G
OINTMENT TOPICAL ONCE
Status: CANCELLED | OUTPATIENT
Start: 2024-02-05 | End: 2024-02-05

## 2024-02-05 RX ORDER — LIDOCAINE 50 MG/G
OINTMENT TOPICAL ONCE
Status: CANCELLED | OUTPATIENT
Start: 2024-02-05 | End: 2024-02-05

## 2024-02-05 RX ORDER — LENALIDOMIDE 10 MG/1
10 CAPSULE ORAL DAILY
COMMUNITY

## 2024-02-05 RX ORDER — IBUPROFEN 200 MG
TABLET ORAL ONCE
Status: CANCELLED | OUTPATIENT
Start: 2024-02-05 | End: 2024-02-05

## 2024-02-05 RX ORDER — LIDOCAINE HYDROCHLORIDE 40 MG/ML
SOLUTION TOPICAL ONCE
Status: CANCELLED | OUTPATIENT
Start: 2024-02-05 | End: 2024-02-05

## 2024-02-05 RX ORDER — GENTAMICIN SULFATE 1 MG/G
OINTMENT TOPICAL ONCE
Status: CANCELLED | OUTPATIENT
Start: 2024-02-05 | End: 2024-02-05

## 2024-02-05 RX ORDER — GINSENG 100 MG
CAPSULE ORAL ONCE
Status: CANCELLED | OUTPATIENT
Start: 2024-02-05 | End: 2024-02-05

## 2024-02-05 RX ORDER — BETAMETHASONE DIPROPIONATE 0.5 MG/G
CREAM TOPICAL ONCE
Status: CANCELLED | OUTPATIENT
Start: 2024-02-05 | End: 2024-02-05

## 2024-02-05 NOTE — DISCHARGE INSTRUCTIONS
Discharge Instructions   [facility]  [address]  [address]  [place] [address]  Telephone: [phone]     FAX [phone]    NAME:  Kylee Koo  YOB: 1940  MEDICAL RECORD NUMBER:  [id number]  DATE:  02/05/24    WOUND CARE ORDERS:  Bilateral Lower Legs :Cleanse with Normal Saline & gauze or mild soap & water. Apply Xeroform, cover w/Gauze,  Exudry, 2 layer compression wrap w/Calamine. Dressings to be changed 2 x week, in clinic & as needed for compromise of dressing. Nurse visit in 3 days, Follow up w/MD in 1 week.    TREATMENT ORDERS:    Lie down in bed for 20 minutes 2 x day with legs at heart level.  Elevate leg(s) when sitting.  Elevate legs above level of heart, when possible to aid in controlling edema/leg swelling   Avoid prolonged standing in one place.  Do not get dressing/wrap wet.  Follow Diet as prescribed:   [] Diet as tolerated: [] Diabetic Diet: Low carb and no Sugar   [x] No Added Salt. Read labels (sodium amount). Try to keep Sodium intake less than 2,000 mg per day.  [] Increase Protein   [x] Limit the amount of liquid you are drinking and avoid drinking in between meals     Return Appointment:  [x] Return Appointment: With Dr. Pedro Love  in 1 Week(s)  [x] Nurse Visit : 3 days     Electronically signed BREANA BARTLETT RN on 2/5/2024 at 9:13 AM     Wound Care Center Information: Should you experience any significant changes in your wound(s) or have questions about your wound care, please contact the [facility] at MONDAY - FRIDAY 8:00 am - 4:30.  If you need help with your wound outside these hours and cannot wait until we are again available, contact your PCP or go to the hospital emergency room.   PLEASE NOTE: IF YOU ARE UNABLE TO OBTAIN WOUND SUPPLIES, CONTINUE TO USE THE SUPPLIES YOU HAVE AVAILABLE UNTIL YOU ARE ABLE TO REACH US. IT IS MOST IMPORTANT TO KEEP THE WOUND COVERED AT ALL

## 2024-02-05 NOTE — PROGRESS NOTES
exposed, nonpressure ulcer left lower leg with fat layer exposed, bilateral leg edema.    L97.912, L97.922, R60.0      G.Anh Love MD

## 2024-02-05 NOTE — FLOWSHEET NOTE
Surface Area (cm^2) 13.2 cm^2   Wound Volume (cm^3) 1.32 cm^3   Wound Assessment Pink/red;Slough;Eschar dry   Drainage Amount Moderate (25-50%)   Drainage Description Serous   Odor None   Allison-wound Assessment Intact   Margins Defined edges   Wound Thickness Description not for Pressure Injury Full thickness   Wound 02/05/24 Leg Anterior;Left #3   Date First Assessed/Time First Assessed: 02/05/24 0853   Present on Original Admission: Yes  Wound Approximate Age at First Assessment (Weeks): 4 weeks  Primary Wound Type: Venous Ulcer  Location: Leg  Wound Location Orientation: Anterior;Left  Wound ...   Wound Image    Wound Etiology Venous   Dressing Status   (Open to air)   Wound Cleansed Cleansed with saline   Wound Length (cm) 0.5 cm   Wound Width (cm) 0.5 cm   Wound Depth (cm) 0.1 cm   Wound Surface Area (cm^2) 0.25 cm^2   Wound Volume (cm^3) 0.025 cm^3   Wound Assessment [place]/red;Slough   Drainage Amount Moderate (25-50%)   Drainage Description Serous   Odor None   Allison-wound Assessment Intact   Margins Defined edges   Wound Thickness Description not for Pressure Injury Full thickness   Wound 02/05/24 Leg Left;Medial #4   Date First Assessed/Time First Assessed: 02/05/24 0853   Present on Original Admission: Yes  Wound Approximate Age at First Assessment (Weeks): 4 weeks  Primary Wound Type: Venous Ulcer  Location: Leg  Wound Location Orientation: Left;Medial  Wound De...   Wound Image    Wound Etiology Venous   Dressing Status   (Open to air)   Wound Cleansed Cleansed with saline   Wound Length (cm) 0.2 cm   Wound Width (cm) 0.2 cm   Wound Depth (cm) 0.1 cm   Wound Surface Area (cm^2) 0.04 cm^2   Wound Volume (cm^3) 0.004 cm^3   Wound Assessment [place]/red;Slough   Drainage Amount Moderate (25-50%)   Drainage Description Serous   Odor None   Allison-wound Assessment Intact   Margins Defined edges   Wound Thickness Description not for Pressure Injury Full thickness   /67   Pulse 77   Temp 97.6 °F (36.4 °C)

## 2024-02-08 ENCOUNTER — HOSPITAL ENCOUNTER (OUTPATIENT)
Facility: HOSPITAL | Age: 84
Discharge: HOME | End: 2024-02-08
Attending: SURGERY
Payer: MEDICARE

## 2024-02-08 VITALS
HEART RATE: 75 BPM | DIASTOLIC BLOOD PRESSURE: 62 MMHG | SYSTOLIC BLOOD PRESSURE: 128 MMHG | RESPIRATION RATE: 18 BRPM | TEMPERATURE: 97.8 F

## 2024-02-08 DIAGNOSIS — L97.912 NON-PRESSURE CHRONIC ULCER OF RIGHT LOWER LEG WITH FAT LAYER EXPOSED (HCC): Primary | ICD-10-CM

## 2024-02-08 PROCEDURE — 29581 APPL MULTLAYER CMPRN SYS LEG: CPT

## 2024-02-08 RX ORDER — LIDOCAINE 40 MG/G
CREAM TOPICAL ONCE
OUTPATIENT
Start: 2024-02-08 | End: 2024-02-08

## 2024-02-08 RX ORDER — BETAMETHASONE DIPROPIONATE 0.5 MG/G
CREAM TOPICAL ONCE
OUTPATIENT
Start: 2024-02-08 | End: 2024-02-08

## 2024-02-08 RX ORDER — BACITRACIN ZINC AND POLYMYXIN B SULFATE 500; 1000 [USP'U]/G; [USP'U]/G
OINTMENT TOPICAL ONCE
OUTPATIENT
Start: 2024-02-08 | End: 2024-02-08

## 2024-02-08 RX ORDER — LIDOCAINE 50 MG/G
OINTMENT TOPICAL ONCE
OUTPATIENT
Start: 2024-02-08 | End: 2024-02-08

## 2024-02-08 RX ORDER — LIDOCAINE HYDROCHLORIDE 40 MG/ML
SOLUTION TOPICAL ONCE
OUTPATIENT
Start: 2024-02-08 | End: 2024-02-08

## 2024-02-08 RX ORDER — IBUPROFEN 200 MG
TABLET ORAL ONCE
OUTPATIENT
Start: 2024-02-08 | End: 2024-02-08

## 2024-02-08 RX ORDER — TRIAMCINOLONE ACETONIDE 1 MG/G
OINTMENT TOPICAL ONCE
OUTPATIENT
Start: 2024-02-08 | End: 2024-02-08

## 2024-02-08 RX ORDER — GENTAMICIN SULFATE 1 MG/G
OINTMENT TOPICAL ONCE
OUTPATIENT
Start: 2024-02-08 | End: 2024-02-08

## 2024-02-08 RX ORDER — CLOBETASOL PROPIONATE 0.5 MG/G
OINTMENT TOPICAL ONCE
OUTPATIENT
Start: 2024-02-08 | End: 2024-02-08

## 2024-02-08 RX ORDER — LIDOCAINE HYDROCHLORIDE 20 MG/ML
JELLY TOPICAL ONCE
OUTPATIENT
Start: 2024-02-08 | End: 2024-02-08

## 2024-02-08 RX ORDER — GINSENG 100 MG
CAPSULE ORAL ONCE
OUTPATIENT
Start: 2024-02-08 | End: 2024-02-08

## 2024-02-08 RX ORDER — SODIUM CHLOR/HYPOCHLOROUS ACID 0.033 %
SOLUTION, IRRIGATION IRRIGATION ONCE
OUTPATIENT
Start: 2024-02-08 | End: 2024-02-08

## 2024-02-08 ASSESSMENT — PAIN SCALES - GENERAL: PAINLEVEL_OUTOF10: 8

## 2024-02-08 NOTE — FLOWSHEET NOTE
02/08/24 0825   Right Leg Edema Point of Measurement   Compression Therapy 2 layer compression wrap   Left Leg Edema Point of Measurement   Compression Therapy 2 layer compression wrap   Wound 02/05/24 Leg Right;Medial #1   Date First Assessed/Time First Assessed: 02/05/24 0850   Present on Original Admission: Yes  Wound Approximate Age at First Assessment (Weeks): 4 weeks  Primary Wound Type: Venous Ulcer  Location: Leg  Wound Location Orientation: Right;Medial  Wound D...   Wound Etiology Venous   Dressing Status Old drainage noted   Wound Cleansed Soap and water   Wound Assessment Slough;Pink/red   Drainage Amount Moderate (25-50%)   Drainage Description Serous   Odor None   Allison-wound Assessment Intact   Margins Defined edges   Wound Thickness Description not for Pressure Injury Full thickness   Wound 02/05/24 Leg Lateral;Lower;Right #2   Date First Assessed/Time First Assessed: 02/05/24 0851   Present on Original Admission: Yes  Wound Approximate Age at First Assessment (Weeks): 4 weeks  Primary Wound Type: Venous Ulcer  Location: Leg  Wound Location Orientation: Lateral;Lower;Right  ...   Wound Etiology Venous   Dressing Status Old drainage noted   Wound Cleansed Soap and water   Wound Assessment [place]/red;Slough   Drainage Amount Moderate (25-50%)   Drainage Description Serous   Odor None   Allison-wound Assessment Intact   Margins Defined edges   Wound Thickness Description not for Pressure Injury Full thickness   Wound 02/05/24 Leg Anterior;Left #3   Date First Assessed/Time First Assessed: 02/05/24 0853   Present on Original Admission: Yes  Wound Approximate Age at First Assessment (Weeks): 4 weeks  Primary Wound Type: Venous Ulcer  Location: Leg  Wound Location Orientation: Anterior;Left  Wound ...   Wound Etiology Venous   Dressing Status Old drainage noted   Wound Cleansed Soap and water   Wound Assessment [place]/red;Slough   Drainage Amount Moderate (25-50%)   Drainage Description Serous   Odor None

## 2024-02-08 NOTE — FLOWSHEET NOTE
02/08/24 0832   Wound 02/05/24 Leg Right;Medial #1   Date First Assessed/Time First Assessed: 02/05/24 0850   Present on Original Admission: Yes  Wound Approximate Age at First Assessment (Weeks): 4 weeks  Primary Wound Type: Venous Ulcer  Location: Leg  Wound Location Orientation: Right;Medial  Wound D...   Dressing Status New dressing applied   Dressing/Treatment Xeroform  (exudry and 2 layer with calamine)   Wound 02/05/24 Leg Lateral;Lower;Right #2   Date First Assessed/Time First Assessed: 02/05/24 0851   Present on Original Admission: Yes  Wound Approximate Age at First Assessment (Weeks): 4 weeks  Primary Wound Type: Venous Ulcer  Location: Leg  Wound Location Orientation: Lateral;Lower;Right  ...   Dressing Status New dressing applied   Dressing/Treatment Xeroform  (exudry and 2 layer with calamine)   Wound 02/05/24 Leg Anterior;Left #3   Date First Assessed/Time First Assessed: 02/05/24 0853   Present on Original Admission: Yes  Wound Approximate Age at First Assessment (Weeks): 4 weeks  Primary Wound Type: Venous Ulcer  Location: Leg  Wound Location Orientation: Anterior;Left  Wound ...   Dressing Status New dressing applied   Dressing/Treatment Xeroform  (exudry and 2 layer with calamine)   Wound 02/05/24 Leg Left;Medial #4   Date First Assessed/Time First Assessed: 02/05/24 0853   Present on Original Admission: Yes  Wound Approximate Age at First Assessment (Weeks): 4 weeks  Primary Wound Type: Venous Ulcer  Location: Leg  Wound Location Orientation: Left;Medial  Wound De...   Dressing Status New dressing applied   Dressing/Treatment Xeroform  (exudry and 2 layer with calamine)     Multilayer Compression Wrap  (Not Unna) Below the Knee    NAME:  Kylee Koo  YOB: 1940  MEDICAL RECORD NUMBER:  [id number]  DATE:  2/8/2024    Removed old Multilayer wrap if indicated and wash leg with mild soap/water.  Applied moisturizing agent to dry skin as needed.   Applied primary and secondary

## 2024-02-09 ENCOUNTER — OFFICE VISIT (OUTPATIENT)
Facility: CLINIC | Age: 84
End: 2024-02-09

## 2024-02-09 VITALS
HEART RATE: 70 BPM | RESPIRATION RATE: 15 BRPM | OXYGEN SATURATION: 97 % | HEIGHT: 67 IN | TEMPERATURE: 98.7 F | SYSTOLIC BLOOD PRESSURE: 106 MMHG | DIASTOLIC BLOOD PRESSURE: 67 MMHG | WEIGHT: 238.2 LBS | BODY MASS INDEX: 37.39 KG/M2

## 2024-02-09 DIAGNOSIS — L97.912 NON-PRESSURE CHRONIC ULCER OF RIGHT LOWER LEG WITH FAT LAYER EXPOSED (HCC): Primary | ICD-10-CM

## 2024-02-09 DIAGNOSIS — L97.922 NON-PRESSURE CHRONIC ULCER OF LEFT LOWER LEG WITH FAT LAYER EXPOSED (HCC): ICD-10-CM

## 2024-02-09 DIAGNOSIS — I87.2 VENOUS STASIS DERMATITIS OF BOTH LOWER EXTREMITIES: ICD-10-CM

## 2024-02-09 DIAGNOSIS — I87.2 VENOUS INSUFFICIENCY: ICD-10-CM

## 2024-02-09 RX ORDER — TRAMADOL HYDROCHLORIDE 50 MG/1
50 TABLET ORAL EVERY 6 HOURS PRN
Qty: 20 TABLET | Refills: 0 | Status: CANCELLED | OUTPATIENT
Start: 2024-02-09 | End: 2024-02-14

## 2024-02-09 ASSESSMENT — PATIENT HEALTH QUESTIONNAIRE - PHQ9
SUM OF ALL RESPONSES TO PHQ QUESTIONS 1-9: 0
1. LITTLE INTEREST OR PLEASURE IN DOING THINGS: 0
SUM OF ALL RESPONSES TO PHQ QUESTIONS 1-9: 0
SUM OF ALL RESPONSES TO PHQ9 QUESTIONS 1 & 2: 0
SUM OF ALL RESPONSES TO PHQ QUESTIONS 1-9: 0
SUM OF ALL RESPONSES TO PHQ QUESTIONS 1-9: 0
2. FEELING DOWN, DEPRESSED OR HOPELESS: 0

## 2024-02-09 NOTE — PROGRESS NOTES
Chief Complaint   Patient presents with    Leg Pain     Patient is here for swelling and pain in both legs. Patient states she has is having pain.      \"Have you been to the ER, urgent care clinic since your last visit?  Hospitalized since your last visit?\"    NO    “Have you seen or consulted any other health care providers outside of [facility] since your last visit?”    NO

## 2024-02-09 NOTE — PROGRESS NOTES
[facility]  [address] [place] [address] [address]  [facility] [address]  Phone:  [phone]  Fax: [phone]       Chief Complaint   Patient presents with    Leg Pain     Patient is here for swelling and pain in both legs. Patient states she has is having pain.    .      SUBJECTIVE:    Kylee Koo is a 83 y.o. female  Dictation on: 02/09/2024  3:01 PM by: SAMIRA NEGRETE [[id number]]          Current Outpatient Medications   Medication Sig Dispense Refill    REVLIMID 10 MG chemo capsule Take 1 capsule by mouth daily TAKE ONE CAPSULE BY MOUTH EVERY DAY FOR 21 DAYS THEN 7 DAYS OFF      metoprolol succinate (TOPROL XL) 25 MG extended release tablet take 1/2 tablet by mouth once daily 90 tablet 3    potassium chloride (KLOR-CON M) 20 MEQ extended release tablet Take 1 tablet by mouth 2 times daily 60 tablet 11    furosemide (LASIX) 40 MG tablet take 1 tablet by mouth once daily 90 tablet 3    lisinopril (PRINIVIL;ZESTRIL) 40 MG tablet take 1 tablet by mouth once daily 90 tablet 3    meclizine (ANTIVERT) 12.5 MG tablet Take 1 tablet by mouth 3 times daily as needed for Dizziness 60 tablet 5    amLODIPine (NORVASC) 10 MG tablet take 1 tablet by mouth once daily 90 tablet 3    alum Hydroxide-Mag Carbonate 160-105 MG CHEW Take 1-2 tablets by mouth 4 times daily as needed      nitrofurantoin, macrocrystal-monohydrate, (MACROBID) 100 MG capsule Take by mouth 2 times daily      warfarin (COUMADIN) 5 MG tablet Take 1 tablet by mouth daily 1 tablet Monday through Friday, 1.5 tablets (7.5 mg) every Saturday & Sunday.      clobetasol (TEMOVATE) 0.05 % ointment Apply topically 2 times daily (Patient not taking: Reported on 2/5/2024) 30 g 5    Menthol, Topical Analgesic, 4 % GEL Apply topically every 6 hours (Patient not taking: Reported on 2/5/2024)      predniSONE (DELTASONE) 20 MG tablet Take by mouth daily (with breakfast) (Patient not taking: Reported on 2/5/2024)       No current facility-administered

## 2024-02-12 ENCOUNTER — HOSPITAL ENCOUNTER (OUTPATIENT)
Facility: HOSPITAL | Age: 84
Discharge: HOME | End: 2024-02-12
Attending: SURGERY
Payer: MEDICARE

## 2024-02-12 VITALS
SYSTOLIC BLOOD PRESSURE: 137 MMHG | TEMPERATURE: 97.4 F | HEART RATE: 74 BPM | DIASTOLIC BLOOD PRESSURE: 73 MMHG | RESPIRATION RATE: 16 BRPM

## 2024-02-12 DIAGNOSIS — L97.912 NON-PRESSURE CHRONIC ULCER OF RIGHT LOWER LEG WITH FAT LAYER EXPOSED (HCC): Primary | ICD-10-CM

## 2024-02-12 PROCEDURE — 29581 APPL MULTLAYER CMPRN SYS LEG: CPT

## 2024-02-12 RX ORDER — LIDOCAINE HYDROCHLORIDE 20 MG/ML
JELLY TOPICAL ONCE
OUTPATIENT
Start: 2024-02-12 | End: 2024-02-12

## 2024-02-12 RX ORDER — BETAMETHASONE DIPROPIONATE 0.5 MG/G
CREAM TOPICAL ONCE
OUTPATIENT
Start: 2024-02-12 | End: 2024-02-12

## 2024-02-12 RX ORDER — BACITRACIN ZINC AND POLYMYXIN B SULFATE 500; 1000 [USP'U]/G; [USP'U]/G
OINTMENT TOPICAL ONCE
OUTPATIENT
Start: 2024-02-12 | End: 2024-02-12

## 2024-02-12 RX ORDER — IBUPROFEN 200 MG
TABLET ORAL ONCE
OUTPATIENT
Start: 2024-02-12 | End: 2024-02-12

## 2024-02-12 RX ORDER — TRIAMCINOLONE ACETONIDE 1 MG/G
OINTMENT TOPICAL ONCE
OUTPATIENT
Start: 2024-02-12 | End: 2024-02-12

## 2024-02-12 RX ORDER — GENTAMICIN SULFATE 1 MG/G
OINTMENT TOPICAL ONCE
OUTPATIENT
Start: 2024-02-12 | End: 2024-02-12

## 2024-02-12 RX ORDER — SODIUM CHLOR/HYPOCHLOROUS ACID 0.033 %
SOLUTION, IRRIGATION IRRIGATION ONCE
OUTPATIENT
Start: 2024-02-12 | End: 2024-02-12

## 2024-02-12 RX ORDER — LIDOCAINE 50 MG/G
OINTMENT TOPICAL ONCE
OUTPATIENT
Start: 2024-02-12 | End: 2024-02-12

## 2024-02-12 RX ORDER — CLOBETASOL PROPIONATE 0.5 MG/G
OINTMENT TOPICAL ONCE
OUTPATIENT
Start: 2024-02-12 | End: 2024-02-12

## 2024-02-12 RX ORDER — GINSENG 100 MG
CAPSULE ORAL ONCE
OUTPATIENT
Start: 2024-02-12 | End: 2024-02-12

## 2024-02-12 RX ORDER — LIDOCAINE 40 MG/G
CREAM TOPICAL ONCE
OUTPATIENT
Start: 2024-02-12 | End: 2024-02-12

## 2024-02-12 RX ORDER — LIDOCAINE HYDROCHLORIDE 40 MG/ML
SOLUTION TOPICAL ONCE
OUTPATIENT
Start: 2024-02-12 | End: 2024-02-12

## 2024-02-12 NOTE — PROGRESS NOTES
Face to Face Documentation for Home Health Care      Patient’s Name: Kylee Koo    YOB: 1940    Date of Face to Face:  2/12/2024                                    Face to Face Encounter findings are related to primary reason for home care:   yes    I certify that this patient is under my care and has a Face to Face Encounter related to the primary reason for home health.    1. I certify that the patient needs intermittent skilled nursing care for wound care.    2. I certify that this patient is homebound for the following reason(s): Requires considerable and taxing effort to leave the home  and Requires the assistance of 1 or more persons to leave the home     3. The qualifying diagnosis is  nonpressure ulcer right lower leg with fat layer exposed (L97.912), nonpressure ulcer left lower leg with fat layer exposed (L97.922), bilateral leg edema (R60.0)        Pedro Love MD 2/12/2024 11:25 AM

## 2024-02-12 NOTE — FLOWSHEET NOTE
02/12/24 0834   Wound 02/05/24 Leg Right;Medial #1   Date First Assessed/Time First Assessed: 02/05/24 0850   Present on Original Admission: Yes  Wound Approximate Age at First Assessment (Weeks): 4 weeks  Primary Wound Type: Venous Ulcer  Location: Leg  Wound Location Orientation: Right;Medial  Wound D...   Wound Image    Wound Etiology Venous   Dressing Status Old drainage noted   Wound Cleansed Soap and water   Wound Length (cm) 0.9 cm   Wound Width (cm) 0.9 cm   Wound Depth (cm) 0.1 cm   Wound Surface Area (cm^2) 0.81 cm^2   Change in Wound Size % (l*w) -170   Wound Volume (cm^3) 0.081 cm^3   Wound Healing % -170   Wound Assessment [place]/red;Slough   Drainage Amount Small (< 25%)   Drainage Description Serous   Odor None   Allison-wound Assessment Intact   Margins Defined edges   Wound Thickness Description not for Pressure Injury Full thickness   Wound 02/05/24 Leg Lateral;Lower;Right #2   Date First Assessed/Time First Assessed: 02/05/24 0851   Present on Original Admission: Yes  Wound Approximate Age at First Assessment (Weeks): 4 weeks  Primary Wound Type: Venous Ulcer  Location: Leg  Wound Location Orientation: Lateral;Lower;Right  ...   Wound Image    Wound Etiology Venous   Dressing Status Old drainage noted   Wound Cleansed Soap and water   Wound Length (cm) 4.7 cm   Wound Width (cm) 4.5 cm   Wound Depth (cm) 0.1 cm   Wound Surface Area (cm^2) 21.15 cm^2   Change in Wound Size % (l*w) -60.23   Wound Volume (cm^3) 2.115 cm^3   Wound Healing % -60   Wound Assessment Eschar moist;Pink/red;Slough   Drainage Amount Moderate (25-50%)   Drainage Description Serous   Odor None   Allison-wound Assessment Intact   Margins Defined edges   Wound Thickness Description not for Pressure Injury Full thickness   Wound 02/05/24 Leg Anterior;Left #3   Date First Assessed/Time First Assessed: 02/05/24 0853   Present on Original Admission: Yes  Wound Approximate Age at First Assessment (Weeks): 4 weeks  Primary Wound Type:

## 2024-02-12 NOTE — DISCHARGE INSTRUCTIONS
Discharge Instructions/Wound Care Orders  [facility]   [address]  [address]  [address] [facility]  Telephone: [phone]     FAX [phone]    NAME:  Kylee Koo  YOB: 1940  MEDICAL RECORD NUMBER:  [id number]  DATE:  2/12/2024     Wound Care Orders:  Right and left leg wounds :Cleanse with soap and water, apply primary dressing medihoney alginate, gauze, ABD, 2 layer calamine wrap..    Pt./pcg/HH nurse to change (freq) 3x Weekly  and as needed for compromise.F/U in 1 week.   Try to limit fluids to 2 quarts a day, avoid   Home Health Agency: referral being made   Treatment Orders:   Elevate leg(s) above the level of the heart when sitting, if swelling present.  Avoid prolonged standing in one place.  Wear off-loading shoe/boot on the affected foot when walking.  Do not get dressing/cast wet.  Do not use objects to scratch inside cast/wrap.   Follow diet as prescribed:  [] Diet as tolerated: [] Diabetic Diet: Low carb no sugar [] No Added Salt:  [] Increase Protein: [] Other:Limit the amount of liquid you are drinking and avoid drinking in between meals             Follow-up:  [x] Return Appointment: With Dr. Love in  1 Week(s)  [] Ordered tests:    Electronically signed Peri Jama RN on 2/12/2024 at 8:49 AM     Wound Care Center Information: Should you experience any significant changes in your wound(s) or have questions about your wound care, please contact the [facility] at MONDAY - FRIDAY 8:00 am - 4:30.  If you need help with your wound outside these hours and cannot wait until we are again available, contact your PCP or go to the hospital emergency room.     PLEASE NOTE: IF YOU ARE UNABLE TO OBTAIN WOUND SUPPLIES, CONTINUE TO USE THE SUPPLIES YOU HAVE AVAILABLE UNTIL YOU ARE ABLE TO REACH US. IT IS MOST IMPORTANT TO KEEP THE WOUND COVERED AT ALL TIMES.     Physician Signature:_______________________    Date:

## 2024-02-12 NOTE — PROGRESS NOTES
Wound care     The patient is an 83-year-old woman who is referred to the wound care center regarding ulcerations on both lower legs.     The patient was first seen in the wound care center on 2/5/2024.     The patient reports that the wounds have been present for about a month.  They have watery drainage.  She does confirm chronic swelling of the legs.  She thinks they may have gotten more swollen recently.     The patient's medication list includes furosemide 40 mg daily.  The patient's daughter thought on the patient's first clinic visit that she may not have been taking this recently.  As of 2/12/2024, the patient is reported to be back on her diuretic.      The patient sleeps lying in bed at night with multiple pillows.  She does get short of breath if she lies down flat.  The patient reports drinking 3 water bottles per day plus additional fluids.  She is working on reducing fluid intake.     The patient denies history of diabetes mellitus.  She does not describe anginal symptoms but does have history of chronic systolic congestive heart failure.  She has history of permanent biventricular pacemaker and AV node ablation for A-fib.  Medications include Coumadin.  The patient is ambulatory.  She does not describe shortness of breath with ambulation.     Past medical history includes cervical radiculopathy, reflux esophagitis, multiple myeloma, hypertension, CKD 3.    Dressing ordered: For right and left legs, apply Xeroform over ulcers then ABD.  Apply 2 layer compression wrap with calamine from base of toes to upper calf.            Reported weight 238 pounds height 5 feet 7 inches     Physical examination     The patient is an alert elderly woman in no acute distress.     Examination of the right lower extremity did not reveal palpable dorsalis pedis or posterior tibial pulses.  There was a strong biphasic right dorsalis pedis Doppler signal.  Patient has 1+ soft pitting edema below the knee.  There was no

## 2024-02-17 DIAGNOSIS — I50.41 ACUTE COMBINED SYSTOLIC AND DIASTOLIC CONGESTIVE HEART FAILURE (HCC): Primary | ICD-10-CM

## 2024-02-19 ENCOUNTER — HOSPITAL ENCOUNTER (OUTPATIENT)
Facility: HOSPITAL | Age: 84
Discharge: HOME | End: 2024-02-19
Attending: SURGERY
Payer: MEDICARE

## 2024-02-19 ENCOUNTER — TELEPHONE (OUTPATIENT)
Facility: CLINIC | Age: 84
End: 2024-02-19

## 2024-02-19 VITALS
DIASTOLIC BLOOD PRESSURE: 73 MMHG | SYSTOLIC BLOOD PRESSURE: 148 MMHG | TEMPERATURE: 97.4 F | RESPIRATION RATE: 16 BRPM | HEART RATE: 75 BPM

## 2024-02-19 DIAGNOSIS — L03.115 CELLULITIS OF RIGHT LOWER LEG: ICD-10-CM

## 2024-02-19 DIAGNOSIS — L97.912 NON-PRESSURE CHRONIC ULCER OF RIGHT LOWER LEG WITH FAT LAYER EXPOSED (HCC): Primary | ICD-10-CM

## 2024-02-19 PROCEDURE — 29581 APPL MULTLAYER CMPRN SYS LEG: CPT

## 2024-02-19 PROCEDURE — 87205 SMEAR GRAM STAIN: CPT

## 2024-02-19 PROCEDURE — 87186 SC STD MICRODIL/AGAR DIL: CPT

## 2024-02-19 PROCEDURE — 87077 CULTURE AEROBIC IDENTIFY: CPT

## 2024-02-19 PROCEDURE — 87070 CULTURE OTHR SPECIMN AEROBIC: CPT

## 2024-02-19 RX ORDER — CLOBETASOL PROPIONATE 0.5 MG/G
OINTMENT TOPICAL ONCE
OUTPATIENT
Start: 2024-02-19 | End: 2024-02-19

## 2024-02-19 RX ORDER — LIDOCAINE HYDROCHLORIDE 40 MG/ML
SOLUTION TOPICAL ONCE
OUTPATIENT
Start: 2024-02-19 | End: 2024-02-19

## 2024-02-19 RX ORDER — LIDOCAINE 50 MG/G
OINTMENT TOPICAL ONCE
OUTPATIENT
Start: 2024-02-19 | End: 2024-02-19

## 2024-02-19 RX ORDER — LIDOCAINE HYDROCHLORIDE 20 MG/ML
JELLY TOPICAL ONCE
OUTPATIENT
Start: 2024-02-19 | End: 2024-02-19

## 2024-02-19 RX ORDER — BETAMETHASONE DIPROPIONATE 0.5 MG/G
CREAM TOPICAL ONCE
OUTPATIENT
Start: 2024-02-19 | End: 2024-02-19

## 2024-02-19 RX ORDER — IBUPROFEN 200 MG
TABLET ORAL ONCE
OUTPATIENT
Start: 2024-02-19 | End: 2024-02-19

## 2024-02-19 RX ORDER — GINSENG 100 MG
CAPSULE ORAL ONCE
OUTPATIENT
Start: 2024-02-19 | End: 2024-02-19

## 2024-02-19 RX ORDER — TRIAMCINOLONE ACETONIDE 1 MG/G
OINTMENT TOPICAL ONCE
OUTPATIENT
Start: 2024-02-19 | End: 2024-02-19

## 2024-02-19 RX ORDER — SODIUM CHLOR/HYPOCHLOROUS ACID 0.033 %
SOLUTION, IRRIGATION IRRIGATION ONCE
OUTPATIENT
Start: 2024-02-19 | End: 2024-02-19

## 2024-02-19 RX ORDER — GENTAMICIN SULFATE 1 MG/G
OINTMENT TOPICAL ONCE
OUTPATIENT
Start: 2024-02-19 | End: 2024-02-19

## 2024-02-19 RX ORDER — BACITRACIN ZINC AND POLYMYXIN B SULFATE 500; 1000 [USP'U]/G; [USP'U]/G
OINTMENT TOPICAL ONCE
OUTPATIENT
Start: 2024-02-19 | End: 2024-02-19

## 2024-02-19 RX ORDER — LIDOCAINE 40 MG/G
CREAM TOPICAL ONCE
OUTPATIENT
Start: 2024-02-19 | End: 2024-02-19

## 2024-02-19 ASSESSMENT — PAIN DESCRIPTION - ORIENTATION: ORIENTATION: RIGHT;LEFT

## 2024-02-19 ASSESSMENT — PAIN SCALES - GENERAL: PAINLEVEL_OUTOF10: 8

## 2024-02-19 ASSESSMENT — PAIN DESCRIPTION - LOCATION: LOCATION: LEG

## 2024-02-19 ASSESSMENT — PAIN DESCRIPTION - DESCRIPTORS: DESCRIPTORS: ACHING

## 2024-02-19 NOTE — PROGRESS NOTES
health was using plain alginate instead].           Reported weight 238 pounds height 5 feet 7 inches     Physical examination     The patient is an alert elderly woman in no acute distress.     Examination of the right lower extremity did not reveal palpable dorsalis pedis or posterior tibial pulses.  There was a strong biphasic right dorsalis pedis Doppler signal.  Patient has 1+ soft pitting edema below the knee, even after use of compression wrap.  There was 1+ pitting distal thigh edema on the right.  .  On the lateral aspect of right lower leg there was a cluster of wounds 6 x 11.7 x 0.1 cm dimension with eschar and with some granulation at the periphery.  The wound has an inflamed appearance.     Examination of the left lower extremity did not reveal palpable dorsalis pedis or posterior tibial pulses.  There was a strong biphasic left dorsalis pedis Doppler signal.  The patient has 1+ soft pitting edema below the knee, even after use of compression wrap.  There is 1+ pitting distal thigh edema on the left.  On the left anterior lower leg there is a wound 1.1 x 0.7 x 0.1 cm dimension with slough.      Right leg wound was cultured.           The patient has wounds on right and left lower legs associated with  soft pitting lower leg edema.  Wounds are slightly larger.    There is a suggestion of cellulitis in the right lower leg.     Edema is noted to be present even up into the thighs.           I have reviewed with the patient and her daughter the need to control edema to achieve wound healing.  The patient should take her diuretic daily.  I asked the patient's son who was present at the visit to confirm whether the patient was actually taking her diuretic.  He is to call the wound care center to let us know.  If the patient is not taking her diuretic, family will need to begin managing that medication so she does take it.  If the patient has been taking her diuretic, I will send a request to her primary

## 2024-02-19 NOTE — DISCHARGE INSTRUCTIONS
Discharge Instructions [facility]  [address]  [address]  [place] [address]  Telephone: [phone]     FAX [phone]    NAME:  Kylee Koo  YOB: 1940  MEDICAL RECORD NUMBER:  [id number]  DATE:  2/19/2024  WOUND CARE ORDERS:  Bilateral lower leg :Cleanse with soap and water, apply primary dressing Iodosorb on Adaptic or Ioplex (Iodophor foam dressing) cover with secondary dressing ABD.  Apply 2 layer compression wrap with calamine  Pt./pcg/HH nurse to change (freq) 3x Weekly  and as needed for compromise.F/U in 1 week.     Home Health Agency: [facility]    If you are still having pain after you go home:  [x] Elevate the affected limb.   [x] Use over-the-counter medications you would normally use for pain as permitted by your family doctor.  [] For persistent pain not relieved by the above interventions, please call your family doctor.     TREATMENT ORDERS:    Elevate leg(s) above the level of the heart when sitting.   Avoid prolonged standing in one place.  Do no get dressing/wrap wet.  Follow Diet as prescribed:   [x] Diet as tolerated: [] Calorie Diabetic Diet: Low carb and no Sugar [x] No Added Salt:  [] Increase Protein: [] Limit the amount of liquid you are drinking and avoid drinking in between meals     Return Appointment:  [x] Return Appointment: With Dr. Love  in  1 Week(s)     Electronically signed LARRY CARDOZO RN on 2/19/2024 at 8:35 AM     Wound Care Center Information: Should you experience any significant changes in your wound(s) or have questions about your wound care, please contact the [facility] at MONDAY - FRIDAY 8:00 am - 4:30.  If you need help with your wound outside these hours and cannot wait until we are again available, contact your PCP or go to the hospital emergency room.   PLEASE NOTE: IF YOU ARE UNABLE TO OBTAIN WOUND SUPPLIES, CONTINUE TO USE THE SUPPLIES YOU HAVE AVAILABLE UNTIL YOU ARE ABLE

## 2024-02-19 NOTE — TELEPHONE ENCOUNTER
----- Message from Aly Rizvi MD sent at 2/17/2024 11:22 PM EST -----  Patient needs to be seen this week

## 2024-02-19 NOTE — FLOWSHEET NOTE
Multilayer Compression Wrap   (Not Unna) Below the Knee    NAME:  Kylee Koo  YOB: 1940  MEDICAL RECORD NUMBER:  [id number]  DATE:  2/19/2024 02/19/24 0846   Right Leg Edema Point of Measurement   Compression Therapy 2 layer compression wrap   Left Leg Edema Point of Measurement   Compression Therapy 2 layer compression wrap   Wound 02/05/24 Leg Lateral;Lower;Right #2   Date First Assessed/Time First Assessed: 02/05/24 0851   Present on Original Admission: Yes  Wound Approximate Age at First Assessment (Weeks): 4 weeks  Primary Wound Type: Venous Ulcer  Location: Leg  Wound Location Orientation: Lateral;Lower;Right  ...   Dressing Status New dressing applied   Dressing/Treatment Non adherent;ABD  (iodosorb, 2 layers with calamine to BLE)   Wound 02/05/24 Leg Anterior;Left #3   Date First Assessed/Time First Assessed: 02/05/24 0853   Present on Original Admission: Yes  Wound Approximate Age at First Assessment (Weeks): 4 weeks  Primary Wound Type: Venous Ulcer  Location: Leg  Wound Location Orientation: Anterior;Left  Wound ...   Dressing Status New dressing applied   Dressing/Treatment Non adherent;ABD  (iodosorb)       Removed old Multilayer wrap if indicated and wash leg with mild soap/water., Applied moisturizing agent to dry skin as needed. , Applied primary and secondary dressing as ordered., Applied multilayered dressing below the knee to right lower leg., Applied multilayered dressing below the knee to left lower leg., Instructed patient/caregiver not to remove dressing and to keep it clean and dry. , Instructed patient/caregiver on complications to report to provider, such as pain, numbness in toes, heavy drainage, and slippage of dressing., and Instructed patient on purpose of compression dressing and on activity and exercise recommendations.      Electronically signed by LARRY CARDOZO RN on 2/19/2024 at 8:48 AM

## 2024-02-19 NOTE — FLOWSHEET NOTE
02/19/24 0807   Right Leg Edema Point of Measurement   Leg circumference 42.5 cm   Ankle circumference 26 cm   Compression Therapy 2 layer compression wrap   Left Leg Edema Point of Measurement   Leg circumference 43 cm   Ankle circumference 26.4 cm   Compression Therapy 2 layer compression wrap   RLE Neurovascular Assessment   Capillary Refill Less than/Equal to 3 seconds   Color Appropriate for Ethnicity   Temperature Warm   RLE Sensation  Full sensation   R Pedal Pulse Doppler   LLE Neurovascular Assessment   Capillary Refill Less than/Equal to 3 seconds   Color Appropriate for Ethnicity   Temperature Warm   LLE Sensation  Full sensation   L Pedal Pulse Doppler   Wound 02/05/24 Leg Lateral;Lower;Right #2   Date First Assessed/Time First Assessed: 02/05/24 0851   Present on Original Admission: Yes  Wound Approximate Age at First Assessment (Weeks): 4 weeks  Primary Wound Type: Venous Ulcer  Location: Leg  Wound Location Orientation: Lateral;Lower;Right  ...   Wound Image    Wound Etiology Venous   Dressing Status Old drainage noted   Wound Cleansed Soap and water   Wound Length (cm) 6 cm   Wound Width (cm) 11.7 cm   Wound Depth (cm) 0.6 cm   Wound Surface Area (cm^2) 70.2 cm^2   Change in Wound Size % (l*w) -431.82   Wound Volume (cm^3) 42.12 cm^3   Wound Healing % -3091   Wound Assessment Eschar moist;Pink/red;Slough   Drainage Amount Moderate (25-50%)   Drainage Description Serous   Odor None   Allison-wound Assessment Intact   Margins Defined edges   Wound Thickness Description not for Pressure Injury Full thickness   Wound 02/05/24 Leg Anterior;Left #3   Date First Assessed/Time First Assessed: 02/05/24 0853   Present on Original Admission: Yes  Wound Approximate Age at First Assessment (Weeks): 4 weeks  Primary Wound Type: Venous Ulcer  Location: Leg  Wound Location Orientation: Anterior;Left  Wound ...   Wound Image    Wound Etiology Venous   Dressing Status Dry   Wound Cleansed Soap and water   Wound Length

## 2024-02-20 ENCOUNTER — OFFICE VISIT (OUTPATIENT)
Facility: CLINIC | Age: 84
End: 2024-02-20
Payer: MEDICARE

## 2024-02-20 VITALS
HEART RATE: 75 BPM | RESPIRATION RATE: 16 BRPM | HEIGHT: 67 IN | DIASTOLIC BLOOD PRESSURE: 65 MMHG | OXYGEN SATURATION: 98 % | TEMPERATURE: 98.6 F | WEIGHT: 228.1 LBS | SYSTOLIC BLOOD PRESSURE: 99 MMHG | BODY MASS INDEX: 35.8 KG/M2

## 2024-02-20 DIAGNOSIS — E66.9 OBESITY (BMI 30.0-34.9): ICD-10-CM

## 2024-02-20 DIAGNOSIS — I48.21 PERMANENT ATRIAL FIBRILLATION (HCC): ICD-10-CM

## 2024-02-20 DIAGNOSIS — I50.30 DIASTOLIC CONGESTIVE HEART FAILURE, UNSPECIFIED HF CHRONICITY (HCC): Primary | ICD-10-CM

## 2024-02-20 DIAGNOSIS — I87.2 VENOUS INSUFFICIENCY: ICD-10-CM

## 2024-02-20 DIAGNOSIS — N18.31 STAGE 3A CHRONIC KIDNEY DISEASE (HCC): ICD-10-CM

## 2024-02-20 DIAGNOSIS — I10 PRIMARY HYPERTENSION: ICD-10-CM

## 2024-02-20 DIAGNOSIS — L97.922 NON-PRESSURE CHRONIC ULCER OF LEFT LOWER LEG WITH FAT LAYER EXPOSED (HCC): ICD-10-CM

## 2024-02-20 DIAGNOSIS — L97.912 NON-PRESSURE CHRONIC ULCER OF RIGHT LOWER LEG WITH FAT LAYER EXPOSED (HCC): ICD-10-CM

## 2024-02-20 PROBLEM — I77.810 ASCENDING AORTA DILATATION (HCC): Status: ACTIVE | Noted: 2024-02-20

## 2024-02-20 LAB
POC INR: 7.2
PROTHROMBIN TIME, POC: 86

## 2024-02-20 PROCEDURE — 1123F ACP DISCUSS/DSCN MKR DOCD: CPT | Performed by: INTERNAL MEDICINE

## 2024-02-20 PROCEDURE — 99214 OFFICE O/P EST MOD 30 MIN: CPT | Performed by: INTERNAL MEDICINE

## 2024-02-20 PROCEDURE — 3074F SYST BP LT 130 MM HG: CPT | Performed by: INTERNAL MEDICINE

## 2024-02-20 PROCEDURE — 3078F DIAST BP <80 MM HG: CPT | Performed by: INTERNAL MEDICINE

## 2024-02-20 PROCEDURE — G8427 DOCREV CUR MEDS BY ELIG CLIN: HCPCS | Performed by: INTERNAL MEDICINE

## 2024-02-20 PROCEDURE — G8400 PT W/DXA NO RESULTS DOC: HCPCS | Performed by: INTERNAL MEDICINE

## 2024-02-20 PROCEDURE — 1036F TOBACCO NON-USER: CPT | Performed by: INTERNAL MEDICINE

## 2024-02-20 PROCEDURE — 1090F PRES/ABSN URINE INCON ASSESS: CPT | Performed by: INTERNAL MEDICINE

## 2024-02-20 PROCEDURE — G8484 FLU IMMUNIZE NO ADMIN: HCPCS | Performed by: INTERNAL MEDICINE

## 2024-02-20 PROCEDURE — 85610 PROTHROMBIN TIME: CPT | Performed by: INTERNAL MEDICINE

## 2024-02-20 PROCEDURE — G8417 CALC BMI ABV UP PARAM F/U: HCPCS | Performed by: INTERNAL MEDICINE

## 2024-02-20 SDOH — ECONOMIC STABILITY: INCOME INSECURITY: HOW HARD IS IT FOR YOU TO PAY FOR THE VERY BASICS LIKE FOOD, HOUSING, MEDICAL CARE, AND HEATING?: NOT HARD AT ALL

## 2024-02-20 SDOH — ECONOMIC STABILITY: FOOD INSECURITY: WITHIN THE PAST 12 MONTHS, THE FOOD YOU BOUGHT JUST DIDN'T LAST AND YOU DIDN'T HAVE MONEY TO GET MORE.: NEVER TRUE

## 2024-02-20 SDOH — ECONOMIC STABILITY: FOOD INSECURITY: WITHIN THE PAST 12 MONTHS, YOU WORRIED THAT YOUR FOOD WOULD RUN OUT BEFORE YOU GOT MONEY TO BUY MORE.: NEVER TRUE

## 2024-02-20 ASSESSMENT — ANXIETY QUESTIONNAIRES
1. FEELING NERVOUS, ANXIOUS, OR ON EDGE: 0
4. TROUBLE RELAXING: 0
6. BECOMING EASILY ANNOYED OR IRRITABLE: 0
IF YOU CHECKED OFF ANY PROBLEMS ON THIS QUESTIONNAIRE, HOW DIFFICULT HAVE THESE PROBLEMS MADE IT FOR YOU TO DO YOUR WORK, TAKE CARE OF THINGS AT HOME, OR GET ALONG WITH OTHER PEOPLE: NOT DIFFICULT AT ALL
7. FEELING AFRAID AS IF SOMETHING AWFUL MIGHT HAPPEN: 0
5. BEING SO RESTLESS THAT IT IS HARD TO SIT STILL: 0
GAD7 TOTAL SCORE: 0
3. WORRYING TOO MUCH ABOUT DIFFERENT THINGS: 0
2. NOT BEING ABLE TO STOP OR CONTROL WORRYING: 0

## 2024-02-20 ASSESSMENT — PATIENT HEALTH QUESTIONNAIRE - PHQ9
SUM OF ALL RESPONSES TO PHQ9 QUESTIONS 1 & 2: 0
SUM OF ALL RESPONSES TO PHQ QUESTIONS 1-9: 0
2. FEELING DOWN, DEPRESSED OR HOPELESS: 0
SUM OF ALL RESPONSES TO PHQ QUESTIONS 1-9: 0
1. LITTLE INTEREST OR PLEASURE IN DOING THINGS: 0

## 2024-02-20 NOTE — PROGRESS NOTES
Chief Complaint   Patient presents with    Follow-up    Hypertension   Patient states \" currently taking warfarin Mon-Fri 5 mg Sat- Sun 7.5 mg   Results for orders placed or performed in visit on 02/20/24   AMB POC PT/INR   Result Value Ref Range    Prothrombin time, POC 86.0     POC INR 7.2     Reviewed results with dr Aly Rizvi  \"Have you been to the ER, urgent care clinic since your last visit?  Hospitalized since your last visit?\"    NO    “Have you seen or consulted any other health care providers outside of [facility] since your last visit?”    NO

## 2024-02-20 NOTE — PROGRESS NOTES
[facility]  [address]   [address]  [facility] [address]  Phone:  [phone]  Fax: [phone]       Chief Complaint   Patient presents with    Follow-up    Hypertension   .      SUBJECTIVE:    Kylee Koo is a 83 y.o. female Comes in for return visit because her proBNP was well over 1,000, which suggests that there may be some significant cardiac dysfunction. She therefore needs to have an echocardiogram done.    She then tells me she saw her cardiologist, who performed an echocardiogram.  She did not know the results.  Apparently additional studies are planned for the patient, although she could not tell me what they would be.    She remains on her diuretic at least once a day.      She has stasis changes of her lower extremities, for which she is getting wound care from the wound healing center at [facility].  Wound dressings are changed every other day three days a week.    She has a past history of primary hypertension, dyslipidemia, multiple myeloma, apparently in remission, as well as severe obesity and osteoarthritis with predominant involvement of her hands.             No current facility-administered medications for this visit.     No current outpatient medications on file.     Facility-Administered Medications Ordered in Other Visits   Medication Dose Route Frequency Provider Last Rate Last Admin    oxyCODONE (ROXICODONE) immediate release tablet 5 mg  5 mg Oral Q4H PRN Chaka Marino MD   5 mg at 02/26/24 1716    acetaminophen (TYLENOL) tablet 650 mg  650 mg Oral Q4H PRN Chaka Marino MD        ondansetron (ZOFRAN) injection 4 mg  4 mg IntraVENous Q4H PRN Chaka Marino MD        piperacillin-tazobactam (ZOSYN) 3,375 mg in sodium chloride 0.9 % 50 mL IVPB (mini-bag)  3,375 mg IntraVENous q8h Chaka Marino MD 12.5 mL/hr at 02/26/24 1754 3,375 mg at 02/26/24 1754    [START ON 2/27/2024] amLODIPine (NORVASC) tablet 10 mg  10 mg Oral Daily Chaka Marino

## 2024-02-24 ENCOUNTER — HOSPITAL ENCOUNTER (EMERGENCY)
Facility: HOSPITAL | Age: 84
Discharge: HOME | DRG: 602 | End: 2024-02-24
Attending: STUDENT IN AN ORGANIZED HEALTH CARE EDUCATION/TRAINING PROGRAM
Payer: MEDICARE

## 2024-02-24 VITALS
OXYGEN SATURATION: 100 % | WEIGHT: 225.31 LBS | RESPIRATION RATE: 16 BRPM | BODY MASS INDEX: 32.26 KG/M2 | DIASTOLIC BLOOD PRESSURE: 64 MMHG | HEIGHT: 70 IN | HEART RATE: 75 BPM | SYSTOLIC BLOOD PRESSURE: 99 MMHG | TEMPERATURE: 98.4 F

## 2024-02-24 DIAGNOSIS — L03.90 CELLULITIS, UNSPECIFIED CELLULITIS SITE: Primary | ICD-10-CM

## 2024-02-24 LAB
ALBUMIN SERPL-MCNC: 2.6 G/DL (ref 3.5–5)
ALBUMIN/GLOB SERPL: 0.7 (ref 1.1–2.2)
ALP SERPL-CCNC: 113 U/L (ref 45–117)
ALT SERPL-CCNC: 21 U/L (ref 12–78)
ANION GAP SERPL CALC-SCNC: 5 MMOL/L (ref 5–15)
AST SERPL-CCNC: 11 U/L (ref 15–37)
BACTERIA SPEC CULT: ABNORMAL
BASOPHILS # BLD: 0 K/UL (ref 0–0.1)
BASOPHILS NFR BLD: 0 % (ref 0–1)
BILIRUB SERPL-MCNC: 0.6 MG/DL (ref 0.2–1)
BUN SERPL-MCNC: 18 MG/DL (ref 6–20)
BUN/CREAT SERPL: 13 (ref 12–20)
CALCIUM SERPL-MCNC: 8.6 MG/DL (ref 8.5–10.1)
CHLORIDE SERPL-SCNC: 109 MMOL/L (ref 97–108)
CO2 SERPL-SCNC: 28 MMOL/L (ref 21–32)
CREAT SERPL-MCNC: 1.35 MG/DL (ref 0.55–1.02)
DIFFERENTIAL METHOD BLD: ABNORMAL
EOSINOPHIL # BLD: 0.1 K/UL (ref 0–0.4)
EOSINOPHIL NFR BLD: 1 % (ref 0–7)
ERYTHROCYTE [DISTWIDTH] IN BLOOD BY AUTOMATED COUNT: 16.2 % (ref 11.5–14.5)
GLOBULIN SER CALC-MCNC: 4 G/DL (ref 2–4)
GLUCOSE SERPL-MCNC: 170 MG/DL (ref 65–100)
GRAM STN SPEC: ABNORMAL
HCT VFR BLD AUTO: 29.8 % (ref 35–47)
HGB BLD-MCNC: 8.8 G/DL (ref 11.5–16)
IMM GRANULOCYTES # BLD AUTO: 0.1 K/UL (ref 0–0.04)
IMM GRANULOCYTES NFR BLD AUTO: 1 % (ref 0–0.5)
LYMPHOCYTES # BLD: 0.6 K/UL (ref 0.8–3.5)
LYMPHOCYTES NFR BLD: 8 % (ref 12–49)
MCH RBC QN AUTO: 26.3 PG (ref 26–34)
MCHC RBC AUTO-ENTMCNC: 29.5 G/DL (ref 30–36.5)
MCV RBC AUTO: 89.2 FL (ref 80–99)
MONOCYTES # BLD: 1.3 K/UL (ref 0–1)
MONOCYTES NFR BLD: 17 % (ref 5–13)
NEUTS SEG # BLD: 5.7 K/UL (ref 1.8–8)
NEUTS SEG NFR BLD: 73 % (ref 32–75)
NRBC # BLD: 0 K/UL (ref 0–0.01)
NRBC BLD-RTO: 0 PER 100 WBC
PLATELET # BLD AUTO: 344 K/UL (ref 150–400)
PMV BLD AUTO: 11 FL (ref 8.9–12.9)
POTASSIUM SERPL-SCNC: 3.5 MMOL/L (ref 3.5–5.1)
PROT SERPL-MCNC: 6.6 G/DL (ref 6.4–8.2)
RBC # BLD AUTO: 3.34 M/UL (ref 3.8–5.2)
RBC MORPH BLD: ABNORMAL
RBC MORPH BLD: ABNORMAL
SERVICE CMNT-IMP: ABNORMAL
SODIUM SERPL-SCNC: 142 MMOL/L (ref 136–145)
WBC # BLD AUTO: 7.8 K/UL (ref 3.6–11)

## 2024-02-24 PROCEDURE — 6370000000 HC RX 637 (ALT 250 FOR IP): Performed by: STUDENT IN AN ORGANIZED HEALTH CARE EDUCATION/TRAINING PROGRAM

## 2024-02-24 PROCEDURE — 36415 COLL VENOUS BLD VENIPUNCTURE: CPT

## 2024-02-24 PROCEDURE — 99283 EMERGENCY DEPT VISIT LOW MDM: CPT

## 2024-02-24 PROCEDURE — 85025 COMPLETE CBC W/AUTO DIFF WBC: CPT

## 2024-02-24 PROCEDURE — 80053 COMPREHEN METABOLIC PANEL: CPT

## 2024-02-24 RX ORDER — CEPHALEXIN 250 MG/1
500 CAPSULE ORAL
Status: COMPLETED | OUTPATIENT
Start: 2024-02-24 | End: 2024-02-24

## 2024-02-24 RX ORDER — DOXYCYCLINE HYCLATE 100 MG
100 TABLET ORAL ONCE
Status: COMPLETED | OUTPATIENT
Start: 2024-02-24 | End: 2024-02-24

## 2024-02-24 RX ORDER — CEPHALEXIN 500 MG/1
500 CAPSULE ORAL 4 TIMES DAILY
Qty: 28 CAPSULE | Refills: 0 | Status: SHIPPED | OUTPATIENT
Start: 2024-02-24 | End: 2024-02-25 | Stop reason: ALTCHOICE

## 2024-02-24 RX ORDER — GABAPENTIN 100 MG/1
100 CAPSULE ORAL NIGHTLY
Qty: 3 CAPSULE | Refills: 0 | Status: SHIPPED | OUTPATIENT
Start: 2024-02-24 | End: 2024-02-27

## 2024-02-24 RX ORDER — DOXYCYCLINE HYCLATE 100 MG/1
100 CAPSULE ORAL 2 TIMES DAILY
Qty: 14 CAPSULE | Refills: 0 | Status: ON HOLD | OUTPATIENT
Start: 2024-02-24 | End: 2024-03-04 | Stop reason: HOSPADM

## 2024-02-24 RX ADMIN — CEPHALEXIN 500 MG: 250 CAPSULE ORAL at 15:29

## 2024-02-24 RX ADMIN — DOXYCYCLINE HYCLATE 100 MG: 100 TABLET, COATED ORAL at 15:29

## 2024-02-24 ASSESSMENT — PAIN DESCRIPTION - LOCATION: LOCATION: LEG

## 2024-02-24 ASSESSMENT — PAIN SCALES - GENERAL: PAINLEVEL_OUTOF10: 9

## 2024-02-24 ASSESSMENT — PAIN - FUNCTIONAL ASSESSMENT: PAIN_FUNCTIONAL_ASSESSMENT: 0-10

## 2024-02-24 NOTE — ED NOTES
Pt's IV removed. Pt provided D/C instructions and states understanding of D/C teaching. Pt has all belongings. Pt ambulates self with walker out of ED to personal vehicle at this time.  Patient is A&Ox4, on RA, VSS, and is in NAD at the time of discharge.

## 2024-02-25 RX ORDER — LEVOFLOXACIN 750 MG/1
750 TABLET, FILM COATED ORAL DAILY
Qty: 7 TABLET | Refills: 0 | Status: ON HOLD | OUTPATIENT
Start: 2024-02-25 | End: 2024-03-04

## 2024-02-25 NOTE — ED PROVIDER NOTES
[facility] EMERGENCY DEPT  EMERGENCY DEPARTMENT ENCOUNTER       Pt Name: Kylee Koo  MRN: [id number]  Birthdate 1940  Date of evaluation: 2/24/2024  Provider: Jair Zaragoza MD   PCP: Aly Rizvi MD  Note Started: 9:23 PM EST 2/24/24     CHIEF COMPLAINT       Chief Complaint   Patient presents with    Leg Pain     Pt has a right lower leg pain (pt has a wound) and has more pain     HISTORY OF PRESENT ILLNESS: 1 or more elements      History From: patient, History limited by: none     Kylee Koo is a 83 y.o. female w hx as below who presents to the ED with a chronic right lower extremity wound that she thinks is infected.  She has wound care nurses come out to her house three times per week to evaluate her wound and on the last wound check they were concerned for the possibility of infection.  She reports that she has worsened pain of her legs that has not been alleviated with Tylenol.  She denies any fevers or chills.  She also feels like she has a wound starting on her left leg (both in compression dressings) but she is more concerned with the right leg.  She takes coumadin and had her INR checked last week - it was 3.    Please See MDM for Additional Details of the HPI/PMH  Nursing Notes were all reviewed and agreed with or any disagreements were addressed in the HPI.     REVIEW OF SYSTEMS        Positives and Pertinent negatives as per HPI.    PAST HISTORY     Past Medical History:  Past Medical History:   Diagnosis Date    Acute combined systolic and diastolic HF (heart failure), NYHA class 2 (HCC) 08/11/2017    Arthritis     Atrial fibrillation (HCC)     Cancer (HCC)     multiple myeloma    Chronic renal disease, stage III (HCC) 06/23/2022    Congestive heart failure (HCC)     Hypertension     Long term current use of anticoagulant therapy     Menopause     Pacemaker     S/P AV brenda ablation 08/11/2017    Status post biventricular pacemaker 08/11/2017 8/11/17        Past Surgical  02/24/24 1214   BP: 99/64   Pulse: 75   Resp: 16   Temp: 98.4 °F (36.9 °C)   TempSrc: Oral   SpO2: 100%   Weight: 102.2 kg (225 lb 5 oz)   Height: 1.778 m (5' 10\")        Patient was given the following medications:  Medications   doxycycline hyclate (VIBRA-TABS) tablet 100 mg (100 mg Oral Given 2/24/24 1529)   cephALEXin (KEFLEX) capsule 500 mg (500 mg Oral Given 2/24/24 1529)     Medical Decision Making  Patient with worsened pain and swelling of right leg.  I am concerned for soft tissue infection, but doubt necrotizing fasciitis or underlying abscess.  Do not think surgical debridement immediately necessary at this point and doubt underlying abscess.  Patient with no fever, no leukocytosis - doubt sepsis.  Think trial of PO antibiotics appropriate and patient with close followup with her wound care specialist on 2/26/24.  Gave first dose of antibiotics in ED and discharged with prescription.  Discussed customary return precautions with the patient.  Will trial low-dose gabapentin at night for worsened pain.    Amount and/or Complexity of Data Reviewed  Independent Historian:      Details: Children provided additional history  External Data Reviewed: notes.     Details: Reviewed outpatient notes and wound culture results from Dr. Love and Dr. Rizvi  Labs: ordered. Decision-making details documented in ED Course.    Risk  Prescription drug management.          ED Course as of 02/24/24 2123   Sat Feb 24, 2024   1444 Labs reassuring - no leukocytosis.  Cr worsened, but not quite to level of JOHN.  Feel discharge is appropriate with PO doxycycline and PO cephalexin - will give first doses in ED.  Discussed customary return precautions. [WB]      ED Course User Index  [WB] Jair Zaragoza MD     2/25/24 9:17: Called patient's daughter and spoke to her.  She reports that her mother still has pain of her legs and that she had an accident where she was unable to get up this morning.  I discussed that unfortunately

## 2024-02-26 ENCOUNTER — HOSPITAL ENCOUNTER (OUTPATIENT)
Facility: HOSPITAL | Age: 84
Discharge: HOME | End: 2024-02-26
Attending: SURGERY
Payer: MEDICARE

## 2024-02-26 ENCOUNTER — HOSPITAL ENCOUNTER (INPATIENT)
Facility: HOSPITAL | Age: 84
LOS: 7 days | Discharge: NURSING, REHAB OR LONG-TERM CARE | DRG: 602 | End: 2024-03-04
Attending: STUDENT IN AN ORGANIZED HEALTH CARE EDUCATION/TRAINING PROGRAM | Admitting: INTERNAL MEDICINE
Payer: MEDICARE

## 2024-02-26 VITALS
DIASTOLIC BLOOD PRESSURE: 55 MMHG | SYSTOLIC BLOOD PRESSURE: 106 MMHG | HEART RATE: 75 BPM | TEMPERATURE: 97.7 F | RESPIRATION RATE: 18 BRPM

## 2024-02-26 DIAGNOSIS — L97.912 NON-PRESSURE CHRONIC ULCER OF RIGHT LOWER LEG WITH FAT LAYER EXPOSED (HCC): Primary | ICD-10-CM

## 2024-02-26 DIAGNOSIS — R60.0 LOWER EXTREMITY EDEMA: ICD-10-CM

## 2024-02-26 DIAGNOSIS — T14.8XXA WOUND INFECTION: Primary | ICD-10-CM

## 2024-02-26 DIAGNOSIS — L08.9 WOUND INFECTION: Primary | ICD-10-CM

## 2024-02-26 DIAGNOSIS — R42 VERTIGO: ICD-10-CM

## 2024-02-26 LAB
ALBUMIN SERPL-MCNC: 2.6 G/DL (ref 3.5–5)
ALBUMIN/GLOB SERPL: 0.7 (ref 1.1–2.2)
ALP SERPL-CCNC: 120 U/L (ref 45–117)
ALT SERPL-CCNC: 21 U/L (ref 12–78)
ANION GAP SERPL CALC-SCNC: 5 MMOL/L (ref 5–15)
AST SERPL-CCNC: 12 U/L (ref 15–37)
BASOPHILS # BLD: 0 K/UL (ref 0–0.1)
BASOPHILS NFR BLD: 0 % (ref 0–1)
BILIRUB SERPL-MCNC: 0.5 MG/DL (ref 0.2–1)
BUN SERPL-MCNC: 19 MG/DL (ref 6–20)
BUN/CREAT SERPL: 15 (ref 12–20)
CALCIUM SERPL-MCNC: 8.9 MG/DL (ref 8.5–10.1)
CHLORIDE SERPL-SCNC: 110 MMOL/L (ref 97–108)
CO2 SERPL-SCNC: 30 MMOL/L (ref 21–32)
COMMENT:: NORMAL
CREAT SERPL-MCNC: 1.23 MG/DL (ref 0.55–1.02)
DIFFERENTIAL METHOD BLD: ABNORMAL
EOSINOPHIL # BLD: 0 K/UL (ref 0–0.4)
EOSINOPHIL NFR BLD: 0 % (ref 0–7)
ERYTHROCYTE [DISTWIDTH] IN BLOOD BY AUTOMATED COUNT: 16.2 % (ref 11.5–14.5)
ERYTHROCYTE [SEDIMENTATION RATE] IN BLOOD: 127 MM/HR (ref 0–30)
GLOBULIN SER CALC-MCNC: 3.6 G/DL (ref 2–4)
GLUCOSE SERPL-MCNC: 95 MG/DL (ref 65–100)
HCT VFR BLD AUTO: 30.4 % (ref 35–47)
HGB BLD-MCNC: 8.9 G/DL (ref 11.5–16)
IMM GRANULOCYTES # BLD AUTO: 0 K/UL (ref 0–0.04)
IMM GRANULOCYTES NFR BLD AUTO: 0 % (ref 0–0.5)
INR PPP: 8.1 (ref 0.9–1.1)
LYMPHOCYTES # BLD: 0.7 K/UL (ref 0.8–3.5)
LYMPHOCYTES NFR BLD: 10 % (ref 12–49)
MCH RBC QN AUTO: 26 PG (ref 26–34)
MCHC RBC AUTO-ENTMCNC: 29.3 G/DL (ref 30–36.5)
MCV RBC AUTO: 88.9 FL (ref 80–99)
MONOCYTES # BLD: 1.4 K/UL (ref 0–1)
MONOCYTES NFR BLD: 20 % (ref 5–13)
NEUTS SEG # BLD: 5 K/UL (ref 1.8–8)
NEUTS SEG NFR BLD: 70 % (ref 32–75)
NRBC # BLD: 0 K/UL (ref 0–0.01)
NRBC BLD-RTO: 0 PER 100 WBC
NT PRO BNP: 906 PG/ML
PLATELET # BLD AUTO: 351 K/UL (ref 150–400)
PMV BLD AUTO: 10.3 FL (ref 8.9–12.9)
POTASSIUM SERPL-SCNC: 3.6 MMOL/L (ref 3.5–5.1)
PROT SERPL-MCNC: 6.2 G/DL (ref 6.4–8.2)
PROTHROMBIN TIME: 73.4 SEC (ref 9–11.1)
RBC # BLD AUTO: 3.42 M/UL (ref 3.8–5.2)
RBC MORPH BLD: ABNORMAL
SODIUM SERPL-SCNC: 145 MMOL/L (ref 136–145)
SPECIMEN HOLD: NORMAL
WBC # BLD AUTO: 7.1 K/UL (ref 3.6–11)

## 2024-02-26 PROCEDURE — 83880 ASSAY OF NATRIURETIC PEPTIDE: CPT

## 2024-02-26 PROCEDURE — 85652 RBC SED RATE AUTOMATED: CPT

## 2024-02-26 PROCEDURE — 6360000002 HC RX W HCPCS: Performed by: STUDENT IN AN ORGANIZED HEALTH CARE EDUCATION/TRAINING PROGRAM

## 2024-02-26 PROCEDURE — 85610 PROTHROMBIN TIME: CPT

## 2024-02-26 PROCEDURE — 80053 COMPREHEN METABOLIC PANEL: CPT

## 2024-02-26 PROCEDURE — 1100000000 HC RM PRIVATE

## 2024-02-26 PROCEDURE — 99214 OFFICE O/P EST MOD 30 MIN: CPT | Performed by: SURGERY

## 2024-02-26 PROCEDURE — 87040 BLOOD CULTURE FOR BACTERIA: CPT

## 2024-02-26 PROCEDURE — 2500000003 HC RX 250 WO HCPCS: Performed by: STUDENT IN AN ORGANIZED HEALTH CARE EDUCATION/TRAINING PROGRAM

## 2024-02-26 PROCEDURE — 6370000000 HC RX 637 (ALT 250 FOR IP): Performed by: INTERNAL MEDICINE

## 2024-02-26 PROCEDURE — 6360000002 HC RX W HCPCS: Performed by: INTERNAL MEDICINE

## 2024-02-26 PROCEDURE — 2580000003 HC RX 258: Performed by: INTERNAL MEDICINE

## 2024-02-26 PROCEDURE — 96365 THER/PROPH/DIAG IV INF INIT: CPT

## 2024-02-26 PROCEDURE — 85025 COMPLETE CBC W/AUTO DIFF WBC: CPT

## 2024-02-26 PROCEDURE — 36415 COLL VENOUS BLD VENIPUNCTURE: CPT

## 2024-02-26 PROCEDURE — 99214 OFFICE O/P EST MOD 30 MIN: CPT

## 2024-02-26 PROCEDURE — 2580000003 HC RX 258: Performed by: STUDENT IN AN ORGANIZED HEALTH CARE EDUCATION/TRAINING PROGRAM

## 2024-02-26 PROCEDURE — 96375 TX/PRO/DX INJ NEW DRUG ADDON: CPT

## 2024-02-26 PROCEDURE — 96374 THER/PROPH/DIAG INJ IV PUSH: CPT

## 2024-02-26 PROCEDURE — 99285 EMERGENCY DEPT VISIT HI MDM: CPT

## 2024-02-26 RX ORDER — LIDOCAINE HYDROCHLORIDE 20 MG/ML
JELLY TOPICAL ONCE
OUTPATIENT
Start: 2024-02-26 | End: 2024-02-26

## 2024-02-26 RX ORDER — HYDROMORPHONE HYDROCHLORIDE 1 MG/ML
0.25 INJECTION, SOLUTION INTRAMUSCULAR; INTRAVENOUS; SUBCUTANEOUS
Status: COMPLETED | OUTPATIENT
Start: 2024-02-26 | End: 2024-02-26

## 2024-02-26 RX ORDER — LIDOCAINE HYDROCHLORIDE 40 MG/ML
SOLUTION TOPICAL ONCE
OUTPATIENT
Start: 2024-02-26 | End: 2024-02-26

## 2024-02-26 RX ORDER — CLOBETASOL PROPIONATE 0.5 MG/G
OINTMENT TOPICAL ONCE
OUTPATIENT
Start: 2024-02-26 | End: 2024-02-26

## 2024-02-26 RX ORDER — MECLIZINE HCL 12.5 MG/1
12.5 TABLET ORAL 3 TIMES DAILY PRN
Status: DISCONTINUED | OUTPATIENT
Start: 2024-02-26 | End: 2024-03-04 | Stop reason: HOSPADM

## 2024-02-26 RX ORDER — POLYETHYLENE GLYCOL 3350 17 G/17G
17 POWDER, FOR SOLUTION ORAL DAILY PRN
Status: DISCONTINUED | OUTPATIENT
Start: 2024-02-26 | End: 2024-03-04 | Stop reason: HOSPADM

## 2024-02-26 RX ORDER — GINSENG 100 MG
CAPSULE ORAL ONCE
OUTPATIENT
Start: 2024-02-26 | End: 2024-02-26

## 2024-02-26 RX ORDER — TRIAMCINOLONE ACETONIDE 1 MG/G
OINTMENT TOPICAL ONCE
OUTPATIENT
Start: 2024-02-26 | End: 2024-02-26

## 2024-02-26 RX ORDER — IBUPROFEN 200 MG
TABLET ORAL ONCE
OUTPATIENT
Start: 2024-02-26 | End: 2024-02-26

## 2024-02-26 RX ORDER — BETAMETHASONE DIPROPIONATE 0.5 MG/G
CREAM TOPICAL ONCE
OUTPATIENT
Start: 2024-02-26 | End: 2024-02-26

## 2024-02-26 RX ORDER — BACITRACIN ZINC AND POLYMYXIN B SULFATE 500; 1000 [USP'U]/G; [USP'U]/G
OINTMENT TOPICAL ONCE
OUTPATIENT
Start: 2024-02-26 | End: 2024-02-26

## 2024-02-26 RX ORDER — LIDOCAINE 40 MG/G
CREAM TOPICAL ONCE
OUTPATIENT
Start: 2024-02-26 | End: 2024-02-26

## 2024-02-26 RX ORDER — LIDOCAINE 50 MG/G
OINTMENT TOPICAL ONCE
OUTPATIENT
Start: 2024-02-26 | End: 2024-02-26

## 2024-02-26 RX ORDER — METOPROLOL SUCCINATE 25 MG/1
12.5 TABLET, EXTENDED RELEASE ORAL DAILY
Status: DISCONTINUED | OUTPATIENT
Start: 2024-02-27 | End: 2024-03-04 | Stop reason: HOSPADM

## 2024-02-26 RX ORDER — ONDANSETRON 2 MG/ML
4 INJECTION INTRAMUSCULAR; INTRAVENOUS EVERY 4 HOURS PRN
Status: DISCONTINUED | OUTPATIENT
Start: 2024-02-26 | End: 2024-03-04 | Stop reason: HOSPADM

## 2024-02-26 RX ORDER — ACETAMINOPHEN 325 MG/1
650 TABLET ORAL EVERY 4 HOURS PRN
Status: DISCONTINUED | OUTPATIENT
Start: 2024-02-26 | End: 2024-02-27

## 2024-02-26 RX ORDER — FUROSEMIDE 10 MG/ML
40 INJECTION INTRAMUSCULAR; INTRAVENOUS ONCE
Status: COMPLETED | OUTPATIENT
Start: 2024-02-26 | End: 2024-02-26

## 2024-02-26 RX ORDER — AMLODIPINE BESYLATE 5 MG/1
10 TABLET ORAL DAILY
Status: DISCONTINUED | OUTPATIENT
Start: 2024-02-27 | End: 2024-03-04

## 2024-02-26 RX ORDER — SODIUM CHLOR/HYPOCHLOROUS ACID 0.033 %
SOLUTION, IRRIGATION IRRIGATION ONCE
OUTPATIENT
Start: 2024-02-26 | End: 2024-02-26

## 2024-02-26 RX ORDER — GABAPENTIN 100 MG/1
100 CAPSULE ORAL NIGHTLY
Status: DISCONTINUED | OUTPATIENT
Start: 2024-02-26 | End: 2024-03-04 | Stop reason: HOSPADM

## 2024-02-26 RX ORDER — OXYCODONE HYDROCHLORIDE 5 MG/1
5 TABLET ORAL EVERY 4 HOURS PRN
Status: DISCONTINUED | OUTPATIENT
Start: 2024-02-26 | End: 2024-02-27

## 2024-02-26 RX ORDER — SODIUM CHLORIDE 9 MG/ML
INJECTION, SOLUTION INTRAVENOUS CONTINUOUS
Status: DISCONTINUED | OUTPATIENT
Start: 2024-02-26 | End: 2024-02-26

## 2024-02-26 RX ORDER — GENTAMICIN SULFATE 1 MG/G
OINTMENT TOPICAL ONCE
OUTPATIENT
Start: 2024-02-26 | End: 2024-02-26

## 2024-02-26 RX ADMIN — OXYCODONE 5 MG: 5 TABLET ORAL at 17:16

## 2024-02-26 RX ADMIN — HYDROMORPHONE HYDROCHLORIDE 0.25 MG: 1 INJECTION, SOLUTION INTRAMUSCULAR; INTRAVENOUS; SUBCUTANEOUS at 12:01

## 2024-02-26 RX ADMIN — OXYCODONE 5 MG: 5 TABLET ORAL at 21:17

## 2024-02-26 RX ADMIN — PIPERACILLIN AND TAZOBACTAM 4500 MG: 4; .5 INJECTION, POWDER, FOR SOLUTION INTRAVENOUS at 11:52

## 2024-02-26 RX ADMIN — SODIUM CHLORIDE: 9 INJECTION, SOLUTION INTRAVENOUS at 18:29

## 2024-02-26 RX ADMIN — PIPERACILLIN AND TAZOBACTAM 3375 MG: 3; .375 INJECTION, POWDER, LYOPHILIZED, FOR SOLUTION INTRAVENOUS at 17:54

## 2024-02-26 RX ADMIN — GABAPENTIN 100 MG: 100 CAPSULE ORAL at 21:13

## 2024-02-26 RX ADMIN — FUROSEMIDE 40 MG: 10 INJECTION, SOLUTION INTRAMUSCULAR; INTRAVENOUS at 12:05

## 2024-02-26 RX ADMIN — Medication 2500 MG: at 17:54

## 2024-02-26 ASSESSMENT — PAIN DESCRIPTION - LOCATION
LOCATION: LEG
LOCATION: LEG

## 2024-02-26 ASSESSMENT — PAIN DESCRIPTION - DESCRIPTORS
DESCRIPTORS: ACHING
DESCRIPTORS: ACHING;THROBBING

## 2024-02-26 ASSESSMENT — PAIN SCALES - GENERAL
PAINLEVEL_OUTOF10: 8
PAINLEVEL_OUTOF10: 10
PAINLEVEL_OUTOF10: 10
PAINLEVEL_OUTOF10: 8
PAINLEVEL_OUTOF10: 10

## 2024-02-26 ASSESSMENT — PAIN DESCRIPTION - ORIENTATION
ORIENTATION: RIGHT
ORIENTATION: RIGHT;LEFT

## 2024-02-26 NOTE — DISCHARGE INSTRUCTIONS
Discharge Instructions/Wound Care Orders  [facility]   [address]  [address]  [address] [facility]  Telephone: [phone]     FAX [phone]    NAME:  Kylee Koo  YOB: 1940  MEDICAL RECORD NUMBER:  [id number]  DATE:  2/26/2024   Wound Care Orders:  Bilateral lower extremity wounds   :Cleanse with soap and water, apply primary dressing Adaptic cover with secondary dressing Roll Gauze.    Home Health Agency: [facility]  Treatment Orders:   Elevate leg(s) above the level of the heart when sitting, if swelling present.  Avoid prolonged standing in one place.  Wear off-loading shoe/boot on the affected foot when walking.  Do not get dressing/cast wet.  Do not use objects to scratch inside cast/wrap.   Follow diet as prescribed:  [] Diet as tolerated: [] Diabetic Diet: Low carb no sugar [] No Added Salt:  [] Increase Protein: [] Other:Limit the amount of liquid you are drinking and avoid drinking in between meals             Follow-up:  [x] To report to ED for eval related to swelling, impaired wound healing..  Electronically signed Peri Jama RN on 2/26/2024 at 8:55 AM     Wound Care Center Information: Should you experience any significant changes in your wound(s) or have questions about your wound care, please contact the [facility] at MONDAY - FRIDAY 8:00 am - 4:30.  If you need help with your wound outside these hours and cannot wait until we are again available, contact your PCP or go to the hospital emergency room.     PLEASE NOTE: IF YOU ARE UNABLE TO OBTAIN WOUND SUPPLIES, CONTINUE TO USE THE SUPPLIES YOU HAVE AVAILABLE UNTIL YOU ARE ABLE TO REACH US. IT IS MOST IMPORTANT TO KEEP THE WOUND COVERED AT ALL TIMES.     Physician Signature:_______________________    Date: ___________ Time:  ____________

## 2024-02-26 NOTE — PROGRESS NOTES
The patient is an 83-year-old woman who is referred to the wound care center regarding ulcerations on both lower legs.     The patient was first seen in the wound care center on 2/5/2024.     The patient reported that the wounds had been present for about a month.  They have watery drainage.  She does confirm chronic swelling of the legs.  She thinks they may have gotten more swollen recently.     The patient's medication list includes furosemide 40 mg daily.  The patient's daughter thought on the patient's first clinic visit that she may not have been taking this recently.  As of 2/12/2024, the patient is reported to be back on her diuretic.  The patient does not report a diuretic response to this medication.     The patient sleeps lying in bed at night with multiple pillows.  She does get short of breath if she lies down flat.  The patient reports she had been drinking 3 water bottles per day plus additional fluids.  She is working on reducing fluid intake.  She has been instructed to avoid foods with high salt content.     The patient denies history of diabetes mellitus.  She does not describe anginal symptoms but does have history of chronic systolic congestive heart failure.  She has history of permanent biventricular pacemaker and AV node ablation for A-fib.  Medications include Coumadin.  The patient is ambulatory.  She does not describe shortness of breath with ambulation.     Past medical history includes cervical radiculopathy, reflux esophagitis, multiple myeloma, hypertension, CKD 3.     Dressing as of 2/5/2024: For right and left legs, apply Xeroform over ulcers then ABD.  Apply 2 layer compression wrap with calamine from base of toes to upper calf.     Dressing as of 2/12/2024: For right and left legs, apply Medihoney alginate over ulcers then ABD.  Apply 2 layer compression wrap with calamine from base of toes to upper calf.  Change dressing 3 times per week. [It appeared that home health was using

## 2024-02-26 NOTE — ED PROVIDER NOTES
[facility] EMERGENCY DEPT  EMERGENCY DEPARTMENT ENCOUNTER       Pt Name: Kylee Koo  MRN: [id number]  Birthdate 1940  Date of evaluation: 2/26/2024  Provider: Hosea Merida MD   PCP: Aly Rizvi MD  Note Started: 11:00 AM 2/26/24     CHIEF COMPLAINT       Chief Complaint   Patient presents with    Wound Check     Pt referred from wound care clinic by Dr. Love to be admitted for wounds both legs and a large on right leg and smaller on on left leg spreading. Pt was in ER on Saturday for pain        HISTORY OF PRESENT ILLNESS: 1 or more elements      History From: Patient  None     Kylee Koo is a 83 y.o. female who presents to the emergency department with worsening wounds on bilateral legs.  Patient states she first noticed wounds a few months ago while at the dermatologist.  Since that time wounds have been worsening, patient reports particularly over the past week she has had increasing pain particularly in the right side.  Patient seen in this ER a few days ago, prescribed oral antibiotics without improvement.  Patient seen at wound care today by Dr. Love, referred to the ER for IV antibiotics.  Dr. Love also feels that patient will require diuresis as edema is hindering wound healing.  Patient believes she is having some drainage from the wound, but states she is unsure what it looks like his wound is usually covered in dressing.  She denies associated fevers.     Nursing Notes were all reviewed and agreed with or any disagreements were addressed in the HPI.     REVIEW OF SYSTEMS      Review of Systems     Positives and Pertinent negatives as per HPI.    PAST HISTORY     Past Medical History:  Past Medical History:   Diagnosis Date    Acute combined systolic and diastolic HF (heart failure), NYHA class 2 (HCC) 08/11/2017    Arthritis     Atrial fibrillation (HCC)     Cancer (HCC)     multiple myeloma    Chronic renal disease, stage III (HCC) 06/23/2022    Congestive heart failure (HCC)           Comments: Roughly 8 cm chronic appearing wound to right shin, similar 2 cm wound present on left shin.  No active drainage.   Neurological:      General: No focal deficit present.      Mental Status: She is alert.   Psychiatric:         Mood and Affect: Mood normal.         Behavior: Behavior normal.        DIAGNOSTIC RESULTS   LABS:     No results found for this or any previous visit (from the past 24 hour(s)).}           RADIOLOGY:  Non-plain film images such as CT, Ultrasound and MRI are read by the radiologist. Plain radiographic images are visualized and preliminarily interpreted by the ED Provider with the below findings:          Interpretation per the Radiologist below, if available at the time of this note:     No orders to display           PROCEDURES   Unless otherwise noted below, none  Procedures       CRITICAL CARE TIME       SCREENINGS   NIH Stroke Score       Heart Score       Curb-65          EMERGENCY DEPARTMENT COURSE and DIFFERENTIAL DIAGNOSIS/MDM   Vitals:    Vitals:    02/26/24 0926   BP: 100/65   Pulse: 74   Resp: 16   Temp: 98.2 °F (36.8 °C)   TempSrc: Oral   SpO2: 100%            Patient was given the following medications:  Medications   furosemide (LASIX) injection 40 mg (has no administration in time range)   piperacillin-tazobactam (ZOSYN) 4,500 mg in sodium chloride 0.9 % 100 mL IVPB (mini-bag) (has no administration in time range)   HYDROmorphone HCl PF (DILAUDID) injection 0.25 mg (has no administration in time range)       CONSULTS: (Who and What was discussed)  None      Chronic Conditions:     Social Determinants affecting Dx or Tx:     Records Reviewed (source and summary of external notes): Nursing Notes    CC/HPI Summary, DDx, ED Course, and Reassessment: 83-year-old female with history above presenting the emergency department with concern for wound infection.  Most recent wound cultures did grow Pseudomonas.  Patient sent by wound clinic with recommendation for diuresis

## 2024-02-26 NOTE — PROGRESS NOTES
.End of Shift Note    Bedside shift change report given to Jc ACOSTA  (oncoming nurse) by Isidra Lee RN (offgoing nurse).  Report included the following information SBAR, Kardex, Intake/Output, MAR, Recent Results, and Med Rec Status    Shift worked:  3569-4163     Shift summary and any significant changes:     Pt given prescribed med's per MAR. Pt labs drawn. Daughter at bedside. Caring rounds completed.            Isidra Lee, RN

## 2024-02-26 NOTE — PROGRESS NOTES
Pharmacy Antimicrobial Kinetic Dosing    Indication for Antimicrobials: SSTI     Current Regimen of Each Antimicrobial:  Vancomycin pharmacy consult; Start Date 24; Day # 1  Zosyn 3.375gm q 8; Start Date 24; Day #1    Previous Antimicrobial Therapy:     Goal Level: Vancomycin -600    Date Dose & Interval Measured (mcg/mL) Predicted AUC                       Significant Cultures:    wound - HEAVY pseudomonas, moderate staph aureus, HEAVY mixed GNRs     Labs:  Recent Labs     Units 24  1154 24  1222   CREATININE MG/DL 1.23* 1.35*   BUN MG/DL 19 18   WBC K/uL 7.1 7.8     Temp (24hrs), Av.9 °F (36.6 °C), Min:97.7 °F (36.5 °C), Max:98.2 °F (36.8 °C)      Conditions for Dosing Consideration:  JOHN    Creatinine Clearance (mL/min): Estimated Creatinine Clearance: 45 mL/min (A) (based on SCr of 1.23 mg/dL (H)).       Impression/Plan:   Discharged from ED on doxy + keflex    Readmitted under advice of outpatient general surgery visit today for IV abx  Vancomycin 1250mg q 24 for projected    Zosyn for pseudomonas wound coverage   Antimicrobial stop date 7 days     Pharmacy will follow daily and adjust medications as appropriate for renal function and/or serum levels.    Thank you,  Leydi Cobb [facility]

## 2024-02-26 NOTE — ED NOTES
Assumed care of pt at this time. Pt resting comfortably at this time. Bed locked in lowest position, bed rails up x2, on monitor x3, call bell in reach. Family at the bedside.    1045 Pt resting comfortably, family at the bedside, on monitor x3, side rails up x2, call bell in reach    1130 Pt resting in stretcher comfortably, locked in lowest position, side rails up x2, call bell in reach, on monitor x3.    1230 Placed pt on purwick at this time. Pt resting comfortably on monitor x3, bed locked in lowest position, side rails up x3, side rails up x2, family at the bedside.     1330 Patient resting comfortably, family at bedside, on monitor x3, stretcher locked in lowest position with side rails up x3. Call bell in reach.    1410 Readjusted pt in bed, changed brief and chux pad, pt resting comfortably, bed locked and in lowest position with side rails up x2. Pt on monitor x3. Call bell in reach    1515 Pt resting comfortably. No complaints at this time. Call bell in reach    1600 Pt resting comfortable in ED stretcher locked in lowest position with side rails up x2. On monitor x3. Call bell in reach.

## 2024-02-26 NOTE — FLOWSHEET NOTE
02/26/24 0826   Right Leg Edema Point of Measurement   Leg circumference 53.7 cm   Ankle circumference 27 cm   Compression Therapy 2 layer compression wrap   Left Leg Edema Point of Measurement   Leg circumference 49.5 cm   Ankle circumference 26.5 cm   Compression Therapy 2 layer compression wrap   RLE Neurovascular Assessment   Capillary Refill Less than/Equal to 3 seconds   Color Appropriate for Ethnicity   Temperature Warm   RLE Sensation  Full sensation   R Pedal Pulse +3   LLE Neurovascular Assessment   Capillary Refill Less than/Equal to 3 seconds   Color Appropriate for Ethnicity   Temperature Warm   LLE Sensation  Full sensation   L Pedal Pulse +3   Wound 02/05/24 Leg Lateral;Lower;Right #2   Date First Assessed/Time First Assessed: 02/05/24 0851   Present on Original Admission: Yes  Wound Approximate Age at First Assessment (Weeks): 4 weeks  Primary Wound Type: Venous Ulcer  Location: Leg  Wound Location Orientation: Lateral;Lower;Right  ...   Wound Image     Wound Etiology Venous   Dressing Status Old drainage noted   Wound Cleansed Soap and water   Wound Length (cm) 10 cm   Wound Width (cm) 15 cm   Wound Depth (cm) 0.2 cm   Wound Surface Area (cm^2) 150 cm^2   Change in Wound Size % (l*w) -1036.36   Wound Volume (cm^3) 30 cm^3   Wound Healing % -2173   Wound Assessment Eschar moist;Slough;Pink/red   Drainage Amount Moderate (25-50%)   Drainage Description Serous   Odor None   Allison-wound Assessment Intact   Margins Defined edges   Wound Thickness Description not for Pressure Injury Full thickness   Wound 02/05/24 Leg Anterior;Left #3   Date First Assessed/Time First Assessed: 02/05/24 0853   Present on Original Admission: Yes  Wound Approximate Age at First Assessment (Weeks): 4 weeks  Primary Wound Type: Venous Ulcer  Location: Leg  Wound Location Orientation: Anterior;Left  Wound ...   Wound Image    Wound Etiology Venous   Dressing Status Old drainage noted   Wound Cleansed Soap and water   Wound

## 2024-02-27 LAB
ANION GAP SERPL CALC-SCNC: 4 MMOL/L (ref 5–15)
BASOPHILS # BLD: 0.1 K/UL (ref 0–0.1)
BASOPHILS NFR BLD: 1 % (ref 0–1)
BUN SERPL-MCNC: 15 MG/DL (ref 6–20)
BUN/CREAT SERPL: 12 (ref 12–20)
CALCIUM SERPL-MCNC: 8.6 MG/DL (ref 8.5–10.1)
CHLORIDE SERPL-SCNC: 109 MMOL/L (ref 97–108)
CO2 SERPL-SCNC: 27 MMOL/L (ref 21–32)
CREAT SERPL-MCNC: 1.24 MG/DL (ref 0.55–1.02)
DIFFERENTIAL METHOD BLD: ABNORMAL
EOSINOPHIL # BLD: 0.1 K/UL (ref 0–0.4)
EOSINOPHIL NFR BLD: 2 % (ref 0–7)
ERYTHROCYTE [DISTWIDTH] IN BLOOD BY AUTOMATED COUNT: 16.3 % (ref 11.5–14.5)
GLUCOSE SERPL-MCNC: 92 MG/DL (ref 65–100)
HCT VFR BLD AUTO: 32.4 % (ref 35–47)
HGB BLD-MCNC: 9.4 G/DL (ref 11.5–16)
IMM GRANULOCYTES # BLD AUTO: 0 K/UL (ref 0–0.04)
IMM GRANULOCYTES NFR BLD AUTO: 0 % (ref 0–0.5)
INR PPP: 7.8 (ref 0.9–1.1)
LYMPHOCYTES # BLD: 0.6 K/UL (ref 0.8–3.5)
LYMPHOCYTES NFR BLD: 12 % (ref 12–49)
MAGNESIUM SERPL-MCNC: 2 MG/DL (ref 1.6–2.4)
MCH RBC QN AUTO: 26 PG (ref 26–34)
MCHC RBC AUTO-ENTMCNC: 29 G/DL (ref 30–36.5)
MCV RBC AUTO: 89.5 FL (ref 80–99)
MONOCYTES # BLD: 1.4 K/UL (ref 0–1)
MONOCYTES NFR BLD: 26 % (ref 5–13)
NEUTS SEG # BLD: 3.2 K/UL (ref 1.8–8)
NEUTS SEG NFR BLD: 59 % (ref 32–75)
NRBC # BLD: 0 K/UL (ref 0–0.01)
NRBC BLD-RTO: 0 PER 100 WBC
PLATELET # BLD AUTO: 346 K/UL (ref 150–400)
PMV BLD AUTO: 10.6 FL (ref 8.9–12.9)
POTASSIUM SERPL-SCNC: 3.5 MMOL/L (ref 3.5–5.1)
PROTHROMBIN TIME: 71.2 SEC (ref 9–11.1)
RBC # BLD AUTO: 3.62 M/UL (ref 3.8–5.2)
RBC MORPH BLD: ABNORMAL
SODIUM SERPL-SCNC: 140 MMOL/L (ref 136–145)
WBC # BLD AUTO: 5.4 K/UL (ref 3.6–11)

## 2024-02-27 PROCEDURE — 80048 BASIC METABOLIC PNL TOTAL CA: CPT

## 2024-02-27 PROCEDURE — 99222 1ST HOSP IP/OBS MODERATE 55: CPT | Performed by: INTERNAL MEDICINE

## 2024-02-27 PROCEDURE — 85610 PROTHROMBIN TIME: CPT

## 2024-02-27 PROCEDURE — 6370000000 HC RX 637 (ALT 250 FOR IP): Performed by: INTERNAL MEDICINE

## 2024-02-27 PROCEDURE — 36415 COLL VENOUS BLD VENIPUNCTURE: CPT

## 2024-02-27 PROCEDURE — 85025 COMPLETE CBC W/AUTO DIFF WBC: CPT

## 2024-02-27 PROCEDURE — 83735 ASSAY OF MAGNESIUM: CPT

## 2024-02-27 PROCEDURE — 6360000002 HC RX W HCPCS: Performed by: INTERNAL MEDICINE

## 2024-02-27 PROCEDURE — 1100000000 HC RM PRIVATE

## 2024-02-27 PROCEDURE — 2580000003 HC RX 258: Performed by: INTERNAL MEDICINE

## 2024-02-27 RX ORDER — ACETAMINOPHEN 500 MG
1000 TABLET ORAL EVERY 8 HOURS SCHEDULED
Status: DISCONTINUED | OUTPATIENT
Start: 2024-02-27 | End: 2024-03-04 | Stop reason: HOSPADM

## 2024-02-27 RX ADMIN — PHYTONADIONE 2.5 MG: 10 INJECTION, EMULSION INTRAMUSCULAR; INTRAVENOUS; SUBCUTANEOUS at 10:27

## 2024-02-27 RX ADMIN — OXYCODONE 5 MG: 5 TABLET ORAL at 06:57

## 2024-02-27 RX ADMIN — VANCOMYCIN HYDROCHLORIDE 1250 MG: 10 INJECTION, POWDER, LYOPHILIZED, FOR SOLUTION INTRAVENOUS at 18:43

## 2024-02-27 RX ADMIN — AMLODIPINE BESYLATE 10 MG: 5 TABLET ORAL at 09:06

## 2024-02-27 RX ADMIN — METOPROLOL SUCCINATE 12.5 MG: 25 TABLET, EXTENDED RELEASE ORAL at 09:06

## 2024-02-27 RX ADMIN — GABAPENTIN 100 MG: 100 CAPSULE ORAL at 21:30

## 2024-02-27 RX ADMIN — PIPERACILLIN AND TAZOBACTAM 3375 MG: 3; .375 INJECTION, POWDER, LYOPHILIZED, FOR SOLUTION INTRAVENOUS at 09:12

## 2024-02-27 RX ADMIN — ACETAMINOPHEN 1000 MG: 500 TABLET ORAL at 21:29

## 2024-02-27 RX ADMIN — PIPERACILLIN AND TAZOBACTAM 3375 MG: 3; .375 INJECTION, POWDER, LYOPHILIZED, FOR SOLUTION INTRAVENOUS at 17:54

## 2024-02-27 RX ADMIN — ACETAMINOPHEN 1000 MG: 500 TABLET ORAL at 13:38

## 2024-02-27 RX ADMIN — PIPERACILLIN AND TAZOBACTAM 3375 MG: 3; .375 INJECTION, POWDER, LYOPHILIZED, FOR SOLUTION INTRAVENOUS at 01:11

## 2024-02-27 ASSESSMENT — PAIN DESCRIPTION - DESCRIPTORS
DESCRIPTORS: ACHING;GNAWING
DESCRIPTORS: ACHING;STABBING;THROBBING
DESCRIPTORS: ACHING;GNAWING
DESCRIPTORS: ACHING;THROBBING;GNAWING

## 2024-02-27 ASSESSMENT — PAIN DESCRIPTION - LOCATION
LOCATION: LEG

## 2024-02-27 ASSESSMENT — PAIN SCALES - GENERAL
PAINLEVEL_OUTOF10: 3
PAINLEVEL_OUTOF10: 10
PAINLEVEL_OUTOF10: 1
PAINLEVEL_OUTOF10: 10
PAINLEVEL_OUTOF10: 7

## 2024-02-27 ASSESSMENT — PAIN DESCRIPTION - ORIENTATION
ORIENTATION: RIGHT
ORIENTATION: RIGHT
ORIENTATION: RIGHT;LEFT
ORIENTATION: RIGHT

## 2024-02-27 ASSESSMENT — PAIN - FUNCTIONAL ASSESSMENT: PAIN_FUNCTIONAL_ASSESSMENT: PREVENTS OR INTERFERES WITH ALL ACTIVE AND SOME PASSIVE ACTIVITIES

## 2024-02-27 NOTE — PROGRESS NOTES
Pharmacist Daily Dosing of Warfarin    Indication & Goal INR: AFib, INR Goal 2-3    PTA Warfarin Dose: 5 mg M-F; 7.5mg Sat and Sun    Notable concurrent conditions and medications: None    Labs:  Recent Labs     Units 02/26/24  1856 02/26/24  1154 02/24/24  1222   INR   8.1*  --   --    HGB g/dL  --  8.9* 8.8*   PLT K/uL  --  351 344         Impression/Plan:   Patient confirms home regimen & she did not take warfarin today; last dose 7.5mg 2/25.  INR SUPRAtherapeutic--- hold dose this evening  Daily INR has been ordered  CBC w/o differential every other day has been ordered     Pharmacy will follow daily and adjust the dose as appropriate.    Thank you,  Carola Schwab RP

## 2024-02-27 NOTE — PROGRESS NOTES
Occupational Therapy    Orders acknowledged, chart reviewed. Pt noted with INR 7.8 and PTT 71.2. Per therapy guidelines, pt is not hemodynamically stable as he is at high risk for bleeding; not safe to participate in formal OT evaluation. Will hold and complete OT evaluation as deemed medically appropriate. Thanks.    Angelita Sosa MS, OTR/L

## 2024-02-27 NOTE — PROGRESS NOTES
.End of Shift Note    Bedside shift change report given to Jc ACOSTA  (oncoming nurse) by Isidra Lee RN (offgoing nurse).  Report included the following information SBAR, Kardex, Intake/Output, MAR, Recent Results, and Med Rec Status    Shift worked:  [id number]     Shift summary and any significant changes:     Pt given prescribed med's per MAR. Pt was given oxycodone last night and she has Codeine listed as a allergic medication. Pt was found very drowsy this morning and family concerned. Son voiced his concerns and Mo Salazar came to speak with family. I updated allergy list to list Oxycodone per family.          Isidra Lee, RN

## 2024-02-27 NOTE — WOUND CARE
Wound care nurse consult for POA wounds to BLE.  Chart reviewed    84 y/o female admitted for RLE wound infection. Patient sent from [facility] where she has been followed for BLE venous stasis wounds.  Past Medical History:   Diagnosis Date    Acute combined systolic and diastolic HF (heart failure), NYHA class 2 (HCC) 08/11/2017    Arthritis     Atrial fibrillation (HCC)     Cancer (HCC)     multiple myeloma    Chronic renal disease, stage III (HCC) 06/23/2022    Congestive heart failure (HCC)     Hypertension     Long term current use of anticoagulant therapy     Menopause     Pacemaker     S/P AV brenda ablation 08/11/2017    Status post biventricular pacemaker 08/11/2017 8/11/17      Past Surgical History:   Procedure Laterality Date    CT BONE MARROW BIOPSY  9/28/2023    CT BONE MARROW BIOPSY 9/28/2023 [facility] CT    GYN      ORTHOPEDIC SURGERY      knee    PACEMAKER  08/11/2017     BMI: 32.33 kg/m2    Wound culture 2/19 shows Heavy pseudomonas, moderate MSSA, Heavy mixed GNR's  IV ABX per hospitalist and pharmacy    RLE: strong pseudomonas smell, greenish-yellow drainage, wound bed eschar and slough  Lateral RLE    Anterior and medial RLE;    LLE: small 2 x2 eschar area - no drainage      WOUND POA CONDITIONS    Wound 02/05/24 Leg Lateral;Lower;Right #2 (Active)   Wound Image    02/27/24 1500   Wound Etiology Venous 02/27/24 1500   Dressing Status New dressing applied 02/27/24 1500   Wound Cleansed Vashe 02/27/24 1500   Dressing/Treatment Other (comment) 02/27/24 1500   Dressing Change Due 02/28/24 02/27/24 1500   Wound Length (cm) 10 cm 02/26/24 0826   Wound Width (cm) 15 cm 02/26/24 0826   Wound Depth (cm) 0.2 cm 02/26/24 0826   Wound Surface Area (cm^2) 150 cm^2 02/26/24 0826   Change in Wound Size % (l*w) -1036.36 02/26/24 0826   Wound Volume (cm^3) 30 cm^3 02/26/24 0826   Wound Healing % -2173 02/26/24 0826   Wound Assessment Eschar moist;Slough 02/27/24 1500   Drainage

## 2024-02-27 NOTE — PROGRESS NOTES
Pharmacy Antimicrobial Kinetic Dosing    Indication for Antimicrobials: SSTI     Current Regimen of Each Antimicrobial:  Vancomycin pharmacy consult; Start Date 24; Day # 2  Zosyn 3.375gm q 8; Start Date 24; Day # 2    Goal Level: Vancomycin -600    Date Dose & Interval Measured (mcg/mL) Predicted AUC                       Significant Cultures:    wound - HEAVY pseudomonas, moderate MSSA, HEAVY mixed GNRs     Labs:  Recent Labs     Units 24  0306 24  1154   CREATININE MG/DL 1.24* 1.23*   BUN MG/DL 15 19   WBC K/uL 5.4 7.1     Temp (24hrs), Av.2 °F (36.8 °C), Min:97.3 °F (36.3 °C), Max:99.3 °F (37.4 °C)      Conditions for Dosing Consideration:  JOHN    Creatinine Clearance (mL/min): Estimated Creatinine Clearance: 44 mL/min (A) (based on SCr of 1.24 mg/dL (H)).       Impression/Plan:   Discharged from ED on doxy + keflex . Readmitted under advice of outpatient general surgery visit today for IV abx  Continue vancomycin 1250mg q 24 for projected . Will check a random level tomorrow at 12:00.   Note  wound culture shows MSSA, consider de-escalation from vancomycin as clinically appropriate  Zosyn for pseudomonas wound coverage   Antimicrobial stop date 7 days     Pharmacy will follow daily and adjust medications as appropriate for renal function and/or serum levels.    Thank you,  Mindi Hoffman [facility]

## 2024-02-27 NOTE — H&P
Hospitalist Admission Note    NAME:   Kylee Koo   : 1940   MRN: [id number]     Date/Time: 2024 7:27 PM    Patient PCP: Aly Rizvi MD    ______________________________________________________________________  Given the patient's current clinical presentation, I have a high level of concern for decompensation if discharged from the emergency department.  Complex decision making was performed, which includes reviewing the patient's available past medical records, laboratory results, and x-ray films.       My assessment of this patient's clinical condition and my plan of care is as follows.    Assessment / Plan:  Bilateral lower extremity pain and swelling concerning for cellulitis associated with infected wounds  Will admit to telemetry, IV Zosyn and vancomycin  Follow-up on wound cultures  Wound care consulted    Elevation of creatinine  We will hold lisinopril and Lasix    A-fib status post pacemaker  Chronic systolic CHF  Hypertension    Continue home dose [facility]  Pharmacy consulted for warfarin dosing  Check PT/INR    Peripheral neuropathy  Continue with Neurontin    Medical Decision Making:   I personally reviewed labs: CBC BMP  I personally reviewed imaging:  I personally reviewed EKG: Yes to monitor  Toxic drug monitoring: Vancomycin level  Discussed case with: ED provider. After discussion I am in agreement that acuity of patient's medical condition necessitates hospital stay.      Code Status: Full code  DVT Prophylaxis: Warfarin  Baseline: Ambulatory    Subjective:   CHIEF COMPLAINT: Right lower extremity pain    HISTORY OF PRESENT ILLNESS:     Kylee Koo is a 83 y.o.  female with PMHx significant for acute combined systolic and diastolic CHF, A-fib status post pacemaker, multiple myeloma, hypertension who presented to the ED sent by the wound clinic with concern for infected bilateral legs.  Patient reported the lower wound on her right lower extremity is the most painful,

## 2024-02-27 NOTE — CARE COORDINATION
Care Management Initial Assessment       RUR: 19% \"Moderate Risk\"  Readmission? No  1st IM letter given? Yes - Given by patient access on 2/26/24.  1st  letter given: N/A    Initial note - 2815: Chart reviewed. CM met with pt and daughter, Rosenda Chong, at the bedside to introduce self and role. Verified contact information and demographics.     Pt resides in a one level home with 5 MARI. Pt PCP is Dr. Aly Rizvi. Preferred pharmacy is [facility] on [address] [facility]. Patient is not a . Pt has a hx of  services for wound care with [facility]. Pt has a hx of a SNF stay 10 years ago but could not recall the location or name of facility. Pt is independent with ADL's. Pt has a cane and 2 rollator at home. Pt does not use O2 at home. Pt is an active . Pt has an ACP on file; pt is a FULL code. Pt family able to transport upon discharge.    Janice Kyle, Care Management     Full assessment below:   02/27/24 0867   Service Assessment   Patient Orientation Alert and Oriented   Cognition Alert   History Provided By Patient   Primary Caregiver Self   Accompanied By/Relationship Patient's daughter, Rosenda Chong, was at bedside during assessment.   Support Systems Children;Family Members   Patient's Healthcare Decision Maker is: Named in Scanned ACP Document   PCP Verified by CM Yes  (Dr. Aly Rizvi)   Last Visit to PCP Within last 3 months  (Last visit: 2/21/24)   Prior Functional Level Independent in ADLs/IADLs   Current Functional Level Independent in ADLs/IADLs   Can patient return to prior living arrangement Yes   Ability to make needs known: Fair   Financial Resources Medicare   Social/Functional History   Lives With Alone   Type of Home House   Home Layout One level   Home Access Stairs to enter with rails   Entrance Stairs - Number of Steps 5   Home Equipment Cane;Rollator   ADL Assistance Independent   Ambulation Assistance Independent   Transfer Assistance Independent   Active  Yes

## 2024-02-27 NOTE — PROGRESS NOTES
Physical Therapy    Received PT order. Pt's INR is 7.8 and PTT is  71.2. Per guidelines for safe mobility, will defer at this time and follow when safe to mobilize without risk of bleeding. Will follow.    Faith Browning PT

## 2024-02-27 NOTE — PROGRESS NOTES
End of Shift Note    Bedside shift change report given to Toña ACOSTA (oncoming nurse) by Jc Everett, RN (offgoing nurse).  Report included the following information SBAR, Kardex, Intake/Output, MAR, and Recent Results    Shift worked:  7p-7a     Shift summary and any significant changes:     Patient tolerated care. Scheduled meds given plus PRN oxy 1x see MAR. Patient was able to rest over shift. Caring rounds provided. Caring rounds given. Incontinent care provided. Turning as tolerated.     Concerns for physician to address:  Notified provider overnight of INR of 8.1. I will redrawn in AM labs         Length of Stay:  Expected LOS: 3  Actual LOS: 1      Jc Everett, RN

## 2024-02-27 NOTE — PROGRESS NOTES
Nursing contacted Nocturnist/cross cover provider and notified patient lab result of inr 8.1, no bleeding reported. VSS. No other reported concerns at this time. Appears pharmacy is monitoring inr for coumadin dosing- appreciate their assistance, please see their note. Patient denies any further complaints or concerns. No acute distress reported. Nursing to notify Hospitalist for further/continued concerns. Will remain available overnight for further concerns if nursing/patient needs. Will defer further evaluation/management to the day shift primary care team.    Non-billable note.

## 2024-02-27 NOTE — PROGRESS NOTES
Pharmacist Daily Dosing of Warfarin    Indication & Goal INR: AFib, INR Goal 2-3    PTA Warfarin Dose: 5 mg M-F; 7.5mg Sat and Sun    Notable concurrent conditions and medications: None    Labs:  Recent Labs     Units 02/27/24  0306 02/26/24  1856 02/26/24  1154   INR   7.8* 8.1*  --    HGB g/dL 9.4*  --  8.9*   PLT K/uL 346  --  351       Impression/Plan:   Last dose warfarin dose 7.5mg PTA 2/25.  INR SUPRAtherapeutic--- hold dose this evening  Vitamin K 2.5 mg PO given 2/27 10:27  Daily INR has been ordered  CBC w/o differential every other day has been ordered     Pharmacy will follow daily and adjust the dose as appropriate.    Thank you,  Mindi Hoffman [facility]

## 2024-02-28 ENCOUNTER — APPOINTMENT (OUTPATIENT)
Facility: HOSPITAL | Age: 84
DRG: 602 | End: 2024-02-28
Payer: MEDICARE

## 2024-02-28 LAB
ANION GAP SERPL CALC-SCNC: 5 MMOL/L (ref 5–15)
BASOPHILS # BLD: 0.1 K/UL (ref 0–0.1)
BASOPHILS NFR BLD: 1 % (ref 0–1)
BUN SERPL-MCNC: 17 MG/DL (ref 6–20)
BUN/CREAT SERPL: 14 (ref 12–20)
CALCIUM SERPL-MCNC: 8.7 MG/DL (ref 8.5–10.1)
CHLORIDE SERPL-SCNC: 105 MMOL/L (ref 97–108)
CO2 SERPL-SCNC: 28 MMOL/L (ref 21–32)
CREAT SERPL-MCNC: 1.21 MG/DL (ref 0.55–1.02)
DIFFERENTIAL METHOD BLD: ABNORMAL
EOSINOPHIL # BLD: 0 K/UL (ref 0–0.4)
EOSINOPHIL NFR BLD: 0 % (ref 0–7)
ERYTHROCYTE [DISTWIDTH] IN BLOOD BY AUTOMATED COUNT: 16.1 % (ref 11.5–14.5)
GLUCOSE SERPL-MCNC: 109 MG/DL (ref 65–100)
HCT VFR BLD AUTO: 26.7 % (ref 35–47)
HGB BLD-MCNC: 7.9 G/DL (ref 11.5–16)
IMM GRANULOCYTES # BLD AUTO: 0.1 K/UL (ref 0–0.04)
IMM GRANULOCYTES NFR BLD AUTO: 1 % (ref 0–0.5)
INR PPP: 4.3 (ref 0.9–1.1)
LYMPHOCYTES # BLD: 0.7 K/UL (ref 0.8–3.5)
LYMPHOCYTES NFR BLD: 9 % (ref 12–49)
MCH RBC QN AUTO: 26 PG (ref 26–34)
MCHC RBC AUTO-ENTMCNC: 29.6 G/DL (ref 30–36.5)
MCV RBC AUTO: 87.8 FL (ref 80–99)
MONOCYTES # BLD: 2.5 K/UL (ref 0–1)
MONOCYTES NFR BLD: 32 % (ref 5–13)
NEUTS SEG # BLD: 4.4 K/UL (ref 1.8–8)
NEUTS SEG NFR BLD: 57 % (ref 32–75)
NRBC # BLD: 0 K/UL (ref 0–0.01)
NRBC BLD-RTO: 0 PER 100 WBC
NT PRO BNP: 2079 PG/ML
PLATELET # BLD AUTO: 293 K/UL (ref 150–400)
PMV BLD AUTO: 10.7 FL (ref 8.9–12.9)
POTASSIUM SERPL-SCNC: 3.1 MMOL/L (ref 3.5–5.1)
PROTHROMBIN TIME: 40.8 SEC (ref 9–11.1)
RBC # BLD AUTO: 3.04 M/UL (ref 3.8–5.2)
RBC MORPH BLD: ABNORMAL
RBC MORPH BLD: ABNORMAL
SODIUM SERPL-SCNC: 138 MMOL/L (ref 136–145)
VANCOMYCIN SERPL-MCNC: 12.6 UG/ML
WBC # BLD AUTO: 7.8 K/UL (ref 3.6–11)

## 2024-02-28 PROCEDURE — 6360000002 HC RX W HCPCS: Performed by: INTERNAL MEDICINE

## 2024-02-28 PROCEDURE — 83880 ASSAY OF NATRIURETIC PEPTIDE: CPT

## 2024-02-28 PROCEDURE — 85025 COMPLETE CBC W/AUTO DIFF WBC: CPT

## 2024-02-28 PROCEDURE — 80202 ASSAY OF VANCOMYCIN: CPT

## 2024-02-28 PROCEDURE — 71045 X-RAY EXAM CHEST 1 VIEW: CPT

## 2024-02-28 PROCEDURE — 97535 SELF CARE MNGMENT TRAINING: CPT

## 2024-02-28 PROCEDURE — 85610 PROTHROMBIN TIME: CPT

## 2024-02-28 PROCEDURE — 6370000000 HC RX 637 (ALT 250 FOR IP): Performed by: INTERNAL MEDICINE

## 2024-02-28 PROCEDURE — 97162 PT EVAL MOD COMPLEX 30 MIN: CPT

## 2024-02-28 PROCEDURE — 80048 BASIC METABOLIC PNL TOTAL CA: CPT

## 2024-02-28 PROCEDURE — 99222 1ST HOSP IP/OBS MODERATE 55: CPT | Performed by: INTERNAL MEDICINE

## 2024-02-28 PROCEDURE — 2580000003 HC RX 258: Performed by: INTERNAL MEDICINE

## 2024-02-28 PROCEDURE — 97165 OT EVAL LOW COMPLEX 30 MIN: CPT

## 2024-02-28 PROCEDURE — 97530 THERAPEUTIC ACTIVITIES: CPT

## 2024-02-28 PROCEDURE — 1100000000 HC RM PRIVATE

## 2024-02-28 PROCEDURE — 36415 COLL VENOUS BLD VENIPUNCTURE: CPT

## 2024-02-28 RX ORDER — POTASSIUM CHLORIDE 1.5 G/1.58G
20 POWDER, FOR SOLUTION ORAL 3 TIMES DAILY
Status: DISCONTINUED | OUTPATIENT
Start: 2024-02-28 | End: 2024-02-29 | Stop reason: CLARIF

## 2024-02-28 RX ADMIN — PIPERACILLIN AND TAZOBACTAM 3375 MG: 3; .375 INJECTION, POWDER, LYOPHILIZED, FOR SOLUTION INTRAVENOUS at 09:48

## 2024-02-28 RX ADMIN — PIPERACILLIN AND TAZOBACTAM 3375 MG: 3; .375 INJECTION, POWDER, LYOPHILIZED, FOR SOLUTION INTRAVENOUS at 18:17

## 2024-02-28 RX ADMIN — METOPROLOL SUCCINATE 12.5 MG: 25 TABLET, EXTENDED RELEASE ORAL at 09:40

## 2024-02-28 RX ADMIN — POTASSIUM CHLORIDE 20 MEQ: 1.5 FOR SOLUTION ORAL at 22:47

## 2024-02-28 RX ADMIN — ACETAMINOPHEN 1000 MG: 500 TABLET ORAL at 22:47

## 2024-02-28 RX ADMIN — PIPERACILLIN AND TAZOBACTAM 3375 MG: 3; .375 INJECTION, POWDER, LYOPHILIZED, FOR SOLUTION INTRAVENOUS at 01:12

## 2024-02-28 RX ADMIN — ACETAMINOPHEN 1000 MG: 500 TABLET ORAL at 14:11

## 2024-02-28 RX ADMIN — GABAPENTIN 100 MG: 100 CAPSULE ORAL at 22:47

## 2024-02-28 RX ADMIN — POTASSIUM CHLORIDE 20 MEQ: 1.5 FOR SOLUTION ORAL at 09:40

## 2024-02-28 RX ADMIN — ACETAMINOPHEN 1000 MG: 500 TABLET ORAL at 06:27

## 2024-02-28 RX ADMIN — VANCOMYCIN HYDROCHLORIDE 1250 MG: 10 INJECTION, POWDER, LYOPHILIZED, FOR SOLUTION INTRAVENOUS at 18:10

## 2024-02-28 RX ADMIN — POTASSIUM CHLORIDE 20 MEQ: 1.5 FOR SOLUTION ORAL at 14:11

## 2024-02-28 ASSESSMENT — PAIN DESCRIPTION - LOCATION
LOCATION: LEG

## 2024-02-28 ASSESSMENT — PAIN DESCRIPTION - DESCRIPTORS
DESCRIPTORS: ACHING

## 2024-02-28 ASSESSMENT — PAIN SCALES - GENERAL
PAINLEVEL_OUTOF10: 7
PAINLEVEL_OUTOF10: 1
PAINLEVEL_OUTOF10: 8
PAINLEVEL_OUTOF10: 8
PAINLEVEL_OUTOF10: 7
PAINLEVEL_OUTOF10: 2
PAINLEVEL_OUTOF10: 3

## 2024-02-28 ASSESSMENT — PAIN DESCRIPTION - ORIENTATION
ORIENTATION: RIGHT
ORIENTATION: RIGHT;LEFT

## 2024-02-28 NOTE — PROGRESS NOTES
Pharmacy Antimicrobial Kinetic Dosing    Indication for Antimicrobials: SSTI     Current Regimen of Each Antimicrobial:  Vancomycin pharmacy consult; Start Date 24; Day # 3  Zosyn 3.375gm q 8; Start Date 24; Day # 3    Previous Antimicrobial Therapy:   Doxycycline and keflex PTA    Goal Level: Vancomycin -600    Date Dose & Interval Measured (mcg/mL) Predicted AUC    13:08 1250 mg IV q24h 12.6 518                 Significant Cultures:    wound - HEAVY pseudomonas(sens to zosyn), moderate MSSA, HEAVY mixed GNRs    blood - NGTD    Labs:  Recent Labs     Units 24  0307 24  0306 24  1154   CREATININE MG/DL 1.21* 1.24* 1.23*   BUN MG/DL 17 15 19   WBC K/uL 7.8 5.4 7.1     Temp (24hrs), Av.2 °F (37.3 °C), Min:98.4 °F (36.9 °C), Max:100.4 °F (38 °C)      Conditions for Dosing Consideration:  JONH    Creatinine Clearance (mL/min): Estimated Creatinine Clearance: 46 mL/min (A) (based on SCr of 1.21 mg/dL (H)).       Impression/Plan:   The vancomycin level resulted at 12.6mcg/ml (). Will continue with the current regimen of 1250 mg IV q24h.   Note  wound culture shows MSSA, consider de-escalation from vancomycin as clinically appropriate  Zosyn for pseudomonas wound coverage   Antimicrobial stop date 7 days     Pharmacy will follow daily and adjust medications as appropriate for renal function and/or serum levels.    Thank you,  Mindi Hoffman, [facility]

## 2024-02-28 NOTE — PROGRESS NOTES
General Daily Progress Note    Admit Date: 2/26/2024    Subjective:     Patient complains of pain in the legs    Current Facility-Administered Medications   Medication Dose Route Frequency    acetaminophen (TYLENOL) tablet 1,000 mg  1,000 mg Oral 3 times per day    warfarin - HOLD the dose 2/27   Other Once    [START ON 2/28/2024] vancomycin level - please draw 2/28 12:00  1 each Other Once    ondansetron (ZOFRAN) injection 4 mg  4 mg IntraVENous Q4H PRN    piperacillin-tazobactam (ZOSYN) 3,375 mg in sodium chloride 0.9 % 50 mL IVPB (mini-bag)  3,375 mg IntraVENous q8h    amLODIPine (NORVASC) tablet 10 mg  10 mg Oral Daily    gabapentin (NEURONTIN) capsule 100 mg  100 mg Oral Nightly    meclizine (ANTIVERT) tablet 12.5 mg  12.5 mg Oral TID PRN    metoprolol succinate (TOPROL XL) extended release tablet 12.5 mg  12.5 mg Oral Daily    polyethylene glycol (GLYCOLAX) packet 17 g  17 g Oral Daily PRN    vancomycin (VANCOCIN) 1250 mg in sodium chloride 0.9% 250 mL IVPB  1,250 mg IntraVENous Q24H    warfarin placeholder: dosing by pharmacy   Other RX Placeholder        Review of Systems  A comprehensive review of systems was negative.    Objective:     Patient Vitals for the past 24 hrs:   BP Temp Temp src Pulse Resp SpO2   02/27/24 1936 101/67 98.4 °F (36.9 °C) Oral 74 14 100 %   02/27/24 1518 95/67 100.4 °F (38 °C) Oral 74 19 94 %   02/27/24 0727 (!) 147/95 99.3 °F (37.4 °C) Oral 74 14 95 %   02/27/24 0657 -- -- -- -- 16 --   02/27/24 0318 115/75 98.6 °F (37 °C) Oral 75 -- 93 %     No intake/output data recorded.  02/26 0701 - 02/27 1900  In: 100   Out: 500 [Urine:500]    Physical Exam: /67   Pulse 74   Temp 98.4 °F (36.9 °C) (Oral)   Resp 14   SpO2 100%   General appearance: alert, appears stated age, and cooperative  Neck: no adenopathy, no carotid bruit, no JVD, supple, symmetrical, trachea midline, and thyroid not enlarged, symmetric, no tenderness/mass/nodules  Lungs: clear to auscultation  bilaterally  Heart: regular rate and rhythm, S1, S2 normal, no murmur, click, rub or gallop  Abdomen: soft, non-tender; bowel sounds normal; no masses,  no organomegaly  Extremities: Bandage distal lower extremities    Assessment:     Principal Problem:    Wound infection  Broad-spectrum parenteral antibiotics being given for wound infection.  Pain control is a major problem but the patient is intolerant of all narcotics.      Coagulopathy  Passive improvement occurring with no obvious complications thus far.       Venous insufficiency  Peripheral edema secondary to chronic venous insufficiency which has been present intermittently for years.       Multiple myeloma  Currently stable and in an apparent remission.          Plan:

## 2024-02-28 NOTE — PROGRESS NOTES
Pharmacist Daily Dosing of Warfarin    Indication & Goal INR: AFib, INR Goal 2-3    PTA Warfarin Dose: 5 mg M-F; 7.5mg Sat and Sun    Notable concurrent conditions and medications: None    Labs:  Recent Labs     Units 02/27/24  0306 02/26/24  1856 02/26/24  1154   INR   7.8* 8.1*  --    HGB g/dL 9.4*  --  8.9*   PLT K/uL 346  --  351       Impression/Plan:   Last dose warfarin dose 7.5mg PTA 2/25  Vitamin K 2.5 mg PO given 2/27 10:27  INR supratherapeutic--- hold dose this evening  Daily INR has been ordered  CBC w/o differential every other day has been ordered     Pharmacy will follow daily and adjust the dose as appropriate.    Thank you,  Mindi Hoffman [facility]

## 2024-02-28 NOTE — PROGRESS NOTES
End of Shift Note    Bedside shift change report given to Chioma ACOSTA (oncoming nurse) by Jc Everett RN (offgoing nurse).  Report included the following information SBAR, Kardex, MAR, Recent Results, and Cardiac Rhythm sinus rhythm    Shift worked:  7p-7a     Shift summary and any significant changes:     Patient tolerated care. Scheduled meds given and were tolerated.  Patient was able to rest over shift. Caring rounds provided. Labs drawn.       Length of Stay:  Expected LOS: 3  Actual LOS: 2      Jc Everett RN

## 2024-02-28 NOTE — PLAN OF CARE
Problem: Physical Therapy - Adult  Goal: By Discharge: Performs mobility at highest level of function for planned discharge setting.  See evaluation for individualized goals.  Description: FUNCTIONAL STATUS PRIOR TO ADMISSION: Pt lives alone and at baseline is fully independent/modified independent using a cane or rollator and also does her own ADLS/IADLs and drives.    HOME SUPPORT PRIOR TO ADMISSION: The patient lived alone with family to provide assistance.    Physical Therapy Goals  Initiated 2/28/2024  1.  Patient will move from supine to sit and sit to supine, scoot up and down, and roll side to side in bed with independence within 7 day(s).    2.  Patient will perform sit to stand with independence within 7 day(s).  3.  Patient will transfer from bed to chair and chair to bed with modified independence using the least restrictive device within 7 day(s).  4.  Patient will ambulate with modified independence for 150 feet with the least restrictive device within 7 day(s).   5.  Patient will ascend/descend 5 stairs with handrail(s) with supervision/set-up within 7 day(s).    Outcome: Not Progressing   PHYSICAL THERAPY EVALUATION    Patient: Kylee Koo (83 y.o. female)  Date: 2/28/2024  Primary Diagnosis: Lower extremity edema [R60.0]  Wound infection [T14.8XXA, L08.9]       Precautions: Restrictions/Precautions: Fall Risk, Bed Alarm                      ASSESSMENT :   DEFICITS/IMPAIRMENTS:   The patient is limited by decreased functional mobility, independence in ADLs, strength, activity tolerance, balance, increased pain levels with c/o RLE wound pain at 10/10 and LLE wound pain at 6/10. Pt willing to participate and stated she needed to have bowel movement. Session focused on bed mobility and transfers with pt needing mod A of 2 to get to the edge of the bed and mod A of 2 to stand with flexed posture and to turn with the rolling walker to and from the [facility]. She did show some improvement on last sit to  complexity level: Medium

## 2024-02-28 NOTE — WOUND CARE
Wound care nurse: dressing change to BLE wounds.   Patient tolerated dressing change better today.  Decreased odor today but no improvement in wound.    WC nurse applied Ioplex iodophor foam dressing placed on both wounds. Ioplex is a hydrophilic absorbant foam dressing complexed with slow release iodine and is good at fighting pseudomonas Then placed NS moist 4x4's and then dry 4x4's,ABD pads and secured with patricio. Will leave this dressing on until Friday and see if Iodophor foam dressing works to decrease infection of wounds and start some Hydrofera blue classic moist to dry dressings to debride off slough and eschar.    Plan: Will see patient Friday for dressing change.    WICHO VERDUZCO RN, CWON

## 2024-02-28 NOTE — PLAN OF CARE
Problem: Occupational Therapy - Adult  Goal: By Discharge: Performs self-care activities at highest level of function for planned discharge setting.  See evaluation for individualized goals.  Description: FUNCTIONAL STATUS PRIOR TO ADMISSION:  Patient was ambulatory using cane/RW in home, living alone; IND-Kandice.  wound nurse.   , ADL Assistance: Independent,  ,  ,  ,  ,  , Homemaking Assistance: Independent, Ambulation Assistance: Independent, Transfer Assistance: Independent, Active : Yes     HOME SUPPORT: Patient lived alone with family/friends to provide assistance as needed.    Occupational Therapy Goals:  Initiated 2/28/2024  1.  Patient will perform grooming standing as tolerated with RW support with Contact Guard Assist within 7 day(s).  2.  Patient will perform bathing seated with Minimal Assist within 7 day(s).  3.  Patient will perform upper body dressing and lower body dressing with Minimal Assist within 7 day(s).  4.  Patient will perform toilet transfers with Minimal Assist  within 7 day(s).  5.  Patient will perform all aspects of toileting with Minimal Assist within 7 day(s).  6.  Patient will participate in upper extremity therapeutic exercise/activities with Modified [place] for 10 minutes within 7 day(s).    7.  Patient will utilize energy conservation techniques during functional activities with verbal cues within 7 day(s).    Outcome: Progressing   OCCUPATIONAL THERAPY EVALUATION    Patient: Kylee Koo (83 y.o. female)  Date: 2/28/2024  Primary Diagnosis: Lower extremity edema [R60.0]  Wound infection [T14.8XXA, L08.9]         Precautions: Fall Risk                  ASSESSMENT :  The patient is limited by decreased functional mobility, independence in ADLs, strength, activity tolerance, balance, posture, increased pain levels, following admission for BLE wounds. Pt received in bed, alert and oriented x4; son at bedside and supportive. Pt c/o BLE pain that increased with  assistance;Assist X2;Additional time  Scooting: Moderate assistance    Transfers:      Transfer Training  Transfer Training: Yes  Sit to Stand: Moderate assistance;Assist X2  Stand to Sit: Moderate assistance;Assist X2  Toilet Transfer: Moderate assistance;Assist X2                     Balance:   Standing: Impaired  Balance  Sitting: Impaired  Sitting - Static: Good (unsupported)  Sitting - Dynamic: Not tested  Standing: Impaired  Standing - Static: Fair;Constant support  Standing - Dynamic: Poor;Fair;Constant support      ADL Assessment:          Feeding: Setup       Grooming: Setup  Grooming Skilled Clinical Factors: seated    UE Bathing: Minimal assistance  UE Bathing Skilled Clinical Factors: seated         LE Bathing: Maximum assistance;Increased time to complete;Adaptive equipment       UE Dressing: Minimal assistance;Increased time to complete  UE Dressing Skilled Clinical Factors: seated    LE Dressing: Maximum assistance;Dependent/Total;Increased time to complete;Adaptive equipment       Toileting: Maximum assistance;Dependent/Total;Adaptive equipment;Increased time to complete                         ADL Intervention and task modifications:                                                                                                                                                                                                                                       Barthel Index:    Barthel Index Scale  Feeding: Independent, Able to apply any necessary device. Feeds in reasonable time  Bathing: Cannot perform activity  Grooming: Washes face, harrison hair, brushes teeth, shaves (manages plug if electric razor)  Dressing: Cannot perform activity  Bowel Control: No accidents. Able to use enema or suppository if needed  Bladder Control: Occasional accidents or needs help with device  Toilet Transfers: Cannot perform activity  Chair/Bed Trannsfers: Able to sit, but needs maximum assistance to

## 2024-02-28 NOTE — CARE COORDINATION
Transition of Care Plan:    RUR: 20% \"High Risk\"  Prior Level of Functioning: Independent with ADL's  Disposition: Home w/ [facility]  Follow up appointments: PCP and specialist as indicated  DME needed: None at this time, waiting PT/OT eval  Transportation at discharge: Family to transport at discharge  IM/IMM Medicare/ letter given: 2nd IM to be given at discharge  Is patient a  and connected with VA? N/A   If yes, was [place] transfer form completed and VA notified? N/A  Caregiver Contact: N/A  Discharge Caregiver contacted prior to discharge? Patient will contact family at the time of discharge  Care Conference needed? Not at this time  Barriers to discharge: Medical stability    Initial note 1021: Chart reviewed. CM sent M[facility]OT to [facility] for resumption of care at discharge.     CM will continue to follow for CM needs.     Janice Kyle, Care Management

## 2024-02-29 LAB
ANION GAP SERPL CALC-SCNC: 2 MMOL/L (ref 5–15)
BASOPHILS # BLD: 0.1 K/UL (ref 0–0.1)
BASOPHILS NFR BLD: 1 % (ref 0–1)
BUN SERPL-MCNC: 19 MG/DL (ref 6–20)
BUN/CREAT SERPL: 15 (ref 12–20)
CALCIUM SERPL-MCNC: 8.5 MG/DL (ref 8.5–10.1)
CHLORIDE SERPL-SCNC: 108 MMOL/L (ref 97–108)
CO2 SERPL-SCNC: 26 MMOL/L (ref 21–32)
CREAT SERPL-MCNC: 1.26 MG/DL (ref 0.55–1.02)
DIFFERENTIAL METHOD BLD: ABNORMAL
EOSINOPHIL # BLD: 0.1 K/UL (ref 0–0.4)
EOSINOPHIL NFR BLD: 1 % (ref 0–7)
ERYTHROCYTE [DISTWIDTH] IN BLOOD BY AUTOMATED COUNT: 15.9 % (ref 11.5–14.5)
GLUCOSE SERPL-MCNC: 124 MG/DL (ref 65–100)
HCT VFR BLD AUTO: 25.4 % (ref 35–47)
HGB BLD-MCNC: 7.7 G/DL (ref 11.5–16)
IMM GRANULOCYTES # BLD AUTO: 0 K/UL (ref 0–0.04)
IMM GRANULOCYTES NFR BLD AUTO: 1 % (ref 0–0.5)
INR PPP: 2.4 (ref 0.9–1.1)
LYMPHOCYTES # BLD: 0.7 K/UL (ref 0.8–3.5)
LYMPHOCYTES NFR BLD: 9 % (ref 12–49)
MCH RBC QN AUTO: 26.3 PG (ref 26–34)
MCHC RBC AUTO-ENTMCNC: 30.3 G/DL (ref 30–36.5)
MCV RBC AUTO: 86.7 FL (ref 80–99)
MONOCYTES # BLD: 1.4 K/UL (ref 0–1)
MONOCYTES NFR BLD: 19 % (ref 5–13)
NEUTS SEG # BLD: 5.2 K/UL (ref 1.8–8)
NEUTS SEG NFR BLD: 70 % (ref 32–75)
NRBC # BLD: 0 K/UL (ref 0–0.01)
NRBC BLD-RTO: 0 PER 100 WBC
PLATELET # BLD AUTO: 265 K/UL (ref 150–400)
PMV BLD AUTO: 10.2 FL (ref 8.9–12.9)
POTASSIUM SERPL-SCNC: 3.5 MMOL/L (ref 3.5–5.1)
PROTHROMBIN TIME: 23.9 SEC (ref 9–11.1)
RBC # BLD AUTO: 2.93 M/UL (ref 3.8–5.2)
SODIUM SERPL-SCNC: 136 MMOL/L (ref 136–145)
WBC # BLD AUTO: 7.5 K/UL (ref 3.6–11)

## 2024-02-29 PROCEDURE — 80048 BASIC METABOLIC PNL TOTAL CA: CPT

## 2024-02-29 PROCEDURE — 1100000000 HC RM PRIVATE

## 2024-02-29 PROCEDURE — 97530 THERAPEUTIC ACTIVITIES: CPT

## 2024-02-29 PROCEDURE — 2580000003 HC RX 258: Performed by: INTERNAL MEDICINE

## 2024-02-29 PROCEDURE — 97535 SELF CARE MNGMENT TRAINING: CPT

## 2024-02-29 PROCEDURE — 97116 GAIT TRAINING THERAPY: CPT

## 2024-02-29 PROCEDURE — 99222 1ST HOSP IP/OBS MODERATE 55: CPT | Performed by: INTERNAL MEDICINE

## 2024-02-29 PROCEDURE — 85025 COMPLETE CBC W/AUTO DIFF WBC: CPT

## 2024-02-29 PROCEDURE — 6360000002 HC RX W HCPCS: Performed by: INTERNAL MEDICINE

## 2024-02-29 PROCEDURE — 6370000000 HC RX 637 (ALT 250 FOR IP): Performed by: INTERNAL MEDICINE

## 2024-02-29 PROCEDURE — 85610 PROTHROMBIN TIME: CPT

## 2024-02-29 PROCEDURE — 36415 COLL VENOUS BLD VENIPUNCTURE: CPT

## 2024-02-29 RX ORDER — WARFARIN SODIUM 5 MG/1
5 TABLET ORAL
Status: COMPLETED | OUTPATIENT
Start: 2024-02-29 | End: 2024-02-29

## 2024-02-29 RX ADMIN — METOPROLOL SUCCINATE 12.5 MG: 25 TABLET, EXTENDED RELEASE ORAL at 09:00

## 2024-02-29 RX ADMIN — PIPERACILLIN AND TAZOBACTAM 3375 MG: 3; .375 INJECTION, POWDER, LYOPHILIZED, FOR SOLUTION INTRAVENOUS at 09:05

## 2024-02-29 RX ADMIN — VANCOMYCIN HYDROCHLORIDE 1250 MG: 10 INJECTION, POWDER, LYOPHILIZED, FOR SOLUTION INTRAVENOUS at 17:31

## 2024-02-29 RX ADMIN — GABAPENTIN 100 MG: 100 CAPSULE ORAL at 21:33

## 2024-02-29 RX ADMIN — PIPERACILLIN AND TAZOBACTAM 3375 MG: 3; .375 INJECTION, POWDER, LYOPHILIZED, FOR SOLUTION INTRAVENOUS at 19:54

## 2024-02-29 RX ADMIN — ACETAMINOPHEN 1000 MG: 500 TABLET ORAL at 06:03

## 2024-02-29 RX ADMIN — POTASSIUM BICARBONATE 20 MEQ: 782 TABLET, EFFERVESCENT ORAL at 13:45

## 2024-02-29 RX ADMIN — ACETAMINOPHEN 1000 MG: 500 TABLET ORAL at 13:45

## 2024-02-29 RX ADMIN — POTASSIUM BICARBONATE 20 MEQ: 782 TABLET, EFFERVESCENT ORAL at 21:33

## 2024-02-29 RX ADMIN — POTASSIUM CHLORIDE 20 MEQ: 1.5 FOR SOLUTION ORAL at 09:01

## 2024-02-29 RX ADMIN — PIPERACILLIN AND TAZOBACTAM 3375 MG: 3; .375 INJECTION, POWDER, LYOPHILIZED, FOR SOLUTION INTRAVENOUS at 00:46

## 2024-02-29 RX ADMIN — MECLIZINE 12.5 MG: 12.5 TABLET ORAL at 11:08

## 2024-02-29 RX ADMIN — WARFARIN SODIUM 5 MG: 5 TABLET ORAL at 17:31

## 2024-02-29 RX ADMIN — ACETAMINOPHEN 1000 MG: 500 TABLET ORAL at 21:33

## 2024-02-29 ASSESSMENT — PAIN SCALES - GENERAL
PAINLEVEL_OUTOF10: 7
PAINLEVEL_OUTOF10: 5
PAINLEVEL_OUTOF10: 6
PAINLEVEL_OUTOF10: 8

## 2024-02-29 ASSESSMENT — PAIN - FUNCTIONAL ASSESSMENT
PAIN_FUNCTIONAL_ASSESSMENT: ACTIVITIES ARE NOT PREVENTED
PAIN_FUNCTIONAL_ASSESSMENT: ACTIVITIES ARE NOT PREVENTED

## 2024-02-29 ASSESSMENT — PAIN DESCRIPTION - LOCATION
LOCATION: LEG

## 2024-02-29 ASSESSMENT — PAIN DESCRIPTION - ORIENTATION
ORIENTATION: RIGHT;LEFT
ORIENTATION: RIGHT;LEFT
ORIENTATION: RIGHT
ORIENTATION: LEFT

## 2024-02-29 ASSESSMENT — PAIN DESCRIPTION - DESCRIPTORS
DESCRIPTORS: ACHING

## 2024-02-29 NOTE — PROGRESS NOTES
Pharmacist Daily Dosing of Warfarin    Indication & Goal INR: AFib, INR Goal 2-3    PTA Warfarin Dose: 5 mg M-F; 7.5mg Sat and Sun    Notable concurrent conditions and medications: None    Labs:  Recent Labs     Units 02/29/24  0612 02/29/24  0053 02/28/24  0307 02/27/24  0306   INR   2.4*  --  4.3* 7.8*   HGB g/dL  --  7.7* 7.9* 9.4*   PLT K/uL  --  265 293 346       Impression/Plan:   Last dose warfarin dose 7.5mg PTA 2/25  Vitamin K 2.5 mg PO given 2/27 10:27  Will order warfarin 5 mg x 1 dose 2/29  Daily INR has been ordered  CBC w/o differential every other day has been ordered     Pharmacy will follow daily and adjust the dose as appropriate.    Thank you,  Mindi Hoffman [facility]

## 2024-02-29 NOTE — CARE COORDINATION
Transition of Care Plan:    RUR: 20% \"High Risk\"  Prior Level of Functioning: Independent with ADL's  Disposition: IPR vs SNF  If SNF or IPR: Date FOC offered: 2/28/24  Date FOC received: 2/28/24  Accepting facility: Pending referrals  Date authorization started with reference number: N/A  Date authorization received and expires: N/A  Follow up appointments: PCP and specialist as indicated after IPR stay  DME needed: Defer to Facility  Transportation at discharge: BLS   IM/IMM Medicare/ letter given: 2nd IM letter to be given  Is patient a [place] and connected with VA? N/A   If yes, was [place] transfer form completed and VA notified? N/A  Caregiver Contact: Son or Daughter to be contacted  Discharge Caregiver contacted prior to discharge? To be contacted prior to discharge   Care Conference needed? Not at this time   Barriers to discharge: Placement, Medical stability    Update 1128 - Chart reviewed. [facility] IPR has declined stating the MD believes patient is SNF appropriate. [facility] responded stating the patient does not have a dx compliant with IPR and is unsure about bed availability. [facility] has accepted patient but will not have a bed until early next week. SHASHI advised [facility] that patient will be ready for dc in 24-48h per IDR's on 2/29/24. CM will work on backup plan for SNF with patient.     Initial note 0911 - Chart reviewed. CM met with patient and son, José Luis Nelson, at bedside 2/28/24 to discuss IPR recommendations and to get preferences for facility. Patient and son agreed for CM to placed referrals with [facility], [facility], and [facility]. CM placed IPR referrals on 2/28/24 and they remain pending at this time. SHASHI contacted Carola [facility] with [facility] to get update as this is the closest facility per son. Carola advised CM they will call after their morning meeting to update bed availability.     Janice Kyle, Care Management

## 2024-02-29 NOTE — PROGRESS NOTES
General Daily Progress Note    Admit Date: 2/26/2024    Subjective:     Patient complains of pain in the legs    Current Facility-Administered Medications   Medication Dose Route Frequency    potassium chloride (KLOR-CON) 20 MEQ packet 20 mEq  20 mEq Oral TID    warfarin - hold the dose 2/28   Other Once    acetaminophen (TYLENOL) tablet 1,000 mg  1,000 mg Oral 3 times per day    ondansetron (ZOFRAN) injection 4 mg  4 mg IntraVENous Q4H PRN    piperacillin-tazobactam (ZOSYN) 3,375 mg in sodium chloride 0.9 % 50 mL IVPB (mini-bag)  3,375 mg IntraVENous q8h    amLODIPine (NORVASC) tablet 10 mg  10 mg Oral Daily    gabapentin (NEURONTIN) capsule 100 mg  100 mg Oral Nightly    meclizine (ANTIVERT) tablet 12.5 mg  12.5 mg Oral TID PRN    metoprolol succinate (TOPROL XL) extended release tablet 12.5 mg  12.5 mg Oral Daily    polyethylene glycol (GLYCOLAX) packet 17 g  17 g Oral Daily PRN    vancomycin (VANCOCIN) 1250 mg in sodium chloride 0.9% 250 mL IVPB  1,250 mg IntraVENous Q24H    warfarin placeholder: dosing by pharmacy   Other RX Placeholder        Review of Systems  A comprehensive review of systems was negative.    Objective:     Patient Vitals for the past 24 hrs:   BP Temp Temp src Pulse Resp SpO2   02/28/24 2245 111/67 -- -- 78 -- 96 %   02/28/24 2030 (!) 89/62 98.8 °F (37.1 °C) -- 74 -- --   02/28/24 1932 100/64 98.6 °F (37 °C) Oral 76 20 94 %   02/28/24 1559 103/71 99.1 °F (37.3 °C) -- 75 16 --   02/28/24 0940 (!) 85/59 -- -- 75 -- --   02/28/24 0715 (!) 85/59 99.7 °F (37.6 °C) Oral 75 16 99 %   02/28/24 0249 92/64 98.4 °F (36.9 °C) Oral 75 14 98 %       No intake/output data recorded.  02/27 0701 - 02/28 1900  In: -   Out: 1050 [Urine:1050]    Physical Exam: /67   Pulse 78   Temp 98.8 °F (37.1 °C)   Resp 20   SpO2 96%   General appearance: alert, appears stated age, and cooperative  Neck: no adenopathy, no carotid bruit, no JVD, supple, symmetrical, trachea midline, and thyroid not enlarged,  symmetric, no tenderness/mass/nodules  Lungs: clear to auscultation bilaterally  Heart: regular rate and rhythm, S1, S2 normal, no murmur, click, rub or gallop  Abdomen: soft, non-tender; bowel sounds normal; no masses,  no organomegaly  Extremities: Bandage distal lower extremities    Assessment:     Principal Problem:    Wound infection  Broad-spectrum parenteral antibiotics being given for wound infection.  Pain control is a major problem but the patient is intolerant of all narcotics.      Coagulopathy  Passive improvement occurring with no obvious complications thus far.       Venous insufficiency  Peripheral edema secondary to chronic venous insufficiency which has been present intermittently for years.       Multiple myeloma  Currently stable and in an apparent remission.        CHF  Minimal signs of congestive heart failure but will have cardiology eval weight in view of progressive increase in proBNP.  Echocardiogram most recently done during visit with Dr. Acosta.          Plan:

## 2024-02-29 NOTE — PROGRESS NOTES
End of Shift Note    Bedside shift change report given to DAVE Saldana (oncoming nurse) by Carol Ryan RN (offgoing nurse).  Report included the following information SBAR, Kardex, MAR, Recent Results, and Cardiac Rhythm Ventricle paced    Shift worked:  7 am to 7:30 pm     Shift summary and any significant changes:     Held Amlodipine dose for BP 85/59, see MAR. All other scheduled medications administered. Patient had pain in right leg; scheduled Tylenol administered, see MAR, and heels elevated on pillows. Random Vancomycin lab drawn. Patient worked with PT & OT during shift; able to get to bedside commode with 2 assist and walker. Incontinence care, linen change and purewick change provided; urine output documented and patient had 1 large bowel movement. Wound care nurse changed dressings to BLE; will return Friday 3/1 to change. IV's flushed and patent. Patient's family came to visit. Nursing rounds and education completed.     Concerns for physician to address:  Cardiology consult to be called tomorrow 2/29/24.     Zone phone for oncoming shift:          Activity:     Number times ambulated in hallways past shift: 0  Number of times OOB to chair past shift: 0    Cardiac:   Cardiac Monitoring: Yes           Access:  Current line(s): PIV     Genitourinary:   Urinary status: voiding and external catheter    Respiratory:      Chronic home O2 use?: NO  Incentive spirometer at bedside: NO       GI:     Current diet:  ADULT DIET; Regular  Passing flatus: YES  Tolerating current diet: YES       Pain Management:   Patient states pain is manageable on current regimen: YES    Skin:     Interventions: float heels, increase time out of bed, PT/OT consult, and internal/external urinary devices    Patient Safety:  Fall Score:    Interventions: bed/chair alarm, assistive device (walker, cane. etc), gripper socks, and pt to call before getting OOB       Length of Stay:  Expected LOS: 3  Actual LOS: 2      Carol Ryan  RN

## 2024-02-29 NOTE — PROGRESS NOTES
Pharmacy Antimicrobial Kinetic Dosing    Indication for Antimicrobials: SSTI     Current Regimen of Each Antimicrobial:  Vancomycin pharmacy consult; Start Date 24; Day # 4  Zosyn 3.375gm q 8; Start Date 24; Day # 4    Previous Antimicrobial Therapy:   Doxycycline and keflex PTA    Goal Level: Vancomycin -600    Date Dose & Interval Measured (mcg/mL) Predicted AUC    13:08 1250 mg IV q24h 12.6 518                 Significant Cultures:    wound - HEAVY pseudomonas(sens to zosyn), moderate MSSA, HEAVY mixed GNRs    blood - NG x 3 days, pending    Labs:  Recent Labs     Units 24  0053 24  0307 24  0306 24  1154   CREATININE MG/DL 1.26* 1.21* 1.24* 1.23*   BUN MG/DL 19 17 15 19   WBC K/uL 7.5 7.8 5.4 7.1     Temp (24hrs), Av.5 °F (36.9 °C), Min:97.9 °F (36.6 °C), Max:99.1 °F (37.3 °C)      Conditions for Dosing Consideration:  JOHN    Creatinine Clearance (mL/min): Estimated Creatinine Clearance: 44 mL/min (A) (based on SCr of 1.26 mg/dL (H)).       Impression/Plan:   Continue vancomycin 1250 mg IV q24h for an estAUC 561. Plan to check a level on Saturday.   Note  wound culture shows MSSA, consider de-escalation from vancomycin to cefazolin as clinically appropriate  Zosyn for pseudomonas wound coverage   Antimicrobial stop date 7 days     Pharmacy will follow daily and adjust medications as appropriate for renal function and/or serum levels.    Thank you,  Mindi Hoffman, [facility]

## 2024-02-29 NOTE — PROGRESS NOTES
End of Shift Note    Bedside shift change report given to DAVE Ma (oncoming nurse) by Shana Perez RN (offgoing nurse).  Report included the following information SBAR, Kardex, Intake/Output, and MAR    Shift worked:  7P-7A     Shift summary and any significant changes:     Scheduled medications were given, see MAR. IV line is flushed and patent . Hygiene care was provided. Frequent rounding is done.     Concerns for physician to address:       Zone phone for oncoming shift:              Shana Perez RN

## 2024-02-29 NOTE — PLAN OF CARE
Problem: Occupational Therapy - Adult  Goal: By Discharge: Performs self-care activities at highest level of function for planned discharge setting.  See evaluation for individualized goals.  Description: FUNCTIONAL STATUS PRIOR TO ADMISSION:  Patient was ambulatory using cane/RW in home, living alone; IND-Kandice.  wound nurse.   , ADL Assistance: Independent,  ,  ,  ,  ,  , Homemaking Assistance: Independent, Ambulation Assistance: Independent, Transfer Assistance: Independent, Active : Yes     HOME SUPPORT: Patient lived alone with family/friends to provide assistance as needed.    Occupational Therapy Goals:  Initiated 2/28/2024  1.  Patient will perform grooming standing as tolerated with RW support with Contact Guard Assist within 7 day(s).  2.  Patient will perform bathing seated with Minimal Assist within 7 day(s).  3.  Patient will perform upper body dressing and lower body dressing with Minimal Assist within 7 day(s).  4.  Patient will perform toilet transfers with Minimal Assist  within 7 day(s).  5.  Patient will perform all aspects of toileting with Minimal Assist within 7 day(s).  6.  Patient will participate in upper extremity therapeutic exercise/activities with Modified [place] for 10 minutes within 7 day(s).    7.  Patient will utilize energy conservation techniques during functional activities with verbal cues within 7 day(s).    Outcome: Progressing   OCCUPATIONAL THERAPY TREATMENT  Patient: Kylee Koo (83 y.o. female)  Date: 2/29/2024  Primary Diagnosis: Lower extremity edema [R60.0]  Wound infection [T14.8XXA, L08.9]       Precautions: Fall Risk, Bed Alarm                Chart, occupational therapy assessment, plan of care, and goals were reviewed.    ASSESSMENT  Patient continues to benefit from skilled OT services and is progressing towards goals. Pt received in bed, alert and oriented x4. Family at bedside. Pt agreeable to OT session. Pt overall supervision-CGA for supine

## 2024-02-29 NOTE — PLAN OF CARE
Problem: Physical Therapy - Adult  Goal: By Discharge: Performs mobility at highest level of function for planned discharge setting.  See evaluation for individualized goals.  Description: FUNCTIONAL STATUS PRIOR TO ADMISSION: Pt lives alone and at baseline is fully independent/modified independent using a cane or rollator and also does her own ADLS/IADLs and drives.    HOME SUPPORT PRIOR TO ADMISSION: The patient lived alone with family to provide assistance.    Physical Therapy Goals  Initiated 2/28/2024  1.  Patient will move from supine to sit and sit to supine, scoot up and down, and roll side to side in bed with independence within 7 day(s).    2.  Patient will perform sit to stand with independence within 7 day(s).  3.  Patient will transfer from bed to chair and chair to bed with modified independence using the least restrictive device within 7 day(s).  4.  Patient will ambulate with modified independence for 150 feet with the least restrictive device within 7 day(s).   5.  Patient will ascend/descend 5 stairs with handrail(s) with supervision/set-up within 7 day(s).    Outcome: Progressing   PHYSICAL THERAPY TREATMENT    Patient: Kylee Koo (83 y.o. female)  Date: 2/29/2024  Diagnosis: Lower extremity edema [R60.0]  Wound infection [T14.8XXA, L08.9] Wound infection      Precautions: Fall Risk, Bed Alarm                      ASSESSMENT:  Patient continues to benefit from skilled PT services and is slowly progressing towards goals. Pt still reporting 10/10 pain in RLE wound area but was able to tolerate movement of BLE much better and was able to progress to gait training this session. She was able to come to sitting from partially elevated HOB with CGA. She needed mod A of 2 to come to stand but was able to achieve more upright posture today. She amb around bed to chair with the walker with wide base, very short steps, very slow pace with CGA. She needed min A of 2 to sit into chair and has

## 2024-03-01 LAB
ANION GAP SERPL CALC-SCNC: 5 MMOL/L (ref 5–15)
BUN SERPL-MCNC: 20 MG/DL (ref 6–20)
BUN/CREAT SERPL: 17 (ref 12–20)
CALCIUM SERPL-MCNC: 9.1 MG/DL (ref 8.5–10.1)
CHLORIDE SERPL-SCNC: 107 MMOL/L (ref 97–108)
CO2 SERPL-SCNC: 28 MMOL/L (ref 21–32)
CREAT SERPL-MCNC: 1.19 MG/DL (ref 0.55–1.02)
EKG ATRIAL RATE: 102 BPM
EKG DIAGNOSIS: NORMAL
EKG Q-T INTERVAL: 416 MS
EKG QRS DURATION: 144 MS
EKG QTC CALCULATION (BAZETT): 464 MS
EKG R AXIS: 246 DEGREES
EKG T AXIS: 48 DEGREES
EKG VENTRICULAR RATE: 75 BPM
ERYTHROCYTE [DISTWIDTH] IN BLOOD BY AUTOMATED COUNT: 15.9 % (ref 11.5–14.5)
GLUCOSE SERPL-MCNC: 108 MG/DL (ref 65–100)
HCT VFR BLD AUTO: 26.2 % (ref 35–47)
HGB BLD-MCNC: 7.9 G/DL (ref 11.5–16)
INR PPP: 2.3 (ref 0.9–1.1)
MAGNESIUM SERPL-MCNC: 2.1 MG/DL (ref 1.6–2.4)
MCH RBC QN AUTO: 26.1 PG (ref 26–34)
MCHC RBC AUTO-ENTMCNC: 30.2 G/DL (ref 30–36.5)
MCV RBC AUTO: 86.5 FL (ref 80–99)
NRBC # BLD: 0 K/UL (ref 0–0.01)
NRBC BLD-RTO: 0 PER 100 WBC
PHOSPHATE SERPL-MCNC: 2.7 MG/DL (ref 2.6–4.7)
PLATELET # BLD AUTO: 303 K/UL (ref 150–400)
PMV BLD AUTO: 10.9 FL (ref 8.9–12.9)
POTASSIUM SERPL-SCNC: 3.9 MMOL/L (ref 3.5–5.1)
PROTHROMBIN TIME: 23.1 SEC (ref 9–11.1)
RBC # BLD AUTO: 3.03 M/UL (ref 3.8–5.2)
SODIUM SERPL-SCNC: 140 MMOL/L (ref 136–145)
WBC # BLD AUTO: 5.9 K/UL (ref 3.6–11)

## 2024-03-01 PROCEDURE — 84100 ASSAY OF PHOSPHORUS: CPT

## 2024-03-01 PROCEDURE — 97530 THERAPEUTIC ACTIVITIES: CPT

## 2024-03-01 PROCEDURE — 6370000000 HC RX 637 (ALT 250 FOR IP): Performed by: INTERNAL MEDICINE

## 2024-03-01 PROCEDURE — 2580000003 HC RX 258: Performed by: INTERNAL MEDICINE

## 2024-03-01 PROCEDURE — 85610 PROTHROMBIN TIME: CPT

## 2024-03-01 PROCEDURE — 80048 BASIC METABOLIC PNL TOTAL CA: CPT

## 2024-03-01 PROCEDURE — 6360000002 HC RX W HCPCS: Performed by: INTERNAL MEDICINE

## 2024-03-01 PROCEDURE — 99221 1ST HOSP IP/OBS SF/LOW 40: CPT | Performed by: INTERNAL MEDICINE

## 2024-03-01 PROCEDURE — 1100000000 HC RM PRIVATE

## 2024-03-01 PROCEDURE — 36415 COLL VENOUS BLD VENIPUNCTURE: CPT

## 2024-03-01 PROCEDURE — 97535 SELF CARE MNGMENT TRAINING: CPT

## 2024-03-01 PROCEDURE — 83735 ASSAY OF MAGNESIUM: CPT

## 2024-03-01 PROCEDURE — 97116 GAIT TRAINING THERAPY: CPT

## 2024-03-01 PROCEDURE — 6370000000 HC RX 637 (ALT 250 FOR IP): Performed by: STUDENT IN AN ORGANIZED HEALTH CARE EDUCATION/TRAINING PROGRAM

## 2024-03-01 PROCEDURE — 85027 COMPLETE CBC AUTOMATED: CPT

## 2024-03-01 PROCEDURE — 6360000002 HC RX W HCPCS: Performed by: STUDENT IN AN ORGANIZED HEALTH CARE EDUCATION/TRAINING PROGRAM

## 2024-03-01 RX ORDER — POTASSIUM CHLORIDE 750 MG/1
10 TABLET, FILM COATED, EXTENDED RELEASE ORAL DAILY
Status: DISCONTINUED | OUTPATIENT
Start: 2024-03-01 | End: 2024-03-04 | Stop reason: HOSPADM

## 2024-03-01 RX ORDER — FUROSEMIDE 10 MG/ML
40 INJECTION INTRAMUSCULAR; INTRAVENOUS 2 TIMES DAILY
Status: DISCONTINUED | OUTPATIENT
Start: 2024-03-01 | End: 2024-03-02

## 2024-03-01 RX ORDER — LISINOPRIL 5 MG/1
2.5 TABLET ORAL DAILY
Status: DISCONTINUED | OUTPATIENT
Start: 2024-03-01 | End: 2024-03-02

## 2024-03-01 RX ORDER — WARFARIN SODIUM 5 MG/1
5 TABLET ORAL
Status: COMPLETED | OUTPATIENT
Start: 2024-03-01 | End: 2024-03-01

## 2024-03-01 RX ORDER — SPIRONOLACTONE 25 MG/1
12.5 TABLET ORAL 2 TIMES DAILY
Status: DISCONTINUED | OUTPATIENT
Start: 2024-03-01 | End: 2024-03-04 | Stop reason: HOSPADM

## 2024-03-01 RX ADMIN — ACETAMINOPHEN 1000 MG: 500 TABLET ORAL at 06:10

## 2024-03-01 RX ADMIN — PIPERACILLIN AND TAZOBACTAM 3375 MG: 3; .375 INJECTION, POWDER, LYOPHILIZED, FOR SOLUTION INTRAVENOUS at 02:35

## 2024-03-01 RX ADMIN — PIPERACILLIN AND TAZOBACTAM 3375 MG: 3; .375 INJECTION, POWDER, LYOPHILIZED, FOR SOLUTION INTRAVENOUS at 22:07

## 2024-03-01 RX ADMIN — ACETAMINOPHEN 1000 MG: 500 TABLET ORAL at 21:58

## 2024-03-01 RX ADMIN — POTASSIUM CHLORIDE 10 MEQ: 750 TABLET, EXTENDED RELEASE ORAL at 17:13

## 2024-03-01 RX ADMIN — SPIRONOLACTONE 12.5 MG: 25 TABLET ORAL at 17:14

## 2024-03-01 RX ADMIN — MECLIZINE 12.5 MG: 12.5 TABLET ORAL at 10:35

## 2024-03-01 RX ADMIN — ACETAMINOPHEN 1000 MG: 500 TABLET ORAL at 13:16

## 2024-03-01 RX ADMIN — MECLIZINE 12.5 MG: 12.5 TABLET ORAL at 14:49

## 2024-03-01 RX ADMIN — LISINOPRIL 2.5 MG: 5 TABLET ORAL at 17:14

## 2024-03-01 RX ADMIN — FUROSEMIDE 40 MG: 10 INJECTION, SOLUTION INTRAMUSCULAR; INTRAVENOUS at 17:18

## 2024-03-01 RX ADMIN — WARFARIN SODIUM 5 MG: 5 TABLET ORAL at 17:14

## 2024-03-01 RX ADMIN — GABAPENTIN 100 MG: 100 CAPSULE ORAL at 21:58

## 2024-03-01 RX ADMIN — PIPERACILLIN AND TAZOBACTAM 3375 MG: 3; .375 INJECTION, POWDER, LYOPHILIZED, FOR SOLUTION INTRAVENOUS at 14:00

## 2024-03-01 RX ADMIN — VANCOMYCIN HYDROCHLORIDE 1250 MG: 10 INJECTION, POWDER, LYOPHILIZED, FOR SOLUTION INTRAVENOUS at 17:21

## 2024-03-01 RX ADMIN — METOPROLOL SUCCINATE 12.5 MG: 25 TABLET, EXTENDED RELEASE ORAL at 09:33

## 2024-03-01 RX ADMIN — COLLAGENASE SANTYL: 250 OINTMENT TOPICAL at 09:44

## 2024-03-01 ASSESSMENT — PAIN DESCRIPTION - ORIENTATION
ORIENTATION: LEFT;RIGHT
ORIENTATION: RIGHT
ORIENTATION: RIGHT;LEFT

## 2024-03-01 ASSESSMENT — PAIN SCALES - GENERAL
PAINLEVEL_OUTOF10: 3
PAINLEVEL_OUTOF10: 5
PAINLEVEL_OUTOF10: 7
PAINLEVEL_OUTOF10: 10
PAINLEVEL_OUTOF10: 8
PAINLEVEL_OUTOF10: 8

## 2024-03-01 ASSESSMENT — PAIN DESCRIPTION - DESCRIPTORS
DESCRIPTORS: ACHING
DESCRIPTORS: ACHING
DESCRIPTORS: THROBBING
DESCRIPTORS: THROBBING
DESCRIPTORS: ACHING;SHARP

## 2024-03-01 ASSESSMENT — PAIN DESCRIPTION - LOCATION
LOCATION: LEG
LOCATION: FOOT
LOCATION: LEG
LOCATION: LEG

## 2024-03-01 NOTE — WOUND CARE
Wound care nurse: BLE dressing change. Patient has adherent slough and eschar to both wounds and will need daily wound care with Santyl collagenase and Hydrofera blue classic.     Dressing change done as follows:    BLE dressing change: daily, remove old dressing by wetting with NS as needed. Wipe wounds clean with NS moist 4x4 - one good firm wipe needed. Apply Santyl collagenase \"nickel thick to both wounds. Take a normal saline moistened piece of Hydrofera blue classic foam and cover both wounds. Cover with ABD pads and secure with patricio. Date and time each dressing.    Plan:  nurse to follow up next week on patient. Patient will benefit from a SNF for wound care and will need HHC when discharged to home. Patient should follow back up with [facility] for continued wound healing when discharged.  WICHO VERDUZCO RN, CWON

## 2024-03-01 NOTE — PROGRESS NOTES
Occupational Therapy    Chart reviewed. Attempted to see pt, however, pt politely declining due to increased BLE pain and recently had wound care. Pt requesting therapy to return later today after next dose of pain medication. Dicussed with RN to coordinate pain meds prior to therapy returning. Will follow-up later as able and medically appropriate.    Angelita Sosa MS, OTR/L

## 2024-03-01 NOTE — PROGRESS NOTES
End of Shift Note    Bedside shift change report given to Dana AGUILAR  (oncoming nurse) by Francesca Weaver RN (offgoing nurse).  Report included the following information SBAR, Kardex, Intake/Output, and MAR    Shift worked:  7a - 7p      Shift summary and any significant changes:    Patient tolerated care fairly well. Medications given per MAR. Morning amlodipine held due to patient having a soft BP. PRN meclizine given for dizziness. Cardiologist came to see patient and made adjustments to medications. EKG completed. Labs drawn. Wound care nurses completed wound care today - wound care due daily. PT/OT saw patient today. Patient was able to use bathroom today instead of bedside commode. Patient x2 assist to bathroom. Ivs flushed and patent. Hourly rounding completed.      Concerns for physician to address:      Zone phone for oncoming shift:         Francesca Weaver RN

## 2024-03-01 NOTE — PROGRESS NOTES
General Daily Progress Note    Admit Date: 2/26/2024    Subjective:     Patient complains of pain in the legs but improving    Current Facility-Administered Medications   Medication Dose Route Frequency    acetaminophen (TYLENOL) tablet 1,000 mg  1,000 mg Oral 3 times per day    ondansetron (ZOFRAN) injection 4 mg  4 mg IntraVENous Q4H PRN    piperacillin-tazobactam (ZOSYN) 3,375 mg in sodium chloride 0.9 % 50 mL IVPB (mini-bag)  3,375 mg IntraVENous q8h    amLODIPine (NORVASC) tablet 10 mg  10 mg Oral Daily    gabapentin (NEURONTIN) capsule 100 mg  100 mg Oral Nightly    meclizine (ANTIVERT) tablet 12.5 mg  12.5 mg Oral TID PRN    metoprolol succinate (TOPROL XL) extended release tablet 12.5 mg  12.5 mg Oral Daily    polyethylene glycol (GLYCOLAX) packet 17 g  17 g Oral Daily PRN    vancomycin (VANCOCIN) 1250 mg in sodium chloride 0.9% 250 mL IVPB  1,250 mg IntraVENous Q24H    warfarin placeholder: dosing by pharmacy   Other RX Placeholder        Review of Systems  A comprehensive review of systems was negative.    Objective:     Patient Vitals for the past 24 hrs:   BP Temp Temp src Pulse Resp SpO2   02/29/24 1946 94/61 98.1 °F (36.7 °C) Oral 75 16 95 %   02/29/24 1430 103/70 98.1 °F (36.7 °C) Oral 75 18 98 %   02/29/24 0800 104/68 97.9 °F (36.6 °C) -- 75 16 100 %   02/29/24 0334 105/71 98.1 °F (36.7 °C) Oral 75 18 96 %       No intake/output data recorded.  02/28 0701 - 02/29 1900  In: 94.7   Out: 850 [Urine:850]    Physical Exam: BP 94/61   Pulse 75   Temp 98.1 °F (36.7 °C) (Oral)   Resp 16   SpO2 95%   General appearance: alert, appears stated age, and cooperative  Neck: no adenopathy, no carotid bruit, no JVD, supple, symmetrical, trachea midline, and thyroid not enlarged, symmetric, no tenderness/mass/nodules  Lungs: clear to auscultation bilaterally  Heart: regular rate and rhythm, S1, S2 normal, no murmur, click, rub or gallop  Abdomen: soft, non-tender; bowel sounds normal; no masses,  no

## 2024-03-01 NOTE — PROGRESS NOTES
Pharmacy Antimicrobial Kinetic Dosing    Indication for Antimicrobials: SSTI     Current Regimen of Each Antimicrobial:  Vancomycin pharmacy consult; Start Date 24; Day # 5  Zosyn 3.375gm q 8; Start Date 24; Day # 5    Previous Antimicrobial Therapy:   Doxycycline and keflex PTA    Goal Level: Vancomycin -600    Date Dose & Interval Measured (mcg/mL) Predicted AUC    13:08 1250 mg IV q24h 12.6 518                 Significant Cultures:    wound - HEAVY pseudomonas(sens to zosyn), moderate MSSA, HEAVY mixed GNRs    blood - NG x 3 days, pending    Labs:  Recent Labs     Units 24  0230 24  0053 24  0307   CREATININE MG/DL  --  1.26* 1.21*   BUN MG/DL  --  19 17   WBC K/uL 5.9 7.5 7.8     Temp (24hrs), Av.1 °F (36.7 °C), Min:97.7 °F (36.5 °C), Max:98.4 °F (36.9 °C)      Conditions for Dosing Consideration:  JOHN    Creatinine Clearance (mL/min): Estimated Creatinine Clearance: 44 mL/min (A) (based on SCr of 1.26 mg/dL (H)).       Impression/Plan:   Continue vancomycin 1250 mg IV q24h for an estAUC 561. Will check a random level tomorrow with am labs   Note  wound culture shows MSSA, consider de-escalation from vancomycin to cefazolin as clinically appropriate  Zosyn for pseudomonas wound coverage   BMP daily   Antimicrobial stop date 7 days     Pharmacy will follow daily and adjust medications as appropriate for renal function and/or serum levels.    Thank you,  Mindi Hoffman, [facility]

## 2024-03-01 NOTE — CONSULTS
[facility]  [address], [place] [address] [phone]  [email or web address]       CARDIOLOGY CONSULTATION       Date of  Admission: 2/26/2024  9:27 AM     Admission type:Emergency   Primary Care Physician:Aly Rizvi MD     Attending Provider: Aly Rizvi MD  Cardiology Provider: Dr. Acosta  CC/REASON FOR CONSULT: Elevated proBNP     Subjective:     Kylee Koo is a 83 y.o. female admitted for Lower extremity edema [R60.0]  Wound infection [T14.8XXA, L08.9].    Patient was seen and examined at the bedside.  The patient reports swelling in her legs and blisters that led to the initial presentation.  Breathing is okay at the time of my visit.  Denies any other complaints.  The patient uses 2 pillows under her head at night to sleep.  Has not tried laying flat.  Denies any episodes of waking up from sleep because of her breathing.      Patient Active Problem List    Diagnosis Date Noted    Anemia 09/08/2014    Wound infection 02/26/2024    Ascending aorta dilatation (HCC) 02/20/2024    Diastolic congestive heart failure (HCC) 02/20/2024    Cellulitis of right lower leg 02/19/2024    Non-pressure chronic ulcer of right lower leg with fat layer exposed (HCC) 02/05/2024    Non-pressure chronic ulcer of left lower leg with fat layer exposed (HCC) 02/05/2024    Bilateral leg edema 02/05/2024    Eczema 05/02/2023    Chronic renal disease, stage III (HCC) 06/23/2022    Chronic systolic congestive heart failure (HCC) 12/21/2021    Lumbar radiculopathy 11/21/2021    Obesity (BMI 30.0-34.9) 08/29/2020    Primary hypertension 12/21/2018    Cervical radiculopathy 05/09/2018    Hyperthyroidism 04/14/2018    Weight loss 09/21/2017    S/P cardiac cath 09/07/2017    S/P AV brenda ablation 08/11/2017    Status post biventricular pacemaker 08/11/2017    Sialadenitis 06/29/2017    Muscular deconditioning 04/22/2017    Back pain 04/30/2016    Venous insufficiency  pharmacy   Other RX Placeholder        Cardiovascular ROS: negative         Objective:      Vitals:    03/01/24 0809   BP: 108/72   Pulse: 75   Resp: 16   Temp: 98.4 °F (36.9 °C)   SpO2: 99%       Physical Exam  Constitutional:       General: She is not in acute distress.     Comments: Sitting upright in bed   HENT:      Head: Normocephalic and atraumatic.   Neck:      Vascular: No JVD.   Cardiovascular:      Rate and Rhythm: Normal rate and regular rhythm.   Pulmonary:      Effort: Pulmonary effort is normal.      Breath sounds: No wheezing.   Musculoskeletal:      Cervical back: Neck supple.      Right lower leg: Edema present.      Left lower leg: Edema present.   Skin:     General: Skin is warm and dry.      Findings: Lesion present.   Neurological:      Mental Status: She is alert.           2/24/2024    12:14 PM 2/20/2024     1:57 PM 2/9/2024     1:25 PM 1/31/2024     9:20 AM 1/8/2024     1:52 PM 10/31/2023    10:19 AM 9/28/2023     9:26 AM   Weight Metrics   Weight 225 lb 5 oz 228 lb 1.6 oz 238 lb 3.2 oz 237 lb 6.4 oz 235 lb 3.2 oz 228 lb 230 lb   BMI (Calculated) 32.4 kg/m2 35.8 kg/m2 37.4 kg/m2 37.3 kg/m2 36.9 kg/m2 35.8 kg/m2 36.1 kg/m2          Data Review:   Recent Labs     02/29/24  0053 03/01/24  0230   WBC 7.5 5.9   HGB 7.7* 7.9*   HCT 25.4* 26.2*    303     Recent Labs     02/28/24  0307 02/29/24  0053 02/29/24  0612 03/01/24  0230    136  --   --    K 3.1* 3.5  --   --     108  --   --    CO2 28 26  --   --    BUN 17 19  --   --    CREATININE 1.21* 1.26*  --   --    INR 4.3*  --  2.4* 2.3*     Cardiographics    Telemetry: Unremarkable  ECG: Pending    Echocardiogram:  02/03/20 02/04/2020  3:41 PM, 02/04/2020 12:00 AM (Final)    Narrative  This is a summary report. The complete report is available in the patient's medical record. If you cannot access the medical record, please contact the sending organization for a detailed fax or copy.    · Normal cavity size and systolic

## 2024-03-01 NOTE — PROGRESS NOTES
Transition of Care Plan:    RUR: 19% \"High Risk\"  Prior Level of Functioning: Independent with ADL's  Disposition: IPR   If SNF or IPR: Date FOC offered: IPR - 2/28/24, SNF - 2/29/24  Date FOC received: IPR - 2/28/24, SNF - Pending choices from patient/patient rep  Accepting facility: [facility] - pending bed availability, [facility] - pending bed availability  Date authorization started with reference number: N/A  Date authorization received and expires: N/A  Follow up appointments: PCP and specialists as indicated after IPR discharge  DME needed: Defer to facility  Transportation at discharge: BLS vs Family   IM/IMM Medicare/ letter given: 2nd IM to be given prior to discharge  Is patient a [place] and connected with [facility]?    If yes, was  transfer form completed and [facility] notified? N/A  Caregiver Contact: Son or Daughter to be contacted   Discharge Caregiver contacted prior to discharge? To be contacted prior to discharge  Care Conference needed? Not at this time  Barriers to discharge: Clinical improvement, IPR placement    Update 4250 - CM met with patient and patient's family at bedside for SNF choices for back up plan to IPR. Patient and family are still deciding preferences. CM gave update for IPR bed availability for [facility] and [facility] to patient and family. [facility] may have a bed early next week. [facility] will give update to unit CM as soon they have update.     Initial note - 9443 - Chart reviewed. CM contacted [facility] with [facility] to determine bed availability and [facility] will call CM once their bed availability has been updated. [facility] does not have a bed in [place] today, may have a bed next week, will update CM. CM is waiting for SNF choices from patient/patient family as back up plan. Patient will not need auth to transition to facility once bed is secured.     CM to continue to follow for CM needs.     Janice Kyle, Care Management Assistant

## 2024-03-01 NOTE — PROGRESS NOTES
End of Shift Note    Bedside shift change report given to Francesca ACOSTA (oncoming nurse) by Jc Everett RN (offgoing nurse).  Report included the following information     Shift worked:  7p-7a     Shift summary and any significant changes:     Patient tolerated care. Scheduled meds given see MAR. Family at bedside over shift. Caring rounds. Labs drawn.       Length of Stay:  Expected LOS: 4  Actual LOS: 4      Jc Everett RN

## 2024-03-01 NOTE — PROGRESS NOTES
Pharmacist Daily Dosing of Warfarin    Indication & Goal INR: AFib, INR Goal 2-3    PTA Warfarin Dose: 5 mg M-F; 7.5mg Sat and Sun    Notable concurrent conditions and medications: None    Labs:  Recent Labs     Units 03/01/24  0230 02/29/24  0612 02/29/24  0053 02/28/24  0307   INR   2.3* 2.4*  --  4.3*   HGB g/dL 7.9*  --  7.7* 7.9*   PLT K/uL 303  --  265 293       Impression/Plan:   Vitamin K 2.5 mg PO given 2/27 10:27  Will order warfarin 5 mg x 1 dose 3/1  Daily INR has been ordered  CBC w/o differential every other day has been ordered     Pharmacy will follow daily and adjust the dose as appropriate.    Thank you,  Mindi Hoffman [facility]

## 2024-03-01 NOTE — PLAN OF CARE
Problem: Occupational Therapy - Adult  Goal: By Discharge: Performs self-care activities at highest level of function for planned discharge setting.  See evaluation for individualized goals.  Description: FUNCTIONAL STATUS PRIOR TO ADMISSION:  Patient was ambulatory using cane/RW in home, living alone; IND-Kandice.  wound nurse.   , ADL Assistance: Independent,  ,  ,  ,  ,  , Homemaking Assistance: Independent, Ambulation Assistance: Independent, Transfer Assistance: Independent, Active : Yes     HOME SUPPORT: Patient lived alone with family/friends to provide assistance as needed.    Occupational Therapy Goals:  Initiated 2/28/2024  1.  Patient will perform grooming standing as tolerated with RW support with Contact Guard Assist within 7 day(s).  2.  Patient will perform bathing seated with Minimal Assist within 7 day(s).  3.  Patient will perform upper body dressing and lower body dressing with Minimal Assist within 7 day(s).  4.  Patient will perform toilet transfers with Minimal Assist  within 7 day(s).  5.  Patient will perform all aspects of toileting with Minimal Assist within 7 day(s).  6.  Patient will participate in upper extremity therapeutic exercise/activities with Modified [place] for 10 minutes within 7 day(s).    7.  Patient will utilize energy conservation techniques during functional activities with verbal cues within 7 day(s).    Outcome: Progressing   OCCUPATIONAL THERAPY TREATMENT  Patient: Kylee Koo (83 y.o. female)  Date: 3/1/2024  Primary Diagnosis: Lower extremity edema [R60.0]  Wound infection [T14.8XXA, L08.9]       Precautions: Fall Risk, Bed Alarm                Chart, occupational therapy assessment, plan of care, and goals were reviewed.    ASSESSMENT  Patient continues to benefit from skilled OT services and is slowly progressing towards goals. Pt received in bed, supportive family at bedside. Pt agreeable to OOB activity with therapy and encouragement. Pt overall

## 2024-03-01 NOTE — PROGRESS NOTES
PT Note    Chart reviewed in preparation for PT session.  Attempted to see pt for PT, however pt requesting to be seen later as she is in pain and would like to be seen after she has her next dose of pain medication.  Will defer and follow up with pt later today.

## 2024-03-01 NOTE — PLAN OF CARE
Problem: Physical Therapy - Adult  Goal: By Discharge: Performs mobility at highest level of function for planned discharge setting.  See evaluation for individualized goals.  Description: FUNCTIONAL STATUS PRIOR TO ADMISSION: Pt lives alone and at baseline is fully independent/modified independent using a cane or rollator and also does her own ADLS/IADLs and drives.    HOME SUPPORT PRIOR TO ADMISSION: The patient lived alone with family to provide assistance.    Physical Therapy Goals  Initiated 2/28/2024  1.  Patient will move from supine to sit and sit to supine, scoot up and down, and roll side to side in bed with independence within 7 day(s).    2.  Patient will perform sit to stand with independence within 7 day(s).  3.  Patient will transfer from bed to chair and chair to bed with modified independence using the least restrictive device within 7 day(s).  4.  Patient will ambulate with modified independence for 150 feet with the least restrictive device within 7 day(s).   5.  Patient will ascend/descend 5 stairs with handrail(s) with supervision/set-up within 7 day(s).    Outcome: Progressing   PHYSICAL THERAPY TREATMENT    Patient: Kylee Koo (83 y.o. female)  Date: 3/1/2024  Diagnosis: Lower extremity edema [R60.0]  Wound infection [T14.8XXA, L08.9] Wound infection      Precautions: Fall Risk, Bed Alarm                      ASSESSMENT:  Patient continues to benefit from skilled PT services and is slowly progressing towards goals. Pt received in bed, multiple supportive family members present, agreeable to session with coaxing.  Pt able to move to sitting eob(used gait belt as a leg ), stand(required increased physical assistance to stand), and ambulate around the bed to the recliner.  Pt required increased time and assistance for RW management.  She reported fatigue once seated in recliner and continues to demonstrate GW, impaired activity tolerance and an increased risk for falls.  Feel pt

## 2024-03-01 NOTE — PROGRESS NOTES
Spiritual Care Assessment/Progress Note  [facility]    Name: Kylee Koo MRN: [id number]    Age: 83 y.o.     Sex: female   Language: English     Date: 3/1/2024            Total Time Calculated: 27 min              Spiritual Assessment begun in [facility] MEDICAL ONCOLOGY  Service Provided For:: Patient and family together  Referral/Consult From:: Rounding  Encounter Overview/Reason : Initial Encounter    Spiritual beliefs:      [x] Involved in a sushila tradition/spiritual practice:      [] Supported by a sushila community:      [] Claims no spiritual orientation:      [] Seeking spiritual identity:           [] Adheres to an individual form of spirituality:      [] Not able to assess:                Identified resources for coping and support system:   Support System: Family members       [x] Prayer                  [] Devotional reading               [] Music                  [] Guided Imagery     [] Pet visits                                        [] Other: (COMMENT)     Specific area/focus of visit   Encounter:    Crisis:    Spiritual/Emotional needs: Type: Spiritual Support  Ritual, Rites and Sacraments:    Grief, Loss, and Adjustments: Type: Adjustment to illness, Life Adjustments  Ethics/Mediation:    Behavioral Health:    Palliative Care:    Advance Care Planning:      Plan/Referrals: No future visits requested    Narrative:   Visit was in 3408 for initial spiritual assessment. Patient was in her recliner and appeared comfortable. Family present, son, daughter and a grand son. She has other children who also have stayed in touch for support. Patient shared that she was doing well, but aware her condition will  require hospitalization for a while. sushila offers strength, family support as well.  remained a supportive presence as patient and family shared family and life stories. Patient's son is a , and several others praying for her. No need for support epressed. They were

## 2024-03-01 NOTE — PROGRESS NOTES
General Daily Progress Note    Admit Date: 2/26/2024    Subjective:     Patient complains of pain in the legs but improving    Current Facility-Administered Medications   Medication Dose Route Frequency    collagenase ointment   Topical Daily    acetaminophen (TYLENOL) tablet 1,000 mg  1,000 mg Oral 3 times per day    ondansetron (ZOFRAN) injection 4 mg  4 mg IntraVENous Q4H PRN    piperacillin-tazobactam (ZOSYN) 3,375 mg in sodium chloride 0.9 % 50 mL IVPB (mini-bag)  3,375 mg IntraVENous q8h    amLODIPine (NORVASC) tablet 10 mg  10 mg Oral Daily    gabapentin (NEURONTIN) capsule 100 mg  100 mg Oral Nightly    meclizine (ANTIVERT) tablet 12.5 mg  12.5 mg Oral TID PRN    metoprolol succinate (TOPROL XL) extended release tablet 12.5 mg  12.5 mg Oral Daily    polyethylene glycol (GLYCOLAX) packet 17 g  17 g Oral Daily PRN    vancomycin (VANCOCIN) 1250 mg in sodium chloride 0.9% 250 mL IVPB  1,250 mg IntraVENous Q24H    warfarin placeholder: dosing by pharmacy   Other RX Placeholder        Review of Systems  A comprehensive review of systems was negative.    Objective:     Patient Vitals for the past 24 hrs:   BP Temp Temp src Pulse Resp SpO2   03/01/24 0809 108/72 98.4 °F (36.9 °C) -- 75 16 99 %   03/01/24 0329 103/71 97.7 °F (36.5 °C) Oral 75 18 100 %   03/01/24 0301 111/73 98.2 °F (36.8 °C) Oral 75 16 97 %   02/29/24 1946 94/61 98.1 °F (36.7 °C) Oral 75 16 95 %   02/29/24 1430 103/70 98.1 °F (36.7 °C) Oral 75 18 98 %       No intake/output data recorded.  02/28 1901 - 03/01 0700  In: 94.7   Out: 300 [Urine:300]    Physical Exam: /72   Pulse 75   Temp 98.4 °F (36.9 °C)   Resp 16   SpO2 99%   General appearance: alert, appears stated age, and cooperative  Neck: no adenopathy, no carotid bruit, no JVD, supple, symmetrical, trachea midline, and thyroid not enlarged, symmetric, no tenderness/mass/nodules  Lungs: clear to auscultation bilaterally  Heart: regular rate and rhythm, S1, S2 normal, no murmur, click,

## 2024-03-01 NOTE — PROGRESS NOTES
End of Shift Note    Bedside shift change report given to Jc ACOSTA (oncoming nurse) by Francesca Weaver RN (offgoing nurse).  Report included the following information SBAR, Kardex, Intake/Output, and MAR    Shift worked:  7a - 7p      Shift summary and any significant changes:    Pt tolerated care. Medications given per MAR. Morning amlodipine held due to patient having a BP of 104/68. PRN meclizine given for dizziness. PT/OT worked with patient and got her up to chair. Patient x2 assist with a walker to bedside commode. Wound care due to be changed tomorrow. IV flushed and patent. Hourly rounding completed.      Concerns for physician to address:     Zone phone for oncoming shift:               Francesca Weaver RN

## 2024-03-02 LAB
ANION GAP SERPL CALC-SCNC: 6 MMOL/L (ref 5–15)
BUN SERPL-MCNC: 18 MG/DL (ref 6–20)
BUN/CREAT SERPL: 19 (ref 12–20)
CALCIUM SERPL-MCNC: 8.8 MG/DL (ref 8.5–10.1)
CHLORIDE SERPL-SCNC: 108 MMOL/L (ref 97–108)
CO2 SERPL-SCNC: 26 MMOL/L (ref 21–32)
CREAT SERPL-MCNC: 0.97 MG/DL (ref 0.55–1.02)
GLUCOSE SERPL-MCNC: 100 MG/DL (ref 65–100)
INR PPP: 2.8 (ref 0.9–1.1)
POTASSIUM SERPL-SCNC: 3.4 MMOL/L (ref 3.5–5.1)
PROTHROMBIN TIME: 26.9 SEC (ref 9–11.1)
SODIUM SERPL-SCNC: 140 MMOL/L (ref 136–145)
VANCOMYCIN SERPL-MCNC: 16.5 UG/ML

## 2024-03-02 PROCEDURE — 6370000000 HC RX 637 (ALT 250 FOR IP): Performed by: INTERNAL MEDICINE

## 2024-03-02 PROCEDURE — 99221 1ST HOSP IP/OBS SF/LOW 40: CPT | Performed by: INTERNAL MEDICINE

## 2024-03-02 PROCEDURE — 6360000002 HC RX W HCPCS: Performed by: INTERNAL MEDICINE

## 2024-03-02 PROCEDURE — 6360000002 HC RX W HCPCS: Performed by: STUDENT IN AN ORGANIZED HEALTH CARE EDUCATION/TRAINING PROGRAM

## 2024-03-02 PROCEDURE — 85610 PROTHROMBIN TIME: CPT

## 2024-03-02 PROCEDURE — 80048 BASIC METABOLIC PNL TOTAL CA: CPT

## 2024-03-02 PROCEDURE — 6370000000 HC RX 637 (ALT 250 FOR IP): Performed by: STUDENT IN AN ORGANIZED HEALTH CARE EDUCATION/TRAINING PROGRAM

## 2024-03-02 PROCEDURE — 2580000003 HC RX 258: Performed by: INTERNAL MEDICINE

## 2024-03-02 PROCEDURE — 80202 ASSAY OF VANCOMYCIN: CPT

## 2024-03-02 PROCEDURE — 1100000000 HC RM PRIVATE

## 2024-03-02 PROCEDURE — 36415 COLL VENOUS BLD VENIPUNCTURE: CPT

## 2024-03-02 RX ORDER — FUROSEMIDE 10 MG/ML
40 INJECTION INTRAMUSCULAR; INTRAVENOUS DAILY
Status: DISCONTINUED | OUTPATIENT
Start: 2024-03-03 | End: 2024-03-04 | Stop reason: HOSPADM

## 2024-03-02 RX ORDER — POTASSIUM CHLORIDE 20 MEQ/1
40 TABLET, EXTENDED RELEASE ORAL ONCE
Status: COMPLETED | OUTPATIENT
Start: 2024-03-02 | End: 2024-03-02

## 2024-03-02 RX ORDER — WARFARIN SODIUM 2.5 MG/1
2.5 TABLET ORAL
Status: COMPLETED | OUTPATIENT
Start: 2024-03-02 | End: 2024-03-02

## 2024-03-02 RX ADMIN — ACETAMINOPHEN 1000 MG: 500 TABLET ORAL at 14:02

## 2024-03-02 RX ADMIN — WARFARIN SODIUM 2.5 MG: 2.5 TABLET ORAL at 17:49

## 2024-03-02 RX ADMIN — ACETAMINOPHEN 1000 MG: 500 TABLET ORAL at 21:03

## 2024-03-02 RX ADMIN — SPIRONOLACTONE 12.5 MG: 25 TABLET ORAL at 17:50

## 2024-03-02 RX ADMIN — COLLAGENASE SANTYL: 250 OINTMENT TOPICAL at 15:30

## 2024-03-02 RX ADMIN — POTASSIUM CHLORIDE 10 MEQ: 750 TABLET, EXTENDED RELEASE ORAL at 09:56

## 2024-03-02 RX ADMIN — GABAPENTIN 100 MG: 100 CAPSULE ORAL at 21:03

## 2024-03-02 RX ADMIN — ACETAMINOPHEN 1000 MG: 500 TABLET ORAL at 06:33

## 2024-03-02 RX ADMIN — PIPERACILLIN AND TAZOBACTAM 3375 MG: 3; .375 INJECTION, POWDER, LYOPHILIZED, FOR SOLUTION INTRAVENOUS at 14:05

## 2024-03-02 RX ADMIN — VANCOMYCIN HYDROCHLORIDE 1250 MG: 10 INJECTION, POWDER, LYOPHILIZED, FOR SOLUTION INTRAVENOUS at 17:56

## 2024-03-02 RX ADMIN — PIPERACILLIN AND TAZOBACTAM 3375 MG: 3; .375 INJECTION, POWDER, LYOPHILIZED, FOR SOLUTION INTRAVENOUS at 06:32

## 2024-03-02 RX ADMIN — POTASSIUM CHLORIDE 40 MEQ: 1500 TABLET, EXTENDED RELEASE ORAL at 17:49

## 2024-03-02 RX ADMIN — FUROSEMIDE 40 MG: 10 INJECTION, SOLUTION INTRAMUSCULAR; INTRAVENOUS at 09:56

## 2024-03-02 RX ADMIN — PIPERACILLIN AND TAZOBACTAM 3375 MG: 3; .375 INJECTION, POWDER, LYOPHILIZED, FOR SOLUTION INTRAVENOUS at 21:05

## 2024-03-02 ASSESSMENT — PAIN SCALES - GENERAL
PAINLEVEL_OUTOF10: 1
PAINLEVEL_OUTOF10: 10
PAINLEVEL_OUTOF10: 8

## 2024-03-02 ASSESSMENT — PAIN DESCRIPTION - DESCRIPTORS
DESCRIPTORS: ACHING;THROBBING
DESCRIPTORS: DISCOMFORT
DESCRIPTORS: ACHING

## 2024-03-02 ASSESSMENT — PAIN DESCRIPTION - LOCATION
LOCATION: LEG
LOCATION: FOOT
LOCATION: LEG

## 2024-03-02 ASSESSMENT — PAIN DESCRIPTION - ORIENTATION
ORIENTATION: RIGHT
ORIENTATION: RIGHT;LEFT
ORIENTATION: LEFT;RIGHT

## 2024-03-02 NOTE — PROGRESS NOTES
General Daily Progress Note    Admit Date: 2/26/2024    Subjective:     Patient complains of pain in the legs but improving    Current Facility-Administered Medications   Medication Dose Route Frequency    warfarin (COUMADIN) tablet 2.5 mg  2.5 mg Oral Once    collagenase ointment   Topical Daily    furosemide (LASIX) injection 40 mg  40 mg IntraVENous BID    spironolactone (ALDACTONE) tablet 12.5 mg  12.5 mg Oral BID    potassium chloride (KLOR-CON) extended release tablet 10 mEq  10 mEq Oral Daily    acetaminophen (TYLENOL) tablet 1,000 mg  1,000 mg Oral 3 times per day    ondansetron (ZOFRAN) injection 4 mg  4 mg IntraVENous Q4H PRN    piperacillin-tazobactam (ZOSYN) 3,375 mg in sodium chloride 0.9 % 50 mL IVPB (mini-bag)  3,375 mg IntraVENous q8h    [Held by provider] amLODIPine (NORVASC) tablet 10 mg  10 mg Oral Daily    gabapentin (NEURONTIN) capsule 100 mg  100 mg Oral Nightly    meclizine (ANTIVERT) tablet 12.5 mg  12.5 mg Oral TID PRN    metoprolol succinate (TOPROL XL) extended release tablet 12.5 mg  12.5 mg Oral Daily    polyethylene glycol (GLYCOLAX) packet 17 g  17 g Oral Daily PRN    vancomycin (VANCOCIN) 1250 mg in sodium chloride 0.9% 250 mL IVPB  1,250 mg IntraVENous Q24H    warfarin placeholder: dosing by pharmacy   Other RX Placeholder        Review of Systems  A comprehensive review of systems was negative.    Objective:     Patient Vitals for the past 24 hrs:   BP Temp Temp src Pulse Resp SpO2   03/02/24 0900 106/69 -- -- 75 -- --   03/02/24 0740 110/72 98.6 °F (37 °C) -- 76 16 96 %   03/02/24 0344 119/79 97.5 °F (36.4 °C) -- 75 18 97 %   03/01/24 2010 107/68 98.2 °F (36.8 °C) Oral 75 18 96 %   03/01/24 1700 125/89 -- -- -- -- --   03/01/24 1603 102/65 98.8 °F (37.1 °C) Oral 74 15 99 %       No intake/output data recorded.  02/29 1901 - 03/02 0700  In: -   Out: 1300 [Urine:1300]    Physical Exam: /69   Pulse 75   Temp 98.6 °F (37 °C)   Resp 16   SpO2 96%   General appearance: alert,

## 2024-03-02 NOTE — PROGRESS NOTES
[facility]  [address] [place] [address] [phone]  [email or web address]  CARDIOLOGY PROGRESS NOTE    3/2/2024 5:16 PM    Admit Date: 2/26/2024    Admit Diagnosis:   Lower extremity edema [R60.0]  Wound infection [T14.8XXA, L08.9]    Subjective:     Kylee Koo nurse called earlier in the day blood pressure 106/ 69 looking to see what medications should be given.  Given how to volume overload she was received Lasix and blood pressure medications were held.  She seems to be diuresing very well.  Daughter at the bedside.  She had dressings placed on her both legs at the blister sites.  No chest pain shortness of breath or palpitations.    /76   Pulse 76   Temp 98.4 °F (36.9 °C) (Oral)   Resp 19   SpO2 100%     Current Facility-Administered Medications   Medication Dose Route Frequency    warfarin (COUMADIN) tablet 2.5 mg  2.5 mg Oral Once    potassium chloride (KLOR-CON M) extended release tablet 40 mEq  40 mEq Oral Once    collagenase ointment   Topical Daily    furosemide (LASIX) injection 40 mg  40 mg IntraVENous BID    spironolactone (ALDACTONE) tablet 12.5 mg  12.5 mg Oral BID    potassium chloride (KLOR-CON) extended release tablet 10 mEq  10 mEq Oral Daily    acetaminophen (TYLENOL) tablet 1,000 mg  1,000 mg Oral 3 times per day    ondansetron (ZOFRAN) injection 4 mg  4 mg IntraVENous Q4H PRN    piperacillin-tazobactam (ZOSYN) 3,375 mg in sodium chloride 0.9 % 50 mL IVPB (mini-bag)  3,375 mg IntraVENous q8h    [Held by provider] amLODIPine (NORVASC) tablet 10 mg  10 mg Oral Daily    gabapentin (NEURONTIN) capsule 100 mg  100 mg Oral Nightly    meclizine (ANTIVERT) tablet 12.5 mg  12.5 mg Oral TID PRN    metoprolol succinate (TOPROL XL) extended release tablet 12.5 mg  12.5 mg Oral Daily    polyethylene glycol (GLYCOLAX) packet 17 g  17 g Oral Daily PRN    vancomycin (VANCOCIN) 1250 mg in sodium chloride 0.9% 250 mL IVPB  1,250 mg

## 2024-03-02 NOTE — PROGRESS NOTES
End of Shift Note    Bedside shift change report given to DAVE Madison  (oncoming nurse) by TAQUERIA SANTOYO RN (offgoing nurse).  Report included the following information SBAR, Kardex, Intake/Output, and MAR    Shift worked:  7a - 7p      Shift summary and any significant changes:     Patient had complaints of pain, scheduled tylenol given. No complaints of nausea. Scheduled medications given per MAR. Wound care completed. 1 IV infiltrated, new one placed.      Concerns for physician to address:      Zone phone for oncoming shift:         TAQUERIA SANTOYO RN

## 2024-03-02 NOTE — PROGRESS NOTES
Pharmacist Daily Dosing of Warfarin    Indication & Goal INR: AFib, INR Goal 2-3    PTA Warfarin Dose: 5 mg M-F; 7.5mg Sat and Sun    Notable concurrent conditions and medications: None    Labs:  Recent Labs     Units 03/02/24  0444 03/01/24  0230 02/29/24  0612 02/29/24  0053   INR   2.8* 2.3* 2.4*  --    HGB g/dL  --  7.9*  --  7.7*   PLT K/uL  --  303  --  265       Impression/Plan:   Vitamin K 2.5 mg PO given 2/27 10:27  Will order warfarin 2.5 mg x 1 dose 3/1  Daily INR has been ordered  CBC w/o differential every other day has been ordered     Pharmacy will follow daily and adjust the dose as appropriate.    Thank you,  Dominic Blandon RPH

## 2024-03-02 NOTE — PROGRESS NOTES
Pharmacy Antimicrobial Kinetic Dosing    Indication for Antimicrobials: SSTI     Current Regimen of Each Antimicrobial:  Vancomycin pharmacy consult; Start Date 24; Day # 6  Zosyn 3.375gm q 8; Start Date 24; Day # 6    Previous Antimicrobial Therapy:   Doxycycline and keflex PTA    Goal Level: Vancomycin -600    Date Dose & Interval Measured (mcg/mL) Predicted AUC    13:08 1250 mg IV q24h 12.6 518   3/2 0444 1250mg q 24h 16.5            Significant Cultures:    wound - HEAVY pseudomonas(sens to zosyn), moderate MSSA, HEAVY mixed GNRs    blood - NG x 3 days, pending    Labs:  Recent Labs     Units 24  0444 24  1627 24  0230 24  0053   CREATININE MG/DL 0.97 1.19*  --  1.26*   BUN MG/DL 18 20  --  19   WBC K/uL  --   --  5.9 7.5     Temp (24hrs), Av.3 °F (36.8 °C), Min:97.5 °F (36.4 °C), Max:98.8 °F (37.1 °C)      Conditions for Dosing Consideration:  JOHN    Creatinine Clearance (mL/min): Estimated Creatinine Clearance: 57 mL/min (based on SCr of 0.97 mg/dL).       Impression/Plan:   Continue vancomycin 1250 mg IV q24h for an estAUC 421.   Note  wound culture shows MSSA, consider de-escalation from vancomycin to cefazolin as clinically appropriate  Zosyn for pseudomonas wound coverage   BMP daily   Antimicrobial stop date 7 days     Pharmacy will follow daily and adjust medications as appropriate for renal function and/or serum levels.    Thank you,  Dominic Blandon RP

## 2024-03-03 LAB
ANION GAP SERPL CALC-SCNC: 3 MMOL/L (ref 5–15)
BACTERIA SPEC CULT: NORMAL
BACTERIA SPEC CULT: NORMAL
BUN SERPL-MCNC: 19 MG/DL (ref 6–20)
BUN/CREAT SERPL: 17 (ref 12–20)
CALCIUM SERPL-MCNC: 8.6 MG/DL (ref 8.5–10.1)
CHLORIDE SERPL-SCNC: 107 MMOL/L (ref 97–108)
CO2 SERPL-SCNC: 29 MMOL/L (ref 21–32)
CREAT SERPL-MCNC: 1.12 MG/DL (ref 0.55–1.02)
ERYTHROCYTE [DISTWIDTH] IN BLOOD BY AUTOMATED COUNT: 15.7 % (ref 11.5–14.5)
GLUCOSE SERPL-MCNC: 98 MG/DL (ref 65–100)
HCT VFR BLD AUTO: 27.4 % (ref 35–47)
HGB BLD-MCNC: 8.2 G/DL (ref 11.5–16)
INR PPP: 3.5 (ref 0.9–1.1)
MCH RBC QN AUTO: 25.9 PG (ref 26–34)
MCHC RBC AUTO-ENTMCNC: 29.9 G/DL (ref 30–36.5)
MCV RBC AUTO: 86.7 FL (ref 80–99)
NRBC # BLD: 0 K/UL (ref 0–0.01)
NRBC BLD-RTO: 0 PER 100 WBC
PLATELET # BLD AUTO: 324 K/UL (ref 150–400)
PMV BLD AUTO: 10 FL (ref 8.9–12.9)
POTASSIUM SERPL-SCNC: 3.7 MMOL/L (ref 3.5–5.1)
PROTHROMBIN TIME: 34.1 SEC (ref 9–11.1)
RBC # BLD AUTO: 3.16 M/UL (ref 3.8–5.2)
SERVICE CMNT-IMP: NORMAL
SERVICE CMNT-IMP: NORMAL
SODIUM SERPL-SCNC: 139 MMOL/L (ref 136–145)
WBC # BLD AUTO: 4.2 K/UL (ref 3.6–11)

## 2024-03-03 PROCEDURE — 1100000000 HC RM PRIVATE

## 2024-03-03 PROCEDURE — 2580000003 HC RX 258: Performed by: INTERNAL MEDICINE

## 2024-03-03 PROCEDURE — 6370000000 HC RX 637 (ALT 250 FOR IP): Performed by: INTERNAL MEDICINE

## 2024-03-03 PROCEDURE — 85610 PROTHROMBIN TIME: CPT

## 2024-03-03 PROCEDURE — 6360000002 HC RX W HCPCS: Performed by: INTERNAL MEDICINE

## 2024-03-03 PROCEDURE — 99222 1ST HOSP IP/OBS MODERATE 55: CPT | Performed by: INTERNAL MEDICINE

## 2024-03-03 PROCEDURE — 36415 COLL VENOUS BLD VENIPUNCTURE: CPT

## 2024-03-03 PROCEDURE — 85027 COMPLETE CBC AUTOMATED: CPT

## 2024-03-03 PROCEDURE — 80048 BASIC METABOLIC PNL TOTAL CA: CPT

## 2024-03-03 PROCEDURE — 6370000000 HC RX 637 (ALT 250 FOR IP): Performed by: STUDENT IN AN ORGANIZED HEALTH CARE EDUCATION/TRAINING PROGRAM

## 2024-03-03 RX ORDER — POTASSIUM CHLORIDE 20 MEQ/1
20 TABLET, EXTENDED RELEASE ORAL ONCE
Status: COMPLETED | OUTPATIENT
Start: 2024-03-03 | End: 2024-03-03

## 2024-03-03 RX ADMIN — MECLIZINE 12.5 MG: 12.5 TABLET ORAL at 08:45

## 2024-03-03 RX ADMIN — ACETAMINOPHEN 1000 MG: 500 TABLET ORAL at 05:30

## 2024-03-03 RX ADMIN — VANCOMYCIN HYDROCHLORIDE 1250 MG: 10 INJECTION, POWDER, LYOPHILIZED, FOR SOLUTION INTRAVENOUS at 18:23

## 2024-03-03 RX ADMIN — PIPERACILLIN AND TAZOBACTAM 3375 MG: 3; .375 INJECTION, POWDER, LYOPHILIZED, FOR SOLUTION INTRAVENOUS at 21:48

## 2024-03-03 RX ADMIN — ACETAMINOPHEN 1000 MG: 500 TABLET ORAL at 14:08

## 2024-03-03 RX ADMIN — SPIRONOLACTONE 12.5 MG: 25 TABLET ORAL at 08:45

## 2024-03-03 RX ADMIN — SPIRONOLACTONE 12.5 MG: 25 TABLET ORAL at 18:22

## 2024-03-03 RX ADMIN — POTASSIUM CHLORIDE 10 MEQ: 750 TABLET, EXTENDED RELEASE ORAL at 08:44

## 2024-03-03 RX ADMIN — ACETAMINOPHEN 1000 MG: 500 TABLET ORAL at 21:38

## 2024-03-03 RX ADMIN — MECLIZINE 12.5 MG: 12.5 TABLET ORAL at 21:43

## 2024-03-03 RX ADMIN — PIPERACILLIN AND TAZOBACTAM 3375 MG: 3; .375 INJECTION, POWDER, LYOPHILIZED, FOR SOLUTION INTRAVENOUS at 05:31

## 2024-03-03 RX ADMIN — GABAPENTIN 100 MG: 100 CAPSULE ORAL at 21:30

## 2024-03-03 RX ADMIN — METOPROLOL SUCCINATE 12.5 MG: 25 TABLET, EXTENDED RELEASE ORAL at 08:44

## 2024-03-03 RX ADMIN — POTASSIUM CHLORIDE 20 MEQ: 1500 TABLET, EXTENDED RELEASE ORAL at 12:12

## 2024-03-03 RX ADMIN — PIPERACILLIN AND TAZOBACTAM 3375 MG: 3; .375 INJECTION, POWDER, LYOPHILIZED, FOR SOLUTION INTRAVENOUS at 14:11

## 2024-03-03 RX ADMIN — FUROSEMIDE 40 MG: 10 INJECTION, SOLUTION INTRAMUSCULAR; INTRAVENOUS at 08:45

## 2024-03-03 RX ADMIN — COLLAGENASE SANTYL: 250 OINTMENT TOPICAL at 08:54

## 2024-03-03 ASSESSMENT — PAIN DESCRIPTION - ORIENTATION
ORIENTATION: RIGHT;LEFT
ORIENTATION: LEFT;RIGHT
ORIENTATION: LEFT;RIGHT
ORIENTATION: RIGHT;LEFT
ORIENTATION: RIGHT;LEFT

## 2024-03-03 ASSESSMENT — PAIN DESCRIPTION - DESCRIPTORS
DESCRIPTORS: ACHING

## 2024-03-03 ASSESSMENT — PAIN SCALES - GENERAL
PAINLEVEL_OUTOF10: 10
PAINLEVEL_OUTOF10: 4
PAINLEVEL_OUTOF10: 6
PAINLEVEL_OUTOF10: 4
PAINLEVEL_OUTOF10: 5

## 2024-03-03 ASSESSMENT — PAIN DESCRIPTION - PAIN TYPE: TYPE: ACUTE PAIN

## 2024-03-03 ASSESSMENT — PAIN DESCRIPTION - LOCATION
LOCATION: LEG

## 2024-03-03 NOTE — PROGRESS NOTES
General Daily Progress Note    Admit Date: 2/26/2024    Subjective:     Patient complains of pain in the legs but improving    Current Facility-Administered Medications   Medication Dose Route Frequency    furosemide (LASIX) injection 40 mg  40 mg IntraVENous Daily    collagenase ointment   Topical Daily    spironolactone (ALDACTONE) tablet 12.5 mg  12.5 mg Oral BID    potassium chloride (KLOR-CON) extended release tablet 10 mEq  10 mEq Oral Daily    acetaminophen (TYLENOL) tablet 1,000 mg  1,000 mg Oral 3 times per day    ondansetron (ZOFRAN) injection 4 mg  4 mg IntraVENous Q4H PRN    piperacillin-tazobactam (ZOSYN) 3,375 mg in sodium chloride 0.9 % 50 mL IVPB (mini-bag)  3,375 mg IntraVENous q8h    [Held by provider] amLODIPine (NORVASC) tablet 10 mg  10 mg Oral Daily    gabapentin (NEURONTIN) capsule 100 mg  100 mg Oral Nightly    meclizine (ANTIVERT) tablet 12.5 mg  12.5 mg Oral TID PRN    metoprolol succinate (TOPROL XL) extended release tablet 12.5 mg  12.5 mg Oral Daily    polyethylene glycol (GLYCOLAX) packet 17 g  17 g Oral Daily PRN    vancomycin (VANCOCIN) 1250 mg in sodium chloride 0.9% 250 mL IVPB  1,250 mg IntraVENous Q24H    warfarin placeholder: dosing by pharmacy   Other RX Placeholder        Review of Systems  A comprehensive review of systems was negative.    Objective:     Patient Vitals for the past 24 hrs:   BP Temp Temp src Pulse Resp SpO2   03/03/24 0749 111/70 97.3 °F (36.3 °C) -- 75 15 96 %   03/03/24 0317 (!) 144/87 98.1 °F (36.7 °C) Oral 76 16 97 %   03/02/24 1945 100/77 97.7 °F (36.5 °C) Oral 74 16 97 %   03/02/24 1745 113/72 -- -- 74 -- --   03/02/24 1507 113/76 98.4 °F (36.9 °C) Oral 76 19 100 %       No intake/output data recorded.  03/01 1901 - 03/03 0700  In: 630 [P.O.:330]  Out: 2040 [Urine:2040]    Physical Exam: /70   Pulse 75   Temp 97.3 °F (36.3 °C)   Resp 15   SpO2 96%   General appearance: alert, appears stated age, and cooperative  Neck: no adenopathy, no carotid

## 2024-03-03 NOTE — PROGRESS NOTES
End of Shift Note    Bedside shift change report given to Mario Alberto ACOSTA  (oncoming nurse) by Francesca Weaver RN (offgoing nurse).  Report included the following information SBAR, Kardex, Intake/Output, and MAR    Shift worked:  7a - 7p      Shift summary and any significant changes:    Pt tolerated care fairly well. Medications given per MAR. PRN meclizine given for dizziness. Daily wound care completed. Ivs flushed and patent. Hourly rounding completed     Concerns for physician to address:      Zone phone for oncoming shift:            Francesca Weaver RN

## 2024-03-03 NOTE — CARE COORDINATION
SHASHI received a call from Carola, liaison from [facility] [phone]. She states that a bed is available for the patient, today. Discussed that there are no notes in chart from a physician, today. CM will watch for any indication of discharge.    1530 CM read notes incorrectly and Carola [phone] is from [facility] The facility had a bed today, but when SHASHI called back was told that beds would be reevaluated tomorrow. SHASHI will need to contact Carola on Monday, to see if there is a bed available.     Dina Sesay, RN  Care Manager  [id number]

## 2024-03-03 NOTE — PROGRESS NOTES
Pharmacist Daily Dosing of Warfarin    Indication & Goal INR: AFib, INR Goal 2-3    PTA Warfarin Dose: 5 mg M-F; 7.5mg Sat and Sun    Notable concurrent conditions and medications: None    Labs:  Recent Labs     Units 03/03/24  0320 03/02/24  0444 03/01/24  0230   INR   3.5* 2.8* 2.3*   HGB g/dL 8.2*  --  7.9*   PLT K/uL 324  --  303       Impression/Plan:   Vitamin K 2.5 mg PO given 2/27 10:27  Will hold warfarin dose for tonight, 3/4  Daily INR has been ordered  CBC w/o differential every other day has been ordered     Pharmacy will follow daily and adjust the dose as appropriate.    Thank you,  Dominic Blandon RPH

## 2024-03-03 NOTE — PROGRESS NOTES
[facility]  [address], [place] [id number] [phone]  [email or web address]  CARDIOLOGY PROGRESS NOTE    3/3/2024 1:10 PM    Admit Date: 2/26/2024    Admit Diagnosis:   Lower extremity edema [R60.0]  Wound infection [T14.8XXA, L08.9]    Subjective:     Kylee Koo good response to the IV diuretics.  Leg swelling is resolved.  She has dressings in place on both legs.  Both daughters at the bedside. B.p better    /70   Pulse 75   Temp 97.3 °F (36.3 °C)   Resp 15   SpO2 96%     Current Facility-Administered Medications   Medication Dose Route Frequency    Warfarin: hold dose tonight, 3/3   Other RX Placeholder    furosemide (LASIX) injection 40 mg  40 mg IntraVENous Daily    collagenase ointment   Topical Daily    spironolactone (ALDACTONE) tablet 12.5 mg  12.5 mg Oral BID    potassium chloride (KLOR-CON) extended release tablet 10 mEq  10 mEq Oral Daily    acetaminophen (TYLENOL) tablet 1,000 mg  1,000 mg Oral 3 times per day    ondansetron (ZOFRAN) injection 4 mg  4 mg IntraVENous Q4H PRN    piperacillin-tazobactam (ZOSYN) 3,375 mg in sodium chloride 0.9 % 50 mL IVPB (mini-bag)  3,375 mg IntraVENous q8h    [Held by provider] amLODIPine (NORVASC) tablet 10 mg  10 mg Oral Daily    gabapentin (NEURONTIN) capsule 100 mg  100 mg Oral Nightly    meclizine (ANTIVERT) tablet 12.5 mg  12.5 mg Oral TID PRN    metoprolol succinate (TOPROL XL) extended release tablet 12.5 mg  12.5 mg Oral Daily    polyethylene glycol (GLYCOLAX) packet 17 g  17 g Oral Daily PRN    vancomycin (VANCOCIN) 1250 mg in sodium chloride 0.9% 250 mL IVPB  1,250 mg IntraVENous Q24H    warfarin placeholder: dosing by pharmacy   Other RX Placeholder         Objective:      Physical Exam   Physical Exam   Constitutional:       General: She is not in acute distress.     Comments: Sitting upright in bed   HENT:      Head: Normocephalic and atraumatic.   Neck:      Vascular: No JVD.

## 2024-03-04 VITALS
SYSTOLIC BLOOD PRESSURE: 126 MMHG | DIASTOLIC BLOOD PRESSURE: 84 MMHG | TEMPERATURE: 98.4 F | OXYGEN SATURATION: 100 % | HEART RATE: 75 BPM | RESPIRATION RATE: 18 BRPM

## 2024-03-04 LAB
ANION GAP SERPL CALC-SCNC: 1 MMOL/L (ref 5–15)
BUN SERPL-MCNC: 17 MG/DL (ref 6–20)
BUN/CREAT SERPL: 17 (ref 12–20)
CALCIUM SERPL-MCNC: 8.8 MG/DL (ref 8.5–10.1)
CHLORIDE SERPL-SCNC: 108 MMOL/L (ref 97–108)
CO2 SERPL-SCNC: 31 MMOL/L (ref 21–32)
CREAT SERPL-MCNC: 1 MG/DL (ref 0.55–1.02)
GLUCOSE SERPL-MCNC: 100 MG/DL (ref 65–100)
INR PPP: 3.2 (ref 0.9–1.1)
POTASSIUM SERPL-SCNC: 3.4 MMOL/L (ref 3.5–5.1)
PROTHROMBIN TIME: 31.2 SEC (ref 9–11.1)
SODIUM SERPL-SCNC: 140 MMOL/L (ref 136–145)

## 2024-03-04 PROCEDURE — 6370000000 HC RX 637 (ALT 250 FOR IP): Performed by: INTERNAL MEDICINE

## 2024-03-04 PROCEDURE — 36415 COLL VENOUS BLD VENIPUNCTURE: CPT

## 2024-03-04 PROCEDURE — 85610 PROTHROMBIN TIME: CPT

## 2024-03-04 PROCEDURE — 6370000000 HC RX 637 (ALT 250 FOR IP): Performed by: STUDENT IN AN ORGANIZED HEALTH CARE EDUCATION/TRAINING PROGRAM

## 2024-03-04 PROCEDURE — 6360000002 HC RX W HCPCS: Performed by: INTERNAL MEDICINE

## 2024-03-04 PROCEDURE — 99232 SBSQ HOSP IP/OBS MODERATE 35: CPT | Performed by: INTERNAL MEDICINE

## 2024-03-04 PROCEDURE — 80048 BASIC METABOLIC PNL TOTAL CA: CPT

## 2024-03-04 PROCEDURE — 2580000003 HC RX 258: Performed by: INTERNAL MEDICINE

## 2024-03-04 RX ORDER — LEVOFLOXACIN 750 MG/1
750 TABLET, FILM COATED ORAL DAILY
Qty: 7 TABLET | Refills: 0 | Status: SHIPPED | OUTPATIENT
Start: 2024-03-04 | End: 2024-03-11

## 2024-03-04 RX ORDER — MECLIZINE HCL 12.5 MG/1
12.5 TABLET ORAL 3 TIMES DAILY PRN
Qty: 60 TABLET | Refills: 5 | Status: SHIPPED | OUTPATIENT
Start: 2024-03-04 | End: 2024-07-02

## 2024-03-04 RX ORDER — LISINOPRIL 5 MG/1
5 TABLET ORAL DAILY
Status: DISCONTINUED | OUTPATIENT
Start: 2024-03-04 | End: 2024-03-04 | Stop reason: HOSPADM

## 2024-03-04 RX ORDER — POLYETHYLENE GLYCOL 3350 17 G/17G
17 POWDER, FOR SOLUTION ORAL DAILY PRN
Qty: 527 G | Refills: 0 | Status: SHIPPED
Start: 2024-03-04 | End: 2024-04-03

## 2024-03-04 RX ORDER — POTASSIUM CHLORIDE 750 MG/1
10 TABLET, FILM COATED, EXTENDED RELEASE ORAL DAILY
Qty: 60 TABLET | Refills: 3 | Status: SHIPPED | OUTPATIENT
Start: 2024-03-05

## 2024-03-04 RX ADMIN — PIPERACILLIN AND TAZOBACTAM 3375 MG: 3; .375 INJECTION, POWDER, LYOPHILIZED, FOR SOLUTION INTRAVENOUS at 05:35

## 2024-03-04 RX ADMIN — LISINOPRIL 5 MG: 5 TABLET ORAL at 09:40

## 2024-03-04 RX ADMIN — COLLAGENASE SANTYL: 250 OINTMENT TOPICAL at 09:42

## 2024-03-04 RX ADMIN — POTASSIUM CHLORIDE 10 MEQ: 750 TABLET, EXTENDED RELEASE ORAL at 09:40

## 2024-03-04 RX ADMIN — MECLIZINE 12.5 MG: 12.5 TABLET ORAL at 09:40

## 2024-03-04 RX ADMIN — FUROSEMIDE 40 MG: 10 INJECTION, SOLUTION INTRAMUSCULAR; INTRAVENOUS at 09:40

## 2024-03-04 RX ADMIN — PIPERACILLIN AND TAZOBACTAM 3375 MG: 3; .375 INJECTION, POWDER, LYOPHILIZED, FOR SOLUTION INTRAVENOUS at 13:10

## 2024-03-04 RX ADMIN — ACETAMINOPHEN 1000 MG: 500 TABLET ORAL at 13:06

## 2024-03-04 RX ADMIN — SPIRONOLACTONE 12.5 MG: 25 TABLET ORAL at 09:40

## 2024-03-04 RX ADMIN — ACETAMINOPHEN 1000 MG: 500 TABLET ORAL at 05:35

## 2024-03-04 RX ADMIN — METOPROLOL SUCCINATE 12.5 MG: 25 TABLET, EXTENDED RELEASE ORAL at 09:40

## 2024-03-04 ASSESSMENT — PAIN DESCRIPTION - DESCRIPTORS
DESCRIPTORS: ACHING
DESCRIPTORS: ACHING

## 2024-03-04 ASSESSMENT — PAIN SCALES - GENERAL
PAINLEVEL_OUTOF10: 10
PAINLEVEL_OUTOF10: 8

## 2024-03-04 ASSESSMENT — PAIN DESCRIPTION - ORIENTATION
ORIENTATION: RIGHT;LEFT
ORIENTATION: RIGHT;LEFT

## 2024-03-04 ASSESSMENT — PAIN DESCRIPTION - LOCATION
LOCATION: LEG
LOCATION: LEG

## 2024-03-04 NOTE — PROGRESS NOTES
3:44pm - Gave report to Quita Hartley at [facility].     I messaged Dr Rizvi about completing EMTALA form. Dr Rizvi was unable to come to hospital to sign. Therefore, we talked to Dr. Rizvi on the phone and verified with two registered nurses that Dr Rizvi was okay with patient going to [facility] (see EMTALA form).     Both IVs removed prior to discharge. Tele monitor removed prior to discharge. Wound Care completed. VS stable. Patient transported to [facility] via [facility].

## 2024-03-04 NOTE — PROGRESS NOTES
Pharmacist Daily Dosing of Warfarin    Indication & Goal INR: AFib, INR Goal 2-3    PTA Warfarin Dose: 5 mg M-F; 7.5mg Sat and Sun    Notable concurrent conditions and medications: None    Labs:  Recent Labs     Units 03/04/24  0430 03/03/24  0320 03/02/24  0444   INR   3.2* 3.5* 2.8*   HGB g/dL  --  8.2*  --    PLT K/uL  --  324  --        Impression/Plan:   Vitamin K 2.5 mg PO given 2/27 10:27  Will hold warfarin dose for tonight, 3/4  Consider reduction in warfarin dose at discharge d/t elevated INR PTA   Daily INR has been ordered  CBC w/o differential every other day has been ordered     Pharmacy will follow daily and adjust the dose as appropriate.    Thank you,  Carola Schwab [facility]

## 2024-03-04 NOTE — PROGRESS NOTES
[facility]  [address], [place] [id number] [phone]  [facility]  CARDIOLOGY PROGRESS NOTE    3/4/2024 4:28 PM    Admit Date: 2/26/2024    Admit Diagnosis:   Lower extremity edema [R60.0]  Wound infection [T14.8XXA, L08.9]    Subjective:     Kylee Koo was seen and examined at the bedside.  The patient reports no new complaints.  The patient's family member is at the bedside.  Per the family member, the patient is being planned for transfer to rehabilitation place this evening.    /78   Pulse 75   Temp 97.7 °F (36.5 °C) (Oral)   Resp 22   SpO2 100%     Current Facility-Administered Medications   Medication Dose Route Frequency    Warfarin- hold dose 3/4   Other RX Placeholder    lisinopril (PRINIVIL;ZESTRIL) tablet 5 mg  5 mg Oral Daily    furosemide (LASIX) injection 40 mg  40 mg IntraVENous Daily    collagenase ointment   Topical Daily    spironolactone (ALDACTONE) tablet 12.5 mg  12.5 mg Oral BID    potassium chloride (KLOR-CON) extended release tablet 10 mEq  10 mEq Oral Daily    acetaminophen (TYLENOL) tablet 1,000 mg  1,000 mg Oral 3 times per day    ondansetron (ZOFRAN) injection 4 mg  4 mg IntraVENous Q4H PRN    piperacillin-tazobactam (ZOSYN) 3,375 mg in sodium chloride 0.9 % 50 mL IVPB (mini-bag)  3,375 mg IntraVENous q8h    gabapentin (NEURONTIN) capsule 100 mg  100 mg Oral Nightly    meclizine (ANTIVERT) tablet 12.5 mg  12.5 mg Oral TID PRN    metoprolol succinate (TOPROL XL) extended release tablet 12.5 mg  12.5 mg Oral Daily    polyethylene glycol (GLYCOLAX) packet 17 g  17 g Oral Daily PRN    vancomycin (VANCOCIN) 1250 mg in sodium chloride 0.9% 250 mL IVPB  1,250 mg IntraVENous Q24H    warfarin placeholder: dosing by pharmacy   Other RX Placeholder         Objective:      Physical Exam  Physical Exam  Constitutional:       General: She is not in acute distress.     Comments: Sitting upright in bed   Neck:

## 2024-03-04 NOTE — CARE COORDINATION
Transition of Care Plan:    RUR: 19% \"High Risk\"  Prior Level of Functioning: Independent with ADL's  Disposition: IPR   If SNF or IPR: Date FOC offered: IPR - 2/28/24, SNF - 2/29/24  Date FOC received: IPR - 2/28/24, SNF - Pending choices from patient/patient rep  Accepting facility: [facility] - pending bed availability, [facility] - pending bed availability  Date authorization started with reference number: N/A  Date authorization received and expires: N/A  Follow up appointments: PCP and specialists as indicated after IPR discharge  DME needed: Defer to facility  Transportation at discharge: BLS vs Family   IM/IMM Medicare/ letter given: 2nd IM to be given prior to discharge  Is patient a [place] and connected with [facility]?    If yes, was  transfer form completed and VA notified? N/A  Caregiver Contact: Son or Daughter to be contacted   Discharge Caregiver contacted prior to discharge? To be contacted prior to discharge  Care Conference needed? Not at this time  Barriers to discharge: Clinical improvement, IPR placement    UPDATE: 1:05PM    Accepting Physician: Dr. Chanel  Rm: [phone]  Call report: [phone]    UPDATE: 12:32PM    SHASHI spoke with pts daughter, at bedside regarding pts d/c on toay.  Pts daughter reported that she feels safe if pt is transported by medical transport.    CM arranged transport today with [facility] at 5P.    SHASHI completed the need of the pt at this time.    KELLY Alvarez CM  [phone]      UPDATE: 11:16AM    SHASHI received call from IPR liaison at [facility], that facility is able to accept pt on today after 3P.  SHASHI will arrange transport with [facility].    SHASHI will inform pt and family of the following.    KELLY Alvarez CM  [phone]        INITIAL NOTE: SHASHI: Rasheeda Berrios is currently following pt in the Onc Unit.  SHASHI spoke with IPR liaison at [facility] and it was reported that facility is currently pending bed availability between today and 3/5/24.  SHASHI will be

## 2024-03-04 NOTE — DISCHARGE INSTRUCTIONS
General Discharge Instructions    Patient ID:  Kylee Koo  [id number]  83 y.o.  1940    Patient Instructions          The following personal items were collected during your admission and were returned to you.        Take Home Medications             What to do at Home    Recommended diet: regular diet    Recommended activity: activity as tolerated    Follow-up with Aly Rizvi MD  in 5 days after D/C from Rehab.        Information obtained by :  I understand that if any problems occur once I am at home I am to contact my physician.  I understand and acknowledge receipt of the instructions indicated above.                                                                                                                                           Physician's or R.N.'s Signature                                                                  Date/Time                                                                                                                                              Patient or Representative Signature                                                          Date/Time

## 2024-03-05 NOTE — DISCHARGE SUMMARY
Discharge Summary    Name: Kylee Koo  [id number]  YOB: 1940 (Age: 83 y.o.)   Date of Admission: 2/26/2024  Date of Discharge: 3/4/2024  Attending Physician: Britni att. providers found    Discharge Diagnosis:   Principal Problem:    Wound infection  Active Problems:    Multiple myeloma (HCC)    Venous insufficiency    Diastolic congestive heart failure (HCC)  Resolved Problems:    * No resolved hospital problems. *      Consultations:  IP CONSULT TO PHARMACY  IP CONSULT TO PHARMACY  IP WOUND CARE NURSE CONSULT TO EVAL  IP CONSULT TO PRIMARY CARE PROVIDER  IP WOUND CARE NURSE CONSULT TO EVAL  IP CONSULT HOME HEALTH  IP CONSULT TO CARDIOLOGY    [address] [place] [address] [phone]  [email or web address]         CARDIOLOGY CONSULTATION        Date of  Admission: 2/26/2024  9:27 AM             Admission type:Emergency   Primary Care Physician:Aly Rizvi MD     Attending Provider: Aly Rizvi MD  Cardiology Provider: Dr. Acosta  CC/REASON FOR CONSULT: Elevated proBNP      Subjective:      Kylee Koo is a 83 y.o. female admitted for Lower extremity edema [R60.0]  Wound infection [T14.8XXA, L08.9].     Patient was seen and examined at the bedside.  The patient reports swelling in her legs and blisters that led to the initial presentation.  Breathing is okay at the time of my visit.  Denies any other complaints.  The patient uses 2 pillows under her head at night to sleep.  Has not tried laying flat.  Denies any episodes of waking up from sleep because of her breathing.     IP CONSULT TO CARDIOLOG     Assessment:   83-year-old patient with decompensated heart failure     Acute on chronic diastolic heart failure (history of nonischemic cardiomyopathy with improvement in ejection fraction)     Start intravenous furosemide 40 mg twice a day along with low-dose lisinopril and spironolactone for potassium raising affect.  Very low-dose additional blood potassium

## 2024-03-20 ENCOUNTER — OFFICE VISIT (OUTPATIENT)
Facility: CLINIC | Age: 84
End: 2024-03-20

## 2024-03-20 VITALS
RESPIRATION RATE: 16 BRPM | HEART RATE: 75 BPM | SYSTOLIC BLOOD PRESSURE: 119 MMHG | TEMPERATURE: 98.4 F | OXYGEN SATURATION: 93 % | BODY MASS INDEX: 33.27 KG/M2 | HEIGHT: 69 IN | DIASTOLIC BLOOD PRESSURE: 79 MMHG

## 2024-03-20 DIAGNOSIS — D64.9 ANEMIA, UNSPECIFIED TYPE: ICD-10-CM

## 2024-03-20 DIAGNOSIS — Z79.01 ENCOUNTER FOR MONITORING COUMADIN THERAPY: Primary | ICD-10-CM

## 2024-03-20 DIAGNOSIS — L97.902 ULCER OF LOWER EXTREMITY WITH FAT LAYER EXPOSED, UNSPECIFIED LATERALITY (HCC): ICD-10-CM

## 2024-03-20 DIAGNOSIS — Z51.81 ENCOUNTER FOR MONITORING COUMADIN THERAPY: Primary | ICD-10-CM

## 2024-03-20 DIAGNOSIS — I26.99 OTHER ACUTE PULMONARY EMBOLISM WITHOUT ACUTE COR PULMONALE (HCC): ICD-10-CM

## 2024-03-20 DIAGNOSIS — I50.32 CHRONIC DIASTOLIC CONGESTIVE HEART FAILURE (HCC): ICD-10-CM

## 2024-03-20 PROBLEM — L97.911 ULCER OF RIGHT LOWER EXTREMITY, LIMITED TO BREAKDOWN OF SKIN (HCC): Status: ACTIVE | Noted: 2024-03-20

## 2024-03-20 PROBLEM — Z95.0 CARDIAC PACEMAKER IN SITU: Status: ACTIVE | Noted: 2022-06-27

## 2024-03-20 PROBLEM — I87.339 STASIS DERMATITIS WITH VENOUS ULCER OF LOWER EXTREMITY DUE TO CHRONIC PERIPHERAL VENOUS HYPERTENSION (HCC): Status: ACTIVE | Noted: 2024-02-19

## 2024-03-20 LAB
ANION GAP SERPL CALC-SCNC: 4 MMOL/L (ref 5–15)
BASOPHILS # BLD: 0 K/UL (ref 0–0.1)
BASOPHILS NFR BLD: 0 % (ref 0–1)
BUN SERPL-MCNC: 18 MG/DL (ref 6–20)
BUN/CREAT SERPL: 19 (ref 12–20)
CALCIUM SERPL-MCNC: 9.5 MG/DL (ref 8.5–10.1)
CHLORIDE SERPL-SCNC: 111 MMOL/L (ref 97–108)
CO2 SERPL-SCNC: 28 MMOL/L (ref 21–32)
CREAT SERPL-MCNC: 0.96 MG/DL (ref 0.55–1.02)
DIFFERENTIAL METHOD BLD: ABNORMAL
EOSINOPHIL # BLD: 0.1 K/UL (ref 0–0.4)
EOSINOPHIL NFR BLD: 3 % (ref 0–7)
ERYTHROCYTE [DISTWIDTH] IN BLOOD BY AUTOMATED COUNT: 17.2 % (ref 11.5–14.5)
GLUCOSE SERPL-MCNC: 95 MG/DL (ref 65–100)
HCT VFR BLD AUTO: 28.1 % (ref 35–47)
HGB BLD-MCNC: 8.2 G/DL (ref 11.5–16)
IMM GRANULOCYTES # BLD AUTO: 0 K/UL (ref 0–0.04)
IMM GRANULOCYTES NFR BLD AUTO: 0 % (ref 0–0.5)
LYMPHOCYTES # BLD: 0.7 K/UL (ref 0.8–3.5)
LYMPHOCYTES NFR BLD: 14 % (ref 12–49)
MCH RBC QN AUTO: 25.9 PG (ref 26–34)
MCHC RBC AUTO-ENTMCNC: 29.2 G/DL (ref 30–36.5)
MCV RBC AUTO: 88.6 FL (ref 80–99)
MONOCYTES # BLD: 0.8 K/UL (ref 0–1)
MONOCYTES NFR BLD: 17 % (ref 5–13)
NEUTS SEG # BLD: 3.2 K/UL (ref 1.8–8)
NEUTS SEG NFR BLD: 66 % (ref 32–75)
NRBC # BLD: 0 K/UL (ref 0–0.01)
NRBC BLD-RTO: 0 PER 100 WBC
PLATELET # BLD AUTO: 191 K/UL (ref 150–400)
PMV BLD AUTO: 10.3 FL (ref 8.9–12.9)
POC INR: 2.4
POTASSIUM SERPL-SCNC: 3.8 MMOL/L (ref 3.5–5.1)
PROTHROMBIN TIME, POC: 28.5
RBC # BLD AUTO: 3.17 M/UL (ref 3.8–5.2)
RBC MORPH BLD: ABNORMAL
SODIUM SERPL-SCNC: 143 MMOL/L (ref 136–145)
WBC # BLD AUTO: 4.8 K/UL (ref 3.6–11)

## 2024-03-20 RX ORDER — DEXAMETHASONE 4 MG/1
4 TABLET ORAL
COMMUNITY
Start: 2024-02-26

## 2024-03-20 SDOH — ECONOMIC STABILITY: FOOD INSECURITY: WITHIN THE PAST 12 MONTHS, YOU WORRIED THAT YOUR FOOD WOULD RUN OUT BEFORE YOU GOT MONEY TO BUY MORE.: NEVER TRUE

## 2024-03-20 SDOH — ECONOMIC STABILITY: FOOD INSECURITY: WITHIN THE PAST 12 MONTHS, THE FOOD YOU BOUGHT JUST DIDN'T LAST AND YOU DIDN'T HAVE MONEY TO GET MORE.: NEVER TRUE

## 2024-03-20 SDOH — ECONOMIC STABILITY: INCOME INSECURITY: HOW HARD IS IT FOR YOU TO PAY FOR THE VERY BASICS LIKE FOOD, HOUSING, MEDICAL CARE, AND HEATING?: NOT HARD AT ALL

## 2024-03-20 ASSESSMENT — ANXIETY QUESTIONNAIRES
7. FEELING AFRAID AS IF SOMETHING AWFUL MIGHT HAPPEN: NOT AT ALL
IF YOU CHECKED OFF ANY PROBLEMS ON THIS QUESTIONNAIRE, HOW DIFFICULT HAVE THESE PROBLEMS MADE IT FOR YOU TO DO YOUR WORK, TAKE CARE OF THINGS AT HOME, OR GET ALONG WITH OTHER PEOPLE: NOT DIFFICULT AT ALL
3. WORRYING TOO MUCH ABOUT DIFFERENT THINGS: NOT AT ALL
4. TROUBLE RELAXING: NOT AT ALL
GAD7 TOTAL SCORE: 0
2. NOT BEING ABLE TO STOP OR CONTROL WORRYING: NOT AT ALL
1. FEELING NERVOUS, ANXIOUS, OR ON EDGE: NOT AT ALL
6. BECOMING EASILY ANNOYED OR IRRITABLE: NOT AT ALL
5. BEING SO RESTLESS THAT IT IS HARD TO SIT STILL: NOT AT ALL

## 2024-03-20 ASSESSMENT — PATIENT HEALTH QUESTIONNAIRE - PHQ9
2. FEELING DOWN, DEPRESSED OR HOPELESS: NOT AT ALL
SUM OF ALL RESPONSES TO PHQ QUESTIONS 1-9: 0
SUM OF ALL RESPONSES TO PHQ QUESTIONS 1-9: 0
1. LITTLE INTEREST OR PLEASURE IN DOING THINGS: NOT AT ALL
SUM OF ALL RESPONSES TO PHQ QUESTIONS 1-9: 0
SUM OF ALL RESPONSES TO PHQ9 QUESTIONS 1 & 2: 0
SUM OF ALL RESPONSES TO PHQ QUESTIONS 1-9: 0

## 2024-03-22 NOTE — PROGRESS NOTES
1. INR today is acceptable and no adjustments are made.  2. She is anemic and I will check the CBC to ensure that this is not getting any worse.  3. She has chronic congestive heart failure, but there are no overt signs of cardiac decompensation today.  BMP will be checked, especially in view of the fact that she is on Furosemide and is quite sensitive to diuretics, as well as her Spironolactone and KCl 10 mEq daily.  4. She has a history of pulmonary embolus, but is on Warfarin obviously.  5. She will continue wound care with the [facility] wound healing service.    
Chief Complaint   Patient presents with    Follow-up   Patient states \"since she has been hoe from [facility] she has not been taking her warfarin and she wanted to follow up with Dr. Aly Rizvi before starting it back up\"    Results for orders placed or performed in visit on 03/20/24   AMB POC PT/INR   Result Value Ref Range    Prothrombin time, POC 28.5     POC INR 2.4        \"Have you been to the ER, urgent care clinic since your last visit?  Hospitalized since your last visit?\"    YES - When: approximately 1  weeks ago.  Where and Why: [facility].    “Have you seen or consulted any other health care providers outside of [facility] since your last visit?”    NO            Click Here for Release of Records Request  
Comes in for return visit, stating that she has done reasonably well after being discharged from the rehab facility, specifically [facility]. She had wound care while she was there and most recently had it changed yesterday.  She has somewhat of an Unna boot type picture and changing oftentimes occurs every 3-4 days.  She is not in as much pain as she previously was, therefore suggesting that the infection has pretty much resolved.    She was in mild heart failure during her hospital stay secondary to diastolic dysfunction and remains on Furosemide 40 mg q.d.    She is moderately anemic and received 1 unit of packed cells during her hospital stay.  Her Vildomet was discontinued, but she will be seeing the oncologist within the next three to four weeks.    INR today is acceptable.    She has a past history of primary hypertension, as well as dyslipidemia and her multiple myeloma.    
therapy    2. Anemia, unspecified type    3. Chronic diastolic congestive heart failure (HCC)    4. Other acute pulmonary embolism without acute cor pulmonale (HCC)    5. Ulcer of lower extremity with fat layer exposed, unspecified laterality (HCC)        PLAN:   Dictation on: 03/20/2024 11:12 AM by: SAMIRA RIZVI [[id number]]     Orders Placed This Encounter   Procedures    CBC with Auto Differential     Standing Status:   Future     Number of Occurrences:   1     Standing Expiration Date:   3/20/2025    Basic Metabolic Panel     Standing Status:   Future     Number of Occurrences:   1     Standing Expiration Date:   3/20/2025    AMB POC PT/INR        Follow-up and Dispositions    Return in about 4 weeks (around 4/17/2024).             Samira Rizvi MD

## 2024-03-27 ENCOUNTER — TELEPHONE (OUTPATIENT)
Facility: CLINIC | Age: 84
End: 2024-03-27

## 2024-03-27 NOTE — TELEPHONE ENCOUNTER
Patricia called stating PT 31.3 and INR 2.6 taking 5mg daily no changes repeat in 2 weeks. Her number is [phone]

## 2024-03-29 ENCOUNTER — HOSPITAL ENCOUNTER (OUTPATIENT)
Facility: HOSPITAL | Age: 84
Discharge: HOME | End: 2024-03-29
Attending: SURGERY
Payer: MEDICARE

## 2024-03-29 VITALS
HEART RATE: 74 BPM | TEMPERATURE: 97.7 F | SYSTOLIC BLOOD PRESSURE: 134 MMHG | RESPIRATION RATE: 18 BRPM | DIASTOLIC BLOOD PRESSURE: 65 MMHG

## 2024-03-29 DIAGNOSIS — L97.912 NON-PRESSURE CHRONIC ULCER OF RIGHT LOWER LEG WITH FAT LAYER EXPOSED (HCC): Primary | ICD-10-CM

## 2024-03-29 PROBLEM — L08.9 WOUND INFECTION: Status: RESOLVED | Noted: 2024-02-26 | Resolved: 2024-03-29

## 2024-03-29 PROBLEM — T14.8XXA WOUND INFECTION: Status: RESOLVED | Noted: 2024-02-26 | Resolved: 2024-03-29

## 2024-03-29 PROCEDURE — 99213 OFFICE O/P EST LOW 20 MIN: CPT | Performed by: SURGERY

## 2024-03-29 PROCEDURE — 99214 OFFICE O/P EST MOD 30 MIN: CPT

## 2024-03-29 RX ORDER — GINSENG 100 MG
CAPSULE ORAL ONCE
OUTPATIENT
Start: 2024-03-29 | End: 2024-03-29

## 2024-03-29 RX ORDER — LIDOCAINE HYDROCHLORIDE 20 MG/ML
JELLY TOPICAL ONCE
OUTPATIENT
Start: 2024-03-29 | End: 2024-03-29

## 2024-03-29 RX ORDER — LIDOCAINE 50 MG/G
OINTMENT TOPICAL ONCE
OUTPATIENT
Start: 2024-03-29 | End: 2024-03-29

## 2024-03-29 RX ORDER — CLOBETASOL PROPIONATE 0.5 MG/G
OINTMENT TOPICAL ONCE
OUTPATIENT
Start: 2024-03-29 | End: 2024-03-29

## 2024-03-29 RX ORDER — SODIUM CHLOR/HYPOCHLOROUS ACID 0.033 %
SOLUTION, IRRIGATION IRRIGATION ONCE
OUTPATIENT
Start: 2024-03-29 | End: 2024-03-29

## 2024-03-29 RX ORDER — BETAMETHASONE DIPROPIONATE 0.5 MG/G
CREAM TOPICAL ONCE
OUTPATIENT
Start: 2024-03-29 | End: 2024-03-29

## 2024-03-29 RX ORDER — IBUPROFEN 200 MG
TABLET ORAL ONCE
OUTPATIENT
Start: 2024-03-29 | End: 2024-03-29

## 2024-03-29 RX ORDER — GENTAMICIN SULFATE 1 MG/G
OINTMENT TOPICAL ONCE
OUTPATIENT
Start: 2024-03-29 | End: 2024-03-29

## 2024-03-29 RX ORDER — LIDOCAINE 40 MG/G
CREAM TOPICAL ONCE
OUTPATIENT
Start: 2024-03-29 | End: 2024-03-29

## 2024-03-29 RX ORDER — TRIAMCINOLONE ACETONIDE 1 MG/G
OINTMENT TOPICAL ONCE
OUTPATIENT
Start: 2024-03-29 | End: 2024-03-29

## 2024-03-29 RX ORDER — BACITRACIN ZINC AND POLYMYXIN B SULFATE 500; 1000 [USP'U]/G; [USP'U]/G
OINTMENT TOPICAL ONCE
OUTPATIENT
Start: 2024-03-29 | End: 2024-03-29

## 2024-03-29 RX ORDER — LIDOCAINE HYDROCHLORIDE 40 MG/ML
SOLUTION TOPICAL ONCE
OUTPATIENT
Start: 2024-03-29 | End: 2024-03-29

## 2024-03-29 ASSESSMENT — PAIN DESCRIPTION - LOCATION: LOCATION: LEG

## 2024-03-29 ASSESSMENT — PAIN DESCRIPTION - ORIENTATION: ORIENTATION: LEFT;RIGHT

## 2024-03-29 ASSESSMENT — PAIN DESCRIPTION - DESCRIPTORS: DESCRIPTORS: ACHING;DULL

## 2024-03-29 ASSESSMENT — PAIN SCALES - GENERAL: PAINLEVEL_OUTOF10: 10

## 2024-03-29 NOTE — DISCHARGE INSTRUCTIONS
Discharge Instructions/Wound Care Orders  [facility]   [address]   [address], [address]  [place] [address]   Telephone: [phone]     FAX [phone]    NAME:  Kylee Koo  YOB: 1940  MEDICAL RECORD NUMBER:  [id number]  DATE:  3/29/2024     Wound Care Orders:  Bilateral lower leg wounds :Cleanse with soap and water, apply primary dressing Silver Alginate  cover with secondary dressing Gauze.  Apply ABD. Rolled gauze, double tubi..   Care to be done 4 x week total.. ( Sat, Mon, Wed and see us on Friday.. or Sun Tues, Thurs and see us Friday ) due to increased drainage,, Double tubi gripe G,       Pt./pcg/HH nurse to change (freq)  4 x week total.   and as needed for compromise.F/U in 1 week.   Home Health Agency: [facility]   You should limit your sodium intake to 2000mg a day ( 2 gms) and limit your fluids to 2 quarts a day ( 64 oz )  Treatment Orders:   Elevate leg(s) above the level of the heart when sitting, if swelling present.  Avoid prolonged standing in one place.  Wear off-loading shoe/boot on the affected foot when walking.  Do not get dressing/cast wet.  Do not use objects to scratch inside cast/wrap.   Follow diet as prescribed:  [] Diet as tolerated: [] Diabetic Diet: Low carb no sugar [x] No Added Salt:  [] Increase Protein: [] Other:Limit the amount of liquid you are drinking and avoid drinking in between meals             Follow-up:  [x] Return Appointment: With Dr. Love in  1 Week(s)  [] Ordered tests:    Electronically signed LEONIDAS VAZQUEZ RN on 3/29/2024 at 9:36 AM     Wound Care Center Information: Should you experience any significant changes in your wound(s) or have questions about your wound care, please contact the [facility] at MONDAY - FRIDAY 8:00 am - 4:30.  If you need help with your wound outside these hours and cannot wait until we are again available, contact your PCP or go to the hospital emergency room.     PLEASE

## 2024-03-29 NOTE — PROGRESS NOTES
left legs, apply Xeroform over ulcers then ABD.  Apply 2 layer compression wrap with calamine from base of toes to upper calf.     Dressing as of 2/12/2024: For right and left legs, apply Medihoney alginate over ulcers then ABD.  Apply 2 layer compression wrap with calamine from base of toes to upper calf.  Change dressing 3 times per week. [It appeared that home health was using plain alginate instead].     Dressing as of 2/19/2024: For right and left legs, apply Iodosorb and Adaptic over ulcers then ABD.  Apply 2 layer compression wrap with calamine from base of toes to upper calf.  Change dressing 3 times per week.                     Reported weight 225 pounds height 5 feet 7 inches     Physical examination     The patient is an alert elderly woman in no acute distress.     Examination of the right lower extremity did not reveal palpable dorsalis pedis or posterior tibial pulses.  There was a strong biphasic right dorsalis pedis Doppler signal.  There was trace pitting distal thigh edema on the right.   Patient has 1-2+ soft pitting edema below the knee.  On the lateral aspect of right lower leg there was a cluster of wounds 9.1 x 21.6 x 0.4 cm dimension with eschar and slough and with scattered granulation.  The surrounding skin does not appear inflamed.     Examination of the left lower extremity did not reveal palpable dorsalis pedis or posterior tibial pulses.  There was a strong biphasic left dorsalis pedis Doppler signal.  There is trace pitting distal thigh edema on the left.   The patient has 1-2+ soft pitting edema below the knee.   On the left anterior lower leg there is a wound for about 4.8 x 3.3 x 0.3 cm dimension with slough.                Impression:    The patient has significant bilateral pitting lower extremity edema.     The patient has wounds on right and left lower legs associated with  soft pitting lower leg edema.       Prior cellulitis appears resolved.    The patient reported diet

## 2024-03-29 NOTE — FLOWSHEET NOTE
03/29/24 0942   Wound 02/05/24 Leg Lateral;Lower;Right #2   Date First Assessed/Time First Assessed: 02/05/24 0851   Present on Original Admission: Yes  Wound Approximate Age at First Assessment (Weeks): 4 weeks  Primary Wound Type: Venous Ulcer  Location: Leg  Wound Location Orientation: Lateral;Lower;Right  ...   Wound Image      Wound Etiology Venous   Dressing Status Old drainage noted  (removed HFBR, ABD)   Wound Cleansed Soap and water   Wound Length (cm) 9.1 cm   Wound Width (cm) 21.6 cm   Wound Depth (cm) 0.4 cm   Wound Surface Area (cm^2) 196.56 cm^2   Change in Wound Size % (l*w) -1389.09   Wound Volume (cm^3) 78.624 cm^3   Wound Healing % -5856   Wound Assessment Eschar moist   Drainage Amount Moderate (25-50%)   Drainage Description Yellow;Brown;Green   Odor None   Allison-wound Assessment Hemosiderin staining (brown yellow)   Margins Defined edges   Wound Thickness Description not for Pressure Injury Full thickness   Wound 02/05/24 Leg Anterior;Left #3   Date First Assessed/Time First Assessed: 02/05/24 0853   Present on Original Admission: Yes  Wound Approximate Age at First Assessment (Weeks): 4 weeks  Primary Wound Type: Venous Ulcer  Location: Leg  Wound Location Orientation: Anterior;Left  Wound ...   Wound Image    Wound Etiology Venous   Dressing Status Old drainage noted  (HFBR, ABD)   Wound Cleansed Soap and water   Wound Length (cm) 4.8 cm   Wound Width (cm) 3.3 cm   Wound Depth (cm) 0.3 cm   Wound Surface Area (cm^2) 15.84 cm^2   Change in Wound Size % (l*w) -6236   Wound Volume (cm^3) 4.752 cm^3   Wound Healing % -91849   Wound Assessment Eschar moist;Slough;Pink/red   Drainage Amount Moderate (25-50%)   Drainage Description Yellow;Green   Odor None   Allison-wound Assessment Hemosiderin staining (brown yellow)   Margins Defined edges   Wound Thickness Description not for Pressure Injury Full thickness     /65   Pulse 74   Temp 97.7 °F (36.5 °C) (Temporal)   Resp 18

## 2024-04-04 RX ORDER — CEFUROXIME AXETIL 500 MG/1
500 TABLET ORAL 2 TIMES DAILY
Qty: 14 TABLET | Refills: 0 | Status: SHIPPED | OUTPATIENT
Start: 2024-04-04 | End: 2024-04-11

## 2024-04-05 ENCOUNTER — HOSPITAL ENCOUNTER (OUTPATIENT)
Facility: HOSPITAL | Age: 84
Discharge: HOME | End: 2024-04-05
Attending: SURGERY
Payer: MEDICARE

## 2024-04-05 VITALS
SYSTOLIC BLOOD PRESSURE: 123 MMHG | HEART RATE: 74 BPM | TEMPERATURE: 97.4 F | DIASTOLIC BLOOD PRESSURE: 60 MMHG | RESPIRATION RATE: 18 BRPM

## 2024-04-05 DIAGNOSIS — L97.912 NON-PRESSURE CHRONIC ULCER OF RIGHT LOWER LEG WITH FAT LAYER EXPOSED (HCC): Primary | ICD-10-CM

## 2024-04-05 PROCEDURE — 97597 DBRDMT OPN WND 1ST 20 CM/<: CPT

## 2024-04-05 PROCEDURE — 11045 DBRDMT SUBQ TISS EACH ADDL: CPT | Performed by: SURGERY

## 2024-04-05 PROCEDURE — 11042 DBRDMT SUBQ TIS 1ST 20SQCM/<: CPT

## 2024-04-05 PROCEDURE — 11042 DBRDMT SUBQ TIS 1ST 20SQCM/<: CPT | Performed by: SURGERY

## 2024-04-05 PROCEDURE — 11045 DBRDMT SUBQ TISS EACH ADDL: CPT

## 2024-04-05 RX ORDER — SODIUM CHLOR/HYPOCHLOROUS ACID 0.033 %
SOLUTION, IRRIGATION IRRIGATION ONCE
OUTPATIENT
Start: 2024-04-05 | End: 2024-04-05

## 2024-04-05 RX ORDER — BACITRACIN ZINC AND POLYMYXIN B SULFATE 500; 1000 [USP'U]/G; [USP'U]/G
OINTMENT TOPICAL ONCE
OUTPATIENT
Start: 2024-04-05 | End: 2024-04-05

## 2024-04-05 RX ORDER — IBUPROFEN 200 MG
TABLET ORAL ONCE
OUTPATIENT
Start: 2024-04-05 | End: 2024-04-05

## 2024-04-05 RX ORDER — GENTAMICIN SULFATE 1 MG/G
OINTMENT TOPICAL ONCE
OUTPATIENT
Start: 2024-04-05 | End: 2024-04-05

## 2024-04-05 RX ORDER — BETAMETHASONE DIPROPIONATE 0.5 MG/G
CREAM TOPICAL ONCE
OUTPATIENT
Start: 2024-04-05 | End: 2024-04-05

## 2024-04-05 RX ORDER — GINSENG 100 MG
CAPSULE ORAL ONCE
OUTPATIENT
Start: 2024-04-05 | End: 2024-04-05

## 2024-04-05 RX ORDER — LIDOCAINE 40 MG/G
CREAM TOPICAL ONCE
OUTPATIENT
Start: 2024-04-05 | End: 2024-04-05

## 2024-04-05 RX ORDER — CLOBETASOL PROPIONATE 0.5 MG/G
OINTMENT TOPICAL ONCE
OUTPATIENT
Start: 2024-04-05 | End: 2024-04-05

## 2024-04-05 RX ORDER — TRIAMCINOLONE ACETONIDE 1 MG/G
OINTMENT TOPICAL ONCE
OUTPATIENT
Start: 2024-04-05 | End: 2024-04-05

## 2024-04-05 RX ORDER — LIDOCAINE HYDROCHLORIDE 40 MG/ML
SOLUTION TOPICAL ONCE
OUTPATIENT
Start: 2024-04-05 | End: 2024-04-05

## 2024-04-05 RX ORDER — LIDOCAINE HYDROCHLORIDE 20 MG/ML
JELLY TOPICAL ONCE
OUTPATIENT
Start: 2024-04-05 | End: 2024-04-05

## 2024-04-05 RX ORDER — LIDOCAINE 50 MG/G
OINTMENT TOPICAL ONCE
OUTPATIENT
Start: 2024-04-05 | End: 2024-04-05

## 2024-04-05 ASSESSMENT — PAIN SCALES - GENERAL: PAINLEVEL_OUTOF10: 6

## 2024-04-05 ASSESSMENT — PAIN DESCRIPTION - LOCATION: LOCATION: LEG

## 2024-04-05 ASSESSMENT — PAIN DESCRIPTION - DESCRIPTORS: DESCRIPTORS: ACHING

## 2024-04-05 ASSESSMENT — PAIN DESCRIPTION - ORIENTATION: ORIENTATION: RIGHT;LEFT

## 2024-04-05 NOTE — PROGRESS NOTES
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    Performed by: Pedro Love MD    Consent obtained: yes    Time out taken: yes    Debridement: Excisional Debridement    Using scissors and forceps the wound(s)/ulcer(s) was/were sharply debrided down through and including the removal of    Subctaneous Tissue   Sharp excisional debridement was carried out with removal of necrotic tissue into the subcutaneous layer.  Debridement was carried down to bleeding viable tissue.    Devitalized Tissue Debrided: necrotic/eschar    Pre Debridement Measurements:  Are located in the Wound/Ulcer Documentation Flow Sheet    Wound/Ulcer #: Wounds right and left lower legs    Post Debridement Measurements:  Wound/Ulcer Descriptions are Pre Debridement except measurements:Other depth increased by 0.1 cm in debrided areas          Percent of Wound(s)/Ulcer(s) Debrided: 40 %    Total Surface Area Debrided:  35 sq cm     Diabetic/Pressure/Non Pressure Ulcers only:  Ulcer:     Estimated Blood Loss:  Minimal    Hemostasis Achieved: Pressure    Procedural Pain: 1 / 10     Post Procedural Pain: 0 / 10     Response to treatment: Patient tolerated treatment with mild discomfort but relieved after procedure was completed

## 2024-04-05 NOTE — FLOWSHEET NOTE
04/05/24 0954   Wound 02/05/24 Leg Lateral;Lower;Right #2   Date First Assessed/Time First Assessed: 02/05/24 0851   Present on Original Admission: Yes  Wound Approximate Age at First Assessment (Weeks): 4 weeks  Primary Wound Type: Venous Ulcer  Location: Leg  Wound Location Orientation: Lateral;Lower;Right  ...   Wound Image     Wound Etiology Venous   Dressing Status Old drainage noted   Wound Cleansed Soap and water   Wound Length (cm) 9 cm   Wound Width (cm) 24 cm   Wound Depth (cm) 0.1 cm   Wound Surface Area (cm^2) 216 cm^2   Change in Wound Size % (l*w) -1536.36   Wound Volume (cm^3) 21.6 cm^3   Wound Healing % -1536   Wound Assessment Eschar moist;Slough   Drainage Amount Moderate (25-50%)   Drainage Description Yellow   Odor None   Allison-wound Assessment Hemosiderin staining (brown yellow)   Margins Defined edges   Wound Thickness Description not for Pressure Injury Full thickness   Wound 02/05/24 Leg Anterior;Left #3   Date First Assessed/Time First Assessed: 02/05/24 0853   Present on Original Admission: Yes  Wound Approximate Age at First Assessment (Weeks): 4 weeks  Primary Wound Type: Venous Ulcer  Location: Leg  Wound Location Orientation: Anterior;Left  Wound ...   Wound Image    Wound Etiology Venous   Dressing Status Old drainage noted   Wound Cleansed Soap and water   Wound Length (cm) 6 cm   Wound Width (cm) 10.8 cm   Wound Depth (cm) 0.1 cm   Wound Surface Area (cm^2) 64.8 cm^2   Change in Wound Size % (l*w) -13042   Wound Volume (cm^3) 6.48 cm^3   Wound Healing % -51602   Wound Assessment Eschar moist   Drainage Amount Moderate (25-50%)   Drainage Description Yellow;Serous   Odor None   Allison-wound Assessment Hemosiderin staining (brown yellow)   Margins Defined edges   Wound Thickness Description not for Pressure Injury Full thickness     /60   Pulse 74   Temp 97.4 °F (36.3 °C) (Temporal)   Resp 18

## 2024-04-05 NOTE — DISCHARGE INSTRUCTIONS
Discharge Instructions/Wound Care Orders  [facility]   [address]   [address], [address]  [place] [address]   Telephone: [phone]     FAX [phone]    NAME:  Kylee Koo  YOB: 1940  MEDICAL RECORD NUMBER:  [id number]  DATE:  4/5/2024     Wound Care Orders:  Bilateral lower leg wounds :Cleanse with soap and water, apply primary dressing Silver Alginate  cover with secondary dressing ABD.  Apply Double tubi   Pt./pcg/HH nurse to change (freq) 3x Weekly  and as needed for compromise.  You need to limit your total fluids to 2 quarts a day. You should continue to limit your sodium intake to 2 grams a day.. no added salt and stay away from foods F/U in 1 week.   Home Health Agency: 3 x week, [facility],  Monday and Wednesday, clinic on Fridays  Treatment Orders:   Elevate leg(s) above the level of the heart when sitting, if swelling present.  Avoid prolonged standing in one place.  Wear off-loading shoe/boot on the affected foot when walking.  Do not get dressing/cast wet.  Do not use objects to scratch inside cast/wrap.   Follow diet as prescribed:  [] Diet as tolerated: [] Diabetic Diet: Low carb no sugar [] No Added Salt:  [] Increase Protein: [] Other:Limit the amount of liquid you are drinking and avoid drinking in between meals             Follow-up:  [x] Return Appointment: With Dr. Love in  1 Week(s)  [] Ordered tests:    Electronically signed LEONIDAS VAZQUEZ RN on 4/5/2024 at 10:18 AM     Wound Care Center Information: Should you experience any significant changes in your wound(s) or have questions about your wound care, please contact the [facility] at MONDAY - FRIDAY 8:00 am - 4:30.  If you need help with your wound outside these hours and cannot wait until we are again available, contact your PCP or go to the hospital emergency room.     PLEASE NOTE: IF YOU ARE UNABLE TO OBTAIN WOUND SUPPLIES, CONTINUE TO USE THE SUPPLIES YOU HAVE AVAILABLE UNTIL

## 2024-04-05 NOTE — PROGRESS NOTES
The patient is an 83-year-old woman who is referred to the wound care center regarding ulcerations on both lower legs.     The patient was first seen in the wound care center on 2/5/2024.     The patient reported that the wounds had been present for about a month prior to her first clinic visit.  They had watery drainage.  She does confirm chronic swelling of the legs.  She thinks they may have gotten more swollen recently prior to her first clinic visit.     The patient's medication list includes furosemide 40 mg daily.      The patient sleeps lying in bed at night with multiple pillows.  She does get short of breath if she lies down flat.  The patient reports she had been drinking 3 water bottles per day plus additional fluids.  She is working on reducing fluid intake.       As of 3/29/2024, the patient reported eating a lot of takeout food, which by her description sounded to have a very high salt content.       The patient denies history of diabetes mellitus.  She does not describe anginal symptoms but does have history of chronic systolic congestive heart failure.  She has history of permanent biventricular pacemaker and AV node ablation for A-fib.  Medications include Coumadin.  The patient is ambulatory.  She does not describe shortness of breath with ambulation.     Past medical history includes cervical radiculopathy, reflux esophagitis, multiple myeloma, hypertension, CKD 3.     The patient went to the ER on 2/24/2024 because of pain and drainage from the leg wounds.  She was started initially on doxycycline and Keflex.  When the culture taken on 2/19/2024 returned showing Pseudomonas and MSSA, Levaquin by mouth was added.     On her 2/26/2024 wound care center visit, the patient was felt to have cellulitis associated with uncontrolled leg edema and hospitalization was recommended.  Patient was hospitalized from 2/26/2024 through 3/4/2024.  She received IV diuretics and IV antibiotics.  She was discharged

## 2024-04-10 ENCOUNTER — TELEPHONE (OUTPATIENT)
Facility: CLINIC | Age: 84
End: 2024-04-10

## 2024-04-10 NOTE — TELEPHONE ENCOUNTER
Nurse called in PT 53.9 and INR 4.5 taking 5mg daily, per Dr. Rizvi patient to hold coumadin x 4 days restart Sunday 5mg repeat on wednesday

## 2024-04-11 ENCOUNTER — CLINICAL DOCUMENTATION (OUTPATIENT)
Facility: HOSPITAL | Age: 84
End: 2024-04-11

## 2024-04-11 NOTE — PROGRESS NOTES
Wound care    The patient was seen at [facility] on 4/8/2024.  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  The cardiologist plan to perform angiography to assess the arterial status prior to assessing for chronic venous insufficiency.    Ashli Love MD

## 2024-04-12 ENCOUNTER — TELEPHONE (OUTPATIENT)
Facility: CLINIC | Age: 84
End: 2024-04-12

## 2024-04-12 ENCOUNTER — HOSPITAL ENCOUNTER (OUTPATIENT)
Facility: HOSPITAL | Age: 84
Discharge: HOME | End: 2024-04-12
Attending: SURGERY
Payer: MEDICARE

## 2024-04-12 VITALS
HEART RATE: 74 BPM | SYSTOLIC BLOOD PRESSURE: 130 MMHG | DIASTOLIC BLOOD PRESSURE: 69 MMHG | RESPIRATION RATE: 18 BRPM | TEMPERATURE: 97.4 F

## 2024-04-12 DIAGNOSIS — L97.912 NON-PRESSURE CHRONIC ULCER OF RIGHT LOWER LEG WITH FAT LAYER EXPOSED (HCC): Primary | ICD-10-CM

## 2024-04-12 PROCEDURE — 99213 OFFICE O/P EST LOW 20 MIN: CPT | Performed by: SURGERY

## 2024-04-12 PROCEDURE — 99213 OFFICE O/P EST LOW 20 MIN: CPT

## 2024-04-12 RX ORDER — LIDOCAINE HYDROCHLORIDE 20 MG/ML
JELLY TOPICAL ONCE
OUTPATIENT
Start: 2024-04-12 | End: 2024-04-12

## 2024-04-12 RX ORDER — IBUPROFEN 200 MG
TABLET ORAL ONCE
OUTPATIENT
Start: 2024-04-12 | End: 2024-04-12

## 2024-04-12 RX ORDER — BETAMETHASONE DIPROPIONATE 0.5 MG/G
CREAM TOPICAL ONCE
OUTPATIENT
Start: 2024-04-12 | End: 2024-04-12

## 2024-04-12 RX ORDER — BACITRACIN ZINC AND POLYMYXIN B SULFATE 500; 1000 [USP'U]/G; [USP'U]/G
OINTMENT TOPICAL ONCE
OUTPATIENT
Start: 2024-04-12 | End: 2024-04-12

## 2024-04-12 RX ORDER — GINSENG 100 MG
CAPSULE ORAL ONCE
OUTPATIENT
Start: 2024-04-12 | End: 2024-04-12

## 2024-04-12 RX ORDER — SODIUM CHLOR/HYPOCHLOROUS ACID 0.033 %
SOLUTION, IRRIGATION IRRIGATION ONCE
OUTPATIENT
Start: 2024-04-12 | End: 2024-04-12

## 2024-04-12 RX ORDER — CLOBETASOL PROPIONATE 0.5 MG/G
OINTMENT TOPICAL ONCE
OUTPATIENT
Start: 2024-04-12 | End: 2024-04-12

## 2024-04-12 RX ORDER — LIDOCAINE 50 MG/G
OINTMENT TOPICAL ONCE
OUTPATIENT
Start: 2024-04-12 | End: 2024-04-12

## 2024-04-12 RX ORDER — LIDOCAINE 40 MG/G
CREAM TOPICAL ONCE
OUTPATIENT
Start: 2024-04-12 | End: 2024-04-12

## 2024-04-12 RX ORDER — GENTAMICIN SULFATE 1 MG/G
OINTMENT TOPICAL ONCE
OUTPATIENT
Start: 2024-04-12 | End: 2024-04-12

## 2024-04-12 RX ORDER — TRIAMCINOLONE ACETONIDE 1 MG/G
OINTMENT TOPICAL ONCE
OUTPATIENT
Start: 2024-04-12 | End: 2024-04-12

## 2024-04-12 RX ORDER — ACETAMINOPHEN 325 MG/1
TABLET ORAL
COMMUNITY

## 2024-04-12 RX ORDER — LIDOCAINE HYDROCHLORIDE 40 MG/ML
SOLUTION TOPICAL ONCE
OUTPATIENT
Start: 2024-04-12 | End: 2024-04-12

## 2024-04-12 NOTE — DISCHARGE INSTRUCTIONS
Discharge Instructions [facility]  [address]   [address]  [place] [address]   Telephone: [phone]     FAX [phone]    NAME:  Kylee Koo  YOB: 1940  MEDICAL RECORD NUMBER:  [id number]  DATE:  4/12/2024  WOUND CARE ORDERS:  Bilateral lower leg wounds :Cleanse with soap and water  apply Vaseline to dry areas, apply primary dressing Silver Alginate  cover with secondary dressing ABD, Roll Gauze, and Tape .  Apply Double tubi  Pt./pcg/HH nurse to change (freq) 3x Weekly  and as needed for compromise.Follow up with provider in 1 Week(s).  Home Health Agency: [facility]  TREATMENT ORDERS:    Elevate leg(s) above the level of the heart when sitting.   Avoid prolonged standing in one place.  Do no get dressing/wrap wet.  Follow Diet as prescribed:   [] Diet as tolerated: [] Calorie Diabetic Diet: Low carb and no Sugar [] No Added Salt:  [] Increase Protein: [] Limit the amount of liquid you are drinking and avoid drinking in between meals     Return Appointment:  [x] Return Appointment: With Dr. Love in  1 Week(s)  [] Nurse Visit : *** days  [] Ordered tests:    Electronically signed Vy Phililps RN on 4/12/2024 at 8:37 AM     Wound Care Center Information: Should you experience any significant changes in your wound(s) or have questions about your wound care, please contact the [facility] at MONDAY - FRIDAY 8:00 am - 4:30.  If you need help with your wound outside these hours and cannot wait until we are again available, contact your PCP or go to the hospital emergency room.   PLEASE NOTE: IF YOU ARE UNABLE TO OBTAIN WOUND SUPPLIES, CONTINUE TO USE THE SUPPLIES YOU HAVE AVAILABLE UNTIL YOU ARE ABLE TO REACH US. IT IS MOST IMPORTANT TO KEEP THE WOUND COVERED AT ALL TIMES.     Physician Signature:_______________________    Date: ___________ Time:  ____________

## 2024-04-12 NOTE — FLOWSHEET NOTE
04/12/24 0811   Right Leg Edema Point of Measurement   Leg circumference 42 cm   Ankle circumference 28 cm   Compression Therapy Tubular elastic support bandage   Left Leg Edema Point of Measurement   Leg circumference 40.5 cm   Ankle circumference 29 cm   Compression Therapy Tubular elastic support bandage   RLE Neurovascular Assessment   Capillary Refill Less than/Equal to 3 seconds   Color Appropriate for Ethnicity   Temperature Warm   R Pedal Pulse +2   LLE Neurovascular Assessment   Capillary Refill Less than/Equal to 3 seconds   Color Appropriate for Ethnicity   Temperature Warm   L Pedal Pulse +2   Wound 02/05/24 Leg Lateral;Lower;Right #2   Date First Assessed/Time First Assessed: 02/05/24 0851   Present on Original Admission: Yes  Wound Approximate Age at First Assessment (Weeks): 4 weeks  Primary Wound Type: Venous Ulcer  Location: Leg  Wound Location Orientation: Lateral;Lower;Right  ...   Wound Image     Wound Etiology Venous   Dressing Status Old drainage noted   Wound Cleansed Soap and water   Wound Length (cm) 8.5 cm   Wound Width (cm) 22 cm   Wound Depth (cm) 0.1 cm   Wound Surface Area (cm^2) 187 cm^2   Change in Wound Size % (l*w) -1316.67   Wound Volume (cm^3) 18.7 cm^3   Wound Healing % -1317   Wound Assessment Slough   Drainage Amount Moderate (25-50%)   Drainage Description Serous   Odor None   Allison-wound Assessment Hemosiderin staining (brown yellow)   Margins Defined edges   Wound Thickness Description not for Pressure Injury Full thickness   Wound 02/05/24 Leg Anterior;Left #3   Date First Assessed/Time First Assessed: 02/05/24 0853   Present on Original Admission: Yes  Wound Approximate Age at First Assessment (Weeks): 4 weeks  Primary Wound Type: Venous Ulcer  Location: Leg  Wound Location Orientation: Anterior;Left  Wound ...   Wound Image    Wound Etiology Venous   Dressing Status Old drainage noted   Wound Cleansed Soap and water   Wound Length (cm) 5.2 cm   Wound Width (cm) 9.4 cm

## 2024-04-12 NOTE — TELEPHONE ENCOUNTER
Patient and patient's daughter notified to increase furosemide to 40 mg 2 times per day  once in the AM and once in the Afternoon  be sure to take potassium per Dr. Rizvi. They were inform that Dr. Rizvi want to see her back in the office on Wednesday, appointment scheduled for 10AM.

## 2024-04-12 NOTE — PROGRESS NOTES
initially on doxycycline and Keflex.  When the culture taken on 2/19/2024 returned showing Pseudomonas and MSSA, Levaquin by mouth was added.     On her 2/26/2024 wound care center visit, the patient was felt to have cellulitis associated with uncontrolled leg edema and hospitalization was recommended.  Patient was hospitalized from 2/26/2024 through 3/4/2024.  She received IV diuretics and IV antibiotics.  She was discharged to a rehab facility.  She was seen again at the wound care center starting on 3/29/2024        Dressing as of 2/5/2024: For right and left legs, apply Xeroform over ulcers then ABD.  Apply 2 layer compression wrap with calamine from base of toes to upper calf.     Dressing as of 2/12/2024: For right and left legs, apply Medihoney alginate over ulcers then ABD.  Apply 2 layer compression wrap with calamine from base of toes to upper calf.  Change dressing 3 times per week. [It appeared that home health was using plain alginate instead].     Dressing as of 2/19/2024: For right and left legs, apply Iodosorb and Adaptic over ulcers then ABD.  Apply 2 layer compression wrap with calamine from base of toes to upper calf.  Change dressing 3 times per week.      Dressings as of 3/29/2024: Right and left legs, cover wounds with silver alginate and ABD then roll gauze.  Apply double layer Tubigrip from base of toes to upper calf.  Change dressings a total of 4 times per week.     Home health was not able to see the patient often enough to allow a total of 4 dressing changes per week.                    Reported weight 225 pounds height 5 feet 7 inches     Physical examination     The patient is an alert elderly woman in no acute distress.     Examination of the right lower extremity did not reveal palpable dorsalis pedis or posterior tibial pulses.  There was a strong biphasic right dorsalis pedis Doppler signal.  There was 1 to 2+ pitting distal thigh edema on the right.  Patient has 2+  pitting edema

## 2024-04-17 ENCOUNTER — OFFICE VISIT (OUTPATIENT)
Facility: CLINIC | Age: 84
End: 2024-04-17
Payer: MEDICARE

## 2024-04-17 VITALS
OXYGEN SATURATION: 83 % | RESPIRATION RATE: 20 BRPM | WEIGHT: 237 LBS | DIASTOLIC BLOOD PRESSURE: 77 MMHG | TEMPERATURE: 97.9 F | BODY MASS INDEX: 35.1 KG/M2 | SYSTOLIC BLOOD PRESSURE: 119 MMHG | HEIGHT: 69 IN | HEART RATE: 78 BPM

## 2024-04-17 DIAGNOSIS — I87.2 VENOUS INSUFFICIENCY: Primary | ICD-10-CM

## 2024-04-17 DIAGNOSIS — N30.00 ACUTE CYSTITIS WITHOUT HEMATURIA: ICD-10-CM

## 2024-04-17 DIAGNOSIS — M25.562 ACUTE PAIN OF LEFT KNEE: ICD-10-CM

## 2024-04-17 DIAGNOSIS — I50.32 CHRONIC DIASTOLIC CONGESTIVE HEART FAILURE (HCC): ICD-10-CM

## 2024-04-17 DIAGNOSIS — M17.0 PRIMARY OSTEOARTHRITIS OF BOTH KNEES: ICD-10-CM

## 2024-04-17 DIAGNOSIS — L97.912 NON-PRESSURE CHRONIC ULCER OF RIGHT LOWER LEG WITH FAT LAYER EXPOSED (HCC): ICD-10-CM

## 2024-04-17 LAB
ANION GAP SERPL CALC-SCNC: 1 MMOL/L (ref 5–15)
APPEARANCE UR: ABNORMAL
BACTERIA URNS QL MICRO: ABNORMAL /HPF
BILIRUB UR QL: NEGATIVE
BUN SERPL-MCNC: 18 MG/DL (ref 6–20)
BUN/CREAT SERPL: 17 (ref 12–20)
CALCIUM SERPL-MCNC: 9 MG/DL (ref 8.5–10.1)
CHLORIDE SERPL-SCNC: 111 MMOL/L (ref 97–108)
CO2 SERPL-SCNC: 30 MMOL/L (ref 21–32)
COLOR UR: ABNORMAL
CREAT SERPL-MCNC: 1.07 MG/DL (ref 0.55–1.02)
EPITH CASTS URNS QL MICRO: ABNORMAL /LPF
GLUCOSE SERPL-MCNC: 90 MG/DL (ref 65–100)
GLUCOSE UR STRIP.AUTO-MCNC: NEGATIVE MG/DL
HGB UR QL STRIP: NEGATIVE
KETONES UR QL STRIP.AUTO: NEGATIVE MG/DL
LEUKOCYTE ESTERASE UR QL STRIP.AUTO: ABNORMAL
NITRITE UR QL STRIP.AUTO: POSITIVE
PH UR STRIP: 7 (ref 5–8)
POTASSIUM SERPL-SCNC: 4.1 MMOL/L (ref 3.5–5.1)
PROT UR STRIP-MCNC: 30 MG/DL
RBC #/AREA URNS HPF: ABNORMAL /HPF (ref 0–5)
SODIUM SERPL-SCNC: 142 MMOL/L (ref 136–145)
SP GR UR REFRACTOMETRY: 1.02 (ref 1–1.03)
UROBILINOGEN UR QL STRIP.AUTO: 0.2 EU/DL (ref 0.2–1)
WBC URNS QL MICRO: ABNORMAL /HPF (ref 0–4)

## 2024-04-17 PROCEDURE — 1123F ACP DISCUSS/DSCN MKR DOCD: CPT | Performed by: INTERNAL MEDICINE

## 2024-04-17 PROCEDURE — 3078F DIAST BP <80 MM HG: CPT | Performed by: INTERNAL MEDICINE

## 2024-04-17 PROCEDURE — 1090F PRES/ABSN URINE INCON ASSESS: CPT | Performed by: INTERNAL MEDICINE

## 2024-04-17 PROCEDURE — G8400 PT W/DXA NO RESULTS DOC: HCPCS | Performed by: INTERNAL MEDICINE

## 2024-04-17 PROCEDURE — G8427 DOCREV CUR MEDS BY ELIG CLIN: HCPCS | Performed by: INTERNAL MEDICINE

## 2024-04-17 PROCEDURE — 99214 OFFICE O/P EST MOD 30 MIN: CPT | Performed by: INTERNAL MEDICINE

## 2024-04-17 PROCEDURE — 1036F TOBACCO NON-USER: CPT | Performed by: INTERNAL MEDICINE

## 2024-04-17 PROCEDURE — G8417 CALC BMI ABV UP PARAM F/U: HCPCS | Performed by: INTERNAL MEDICINE

## 2024-04-17 PROCEDURE — 3074F SYST BP LT 130 MM HG: CPT | Performed by: INTERNAL MEDICINE

## 2024-04-17 SDOH — ECONOMIC STABILITY: FOOD INSECURITY: WITHIN THE PAST 12 MONTHS, THE FOOD YOU BOUGHT JUST DIDN'T LAST AND YOU DIDN'T HAVE MONEY TO GET MORE.: NEVER TRUE

## 2024-04-17 SDOH — ECONOMIC STABILITY: FOOD INSECURITY: WITHIN THE PAST 12 MONTHS, YOU WORRIED THAT YOUR FOOD WOULD RUN OUT BEFORE YOU GOT MONEY TO BUY MORE.: NEVER TRUE

## 2024-04-17 SDOH — ECONOMIC STABILITY: INCOME INSECURITY: HOW HARD IS IT FOR YOU TO PAY FOR THE VERY BASICS LIKE FOOD, HOUSING, MEDICAL CARE, AND HEATING?: NOT HARD AT ALL

## 2024-04-17 ASSESSMENT — ANXIETY QUESTIONNAIRES
5. BEING SO RESTLESS THAT IT IS HARD TO SIT STILL: NOT AT ALL
7. FEELING AFRAID AS IF SOMETHING AWFUL MIGHT HAPPEN: NOT AT ALL
2. NOT BEING ABLE TO STOP OR CONTROL WORRYING: NOT AT ALL
3. WORRYING TOO MUCH ABOUT DIFFERENT THINGS: NOT AT ALL
IF YOU CHECKED OFF ANY PROBLEMS ON THIS QUESTIONNAIRE, HOW DIFFICULT HAVE THESE PROBLEMS MADE IT FOR YOU TO DO YOUR WORK, TAKE CARE OF THINGS AT HOME, OR GET ALONG WITH OTHER PEOPLE: NOT DIFFICULT AT ALL
6. BECOMING EASILY ANNOYED OR IRRITABLE: NOT AT ALL
GAD7 TOTAL SCORE: 0
4. TROUBLE RELAXING: NOT AT ALL
1. FEELING NERVOUS, ANXIOUS, OR ON EDGE: NOT AT ALL

## 2024-04-17 ASSESSMENT — PATIENT HEALTH QUESTIONNAIRE - PHQ9
SUM OF ALL RESPONSES TO PHQ QUESTIONS 1-9: 0
1. LITTLE INTEREST OR PLEASURE IN DOING THINGS: NOT AT ALL
SUM OF ALL RESPONSES TO PHQ9 QUESTIONS 1 & 2: 0
SUM OF ALL RESPONSES TO PHQ QUESTIONS 1-9: 0
2. FEELING DOWN, DEPRESSED OR HOPELESS: NOT AT ALL

## 2024-04-17 NOTE — PROGRESS NOTES
[facility]  [address] [place] [address]  [facility] [address]  Phone:  [phone]  Fax: [phone]       Chief Complaint   Patient presents with    Follow-up    Hypertension    Urinary Tract Infection   .      SUBJECTIVE:    Kylee Koo is a 84 y.o. female  Dictation on: 04/17/2024 11:10 AM by: SAMIRA NEGRETE [[id number]]          Current Outpatient Medications   Medication Sig Dispense Refill    meclizine (ANTIVERT) 12.5 MG tablet Take 1 tablet by mouth 3 times daily as needed for Dizziness 60 tablet 5    potassium chloride (KLOR-CON) 10 MEQ extended release tablet Take 1 tablet by mouth daily 60 tablet 3    metoprolol succinate (TOPROL XL) 25 MG extended release tablet take 1/2 tablet by mouth once daily 90 tablet 3    furosemide (LASIX) 40 MG tablet take 1 tablet by mouth once daily 90 tablet 3    lisinopril (PRINIVIL;ZESTRIL) 40 MG tablet take 1 tablet by mouth once daily 90 tablet 3    warfarin (COUMADIN) 5 MG tablet Take 1 tablet by mouth daily 1 tablet Monday through Friday, 1.5 tablets (7.5 mg) every Saturday & Sunday.      acetaminophen (TYLENOL) 325 MG tablet take 3 tablet by mouth three times a day      dexAMETHasone (DECADRON) 4 MG tablet Take 1 tablet by mouth daily (with breakfast) (Patient not taking: Reported on 3/29/2024)      gabapentin (NEURONTIN) 100 MG capsule Take 1 capsule by mouth at bedtime for 3 days. Max Daily Amount: 100 mg 3 capsule 0    amLODIPine (NORVASC) 10 MG tablet take 1 tablet by mouth once daily 90 tablet 3     No current facility-administered medications for this visit.     Past Medical History:   Diagnosis Date    Acute combined systolic and diastolic HF (heart failure), NYHA class 2 (HCC) 08/11/2017    Arthritis     Atrial fibrillation (HCC)     Cancer (HCC)     multiple myeloma    Chronic renal disease, stage III (HCC) 06/23/2022    Congestive heart failure (HCC)     Hypertension     Long term current use of anticoagulant therapy

## 2024-04-17 NOTE — PROGRESS NOTES
Chief Complaint   Patient presents with    Follow-up    Hypertension    Urinary Tract Infection   Patient states \" that is she having a lot of knee pain both legs and still has a UTI.\"  \"Have you been to the ER, urgent care clinic since your last visit?  Hospitalized since your last visit?\"    NO    “Have you seen or consulted any other health care providers outside of [facility] since your last visit?”    NO            Click Here for Release of Records Request

## 2024-04-18 RX ORDER — TRIAMCINOLONE ACETONIDE 40 MG/ML
40 INJECTION, SUSPENSION INTRA-ARTICULAR; INTRAMUSCULAR ONCE
Status: SHIPPED | OUTPATIENT
Start: 2024-04-18

## 2024-04-18 NOTE — PROGRESS NOTES
1. The patient's chronic venous insufficiency is superimposed on her congestive heart failure.  I will continue the Furosemide 40 mg b.i.d. and check a BMP today.  2. Osteoarthritic knees are getting worse, which is not surprising because she has had no meaningful weight loss.  Under sterile technique, I inject Kenalog 40 mg and Xylocaine 1% 5 cc into the lateral aspect of the patient's left knee.  She tolerates the procedure well and hopefully this will be of some benefit.  The most important thing she can do as far as her knees, however, is lose weight.  3. She continues with wound care on both lower extremities. The wounds are dressed every other day.  4. She also has dysuria and thinks she might have a UTI.  Appropriate labs will be obtained.

## 2024-04-18 NOTE — PROGRESS NOTES
Comes in for a return visit.  I increased her dose of Furosemide to 40 mg b.i.d. at the suggestion of the wound healing people.  This is going to be a bit problematic because generally when her diuretic dose increases, she develops significant prerenal azotemia.  She does not know if there has been any meaningful weight reduction.    She is actively seeing a cardiologist regarding her chronic venous insufficiency.    She is having increasing pain in her knees, as would be expected given the fact that she has severe osteoarthritis of the knees, which makes walking that much more difficult.    She remains on her Warfarin for her history of atrial fibrillation.

## 2024-04-19 ENCOUNTER — HOSPITAL ENCOUNTER (OUTPATIENT)
Facility: HOSPITAL | Age: 84
Discharge: HOME | End: 2024-04-19
Attending: SURGERY
Payer: MEDICARE

## 2024-04-19 VITALS
TEMPERATURE: 97.1 F | SYSTOLIC BLOOD PRESSURE: 125 MMHG | HEART RATE: 75 BPM | DIASTOLIC BLOOD PRESSURE: 90 MMHG | RESPIRATION RATE: 18 BRPM

## 2024-04-19 DIAGNOSIS — L97.912 NON-PRESSURE CHRONIC ULCER OF RIGHT LOWER LEG WITH FAT LAYER EXPOSED (HCC): Primary | ICD-10-CM

## 2024-04-19 LAB
BACTERIA SPEC CULT: ABNORMAL
CC UR VC: ABNORMAL
SERVICE CMNT-IMP: ABNORMAL

## 2024-04-19 PROCEDURE — 99214 OFFICE O/P EST MOD 30 MIN: CPT | Performed by: SURGERY

## 2024-04-19 PROCEDURE — 87205 SMEAR GRAM STAIN: CPT

## 2024-04-19 PROCEDURE — 87070 CULTURE OTHR SPECIMN AEROBIC: CPT

## 2024-04-19 RX ORDER — LIDOCAINE HYDROCHLORIDE 40 MG/ML
SOLUTION TOPICAL ONCE
OUTPATIENT
Start: 2024-04-19 | End: 2024-04-19

## 2024-04-19 RX ORDER — BETAMETHASONE DIPROPIONATE 0.5 MG/G
CREAM TOPICAL ONCE
OUTPATIENT
Start: 2024-04-19 | End: 2024-04-19

## 2024-04-19 RX ORDER — LIDOCAINE 50 MG/G
OINTMENT TOPICAL ONCE
OUTPATIENT
Start: 2024-04-19 | End: 2024-04-19

## 2024-04-19 RX ORDER — TRIAMCINOLONE ACETONIDE 1 MG/G
OINTMENT TOPICAL ONCE
OUTPATIENT
Start: 2024-04-19 | End: 2024-04-19

## 2024-04-19 RX ORDER — CLOBETASOL PROPIONATE 0.5 MG/G
OINTMENT TOPICAL ONCE
OUTPATIENT
Start: 2024-04-19 | End: 2024-04-19

## 2024-04-19 RX ORDER — BACITRACIN ZINC AND POLYMYXIN B SULFATE 500; 1000 [USP'U]/G; [USP'U]/G
OINTMENT TOPICAL ONCE
OUTPATIENT
Start: 2024-04-19 | End: 2024-04-19

## 2024-04-19 RX ORDER — LIDOCAINE 40 MG/G
CREAM TOPICAL ONCE
OUTPATIENT
Start: 2024-04-19 | End: 2024-04-19

## 2024-04-19 RX ORDER — LIDOCAINE HYDROCHLORIDE 20 MG/ML
JELLY TOPICAL ONCE
OUTPATIENT
Start: 2024-04-19 | End: 2024-04-19

## 2024-04-19 RX ORDER — GENTAMICIN SULFATE 1 MG/G
OINTMENT TOPICAL ONCE
OUTPATIENT
Start: 2024-04-19 | End: 2024-04-19

## 2024-04-19 RX ORDER — SODIUM CHLOR/HYPOCHLOROUS ACID 0.033 %
SOLUTION, IRRIGATION IRRIGATION ONCE
OUTPATIENT
Start: 2024-04-19 | End: 2024-04-19

## 2024-04-19 RX ORDER — GINSENG 100 MG
CAPSULE ORAL ONCE
OUTPATIENT
Start: 2024-04-19 | End: 2024-04-19

## 2024-04-19 RX ORDER — CIPROFLOXACIN 500 MG/1
500 TABLET, FILM COATED ORAL 2 TIMES DAILY
Qty: 14 TABLET | Refills: 0 | Status: SHIPPED | OUTPATIENT
Start: 2024-04-19 | End: 2024-04-26

## 2024-04-19 RX ORDER — IBUPROFEN 200 MG
TABLET ORAL ONCE
OUTPATIENT
Start: 2024-04-19 | End: 2024-04-19

## 2024-04-19 ASSESSMENT — PAIN DESCRIPTION - LOCATION: LOCATION: LEG

## 2024-04-19 ASSESSMENT — PAIN - FUNCTIONAL ASSESSMENT: PAIN_FUNCTIONAL_ASSESSMENT: ACTIVITIES ARE NOT PREVENTED

## 2024-04-19 ASSESSMENT — PAIN DESCRIPTION - DESCRIPTORS: DESCRIPTORS: ACHING;DISCOMFORT

## 2024-04-19 ASSESSMENT — PAIN SCALES - GENERAL: PAINLEVEL_OUTOF10: 4

## 2024-04-19 NOTE — DISCHARGE INSTRUCTIONS
Discharge Instructions/Wound Care Orders  [facility]   [address]   [address], [address]  [place] [address]   Telephone: [phone]     FAX [phone]    NAME:  Kylee Koo  YOB: 1940  MEDICAL RECORD NUMBER:  [id number]  DATE:  4/19/2024     Wound Care Orders:  Right and left leg wounds:Cleanse with soap and water, apply primary dressing Silver Alginate  cover with secondary dressing ABD, Gauze, and Roll Gauze.    Right posterior heel wound: Clean with soap and water, apply xeroform, bordered foam dressing. Apply Double tubi  \"G\"    Pt./pcg/HH nurse to change (freq) 3x Weekly  and as needed for compromise.F/U in 1 week.   Home Health Agency: [facility]  Treatment Orders:   Check your weight every morning and write it down.  Continue to limit your fluids to 2 quarts a day.  Continue to limit your salt intake, no added salt to your foods, take out food or pre-made meals are very high in sodium.   Elevate leg(s) above the level of the heart when sitting, if swelling present.  Avoid prolonged standing in one place.  Wear off-loading shoe/boot on the affected foot when walking.  Do not get dressing/cast wet.  Do not use objects to scratch inside cast/wrap.   Follow diet as prescribed:  [] Diet as tolerated: [] Diabetic Diet: Low carb no sugar [] No Added Salt:  [] Increase Protein: [] Other:Limit the amount of liquid you are drinking and avoid drinking in between meals             Follow-up:  [x] Return Appointment: With Dr. Love in  1 Week(s)  [] Ordered tests:    Electronically signed LEONIDAS VAZQUEZ RN on 4/19/2024 at 8:58 AM     Wound Care Center Information: Should you experience any significant changes in your wound(s) or have questions about your wound care, please contact the [facility] at MONDAY - FRIDAY 8:00 am - 4:30.  If you need help with your wound outside these hours and cannot wait until we are again available, contact your PCP or go to the

## 2024-04-19 NOTE — PROGRESS NOTES
2/24/2024 because of pain and drainage from the leg wounds.  She was started initially on doxycycline and Keflex.  When the culture taken on 2/19/2024 returned showing Pseudomonas and MSSA, Levaquin by mouth was added.     On her 2/26/2024 wound care center visit, the patient was felt to have cellulitis associated with uncontrolled leg edema and hospitalization was recommended.  Patient was hospitalized from 2/26/2024 through 3/4/2024.  She received IV diuretics and IV antibiotics.  She was discharged to a rehab facility.  She was seen again at the wound care center starting on 3/29/2024        Dressing as of 2/5/2024: For right and left legs, apply Xeroform over ulcers then ABD.  Apply 2 layer compression wrap with calamine from base of toes to upper calf.     Dressing as of 2/12/2024: For right and left legs, apply Medihoney alginate over ulcers then ABD.  Apply 2 layer compression wrap with calamine from base of toes to upper calf.  Change dressing 3 times per week. [It appeared that home health was using plain alginate instead].     Dressing as of 2/19/2024: For right and left legs, apply Iodosorb and Adaptic over ulcers then ABD.  Apply 2 layer compression wrap with calamine from base of toes to upper calf.  Change dressing 3 times per week.      Dressings as of 3/29/2024: Right and left legs, cover wounds with silver alginate and ABD then roll gauze.  Apply double layer Tubigrip from base of toes to upper calf.  Change dressings a total of 3 times per week.                       Reported weight 225 pounds height 5 feet 7 inches     Physical examination     The patient is an alert elderly woman in no acute distress.     Examination of the right lower extremity revealed 1-2+ right dorsalis pedis pulse.  I did not feel posterior tibial pulse.  There was a strong biphasic right dorsalis pedis Doppler signal.  There was 1 + pitting distal thigh edema on the right.  Patient has 2+  pitting edema below the knee.  On

## 2024-04-19 NOTE — FLOWSHEET NOTE
04/19/24 0812   Right Leg Edema Point of Measurement   Leg circumference 45.2 cm   Ankle circumference 24.5 cm   Compression Therapy Tubular elastic support bandage   Left Leg Edema Point of Measurement   Leg circumference 45.3 cm   Ankle circumference 26.3 cm   Compression Therapy Tubular elastic support bandage   Wound 02/05/24 Leg Lateral;Lower;Right #2   Date First Assessed/Time First Assessed: 02/05/24 0851   Present on Original Admission: Yes  Wound Approximate Age at First Assessment (Weeks): 4 weeks  Primary Wound Type: Venous Ulcer  Location: Leg  Wound Location Orientation: Lateral;Lower;Right  ...   Wound Image      Wound Etiology Venous   Dressing Status Old drainage noted   Wound Cleansed Soap and water   Post-Procedure Length (cm) 8.6 cm   Post-Procedure Width (cm) 30.4 cm   Post-Procedure Depth (cm) 0.3 cm   Post-Procedure Surface Area (cm^2) 261.44 cm^2   Post-Procedure Volume (cm^3) 78.432 cm^3   Wound Assessment Slough   Drainage Amount Moderate (25-50%)   Drainage Description Serosanguinous   Odor None   Allison-wound Assessment Fragile   Margins Defined edges   Wound Thickness Description not for Pressure Injury Full thickness   Wound 02/05/24 Leg Anterior;Left #3   Date First Assessed/Time First Assessed: 02/05/24 0853   Present on Original Admission: Yes  Wound Approximate Age at First Assessment (Weeks): 4 weeks  Primary Wound Type: Venous Ulcer  Location: Leg  Wound Location Orientation: Anterior;Left  Wound ...   Wound Image    Wound Etiology Venous   Dressing Status Old drainage noted   Wound Cleansed Soap and water   Wound Length (cm) 6 cm   Wound Width (cm) 10.5 cm   Wound Depth (cm) 0.4 cm   Wound Surface Area (cm^2) 63 cm^2   Change in Wound Size % (l*w) -81255   Wound Volume (cm^3) 25.2 cm^3   Wound Healing % -[id number]   Wound Assessment Slough;Eschar dry   Drainage Amount Moderate (25-50%)   Drainage Description Serous   Odor None   Allison-wound Assessment Hemosiderin staining (brown

## 2024-04-21 LAB
BACTERIA SPEC CULT: ABNORMAL
BACTERIA SPEC CULT: ABNORMAL
GRAM STN SPEC: ABNORMAL
GRAM STN SPEC: ABNORMAL
SERVICE CMNT-IMP: ABNORMAL

## 2024-04-25 ENCOUNTER — CLINICAL DOCUMENTATION (OUTPATIENT)
Facility: HOSPITAL | Age: 84
End: 2024-04-25

## 2024-04-25 NOTE — PROGRESS NOTES
Wound care    The patient had wound culture on 4/19/2024 which grew Pseudomonas and Morganella.  The patient on 4/19/2024 was started on Cipro 500 mg twice per day for 7 days by Dr. Rizvi for UTI.  The Cipro will provide full coverage for patient's Pseudomonas and intermediate coverage for Morganella.    I will reassess the patient's wounds at her next wound care center visit.    Ashli Love MD

## 2024-04-26 LAB
BACTERIA SPEC CULT: ABNORMAL
GRAM STN SPEC: ABNORMAL
GRAM STN SPEC: ABNORMAL
SERVICE CMNT-IMP: ABNORMAL

## 2024-04-29 ENCOUNTER — HOSPITAL ENCOUNTER (OUTPATIENT)
Facility: HOSPITAL | Age: 84
Discharge: HOME | End: 2024-04-29
Attending: SURGERY
Payer: MEDICARE

## 2024-04-29 VITALS
TEMPERATURE: 97.6 F | RESPIRATION RATE: 18 BRPM | SYSTOLIC BLOOD PRESSURE: 135 MMHG | HEART RATE: 75 BPM | DIASTOLIC BLOOD PRESSURE: 73 MMHG

## 2024-04-29 DIAGNOSIS — L97.912 NON-PRESSURE CHRONIC ULCER OF RIGHT LOWER LEG WITH FAT LAYER EXPOSED (HCC): Primary | ICD-10-CM

## 2024-04-29 PROCEDURE — 11042 DBRDMT SUBQ TIS 1ST 20SQCM/<: CPT | Performed by: SURGERY

## 2024-04-29 PROCEDURE — 11042 DBRDMT SUBQ TIS 1ST 20SQCM/<: CPT

## 2024-04-29 RX ORDER — BETAMETHASONE DIPROPIONATE 0.5 MG/G
CREAM TOPICAL ONCE
OUTPATIENT
Start: 2024-04-29 | End: 2024-04-29

## 2024-04-29 RX ORDER — LIDOCAINE 40 MG/G
CREAM TOPICAL ONCE
OUTPATIENT
Start: 2024-04-29 | End: 2024-04-29

## 2024-04-29 RX ORDER — SODIUM CHLOR/HYPOCHLOROUS ACID 0.033 %
SOLUTION, IRRIGATION IRRIGATION ONCE
OUTPATIENT
Start: 2024-04-29 | End: 2024-04-29

## 2024-04-29 RX ORDER — GENTAMICIN SULFATE 1 MG/G
OINTMENT TOPICAL ONCE
OUTPATIENT
Start: 2024-04-29 | End: 2024-04-29

## 2024-04-29 RX ORDER — LIDOCAINE HYDROCHLORIDE 20 MG/ML
JELLY TOPICAL ONCE
OUTPATIENT
Start: 2024-04-29 | End: 2024-04-29

## 2024-04-29 RX ORDER — BACITRACIN ZINC AND POLYMYXIN B SULFATE 500; 1000 [USP'U]/G; [USP'U]/G
OINTMENT TOPICAL ONCE
OUTPATIENT
Start: 2024-04-29 | End: 2024-04-29

## 2024-04-29 RX ORDER — CLOBETASOL PROPIONATE 0.5 MG/G
OINTMENT TOPICAL ONCE
OUTPATIENT
Start: 2024-04-29 | End: 2024-04-29

## 2024-04-29 RX ORDER — GINSENG 100 MG
CAPSULE ORAL ONCE
OUTPATIENT
Start: 2024-04-29 | End: 2024-04-29

## 2024-04-29 RX ORDER — LEVOFLOXACIN 500 MG/1
500 TABLET, FILM COATED ORAL DAILY
Qty: 10 TABLET | Refills: 0 | Status: SHIPPED | OUTPATIENT
Start: 2024-04-29 | End: 2024-05-09

## 2024-04-29 RX ORDER — IBUPROFEN 200 MG
TABLET ORAL ONCE
OUTPATIENT
Start: 2024-04-29 | End: 2024-04-29

## 2024-04-29 RX ORDER — LIDOCAINE HYDROCHLORIDE 40 MG/ML
SOLUTION TOPICAL ONCE
OUTPATIENT
Start: 2024-04-29 | End: 2024-04-29

## 2024-04-29 RX ORDER — TRIAMCINOLONE ACETONIDE 1 MG/G
OINTMENT TOPICAL ONCE
OUTPATIENT
Start: 2024-04-29 | End: 2024-04-29

## 2024-04-29 RX ORDER — LIDOCAINE 50 MG/G
OINTMENT TOPICAL ONCE
OUTPATIENT
Start: 2024-04-29 | End: 2024-04-29

## 2024-04-29 ASSESSMENT — PAIN SCALES - GENERAL: PAINLEVEL_OUTOF10: 4

## 2024-04-29 NOTE — PROGRESS NOTES
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    Performed by: Pedro Love MD    Consent obtained: yes    Time out taken: yes    Debridement: Excisional Debridement    Using scissors and forceps the wound(s)/ulcer(s) was/were sharply debrided down through and including the removal of    Subctaneous Tissue   Sharp excisional debridement was carried out with removal of necrotic tissue, slough, and granulation into the subcutaneous layer.  Debridement was carried down to bleeding viable tissue.    Devitalized Tissue Debrided: necrotic/eschar    Pre Debridement Measurements:  Are located in the Wound/Ulcer Documentation Flow Sheet    Wound/Ulcer #: 3    Post Debridement Measurements:  Wound/Ulcer Descriptions are Pre Debridement except measurements:Other depth increased by 0.2 cm in debrided area.          Percent of Wound(s)/Ulcer(s) Debrided: 10 %    Total Surface Area Debrided:  6 sq cm     Diabetic/Pressure/Non Pressure Ulcers only:  Ulcer:     Estimated Blood Loss:  Minimal    Hemostasis Achieved: Pressure    Procedural Pain: 2 / 10     Post Procedural Pain: 0 / 10     Response to treatment: Patient tolerated treatment with mild discomfort but relieved after procedure was completed

## 2024-04-29 NOTE — FLOWSHEET NOTE
04/29/24 0915   Right Leg Edema Point of Measurement   Leg circumference 42.6 cm   Ankle circumference 26.5 cm   Compression Therapy Tubular elastic support bandage   Left Leg Edema Point of Measurement   Leg circumference 43 cm   Ankle circumference 27.6 cm   Compression Therapy Tubular elastic support bandage   RLE Neurovascular Assessment   Capillary Refill Less than/Equal to 3 seconds   Color Appropriate for Ethnicity   Temperature Warm   R Pedal Pulse +2   LLE Neurovascular Assessment   Capillary Refill Less than/Equal to 3 seconds   Color Appropriate for Ethnicity   Temperature Warm   L Pedal Pulse +2   Wound 04/19/24 Ankle Posterior;Right   Date First Assessed/Time First Assessed: 04/19/24 0828   Present on Original Admission: No  Wound Approximate Age at First Assessment (Weeks): 1 weeks  Location: Ankle  Wound Location Orientation: Posterior;Right   Wound Image     Wound Etiology Pressure Stage 3   Dressing Status Old drainage noted   Wound Cleansed Soap and water   Wound Length (cm) 0.6 cm   Wound Width (cm) 0.5 cm   Wound Depth (cm) 0.1 cm   Wound Surface Area (cm^2) 0.3 cm^2   Change in Wound Size % (l*w) -650   Wound Volume (cm^3) 0.03 cm^3   Wound Healing % -650   Wound Assessment Slough   Drainage Amount Small (< 25%)   Drainage Description Serous   Odor None   Allison-wound Assessment Fragile   Margins Defined edges   Wound Thickness Description not for Pressure Injury Full thickness   Wound 02/05/24 Leg Lateral;Lower;Right #2   Date First Assessed/Time First Assessed: 02/05/24 0851   Present on Original Admission: Yes  Wound Approximate Age at First Assessment (Weeks): 4 weeks  Primary Wound Type: Venous Ulcer  Location: Leg  Wound Location Orientation: Lateral;Lower;Right  ...   Wound Image    Wound Etiology Venous   Dressing Status Old drainage noted   Wound Cleansed Soap and water   Wound Length (cm) 7.5 cm   Wound Width (cm) 18.5 cm   Wound Depth (cm) 0.1 cm   Wound Surface Area (cm^2) 138.75

## 2024-04-29 NOTE — PROGRESS NOTES
The patient is an 83-year-old woman who is referred to the wound care center regarding ulcerations on both lower legs.     The patient was first seen in the wound care center on 2/5/2024.     The patient reported that the wounds had been present for about a month prior to her first clinic visit.  They had watery drainage.  She does confirm chronic swelling of the legs.  She thinks they may have gotten more swollen recently prior to her first clinic visit.     The patient's medication list includes furosemide 40 mg daily.  Notes have been sent to the patient's primary provider asking if her diuretic dose could be increased.  As of 4/19/2024, the patient is taking furosemide 40 mg twice per day.     The patient sleeps lying in bed at night with multiple pillows.  She does get short of breath if she lies down flat.  The patient reports she had been drinking 3 water bottles per day plus additional fluids.  She is working on reducing fluid intake.       As of 3/29/2024, the patient reported eating a lot of takeout food, which by her description sounded to have a very high salt content.  She is trying to reduce salt intake in her diet.     The patient denies history of diabetes mellitus.  She does not describe anginal symptoms but does have history of chronic systolic congestive heart failure.  She has history of permanent biventricular pacemaker and AV node ablation for A-fib.  Medications include Coumadin.  The patient is ambulatory.  She does not describe shortness of breath with ambulation.     Past medical history includes cervical radiculopathy, reflux esophagitis, multiple myeloma, hypertension, CKD 3.     The patient was seen at [facility] and vascular [facility] on 4/8/2024. 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 The cardiologist plan to perform angiography to assess the arterial status prior to assessing for chronic venous insufficiency.          The patient went to the ER on

## 2024-05-13 ENCOUNTER — HOSPITAL ENCOUNTER (OUTPATIENT)
Facility: HOSPITAL | Age: 84
Discharge: HOME | End: 2024-05-13
Attending: SURGERY
Payer: MEDICARE

## 2024-05-13 VITALS
RESPIRATION RATE: 18 BRPM | SYSTOLIC BLOOD PRESSURE: 118 MMHG | TEMPERATURE: 97.3 F | HEART RATE: 75 BPM | DIASTOLIC BLOOD PRESSURE: 64 MMHG

## 2024-05-13 DIAGNOSIS — L97.912 NON-PRESSURE CHRONIC ULCER OF RIGHT LOWER LEG WITH FAT LAYER EXPOSED (HCC): Primary | ICD-10-CM

## 2024-05-13 PROCEDURE — 11042 DBRDMT SUBQ TIS 1ST 20SQCM/<: CPT

## 2024-05-13 RX ORDER — GINSENG 100 MG
CAPSULE ORAL ONCE
OUTPATIENT
Start: 2024-05-13 | End: 2024-05-13

## 2024-05-13 RX ORDER — BETAMETHASONE DIPROPIONATE 0.5 MG/G
CREAM TOPICAL ONCE
OUTPATIENT
Start: 2024-05-13 | End: 2024-05-13

## 2024-05-13 RX ORDER — LIDOCAINE HYDROCHLORIDE 20 MG/ML
JELLY TOPICAL ONCE
OUTPATIENT
Start: 2024-05-13 | End: 2024-05-13

## 2024-05-13 RX ORDER — LIDOCAINE 40 MG/G
CREAM TOPICAL ONCE
OUTPATIENT
Start: 2024-05-13 | End: 2024-05-13

## 2024-05-13 RX ORDER — TRIAMCINOLONE ACETONIDE 1 MG/G
OINTMENT TOPICAL ONCE
OUTPATIENT
Start: 2024-05-13 | End: 2024-05-13

## 2024-05-13 RX ORDER — IBUPROFEN 200 MG
TABLET ORAL ONCE
OUTPATIENT
Start: 2024-05-13 | End: 2024-05-13

## 2024-05-13 RX ORDER — LIDOCAINE 50 MG/G
OINTMENT TOPICAL ONCE
OUTPATIENT
Start: 2024-05-13 | End: 2024-05-13

## 2024-05-13 RX ORDER — GENTAMICIN SULFATE 1 MG/G
OINTMENT TOPICAL ONCE
OUTPATIENT
Start: 2024-05-13 | End: 2024-05-13

## 2024-05-13 RX ORDER — SODIUM CHLOR/HYPOCHLOROUS ACID 0.033 %
SOLUTION, IRRIGATION IRRIGATION ONCE
OUTPATIENT
Start: 2024-05-13 | End: 2024-05-13

## 2024-05-13 RX ORDER — CLOBETASOL PROPIONATE 0.5 MG/G
OINTMENT TOPICAL ONCE
OUTPATIENT
Start: 2024-05-13 | End: 2024-05-13

## 2024-05-13 RX ORDER — LIDOCAINE HYDROCHLORIDE 40 MG/ML
SOLUTION TOPICAL ONCE
OUTPATIENT
Start: 2024-05-13 | End: 2024-05-13

## 2024-05-13 RX ORDER — BACITRACIN ZINC AND POLYMYXIN B SULFATE 500; 1000 [USP'U]/G; [USP'U]/G
OINTMENT TOPICAL ONCE
OUTPATIENT
Start: 2024-05-13 | End: 2024-05-13

## 2024-05-13 ASSESSMENT — PAIN SCALES - GENERAL: PAINLEVEL_OUTOF10: 5

## 2024-05-13 NOTE — PROGRESS NOTES
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    Performed by: Pedro Love MD    Consent obtained: yes    Time out taken: yes    Debridement: Excisional Debridement    Using scissors and forceps the wound(s)/ulcer(s) was/were sharply debrided down through and including the removal of    Subctaneous Tissue   Sharp excisional debridement was carried out with removal of necrotic tissue (black eschar) into the subcutaneous layer.  Debridement was carried down to bleeding viable tissue.    Devitalized Tissue Debrided: necrotic/eschar    Pre Debridement Measurements:  Are located in the Wound/Ulcer Documentation Flow Sheet    Wound/Ulcer #: left anterior lower leg #3    Post Debridement Measurements:  Wound/Ulcer Descriptions are Pre Debridement except measurements:Other depth increased by 0.1 cm and debrided area          Percent of Wound(s)/Ulcer(s) Debrided: 20 %    Total Surface Area Debrided:  12 sq cm     Diabetic/Pressure/Non Pressure Ulcers only:  Ulcer:     Estimated Blood Loss:  None    Hemostasis Achieved: Pressure    Procedural Pain: 1 / 10     Post Procedural Pain: 0 / 10     Response to treatment: Patient tolerated treatment with mild discomfort but relieved after procedure was completed

## 2024-05-13 NOTE — FLOWSHEET NOTE
05/13/24 0907   Right Leg Edema Point of Measurement   Leg circumference 42 cm   Ankle circumference 26 cm   Compression Therapy Tubular elastic support bandage   Left Leg Edema Point of Measurement   Leg circumference 42.5 cm   Ankle circumference 26 cm   Compression Therapy Tubular elastic support bandage   RLE Neurovascular Assessment   Capillary Refill Less than/Equal to 3 seconds   Color Appropriate for Ethnicity   Temperature Warm   R Pedal Pulse +1   LLE Neurovascular Assessment   Capillary Refill Less than/Equal to 3 seconds   Color Appropriate for Ethnicity   Temperature Warm   L Pedal Pulse +1   Wound 04/19/24 Ankle Posterior;Right   Date First Assessed/Time First Assessed: 04/19/24 0828   Present on Original Admission: No  Wound Approximate Age at First Assessment (Weeks): 1 weeks  Location: Ankle  Wound Location Orientation: Posterior;Right   Wound Image    Wound Etiology Pressure Stage 3   Dressing Status Old drainage noted   Wound Cleansed Soap and water   Wound Length (cm) 0.6 cm   Wound Width (cm) 0.5 cm   Wound Depth (cm) 0.1 cm   Wound Surface Area (cm^2) 0.3 cm^2   Change in Wound Size % (l*w) -650   Wound Volume (cm^3) 0.03 cm^3   Wound Healing % -650   Wound Assessment Coolin/red;Slough   Drainage Amount Moderate (25-50%)   Drainage Description Serous   Odor None   Allison-wound Assessment Fragile   Margins Defined edges   Wound Thickness Description not for Pressure Injury Full thickness   Wound 02/05/24 Leg Lateral;Lower;Right #2   Date First Assessed/Time First Assessed: 02/05/24 0851   Present on Original Admission: Yes  Wound Approximate Age at First Assessment (Weeks): 4 weeks  Primary Wound Type: Venous Ulcer  Location: Leg  Wound Location Orientation: Lateral;Lower;Right  ...   Wound Image    Wound Etiology Venous   Dressing Status Old drainage noted   Wound Cleansed Soap and water   Wound Length (cm) 7.2 cm   Wound Width (cm) 18.5 cm   Wound Depth (cm) 0.1 cm   Wound Surface Area (cm^2)

## 2024-05-13 NOTE — PROGRESS NOTES
The patient is an 83-year-old woman who is referred to the wound care center regarding ulcerations on both lower legs.     The patient was first seen in the wound care center on 2/5/2024.     The patient reported that the wounds had been present for about a month prior to her first clinic visit.  They had watery drainage.  She does confirm chronic swelling of the legs.  She thinks they may have gotten more swollen recently prior to her first clinic visit.     The patient's medication list includes furosemide 40 mg daily.  Notes have been sent to the patient's primary provider asking if her diuretic dose could be increased.  As of 4/19/2024, the patient is taking furosemide 40 mg twice per day.     The patient sleeps lying in bed at night with multiple pillows.  She does get short of breath if she lies down flat.  The patient reports she had been drinking 3 water bottles per day plus additional fluids.  She is working on reducing fluid intake.       As of 3/29/2024, the patient reported eating a lot of takeout food, which by her description sounded to have a very high salt content.  She is trying to reduce salt intake in her diet.     The patient denies history of diabetes mellitus.  She does not describe anginal symptoms but does have history of chronic systolic congestive heart failure.  She has history of permanent biventricular pacemaker and AV node ablation for A-fib.  Medications include Coumadin.  The patient is ambulatory.  She does not describe shortness of breath with ambulation.     Past medical history includes cervical radiculopathy, reflux esophagitis, multiple myeloma, hypertension, CKD 3.     The patient was seen at [facility] on 4/8/2024. 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 The cardiologist is reported on 5/13/2024 to have performed angiography and intervention on 1 lower extremity.  The patient will have a second angiography and

## 2024-05-13 NOTE — PROGRESS NOTES
Goals      Patient verbalizes understanding of self-management goals of living with Congestive Heart Failure      4/12/2021: CHF: reported better progress: denied swollen ankles today. Review:  Na+ intake reduction: elimination of processed foods as much as possible. CDC guidelines reviewed: physical distancing/adequate hydration/mask wearing. Agreement voiced.  EW        f/u:  4/26
Attending Attestation (For Attendings USE Only)...

## 2024-05-17 ENCOUNTER — OFFICE VISIT (OUTPATIENT)
Facility: CLINIC | Age: 84
End: 2024-05-17

## 2024-05-17 VITALS
HEIGHT: 70 IN | HEART RATE: 75 BPM | WEIGHT: 226.9 LBS | RESPIRATION RATE: 16 BRPM | DIASTOLIC BLOOD PRESSURE: 88 MMHG | BODY MASS INDEX: 32.48 KG/M2 | SYSTOLIC BLOOD PRESSURE: 144 MMHG | TEMPERATURE: 97.9 F | OXYGEN SATURATION: 97 %

## 2024-05-17 DIAGNOSIS — L97.922 NON-PRESSURE CHRONIC ULCER OF LEFT LOWER LEG WITH FAT LAYER EXPOSED (HCC): ICD-10-CM

## 2024-05-17 DIAGNOSIS — N39.0 URINARY TRACT INFECTION WITHOUT HEMATURIA, SITE UNSPECIFIED: ICD-10-CM

## 2024-05-17 DIAGNOSIS — Z51.81 ENCOUNTER FOR MONITORING COUMADIN THERAPY: Primary | ICD-10-CM

## 2024-05-17 DIAGNOSIS — M17.0 PRIMARY OSTEOARTHRITIS OF BOTH KNEES: ICD-10-CM

## 2024-05-17 DIAGNOSIS — E66.9 OBESITY (BMI 30.0-34.9): ICD-10-CM

## 2024-05-17 DIAGNOSIS — L97.912 NON-PRESSURE CHRONIC ULCER OF RIGHT LOWER LEG WITH FAT LAYER EXPOSED (HCC): ICD-10-CM

## 2024-05-17 DIAGNOSIS — R32 URINARY INCONTINENCE, UNSPECIFIED TYPE: ICD-10-CM

## 2024-05-17 DIAGNOSIS — Z79.01 ENCOUNTER FOR MONITORING COUMADIN THERAPY: Primary | ICD-10-CM

## 2024-05-17 DIAGNOSIS — I48.21 PERMANENT ATRIAL FIBRILLATION (HCC): ICD-10-CM

## 2024-05-17 DIAGNOSIS — I50.32 CHRONIC DIASTOLIC CONGESTIVE HEART FAILURE (HCC): ICD-10-CM

## 2024-05-17 DIAGNOSIS — I87.2 VENOUS INSUFFICIENCY: ICD-10-CM

## 2024-05-17 LAB
APPEARANCE UR: CLEAR
BACTERIA URNS QL MICRO: NEGATIVE /HPF
BILIRUB UR QL: NEGATIVE
COLOR UR: ABNORMAL
EPITH CASTS URNS QL MICRO: ABNORMAL /LPF
GLUCOSE UR STRIP.AUTO-MCNC: NEGATIVE MG/DL
HGB UR QL STRIP: NEGATIVE
KETONES UR QL STRIP.AUTO: NEGATIVE MG/DL
LEUKOCYTE ESTERASE UR QL STRIP.AUTO: ABNORMAL
NITRITE UR QL STRIP.AUTO: NEGATIVE
PH UR STRIP: 6.5 (ref 5–8)
POC INR: 4
PROT UR STRIP-MCNC: NEGATIVE MG/DL
PROTHROMBIN TIME, POC: 47.9
RBC #/AREA URNS HPF: ABNORMAL /HPF (ref 0–5)
SP GR UR REFRACTOMETRY: 1.01 (ref 1–1.03)
UROBILINOGEN UR QL STRIP.AUTO: 0.2 EU/DL (ref 0.2–1)
WBC URNS QL MICRO: ABNORMAL /HPF (ref 0–4)
YEAST BUDDING URNS QL: PRESENT
YEAST URNS QL MICRO: PRESENT

## 2024-05-17 SDOH — ECONOMIC STABILITY: TRANSPORTATION INSECURITY
IN THE PAST 12 MONTHS, HAS LACK OF TRANSPORTATION KEPT YOU FROM MEETINGS, WORK, OR FROM GETTING THINGS NEEDED FOR DAILY LIVING?: NO

## 2024-05-17 SDOH — ECONOMIC STABILITY: TRANSPORTATION INSECURITY
IN THE PAST 12 MONTHS, HAS THE LACK OF TRANSPORTATION KEPT YOU FROM MEDICAL APPOINTMENTS OR FROM GETTING MEDICATIONS?: NO

## 2024-05-17 SDOH — ECONOMIC STABILITY: FOOD INSECURITY: WITHIN THE PAST 12 MONTHS, THE FOOD YOU BOUGHT JUST DIDN'T LAST AND YOU DIDN'T HAVE MONEY TO GET MORE.: NEVER TRUE

## 2024-05-17 SDOH — ECONOMIC STABILITY: INCOME INSECURITY: IN THE LAST 12 MONTHS, WAS THERE A TIME WHEN YOU WERE NOT ABLE TO PAY THE MORTGAGE OR RENT ON TIME?: NO

## 2024-05-17 SDOH — ECONOMIC STABILITY: FOOD INSECURITY: WITHIN THE PAST 12 MONTHS, YOU WORRIED THAT YOUR FOOD WOULD RUN OUT BEFORE YOU GOT MONEY TO BUY MORE.: NEVER TRUE

## 2024-05-17 SDOH — ECONOMIC STABILITY: INCOME INSECURITY: HOW HARD IS IT FOR YOU TO PAY FOR THE VERY BASICS LIKE FOOD, HOUSING, MEDICAL CARE, AND HEATING?: NOT HARD AT ALL

## 2024-05-17 SDOH — ECONOMIC STABILITY: HOUSING INSECURITY: IN THE LAST 12 MONTHS, HOW MANY PLACES HAVE YOU LIVED?: 1

## 2024-05-17 ASSESSMENT — PATIENT HEALTH QUESTIONNAIRE - PHQ9
SUM OF ALL RESPONSES TO PHQ QUESTIONS 1-9: 0
2. FEELING DOWN, DEPRESSED OR HOPELESS: NOT AT ALL
SUM OF ALL RESPONSES TO PHQ QUESTIONS 1-9: 0
SUM OF ALL RESPONSES TO PHQ9 QUESTIONS 1 & 2: 0
1. LITTLE INTEREST OR PLEASURE IN DOING THINGS: NOT AT ALL
SUM OF ALL RESPONSES TO PHQ QUESTIONS 1-9: 0
SUM OF ALL RESPONSES TO PHQ QUESTIONS 1-9: 0

## 2024-05-17 ASSESSMENT — ANXIETY QUESTIONNAIRES
6. BECOMING EASILY ANNOYED OR IRRITABLE: NOT AT ALL
IF YOU CHECKED OFF ANY PROBLEMS ON THIS QUESTIONNAIRE, HOW DIFFICULT HAVE THESE PROBLEMS MADE IT FOR YOU TO DO YOUR WORK, TAKE CARE OF THINGS AT HOME, OR GET ALONG WITH OTHER PEOPLE: NOT DIFFICULT AT ALL
1. FEELING NERVOUS, ANXIOUS, OR ON EDGE: NOT AT ALL
5. BEING SO RESTLESS THAT IT IS HARD TO SIT STILL: NOT AT ALL
2. NOT BEING ABLE TO STOP OR CONTROL WORRYING: NOT AT ALL
4. TROUBLE RELAXING: NOT AT ALL
GAD7 TOTAL SCORE: 0
3. WORRYING TOO MUCH ABOUT DIFFERENT THINGS: NOT AT ALL

## 2024-05-17 NOTE — PROGRESS NOTES
Chief Complaint   Patient presents with    Follow-up    Hypertension     Patient here for follow up high blood pressure. She also reports burning during urination.      \"Have you been to the ER, urgent care clinic since your last visit?  Hospitalized since your last visit?\"    NO    “Have you seen or consulted any other health care providers outside of [facility] since your last visit?”    NO    Results for orders placed or performed in visit on 05/17/24   AMB POC PT/INR   Result Value Ref Range    Prothrombin time, POC 47.9     POC INR 4.0            Click Here for Release of Records Request

## 2024-05-17 NOTE — PROGRESS NOTES
[facility]  [address] [place] [address] [address]  [facility] [address]  Phone:  [phone]  Fax: [phone]       Chief Complaint   Patient presents with    Follow-up    Hypertension     Patient here for follow up high blood pressure. She also reports burning during urination.    .      SUBJECTIVE:    Kylee Koo is a 84 y.o. female  Dictation on: 05/17/2024 12:01 PM by: SAMIRA NEGRETE [[id number]]          Current Outpatient Medications   Medication Sig Dispense Refill    acetaminophen (TYLENOL) 325 MG tablet take 3 tablet by mouth three times a day      meclizine (ANTIVERT) 12.5 MG tablet Take 1 tablet by mouth 3 times daily as needed for Dizziness 60 tablet 5    potassium chloride (KLOR-CON) 10 MEQ extended release tablet Take 1 tablet by mouth daily 60 tablet 3    metoprolol succinate (TOPROL XL) 25 MG extended release tablet take 1/2 tablet by mouth once daily 90 tablet 3    furosemide (LASIX) 40 MG tablet take 1 tablet by mouth once daily (Patient taking differently: Take 1 tablet by mouth 2 times daily Patient stated that she is only taking once a day) 90 tablet 3    lisinopril (PRINIVIL;ZESTRIL) 40 MG tablet take 1 tablet by mouth once daily 90 tablet 3    amLODIPine (NORVASC) 10 MG tablet take 1 tablet by mouth once daily 90 tablet 3    warfarin (COUMADIN) 5 MG tablet Take 1 tablet by mouth daily 1 tablet Monday through Friday, 1.5 tablets (7.5 mg) every Saturday & Sunday.      dexAMETHasone (DECADRON) 4 MG tablet Take 1 tablet by mouth daily (with breakfast) (Patient not taking: Reported on 3/29/2024)      gabapentin (NEURONTIN) 100 MG capsule Take 1 capsule by mouth at bedtime for 3 days. Max Daily Amount: 100 mg 3 capsule 0     No current facility-administered medications for this visit.     Past Medical History:   Diagnosis Date    Acute combined systolic and diastolic HF (heart failure), NYHA class 2 (HCC) 08/11/2017    Arthritis     Atrial fibrillation (HCC)     Cancer (HCC)

## 2024-05-18 LAB
BACTERIA SPEC CULT: NORMAL
CC UR VC: NORMAL
SERVICE CMNT-IMP: NORMAL

## 2024-05-30 ENCOUNTER — HOSPITAL ENCOUNTER (INPATIENT)
Facility: HOSPITAL | Age: 84
LOS: 2 days | Discharge: HOME | End: 2024-06-01
Attending: HOSPITALIST | Admitting: HOSPITALIST
Payer: MEDICARE

## 2024-05-30 DIAGNOSIS — R79.1 ELEVATED INR: ICD-10-CM

## 2024-05-30 DIAGNOSIS — Z48.1: Primary | ICD-10-CM

## 2024-05-30 PROBLEM — D68.59 HYPERCOAGULABLE STATE (HCC): Status: ACTIVE | Noted: 2024-05-30

## 2024-05-30 LAB
ALBUMIN SERPL-MCNC: 3 G/DL (ref 3.5–5)
ALBUMIN/GLOB SERPL: 0.7 (ref 1.1–2.2)
ALP SERPL-CCNC: 88 U/L (ref 45–117)
ALT SERPL-CCNC: 16 U/L (ref 12–78)
ANION GAP SERPL CALC-SCNC: 6 MMOL/L (ref 5–15)
APTT PPP: 35.3 SEC (ref 22.1–31)
AST SERPL-CCNC: 16 U/L (ref 15–37)
BASOPHILS # BLD: 0 K/UL (ref 0–0.1)
BASOPHILS NFR BLD: 0 % (ref 0–1)
BILIRUB SERPL-MCNC: 0.8 MG/DL (ref 0.2–1)
BUN SERPL-MCNC: 9 MG/DL (ref 6–20)
BUN/CREAT SERPL: 10 (ref 12–20)
CALCIUM SERPL-MCNC: 9.2 MG/DL (ref 8.5–10.1)
CHLORIDE SERPL-SCNC: 111 MMOL/L (ref 97–108)
CO2 SERPL-SCNC: 27 MMOL/L (ref 21–32)
COMMENT:: NORMAL
CREAT SERPL-MCNC: 0.86 MG/DL (ref 0.55–1.02)
DIFFERENTIAL METHOD BLD: ABNORMAL
EOSINOPHIL # BLD: 0.1 K/UL (ref 0–0.4)
EOSINOPHIL NFR BLD: 2 % (ref 0–7)
ERYTHROCYTE [DISTWIDTH] IN BLOOD BY AUTOMATED COUNT: 17.9 % (ref 11.5–14.5)
GLOBULIN SER CALC-MCNC: 4.5 G/DL (ref 2–4)
GLUCOSE SERPL-MCNC: 125 MG/DL (ref 65–100)
HCT VFR BLD AUTO: 32.9 % (ref 35–47)
HGB BLD-MCNC: 9.7 G/DL (ref 11.5–16)
IMM GRANULOCYTES # BLD AUTO: 0 K/UL (ref 0–0.04)
IMM GRANULOCYTES NFR BLD AUTO: 0 % (ref 0–0.5)
INR PPP: 2.5 (ref 0.9–1.1)
LYMPHOCYTES # BLD: 0.6 K/UL (ref 0.8–3.5)
LYMPHOCYTES NFR BLD: 14 % (ref 12–49)
MCH RBC QN AUTO: 24.5 PG (ref 26–34)
MCHC RBC AUTO-ENTMCNC: 29.5 G/DL (ref 30–36.5)
MCV RBC AUTO: 83.1 FL (ref 80–99)
MONOCYTES # BLD: 0.6 K/UL (ref 0–1)
MONOCYTES NFR BLD: 14 % (ref 5–13)
NEUTS SEG # BLD: 3 K/UL (ref 1.8–8)
NEUTS SEG NFR BLD: 70 % (ref 32–75)
NRBC # BLD: 0 K/UL (ref 0–0.01)
NRBC BLD-RTO: 0 PER 100 WBC
PLATELET # BLD AUTO: 234 K/UL (ref 150–400)
PMV BLD AUTO: 9.7 FL (ref 8.9–12.9)
POTASSIUM SERPL-SCNC: 3 MMOL/L (ref 3.5–5.1)
PROT SERPL-MCNC: 7.5 G/DL (ref 6.4–8.2)
PROTHROMBIN TIME: 24.4 SEC (ref 9–11.1)
RBC # BLD AUTO: 3.96 M/UL (ref 3.8–5.2)
RBC MORPH BLD: ABNORMAL
RBC MORPH BLD: ABNORMAL
SODIUM SERPL-SCNC: 144 MMOL/L (ref 136–145)
SPECIMEN HOLD: NORMAL
THERAPEUTIC RANGE: ABNORMAL SECS (ref 58–77)
WBC # BLD AUTO: 4.3 K/UL (ref 3.6–11)

## 2024-05-30 PROCEDURE — 99285 EMERGENCY DEPT VISIT HI MDM: CPT

## 2024-05-30 PROCEDURE — 1100000000 HC RM PRIVATE

## 2024-05-30 PROCEDURE — 80053 COMPREHEN METABOLIC PANEL: CPT

## 2024-05-30 PROCEDURE — 85025 COMPLETE CBC W/AUTO DIFF WBC: CPT

## 2024-05-30 PROCEDURE — 36415 COLL VENOUS BLD VENIPUNCTURE: CPT

## 2024-05-30 PROCEDURE — 2580000003 HC RX 258: Performed by: NURSE PRACTITIONER

## 2024-05-30 PROCEDURE — 85610 PROTHROMBIN TIME: CPT

## 2024-05-30 PROCEDURE — 85730 THROMBOPLASTIN TIME PARTIAL: CPT

## 2024-05-30 RX ORDER — POTASSIUM CHLORIDE 29.8 MG/ML
20 INJECTION INTRAVENOUS PRN
Status: DISCONTINUED | OUTPATIENT
Start: 2024-05-30 | End: 2024-06-01 | Stop reason: HOSPADM

## 2024-05-30 RX ORDER — ONDANSETRON 4 MG/1
4 TABLET, ORALLY DISINTEGRATING ORAL EVERY 8 HOURS PRN
Status: DISCONTINUED | OUTPATIENT
Start: 2024-05-30 | End: 2024-06-01 | Stop reason: HOSPADM

## 2024-05-30 RX ORDER — ACETAMINOPHEN 650 MG/1
650 SUPPOSITORY RECTAL EVERY 6 HOURS PRN
Status: DISCONTINUED | OUTPATIENT
Start: 2024-05-30 | End: 2024-06-01 | Stop reason: HOSPADM

## 2024-05-30 RX ORDER — IPRATROPIUM BROMIDE AND ALBUTEROL SULFATE 2.5; .5 MG/3ML; MG/3ML
1 SOLUTION RESPIRATORY (INHALATION)
Status: DISCONTINUED | OUTPATIENT
Start: 2024-05-31 | End: 2024-05-31

## 2024-05-30 RX ORDER — ACETAMINOPHEN 325 MG/1
650 TABLET ORAL EVERY 6 HOURS PRN
Status: DISCONTINUED | OUTPATIENT
Start: 2024-05-30 | End: 2024-06-01 | Stop reason: HOSPADM

## 2024-05-30 RX ORDER — POLYETHYLENE GLYCOL 3350 17 G/17G
17 POWDER, FOR SOLUTION ORAL DAILY PRN
Status: DISCONTINUED | OUTPATIENT
Start: 2024-05-30 | End: 2024-06-01 | Stop reason: HOSPADM

## 2024-05-30 RX ORDER — MAGNESIUM SULFATE IN WATER 40 MG/ML
2000 INJECTION, SOLUTION INTRAVENOUS PRN
Status: DISCONTINUED | OUTPATIENT
Start: 2024-05-30 | End: 2024-06-01 | Stop reason: HOSPADM

## 2024-05-30 RX ORDER — SODIUM CHLORIDE 0.9 % (FLUSH) 0.9 %
5-40 SYRINGE (ML) INJECTION PRN
Status: DISCONTINUED | OUTPATIENT
Start: 2024-05-30 | End: 2024-06-01 | Stop reason: HOSPADM

## 2024-05-30 RX ORDER — POTASSIUM CHLORIDE 7.45 MG/ML
10 INJECTION INTRAVENOUS PRN
Status: DISCONTINUED | OUTPATIENT
Start: 2024-05-30 | End: 2024-06-01 | Stop reason: HOSPADM

## 2024-05-30 RX ORDER — SODIUM CHLORIDE 0.9 % (FLUSH) 0.9 %
5-40 SYRINGE (ML) INJECTION EVERY 12 HOURS SCHEDULED
Status: DISCONTINUED | OUTPATIENT
Start: 2024-05-30 | End: 2024-06-01 | Stop reason: HOSPADM

## 2024-05-30 RX ORDER — SODIUM CHLORIDE 9 MG/ML
INJECTION, SOLUTION INTRAVENOUS PRN
Status: DISCONTINUED | OUTPATIENT
Start: 2024-05-30 | End: 2024-06-01 | Stop reason: HOSPADM

## 2024-05-30 RX ORDER — ONDANSETRON 2 MG/ML
4 INJECTION INTRAMUSCULAR; INTRAVENOUS EVERY 6 HOURS PRN
Status: DISCONTINUED | OUTPATIENT
Start: 2024-05-30 | End: 2024-06-01 | Stop reason: HOSPADM

## 2024-05-30 RX ADMIN — SODIUM CHLORIDE, PRESERVATIVE FREE 10 ML: 5 INJECTION INTRAVENOUS at 21:29

## 2024-05-30 ASSESSMENT — PAIN - FUNCTIONAL ASSESSMENT: PAIN_FUNCTIONAL_ASSESSMENT: NONE - DENIES PAIN

## 2024-05-30 NOTE — ED NOTES
TRANSFER - OUT REPORT:    Verbal report given to Rhonda ACOSTA on Kylee H Walston        Report consisted of patient's Situation, Background, Assessment and   Recommendations(SBAR).     Information from the following report(s) Nurse Handoff Report, ED Encounter Summary, ED SBAR, Recent Results, and Cardiac Rhythm    was reviewed with the receiving nurse.    [place] Fall Assessment:    Presents to emergency department  because of falls (Syncope, seizure, or loss of consciousness): No  Age > 70: Yes  Altered Mental Status, Intoxication with alcohol or substance confusion (Disorientation, impaired judgment, poor safety awaremess, or inability to follow instructions): No  Impaired Mobility: Ambulates or transfers with assistive devices or assistance; Unable to ambulate or transer.: Yes  Nursing Judgement: Yes         Opportunity for questions and clarification was provided.

## 2024-05-30 NOTE — ED NOTES
Device from previous procedure present in patients right groin; unsure of what the device is, left in place to be evaluated by doctor

## 2024-05-30 NOTE — ED PROVIDER NOTES
hospitals EMERGENCY DEPT  EMERGENCY DEPARTMENT ENCOUNTER       Pt Name: Kylee Koo  MRN: [id number]  Birthdate 1940  Date of evaluation: 5/30/2024  Provider: Sonja Sandhu PA-C   PCP: Aly Rizvi MD  Note Started: 7:09 PM EDT 5/30/24     CHIEF COMPLAINT       Chief Complaint   Patient presents with    Post-op Problem     EMS reports bleeding from sheath site after placement today at [facility] procedure center.  Dressing in place with catheter at right groin.  No bleeding at present         HISTORY OF PRESENT ILLNESS: 1 or more elements      History From: Patient and Patient's Daughter  HPI Limitations: None     Kylee Koo is a 84 y.o. female who presents with daughter to the emergency department from the [facility] procedure center.  Patient states that she is a patient of [facility] and was having a \"procedure today to open up the arteries in her legs\".  Patient's daughter states that she was off of her warfarin for two days before the procedure, was given warfarin just prior to the procedure, was then given a reversal agent, states that the \"sheath\" was never removed from the patient.  Patient's daughter states that the patient was then referred to the emergency department.  Patient denies any symptoms at this time.     Nursing Notes were all reviewed and agreed with or any disagreements were addressed in the HPI.     REVIEW OF SYSTEMS      Review of Systems     Positives and Pertinent negatives as per HPI.    PAST HISTORY     Past Medical History:  Past Medical History:   Diagnosis Date    Acute combined systolic and diastolic HF (heart failure), NYHA class 2 (HCC) 08/11/2017    Arthritis     Atrial fibrillation (HCC)     Cancer (HCC)     multiple myeloma    Chronic renal disease, stage III (HCC) 06/23/2022    Congestive heart failure (HCC)     Hypertension     Long term current use of anticoagulant therapy     Menopause     Pacemaker     S/P AV brenda ablation 08/11/2017    Status post

## 2024-05-31 LAB
ANION GAP SERPL CALC-SCNC: 8 MMOL/L (ref 5–15)
APTT PPP: 37 SEC (ref 22.1–31)
BUN SERPL-MCNC: 9 MG/DL (ref 6–20)
BUN/CREAT SERPL: 11 (ref 12–20)
CALCIUM SERPL-MCNC: 9.6 MG/DL (ref 8.5–10.1)
CHLORIDE SERPL-SCNC: 110 MMOL/L (ref 97–108)
CO2 SERPL-SCNC: 22 MMOL/L (ref 21–32)
CREAT SERPL-MCNC: 0.84 MG/DL (ref 0.55–1.02)
ERYTHROCYTE [DISTWIDTH] IN BLOOD BY AUTOMATED COUNT: 17.7 % (ref 11.5–14.5)
GLUCOSE SERPL-MCNC: 93 MG/DL (ref 65–100)
HCT VFR BLD AUTO: 36.8 % (ref 35–47)
HGB BLD-MCNC: 10.9 G/DL (ref 11.5–16)
INR PPP: 1.6 (ref 0.9–1.1)
MAGNESIUM SERPL-MCNC: 2 MG/DL (ref 1.6–2.4)
MCH RBC QN AUTO: 24.9 PG (ref 26–34)
MCHC RBC AUTO-ENTMCNC: 29.6 G/DL (ref 30–36.5)
MCV RBC AUTO: 84 FL (ref 80–99)
NRBC # BLD: 0 K/UL (ref 0–0.01)
NRBC BLD-RTO: 0 PER 100 WBC
PHOSPHATE SERPL-MCNC: 2.7 MG/DL (ref 2.6–4.7)
PLATELET # BLD AUTO: 244 K/UL (ref 150–400)
PMV BLD AUTO: 10.3 FL (ref 8.9–12.9)
POTASSIUM SERPL-SCNC: 3.4 MMOL/L (ref 3.5–5.1)
PROTHROMBIN TIME: 16.1 SEC (ref 9–11.1)
RBC # BLD AUTO: 4.38 M/UL (ref 3.8–5.2)
SODIUM SERPL-SCNC: 140 MMOL/L (ref 136–145)
THERAPEUTIC RANGE: ABNORMAL SECS (ref 58–77)
WBC # BLD AUTO: 3.3 K/UL (ref 3.6–11)

## 2024-05-31 PROCEDURE — 2580000003 HC RX 258: Performed by: INTERNAL MEDICINE

## 2024-05-31 PROCEDURE — 6370000000 HC RX 637 (ALT 250 FOR IP): Performed by: STUDENT IN AN ORGANIZED HEALTH CARE EDUCATION/TRAINING PROGRAM

## 2024-05-31 PROCEDURE — 6370000000 HC RX 637 (ALT 250 FOR IP): Performed by: INTERNAL MEDICINE

## 2024-05-31 PROCEDURE — 2580000003 HC RX 258: Performed by: NURSE PRACTITIONER

## 2024-05-31 PROCEDURE — 6370000000 HC RX 637 (ALT 250 FOR IP): Performed by: HOSPITALIST

## 2024-05-31 PROCEDURE — 85730 THROMBOPLASTIN TIME PARTIAL: CPT

## 2024-05-31 PROCEDURE — 2060000000 HC ICU INTERMEDIATE R&B

## 2024-05-31 PROCEDURE — 6360000002 HC RX W HCPCS

## 2024-05-31 PROCEDURE — 84100 ASSAY OF PHOSPHORUS: CPT

## 2024-05-31 PROCEDURE — 85027 COMPLETE CBC AUTOMATED: CPT

## 2024-05-31 PROCEDURE — 85610 PROTHROMBIN TIME: CPT

## 2024-05-31 PROCEDURE — 80048 BASIC METABOLIC PNL TOTAL CA: CPT

## 2024-05-31 PROCEDURE — 6360000002 HC RX W HCPCS: Performed by: INTERNAL MEDICINE

## 2024-05-31 PROCEDURE — 36415 COLL VENOUS BLD VENIPUNCTURE: CPT

## 2024-05-31 PROCEDURE — 83735 ASSAY OF MAGNESIUM: CPT

## 2024-05-31 RX ORDER — FENTANYL CITRATE 50 UG/ML
25 INJECTION, SOLUTION INTRAMUSCULAR; INTRAVENOUS ONCE
Status: COMPLETED | OUTPATIENT
Start: 2024-05-31 | End: 2024-05-31

## 2024-05-31 RX ORDER — AMLODIPINE BESYLATE 5 MG/1
10 TABLET ORAL DAILY
Status: DISCONTINUED | OUTPATIENT
Start: 2024-05-31 | End: 2024-06-01 | Stop reason: HOSPADM

## 2024-05-31 RX ORDER — MECLIZINE HCL 12.5 MG/1
12.5 TABLET ORAL 3 TIMES DAILY PRN
Status: DISCONTINUED | OUTPATIENT
Start: 2024-05-31 | End: 2024-06-01 | Stop reason: HOSPADM

## 2024-05-31 RX ORDER — ASPIRIN 81 MG/1
81 TABLET, CHEWABLE ORAL DAILY
Status: DISCONTINUED | OUTPATIENT
Start: 2024-05-31 | End: 2024-06-01 | Stop reason: HOSPADM

## 2024-05-31 RX ORDER — FENTANYL CITRATE 50 UG/ML
INJECTION, SOLUTION INTRAMUSCULAR; INTRAVENOUS
Status: COMPLETED
Start: 2024-05-31 | End: 2024-05-31

## 2024-05-31 RX ORDER — IPRATROPIUM BROMIDE AND ALBUTEROL SULFATE 2.5; .5 MG/3ML; MG/3ML
1 SOLUTION RESPIRATORY (INHALATION) EVERY 4 HOURS PRN
Status: DISCONTINUED | OUTPATIENT
Start: 2024-05-31 | End: 2024-06-01 | Stop reason: HOSPADM

## 2024-05-31 RX ORDER — LISINOPRIL 40 MG/1
40 TABLET ORAL DAILY
Status: DISCONTINUED | OUTPATIENT
Start: 2024-05-31 | End: 2024-06-01 | Stop reason: HOSPADM

## 2024-05-31 RX ORDER — METOPROLOL SUCCINATE 25 MG/1
12.5 TABLET, EXTENDED RELEASE ORAL DAILY
Status: DISCONTINUED | OUTPATIENT
Start: 2024-05-31 | End: 2024-06-01 | Stop reason: HOSPADM

## 2024-05-31 RX ADMIN — SODIUM CHLORIDE, PRESERVATIVE FREE 10 ML: 5 INJECTION INTRAVENOUS at 10:06

## 2024-05-31 RX ADMIN — ASPIRIN 81 MG CHEWABLE TABLET 81 MG: 81 TABLET CHEWABLE at 16:10

## 2024-05-31 RX ADMIN — METOPROLOL SUCCINATE 12.5 MG: 25 TABLET, FILM COATED, EXTENDED RELEASE ORAL at 12:05

## 2024-05-31 RX ADMIN — SODIUM CHLORIDE, PRESERVATIVE FREE 10 ML: 5 INJECTION INTRAVENOUS at 21:42

## 2024-05-31 RX ADMIN — MECLIZINE 12.5 MG: 12.5 TABLET ORAL at 16:10

## 2024-05-31 RX ADMIN — FENTANYL CITRATE 25 MCG: 50 INJECTION, SOLUTION INTRAMUSCULAR; INTRAVENOUS at 10:06

## 2024-05-31 RX ADMIN — LISINOPRIL 40 MG: 40 TABLET ORAL at 12:05

## 2024-05-31 RX ADMIN — AMLODIPINE BESYLATE 10 MG: 5 TABLET ORAL at 12:05

## 2024-05-31 RX ADMIN — FENTANYL CITRATE 25 MCG: 50 INJECTION INTRAMUSCULAR; INTRAVENOUS at 10:06

## 2024-05-31 RX ADMIN — Medication 1 AMPULE: at 21:43

## 2024-05-31 RX ADMIN — PHYTONADIONE 10 MG: 10 INJECTION, EMULSION INTRAMUSCULAR; INTRAVENOUS; SUBCUTANEOUS at 02:15

## 2024-05-31 ASSESSMENT — ENCOUNTER SYMPTOMS
SHORTNESS OF BREATH: 0
ABDOMINAL PAIN: 0

## 2024-05-31 ASSESSMENT — PAIN SCALES - GENERAL: PAINLEVEL_OUTOF10: 0

## 2024-05-31 NOTE — PROGRESS NOTES
PCP hospital follow-up transitional care appointment has been scheduled with Dr. Aly Rizvi on 6/6/24 5378. [facility] placed [facility] information AVS for patient resource.   Pending patient discharge.  Janice Kyle, Care Management Assistant

## 2024-05-31 NOTE — H&P
CRITICAL CARE NOTE      Name: Kylee Koo   : 1940   MRN: [id number]   Date: 2024      REASON FOR ICU ADMISSION: hypercoagulable state     PRINCIPAL ICU DIAGNOSIS     Hypercoagulable state  CHF  Afib  Cancer  CKD  HTN  PM, BV    BRIEF PATIENT SUMMARY     Per ED note  84 y.o. female who presents with daughter to the emergency department from the [facility] procedure center.  Patient states that she is a patient of [facility] and was having a \"procedure today to open up the arteries in her legs\".  Patient's daughter states that she was off of her warfarin for a couple of days before the procedure, was given warfarin just prior to the procedure, was then given a reversal agent, states that the \"sheath\" was never removed from the patient, and that the patient was referred to the emergency department.  Patient denies any symptoms at this time.     ICU c/s d/t hypercoagulable state and VCS unable to remove femoral sheath from procedure.    HOSPITAL COURSE/DAILY EVENT LOG     24 HOUR EVENTS: as above    COMPREHENSIVE ASSESSMENT & PLAN:SYSTEM BASED     NEUROLOGICAL:     Aggressive Pain Control.  Spontaneous Awakening Trial: Pending  Pain Medications: prn  Target RASS: 0  Sedation Medications:prn    PULMONOLOGY:     Pulmonary toilet: HOB, IS, pulm nebs  SpO2 Goal: > 92%    CARDIOVASCULAR:     Vasopressors to keep MAP 65 and above for adequate tissue perfusion.   SBP Goal of: > 90 mmHg  MAP Goal of: > 65 mmHg  Cardiac Gtts: none  to maintain SBP/MAP goals  Transfusion Trigger (Hgb): <7 g/dL  Keep K > 4; Mg > 2     GASTROINTESTINAL:      GI Prophylaxis: PPI  Bowel Regimen: prn  Feeding:  Pending     RENAL/ELECTROLYTE/FLUIDS:     Goal urine output > 0.5 cc/kg/hr, Strict I/Os, avoid nephrotoxins  IVFs: none    ENDOCRINE:     Blood Sugar Goal 120-180, Avoid hypo/hyperglycemia    HEMATOLOGY/ONCOLOGY:     Transfusion Trigger (Hgb): <7  DVT Prophylaxis: none, INR 2.5    INFECTIOUS DISEASE:     Monitor  SpO2 98%   BMI 31.85 kg/m²      Temp (24hrs), Av.3 °F (36.8 °C), Min:98.3 °F (36.8 °C), Max:98.3 °F (36.8 °C)           Intake/Output:     Intake/Output Summary (Last 24 hours) at 2024  Last data filed at 2024  Gross per 24 hour   Intake --   Output 900 ml   Net -900 ml       Imaging    No valid procedures specified.       CRITICAL CARE DOCUMENTATION  I had a face to face encounter with the patient, reviewed and interpreted patient data including clinical events, labs, images, vital signs, I/O's, and examined patient.  I have discussed the case and the plan and management of the patient's care with the consulting services, the bedside nurses and the respiratory therapist.      NOTE OF PERSONAL INVOLVEMENT IN CARE   This patient has a high probability of imminent, clinically significant deterioration, which requires the highest level of preparedness to intervene urgently. I participated in the decision-making and personally managed or directed the management of the following life and organ supporting interventions that required my frequent assessment to treat or prevent imminent deterioration.    I personally spent 75 minutes of critical care time.  This is time spent at this critically ill patient's bedside actively involved in patient care as well as the coordination of care.  This does not include any procedural time which has been billed separately.    Landon Meza, TARA - NP   Critical Care Medicine  [facility]

## 2024-05-31 NOTE — ED NOTES
Report called to CCU using SBAR method, discussing pt's current condition, medical history, lab and imaging results, medications, and anticipated plan of care.  DAVE Skaggs accepted care of patient at this time, pt to be transported upstairs by RN on [employer] monitor.

## 2024-05-31 NOTE — CONSULTS
Palliative Medicine  Patient Name: Kylee Koo  YOB: 1940  MRN: [id number]  Age: 84 y.o.  Gender: female    Date of Initial Consult: 5/31/2024  Date of Service: 5/31/2024  Time: 10:06 AM  Provider: TARA Carbone NP  Hospital Day: 2  Admit Date: 5/30/2024  Referring Provider: Landon Meza NP       Reasons for Consultation:  Goals of Care    HISTORY OF PRESENT ILLNESS (HPI):   Kylee Koo is a 84 y.o. female with a past medical history of acute systolic and dialystolic HF NYHA class 2, afib, multiple myeloma, CKD3, HTN, pacemaker, s/p AV brenda ablation, who was admitted on 5/30/2024 from [facility] procedure center with a diagnosis of hypercoagulable state.  BIBA from [facility] outpatient procedure center after catheterization of vasculature of right lower extremity.  Pt was off warfarin for 2 days prior to procedure, then given warfarin just prior to procedure, then given reversal agent.  The sheath was not removed from pt's right groin; plan was to admit pt to cardiovascular ICU to monitor INR with plan of removing sheath tomorrow. INR elevated at 2.5, prothrombin time elevated at 24.4.     PALLIATIVE DIAGNOSES:    Hypercoagulable state  CHF  Afib  Multiple myeloma  CKD  Goals of care    ASSESSMENT AND PLAN:   Today, I am seeing Kylee Koo for goals of care.  Introduced self and purpose of visit to discuss completing an AMD; pt states she is not interested in doing an AMD and did not wish to speak further of it.  Counseled pt to talk with her family about what she wants and what she might want to place limits on.   I did not do an exam, this is a non billable note.   Initial consult note routed to primary continuity provider and/or primary health care team members  Please call with any palliative questions or concerns.  Palliative Care Team is available via perfect serve or via phone.    Referrals to:   [] Outpatient Palliative Care  [] Home Based Palliative Care  [] Home Based

## 2024-05-31 NOTE — CONSULTS
Hospitalist Consult Note    NAME:   Kylee Koo   : 1940   MRN: [id number]     Date/Time: 2024 2:31 PM  Patient PCP: Aly Rizvi MD    Estimated discharge date:   Barriers:       Assessment / Plan:    CHF  A-fib  Resume Coumadin  Pharmacy for dosing  Continue with metoprolol  Cardiology signed off    Generalized weakness  PT OT    HTN  Resume antihypertensive medication as blood pressure out  Continue now with amlodipine, lisinopril  She is on metoprolol      Dizziness versus lightheadedness  Blood pressure stable  Patient did take meclizine usually  Will resume that as needed        Medical Decision Making:   I personally reviewed labs:yes  I personally reviewed imaging:yes  I personally reviewed EKG:yes  Toxic drug monitoring: yes  Discussed case with: Patient, intensivist,        Code Status: Full code  DVT Prophylaxis: Warfarin  GI Prophylaxis:    Subjective:   Per ED note  84 y.o. female who presents with daughter to the emergency department from the [facility] procedure center.  Patient states that she is a patient of [facility] and was having a \"procedure today to open up the arteries in her legs\".  Patient's daughter states that she was off of her warfarin for a couple of days before the procedure, was given warfarin just prior to the procedure, was then given a reversal agent, states that the \"sheath\" was never removed from the patient, and that the patient was referred to the emergency department.  Patient denies any symptoms at this time.   Chief Complaint / Reason for Physician Visit  \"In the ICU she felt slightly dizzy  Blood pressure is stable at the bedside  \".  Discussed with RN events overnight.       Objective:     VITALS:   Last 24hrs VS reviewed since prior progress note. Most recent are:  Patient Vitals for the past 24 hrs:   BP Temp Temp src Pulse Resp SpO2 Height Weight   24 1408 139/86 -- -- 75 22 99 % -- --   24 1400 135/87 -- -- 75 17 94

## 2024-05-31 NOTE — PROGRESS NOTES
4 mg IntraVENous Q6H PRN    polyethylene glycol (GLYCOLAX) packet 17 g  17 g Oral Daily PRN    acetaminophen (TYLENOL) tablet 650 mg  650 mg Oral Q6H PRN    Or    acetaminophen (TYLENOL) suppository 650 mg  650 mg Rectal Q6H PRN    sodium phosphate 15 mmol in sodium chloride 0.9 % 250 mL IVPB  15 mmol IntraVENous PRN    ipratropium 0.5 mg-albuterol 2.5 mg (DUONEB) nebulizer solution 1 Dose  1 Dose Inhalation Q4H WA RT         Objective:      Physical Exam  Physical Exam       Data Review:   Recent Labs     05/30/24 2036 05/31/24  0647   WBC 4.3 3.3*   HGB 9.7* 10.9*   HCT 32.9* 36.8    244     Recent Labs     05/30/24 1936 05/30/24 2036 05/31/24  0647   NA  --  144 140   K  --  3.0* 3.4*   CL  --  111* 110*   CO2  --  27 22   BUN  --  9 9   CREATININE  --  0.86 0.84   MG  --   --  2.0   PHOS  --   --  2.7   ALT  --  16  --    INR 2.5*  --  1.6*       No results for input(s): \"TROPHS\" in the last 72 hours.      Intake/Output Summary (Last 24 hours) at 5/31/2024 1034  Last data filed at 5/31/2024 0806  Gross per 24 hour   Intake 100 ml   Output 1200 ml   Net -1100 ml        Telemetry:   EKG:  Cxray:    Assessment:   Right groin arterial sheath removed.  Manual pressure held and hemostasis achieved  Principal Problem:    Hypercoagulable state (HCC)  Resolved Problems:    * No resolved hospital problems. *      Plan:   Resume home medications including all the antihypertensives.  Bedrest for 4 hours.  Ambulate if no bleeding issues.  Okay to discharge later in the day.  She can resume her home Coumadin follow-up with the primary care physician from today evening.      Yves Cee MD

## 2024-05-31 NOTE — INTERDISCIPLINARY ROUNDS
Critical care interdisciplinary rounds today.  Following members present: Case Management, Nursing, Nutrition, Pharmacy, and Physician.   Plan of care discussed.  See clinical pathway for plan of care and interventions and desired outcomes.

## 2024-05-31 NOTE — CARE COORDINATION
Care Management Initial Assessment       RUR: 15%  Readmission? No  1st IM letter given? Yes - Given by patient access on 5/30/24.  1st  letter given: No---N/A           05/31/24 1230   Service Assessment   Patient Orientation Alert and Oriented;Person;Place;Situation;Self   Cognition Alert   History Provided By Child/Family;Patient   Primary Caregiver Self   Support Systems Children   Patient's Healthcare Decision Maker is: Legal Next of Kin   PCP Verified by CM Yes   Last Visit to PCP   (Patient saw her pcp 2 weeks ago.)   Prior Functional Level Independent in ADLs/IADLs   Current Functional Level Assistance with the following:;Bathing;Dressing;Toileting;Mobility   Can patient return to prior living arrangement Yes   Family able to assist with home care needs: Yes   Would you like for me to discuss the discharge plan with any other family members/significant others, and if so, who? Yes  (Daughters)   Financial Resources Medicare   Community Resources Other (Comment)  (Patient is open to [facility] services for pt and nursing.)   Social/Functional History   Lives With Alone   Type of Home House  (Patient lives in a one story home.)   Home Equipment Cane;Rollator  (Daughter states patient has three rollators and four canes at home.)   Active  Yes   Discharge Planning   Type of Residence House   Current Services Prior To Admission Home Care  (Patient had [facility] services for pt and nursing prior to admission to the hospital and would like to continue when discharged.)   Patient expects to be discharged to: House         Per cardiologist patient may discharge later today after four hours and if no bleeding and ambulates well. Daughters are in at the bedside and will be transporting her home if discharged. Patient resting on rounds with no complaints.  Per daughter states patient is open to [facility] services for pt and nursing and would like to resume. Called [facility] and

## 2024-05-31 NOTE — PROGRESS NOTES
Critical Care Progress Note  Timo Lowe MD          Date of Service:  2024  NAME:  Kylee Koo  :  1940  MRN:  [id number]      Subjective/Hospital course:      : Patient is awake and alert.  Right groin Sheath continues removed by cardiology.       Assessment:     Hypercoagulable state  CHF  Afib  Cancer  CKD  HTN  PM, BV    Plan:     -She is removed by cardiology.  - INR was reversed with vitamin K.  - Decision for restarting anticoagulation per cardiology.  - Resume home blood pressure medications.  - Can transfer to IVCU.    Code status: Full code  SUP: NA  DVT prophylaxis: SCD  José Luis Ibanez (Child)  [phone] (Mobile)     Care Plan discussed with: Patient/Family and Nurse      I personally spent 00 minutes of critical care time.  This is time spent at this critically ill patient's bedside actively involved in patient care as well as the coordination of care and discussions with the patient's family.  This does not include any procedural time which has been billed separately.      Review of Systems:   Review of Systems   All other systems reviewed and are negative.           Vital Signs:   Patient Vitals for the past 4 hrs:   BP Temp Temp src Pulse Resp   24 0800 (!) 153/92 97.7 °F (36.5 °C) Oral 75 --   24 0700 (!) 142/95 -- -- 76 22   24 0645 -- -- -- 75 18   24 0630 -- -- -- 75 15        Intake/Output Summary (Last 24 hours) at 2024 1029  Last data filed at 2024 0806  Gross per 24 hour   Intake 100 ml   Output 1200 ml   Net -1100 ml        Physical Examination:    Physical Exam  HENT:      Head: Normocephalic and atraumatic.      Mouth/Throat:      Mouth: Mucous membranes are moist.   Eyes:      Conjunctiva/sclera: Conjunctivae normal.   Cardiovascular:      Rate and Rhythm: Normal rate and regular rhythm.   Pulmonary:      Effort: Pulmonary effort is normal. No respiratory distress.   Musculoskeletal:      Cervical back: Neck

## 2024-05-31 NOTE — PROGRESS NOTES
Bedside and Verbal shift change report given to Esperanza (oncoming nurse) by August (offgoing nurse). Report included the following information Nurse Handoff Report, MAR, Recent Results, and Cardiac Rhythm AV paced .       1005- sheath removed.  Dr OTTO Cee  One time dose of fentanyl given ivp   Patient to lie flat for 4 hours post.  Per Dr. Cee, we can resume home BP meds (lisinopril, norvasc, and metoprolol)    1405- Patient now sitting up, eating a meal.  No bleeding at sheath site.  Patient states does feel mildly dizzy. Blood pressure is 139/86 , but apparently she takes meclizine daily for dizziness.  Will discuss with attending    1700 per hospitalist, we will keep the patient overnight.  States patient can be transferred to IVCU    1720- called and left a message for Dr. Rizvi making him aware that the patient is here and under the hospitalist service.

## 2024-05-31 NOTE — PROGRESS NOTES
Bedside and Verbal shift change report given to Esperanza (oncoming nurse) by August (offgoing nurse). Report included the following information Nurse Handoff Report, MAR, Recent Results, and Cardiac Rhythm AV paced .       1005- sheath removed.  Dr OTTO Cee  One time dose of fentanyl given ivp   Patient to lie flat for 4 hours post.  Per Dr. Cee, we can resume home BP meds (lisinopril, norvasc, and metoprolol)    1405- Patient now sitting up, eating a meal.  No bleeding at sheath site.  Patient states does feel mildly dizzy. Blood pressure is 139/86 , but apparently she takes meclizine daily for dizziness.  Will discuss with attending    Patient had bm x 2 today (1st soft; second loose)    1700 per hospitalist, we will keep the patient overnight.  States patient can be transferred to IVCU    1720- called and left a message for Dr. Rizvi making him aware that the patient is here and under the hospitalist service.    TRANSFER - OUT REPORT:    Verbal report given to *** on Kylee H Hayes  being transferred to [facility] for routine progression of patient care       Report consisted of patient's Situation, Background, Assessment and   Recommendations(SBAR).     Information from the following report(s) Nurse Handoff Report, MAR, Recent Results, and Cardiac Rhythm v-paced  was reviewed with the receiving nurse.           Lines:   Peripheral IV 05/30/24 Left Antecubital (Active)   Site Assessment Clean, dry & intact 05/31/24 1600   Line Status Flushed;No blood return 05/31/24 1600   Line Care Connections checked and tightened 05/31/24 1600   Phlebitis Assessment No symptoms 05/31/24 1600   Infiltration Assessment 0 05/31/24 1600   Dressing Status Clean, dry & intact 05/31/24 1600   Dressing Type Transparent 05/31/24 1600       Peripheral IV 05/30/24 Distal;Right Cephalic (Active)   Site Assessment Clean, dry & intact 05/31/24 1600   Line Status No blood return;Flushed 05/31/24 1600   Line Care Connections checked and

## 2024-06-01 VITALS
RESPIRATION RATE: 16 BRPM | BODY MASS INDEX: 30.33 KG/M2 | WEIGHT: 211.86 LBS | DIASTOLIC BLOOD PRESSURE: 77 MMHG | HEART RATE: 75 BPM | SYSTOLIC BLOOD PRESSURE: 103 MMHG | OXYGEN SATURATION: 96 % | HEIGHT: 70 IN | TEMPERATURE: 99.1 F

## 2024-06-01 PROBLEM — Q65.89 FEMORAL ANTEVERSION OF RIGHT LOWER EXTREMITY: Status: ACTIVE | Noted: 2024-06-01

## 2024-06-01 LAB
INR PPP: 1.2 (ref 0.9–1.1)
PROTHROMBIN TIME: 12.2 SEC (ref 9–11.1)

## 2024-06-01 PROCEDURE — 97116 GAIT TRAINING THERAPY: CPT

## 2024-06-01 PROCEDURE — 36415 COLL VENOUS BLD VENIPUNCTURE: CPT

## 2024-06-01 PROCEDURE — 2580000003 HC RX 258: Performed by: NURSE PRACTITIONER

## 2024-06-01 PROCEDURE — 6370000000 HC RX 637 (ALT 250 FOR IP): Performed by: HOSPITALIST

## 2024-06-01 PROCEDURE — 97166 OT EVAL MOD COMPLEX 45 MIN: CPT | Performed by: OCCUPATIONAL THERAPIST

## 2024-06-01 PROCEDURE — 85610 PROTHROMBIN TIME: CPT

## 2024-06-01 PROCEDURE — 97162 PT EVAL MOD COMPLEX 30 MIN: CPT

## 2024-06-01 PROCEDURE — 97535 SELF CARE MNGMENT TRAINING: CPT | Performed by: OCCUPATIONAL THERAPIST

## 2024-06-01 PROCEDURE — 6370000000 HC RX 637 (ALT 250 FOR IP): Performed by: INTERNAL MEDICINE

## 2024-06-01 PROCEDURE — 6370000000 HC RX 637 (ALT 250 FOR IP): Performed by: STUDENT IN AN ORGANIZED HEALTH CARE EDUCATION/TRAINING PROGRAM

## 2024-06-01 RX ORDER — WARFARIN SODIUM 5 MG/1
5 TABLET ORAL
Status: DISCONTINUED | OUTPATIENT
Start: 2024-06-01 | End: 2024-06-01 | Stop reason: HOSPADM

## 2024-06-01 RX ORDER — WARFARIN SODIUM 5 MG/1
5 TABLET ORAL EVERY EVENING
Status: DISCONTINUED | OUTPATIENT
Start: 2024-06-01 | End: 2024-06-01

## 2024-06-01 RX ADMIN — ASPIRIN 81 MG CHEWABLE TABLET 81 MG: 81 TABLET CHEWABLE at 08:39

## 2024-06-01 RX ADMIN — MECLIZINE 12.5 MG: 12.5 TABLET ORAL at 08:41

## 2024-06-01 RX ADMIN — WARFARIN SODIUM 5 MG: 5 TABLET ORAL at 03:40

## 2024-06-01 RX ADMIN — AMLODIPINE BESYLATE 10 MG: 5 TABLET ORAL at 08:38

## 2024-06-01 RX ADMIN — SODIUM CHLORIDE, PRESERVATIVE FREE 5 ML: 5 INJECTION INTRAVENOUS at 12:26

## 2024-06-01 RX ADMIN — LISINOPRIL 40 MG: 40 TABLET ORAL at 08:39

## 2024-06-01 RX ADMIN — METOPROLOL SUCCINATE 12.5 MG: 25 TABLET, FILM COATED, EXTENDED RELEASE ORAL at 08:38

## 2024-06-01 NOTE — DISCHARGE INSTRUCTIONS
General Discharge Instructions    Patient ID:  Kylee Koo  [id number]  84 y.o.  1940    Patient Instructions          The following personal items were collected during your admission and were returned to you.        Take Home Medications     {Medication reconciliation information is now added to the patient's AVS automatically when it is printed.  There is no need to use this SmartLink in discharge instructions.  Highlight this text and delete it to clear this message}      What to do at Home    Recommended diet: regular diet    Recommended activity: activity as tolerated    Follow-up with Aly Rizvi MD  in 3 days.        Information obtained by :  I understand that if any problems occur once I am at home I am to contact my physician.  I understand and acknowledge receipt of the instructions indicated above.                                                                                                                                           Physician's or R.N.'s Signature                                                                  Date/Time                                                                                                                                              Patient or Representative Signature                                                          Date/Time

## 2024-06-01 NOTE — PLAN OF CARE
Problem: Occupational Therapy - Adult  Goal: By Discharge: Performs self-care activities at highest level of function for planned discharge setting.  See evaluation for individualized goals.  Description: FUNCTIONAL STATUS PRIOR TO ADMISSION:  ambulated with rollator walker or SPC in the home and rollator outside of the home, performed ADLS and IADLs on her own with increased time, wears depends, drives, stands to bathe in shower and has shower chair, getting HHPT and nursing services   ,  ,  ,  ,  ,  ,  ,  ,  ,  , Active : Yes     HOME SUPPORT: Patient lived alone but reports that her children will take turns when she returns to home to assist.    Occupational Therapy Goals:  Initiated 6/1/2024  1.  Patient will perform grooming with Modified [place] with rollator within 7 day(s).  2.  Patient will perform lower body dressing with Modified [place] within 7 day(s).  3.  Patient will perform toileting with Modified [place] within 7 day(s).  4.  Patient will perform toilet transfers with Modified [place]  within 7 day(s).    Outcome: Not Progressing  OCCUPATIONAL THERAPY EVALUATION    Patient: Kylee Koo (84 y.o. female)  Date: 6/1/2024  Primary Diagnosis: Admission for planned post-operative wound closure [Z48.1]  Hypercoagulable state (HCC) [D68.59]  Elevated INR [R79.1]         Precautions: Fall Risk                  ASSESSMENT :  The patient is limited by decreased functional mobility, independence in ADLs, high-level IADLs, strength, endurance, proprioception.    Based on the impairments listed above pt presents with functional weakness due to medical set backs and prolonged bedrest.  Pt was cleared for session.  She needed elevated surface and rocking technique to achieve sit to stand and was educated on sitting on higher surfaces with arms at home.  Overall pt needs min assist to achieve standing from standard surface and CGA from higher surface with arms.  She was able to

## 2024-06-01 NOTE — PROGRESS NOTES
Has no complaints. Anxious to go home. Groin site looks dry, soft. INR 1.2. Resume coumadin. Can go home from cardiac stand point.

## 2024-06-01 NOTE — PLAN OF CARE
Problem: Discharge Planning  Goal: Discharge to home or other facility with appropriate resources  Outcome: Progressing     Problem: Chronic Conditions and Co-morbidities  Goal: Patient's chronic conditions and co-morbidity symptoms are monitored and maintained or improved  Outcome: Progressing     Problem: Skin/Tissue Integrity  Goal: Absence of new skin breakdown  Description: 1.  Monitor for areas of redness and/or skin breakdown  2.  Assess vascular access sites hourly  3.  Every 4-6 hours minimum:  Change oxygen saturation probe site  4.  Every 4-6 hours:  If on nasal continuous positive airway pressure, respiratory therapy assess nares and determine need for appliance change or resting period.  Outcome: Progressing     Problem: Safety - Adult  Goal: Free from fall injury  Outcome: Progressing

## 2024-06-01 NOTE — CARE COORDINATION
Pt is cleared for d/c from a CM standpoint.        06/01/24 1309   Services At/After Discharge   Transition of Care Consult (CM Consult) Discharge Planning   Services At/After Discharge Home Health  (MARGOT order sent to [facility])   Mode of Transport at Discharge Other (see comment)  (family)   Condition of Participation: Discharge Planning   The Patient and/or Patient Representative was provided with a Choice of Provider? Patient   The Patient and/Or Patient Representative agree with the Discharge Plan? Yes   Freedom of Choice list was provided with basic dialogue that supports the patient's individualized plan of care/goals, treatment preferences, and shares the quality data associated with the providers?  Yes       CM acknowledged d/c order.  CM met with pt at bedside to discuss.  Pt in agreement.  2IM notice reviewed, signed and copy placed on bedside chart.  MARGOT order previously sent to [facility].  CM will inform them pt is discharging today.  Family at bedside.     Marlene Hutchinson LMSW  Supervisee in Social Work  Care Management, [facility]  [phone]

## 2024-06-01 NOTE — PROGRESS NOTES
2006: TRANSFER - IN REPORT:    Verbal report received from DAVE Mata on Kylee H Hayes  being received from CCU for routine progression of patient care      Report consisted of patient's Situation, Background, Assessment and   Recommendations(SBAR).     Information from the following report(s) Nurse Handoff Report, Index, Adult Overview, Intake/Output, MAR, Recent Results, and Cardiac Rhythm V-Paced  was reviewed with the receiving nurse.    Opportunity for questions and clarification was provided.      Assessment completed upon patient's arrival to unit and care assumed.      2035: pt arrived on unit, VSS. Call bell within reach, pt dtr at bedside.     End of Shift Note    Bedside shift change report given to DAVE Vera (oncoming nurse) by Tiffanie Collins RN (offgoing nurse).  Report included the following information SBAR, Kardex, Intake/Output, MAR, Recent Results, and Cardiac Rhythm V-Paced    Shift worked:  7p-7a     Shift summary and any significant changes:          Concerns for physician to address:       Zone phone for oncoming shift:          Activity:     Number times ambulated in hallways past shift: 0  Number of times OOB to chair past shift: 0    Cardiac:   Cardiac Monitoring: Yes           Access:  Current line(s): PIV     Genitourinary:   Urinary status: voiding and external catheter    Respiratory:      Chronic home O2 use?: NO  Incentive spirometer at bedside: NO       GI:     Current diet:  ADULT DIET; Regular  Passing flatus: YES  Tolerating current diet: YES       Pain Management:   Patient states pain is manageable on current regimen: YES    Skin:     Interventions: increase time out of bed and internal/external urinary devices    Patient Safety:  Fall Score:    Interventions: bed/chair alarm, assistive device (walker, cane. etc), gripper socks, pt to call before getting OOB, and stay with me (per policy)       Length of Stay:  Expected LOS: 4  Actual LOS: 2      Tiffanie Collins,  RN

## 2024-06-01 NOTE — PROGRESS NOTES
Detail Level: Detailed Orders received, chart reviewed and patient evaluated by physical therapy. Pending progression with skilled acute physical therapy, recommend:  Therapy 2x a week in the home, however, may require supervision, cognitive and/or physical assistance due to the following concerns listed below:    Recommend with nursing OOB to chair 3x/day and walking daily with 1 assist and rollator. Thank you for completing as able in order to maintain patient strength, endurance and independence.     Full evaluation to follow.      Quality 137: Melanoma: Continuity Of Care - Recall System: Patient information entered into a recall system that includes: target date for the next exam specified AND a process to follow up with patients regarding missed or unscheduled appointments Quality 110: Preventive Care And Screening: Influenza Immunization: Influenza immunization was not ordered or administered, reason not given

## 2024-06-01 NOTE — PROGRESS NOTES
Pharmacist Daily Dosing of Warfarin    Indication & Goal INR: AFib, INR Goal 2-3    PTA Warfarin Dose: 5 mg daily    Notable concurrent conditions and medications: None    Labs:  Recent Labs     Units 06/01/24  0318 05/31/24  0647 05/30/24 2036 05/30/24 2036 05/30/24 1936   INR   1.2* 1.6*  --   --  2.5*   HGB g/dL  --  10.9*   < > 9.7*  --    PLT K/uL  --  244  --  234  --     < > = values in this interval not displayed.         Impression/Plan:   Warfarin dose given at 0340 this AM  Will order warfarin 5 mg PO x 1 dose this evening.   Daily INR has been ordered  CBC w/o differential every other day has been ordered     Pharmacy will follow daily and adjust the dose as appropriate.    Thank you,  Tucker Yu [facility]      Warfarin Protocol    Located on pharmacy Teams site: Clinical Practice -> Anticoagulation & Cardiology -> Anticoagulation Policies, Protocols, Guidance

## 2024-06-01 NOTE — PROGRESS NOTES
Hospitalist Progress Note    NAME:   Kylee Koo   : 1940   MRN: [id number]     Date/Time: 2024 11:26 AM  Patient PCP: Aly Rizvi MD    Estimated discharge date: 2024  Barriers: Medically stable for discharge    Assessment / Plan:  CHF  A-fib  Last echo in  ejection fraction 50 to 55%, left ventricular diastolic dysfunction with dilated atria  ResumeD Coumadin  Pharmacy for dosing  INR today 1.2  Continue with metoprolol  Cardiology signed off     Generalized weakness  PT OT eval carried out today advised for discharge home     HTN  Resumed antihypertensive medication  Continue now with amlodipine, lisinopril, metoprolol     Dizziness versus lightheadedness  Blood pressure stable  Patient did take meclizine usually  Will resume that as needed    Mild hypokalemia  Supplemented potassium     Medical Decision Making:   I personally reviewed labs: CBC BMP  I personally reviewed imaging: None for today  I personally reviewed EKG: none, telemetry reveals normal sinus rhythm  Toxic drug monitoring: yes  Discussed case with: Patient     NoneCode Status: Full code  DVT Prophylaxis: Warfarin  GI Prophylaxis:      Subjective:     Chief Complaint / Reason for Physician Visit  Patient currently admitted and treated for A-fib.She also had a right groin sheath removed in the hospital.    Labs and chart reviewed patient examined overnight events noted.  Patient appeared to be comfortable no apparent distress.  She wanted to go home.      Objective:     VITALS:   Last 24hrs VS reviewed since prior progress note. Most recent are:  Patient Vitals for the past 24 hrs:   BP Temp Temp src Pulse Resp SpO2 Weight   24 0838 133/86 -- -- -- -- -- --   24 0715 133/86 97.9 °F (36.6 °C) Axillary 75 16 100 % --   24 0247 121/80 97.9 °F (36.6 °C) Oral 77 21 100 % --   24 2308 124/82 97.8 °F (36.6 °C) Oral 75 17 99 % --   248 118/74 98.2 °F (36.8 °C) Oral 75 20 95 % --  05/30/24 1936 05/30/24 2036 05/31/24  0647 06/01/24 0318   NA  --  144 140  --    K  --  3.0* 3.4*  --    CL  --  111* 110*  --    CO2  --  27 22  --    GLUCOSE  --  125* 93  --    BUN  --  9 9  --    CREATININE  --  0.86 0.84  --    CALCIUM  --  9.2 9.6  --    MG  --   --  2.0  --    PHOS  --   --  2.7  --    BILITOT  --  0.8  --   --    AST  --  16  --   --    ALT  --  16  --   --    INR 2.5*  --  1.6* 1.2*       Signed: Anne Fernandez MD

## 2024-06-01 NOTE — PROGRESS NOTES
0700: Bedside shift change report given to DAVE Vera (oncoming nurse) by DAVE Moreno (offgoing nurse). Report included the following information Nurse Handoff Report and Index.     0730: Patient working with PT/OT and in the chair. Up with 1 assist.

## 2024-06-01 NOTE — PLAN OF CARE
Problem: Physical Therapy - Adult  Goal: By Discharge: Performs mobility at highest level of function for planned discharge setting.  See evaluation for individualized goals.  Description: FUNCTIONAL STATUS PRIOR TO ADMISSION: Patient was modified independent using a rollator for functional mobility.    HOME SUPPORT PRIOR TO ADMISSION: The patient lived alone with family to provide assistance.    Physical Therapy Goals  Initiated 6/1/2024  1.  Patient will move from supine to sit and sit to supine in bed with independence within 7 day(s).    2.  Patient will perform sit to stand with modified independence within 7 day(s).  3.  Patient will transfer from bed to chair and chair to bed with modified independence using the least restrictive device within 7 day(s).  4.  Patient will ambulate with modified independence for 75 feet with the least restrictive device within 7 day(s).   5.  Patient will ascend/descend 4 stairs with 1 handrail(s) with modified independence within 7 day(s).    Outcome: Progressing      PHYSICAL THERAPY EVALUATION    Patient: Kylee Koo (84 y.o. female)  Date: 6/1/2024  Primary Diagnosis: Admission for planned post-operative wound closure [Z48.1]  Hypercoagulable state (HCC) [D68.59]  Elevated INR [R79.1]       Precautions: Restrictions/Precautions: Fall Risk                      ASSESSMENT :   DEFICITS/IMPAIRMENTS:   The patient is limited by decreased functional mobility, strength, endurance, balance     Based on the impairments listed above pt is below functional baseline. Pt was received in supine on RA and cleared by nursing to mobilize. Vitals stable during session. She was able to mobilize to the EOB with additional time. Needed the height of the bed raised to stand without assistance. Ambulated short distance around the room, no loss of balance noted. She fatigued and was left sitting up.     Patient will benefit from skilled intervention to address the above

## 2024-06-03 ENCOUNTER — HOSPITAL ENCOUNTER (OUTPATIENT)
Facility: HOSPITAL | Age: 84
Discharge: HOME | End: 2024-06-03
Attending: SURGERY
Payer: MEDICARE

## 2024-06-03 ENCOUNTER — CLINICAL DOCUMENTATION (OUTPATIENT)
Facility: HOSPITAL | Age: 84
End: 2024-06-03

## 2024-06-03 VITALS
HEART RATE: 75 BPM | DIASTOLIC BLOOD PRESSURE: 61 MMHG | SYSTOLIC BLOOD PRESSURE: 109 MMHG | RESPIRATION RATE: 18 BRPM | TEMPERATURE: 97.7 F

## 2024-06-03 DIAGNOSIS — L97.912 NON-PRESSURE CHRONIC ULCER OF RIGHT LOWER LEG WITH FAT LAYER EXPOSED (HCC): Primary | ICD-10-CM

## 2024-06-03 PROCEDURE — 99213 OFFICE O/P EST LOW 20 MIN: CPT | Performed by: SURGERY

## 2024-06-03 PROCEDURE — 99213 OFFICE O/P EST LOW 20 MIN: CPT

## 2024-06-03 RX ORDER — GINSENG 100 MG
CAPSULE ORAL ONCE
OUTPATIENT
Start: 2024-06-03 | End: 2024-06-03

## 2024-06-03 RX ORDER — LIDOCAINE HYDROCHLORIDE 20 MG/ML
JELLY TOPICAL ONCE
OUTPATIENT
Start: 2024-06-03 | End: 2024-06-03

## 2024-06-03 RX ORDER — LIDOCAINE 50 MG/G
OINTMENT TOPICAL ONCE
OUTPATIENT
Start: 2024-06-03 | End: 2024-06-03

## 2024-06-03 RX ORDER — BACITRACIN ZINC AND POLYMYXIN B SULFATE 500; 1000 [USP'U]/G; [USP'U]/G
OINTMENT TOPICAL ONCE
OUTPATIENT
Start: 2024-06-03 | End: 2024-06-03

## 2024-06-03 RX ORDER — TRIAMCINOLONE ACETONIDE 1 MG/G
OINTMENT TOPICAL ONCE
OUTPATIENT
Start: 2024-06-03 | End: 2024-06-03

## 2024-06-03 RX ORDER — BETAMETHASONE DIPROPIONATE 0.5 MG/G
CREAM TOPICAL ONCE
OUTPATIENT
Start: 2024-06-03 | End: 2024-06-03

## 2024-06-03 RX ORDER — LIDOCAINE HYDROCHLORIDE 40 MG/ML
SOLUTION TOPICAL ONCE
OUTPATIENT
Start: 2024-06-03 | End: 2024-06-03

## 2024-06-03 RX ORDER — CLOBETASOL PROPIONATE 0.5 MG/G
OINTMENT TOPICAL ONCE
OUTPATIENT
Start: 2024-06-03 | End: 2024-06-03

## 2024-06-03 RX ORDER — SODIUM CHLOR/HYPOCHLOROUS ACID 0.033 %
SOLUTION, IRRIGATION IRRIGATION ONCE
OUTPATIENT
Start: 2024-06-03 | End: 2024-06-03

## 2024-06-03 RX ORDER — GENTAMICIN SULFATE 1 MG/G
OINTMENT TOPICAL ONCE
OUTPATIENT
Start: 2024-06-03 | End: 2024-06-03

## 2024-06-03 RX ORDER — IBUPROFEN 200 MG
TABLET ORAL ONCE
OUTPATIENT
Start: 2024-06-03 | End: 2024-06-03

## 2024-06-03 RX ORDER — LIDOCAINE 40 MG/G
CREAM TOPICAL ONCE
OUTPATIENT
Start: 2024-06-03 | End: 2024-06-03

## 2024-06-03 ASSESSMENT — PAIN SCALES - GENERAL: PAINLEVEL_OUTOF10: 4

## 2024-06-03 ASSESSMENT — PAIN DESCRIPTION - DESCRIPTORS: DESCRIPTORS: BURNING

## 2024-06-03 ASSESSMENT — PAIN DESCRIPTION - LOCATION: LOCATION: LEG

## 2024-06-03 ASSESSMENT — PAIN DESCRIPTION - ORIENTATION: ORIENTATION: LEFT;RIGHT

## 2024-06-03 ASSESSMENT — PAIN DESCRIPTION - PAIN TYPE: TYPE: CHRONIC PAIN

## 2024-06-03 NOTE — PROGRESS NOTES
The patient is an 83-year-old woman who is referred to the wound care center regarding ulcerations on both lower legs.     The patient was first seen in the wound care center on 2/5/2024.     The patient reported that the wounds had been present for about a month prior to her first clinic visit.  They had watery drainage.  She does confirm chronic swelling of the legs.  She thinks they may have gotten more swollen recently prior to her first clinic visit.     The patient's medication list includes furosemide 40 mg daily.  Notes have been sent to the patient's primary provider asking if her diuretic dose could be increased.  As of 4/19/2024, the patient was taking furosemide 40 mg twice per day.  As of 5/13/2024, the patient is taking furosemide once per day.     The patient sleeps lying in bed at night with multiple pillows.  She does get short of breath if she lies down flat.  The patient reports she had been drinking 3 water bottles per day plus additional fluids.  She is working on reducing fluid intake.       As of 3/29/2024, the patient reported that she had been eating a lot of takeout food, which by her description sounded to have a very high salt content.  She is working to reduce salt intake in her diet.     The patient denies history of diabetes mellitus.  She does not describe anginal symptoms but does have history of chronic systolic congestive heart failure.  She has history of permanent biventricular pacemaker and AV node ablation for A-fib.  Medications include Coumadin.  The patient is ambulatory.  She does not describe shortness of breath with ambulation.     Past medical history includes cervical radiculopathy, reflux esophagitis, multiple myeloma, hypertension, CKD 3.     The patient was seen at [facility] on 4/8/2024. 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 The cardiologist is reported on 5/13/2024 to have performed angiography and

## 2024-06-03 NOTE — PROGRESS NOTES
Wound care    The patient had angiography performed by  on 5/30/2024.  He used right femoral artery puncture and performed angioplasty of the left peroneal artery.  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  I discussed the findings with him.  He felt that the patient likely would be able to tolerate compression now on the left lower extremity with respect to now having improved arterial inflow.  He felt the patient potentially could have angioplasty on the right if needed.  The patient was hospitalized for several days following the procedure related to removal of the percutaneous sheath and treatment of atrial fibrillation.  She was discharged home on 6/1/2024.

## 2024-06-03 NOTE — DISCHARGE INSTRUCTIONS
Discharge Instructions/Wound Care Orders  [facility]   [address]   [address], [address]  [place] [address]   Telephone: [phone]     FAX [phone]    NAME:  Kylee Koo  YOB: 1940  MEDICAL RECORD NUMBER:  [id number]  DATE:  6/3/2024     CPT code: E&M-Level 3 (66392)    Wound Care Orders:  Bilateral lower leg wounds :Cleanse with soap and water, apply primary dressing Silver Alginate  cover with secondary dressing ABD and Gauze.  Apply Double tubi   Pt./pcg/HH nurse to change (freq) 3x Weekly  and as needed for compromise.F/U in 2 week.   [facility] Health Agency: [facility]  Treatment Orders:   Elevate leg(s) above the level of the heart when sitting, if swelling present.  Avoid prolonged standing in one place.  Wear off-loading shoe/boot on the affected foot when walking.  Do not get dressing/cast wet.  Do not use objects to scratch inside cast/wrap.   Follow diet as prescribed:  [] Diet as tolerated: [] Diabetic Diet: Low carb no sugar [] No Added Salt:  [] Increase Protein: [] Other:Limit the amount of liquid you are drinking and avoid drinking in between meals             Follow-up:  [x] Return Appointment: With Dr. Love in  2 Week(s)  [] Ordered tests:    Electronically signed LEONIDAS VAZQUEZ RN on 6/3/2024 at 9:40 AM     Wound Care Center Information: Should you experience any significant changes in your wound(s) or have questions about your wound care, please contact the [facility] at MONDAY - FRIDAY 8:00 am - 4:30.  If you need help with your wound outside these hours and cannot wait until we are again available, contact your PCP or go to the hospital emergency room.     PLEASE NOTE: IF YOU ARE UNABLE TO OBTAIN WOUND SUPPLIES, CONTINUE TO USE THE SUPPLIES YOU HAVE AVAILABLE UNTIL YOU ARE ABLE TO REACH US. IT IS MOST IMPORTANT TO KEEP THE WOUND COVERED AT ALL TIMES.     Physician Signature:_______________________    Date: ___________ Time:

## 2024-06-03 NOTE — FLOWSHEET NOTE
06/03/24 0921   Right Leg Edema Point of Measurement   Leg circumference 40.8 cm   Ankle circumference 23.8 cm   Left Leg Edema Point of Measurement   Leg circumference 41.2 cm   Ankle circumference 24.9 cm   RLE Neurovascular Assessment   Capillary Refill Less than/Equal to 3 seconds   Color Appropriate for Ethnicity   Temperature Warm   RLE Sensation  Full sensation   R Femoral Pulse +2 (Moderate)   LLE Neurovascular Assessment   Capillary Refill Less than/Equal to 3 seconds   Color Appropriate for Ethnicity   Temperature Warm   LLE Sensation  Full sensation   L Pedal Pulse +2   [REMOVED] Wound 04/19/24 Ankle Posterior;Right   Final Assessment Date/Final Assessment Time: 06/03/24 0930  Date First Assessed/Time First Assessed: 04/19/24 0828   Present on Original Admission: No  Wound Approximate Age at First Assessment (Weeks): 1 weeks  Location: Ankle  Wound Location Orienta...   Wound Image    Wound 02/05/24 Leg Lateral;Lower;Right #2   Date First Assessed/Time First Assessed: 02/05/24 0851   Present on Original Admission: Yes  Wound Approximate Age at First Assessment (Weeks): 4 weeks  Primary Wound Type: Venous Ulcer  Location: Leg  Wound Location Orientation: Lateral;Lower;Right  ...   Wound Image    Wound Etiology Venous   Dressing Status Old drainage noted   Wound Cleansed Soap and water   Wound Length (cm) 4.4 cm   Wound Width (cm) 8 cm   Wound Depth (cm) 0.3 cm   Wound Surface Area (cm^2) 35.2 cm^2   Change in Wound Size % (l*w) -166.67   Wound Volume (cm^3) 10.56 cm^3   Wound Healing % -700   Wound Assessment [place]/red;Slough   Drainage Amount Moderate (25-50%)   Drainage Description Serosanguinous   Odor None   Allison-wound Assessment Fragile   Margins Defined edges   Wound Thickness Description not for Pressure Injury Full thickness   Wound 02/05/24 Leg Anterior;Left #3   Date First Assessed/Time First Assessed: 02/05/24 0853   Present on Original Admission: Yes  Wound Approximate Age at First Assessment

## 2024-06-06 ENCOUNTER — OFFICE VISIT (OUTPATIENT)
Facility: CLINIC | Age: 84
End: 2024-06-06

## 2024-06-06 VITALS
HEIGHT: 70 IN | SYSTOLIC BLOOD PRESSURE: 117 MMHG | OXYGEN SATURATION: 95 % | TEMPERATURE: 98 F | WEIGHT: 221.4 LBS | BODY MASS INDEX: 31.7 KG/M2 | DIASTOLIC BLOOD PRESSURE: 75 MMHG | HEART RATE: 75 BPM

## 2024-06-06 DIAGNOSIS — C90.01 MULTIPLE MYELOMA IN REMISSION (HCC): ICD-10-CM

## 2024-06-06 DIAGNOSIS — I48.21 PERMANENT ATRIAL FIBRILLATION (HCC): ICD-10-CM

## 2024-06-06 DIAGNOSIS — L97.922 NON-PRESSURE CHRONIC ULCER OF LEFT LOWER LEG WITH FAT LAYER EXPOSED (HCC): ICD-10-CM

## 2024-06-06 DIAGNOSIS — Z79.01 ENCOUNTER FOR MONITORING COUMADIN THERAPY: Primary | ICD-10-CM

## 2024-06-06 DIAGNOSIS — I50.32 CHRONIC DIASTOLIC CONGESTIVE HEART FAILURE (HCC): ICD-10-CM

## 2024-06-06 DIAGNOSIS — Z51.81 ENCOUNTER FOR MONITORING COUMADIN THERAPY: Primary | ICD-10-CM

## 2024-06-06 DIAGNOSIS — L97.912 NON-PRESSURE CHRONIC ULCER OF RIGHT LOWER LEG WITH FAT LAYER EXPOSED (HCC): ICD-10-CM

## 2024-06-06 LAB
POC INR: 1.5
PROTHROMBIN TIME, POC: 18.2

## 2024-06-06 ASSESSMENT — PATIENT HEALTH QUESTIONNAIRE - PHQ9
SUM OF ALL RESPONSES TO PHQ QUESTIONS 1-9: 0
SUM OF ALL RESPONSES TO PHQ9 QUESTIONS 1 & 2: 0
1. LITTLE INTEREST OR PLEASURE IN DOING THINGS: NOT AT ALL
2. FEELING DOWN, DEPRESSED OR HOPELESS: NOT AT ALL
SUM OF ALL RESPONSES TO PHQ QUESTIONS 1-9: 0

## 2024-06-06 NOTE — PROGRESS NOTES
Chief Complaint   Patient presents with    Follow-Up from Hospital     [facility] for wound closure     \"Have you been to the ER, urgent care clinic since your last visit?  Hospitalized since your last visit?\"    YES - When: approximately 3 days ago.  Where and Why: for wound closure.    “Have you seen or consulted any other health care providers outside of [facility] since your last visit?”    NO            Click Here for Release of Records Request

## 2024-06-17 ENCOUNTER — HOSPITAL ENCOUNTER (OUTPATIENT)
Facility: HOSPITAL | Age: 84
Discharge: HOME | End: 2024-06-17
Attending: SURGERY
Payer: MEDICARE

## 2024-06-17 VITALS
TEMPERATURE: 97.3 F | SYSTOLIC BLOOD PRESSURE: 158 MMHG | RESPIRATION RATE: 18 BRPM | DIASTOLIC BLOOD PRESSURE: 75 MMHG | HEART RATE: 74 BPM

## 2024-06-17 DIAGNOSIS — L97.912 NON-PRESSURE CHRONIC ULCER OF RIGHT LOWER LEG WITH FAT LAYER EXPOSED (HCC): Primary | ICD-10-CM

## 2024-06-17 PROCEDURE — 99213 OFFICE O/P EST LOW 20 MIN: CPT | Performed by: SURGERY

## 2024-06-17 PROCEDURE — 99213 OFFICE O/P EST LOW 20 MIN: CPT

## 2024-06-17 RX ORDER — LIDOCAINE 40 MG/G
CREAM TOPICAL ONCE
OUTPATIENT
Start: 2024-06-17 | End: 2024-06-17

## 2024-06-17 RX ORDER — TRIAMCINOLONE ACETONIDE 1 MG/G
OINTMENT TOPICAL ONCE
OUTPATIENT
Start: 2024-06-17 | End: 2024-06-17

## 2024-06-17 RX ORDER — LIDOCAINE HYDROCHLORIDE 20 MG/ML
JELLY TOPICAL ONCE
OUTPATIENT
Start: 2024-06-17 | End: 2024-06-17

## 2024-06-17 RX ORDER — BACITRACIN ZINC AND POLYMYXIN B SULFATE 500; 1000 [USP'U]/G; [USP'U]/G
OINTMENT TOPICAL ONCE
OUTPATIENT
Start: 2024-06-17 | End: 2024-06-17

## 2024-06-17 RX ORDER — GENTAMICIN SULFATE 1 MG/G
OINTMENT TOPICAL ONCE
OUTPATIENT
Start: 2024-06-17 | End: 2024-06-17

## 2024-06-17 RX ORDER — GINSENG 100 MG
CAPSULE ORAL ONCE
OUTPATIENT
Start: 2024-06-17 | End: 2024-06-17

## 2024-06-17 RX ORDER — LIDOCAINE HYDROCHLORIDE 40 MG/ML
SOLUTION TOPICAL ONCE
OUTPATIENT
Start: 2024-06-17 | End: 2024-06-17

## 2024-06-17 RX ORDER — SODIUM CHLOR/HYPOCHLOROUS ACID 0.033 %
SOLUTION, IRRIGATION IRRIGATION ONCE
OUTPATIENT
Start: 2024-06-17 | End: 2024-06-17

## 2024-06-17 RX ORDER — CLOBETASOL PROPIONATE 0.5 MG/G
OINTMENT TOPICAL ONCE
OUTPATIENT
Start: 2024-06-17 | End: 2024-06-17

## 2024-06-17 RX ORDER — IBUPROFEN 200 MG
TABLET ORAL ONCE
OUTPATIENT
Start: 2024-06-17 | End: 2024-06-17

## 2024-06-17 RX ORDER — LIDOCAINE 50 MG/G
OINTMENT TOPICAL ONCE
OUTPATIENT
Start: 2024-06-17 | End: 2024-06-17

## 2024-06-17 RX ORDER — BETAMETHASONE DIPROPIONATE 0.5 MG/G
CREAM TOPICAL ONCE
OUTPATIENT
Start: 2024-06-17 | End: 2024-06-17

## 2024-06-17 ASSESSMENT — PAIN SCALES - GENERAL: PAINLEVEL_OUTOF10: 4

## 2024-06-17 NOTE — FLOWSHEET NOTE
06/17/24 0902   Right Leg Edema Point of Measurement   Leg circumference 41.9 cm   Ankle circumference 27.1 cm   Compression Therapy Tubular elastic support bandage   Tubular Elastic Support Bandage Compression Pressure Medium  (double F)   Left Leg Edema Point of Measurement   Leg circumference 41.5 cm   Ankle circumference 25.4 cm   Compression Therapy Tubular elastic support bandage   Tubular Elastic Support Bandage Compression Pressure Medium  (double F)   RLE Neurovascular Assessment   Capillary Refill Less than/Equal to 3 seconds   Color Appropriate for Ethnicity   Temperature Warm   RLE Sensation  Full sensation   R Femoral Pulse +2 (Moderate)   LLE Neurovascular Assessment   Capillary Refill Less than/Equal to 3 seconds   Color Appropriate for Ethnicity   Temperature Warm   LLE Sensation  Full sensation   L Pedal Pulse +2   Wound 02/05/24 Leg Lateral;Lower;Right #2   Date First Assessed/Time First Assessed: 02/05/24 0851   Present on Original Admission: Yes  Wound Approximate Age at First Assessment (Weeks): 4 weeks  Primary Wound Type: Venous Ulcer  Location: Leg  Wound Location Orientation: Lateral;Lower;Right  ...   Wound Image    (left leg,)   Wound Etiology Venous   Dressing Status Old drainage noted   Wound Cleansed Soap and water   Wound Length (cm) 3.5 cm   Wound Width (cm) 3.6 cm   Wound Depth (cm) 0.1 cm   Wound Surface Area (cm^2) 12.6 cm^2   Change in Wound Size % (l*w) 4.55   Wound Volume (cm^3) 1.26 cm^3   Wound Healing % 5   Wound Assessment Pink/red;Slough;Hyper granulation tissue   Drainage Amount Moderate (25-50%)   Drainage Description Serosanguinous   Odor None   Allison-wound Assessment Fragile   Margins Defined edges   Wound Thickness Description not for Pressure Injury Full thickness   Wound 02/05/24 Leg Anterior;Left #3   Date First Assessed/Time First Assessed: 02/05/24 0853   Present on Original Admission: Yes  Wound Approximate Age at First Assessment (Weeks): 4 weeks  Primary

## 2024-06-17 NOTE — PROGRESS NOTES
The patient is an 83-year-old woman who is referred to the wound care center regarding ulcerations on both lower legs.     The patient was first seen in the wound care center on 2/5/2024.     The patient reported that the wounds had been present for about a month prior to her first clinic visit.  They had watery drainage.  She does confirm chronic swelling of the legs.  She thinks they may have gotten more swollen recently prior to her first clinic visit.     The patient's medication list includes furosemide 40 mg daily.  Notes have been sent to the patient's primary provider asking if her diuretic dose could be increased.  As of 4/19/2024, the patient was taking furosemide 40 mg twice per day.  As of 5/13/2024, the patient is taking furosemide once per day.     The patient sleeps lying in bed at night with multiple pillows.  She does get short of breath if she lies down flat.  The patient reports she had been drinking 3 water bottles per day plus additional fluids.  She is working on reducing fluid intake.       As of 3/29/2024, the patient reported that she had been eating a lot of takeout food, which by her description sounded to have a very high salt content.  She is working to reduce salt intake in her diet.     The patient denies history of diabetes mellitus.  She does not describe anginal symptoms but does have history of chronic systolic congestive heart failure.  She has history of permanent biventricular pacemaker and AV node ablation for A-fib.  Medications include Coumadin.  The patient is ambulatory.  She does not describe shortness of breath with ambulation.     Past medical history includes cervical radiculopathy, reflux esophagitis, multiple myeloma, hypertension, CKD 3.     The patient was seen at [facility] on 4/8/2024. 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 The cardiologist is reported on 5/13/2024 to have performed angiography and

## 2024-07-01 ENCOUNTER — HOSPITAL ENCOUNTER (OUTPATIENT)
Facility: HOSPITAL | Age: 84
Discharge: HOME | End: 2024-07-01
Attending: SURGERY
Payer: MEDICARE

## 2024-07-01 VITALS
TEMPERATURE: 97.5 F | HEART RATE: 75 BPM | SYSTOLIC BLOOD PRESSURE: 143 MMHG | DIASTOLIC BLOOD PRESSURE: 93 MMHG | RESPIRATION RATE: 18 BRPM

## 2024-07-01 DIAGNOSIS — L97.912 NON-PRESSURE CHRONIC ULCER OF RIGHT LOWER LEG WITH FAT LAYER EXPOSED (HCC): Primary | ICD-10-CM

## 2024-07-01 PROCEDURE — 99213 OFFICE O/P EST LOW 20 MIN: CPT

## 2024-07-01 PROCEDURE — 99213 OFFICE O/P EST LOW 20 MIN: CPT | Performed by: SURGERY

## 2024-07-01 RX ORDER — LIDOCAINE HYDROCHLORIDE 20 MG/ML
JELLY TOPICAL ONCE
OUTPATIENT
Start: 2024-07-01 | End: 2024-07-01

## 2024-07-01 RX ORDER — LIDOCAINE HYDROCHLORIDE 40 MG/ML
SOLUTION TOPICAL ONCE
OUTPATIENT
Start: 2024-07-01 | End: 2024-07-01

## 2024-07-01 RX ORDER — TRIAMCINOLONE ACETONIDE 1 MG/G
OINTMENT TOPICAL ONCE
OUTPATIENT
Start: 2024-07-01 | End: 2024-07-01

## 2024-07-01 RX ORDER — IBUPROFEN 200 MG
TABLET ORAL ONCE
OUTPATIENT
Start: 2024-07-01 | End: 2024-07-01

## 2024-07-01 RX ORDER — BACITRACIN ZINC AND POLYMYXIN B SULFATE 500; 1000 [USP'U]/G; [USP'U]/G
OINTMENT TOPICAL ONCE
OUTPATIENT
Start: 2024-07-01 | End: 2024-07-01

## 2024-07-01 RX ORDER — LIDOCAINE 40 MG/G
CREAM TOPICAL ONCE
OUTPATIENT
Start: 2024-07-01 | End: 2024-07-01

## 2024-07-01 RX ORDER — LIDOCAINE 50 MG/G
OINTMENT TOPICAL ONCE
OUTPATIENT
Start: 2024-07-01 | End: 2024-07-01

## 2024-07-01 RX ORDER — BETAMETHASONE DIPROPIONATE 0.5 MG/G
CREAM TOPICAL ONCE
OUTPATIENT
Start: 2024-07-01 | End: 2024-07-01

## 2024-07-01 RX ORDER — GENTAMICIN SULFATE 1 MG/G
OINTMENT TOPICAL ONCE
OUTPATIENT
Start: 2024-07-01 | End: 2024-07-01

## 2024-07-01 RX ORDER — GINSENG 100 MG
CAPSULE ORAL ONCE
OUTPATIENT
Start: 2024-07-01 | End: 2024-07-01

## 2024-07-01 RX ORDER — CLOBETASOL PROPIONATE 0.5 MG/G
OINTMENT TOPICAL ONCE
OUTPATIENT
Start: 2024-07-01 | End: 2024-07-01

## 2024-07-01 RX ORDER — SODIUM CHLOR/HYPOCHLOROUS ACID 0.033 %
SOLUTION, IRRIGATION IRRIGATION ONCE
OUTPATIENT
Start: 2024-07-01 | End: 2024-07-01

## 2024-07-01 NOTE — PROGRESS NOTES
the lateral aspect of right lower leg there was a cluster of wounds 4 x 2 x 0.1 cm dimension with   granulation (90%) and mild slough.  The surrounding skin does not appear inflamed.     Examination of the left lower extremity revealed 1-2+ left dorsalis pedis pulse.  I did not palpate posterior tibial pulse.  There was a strong biphasic left dorsalis pedis Doppler signal.  There was no edema in the left thigh.  The patient has trace pitting edema below the knee.   On the left anterior lower leg there is a wound for about 5 x 7.5 x 0.1 cm dimension with granulation (80%) and mild slough.  The surrounding skin does not appear inflamed.     Mechanical debridement of the wound was performed by abrasion with dry gauze.                    Impression:     The patient has wounds on right and left lower legs associated with  pitting lower leg edema.     Thigh edema has resolved on both sides.  Edema in the lower legs is decreased.  She is taking furosemide 40 mg once per day.  She appears to be doing better in terms of limiting salt intake.    The patient has PAD and left lower extremity.  Arterial perfusion has greatly improved after arterial angioplasty.     Wound on right lower leg is improved, not at goal (not healed).    Wound on left lower legs improved, not at goal (not healed).     Posterior right Achilles ulcer is healed.           Plan:     I have recommended the patient and her daughter be very vigilant to avoid any pressure against the area at the mid posterior right Achilles region     I have recommended the patient limit total fluid intake from all sources to 2 quarts per day.     I have recommended the patient limit sodium intake to 2000 mg/day.  Common foods high in salt were reviewed with the patient and with her daughter.  I do not think there will be any takeout foods that will be low enough in salt to be appropriate for the patient.  I talked with the patient's daughter about family members cooking her

## 2024-07-01 NOTE — DISCHARGE INSTRUCTIONS
SUPPLIES YOU HAVE AVAILABLE UNTIL YOU ARE ABLE TO REACH US. IT IS MOST IMPORTANT TO KEEP THE WOUND COVERED AT ALL TIMES.     Physician Signature:_______________________    Date: ___________ Time:  ____________

## 2024-07-08 ENCOUNTER — OFFICE VISIT (OUTPATIENT)
Facility: CLINIC | Age: 84
End: 2024-07-08
Payer: MEDICARE

## 2024-07-08 VITALS
TEMPERATURE: 98.1 F | WEIGHT: 223.2 LBS | SYSTOLIC BLOOD PRESSURE: 137 MMHG | OXYGEN SATURATION: 97 % | HEART RATE: 71 BPM | HEIGHT: 70 IN | RESPIRATION RATE: 16 BRPM | DIASTOLIC BLOOD PRESSURE: 85 MMHG | BODY MASS INDEX: 31.95 KG/M2

## 2024-07-08 DIAGNOSIS — I50.32 CHRONIC DIASTOLIC CONGESTIVE HEART FAILURE (HCC): ICD-10-CM

## 2024-07-08 DIAGNOSIS — Z79.01 ENCOUNTER FOR MONITORING COUMADIN THERAPY: Primary | ICD-10-CM

## 2024-07-08 DIAGNOSIS — I87.339 STASIS DERMATITIS WITH VENOUS ULCER OF LOWER EXTREMITY DUE TO CHRONIC PERIPHERAL VENOUS HYPERTENSION (HCC): ICD-10-CM

## 2024-07-08 DIAGNOSIS — I87.2 VENOUS INSUFFICIENCY: ICD-10-CM

## 2024-07-08 DIAGNOSIS — L97.912 NON-PRESSURE CHRONIC ULCER OF RIGHT LOWER LEG WITH FAT LAYER EXPOSED (HCC): ICD-10-CM

## 2024-07-08 DIAGNOSIS — I10 PRIMARY HYPERTENSION: ICD-10-CM

## 2024-07-08 DIAGNOSIS — L97.922 NON-PRESSURE CHRONIC ULCER OF LEFT LOWER LEG WITH FAT LAYER EXPOSED (HCC): ICD-10-CM

## 2024-07-08 DIAGNOSIS — Z51.81 ENCOUNTER FOR MONITORING COUMADIN THERAPY: Primary | ICD-10-CM

## 2024-07-08 LAB
POC INR: 3.8
PROTHROMBIN TIME, POC: 45.5

## 2024-07-08 PROCEDURE — G8417 CALC BMI ABV UP PARAM F/U: HCPCS | Performed by: INTERNAL MEDICINE

## 2024-07-08 PROCEDURE — 1090F PRES/ABSN URINE INCON ASSESS: CPT | Performed by: INTERNAL MEDICINE

## 2024-07-08 PROCEDURE — 1123F ACP DISCUSS/DSCN MKR DOCD: CPT | Performed by: INTERNAL MEDICINE

## 2024-07-08 PROCEDURE — 85610 PROTHROMBIN TIME: CPT | Performed by: INTERNAL MEDICINE

## 2024-07-08 PROCEDURE — G8400 PT W/DXA NO RESULTS DOC: HCPCS | Performed by: INTERNAL MEDICINE

## 2024-07-08 PROCEDURE — 99214 OFFICE O/P EST MOD 30 MIN: CPT | Performed by: INTERNAL MEDICINE

## 2024-07-08 PROCEDURE — 3075F SYST BP GE 130 - 139MM HG: CPT | Performed by: INTERNAL MEDICINE

## 2024-07-08 PROCEDURE — 3079F DIAST BP 80-89 MM HG: CPT | Performed by: INTERNAL MEDICINE

## 2024-07-08 PROCEDURE — G8427 DOCREV CUR MEDS BY ELIG CLIN: HCPCS | Performed by: INTERNAL MEDICINE

## 2024-07-08 PROCEDURE — 1036F TOBACCO NON-USER: CPT | Performed by: INTERNAL MEDICINE

## 2024-07-08 SDOH — ECONOMIC STABILITY: FOOD INSECURITY: WITHIN THE PAST 12 MONTHS, YOU WORRIED THAT YOUR FOOD WOULD RUN OUT BEFORE YOU GOT MONEY TO BUY MORE.: NEVER TRUE

## 2024-07-08 SDOH — ECONOMIC STABILITY: INCOME INSECURITY: HOW HARD IS IT FOR YOU TO PAY FOR THE VERY BASICS LIKE FOOD, HOUSING, MEDICAL CARE, AND HEATING?: NOT HARD AT ALL

## 2024-07-08 SDOH — ECONOMIC STABILITY: FOOD INSECURITY: WITHIN THE PAST 12 MONTHS, THE FOOD YOU BOUGHT JUST DIDN'T LAST AND YOU DIDN'T HAVE MONEY TO GET MORE.: NEVER TRUE

## 2024-07-08 ASSESSMENT — ANXIETY QUESTIONNAIRES
5. BEING SO RESTLESS THAT IT IS HARD TO SIT STILL: NOT AT ALL
GAD7 TOTAL SCORE: 0
4. TROUBLE RELAXING: NOT AT ALL
6. BECOMING EASILY ANNOYED OR IRRITABLE: NOT AT ALL
3. WORRYING TOO MUCH ABOUT DIFFERENT THINGS: NOT AT ALL
IF YOU CHECKED OFF ANY PROBLEMS ON THIS QUESTIONNAIRE, HOW DIFFICULT HAVE THESE PROBLEMS MADE IT FOR YOU TO DO YOUR WORK, TAKE CARE OF THINGS AT HOME, OR GET ALONG WITH OTHER PEOPLE: NOT DIFFICULT AT ALL
1. FEELING NERVOUS, ANXIOUS, OR ON EDGE: NOT AT ALL
2. NOT BEING ABLE TO STOP OR CONTROL WORRYING: NOT AT ALL
7. FEELING AFRAID AS IF SOMETHING AWFUL MIGHT HAPPEN: NOT AT ALL

## 2024-07-08 ASSESSMENT — PATIENT HEALTH QUESTIONNAIRE - PHQ9
SUM OF ALL RESPONSES TO PHQ QUESTIONS 1-9: 0
2. FEELING DOWN, DEPRESSED OR HOPELESS: NOT AT ALL
1. LITTLE INTEREST OR PLEASURE IN DOING THINGS: NOT AT ALL
SUM OF ALL RESPONSES TO PHQ9 QUESTIONS 1 & 2: 0
SUM OF ALL RESPONSES TO PHQ QUESTIONS 1-9: 0

## 2024-07-08 NOTE — PROGRESS NOTES
Chief Complaint   Patient presents with    Follow-up    Hypertension       Patient states  \"Monday- Friday 5 mg and Saturday and Sunday 7.5 mg.\"     Results for orders placed or performed in visit on 07/08/24   AMB POC PT/INR   Result Value Ref Range    Prothrombin time, POC 45.5     POC INR 3.8              Reviewed results with Dr. Sarwat Rizvi and he advised patient accordingly.   \"Have you been to the ER, urgent care clinic since your last visit?  Hospitalized since your last visit?\"    NO    “Have you seen or consulted any other health care providers outside of [facility] since your last visit?”    NO            Click Here for Release of Records Request

## 2024-07-17 NOTE — PROGRESS NOTES
1. The patient's INR is slightly elevated at 3.8.  She will hold it for two days and resume the current dose.  2. She will continue to follow up with cardiology as relates to venous insufficiency.  3. She also goes to wound care for ulcers of both lower extremities related to chronic venous stasis dermatitis apparently.  4. Her congestive heart failure is well compensated currently.  5. Blood pressure is excellent and no adjustments are made.

## 2024-07-17 NOTE — PROGRESS NOTES
[facility]  [address] [place] [address] [address]  [facility] [address]  Phone:  [phone]  Fax: [phone]       Chief Complaint   Patient presents with    Follow-up    Hypertension   .      SUBJECTIVE:    Kylee Koo is a 84 y.o. female  Dictation on: 07/16/2024 10:19 PM by: SAMIRA NEGRETE [[id number]]          Current Outpatient Medications   Medication Sig Dispense Refill    acetaminophen (TYLENOL) 325 MG tablet take 3 tablet by mouth three times a day      potassium chloride (KLOR-CON) 10 MEQ extended release tablet Take 1 tablet by mouth daily 60 tablet 3    metoprolol succinate (TOPROL XL) 25 MG extended release tablet take 1/2 tablet by mouth once daily 90 tablet 3    furosemide (LASIX) 40 MG tablet take 1 tablet by mouth once daily (Patient taking differently: Take 1 tablet by mouth 2 times daily Patient stated that she is only taking once a day) 90 tablet 3    lisinopril (PRINIVIL;ZESTRIL) 40 MG tablet take 1 tablet by mouth once daily 90 tablet 3    amLODIPine (NORVASC) 10 MG tablet take 1 tablet by mouth once daily 90 tablet 3    warfarin (COUMADIN) 5 MG tablet Take 1 tablet by mouth daily 1 tablet Monday through Friday, 1.5 tablets (7.5 mg) every Saturday & Sunday.       No current facility-administered medications for this visit.     Past Medical History:   Diagnosis Date    Acute combined systolic and diastolic HF (heart failure), NYHA class 2 (HCC) 08/11/2017    Arthritis     Atrial fibrillation (HCC)     Cancer (HCC)     multiple myeloma    Chronic renal disease, stage III (HCC) 06/23/2022    Congestive heart failure (HCC)     Hypertension     Long term current use of anticoagulant therapy     Menopause     Pacemaker     S/P AV brenda ablation 08/11/2017    Status post biventricular pacemaker 08/11/2017 8/11/17      Past Surgical History:   Procedure Laterality Date    CT BONE MARROW BIOPSY  9/28/2023    CT BONE MARROW BIOPSY 9/28/2023 [facility] RAD CT    GYN      ORTHOPEDIC

## 2024-07-17 NOTE — PROGRESS NOTES
Comes in for return visit stating tat she has done reasonably well. She is accompanied by her daughter.    The procedures being done by cardiology appear to have made a difference in that there is less swelling of her extremities.    The edema of her lower extremities is related to chronic venous insufficiency, which she has had for decades.    One complicating feature that I do not think is of clinical significance is her history of congestive heart failure secondary to preserved systolic function for which she is taking Furosemide.  This has been enough to eliminate the contribution by the CHF.    She has a past history of primary hypertension, dyslipidemia, as well as osteoarthritis with predominant involvement of her knees.  The latter is complicated by her long history of obesity.

## 2024-07-22 ENCOUNTER — HOSPITAL ENCOUNTER (OUTPATIENT)
Facility: HOSPITAL | Age: 84
Discharge: HOME | End: 2024-07-22
Attending: SURGERY
Payer: MEDICARE

## 2024-07-22 VITALS
TEMPERATURE: 97 F | DIASTOLIC BLOOD PRESSURE: 68 MMHG | RESPIRATION RATE: 18 BRPM | SYSTOLIC BLOOD PRESSURE: 129 MMHG | HEART RATE: 75 BPM

## 2024-07-22 DIAGNOSIS — L97.912 NON-PRESSURE CHRONIC ULCER OF RIGHT LOWER LEG WITH FAT LAYER EXPOSED (HCC): Primary | ICD-10-CM

## 2024-07-22 PROCEDURE — 99213 OFFICE O/P EST LOW 20 MIN: CPT | Performed by: SURGERY

## 2024-07-22 PROCEDURE — 99213 OFFICE O/P EST LOW 20 MIN: CPT

## 2024-07-22 RX ORDER — BETAMETHASONE DIPROPIONATE 0.5 MG/G
CREAM TOPICAL ONCE
OUTPATIENT
Start: 2024-07-22 | End: 2024-07-22

## 2024-07-22 RX ORDER — TRIAMCINOLONE ACETONIDE 1 MG/G
OINTMENT TOPICAL ONCE
OUTPATIENT
Start: 2024-07-22 | End: 2024-07-22

## 2024-07-22 RX ORDER — GINSENG 100 MG
CAPSULE ORAL ONCE
OUTPATIENT
Start: 2024-07-22 | End: 2024-07-22

## 2024-07-22 RX ORDER — LIDOCAINE 40 MG/G
CREAM TOPICAL ONCE
OUTPATIENT
Start: 2024-07-22 | End: 2024-07-22

## 2024-07-22 RX ORDER — LIDOCAINE HYDROCHLORIDE 40 MG/ML
SOLUTION TOPICAL ONCE
OUTPATIENT
Start: 2024-07-22 | End: 2024-07-22

## 2024-07-22 RX ORDER — CLOBETASOL PROPIONATE 0.5 MG/G
OINTMENT TOPICAL ONCE
OUTPATIENT
Start: 2024-07-22 | End: 2024-07-22

## 2024-07-22 RX ORDER — LIDOCAINE 50 MG/G
OINTMENT TOPICAL ONCE
OUTPATIENT
Start: 2024-07-22 | End: 2024-07-22

## 2024-07-22 RX ORDER — IBUPROFEN 200 MG
TABLET ORAL ONCE
OUTPATIENT
Start: 2024-07-22 | End: 2024-07-22

## 2024-07-22 RX ORDER — LIDOCAINE HYDROCHLORIDE 20 MG/ML
JELLY TOPICAL ONCE
OUTPATIENT
Start: 2024-07-22 | End: 2024-07-22

## 2024-07-22 RX ORDER — BACITRACIN ZINC AND POLYMYXIN B SULFATE 500; 1000 [USP'U]/G; [USP'U]/G
OINTMENT TOPICAL ONCE
OUTPATIENT
Start: 2024-07-22 | End: 2024-07-22

## 2024-07-22 RX ORDER — GENTAMICIN SULFATE 1 MG/G
OINTMENT TOPICAL ONCE
OUTPATIENT
Start: 2024-07-22 | End: 2024-07-22

## 2024-07-22 RX ORDER — SODIUM CHLOR/HYPOCHLOROUS ACID 0.033 %
SOLUTION, IRRIGATION IRRIGATION ONCE
OUTPATIENT
Start: 2024-07-22 | End: 2024-07-22

## 2024-07-22 NOTE — FLOWSHEET NOTE
07/22/24 0825   Right Leg Edema Point of Measurement   Leg circumference 40.5 cm   Ankle circumference 24 cm   Compression Therapy Tubular elastic support bandage   Left Leg Edema Point of Measurement   Leg circumference 41 cm   Ankle circumference 24.5 cm   Compression Therapy Tubular elastic support bandage   RLE Neurovascular Assessment   Capillary Refill Less than/Equal to 3 seconds   Color Appropriate for Ethnicity   Temperature Warm   R Femoral Pulse +1 (Weak)   LLE Neurovascular Assessment   Capillary Refill Less than/Equal to 3 seconds   Color Appropriate for Ethnicity   Temperature Warm   L Pedal Pulse +1   Wound 02/05/24 Leg Lateral;Lower;Right #2   Date First Assessed/Time First Assessed: 02/05/24 0851   Present on Original Admission: Yes  Wound Approximate Age at First Assessment (Weeks): 4 weeks  Primary Wound Type: Venous Ulcer  Location: Leg  Wound Location Orientation: Lateral;Lower;Right  ...   Wound Image    Wound Etiology Venous   Dressing Status Old drainage noted   Wound Cleansed Cleansed with saline   Wound Length (cm) 0.5 cm   Wound Width (cm) 0.8 cm   Wound Depth (cm) 0.1 cm   Wound Surface Area (cm^2) 0.4 cm^2   Change in Wound Size % (l*w) 96.97   Wound Volume (cm^3) 0.04 cm^3   Wound Healing % 97   Wound Assessment [place]/red;Slough   Drainage Amount Moderate (25-50%)   Drainage Description Serosanguinous   Odor None   Allison-wound Assessment Hemosiderin staining (brown yellow)   Margins Defined edges   Wound Thickness Description not for Pressure Injury Full thickness   Wound 02/05/24 Leg Anterior;Left #3   Date First Assessed/Time First Assessed: 02/05/24 0853   Present on Original Admission: Yes  Wound Approximate Age at First Assessment (Weeks): 4 weeks  Primary Wound Type: Venous Ulcer  Location: Leg  Wound Location Orientation: Anterior;Left  Wound ...   Wound Image    Wound Etiology Venous   Dressing Status Old drainage noted   Wound Length (cm) 3.3 cm   Wound Width (cm) 1.9 cm

## 2024-07-22 NOTE — PROGRESS NOTES
lateral aspect of right lower leg there was a cluster of wounds 0.5 x 0.8 x 0.1 cm dimension with   granulation (100 %) and minimal slough.  The surrounding skin does not appear inflamed.     Examination of the left lower extremity revealed 1-2+ left dorsalis pedis pulse.  I did not palpate posterior tibial pulse.  There was a strong biphasic left dorsalis pedis Doppler signal.  There was no edema in the left thigh.  The patient has trace to 1+ pitting edema below the knee.   On the left anterior lower leg there is a wound for about 3.3 x 1.9 x 0.1 cm dimension with granulation (100 %) and minimal slough.  The surrounding skin does not appear inflamed.     Mechanical debridement of the wounds were performed by abrasion with dry gauze.                    Impression:     The patient has wounds on right and left lower legs associated with  pitting lower leg edema.     Thigh edema has resolved on both sides.  Edema in the lower legs is decreased.  She is taking furosemide 40 mg once per day.  She appears to be doing better in terms of limiting salt intake.     The patient has PAD and left lower extremity.  Arterial perfusion has greatly improved after arterial angioplasty.     Wound on right lower leg is greatly improved, not at goal (not healed).     Wound on left lower leg is greatly improved, not at goal (not healed).     Posterior right Achilles ulcer is healed.           Plan:     I have recommended the patient and her daughter be very vigilant to avoid any pressure against the area at the mid posterior right Achilles region     I have recommended the patient limit total fluid intake from all sources to 2 quarts per day.     I have recommended the patient limit sodium intake to 2000 mg/day.  Common foods high in salt were reviewed with the patient and with her daughter.  I do not think there will be any takeout foods that will be low enough in salt to be appropriate for the patient.  I talked with the patient's

## 2024-07-22 NOTE — DISCHARGE INSTRUCTIONS
Discharge Instructions [facility]  [address]   [address]  [place] [address]   Telephone: [phone]     FAX [phone]    NAME:  Kylee Koo  YOB: 1940  MEDICAL RECORD NUMBER:  [id number]  DATE:  7/22/2024    CPT code:E&M-Level 3 (47331)    WOUND CARE ORDERS:  Bilateral lower leg wounds :Cleanse with soap and water , apply primary dressing Silver Alginate  covered with secondary dressing ABD, Roll Gauze, and Tape .  Apply Double tubi  Pt./pcg/HH nurse to change (freq) 3x Weekly  and as needed for compromise.Follow up with provider in 3 Week(s).   Home Health Agency: [facility]  TREATMENT ORDERS:    Elevate leg(s) above the level of the heart when sitting.   Avoid prolonged standing in one place.  Do no get dressing/wrap wet.  Follow Diet as prescribed:   [] Diet as tolerated: [] Calorie Diabetic Diet: Low carb and no Sugar [x] No Added Salt:no more then 2,000 mg daily  [] Increase Protein: [x] Limit the amount of liquid you are drinking and avoid drinking in between meals (limit to 2 quarts daily)     Return Appointment:  [x] Return Appointment: With Dr. Love in  3 Week(s)  [] Nurse Visit : *** days  [] Ordered tests:    Electronically signed Vy Phillips RN on 7/22/2024 at 8:54 AM     Wound Care Center Information: Should you experience any significant changes in your wound(s) or have questions about your wound care, please contact the [facility] at MONDAY - FRIDAY 8:00 am - 4:30.  If you need help with your wound outside these hours and cannot wait until we are again available, contact your PCP or go to the hospital emergency room.   PLEASE NOTE: IF YOU ARE UNABLE TO OBTAIN WOUND SUPPLIES, CONTINUE TO USE THE SUPPLIES YOU HAVE AVAILABLE UNTIL YOU ARE ABLE TO REACH US. IT IS MOST IMPORTANT TO KEEP THE WOUND COVERED AT ALL TIMES.     Physician Signature:_______________________    Date: ___________ Time:  ____________

## 2024-08-08 ENCOUNTER — NURSE ONLY (OUTPATIENT)
Facility: CLINIC | Age: 84
End: 2024-08-08
Payer: MEDICARE

## 2024-08-08 DIAGNOSIS — Z79.01 ENCOUNTER FOR MONITORING COUMADIN THERAPY: Primary | ICD-10-CM

## 2024-08-08 DIAGNOSIS — Z51.81 ENCOUNTER FOR MONITORING COUMADIN THERAPY: Primary | ICD-10-CM

## 2024-08-08 LAB
POC INR: 2.3
PROTHROMBIN TIME, POC: 27.4

## 2024-08-08 PROCEDURE — 85610 PROTHROMBIN TIME: CPT | Performed by: INTERNAL MEDICINE

## 2024-08-08 NOTE — PROGRESS NOTES
Patient in for PT/INR, states she is taking 7.5 mg of coumadin on Saturdays and Sundays and 5 mg all other days. PT=27.4, INR = 2.3. Dr. Alvarez notified. Pt informed no adjustment will be made at this time, continue on same dosing and return in one month for recheck per Dr. Alvarez..

## 2024-08-12 ENCOUNTER — HOSPITAL ENCOUNTER (OUTPATIENT)
Facility: HOSPITAL | Age: 84
Discharge: HOME | End: 2024-08-12
Attending: SURGERY
Payer: MEDICARE

## 2024-08-12 VITALS
RESPIRATION RATE: 18 BRPM | TEMPERATURE: 98.3 F | DIASTOLIC BLOOD PRESSURE: 72 MMHG | SYSTOLIC BLOOD PRESSURE: 120 MMHG | HEART RATE: 74 BPM

## 2024-08-12 DIAGNOSIS — L97.912 NON-PRESSURE CHRONIC ULCER OF RIGHT LOWER LEG WITH FAT LAYER EXPOSED (HCC): Primary | ICD-10-CM

## 2024-08-12 PROCEDURE — 99213 OFFICE O/P EST LOW 20 MIN: CPT

## 2024-08-12 PROCEDURE — 99213 OFFICE O/P EST LOW 20 MIN: CPT | Performed by: SURGERY

## 2024-08-12 RX ORDER — GENTAMICIN SULFATE 1 MG/G
OINTMENT TOPICAL ONCE
OUTPATIENT
Start: 2024-08-12 | End: 2024-08-12

## 2024-08-12 RX ORDER — LIDOCAINE HYDROCHLORIDE 20 MG/ML
JELLY TOPICAL ONCE
OUTPATIENT
Start: 2024-08-12 | End: 2024-08-12

## 2024-08-12 RX ORDER — IBUPROFEN 200 MG
TABLET ORAL ONCE
OUTPATIENT
Start: 2024-08-12 | End: 2024-08-12

## 2024-08-12 RX ORDER — BETAMETHASONE DIPROPIONATE 0.5 MG/G
CREAM TOPICAL ONCE
OUTPATIENT
Start: 2024-08-12 | End: 2024-08-12

## 2024-08-12 RX ORDER — LIDOCAINE HYDROCHLORIDE 40 MG/ML
SOLUTION TOPICAL ONCE
OUTPATIENT
Start: 2024-08-12 | End: 2024-08-12

## 2024-08-12 RX ORDER — GINSENG 100 MG
CAPSULE ORAL ONCE
OUTPATIENT
Start: 2024-08-12 | End: 2024-08-12

## 2024-08-12 RX ORDER — CLOBETASOL PROPIONATE 0.5 MG/G
OINTMENT TOPICAL ONCE
OUTPATIENT
Start: 2024-08-12 | End: 2024-08-12

## 2024-08-12 RX ORDER — BACITRACIN ZINC AND POLYMYXIN B SULFATE 500; 1000 [USP'U]/G; [USP'U]/G
OINTMENT TOPICAL ONCE
OUTPATIENT
Start: 2024-08-12 | End: 2024-08-12

## 2024-08-12 RX ORDER — SODIUM CHLOR/HYPOCHLOROUS ACID 0.033 %
SOLUTION, IRRIGATION IRRIGATION ONCE
OUTPATIENT
Start: 2024-08-12 | End: 2024-08-12

## 2024-08-12 RX ORDER — TRIAMCINOLONE ACETONIDE 1 MG/G
OINTMENT TOPICAL ONCE
OUTPATIENT
Start: 2024-08-12 | End: 2024-08-12

## 2024-08-12 RX ORDER — LIDOCAINE 40 MG/G
CREAM TOPICAL ONCE
OUTPATIENT
Start: 2024-08-12 | End: 2024-08-12

## 2024-08-12 RX ORDER — LIDOCAINE 50 MG/G
OINTMENT TOPICAL ONCE
OUTPATIENT
Start: 2024-08-12 | End: 2024-08-12

## 2024-08-12 NOTE — PROGRESS NOTES
The patient is an 83-year-old woman who is referred to the wound care center regarding ulcerations on both lower legs.     The patient was first seen in the wound care center on 2/5/2024.     The patient reported that the wounds had been present for about a month prior to her first clinic visit.  They had watery drainage.  She does confirm chronic swelling of the legs.  She thinks they may have gotten more swollen recently prior to her first clinic visit.     The patient's medication list includes furosemide 40 mg daily.  Notes have been sent to the patient's primary provider asking if her diuretic dose could be increased.  As of 4/19/2024, the patient was taking furosemide 40 mg twice per day.  As of 5/13/2024, the patient is taking furosemide once per day.     The patient sleeps lying in bed at night with multiple pillows.  She does get short of breath if she lies down flat.  The patient reports she had been drinking 3 water bottles per day plus additional fluids.  She is working on reducing fluid intake.       As of 3/29/2024, the patient reported that she had been eating a lot of takeout food, which by her description sounded to have a very high salt content.  She is working to reduce salt intake in her diet.     The patient denies history of diabetes mellitus.  She does not describe anginal symptoms but does have history of chronic systolic congestive heart failure.  She has history of permanent biventricular pacemaker and AV node ablation for A-fib.  Medications include Coumadin.  The patient is ambulatory.  She does not describe shortness of breath with ambulation.     Past medical history includes cervical radiculopathy, reflux esophagitis, multiple myeloma, hypertension, CKD 3.     The patient was seen at [facility] on 4/8/2024. 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 The cardiologist is reported on 5/13/2024 to have performed angiography and

## 2024-08-12 NOTE — FLOWSHEET NOTE
08/12/24 0906   RLE Neurovascular Assessment   Capillary Refill Less than/Equal to 3 seconds;Absent   Color Appropriate for Ethnicity   Temperature Warm   R Pedal Pulse +1   LLE Neurovascular Assessment   Capillary Refill Less than/Equal to 3 seconds   Color Appropriate for Ethnicity   Temperature Warm   L Pedal Pulse +2   Wound 02/05/24 Leg Lateral;Lower;Right #2   Date First Assessed/Time First Assessed: 02/05/24 0851   Present on Original Admission: Yes  Wound Approximate Age at First Assessment (Weeks): 4 weeks  Primary Wound Type: Venous Ulcer  Location: Leg  Wound Location Orientation: Lateral;Lower;Right  ...   Wound Etiology Venous   Dressing Status Dry   Wound Cleansed Cleansed with saline   Wound Length (cm) 0.1 cm   Wound Width (cm) 0.1 cm   Wound Depth (cm) 0.1 cm   Wound Surface Area (cm^2) 0.01 cm^2   Change in Wound Size % (l*w) 99.92   Wound Volume (cm^3) 0.001 cm^3   Wound Healing % 100   Wound Assessment Dry  (scab)   Drainage Amount None (dry)   Odor None   Allison-wound Assessment Hemosiderin staining (brown yellow)   Margins Defined edges   Wound Thickness Description not for Pressure Injury Partial thickness   Wound 02/05/24 Leg Anterior;Left #3   Date First Assessed/Time First Assessed: 02/05/24 0853   Present on Original Admission: Yes  Wound Approximate Age at First Assessment (Weeks): 4 weeks  Primary Wound Type: Venous Ulcer  Location: Leg  Wound Location Orientation: Anterior;Left  Wound ...   Wound Etiology Venous   Dressing Status Old drainage noted   Wound Cleansed Cleansed with saline   Wound Length (cm) 0.3 cm   Wound Width (cm) 0.4 cm   Wound Depth (cm) 0.1 cm   Wound Surface Area (cm^2) 0.12 cm^2   Change in Wound Size % (l*w) 52   Wound Volume (cm^3) 0.012 cm^3   Wound Healing % 52   Wound Assessment [place]/red;Slough   Drainage Amount Scant (moist but unmeasurable)   Drainage Description Serosanguinous   Odor None   Allison-wound Assessment Hemosiderin staining (brown yellow)

## 2024-08-13 RX ORDER — WARFARIN SODIUM 5 MG/1
TABLET ORAL
Qty: 45 TABLET | Refills: 11 | Status: SHIPPED | OUTPATIENT
Start: 2024-08-13

## 2024-09-04 ENCOUNTER — HOSPITAL ENCOUNTER (OUTPATIENT)
Facility: HOSPITAL | Age: 84
Discharge: HOME | End: 2024-09-04
Attending: SURGERY
Payer: MEDICARE

## 2024-09-04 VITALS
RESPIRATION RATE: 18 BRPM | TEMPERATURE: 97.3 F | SYSTOLIC BLOOD PRESSURE: 139 MMHG | HEART RATE: 75 BPM | DIASTOLIC BLOOD PRESSURE: 67 MMHG

## 2024-09-04 DIAGNOSIS — L97.912 NON-PRESSURE CHRONIC ULCER OF RIGHT LOWER LEG WITH FAT LAYER EXPOSED (HCC): Primary | ICD-10-CM

## 2024-09-04 PROCEDURE — 99213 OFFICE O/P EST LOW 20 MIN: CPT | Performed by: SURGERY

## 2024-09-04 PROCEDURE — 99213 OFFICE O/P EST LOW 20 MIN: CPT

## 2024-09-04 RX ORDER — BETAMETHASONE DIPROPIONATE 0.5 MG/G
CREAM TOPICAL ONCE
OUTPATIENT
Start: 2024-09-04 | End: 2024-09-04

## 2024-09-04 RX ORDER — CLOBETASOL PROPIONATE 0.5 MG/G
OINTMENT TOPICAL ONCE
OUTPATIENT
Start: 2024-09-04 | End: 2024-09-04

## 2024-09-04 RX ORDER — LIDOCAINE 40 MG/G
CREAM TOPICAL ONCE
OUTPATIENT
Start: 2024-09-04 | End: 2024-09-04

## 2024-09-04 RX ORDER — LIDOCAINE HYDROCHLORIDE 20 MG/ML
JELLY TOPICAL ONCE
OUTPATIENT
Start: 2024-09-04 | End: 2024-09-04

## 2024-09-04 RX ORDER — LIDOCAINE 50 MG/G
OINTMENT TOPICAL ONCE
OUTPATIENT
Start: 2024-09-04 | End: 2024-09-04

## 2024-09-04 RX ORDER — LIDOCAINE HYDROCHLORIDE 40 MG/ML
SOLUTION TOPICAL ONCE
OUTPATIENT
Start: 2024-09-04 | End: 2024-09-04

## 2024-09-04 RX ORDER — GINSENG 100 MG
CAPSULE ORAL ONCE
OUTPATIENT
Start: 2024-09-04 | End: 2024-09-04

## 2024-09-04 RX ORDER — BACITRACIN ZINC AND POLYMYXIN B SULFATE 500; 1000 [USP'U]/G; [USP'U]/G
OINTMENT TOPICAL ONCE
OUTPATIENT
Start: 2024-09-04 | End: 2024-09-04

## 2024-09-04 RX ORDER — SODIUM CHLOR/HYPOCHLOROUS ACID 0.033 %
SOLUTION, IRRIGATION IRRIGATION ONCE
OUTPATIENT
Start: 2024-09-04 | End: 2024-09-04

## 2024-09-04 RX ORDER — NEOMYCIN/BACITRACIN/POLYMYXINB 3.5-400-5K
OINTMENT (GRAM) TOPICAL ONCE
OUTPATIENT
Start: 2024-09-04 | End: 2024-09-04

## 2024-09-04 RX ORDER — TRIAMCINOLONE ACETONIDE 1 MG/G
OINTMENT TOPICAL ONCE
OUTPATIENT
Start: 2024-09-04 | End: 2024-09-04

## 2024-09-04 RX ORDER — MUPIROCIN 20 MG/G
OINTMENT TOPICAL ONCE
OUTPATIENT
Start: 2024-09-04 | End: 2024-09-04

## 2024-09-04 RX ORDER — SILVER SULFADIAZINE 10 MG/G
CREAM TOPICAL ONCE
OUTPATIENT
Start: 2024-09-04 | End: 2024-09-04

## 2024-09-04 RX ORDER — GENTAMICIN SULFATE 1 MG/G
OINTMENT TOPICAL ONCE
OUTPATIENT
Start: 2024-09-04 | End: 2024-09-04

## 2024-09-04 NOTE — DISCHARGE INSTRUCTIONS
Discharge Instructions [facility]  [address]   [address]  [place] [address]   Telephone: [phone]     FAX [phone]    NAME:  Kylee Koo  YOB: 1940  MEDICAL RECORD NUMBER:  [id number]  DATE:  9/4/2024    CPT code:E&M-Level 3 (29512)    WOUND CARE ORDERS:  Left lower leg wound :Cleanse with soap and water , apply primary dressing Silver Alginate  covered with secondary dressing ABD and Roll Gauze.  Apply Double tubi  Pt./pcg/HH nurse to change (freq) 3x Weekly  and as needed for compromise.Follow up with provider in 2 Week(s).   Home Health Agency: [facility]  TREATMENT ORDERS:    Elevate leg(s) above the level of the heart when sitting.   Avoid prolonged standing in one place.  Do no get dressing/wrap wet.  Follow Diet as prescribed:   [] Diet as tolerated: [] Calorie Diabetic Diet: Low carb and no Sugar [] No Added Salt:no more then 2,000 mg daily  [] Increase Protein: [] Limit the amount of liquid you are drinking and avoid drinking in between meals (limit to 2 quarts daily)     Return Appointment:  [x] Return Appointment: With Dr. Love in  2 Week(s)  [] Nurse Visit : *** days  [] Ordered tests:    Electronically signed Vy Phillips RN on 9/4/2024 at 1:12 PM     Wound Care Center Information: Should you experience any significant changes in your wound(s) or have questions about your wound care, please contact the [facility] at MONDAY - FRIDAY 8:00 am - 4:30.  If you need help with your wound outside these hours and cannot wait until we are again available, contact your PCP or go to the hospital emergency room.   PLEASE NOTE: IF YOU ARE UNABLE TO OBTAIN WOUND SUPPLIES, CONTINUE TO USE THE SUPPLIES YOU HAVE AVAILABLE UNTIL YOU ARE ABLE TO REACH US. IT IS MOST IMPORTANT TO KEEP THE WOUND COVERED AT ALL TIMES.     Physician Signature:_______________________    Date: ___________ Time:  ____________

## 2024-09-04 NOTE — FLOWSHEET NOTE
09/04/24 1302   Left Leg Edema Point of Measurement   Leg circumference 38.4 cm   Ankle circumference 25.6 cm   Compression Therapy Tubular elastic support bandage   LLE Neurovascular Assessment   Capillary Refill Less than/Equal to 3 seconds   Color Appropriate for Ethnicity   Temperature Warm   L Pedal Pulse +2   Wound 02/05/24 Leg Anterior;Left #3   Date First Assessed/Time First Assessed: 02/05/24 0853   Present on Original Admission: Yes  Wound Approximate Age at First Assessment (Weeks): 4 weeks  Primary Wound Type: Venous Ulcer  Location: Leg  Wound Location Orientation: Anterior;Left  Wound ...   Wound Image    Wound Etiology Venous   Dressing Status Old drainage noted   Wound Cleansed Soap and water   Wound Length (cm) 0.7 cm   Wound Width (cm) 0.5 cm   Wound Depth (cm) 0.1 cm   Wound Surface Area (cm^2) 0.35 cm^2   Change in Wound Size % (l*w) -40   Wound Volume (cm^3) 0.035 cm^3   Wound Healing % -40   Wound Assessment [place]/red   Drainage Amount Scant (moist but unmeasurable)   Drainage Description Serosanguinous   Odor None   Allison-wound Assessment Hemosiderin staining (brown yellow)   Margins Defined edges   Wound Thickness Description not for Pressure Injury Full thickness   Pain Assessment   Pain Assessment None - Denies Pain     /67   Pulse 75   Temp 97.3 °F (36.3 °C) (Temporal)   Resp 18

## 2024-09-04 NOTE — PROGRESS NOTES
be prescribing another antibiotic.  The patient is completing a course of Levaquin as of 5/14/2024.        Dressing as of 2/5/2024: For right and left legs, apply Xeroform over ulcers then ABD.  Apply 2 layer compression wrap with calamine from base of toes to upper calf.     Dressing as of 2/12/2024: For right and left legs, apply Medihoney alginate over ulcers then ABD.  Apply 2 layer compression wrap with calamine from base of toes to upper calf.  Change dressing 3 times per week. [It appeared that home health was using plain alginate instead].     Dressing as of 2/19/2024: For right and left legs, apply Iodosorb and Adaptic over ulcers then ABD.  Apply 2 layer compression wrap with calamine from base of toes to upper calf.  Change dressing 3 times per week.      Dressings as of 3/29/2024: Right and left legs, cover wounds with silver alginate and ABD then roll gauze.  Apply double layer Tubigrip from base of toes to upper calf.  Change dressings a total of 3 times per week.     Dressings as of 4/19/2024: For right lower extremity, apply Xeroform and bordered foam dressing to posterior Achilles site.  Cover leg wounds with silver alginate and ABD then roll gauze.  Apply double layer Tubigrip from base of toes to upper calf.  Change dressings a total of 3 times per week.     For left lower extremity, cover leg wounds with silver alginate and ABD then roll gauze.  Apply double layer Tubigrip from base of toes to upper calf.  Change dressings a total of 3 times per week.                       Reported weight 223 pounds height 5 feet 7 inches     Physical examination     The patient is an alert elderly woman in no acute distress.     Examination of the right lower extremity revealed 1-2+ right dorsalis pedis pulse.  I did not feel posterior tibial pulse.  There was a strong biphasic right dorsalis pedis Doppler signal.  There was no edema in the thigh on the right.  Patient has trace  pitting edema below the knee.

## 2024-09-09 ENCOUNTER — FOLLOWUP TELEPHONE ENCOUNTER (OUTPATIENT)
Facility: HOSPITAL | Age: 84
End: 2024-09-09

## 2024-09-13 ENCOUNTER — OFFICE VISIT (OUTPATIENT)
Facility: CLINIC | Age: 84
End: 2024-09-13

## 2024-09-13 VITALS
OXYGEN SATURATION: 99 % | HEIGHT: 70 IN | SYSTOLIC BLOOD PRESSURE: 117 MMHG | DIASTOLIC BLOOD PRESSURE: 80 MMHG | HEART RATE: 75 BPM | RESPIRATION RATE: 18 BRPM | TEMPERATURE: 97.8 F | WEIGHT: 225.7 LBS | BODY MASS INDEX: 32.31 KG/M2

## 2024-09-13 DIAGNOSIS — I87.2 VENOUS INSUFFICIENCY: ICD-10-CM

## 2024-09-13 DIAGNOSIS — I87.339 STASIS DERMATITIS WITH VENOUS ULCER OF LOWER EXTREMITY DUE TO CHRONIC PERIPHERAL VENOUS HYPERTENSION (HCC): ICD-10-CM

## 2024-09-13 DIAGNOSIS — Z51.81 ENCOUNTER FOR MONITORING COUMADIN THERAPY: Primary | ICD-10-CM

## 2024-09-13 DIAGNOSIS — E66.9 OBESITY (BMI 30.0-34.9): ICD-10-CM

## 2024-09-13 DIAGNOSIS — L97.922 NON-PRESSURE CHRONIC ULCER OF LEFT LOWER LEG WITH FAT LAYER EXPOSED (HCC): ICD-10-CM

## 2024-09-13 DIAGNOSIS — Z98.890 S/P AV NODAL ABLATION: ICD-10-CM

## 2024-09-13 DIAGNOSIS — I50.32 CHRONIC DIASTOLIC CONGESTIVE HEART FAILURE (HCC): ICD-10-CM

## 2024-09-13 DIAGNOSIS — Z79.01 ENCOUNTER FOR MONITORING COUMADIN THERAPY: Primary | ICD-10-CM

## 2024-09-13 DIAGNOSIS — D64.9 ANEMIA, UNSPECIFIED TYPE: ICD-10-CM

## 2024-09-13 LAB
POC INR: 3
PROTHROMBIN TIME, POC: 36.6

## 2024-09-13 ASSESSMENT — PATIENT HEALTH QUESTIONNAIRE - PHQ9
SUM OF ALL RESPONSES TO PHQ9 QUESTIONS 1 & 2: 0
SUM OF ALL RESPONSES TO PHQ QUESTIONS 1-9: 0
1. LITTLE INTEREST OR PLEASURE IN DOING THINGS: NOT AT ALL
2. FEELING DOWN, DEPRESSED OR HOPELESS: NOT AT ALL
SUM OF ALL RESPONSES TO PHQ QUESTIONS 1-9: 0

## 2024-09-16 ENCOUNTER — HOSPITAL ENCOUNTER (OUTPATIENT)
Facility: HOSPITAL | Age: 84
Discharge: HOME | End: 2024-09-16
Attending: SURGERY
Payer: MEDICARE

## 2024-09-16 VITALS
DIASTOLIC BLOOD PRESSURE: 83 MMHG | TEMPERATURE: 97.6 F | RESPIRATION RATE: 18 BRPM | HEART RATE: 74 BPM | SYSTOLIC BLOOD PRESSURE: 156 MMHG

## 2024-09-16 DIAGNOSIS — L97.912 NON-PRESSURE CHRONIC ULCER OF RIGHT LOWER LEG WITH FAT LAYER EXPOSED (HCC): Primary | ICD-10-CM

## 2024-09-16 PROCEDURE — 99213 OFFICE O/P EST LOW 20 MIN: CPT

## 2024-09-16 PROCEDURE — 99213 OFFICE O/P EST LOW 20 MIN: CPT | Performed by: SURGERY

## 2024-09-16 RX ORDER — LIDOCAINE 40 MG/G
CREAM TOPICAL ONCE
OUTPATIENT
Start: 2024-09-16 | End: 2024-09-16

## 2024-09-16 RX ORDER — LIDOCAINE HYDROCHLORIDE 20 MG/ML
JELLY TOPICAL ONCE
OUTPATIENT
Start: 2024-09-16 | End: 2024-09-16

## 2024-09-16 RX ORDER — BETAMETHASONE DIPROPIONATE 0.5 MG/G
CREAM TOPICAL ONCE
OUTPATIENT
Start: 2024-09-16 | End: 2024-09-16

## 2024-09-16 RX ORDER — GINSENG 100 MG
CAPSULE ORAL ONCE
OUTPATIENT
Start: 2024-09-16 | End: 2024-09-16

## 2024-09-16 RX ORDER — LIDOCAINE HYDROCHLORIDE 40 MG/ML
SOLUTION TOPICAL ONCE
OUTPATIENT
Start: 2024-09-16 | End: 2024-09-16

## 2024-09-16 RX ORDER — NEOMYCIN/BACITRACIN/POLYMYXINB 3.5-400-5K
OINTMENT (GRAM) TOPICAL ONCE
OUTPATIENT
Start: 2024-09-16 | End: 2024-09-16

## 2024-09-16 RX ORDER — GENTAMICIN SULFATE 1 MG/G
OINTMENT TOPICAL ONCE
OUTPATIENT
Start: 2024-09-16 | End: 2024-09-16

## 2024-09-16 RX ORDER — MUPIROCIN 20 MG/G
OINTMENT TOPICAL ONCE
OUTPATIENT
Start: 2024-09-16 | End: 2024-09-16

## 2024-09-16 RX ORDER — SILVER SULFADIAZINE 10 MG/G
CREAM TOPICAL ONCE
OUTPATIENT
Start: 2024-09-16 | End: 2024-09-16

## 2024-09-16 RX ORDER — BACITRACIN ZINC AND POLYMYXIN B SULFATE 500; 1000 [USP'U]/G; [USP'U]/G
OINTMENT TOPICAL ONCE
OUTPATIENT
Start: 2024-09-16 | End: 2024-09-16

## 2024-09-16 RX ORDER — SODIUM CHLOR/HYPOCHLOROUS ACID 0.033 %
SOLUTION, IRRIGATION IRRIGATION ONCE
OUTPATIENT
Start: 2024-09-16 | End: 2024-09-16

## 2024-09-16 RX ORDER — CLOBETASOL PROPIONATE 0.5 MG/G
OINTMENT TOPICAL ONCE
OUTPATIENT
Start: 2024-09-16 | End: 2024-09-16

## 2024-09-16 RX ORDER — LIDOCAINE 50 MG/G
OINTMENT TOPICAL ONCE
OUTPATIENT
Start: 2024-09-16 | End: 2024-09-16

## 2024-09-16 RX ORDER — TRIAMCINOLONE ACETONIDE 1 MG/G
OINTMENT TOPICAL ONCE
OUTPATIENT
Start: 2024-09-16 | End: 2024-09-16

## 2024-09-20 ENCOUNTER — FOLLOWUP TELEPHONE ENCOUNTER (OUTPATIENT)
Facility: HOSPITAL | Age: 84
End: 2024-09-20

## 2024-09-26 DIAGNOSIS — R42 VERTIGO: ICD-10-CM

## 2024-09-27 RX ORDER — MECLIZINE HCL 12.5 MG 12.5 MG/1
TABLET ORAL
Qty: 60 TABLET | Refills: 5 | Status: SHIPPED | OUTPATIENT
Start: 2024-09-27

## 2024-09-30 ENCOUNTER — HOSPITAL ENCOUNTER (OUTPATIENT)
Facility: HOSPITAL | Age: 84
Discharge: HOME | End: 2024-09-30
Attending: SURGERY
Payer: MEDICARE

## 2024-09-30 VITALS
RESPIRATION RATE: 18 BRPM | DIASTOLIC BLOOD PRESSURE: 74 MMHG | SYSTOLIC BLOOD PRESSURE: 134 MMHG | HEART RATE: 76 BPM | TEMPERATURE: 97.7 F

## 2024-09-30 DIAGNOSIS — L97.912 NON-PRESSURE CHRONIC ULCER OF RIGHT LOWER LEG WITH FAT LAYER EXPOSED (HCC): Primary | ICD-10-CM

## 2024-09-30 PROBLEM — L97.922 NON-PRESSURE CHRONIC ULCER OF LEFT LOWER LEG WITH FAT LAYER EXPOSED (HCC): Status: RESOLVED | Noted: 2024-02-05 | Resolved: 2024-09-30

## 2024-09-30 PROCEDURE — 99213 OFFICE O/P EST LOW 20 MIN: CPT | Performed by: SURGERY

## 2024-09-30 PROCEDURE — 99213 OFFICE O/P EST LOW 20 MIN: CPT

## 2024-09-30 RX ORDER — NEOMYCIN/BACITRACIN/POLYMYXINB 3.5-400-5K
OINTMENT (GRAM) TOPICAL ONCE
Status: CANCELLED | OUTPATIENT
Start: 2024-09-30 | End: 2024-09-30

## 2024-09-30 RX ORDER — MUPIROCIN 20 MG/G
OINTMENT TOPICAL ONCE
Status: CANCELLED | OUTPATIENT
Start: 2024-09-30 | End: 2024-09-30

## 2024-09-30 RX ORDER — LIDOCAINE 40 MG/G
CREAM TOPICAL ONCE
Status: CANCELLED | OUTPATIENT
Start: 2024-09-30 | End: 2024-09-30

## 2024-09-30 RX ORDER — LIDOCAINE 50 MG/G
OINTMENT TOPICAL ONCE
Status: CANCELLED | OUTPATIENT
Start: 2024-09-30 | End: 2024-09-30

## 2024-09-30 RX ORDER — CLOBETASOL PROPIONATE 0.5 MG/G
OINTMENT TOPICAL ONCE
Status: CANCELLED | OUTPATIENT
Start: 2024-09-30 | End: 2024-09-30

## 2024-09-30 RX ORDER — BACITRACIN ZINC AND POLYMYXIN B SULFATE 500; 1000 [USP'U]/G; [USP'U]/G
OINTMENT TOPICAL ONCE
Status: CANCELLED | OUTPATIENT
Start: 2024-09-30 | End: 2024-09-30

## 2024-09-30 RX ORDER — BETAMETHASONE DIPROPIONATE 0.5 MG/G
CREAM TOPICAL ONCE
Status: CANCELLED | OUTPATIENT
Start: 2024-09-30 | End: 2024-09-30

## 2024-09-30 RX ORDER — LIDOCAINE HYDROCHLORIDE 20 MG/ML
JELLY TOPICAL ONCE
Status: CANCELLED | OUTPATIENT
Start: 2024-09-30 | End: 2024-09-30

## 2024-09-30 RX ORDER — GINSENG 100 MG
CAPSULE ORAL ONCE
Status: CANCELLED | OUTPATIENT
Start: 2024-09-30 | End: 2024-09-30

## 2024-09-30 RX ORDER — SODIUM CHLOR/HYPOCHLOROUS ACID 0.033 %
SOLUTION, IRRIGATION IRRIGATION ONCE
Status: CANCELLED | OUTPATIENT
Start: 2024-09-30 | End: 2024-09-30

## 2024-09-30 RX ORDER — SILVER SULFADIAZINE 10 MG/G
CREAM TOPICAL ONCE
Status: CANCELLED | OUTPATIENT
Start: 2024-09-30 | End: 2024-09-30

## 2024-09-30 RX ORDER — TRIAMCINOLONE ACETONIDE 1 MG/G
OINTMENT TOPICAL ONCE
Status: CANCELLED | OUTPATIENT
Start: 2024-09-30 | End: 2024-09-30

## 2024-09-30 RX ORDER — LIDOCAINE HYDROCHLORIDE 40 MG/ML
SOLUTION TOPICAL ONCE
Status: CANCELLED | OUTPATIENT
Start: 2024-09-30 | End: 2024-09-30

## 2024-09-30 RX ORDER — GENTAMICIN SULFATE 1 MG/G
OINTMENT TOPICAL ONCE
Status: CANCELLED | OUTPATIENT
Start: 2024-09-30 | End: 2024-09-30

## 2024-09-30 NOTE — PROGRESS NOTES
Wound Care     The patient is an 83-year-old woman who is referred to the wound care center regarding ulcerations on both lower legs.     The patient was first seen in the wound care center on 2/5/2024.     The patient reported that the wounds had been present for about a month prior to her first clinic visit.  They had watery drainage.  She does confirm chronic swelling of the legs.  She thinks they may have gotten more swollen recently prior to her first clinic visit.     The patient's medication list includes furosemide 40 mg daily.  Notes have been sent to the patient's primary provider asking if her diuretic dose could be increased.  As of 4/19/2024, the patient was taking furosemide 40 mg twice per day.  As of 5/13/2024, the patient is taking furosemide once per day.     The patient sleeps lying in bed at night with multiple pillows.  She does get short of breath if she lies down flat.  The patient reports she had been drinking 3 water bottles per day plus additional fluids.  She is working on reducing fluid intake.       As of 3/29/2024, the patient reported that she had been eating a lot of takeout food, which by her description sounded to have a very high salt content.  She is working to reduce salt intake in her diet.     The patient denies history of diabetes mellitus.  She does not describe anginal symptoms but does have history of chronic systolic congestive heart failure.  She has history of permanent biventricular pacemaker and AV node ablation for A-fib.  Medications include Coumadin.  The patient is ambulatory.  She does not describe shortness of breath with ambulation.     Past medical history includes cervical radiculopathy, reflux esophagitis, multiple myeloma, hypertension, CKD 3.     The patient was seen at [facility] on 4/8/2024. 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 The cardiologist is reported on 5/13/2024 to have performed

## 2024-09-30 NOTE — DISCHARGE INSTRUCTIONS
Discharge Instructions/Wound Care Orders  [facility]  [address]   [address], [address]  [place] [address]   Telephone: [phone]    FAX [phone]      NAME:  Kylee Koo  YOB: 1940  MEDICAL RECORD NUMBER:  [id number]  DATE:  9/30/2024    CPT code: E&M-Level 3 (82609)    Congratulations!  You have completed your treatment.     Return to your Primary Care Physician for all your health issues.   Resume your ordinary activities as tolerated.   Take your medications as prescribed by your primary care physician.   Check your skin daily for cracks, bruises, sores, or dryness. Use a moisturizer as needed.   Clean and dry your skin, using mild soap and warm water (not hot).   Avoid alcohol and caffeine and do not smoke.  Maintain a nutritious diet.  Avoid pressure on your wound site. Keep your legs elevated above the level of the heart whenever possible.  Continue to use wraps/stockings/compression as prescribed.  Replace compression stockings every four to six months as needed to ensure proper fit.   Wear well-fitting shoes and leg garments.  Other: Double tubi  bilateral lower leg today. Wear compression stocking from the time you get up till the time you go to bed everyday.     THANK YOU FOR ALLOWING US TO SERVE YOU.  PLEASE CALL IF YOU DEVELOP ANOTHER WOUND.     Electronically signed by Vy Phillips RN on 9/30/2024 at 9:08 AM     Wound Care Center Information: Should you experience any significant changes in your wound(s) or have questions about your wound care, please contact the [facility] at MONDAY - FRIDAY 8:00 am - 4:30.  If you need help with your wound outside these hours and cannot wait until we are again available, contact your PCP or go to the hospital emergency room.     PLEASE NOTE: IF YOU ARE UNABLE TO OBTAIN WOUND SUPPLIES, CONTINUE TO USE THE SUPPLIES YOU HAVE AVAILABLE UNTIL YOU ARE ABLE TO REACH US. IT IS MOST

## 2024-09-30 NOTE — FLOWSHEET NOTE
09/30/24 0830   Right Leg Edema Point of Measurement   Leg circumference 41 cm   Ankle circumference 25 cm   Compression Therapy Tubular elastic support bandage   Left Leg Edema Point of Measurement   Leg circumference 40.5 cm   Ankle circumference 24.9 cm   Compression Therapy Tubular elastic support bandage   RLE Neurovascular Assessment   Capillary Refill Less than/Equal to 3 seconds   Color Appropriate for Ethnicity   Temperature Warm   R Pedal Pulse +3   LLE Neurovascular Assessment   Capillary Refill Less than/Equal to 3 seconds   Color Appropriate for Ethnicity   Temperature Warm   L Pedal Pulse +3   Wound 02/05/24 Leg Anterior;Left #3   Date First Assessed/Time First Assessed: 02/05/24 0853   Present on Original Admission: Yes  Wound Approximate Age at First Assessment (Weeks): 4 weeks  Primary Wound Type: Venous Ulcer  Location: Leg  Wound Location Orientation: Anterior;Left  Wound ...   Wound Image    Wound Etiology Venous   Dressing Status Intact   Wound Cleansed Soap and water   Wound Length (cm) 0.1 cm   Wound Width (cm) 0.1 cm   Wound Depth (cm) 0.1 cm   Wound Surface Area (cm^2) 0.01 cm^2   Change in Wound Size % (l*w) 96   Wound Volume (cm^3) 0.001 cm^3   Wound Healing % 96   Wound Assessment Epithelialization   Drainage Amount None (dry)   Odor None   Allison-wound Assessment Hemosiderin staining (brown yellow)   Margins Defined edges   Wound Thickness Description not for Pressure Injury Full thickness   Pain Assessment   Pain Assessment None - Denies Pain       /74   Pulse 76   Temp 97.7 °F (36.5 °C) (Temporal)   Resp 18

## 2024-10-15 RX ORDER — AMLODIPINE BESYLATE 10 MG/1
TABLET ORAL
Qty: 90 TABLET | Refills: 3 | Status: SHIPPED | OUTPATIENT
Start: 2024-10-15

## 2024-10-15 RX ORDER — LISINOPRIL 40 MG/1
TABLET ORAL
Qty: 90 TABLET | Refills: 3 | Status: SHIPPED | OUTPATIENT
Start: 2024-10-15

## 2025-01-04 RX ORDER — FUROSEMIDE 40 MG/1
TABLET ORAL
Qty: 90 TABLET | Refills: 3 | Status: SHIPPED | OUTPATIENT
Start: 2025-01-04

## 2025-01-06 RX ORDER — FUROSEMIDE 40 MG/1
40 TABLET ORAL DAILY
Qty: 90 TABLET | Refills: 3 | Status: SHIPPED | OUTPATIENT
Start: 2025-01-06

## 2025-02-07 RX ORDER — POTASSIUM CHLORIDE 750 MG/1
10 TABLET, EXTENDED RELEASE ORAL DAILY
Qty: 60 TABLET | Refills: 11 | Status: SHIPPED | OUTPATIENT
Start: 2025-02-07

## 2025-02-18 ENCOUNTER — OFFICE VISIT (OUTPATIENT)
Facility: CLINIC | Age: 85
End: 2025-02-18

## 2025-02-18 VITALS
HEIGHT: 70 IN | OXYGEN SATURATION: 95 % | HEART RATE: 75 BPM | DIASTOLIC BLOOD PRESSURE: 66 MMHG | WEIGHT: 231.4 LBS | SYSTOLIC BLOOD PRESSURE: 108 MMHG | BODY MASS INDEX: 33.13 KG/M2 | RESPIRATION RATE: 16 BRPM | TEMPERATURE: 98.9 F

## 2025-02-18 DIAGNOSIS — R29.898 MUSCULAR DECONDITIONING: ICD-10-CM

## 2025-02-18 DIAGNOSIS — I10 PRIMARY HYPERTENSION: ICD-10-CM

## 2025-02-18 DIAGNOSIS — I87.2 VENOUS INSUFFICIENCY: ICD-10-CM

## 2025-02-18 DIAGNOSIS — L97.922 NON-PRESSURE CHRONIC ULCER OF LEFT LOWER LEG WITH FAT LAYER EXPOSED (HCC): ICD-10-CM

## 2025-02-18 DIAGNOSIS — Z95.0 STATUS POST BIVENTRICULAR PACEMAKER: ICD-10-CM

## 2025-02-18 DIAGNOSIS — C90.02 MULTIPLE MYELOMA IN RELAPSE (HCC): ICD-10-CM

## 2025-02-18 DIAGNOSIS — Z00.00 MEDICARE ANNUAL WELLNESS VISIT, SUBSEQUENT: Primary | ICD-10-CM

## 2025-02-18 DIAGNOSIS — I48.21 PERMANENT ATRIAL FIBRILLATION (HCC): ICD-10-CM

## 2025-02-18 DIAGNOSIS — I50.32 CHRONIC HEART FAILURE WITH PRESERVED EJECTION FRACTION (HCC): ICD-10-CM

## 2025-02-18 DIAGNOSIS — E66.811 OBESITY (BMI 30.0-34.9): ICD-10-CM

## 2025-02-18 RX ORDER — METOPROLOL SUCCINATE 25 MG/1
TABLET, EXTENDED RELEASE ORAL
Qty: 90 TABLET | Refills: 3 | Status: SHIPPED | OUTPATIENT
Start: 2025-02-18

## 2025-02-18 SDOH — ECONOMIC STABILITY: FOOD INSECURITY: WITHIN THE PAST 12 MONTHS, YOU WORRIED THAT YOUR FOOD WOULD RUN OUT BEFORE YOU GOT MONEY TO BUY MORE.: NEVER TRUE

## 2025-02-18 SDOH — ECONOMIC STABILITY: FOOD INSECURITY: WITHIN THE PAST 12 MONTHS, THE FOOD YOU BOUGHT JUST DIDN'T LAST AND YOU DIDN'T HAVE MONEY TO GET MORE.: NEVER TRUE

## 2025-02-18 ASSESSMENT — ANXIETY QUESTIONNAIRES
3. WORRYING TOO MUCH ABOUT DIFFERENT THINGS: NOT AT ALL
4. TROUBLE RELAXING: NOT AT ALL
IF YOU CHECKED OFF ANY PROBLEMS ON THIS QUESTIONNAIRE, HOW DIFFICULT HAVE THESE PROBLEMS MADE IT FOR YOU TO DO YOUR WORK, TAKE CARE OF THINGS AT HOME, OR GET ALONG WITH OTHER PEOPLE: NOT DIFFICULT AT ALL
6. BECOMING EASILY ANNOYED OR IRRITABLE: NOT AT ALL
2. NOT BEING ABLE TO STOP OR CONTROL WORRYING: NOT AT ALL
7. FEELING AFRAID AS IF SOMETHING AWFUL MIGHT HAPPEN: NOT AT ALL
5. BEING SO RESTLESS THAT IT IS HARD TO SIT STILL: NOT AT ALL
GAD7 TOTAL SCORE: 0
1. FEELING NERVOUS, ANXIOUS, OR ON EDGE: NOT AT ALL

## 2025-02-18 ASSESSMENT — PATIENT HEALTH QUESTIONNAIRE - PHQ9
SUM OF ALL RESPONSES TO PHQ QUESTIONS 1-9: 0
2. FEELING DOWN, DEPRESSED OR HOPELESS: NOT AT ALL
SUM OF ALL RESPONSES TO PHQ QUESTIONS 1-9: 0
1. LITTLE INTEREST OR PLEASURE IN DOING THINGS: NOT AT ALL
SUM OF ALL RESPONSES TO PHQ9 QUESTIONS 1 & 2: 0

## 2025-02-18 ASSESSMENT — LIFESTYLE VARIABLES
HOW MANY STANDARD DRINKS CONTAINING ALCOHOL DO YOU HAVE ON A TYPICAL DAY: PATIENT DOES NOT DRINK
HOW OFTEN DO YOU HAVE A DRINK CONTAINING ALCOHOL: NEVER

## 2025-02-18 NOTE — PROGRESS NOTES
Chief Complaint   Patient presents with    Medicare AWV     Patient states \" she is present for a follow-up. Patient states \" she has a rash on the right side of her neck.\"    \"Have you been to the ER, urgent care clinic since your last visit?  Hospitalized since your last visit?\"    NO    “Have you seen or consulted any other health care providers outside our system since your last visit?”    NO

## 2025-02-23 RX ORDER — POTASSIUM CHLORIDE 750 MG/1
10 TABLET, EXTENDED RELEASE ORAL DAILY
Qty: 60 TABLET | Refills: 11 | Status: SHIPPED | OUTPATIENT
Start: 2025-02-23

## 2025-02-23 RX ORDER — ACETAMINOPHEN 325 MG/1
325 TABLET ORAL EVERY 6 HOURS PRN
Qty: 120 TABLET | Refills: 11 | Status: SHIPPED | OUTPATIENT
Start: 2025-02-23

## 2025-02-25 RX ORDER — CLOBETASOL PROPIONATE 0.5 MG/G
OINTMENT TOPICAL
Qty: 30 G | Refills: 5 | Status: SHIPPED | OUTPATIENT
Start: 2025-02-25

## 2025-03-06 DIAGNOSIS — R42 VERTIGO: ICD-10-CM

## 2025-03-06 RX ORDER — MECLIZINE HCL 12.5 MG 12.5 MG/1
12.5 TABLET ORAL 2 TIMES DAILY PRN
Qty: 60 TABLET | Refills: 5 | Status: SHIPPED | OUTPATIENT
Start: 2025-03-06

## 2025-03-06 RX ORDER — WARFARIN SODIUM 5 MG/1
TABLET ORAL
Qty: 45 TABLET | Refills: 11 | Status: SHIPPED | OUTPATIENT
Start: 2025-03-06

## 2025-03-06 NOTE — TELEPHONE ENCOUNTER
Medication refill:    Antivert 12.5 mg  Coumadin 5 mg    [facility] [address] [facility] [phone]

## 2025-03-31 ENCOUNTER — ANESTHESIA (OUTPATIENT)
Facility: HOSPITAL | Age: 85
End: 2025-03-31
Payer: MEDICARE

## 2025-03-31 ENCOUNTER — ANESTHESIA EVENT (OUTPATIENT)
Facility: HOSPITAL | Age: 85
End: 2025-03-31
Payer: MEDICARE

## 2025-03-31 ENCOUNTER — HOSPITAL ENCOUNTER (OUTPATIENT)
Facility: HOSPITAL | Age: 85
Setting detail: OUTPATIENT SURGERY
Discharge: HOME | End: 2025-03-31
Attending: PODIATRIST | Admitting: PODIATRIST
Payer: MEDICARE

## 2025-03-31 VITALS
HEIGHT: 70 IN | TEMPERATURE: 97.2 F | WEIGHT: 224 LBS | OXYGEN SATURATION: 99 % | BODY MASS INDEX: 32.07 KG/M2 | DIASTOLIC BLOOD PRESSURE: 82 MMHG | HEART RATE: 75 BPM | SYSTOLIC BLOOD PRESSURE: 139 MMHG | RESPIRATION RATE: 14 BRPM

## 2025-03-31 DIAGNOSIS — M89.8X7 EXOSTOSIS OF RIGHT FOOT: Primary | ICD-10-CM

## 2025-03-31 PROCEDURE — 7100000010 HC PHASE II RECOVERY - FIRST 15 MIN: Performed by: PODIATRIST

## 2025-03-31 PROCEDURE — 3600000012 HC SURGERY LEVEL 2 ADDTL 15MIN: Performed by: PODIATRIST

## 2025-03-31 PROCEDURE — 7100000011 HC PHASE II RECOVERY - ADDTL 15 MIN: Performed by: PODIATRIST

## 2025-03-31 PROCEDURE — 2720000010 HC SURG SUPPLY STERILE: Performed by: PODIATRIST

## 2025-03-31 PROCEDURE — 3600000002 HC SURGERY LEVEL 2 BASE: Performed by: PODIATRIST

## 2025-03-31 PROCEDURE — 6360000002 HC RX W HCPCS: Performed by: PODIATRIST

## 2025-03-31 PROCEDURE — 2709999900 HC NON-CHARGEABLE SUPPLY: Performed by: PODIATRIST

## 2025-03-31 RX ORDER — CEFAZOLIN SODIUM/WATER 2 G/20 ML
2000 SYRINGE (ML) INTRAVENOUS
Status: COMPLETED | OUTPATIENT
Start: 2025-03-31 | End: 2025-03-31

## 2025-03-31 RX ORDER — HYDROCODONE BITARTRATE AND ACETAMINOPHEN 7.5; 325 MG/1; MG/1
1 TABLET ORAL EVERY 6 HOURS PRN
Qty: 12 TABLET | Refills: 0 | Status: SHIPPED | OUTPATIENT
Start: 2025-03-31 | End: 2025-04-03

## 2025-03-31 RX ORDER — DOXYCYCLINE HYCLATE 100 MG
100 TABLET ORAL 2 TIMES DAILY
Qty: 10 TABLET | Refills: 0 | Status: SHIPPED | OUTPATIENT
Start: 2025-03-31 | End: 2025-04-10

## 2025-03-31 RX ORDER — BUPIVACAINE HYDROCHLORIDE 2.5 MG/ML
INJECTION, SOLUTION EPIDURAL; INFILTRATION; INTRACAUDAL; PERINEURAL PRN
Status: DISCONTINUED | OUTPATIENT
Start: 2025-03-31 | End: 2025-03-31 | Stop reason: ALTCHOICE

## 2025-03-31 RX ADMIN — Medication 2000 MG: at 08:13

## 2025-03-31 ASSESSMENT — PAIN - FUNCTIONAL ASSESSMENT
PAIN_FUNCTIONAL_ASSESSMENT: NONE - DENIES PAIN
PAIN_FUNCTIONAL_ASSESSMENT: 0-10
PAIN_FUNCTIONAL_ASSESSMENT: ACTIVITIES ARE NOT PREVENTED

## 2025-03-31 ASSESSMENT — PAIN DESCRIPTION - DESCRIPTORS: DESCRIPTORS: NAGGING

## 2025-03-31 NOTE — PERIOP NOTE
Kylee Koo  1940  [id number]    Situation:  Verbal report given from: Cristina Mcfadden RN  Procedure: Procedure(s):  HEMIPHALANGECTOMY FIRST AND SECOND RIGHT TOES    Background:    Preoperative diagnosis: Exostosis of right foot [M89.8X7]    Postoperative diagnosis: * No post-op diagnosis entered *    :  Dr. Cason    Assistant(s): Circulator: Rodger Chanel RN  Monitoring Nurse: Devendra Hutchinson RN  Scrub Person First: Kiersten Gardner RN    Specimens: * No specimens in log *    Assessment:  Intra-procedure medications         Anesthesia gave intra-procedure sedation and medications, see anesthesia flow sheet     Intravenous fluids: LR@ KVO     Vital signs stable

## 2025-03-31 NOTE — DISCHARGE INSTRUCTIONS
Wednesday April 9,2025 @ 8am   Location: <X> [facility]                       <> Happy

## 2025-03-31 NOTE — ANESTHESIA PRE PROCEDURE
Department of Anesthesiology  Preprocedure Note       Name:  Kylee Koo   Age:  85 y.o.  :  1940                                          MRN:  [id number]         Date:  3/31/2025      Surgeon: Surgeon(s):  Brunilda Cason DPM    Procedure: Procedure(s):  HEMIPHALANGECTOMY FIRST AND SECOND RIGHT TOES    Medications prior to admission:   Prior to Admission medications    Medication Sig Start Date End Date Taking? Authorizing Provider   meclizine (ANTIVERT) 12.5 MG tablet Take 1 tablet by mouth 2 times daily as needed for Dizziness 3/6/25   Aly Rizvi MD   warfarin (COUMADIN) 5 MG tablet Take 1 tablet by mouth daily 1 tablet Monday through Friday, 1.5 tablets (7.5 mg) every Saturday & . 3/6/25   Aly Rizvi MD   clobetasol (TEMOVATE) 0.05 % ointment Apply topically 2 times daily. 25   Aly Rizvi MD   acetaminophen (TYLENOL) 325 MG tablet Take 1 tablet by mouth every 6 hours as needed for Pain 25   Aly Rizvi MD   potassium chloride (KLOR-CON) 10 MEQ extended release tablet Take 1 tablet by mouth daily 25   Aly Rizvi MD   metoprolol succinate (TOPROL XL) 25 MG extended release tablet take 1/2 tablet by mouth once daily 25   Aly Rizvi MD   furosemide (LASIX) 40 MG tablet Take 1 tablet by mouth daily 25   Aly Rizvi MD   amLODIPine (NORVASC) 10 MG tablet take 1 tablet by mouth once daily 10/15/24   Aly Rizvi MD   lisinopril (PRINIVIL;ZESTRIL) 40 MG tablet take 1 tablet by mouth once daily 10/15/24   Aly Rizvi MD       Current medications:    No current facility-administered medications for this encounter.       Allergies:    Allergies   Allergen Reactions   • Codeine Other (See Comments)     \"Hallucinations\"   • Oxycodone      Extreme drowsiness        Problem List:    Patient Active Problem List   Diagnosis Code   • Obesity (BMI 30.0-34.9) E66.811   • DJD (degenerative joint disease) M19.90   • S/P cardiac cath

## 2025-03-31 NOTE — BRIEF OP NOTE
Brief Postoperative Note      Patient: Kylee Koo  YOB: 1940  MRN: [id number]    Date of Procedure: 3/31/2025    Pre-Op Diagnosis Codes:      * Exostosis of right foot [M89.8X7]    Post-Op Diagnosis: Same       Procedure(s):  HEMIPHALANGECTOMY FIRST AND SECOND RIGHT TOES    Surgeon(s):  Brunilda Cason DPM    Assistant:  * No surgical staff found *    Anesthesia: Local    Estimated Blood Loss (mL): Minimal    Complications: None    Specimens:   * No specimens in log *    Implants:  * No implants in log *      Drains: * No LDAs found *    Findings:  Infection Present At Time Of Surgery (PATOS) (choose all levels that have infection present):  No infection present  Other Findings: exosotosis 1st and 2nd right toes    Electronically signed by Brunilda Cason DPM on 3/31/2025 at 10:23 AM

## 2025-03-31 NOTE — H&P
XRAY right foot :  Abducted hallux right foot with IM angle 14 degrees with hyperostosis medial 2nd PIPJ and lateral !st right IPJ      Data Review:   No results found for this or any previous visit (from the past 24 hours).        Assessment:     Active Problems:    * No active hospital problems. *  Resolved Problems:    * No resolved hospital problems. *    Hyperostosis 1st and 2nd right toes  Plan:   Scheduled for hemiphalangectomy 1st and 2nd right toes.  Discuss risks, benefits, expected outcome as well as post op course

## 2025-04-01 NOTE — OP NOTE
[facility]               [address]  [address]                            OPERATIVE REPORT      PATIENT NAME: LEYDI WILSON             : 1940  MED REC NO: [id number]                       ROOM: OR  ACCOUNT NO: [id number]                       ADMIT DATE: 2025  PROVIDER: Brunilda Cason DPM    DATE OF SERVICE:  2025    PREOPERATIVE DIAGNOSES:  Exostosis, first and second right toes.    POSTOPERATIVE DIAGNOSES:  Exostosis, first and second right toes.    PROCEDURES PERFORMED:  Hemiphalangectomy, second and first right toes.    SURGEON:  Brunilda Cason DPM    ASSISTANT:  none    ANESTHESIA:  Local.    ESTIMATED BLOOD LOSS:  Minimal.    SPECIMENS REMOVED:  None.         COMPLICATIONS: none    IMPLANTS:  none    INDICATIONS:  This 85-year-old female presents with painful, enucleated, reoccurring lesions, first and second right toes.  At this time, surgical intervention has been opted as treatment of choice.  Medical history and physical has been performed and the patient released to surgery.    Physical examination of the lower extremities:  Palpable pedal pulses with fairly good muscle strength and epicritic sensation intact, lower extremities, bilateral.  She has dorsiflexed and flexed second right toe and an abducted hallux, right foot.  There is a palpable painful hyperkeratosis with palpable underlying exostosis lateral first right IPJ and medial second right PIPJ.    X-rays right foot shows an abducted hallux with the first IM angle of 14 degrees with an exostosis, medial second right PIPJ and lateral first right IPJ.    DESCRIPTION OF PROCEDURE:  The patient was brought into the operating room and placed on the operating table in supine position.  Anesthesia was achieved to the first and second right toes using 0.05% Marcaine with epinephrine.  The right foot was now prepped and draped in usual sterile manner and a successful

## 2025-07-05 NOTE — DISCHARGE INSTRUCTIONS
Blood pressure currently 173/93  continue Coreg, hydralazine and amlodipine at previous doses  Routine vital signs  continue prn hydralazine    Discharge Instructions [facility]  [address]   [address], [address]  [place] [address]   Telephone: [phone]     FAX [phone]    NAME:  Kylee Koo  YOB: 1940  MEDICAL RECORD NUMBER:  [id number]  DATE:  4/29/2024  WOUND CARE ORDERS:  Bilateral lower leg wounds :Cleanse with soap and water , apply primary dressing Silver Alginate  cover with secondary dressing ABD, Roll Gauze, and Tape .  Apply Double tubi  Pt./pcg/HH nurse to change (freq) 3x Weekly  and as needed for compromise.Follow up with provider in 2 Week(s).   Home Health Agency: [facility]  TREATMENT ORDERS:    Elevate leg(s) above the level of the heart when sitting.   Avoid prolonged standing in one place.  Do no get dressing/wrap wet.  Follow Diet as prescribed:   [] Diet as tolerated: [] Calorie Diabetic Diet: Low carb and no Sugar [] No Added Salt:  [] Increase Protein: [] Limit the amount of liquid you are drinking and avoid drinking in between meals     Return Appointment:  [] Return Appointment: With Dr. Love in  2 Week(s)  [] Nurse Visit : *** days  [] Ordered tests:    Electronically signed Vy Phillips RN on 4/29/2024 at 9:23 AM     Wound Care Center Information: Should you experience any significant changes in your wound(s) or have questions about your wound care, please contact the [facility] at MONDAY - FRIDAY 8:00 am - 4:30.  If you need help with your wound outside these hours and cannot wait until we are again available, contact your PCP or go to the hospital emergency room.   PLEASE NOTE: IF YOU ARE UNABLE TO OBTAIN WOUND SUPPLIES, CONTINUE TO USE THE SUPPLIES YOU HAVE AVAILABLE UNTIL YOU ARE ABLE TO REACH US. IT IS MOST IMPORTANT TO KEEP THE WOUND COVERED AT ALL TIMES.     Physician Signature:_______________________    Date: ___________ Time:  ____________

## 2025-07-30 DIAGNOSIS — I50.32 CHRONIC HEART FAILURE WITH PRESERVED EJECTION FRACTION (HCC): ICD-10-CM

## 2025-07-31 RX ORDER — METOPROLOL SUCCINATE 25 MG/1
12.5 TABLET, EXTENDED RELEASE ORAL DAILY
Qty: 90 TABLET | Refills: 3 | Status: SHIPPED | OUTPATIENT
Start: 2025-07-31

## 2025-07-31 RX ORDER — POTASSIUM CHLORIDE 750 MG/1
10 TABLET, EXTENDED RELEASE ORAL DAILY
Qty: 90 TABLET | Refills: 3 | Status: SHIPPED | OUTPATIENT
Start: 2025-07-31

## 2025-08-18 ENCOUNTER — OFFICE VISIT (OUTPATIENT)
Facility: CLINIC | Age: 85
End: 2025-08-18
Payer: MEDICARE

## 2025-08-18 VITALS
BODY MASS INDEX: 33.84 KG/M2 | DIASTOLIC BLOOD PRESSURE: 84 MMHG | RESPIRATION RATE: 16 BRPM | SYSTOLIC BLOOD PRESSURE: 127 MMHG | TEMPERATURE: 97.7 F | HEART RATE: 75 BPM | WEIGHT: 236.4 LBS | OXYGEN SATURATION: 90 % | HEIGHT: 70 IN

## 2025-08-18 DIAGNOSIS — E05.90 HYPERTHYROIDISM: ICD-10-CM

## 2025-08-18 DIAGNOSIS — E78.5 DYSLIPIDEMIA: ICD-10-CM

## 2025-08-18 DIAGNOSIS — E66.811 OBESITY (BMI 30.0-34.9): ICD-10-CM

## 2025-08-18 DIAGNOSIS — I50.32 CHRONIC DIASTOLIC CONGESTIVE HEART FAILURE (HCC): ICD-10-CM

## 2025-08-18 DIAGNOSIS — I48.21 PERMANENT ATRIAL FIBRILLATION (HCC): Primary | ICD-10-CM

## 2025-08-18 DIAGNOSIS — I10 PRIMARY HYPERTENSION: ICD-10-CM

## 2025-08-18 DIAGNOSIS — L30.9 ECZEMA, UNSPECIFIED TYPE: ICD-10-CM

## 2025-08-18 LAB
ALBUMIN SERPL-MCNC: 3.5 G/DL (ref 3.5–5.2)
ALBUMIN/GLOB SERPL: 1.3 (ref 1.1–2.2)
ALP SERPL-CCNC: 77 U/L (ref 35–104)
ALT SERPL-CCNC: 24 U/L (ref 10–35)
ANION GAP SERPL CALC-SCNC: 11 MMOL/L (ref 2–14)
APPEARANCE UR: CLEAR
AST SERPL-CCNC: 16 U/L (ref 10–35)
BACTERIA URNS QL MICRO: ABNORMAL /HPF
BASOPHILS # BLD: 0.01 K/UL (ref 0–0.1)
BASOPHILS NFR BLD: 0.2 % (ref 0–1)
BILIRUB SERPL-MCNC: 0.4 MG/DL (ref 0–1.2)
BILIRUB UR QL: NEGATIVE
BUN SERPL-MCNC: 19 MG/DL (ref 8–23)
BUN/CREAT SERPL: 20 (ref 12–20)
CALCIUM SERPL-MCNC: 9.4 MG/DL (ref 8.8–10.2)
CHLORIDE SERPL-SCNC: 104 MMOL/L (ref 98–107)
CHOLEST SERPL-MCNC: 218 MG/DL (ref 0–200)
CO2 SERPL-SCNC: 30 MMOL/L (ref 20–29)
COLOR UR: ABNORMAL
CREAT SERPL-MCNC: 0.97 MG/DL (ref 0.6–1)
DIFFERENTIAL METHOD BLD: ABNORMAL
EOSINOPHIL # BLD: 0.05 K/UL (ref 0–0.4)
EOSINOPHIL NFR BLD: 0.9 % (ref 0–7)
EPITH CASTS URNS QL MICRO: ABNORMAL /LPF
ERYTHROCYTE [DISTWIDTH] IN BLOOD BY AUTOMATED COUNT: 15.5 % (ref 11.5–14.5)
GLOBULIN SER CALC-MCNC: 2.6 G/DL (ref 2–4)
GLUCOSE SERPL-MCNC: 84 MG/DL (ref 65–100)
GLUCOSE UR STRIP.AUTO-MCNC: NEGATIVE MG/DL
HCT VFR BLD AUTO: 36.5 % (ref 35–47)
HDLC SERPL-MCNC: 95 MG/DL (ref 40–60)
HDLC SERPL: 2.3 (ref 0–5)
HGB BLD-MCNC: 11.3 G/DL (ref 11.5–16)
HGB UR QL STRIP: NEGATIVE
HYALINE CASTS URNS QL MICRO: ABNORMAL /LPF (ref 0–5)
IMM GRANULOCYTES # BLD AUTO: 0.01 K/UL (ref 0–0.04)
IMM GRANULOCYTES NFR BLD AUTO: 0.2 % (ref 0–0.5)
KETONES UR QL STRIP.AUTO: NEGATIVE MG/DL
LDLC SERPL CALC-MCNC: 105 MG/DL (ref 0–100)
LEUKOCYTE ESTERASE UR QL STRIP.AUTO: ABNORMAL
LYMPHOCYTES # BLD: 1.04 K/UL (ref 0.8–3.5)
LYMPHOCYTES NFR BLD: 19.5 % (ref 12–49)
MCH RBC QN AUTO: 25.6 PG (ref 26–34)
MCHC RBC AUTO-ENTMCNC: 31 G/DL (ref 30–36.5)
MCV RBC AUTO: 82.6 FL (ref 80–99)
MONOCYTES # BLD: 0.58 K/UL (ref 0–1)
MONOCYTES NFR BLD: 10.9 % (ref 5–13)
NEUTS SEG # BLD: 3.65 K/UL (ref 1.8–8)
NEUTS SEG NFR BLD: 68.3 % (ref 32–75)
NITRITE UR QL STRIP.AUTO: NEGATIVE
NRBC # BLD: 0 K/UL (ref 0–0.01)
NRBC BLD-RTO: 0 PER 100 WBC
PH UR STRIP: 5.5 (ref 5–8)
PLATELET # BLD AUTO: 204 K/UL (ref 150–400)
PMV BLD AUTO: 10.5 FL (ref 8.9–12.9)
POC INR: 3
POTASSIUM SERPL-SCNC: 3.3 MMOL/L (ref 3.5–5.1)
PROT SERPL-MCNC: 6.1 G/DL (ref 6.4–8.3)
PROT UR STRIP-MCNC: NEGATIVE MG/DL
PROTHROMBIN TIME, POC: 35.9
RBC # BLD AUTO: 4.42 M/UL (ref 3.8–5.2)
RBC #/AREA URNS HPF: ABNORMAL /HPF (ref 0–5)
SODIUM SERPL-SCNC: 145 MMOL/L (ref 136–145)
SP GR UR REFRACTOMETRY: 1.01 (ref 1–1.03)
SPECIMEN HOLD: NORMAL
TRIGL SERPL-MCNC: 94 MG/DL (ref 0–150)
TSH, 3RD GENERATION: 1.51 UIU/ML (ref 0.27–4.2)
UROBILINOGEN UR QL STRIP.AUTO: 0.2 EU/DL (ref 0.2–1)
VLDLC SERPL CALC-MCNC: 19 MG/DL
WBC # BLD AUTO: 5.3 K/UL (ref 3.6–11)
WBC URNS QL MICRO: ABNORMAL /HPF (ref 0–4)

## 2025-08-18 PROCEDURE — 36415 COLL VENOUS BLD VENIPUNCTURE: CPT | Performed by: INTERNAL MEDICINE

## 2025-08-18 PROCEDURE — 1126F AMNT PAIN NOTED NONE PRSNT: CPT | Performed by: INTERNAL MEDICINE

## 2025-08-18 PROCEDURE — G8428 CUR MEDS NOT DOCUMENT: HCPCS | Performed by: INTERNAL MEDICINE

## 2025-08-18 PROCEDURE — G8417 CALC BMI ABV UP PARAM F/U: HCPCS | Performed by: INTERNAL MEDICINE

## 2025-08-18 PROCEDURE — G8400 PT W/DXA NO RESULTS DOC: HCPCS | Performed by: INTERNAL MEDICINE

## 2025-08-18 PROCEDURE — 85610 PROTHROMBIN TIME: CPT | Performed by: INTERNAL MEDICINE

## 2025-08-18 PROCEDURE — 3079F DIAST BP 80-89 MM HG: CPT | Performed by: INTERNAL MEDICINE

## 2025-08-18 PROCEDURE — 1036F TOBACCO NON-USER: CPT | Performed by: INTERNAL MEDICINE

## 2025-08-18 PROCEDURE — 3074F SYST BP LT 130 MM HG: CPT | Performed by: INTERNAL MEDICINE

## 2025-08-18 PROCEDURE — 1090F PRES/ABSN URINE INCON ASSESS: CPT | Performed by: INTERNAL MEDICINE

## 2025-08-18 PROCEDURE — 99214 OFFICE O/P EST MOD 30 MIN: CPT | Performed by: INTERNAL MEDICINE

## 2025-08-18 PROCEDURE — 1123F ACP DISCUSS/DSCN MKR DOCD: CPT | Performed by: INTERNAL MEDICINE

## 2025-08-18 SDOH — ECONOMIC STABILITY: FOOD INSECURITY: WITHIN THE PAST 12 MONTHS, YOU WORRIED THAT YOUR FOOD WOULD RUN OUT BEFORE YOU GOT MONEY TO BUY MORE.: NEVER TRUE

## 2025-08-18 SDOH — ECONOMIC STABILITY: FOOD INSECURITY: WITHIN THE PAST 12 MONTHS, THE FOOD YOU BOUGHT JUST DIDN'T LAST AND YOU DIDN'T HAVE MONEY TO GET MORE.: NEVER TRUE

## 2025-08-18 ASSESSMENT — PATIENT HEALTH QUESTIONNAIRE - PHQ9
2. FEELING DOWN, DEPRESSED OR HOPELESS: NOT AT ALL
1. LITTLE INTEREST OR PLEASURE IN DOING THINGS: NOT AT ALL
SUM OF ALL RESPONSES TO PHQ QUESTIONS 1-9: 0

## 2025-08-24 ENCOUNTER — RESULTS FOLLOW-UP (OUTPATIENT)
Facility: CLINIC | Age: 85
End: 2025-08-24

## 2025-08-24 DIAGNOSIS — N39.0 UTI (URINARY TRACT INFECTION) WITH PYURIA: Primary | ICD-10-CM

## 2025-08-24 RX ORDER — CEFUROXIME AXETIL 250 MG/1
250 TABLET ORAL 2 TIMES DAILY
Qty: 14 TABLET | Refills: 0 | Status: SHIPPED | OUTPATIENT
Start: 2025-08-24 | End: 2025-08-31

## 2025-08-29 RX ORDER — AMLODIPINE BESYLATE 10 MG/1
10 TABLET ORAL DAILY
Qty: 90 TABLET | Refills: 3 | Status: SHIPPED | OUTPATIENT
Start: 2025-08-29

## (undated) DEVICE — SOLUTION SCRB 2OZ 10% POVIDONE IOD ANTIMIC BTL

## (undated) DEVICE — GLOVE SURG SZ 65 THK91MIL LTX FREE SYN POLYISOPRENE

## (undated) DEVICE — SYRINGE MED 10ML LUERLOCK TIP W/O SFTY DISP

## (undated) DEVICE — DRESSING PETRO W3XL3IN OIL EMUL N ADH GZ KNIT IMPREG CELOS

## (undated) DEVICE — SUTURE NONABSORBABLE MONOFILAMENT 5-0 PS-2 18 IN BLK ETHILON 1666H

## (undated) DEVICE — STOCKINETTE,DOUBLE PLY,6X54,STERILE: Brand: MEDLINE

## (undated) DEVICE — BANDAGE,GAUZE,BULKEE II,4.5"X4.1YD,STRL: Brand: MEDLINE

## (undated) DEVICE — BANDAGE,GAUZE,CONFORMING,3"X75",STRL,LF: Brand: MEDLINE

## (undated) DEVICE — MINOR BASIN -SMH: Brand: MEDLINE INDUSTRIES, INC.

## (undated) DEVICE — DRAPE,EXTREMITY,89X128,STERILE: Brand: MEDLINE

## (undated) DEVICE — DRAPE,REIN 53X77,STERILE: Brand: MEDLINE

## (undated) DEVICE — COVER LT HNDL PLAS RIG 1 PER PK

## (undated) DEVICE — SPONGE GZ W4XL4IN COT 12 PLY TYP VII WVN C FLD DSGN STERILE

## (undated) DEVICE — 3.0MM ROUND FLUTED

## (undated) DEVICE — SYRINGE,EAR/ULCER, 2 OZ, STERILE: Brand: MEDLINE

## (undated) DEVICE — HYPODERMIC SAFETY NEEDLE: Brand: MONOJECT

## (undated) DEVICE — BANDAGE COMPR W4INXL5YD WHT BGE POLY COT M E WRP WV HK AND